# Patient Record
Sex: FEMALE | Race: AMERICAN INDIAN OR ALASKA NATIVE | NOT HISPANIC OR LATINO | URBAN - METROPOLITAN AREA
[De-identification: names, ages, dates, MRNs, and addresses within clinical notes are randomized per-mention and may not be internally consistent; named-entity substitution may affect disease eponyms.]

---

## 2017-11-02 ENCOUNTER — INPATIENT (INPATIENT)
Facility: HOSPITAL | Age: 62
LOS: 0 days | Discharge: HOME | End: 2017-11-03
Attending: INTERNAL MEDICINE

## 2017-11-02 DIAGNOSIS — C34.91 MALIGNANT NEOPLASM OF UNSPECIFIED PART OF RIGHT BRONCHUS OR LUNG: ICD-10-CM

## 2017-11-02 DIAGNOSIS — J90 PLEURAL EFFUSION, NOT ELSEWHERE CLASSIFIED: ICD-10-CM

## 2017-11-06 DIAGNOSIS — Z82.49 FAMILY HISTORY OF ISCHEMIC HEART DISEASE AND OTHER DISEASES OF THE CIRCULATORY SYSTEM: ICD-10-CM

## 2017-11-06 DIAGNOSIS — Z77.22 CONTACT WITH AND (SUSPECTED) EXPOSURE TO ENVIRONMENTAL TOBACCO SMOKE (ACUTE) (CHRONIC): ICD-10-CM

## 2017-11-06 DIAGNOSIS — Z83.3 FAMILY HISTORY OF DIABETES MELLITUS: ICD-10-CM

## 2017-11-06 DIAGNOSIS — J90 PLEURAL EFFUSION, NOT ELSEWHERE CLASSIFIED: ICD-10-CM

## 2017-11-06 DIAGNOSIS — R91.1 SOLITARY PULMONARY NODULE: ICD-10-CM

## 2017-11-06 DIAGNOSIS — Z82.3 FAMILY HISTORY OF STROKE: ICD-10-CM

## 2017-11-06 DIAGNOSIS — R05 COUGH: ICD-10-CM

## 2017-11-17 ENCOUNTER — APPOINTMENT (OUTPATIENT)
Dept: PULMONOLOGY | Facility: CLINIC | Age: 62
End: 2017-11-17

## 2017-11-17 ENCOUNTER — OUTPATIENT (OUTPATIENT)
Dept: OUTPATIENT SERVICES | Facility: HOSPITAL | Age: 62
LOS: 1 days | Discharge: HOME | End: 2017-11-17

## 2017-11-17 VITALS
HEART RATE: 98 BPM | BODY MASS INDEX: 30.16 KG/M2 | HEIGHT: 65 IN | SYSTOLIC BLOOD PRESSURE: 108 MMHG | DIASTOLIC BLOOD PRESSURE: 79 MMHG | WEIGHT: 181 LBS

## 2017-11-17 DIAGNOSIS — J90 PLEURAL EFFUSION, NOT ELSEWHERE CLASSIFIED: ICD-10-CM

## 2017-11-17 DIAGNOSIS — Z78.9 OTHER SPECIFIED HEALTH STATUS: ICD-10-CM

## 2017-11-24 ENCOUNTER — APPOINTMENT (OUTPATIENT)
Dept: HEMATOLOGY ONCOLOGY | Facility: CLINIC | Age: 62
End: 2017-11-24

## 2017-11-24 ENCOUNTER — OUTPATIENT (OUTPATIENT)
Dept: OUTPATIENT SERVICES | Facility: HOSPITAL | Age: 62
LOS: 1 days | Discharge: HOME | End: 2017-11-24

## 2017-11-24 VITALS
HEIGHT: 65 IN | HEART RATE: 105 BPM | RESPIRATION RATE: 16 BRPM | TEMPERATURE: 96.8 F | DIASTOLIC BLOOD PRESSURE: 76 MMHG | BODY MASS INDEX: 30.32 KG/M2 | WEIGHT: 182 LBS | SYSTOLIC BLOOD PRESSURE: 111 MMHG

## 2017-11-24 DIAGNOSIS — J90 PLEURAL EFFUSION, NOT ELSEWHERE CLASSIFIED: ICD-10-CM

## 2017-11-24 DIAGNOSIS — R06.00 DYSPNEA, UNSPECIFIED: ICD-10-CM

## 2017-11-24 LAB
BASOPHILS # BLD: 0.02 TH/MM3
BASOPHILS NFR BLD: 0.2 %
EOSINOPHIL # BLD: 0 TH/MM3
EOSINOPHIL NFR BLD: 0 %
ERYTHROCYTE [DISTWIDTH] IN BLOOD BY AUTOMATED COUNT: 13.4 %
GRANULOCYTES # BLD: 6.52 TH/MM3
GRANULOCYTES NFR BLD: 75.8 %
HCT VFR BLD AUTO: 48.6 %
HGB BLD-MCNC: 15.8 G/DL
IMM GRANULOCYTES # BLD: 0.02 TH/MM3
IMM GRANULOCYTES NFR BLD: 0.2 %
LYMPHOCYTES # BLD: 1.41 TH/MM3
LYMPHOCYTES NFR BLD: 16.4 %
MCH RBC QN AUTO: 29.2 PG
MCHC RBC AUTO-ENTMCNC: 32.5 G/DL
MCV RBC AUTO: 89.8 FL
MONOCYTES # BLD: 0.64 TH/MM3
MONOCYTES NFR BLD: 7.4 %
PLATELET # BLD: 376 TH/MM3
PMV BLD AUTO: 9.5 FL
RBC # BLD AUTO: 5.41 MIL/MM3
WBC # BLD: 8.61 TH/MM3

## 2017-11-27 ENCOUNTER — OUTPATIENT (OUTPATIENT)
Dept: OUTPATIENT SERVICES | Facility: HOSPITAL | Age: 62
LOS: 1 days | Discharge: HOME | End: 2017-11-27

## 2017-11-27 DIAGNOSIS — J90 PLEURAL EFFUSION, NOT ELSEWHERE CLASSIFIED: ICD-10-CM

## 2017-11-27 LAB
ALBUMIN SERPL-MCNC: 3.9 G/DL
ALBUMIN/GLOB SERPL: 1.5
ALP SERPL-CCNC: 82 IU/L
ALT SERPL-CCNC: 23 IU/L
ANION GAP SERPL CALC-SCNC: 10 MEQ/L
APTT PPP: 31.5 SEC
AST SERPL-CCNC: 23 IU/L
BILIRUB SERPL-MCNC: 0.8 MG/DL
BUN SERPL-MCNC: 10 MG/DL
BUN/CREAT SERPL: 16.1 %
CALCIUM SERPL-MCNC: 9.3 MG/DL
CANCER AG125 SERPL-ACNC: 638 U/ML
CANCER AG19-9 SERPL-ACNC: 16 U/ML
CEA SERPL-MCNC: 3.6 NG/ML
CHLORIDE SERPL-SCNC: 100 MEQ/L
CO2 SERPL-SCNC: 27 MEQ/L
CREAT SERPL-MCNC: 0.62 MG/DL
GFR SERPL CREATININE-BSD FRML MDRD: 98
GLUCOSE SERPL-MCNC: 84 MG/DL
INR PPP: 1.1
POTASSIUM SERPL-SCNC: 4.6 MMOL/L
PROT SERPL-MCNC: 6.5 G/DL
PROTHROMBIN TIME: 11.5 SEC
SODIUM SERPL-SCNC: 137 MEQ/L

## 2017-11-29 ENCOUNTER — APPOINTMENT (OUTPATIENT)
Dept: INTERNAL MEDICINE | Facility: CLINIC | Age: 62
End: 2017-11-29

## 2017-11-29 ENCOUNTER — OUTPATIENT (OUTPATIENT)
Dept: OUTPATIENT SERVICES | Facility: HOSPITAL | Age: 62
LOS: 1 days | Discharge: HOME | End: 2017-11-29

## 2017-11-29 DIAGNOSIS — J90 PLEURAL EFFUSION, NOT ELSEWHERE CLASSIFIED: ICD-10-CM

## 2017-11-29 DIAGNOSIS — C80.1 MALIGNANT (PRIMARY) NEOPLASM, UNSPECIFIED: ICD-10-CM

## 2017-12-01 ENCOUNTER — APPOINTMENT (OUTPATIENT)
Dept: HEMATOLOGY ONCOLOGY | Facility: CLINIC | Age: 62
End: 2017-12-01

## 2017-12-01 VITALS
SYSTOLIC BLOOD PRESSURE: 113 MMHG | WEIGHT: 172 LBS | TEMPERATURE: 96.6 F | DIASTOLIC BLOOD PRESSURE: 77 MMHG | RESPIRATION RATE: 16 BRPM | HEIGHT: 65 IN | HEART RATE: 88 BPM | BODY MASS INDEX: 28.66 KG/M2

## 2017-12-04 DIAGNOSIS — J90 PLEURAL EFFUSION, NOT ELSEWHERE CLASSIFIED: ICD-10-CM

## 2017-12-06 ENCOUNTER — APPOINTMENT (OUTPATIENT)
Dept: HEMATOLOGY ONCOLOGY | Facility: CLINIC | Age: 62
End: 2017-12-06

## 2017-12-07 ENCOUNTER — OUTPATIENT (OUTPATIENT)
Dept: OUTPATIENT SERVICES | Facility: HOSPITAL | Age: 62
LOS: 1 days | Discharge: HOME | End: 2017-12-07

## 2017-12-07 DIAGNOSIS — J90 PLEURAL EFFUSION, NOT ELSEWHERE CLASSIFIED: ICD-10-CM

## 2017-12-07 DIAGNOSIS — C80.1 MALIGNANT (PRIMARY) NEOPLASM, UNSPECIFIED: ICD-10-CM

## 2017-12-08 ENCOUNTER — RX RENEWAL (OUTPATIENT)
Age: 62
End: 2017-12-08

## 2017-12-12 ENCOUNTER — OUTPATIENT (OUTPATIENT)
Dept: OUTPATIENT SERVICES | Facility: HOSPITAL | Age: 62
LOS: 1 days | Discharge: HOME | End: 2017-12-12

## 2017-12-12 DIAGNOSIS — J90 PLEURAL EFFUSION, NOT ELSEWHERE CLASSIFIED: ICD-10-CM

## 2017-12-12 DIAGNOSIS — R05 COUGH: ICD-10-CM

## 2017-12-13 ENCOUNTER — OUTPATIENT (OUTPATIENT)
Dept: OUTPATIENT SERVICES | Facility: HOSPITAL | Age: 62
LOS: 1 days | Discharge: HOME | End: 2017-12-13

## 2017-12-13 DIAGNOSIS — J91.0 MALIGNANT PLEURAL EFFUSION: ICD-10-CM

## 2017-12-13 DIAGNOSIS — C77.1 SECONDARY AND UNSPECIFIED MALIGNANT NEOPLASM OF INTRATHORACIC LYMPH NODES: ICD-10-CM

## 2017-12-13 DIAGNOSIS — J90 PLEURAL EFFUSION, NOT ELSEWHERE CLASSIFIED: ICD-10-CM

## 2017-12-14 ENCOUNTER — INPATIENT (INPATIENT)
Facility: HOSPITAL | Age: 62
LOS: 6 days | Discharge: HOME HEALTH | End: 2017-12-21
Attending: INTERNAL MEDICINE | Admitting: INTERNAL MEDICINE

## 2017-12-14 DIAGNOSIS — J90 PLEURAL EFFUSION, NOT ELSEWHERE CLASSIFIED: ICD-10-CM

## 2017-12-21 ENCOUNTER — APPOINTMENT (OUTPATIENT)
Dept: HEMATOLOGY ONCOLOGY | Facility: CLINIC | Age: 62
End: 2017-12-21

## 2017-12-22 ENCOUNTER — APPOINTMENT (OUTPATIENT)
Dept: INFUSION THERAPY | Facility: CLINIC | Age: 62
End: 2017-12-22

## 2017-12-27 DIAGNOSIS — R55 SYNCOPE AND COLLAPSE: ICD-10-CM

## 2017-12-27 DIAGNOSIS — N84.1 POLYP OF CERVIX UTERI: ICD-10-CM

## 2017-12-27 DIAGNOSIS — C78.00 SECONDARY MALIGNANT NEOPLASM OF UNSPECIFIED LUNG: ICD-10-CM

## 2017-12-27 DIAGNOSIS — S06.6X0A TRAUMATIC SUBARACHNOID HEMORRHAGE WITHOUT LOSS OF CONSCIOUSNESS, INITIAL ENCOUNTER: ICD-10-CM

## 2017-12-27 DIAGNOSIS — R10.13 EPIGASTRIC PAIN: ICD-10-CM

## 2017-12-27 DIAGNOSIS — T50.8X5A ADVERSE EFFECT OF DIAGNOSTIC AGENTS, INITIAL ENCOUNTER: ICD-10-CM

## 2017-12-27 DIAGNOSIS — G50.0 TRIGEMINAL NEURALGIA: ICD-10-CM

## 2017-12-27 DIAGNOSIS — J91.0 MALIGNANT PLEURAL EFFUSION: ICD-10-CM

## 2017-12-27 DIAGNOSIS — R13.10 DYSPHAGIA, UNSPECIFIED: ICD-10-CM

## 2017-12-27 DIAGNOSIS — K59.00 CONSTIPATION, UNSPECIFIED: ICD-10-CM
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
## 2017-12-27 DIAGNOSIS — K52.1 TOXIC GASTROENTERITIS AND COLITIS: ICD-10-CM
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
## 2017-12-27 DIAGNOSIS — D25.9 LEIOMYOMA OF UTERUS, UNSPECIFIED: ICD-10-CM

## 2017-12-28 ENCOUNTER — APPOINTMENT (OUTPATIENT)
Dept: HEMATOLOGY ONCOLOGY | Facility: CLINIC | Age: 62
End: 2017-12-28

## 2017-12-28 DIAGNOSIS — C79.82 SECONDARY MALIGNANT NEOPLASM OF GENITAL ORGANS: ICD-10-CM

## 2017-12-28 DIAGNOSIS — C80.1 MALIGNANT (PRIMARY) NEOPLASM, UNSPECIFIED: ICD-10-CM

## 2017-12-29 LAB
BASOPHILS # BLD: 0.06 TH/MM3
BASOPHILS NFR BLD: 1.3 %
EOSINOPHIL # BLD: 0.11 TH/MM3
EOSINOPHIL NFR BLD: 2.3 %
ERYTHROCYTE [DISTWIDTH] IN BLOOD BY AUTOMATED COUNT: 13.4 %
GRANULOCYTES # BLD: 2.38 TH/MM3
GRANULOCYTES NFR BLD: 49.7 %
HCT VFR BLD AUTO: 44.9 %
HGB BLD-MCNC: 14.7 G/DL
IMM GRANULOCYTES # BLD: 0.08 TH/MM3
IMM GRANULOCYTES NFR BLD: 1.7 %
LYMPHOCYTES # BLD: 1.28 TH/MM3
LYMPHOCYTES NFR BLD: 26.8 %
MCH RBC QN AUTO: 29.7 PG
MCHC RBC AUTO-ENTMCNC: 32.7 G/DL
MCV RBC AUTO: 90.7 FL
MONOCYTES # BLD: 0.87 TH/MM3
MONOCYTES NFR BLD: 18.2 %
PLATELET # BLD: 275 TH/MM3
PMV BLD AUTO: 9.1 FL
RBC # BLD AUTO: 4.95 MIL/MM3
WBC # BLD: 4.78 TH/MM3

## 2018-01-02 ENCOUNTER — OUTPATIENT (OUTPATIENT)
Dept: OUTPATIENT SERVICES | Facility: HOSPITAL | Age: 63
LOS: 1 days | Discharge: HOME | End: 2018-01-02

## 2018-01-02 ENCOUNTER — APPOINTMENT (OUTPATIENT)
Dept: INTERNAL MEDICINE | Facility: CLINIC | Age: 63
End: 2018-01-02

## 2018-01-02 VITALS
DIASTOLIC BLOOD PRESSURE: 75 MMHG | HEIGHT: 65 IN | WEIGHT: 165 LBS | HEART RATE: 98 BPM | SYSTOLIC BLOOD PRESSURE: 107 MMHG | BODY MASS INDEX: 27.49 KG/M2

## 2018-01-02 DIAGNOSIS — J90 PLEURAL EFFUSION, NOT ELSEWHERE CLASSIFIED: ICD-10-CM

## 2018-01-02 DIAGNOSIS — Z83.3 FAMILY HISTORY OF DIABETES MELLITUS: ICD-10-CM

## 2018-01-02 DIAGNOSIS — C55 MALIGNANT NEOPLASM OF UTERUS, PART UNSPECIFIED: ICD-10-CM

## 2018-01-02 DIAGNOSIS — Z82.49 FAMILY HISTORY OF ISCHEMIC HEART DISEASE AND OTHER DISEASES OF THE CIRCULATORY SYSTEM: ICD-10-CM

## 2018-01-02 DIAGNOSIS — Z82.3 FAMILY HISTORY OF STROKE: ICD-10-CM

## 2018-01-02 DIAGNOSIS — Z00.01 ENCOUNTER FOR GENERAL ADULT MEDICAL EXAMINATION WITH ABNORMAL FINDINGS: ICD-10-CM

## 2018-01-02 DIAGNOSIS — Z23 ENCOUNTER FOR IMMUNIZATION: ICD-10-CM

## 2018-01-02 RX ORDER — DOCUSATE SODIUM 100 MG/1
100 CAPSULE, LIQUID FILLED ORAL
Qty: 60 | Refills: 0 | Status: COMPLETED | COMMUNITY
Start: 2017-12-21

## 2018-01-05 ENCOUNTER — APPOINTMENT (OUTPATIENT)
Dept: INFUSION THERAPY | Facility: CLINIC | Age: 63
End: 2018-01-05

## 2018-01-05 ENCOUNTER — RESULT REVIEW (OUTPATIENT)
Age: 63
End: 2018-01-05

## 2018-01-05 ENCOUNTER — APPOINTMENT (OUTPATIENT)
Dept: HEMATOLOGY ONCOLOGY | Facility: CLINIC | Age: 63
End: 2018-01-05

## 2018-01-05 VITALS
WEIGHT: 164 LBS | SYSTOLIC BLOOD PRESSURE: 107 MMHG | HEART RATE: 103 BPM | HEIGHT: 65 IN | RESPIRATION RATE: 16 BRPM | BODY MASS INDEX: 27.32 KG/M2 | TEMPERATURE: 97.1 F | DIASTOLIC BLOOD PRESSURE: 76 MMHG

## 2018-01-08 ENCOUNTER — OUTPATIENT (OUTPATIENT)
Dept: OUTPATIENT SERVICES | Facility: HOSPITAL | Age: 63
LOS: 1 days | Discharge: HOME | End: 2018-01-08

## 2018-01-08 DIAGNOSIS — J81.0 ACUTE PULMONARY EDEMA: ICD-10-CM

## 2018-01-08 DIAGNOSIS — S06.6X9A TRAUMATIC SUBARACHNOID HEMORRHAGE WITH LOSS OF CONSCIOUSNESS OF UNSPECIFIED DURATION, INITIAL ENCOUNTER: ICD-10-CM

## 2018-01-08 DIAGNOSIS — J90 PLEURAL EFFUSION, NOT ELSEWHERE CLASSIFIED: ICD-10-CM

## 2018-01-08 DIAGNOSIS — J91.0 MALIGNANT PLEURAL EFFUSION: ICD-10-CM

## 2018-01-08 DIAGNOSIS — I61.9 NONTRAUMATIC INTRACEREBRAL HEMORRHAGE, UNSPECIFIED: ICD-10-CM

## 2018-01-12 ENCOUNTER — APPOINTMENT (OUTPATIENT)
Dept: HEMATOLOGY ONCOLOGY | Facility: CLINIC | Age: 63
End: 2018-01-12

## 2018-01-19 ENCOUNTER — APPOINTMENT (OUTPATIENT)
Dept: PULMONOLOGY | Facility: CLINIC | Age: 63
End: 2018-01-19

## 2018-01-19 ENCOUNTER — OUTPATIENT (OUTPATIENT)
Dept: OUTPATIENT SERVICES | Facility: HOSPITAL | Age: 63
LOS: 1 days | Discharge: HOME | End: 2018-01-19

## 2018-01-19 ENCOUNTER — APPOINTMENT (OUTPATIENT)
Dept: HEMATOLOGY ONCOLOGY | Facility: CLINIC | Age: 63
End: 2018-01-19

## 2018-01-19 VITALS
SYSTOLIC BLOOD PRESSURE: 115 MMHG | HEIGHT: 65 IN | OXYGEN SATURATION: 97 % | HEART RATE: 109 BPM | DIASTOLIC BLOOD PRESSURE: 83 MMHG | WEIGHT: 164 LBS | BODY MASS INDEX: 27.32 KG/M2

## 2018-01-19 DIAGNOSIS — J90 PLEURAL EFFUSION, NOT ELSEWHERE CLASSIFIED: ICD-10-CM

## 2018-01-19 LAB
ALBUMIN SERPL-MCNC: 3.8 G/DL
ALBUMIN SERPL-MCNC: 3.8 G/DL
ALBUMIN/GLOB SERPL: 1.23
ALBUMIN/GLOB SERPL: 1.46
ALP SERPL-CCNC: 103 IU/L
ALP SERPL-CCNC: 82 IU/L
ALT SERPL-CCNC: 26 IU/L
ALT SERPL-CCNC: 37 IU/L
ANION GAP SERPL CALC-SCNC: 10 MEQ/L
ANION GAP SERPL CALC-SCNC: 7 MEQ/L
APPEARANCE UR: NORMAL
AST SERPL-CCNC: 20 IU/L
AST SERPL-CCNC: 23 IU/L
BACTERIA URNS QL MICRO: ABNORMAL
BASOPHILS # BLD: 0.01 TH/MM3
BASOPHILS # BLD: 0.02 TH/MM3
BASOPHILS # BLD: 0.02 TH/MM3
BASOPHILS NFR BLD: 0.1 %
BASOPHILS NFR BLD: 0.5 %
BASOPHILS NFR BLD: 0.6 %
BILIRUB SERPL-MCNC: 0.5 MG/DL
BILIRUB SERPL-MCNC: 0.5 MG/DL
BILIRUB UR QL STRIP: NEGATIVE
BUN SERPL-MCNC: 12 MG/DL
BUN SERPL-MCNC: 6 MG/DL
BUN/CREAT SERPL: 16.2 %
BUN/CREAT SERPL: 6.2 %
CALCIUM SERPL-MCNC: 9.6 MG/DL
CALCIUM SERPL-MCNC: 9.8 MG/DL
CHLORIDE SERPL-SCNC: 102 MEQ/L
CHLORIDE SERPL-SCNC: 104 MEQ/L
CO2 SERPL-SCNC: 27 MEQ/L
CO2 SERPL-SCNC: 29 MEQ/L
COLOR UR: YELLOW
CREAT SERPL-MCNC: 0.74 MG/DL
CREAT SERPL-MCNC: 0.97 MG/DL
EOSINOPHIL # BLD: 0 TH/MM3
EOSINOPHIL # BLD: 0 TH/MM3
EOSINOPHIL # BLD: 0.01 TH/MM3
EOSINOPHIL NFR BLD: 0 %
EOSINOPHIL NFR BLD: 0 %
EOSINOPHIL NFR BLD: 0.1 %
ERYTHROCYTE [DISTWIDTH] IN BLOOD BY AUTOMATED COUNT: 13.4 %
ERYTHROCYTE [DISTWIDTH] IN BLOOD BY AUTOMATED COUNT: 14 %
ERYTHROCYTE [DISTWIDTH] IN BLOOD BY AUTOMATED COUNT: 14.3 %
GFR SERPL CREATININE-BSD FRML MDRD: 58
GFR SERPL CREATININE-BSD FRML MDRD: 80
GLUCOSE SERPL-MCNC: 80 MG/DL
GLUCOSE SERPL-MCNC: 94 MG/DL
GLUCOSE UR STRIP-MCNC: NEGATIVE MG/DL
GRANULOCYTES # BLD: 1.32 TH/MM3
GRANULOCYTES # BLD: 2.81 TH/MM3
GRANULOCYTES # BLD: 5.89 TH/MM3
GRANULOCYTES NFR BLD: 36.6 %
GRANULOCYTES NFR BLD: 67.4 %
GRANULOCYTES NFR BLD: 79.6 %
HCT VFR BLD AUTO: 34.7 %
HCT VFR BLD AUTO: 39.3 %
HCT VFR BLD AUTO: 42.7 %
HGB BLD-MCNC: 12.5 G/DL
HGB BLD-MCNC: 13.2 G/DL
HGB BLD-MCNC: 13.8 G/DL
HGB UR QL STRIP: NEGATIVE
IMM GRANULOCYTES # BLD: 0.03 TH/MM3
IMM GRANULOCYTES # BLD: 0.05 TH/MM3
IMM GRANULOCYTES # BLD: 0.07 TH/MM3
IMM GRANULOCYTES NFR BLD: 0.7 %
IMM GRANULOCYTES NFR BLD: 0.7 %
IMM GRANULOCYTES NFR BLD: 1.9 %
KETONES UR STRIP-MCNC: NEGATIVE MG/DL
LYMPHOCYTES # BLD: 0.95 TH/MM3
LYMPHOCYTES # BLD: 1.1 TH/MM3
LYMPHOCYTES # BLD: 1.28 TH/MM3
LYMPHOCYTES NFR BLD: 12.8 %
LYMPHOCYTES NFR BLD: 26.4 %
LYMPHOCYTES NFR BLD: 35.6 %
MAGNESIUM SERPL-MCNC: 2.5 MG/DL
MCH RBC QN AUTO: 29.5 PG
MCH RBC QN AUTO: 29.9 PG
MCH RBC QN AUTO: 30.1 PG
MCHC RBC AUTO-ENTMCNC: 32.3 G/DL
MCHC RBC AUTO-ENTMCNC: 33.6 G/DL
MCHC RBC AUTO-ENTMCNC: 36 G/DL
MCV RBC AUTO: 83 FL
MCV RBC AUTO: 89.5 FL
MCV RBC AUTO: 91.2 FL
MONOCYTES # BLD: 0.21 TH/MM3
MONOCYTES # BLD: 0.5 TH/MM3
MONOCYTES # BLD: 0.91 TH/MM3
MONOCYTES NFR BLD: 25.3 %
MONOCYTES NFR BLD: 5 %
MONOCYTES NFR BLD: 6.7 %
NITRITE UR QL STRIP: NEGATIVE
PH UR STRIP: 7
PLATELET # BLD: 209 TH/MM3
PLATELET # BLD: 264 TH/MM3
PLATELET # BLD: 299 TH/MM3
PMV BLD AUTO: 8.9 FL
PMV BLD AUTO: 9 FL
PMV BLD AUTO: 9.6 FL
POTASSIUM SERPL-SCNC: 4.9 MMOL/L
POTASSIUM SERPL-SCNC: 5 MMOL/L
PROT SERPL-MCNC: 6.4 G/DL
PROT SERPL-MCNC: 6.9 G/DL
PROT UR STRIP-MCNC: NEGATIVE MG/DL
RBC # BLD AUTO: 4.18 MIL/MM3
RBC # BLD AUTO: 4.39 MIL/MM3
RBC # BLD AUTO: 4.68 MIL/MM3
SODIUM SERPL-SCNC: 139 MEQ/L
SODIUM SERPL-SCNC: 140 MEQ/L
SP GR UR STRIP: 1.01
URINE COMP/EPITH (NORTH): ABNORMAL
UROBILINOGEN UR STRIP-MCNC: 1 MG/DL
WBC # BLD: 3.6 TH/MM3
WBC # BLD: 4.17 TH/MM3
WBC # BLD: 7.41 TH/MM3
WBC URNS QL MICRO: ABNORMAL
WBC URNS QL MICRO: ABNORMAL P/HPF

## 2018-01-26 ENCOUNTER — RX RENEWAL (OUTPATIENT)
Age: 63
End: 2018-01-26

## 2018-01-26 ENCOUNTER — APPOINTMENT (OUTPATIENT)
Dept: HEMATOLOGY ONCOLOGY | Facility: CLINIC | Age: 63
End: 2018-01-26

## 2018-01-26 ENCOUNTER — APPOINTMENT (OUTPATIENT)
Dept: INFUSION THERAPY | Facility: CLINIC | Age: 63
End: 2018-01-26

## 2018-01-26 ENCOUNTER — RESULT REVIEW (OUTPATIENT)
Age: 63
End: 2018-01-26

## 2018-01-26 VITALS
TEMPERATURE: 97.3 F | HEART RATE: 97 BPM | BODY MASS INDEX: 28.32 KG/M2 | WEIGHT: 170 LBS | SYSTOLIC BLOOD PRESSURE: 126 MMHG | DIASTOLIC BLOOD PRESSURE: 73 MMHG | RESPIRATION RATE: 16 BRPM | HEIGHT: 65 IN

## 2018-02-01 LAB
ALBUMIN SERPL-MCNC: 3.7 G/DL
ALBUMIN/GLOB SERPL: 1.61
ALP SERPL-CCNC: 71 IU/L
ALT SERPL-CCNC: 37 IU/L
ANION GAP SERPL CALC-SCNC: 8 MEQ/L
APTT PPP: 30 SEC
AST SERPL-CCNC: 75 IU/L
BASOPHILS # BLD: 0.02 TH/MM3
BASOPHILS NFR BLD: 0.3 %
BILIRUB SERPL-MCNC: 1.9 MG/DL
BUN SERPL-MCNC: 14 MG/DL
BUN/CREAT SERPL: 17.1 %
CALCIUM SERPL-MCNC: 9.5 MG/DL
CHLORIDE SERPL-SCNC: 102 MEQ/L
CO2 SERPL-SCNC: 26 MEQ/L
CREAT SERPL-MCNC: 0.82 MG/DL
EOSINOPHIL # BLD: 0 TH/MM3
EOSINOPHIL NFR BLD: 0 %
ERYTHROCYTE [DISTWIDTH] IN BLOOD BY AUTOMATED COUNT: 15.4 %
GFR SERPL CREATININE-BSD FRML MDRD: 71
GLUCOSE SERPL-MCNC: 81 MG/DL
GRANULOCYTES # BLD: 5.52 TH/MM3
GRANULOCYTES NFR BLD: 72 %
HCT VFR BLD AUTO: 36 %
HGB BLD-MCNC: 12.9 G/DL
IMM GRANULOCYTES # BLD: 0.05 TH/MM3
IMM GRANULOCYTES NFR BLD: 0.7 %
INR PPP: 1
LYMPHOCYTES # BLD: 1.27 TH/MM3
LYMPHOCYTES NFR BLD: 16.6 %
MAGNESIUM SERPL-MCNC: 2.4 MG/DL
MCH RBC QN AUTO: 30.5 PG
MCHC RBC AUTO-ENTMCNC: 35.8 G/DL
MCV RBC AUTO: 85.1 FL
MONOCYTES # BLD: 0.8 TH/MM3
MONOCYTES NFR BLD: 10.4 %
PLATELET # BLD: 237 TH/MM3
PMV BLD AUTO: 8.5 FL
POTASSIUM SERPL-SCNC: 5.9 MMOL/L
PROT SERPL-MCNC: 6 G/DL
PROTHROMBIN TIME: 10.3 SEC
RBC # BLD AUTO: 4.23 MIL/MM3
SODIUM SERPL-SCNC: 136 MEQ/L
WBC # BLD: 7.66 TH/MM3

## 2018-02-02 ENCOUNTER — OUTPATIENT (OUTPATIENT)
Dept: OUTPATIENT SERVICES | Facility: HOSPITAL | Age: 63
LOS: 1 days | Discharge: HOME | End: 2018-02-02

## 2018-02-02 DIAGNOSIS — C54.1 MALIGNANT NEOPLASM OF ENDOMETRIUM: ICD-10-CM

## 2018-02-08 ENCOUNTER — OUTPATIENT (OUTPATIENT)
Dept: OUTPATIENT SERVICES | Facility: HOSPITAL | Age: 63
LOS: 1 days | Discharge: HOME | End: 2018-02-08

## 2018-02-08 VITALS
HEIGHT: 65 IN | HEART RATE: 91 BPM | TEMPERATURE: 99 F | SYSTOLIC BLOOD PRESSURE: 109 MMHG | RESPIRATION RATE: 20 BRPM | WEIGHT: 164.02 LBS | DIASTOLIC BLOOD PRESSURE: 76 MMHG

## 2018-02-08 VITALS — RESPIRATION RATE: 20 BRPM | HEART RATE: 90 BPM | SYSTOLIC BLOOD PRESSURE: 111 MMHG | DIASTOLIC BLOOD PRESSURE: 80 MMHG

## 2018-02-08 DIAGNOSIS — C34.90 MALIGNANT NEOPLASM OF UNSPECIFIED PART OF UNSPECIFIED BRONCHUS OR LUNG: ICD-10-CM

## 2018-02-08 DIAGNOSIS — J91.0 MALIGNANT PLEURAL EFFUSION: ICD-10-CM

## 2018-02-09 ENCOUNTER — RX RENEWAL (OUTPATIENT)
Age: 63
End: 2018-02-09

## 2018-02-16 ENCOUNTER — APPOINTMENT (OUTPATIENT)
Dept: INFUSION THERAPY | Facility: CLINIC | Age: 63
End: 2018-02-16

## 2018-02-16 ENCOUNTER — LABORATORY RESULT (OUTPATIENT)
Age: 63
End: 2018-02-16

## 2018-02-16 ENCOUNTER — APPOINTMENT (OUTPATIENT)
Dept: HEMATOLOGY ONCOLOGY | Facility: CLINIC | Age: 63
End: 2018-02-16

## 2018-02-16 VITALS
WEIGHT: 170 LBS | TEMPERATURE: 97.1 F | SYSTOLIC BLOOD PRESSURE: 113 MMHG | HEIGHT: 65 IN | RESPIRATION RATE: 16 BRPM | HEART RATE: 93 BPM | BODY MASS INDEX: 28.32 KG/M2 | DIASTOLIC BLOOD PRESSURE: 69 MMHG

## 2018-02-16 RX ORDER — FOSAPREPITANT DIMEGLUMINE 150 MG/5ML
150 INJECTION, POWDER, LYOPHILIZED, FOR SOLUTION INTRAVENOUS ONCE
Qty: 0 | Refills: 0 | Status: COMPLETED | OUTPATIENT
Start: 2018-02-16 | End: 2018-02-16

## 2018-02-16 RX ORDER — DEXAMETHASONE 0.5 MG/5ML
12 ELIXIR ORAL ONCE
Qty: 0 | Refills: 0 | Status: COMPLETED | OUTPATIENT
Start: 2018-02-16 | End: 2018-02-16

## 2018-02-16 RX ORDER — ONDANSETRON 8 MG/1
16 TABLET, FILM COATED ORAL ONCE
Qty: 0 | Refills: 0 | Status: COMPLETED | OUTPATIENT
Start: 2018-02-16 | End: 2018-02-16

## 2018-02-16 RX ORDER — FAMOTIDINE 10 MG/ML
20 INJECTION INTRAVENOUS ONCE
Qty: 0 | Refills: 0 | Status: COMPLETED | OUTPATIENT
Start: 2018-02-16 | End: 2018-02-16

## 2018-02-16 RX ORDER — DIPHENHYDRAMINE HCL 50 MG
50 CAPSULE ORAL ONCE
Qty: 0 | Refills: 0 | Status: COMPLETED | OUTPATIENT
Start: 2018-02-16 | End: 2018-02-16

## 2018-02-16 RX ORDER — CARBOPLATIN 50 MG
555 VIAL (EA) INTRAVENOUS ONCE
Qty: 0 | Refills: 0 | Status: COMPLETED | OUTPATIENT
Start: 2018-02-16 | End: 2018-02-16

## 2018-02-16 RX ORDER — PACLITAXEL 6 MG/ML
325 INJECTION, SOLUTION, CONCENTRATE INTRAVENOUS ONCE
Qty: 0 | Refills: 0 | Status: COMPLETED | OUTPATIENT
Start: 2018-02-16 | End: 2018-02-16

## 2018-02-16 RX ADMIN — ONDANSETRON 174 MILLIGRAM(S): 8 TABLET, FILM COATED ORAL at 12:03

## 2018-02-16 RX ADMIN — FOSAPREPITANT DIMEGLUMINE 465 MILLIGRAM(S): 150 INJECTION, POWDER, LYOPHILIZED, FOR SOLUTION INTRAVENOUS at 12:03

## 2018-02-16 RX ADMIN — FAMOTIDINE 156 MILLIGRAM(S): 10 INJECTION INTRAVENOUS at 12:03

## 2018-02-16 RX ADMIN — Medication 305.5 MILLIGRAM(S): at 13:04

## 2018-02-16 RX ADMIN — Medication 159 MILLIGRAM(S): at 12:04

## 2018-02-16 RX ADMIN — Medication 153 MILLIGRAM(S): at 12:03

## 2018-02-16 RX ADMIN — PACLITAXEL 277.09 MILLIGRAM(S): 6 INJECTION, SOLUTION, CONCENTRATE INTRAVENOUS at 13:04

## 2018-02-22 LAB
ALBUMIN SERPL ELPH-MCNC: 4.1 G/DL
ALP BLD-CCNC: 85 U/L
ALT SERPL-CCNC: 21 U/L
ANION GAP SERPL CALC-SCNC: 17 MMOL/L
AST SERPL-CCNC: 40 U/L
BILIRUB SERPL-MCNC: 0.3 MG/DL
BUN SERPL-MCNC: 12 MG/DL
CALCIUM SERPL-MCNC: 9.8 MG/DL
CANCER AG125 SERPL-ACNC: 23 U/ML
CHLORIDE SERPL-SCNC: 103 MMOL/L
CO2 SERPL-SCNC: 21 MMOL/L
CREAT SERPL-MCNC: 0.76 MG/DL
GLUCOSE SERPL-MCNC: 83 MG/DL
HCT VFR BLD CALC: 33.7 %
HGB BLD-MCNC: 11.5 G/DL
MCHC RBC-ENTMCNC: 31.3 PG
MCHC RBC-ENTMCNC: 34.1 G/DL
MCV RBC AUTO: 91.6 FL
PLATELET # BLD AUTO: 269 K/UL
PMV BLD: 9 FL
POTASSIUM SERPL-SCNC: 5 MMOL/L
PROT SERPL-MCNC: 7.4 G/DL
RBC # BLD: 3.68 M/UL
RBC # FLD: 16.5 %
SODIUM SERPL-SCNC: 141 MMOL/L
WBC # FLD AUTO: 4.61 K/UL

## 2018-02-23 ENCOUNTER — APPOINTMENT (OUTPATIENT)
Dept: GYNECOLOGIC ONCOLOGY | Facility: CLINIC | Age: 63
End: 2018-02-23

## 2018-02-23 ENCOUNTER — OUTPATIENT (OUTPATIENT)
Dept: OUTPATIENT SERVICES | Facility: HOSPITAL | Age: 63
LOS: 1 days | Discharge: HOME | End: 2018-02-23

## 2018-02-23 VITALS
HEART RATE: 106 BPM | SYSTOLIC BLOOD PRESSURE: 106 MMHG | TEMPERATURE: 97.6 F | DIASTOLIC BLOOD PRESSURE: 72 MMHG | BODY MASS INDEX: 28.99 KG/M2 | WEIGHT: 174 LBS | HEIGHT: 65 IN | RESPIRATION RATE: 16 BRPM

## 2018-02-23 DIAGNOSIS — C54.1 MALIGNANT NEOPLASM OF ENDOMETRIUM: ICD-10-CM

## 2018-03-09 ENCOUNTER — LABORATORY RESULT (OUTPATIENT)
Age: 63
End: 2018-03-09

## 2018-03-09 ENCOUNTER — APPOINTMENT (OUTPATIENT)
Dept: HEMATOLOGY ONCOLOGY | Facility: CLINIC | Age: 63
End: 2018-03-09

## 2018-03-09 ENCOUNTER — APPOINTMENT (OUTPATIENT)
Dept: INFUSION THERAPY | Facility: CLINIC | Age: 63
End: 2018-03-09

## 2018-03-09 VITALS
HEIGHT: 65 IN | WEIGHT: 174 LBS | HEART RATE: 82 BPM | BODY MASS INDEX: 28.99 KG/M2 | TEMPERATURE: 97.2 F | DIASTOLIC BLOOD PRESSURE: 54 MMHG | SYSTOLIC BLOOD PRESSURE: 115 MMHG | RESPIRATION RATE: 16 BRPM

## 2018-03-09 RX ORDER — CARBOPLATIN 50 MG
600 VIAL (EA) INTRAVENOUS ONCE
Qty: 0 | Refills: 0 | Status: COMPLETED | OUTPATIENT
Start: 2018-03-09 | End: 2018-03-09

## 2018-03-09 RX ORDER — DEXAMETHASONE 0.5 MG/5ML
12 ELIXIR ORAL ONCE
Qty: 0 | Refills: 0 | Status: COMPLETED | OUTPATIENT
Start: 2018-03-09 | End: 2018-03-09

## 2018-03-09 RX ORDER — PACLITAXEL 6 MG/ML
280 INJECTION, SOLUTION, CONCENTRATE INTRAVENOUS ONCE
Qty: 0 | Refills: 0 | Status: COMPLETED | OUTPATIENT
Start: 2018-03-09 | End: 2018-03-09

## 2018-03-09 RX ORDER — DIPHENHYDRAMINE HCL 50 MG
50 CAPSULE ORAL ONCE
Qty: 0 | Refills: 0 | Status: COMPLETED | OUTPATIENT
Start: 2018-03-09 | End: 2018-03-09

## 2018-03-09 RX ORDER — FOSAPREPITANT DIMEGLUMINE 150 MG/5ML
150 INJECTION, POWDER, LYOPHILIZED, FOR SOLUTION INTRAVENOUS ONCE
Qty: 0 | Refills: 0 | Status: COMPLETED | OUTPATIENT
Start: 2018-03-09 | End: 2018-03-09

## 2018-03-09 RX ORDER — FAMOTIDINE 10 MG/ML
20 INJECTION INTRAVENOUS ONCE
Qty: 0 | Refills: 0 | Status: COMPLETED | OUTPATIENT
Start: 2018-03-09 | End: 2018-03-09

## 2018-03-09 RX ADMIN — Medication 183 MILLIGRAM(S): at 11:24

## 2018-03-09 RX ADMIN — Medication 153 MILLIGRAM(S): at 11:24

## 2018-03-09 RX ADMIN — FOSAPREPITANT DIMEGLUMINE 465 MILLIGRAM(S): 150 INJECTION, POWDER, LYOPHILIZED, FOR SOLUTION INTRAVENOUS at 11:25

## 2018-03-09 RX ADMIN — Medication 310 MILLIGRAM(S): at 13:25

## 2018-03-09 RX ADMIN — PACLITAXEL 182.22 MILLIGRAM(S): 6 INJECTION, SOLUTION, CONCENTRATE INTRAVENOUS at 13:25

## 2018-03-09 RX ADMIN — FAMOTIDINE 156 MILLIGRAM(S): 10 INJECTION INTRAVENOUS at 11:24

## 2018-03-12 LAB
ALBUMIN SERPL ELPH-MCNC: 3.9 G/DL
ALP BLD-CCNC: 76 U/L
ALT SERPL-CCNC: 27 U/L
ANION GAP SERPL CALC-SCNC: 15 MMOL/L
AST SERPL-CCNC: 27 U/L
BILIRUB SERPL-MCNC: 0.3 MG/DL
BUN SERPL-MCNC: 17 MG/DL
CALCIUM SERPL-MCNC: 9.3 MG/DL
CANCER AG125 SERPL-ACNC: 19 U/ML
CHLORIDE SERPL-SCNC: 103 MMOL/L
CO2 SERPL-SCNC: 25 MMOL/L
CREAT SERPL-MCNC: 0.8 MG/DL
GLUCOSE SERPL-MCNC: 85 MG/DL
HCT VFR BLD CALC: 34.3 %
HGB BLD-MCNC: 11.5 G/DL
MCHC RBC-ENTMCNC: 32 PG
MCHC RBC-ENTMCNC: 33.5 G/DL
MCV RBC AUTO: 95.5 FL
PLATELET # BLD AUTO: 259 K/UL
PMV BLD: 9.2 FL
POTASSIUM SERPL-SCNC: 4.4 MMOL/L
PROT SERPL-MCNC: 6.8 G/DL
RBC # BLD: 3.59 M/UL
RBC # FLD: 17.4 %
SODIUM SERPL-SCNC: 143 MMOL/L
WBC # FLD AUTO: 5.34 K/UL

## 2018-03-16 ENCOUNTER — OUTPATIENT (OUTPATIENT)
Dept: OUTPATIENT SERVICES | Facility: HOSPITAL | Age: 63
LOS: 1 days | Discharge: HOME | End: 2018-03-16

## 2018-03-16 DIAGNOSIS — J91.0 MALIGNANT PLEURAL EFFUSION: ICD-10-CM

## 2018-03-20 ENCOUNTER — OUTPATIENT (OUTPATIENT)
Dept: OUTPATIENT SERVICES | Facility: HOSPITAL | Age: 63
LOS: 1 days | Discharge: HOME | End: 2018-03-20

## 2018-03-20 DIAGNOSIS — C80.1 MALIGNANT (PRIMARY) NEOPLASM, UNSPECIFIED: ICD-10-CM

## 2018-03-30 ENCOUNTER — APPOINTMENT (OUTPATIENT)
Dept: HEMATOLOGY ONCOLOGY | Facility: CLINIC | Age: 63
End: 2018-03-30

## 2018-03-30 ENCOUNTER — APPOINTMENT (OUTPATIENT)
Dept: INFUSION THERAPY | Facility: CLINIC | Age: 63
End: 2018-03-30

## 2018-03-30 ENCOUNTER — OUTPATIENT (OUTPATIENT)
Dept: OUTPATIENT SERVICES | Facility: HOSPITAL | Age: 63
LOS: 1 days | Discharge: HOME | End: 2018-03-30

## 2018-03-30 ENCOUNTER — LABORATORY RESULT (OUTPATIENT)
Age: 63
End: 2018-03-30

## 2018-03-30 VITALS
DIASTOLIC BLOOD PRESSURE: 78 MMHG | SYSTOLIC BLOOD PRESSURE: 114 MMHG | HEIGHT: 65 IN | TEMPERATURE: 97.2 F | RESPIRATION RATE: 16 BRPM | WEIGHT: 174 LBS | HEART RATE: 85 BPM | BODY MASS INDEX: 28.99 KG/M2

## 2018-03-30 DIAGNOSIS — J91.0 MALIGNANT PLEURAL EFFUSION: ICD-10-CM

## 2018-03-30 DIAGNOSIS — R59.9 ENLARGED LYMPH NODES, UNSPECIFIED: ICD-10-CM

## 2018-03-30 DIAGNOSIS — C54.1 MALIGNANT NEOPLASM OF ENDOMETRIUM: ICD-10-CM

## 2018-03-30 DIAGNOSIS — C80.1 MALIGNANT (PRIMARY) NEOPLASM, UNSPECIFIED: ICD-10-CM

## 2018-03-30 DIAGNOSIS — C55 MALIGNANT NEOPLASM OF UTERUS, PART UNSPECIFIED: ICD-10-CM

## 2018-03-30 RX ORDER — DIPHENHYDRAMINE HCL 50 MG
50 CAPSULE ORAL ONCE
Qty: 0 | Refills: 0 | Status: COMPLETED | OUTPATIENT
Start: 2018-03-30 | End: 2018-03-30

## 2018-03-30 RX ORDER — FOSAPREPITANT DIMEGLUMINE 150 MG/5ML
150 INJECTION, POWDER, LYOPHILIZED, FOR SOLUTION INTRAVENOUS ONCE
Qty: 0 | Refills: 0 | Status: COMPLETED | OUTPATIENT
Start: 2018-03-30 | End: 2018-03-30

## 2018-03-30 RX ORDER — DEXAMETHASONE 0.5 MG/5ML
12 ELIXIR ORAL ONCE
Qty: 0 | Refills: 0 | Status: COMPLETED | OUTPATIENT
Start: 2018-03-30 | End: 2018-03-30

## 2018-03-30 RX ORDER — PACLITAXEL 6 MG/ML
185 INJECTION, SOLUTION, CONCENTRATE INTRAVENOUS ONCE
Qty: 0 | Refills: 0 | Status: COMPLETED | OUTPATIENT
Start: 2018-03-30 | End: 2018-03-30

## 2018-03-30 RX ORDER — FAMOTIDINE 10 MG/ML
20 INJECTION INTRAVENOUS ONCE
Qty: 0 | Refills: 0 | Status: COMPLETED | OUTPATIENT
Start: 2018-03-30 | End: 2018-03-30

## 2018-03-30 RX ORDER — CARBOPLATIN 50 MG
580 VIAL (EA) INTRAVENOUS ONCE
Qty: 0 | Refills: 0 | Status: COMPLETED | OUTPATIENT
Start: 2018-03-30 | End: 2018-03-30

## 2018-03-30 RX ADMIN — FOSAPREPITANT DIMEGLUMINE 465 MILLIGRAM(S): 150 INJECTION, POWDER, LYOPHILIZED, FOR SOLUTION INTRAVENOUS at 10:34

## 2018-03-30 RX ADMIN — Medication 183 MILLIGRAM(S): at 10:34

## 2018-03-30 RX ADMIN — Medication 153 MILLIGRAM(S): at 10:34

## 2018-03-30 RX ADMIN — Medication 308 MILLIGRAM(S): at 11:55

## 2018-03-30 RX ADMIN — PACLITAXEL 176.94 MILLIGRAM(S): 6 INJECTION, SOLUTION, CONCENTRATE INTRAVENOUS at 11:56

## 2018-03-30 RX ADMIN — FAMOTIDINE 156 MILLIGRAM(S): 10 INJECTION INTRAVENOUS at 10:34

## 2018-04-04 ENCOUNTER — OUTPATIENT (OUTPATIENT)
Dept: OUTPATIENT SERVICES | Facility: HOSPITAL | Age: 63
LOS: 1 days | Discharge: HOME | End: 2018-04-04

## 2018-04-04 DIAGNOSIS — H53.8 OTHER VISUAL DISTURBANCES: ICD-10-CM

## 2018-04-04 LAB
CANCER AG125 SERPL-ACNC: 16 U/ML
HCT VFR BLD CALC: 33.8 %
HGB BLD-MCNC: 11.4 G/DL
MCHC RBC-ENTMCNC: 32.9 PG
MCHC RBC-ENTMCNC: 33.7 G/DL
MCV RBC AUTO: 97.7 FL
PLATELET # BLD AUTO: 183 K/UL
PMV BLD: 9.5 FL
RBC # BLD: 3.46 M/UL
RBC # FLD: 16.5 %
WBC # FLD AUTO: 4.69 K/UL

## 2018-04-16 ENCOUNTER — OUTPATIENT (OUTPATIENT)
Dept: OUTPATIENT SERVICES | Facility: HOSPITAL | Age: 63
LOS: 1 days | Discharge: HOME | End: 2018-04-16

## 2018-04-16 DIAGNOSIS — C54.1 MALIGNANT NEOPLASM OF ENDOMETRIUM: ICD-10-CM

## 2018-04-20 ENCOUNTER — OUTPATIENT (OUTPATIENT)
Dept: OUTPATIENT SERVICES | Facility: HOSPITAL | Age: 63
LOS: 1 days | Discharge: HOME | End: 2018-04-20

## 2018-04-20 ENCOUNTER — APPOINTMENT (OUTPATIENT)
Dept: GYNECOLOGIC ONCOLOGY | Facility: CLINIC | Age: 63
End: 2018-04-20

## 2018-04-20 VITALS
DIASTOLIC BLOOD PRESSURE: 72 MMHG | SYSTOLIC BLOOD PRESSURE: 104 MMHG | WEIGHT: 172 LBS | BODY MASS INDEX: 28.66 KG/M2 | HEART RATE: 96 BPM | TEMPERATURE: 96.4 F | RESPIRATION RATE: 16 BRPM | HEIGHT: 65 IN

## 2018-04-20 DIAGNOSIS — E07.89 OTHER SPECIFIED DISORDERS OF THYROID: ICD-10-CM

## 2018-04-21 LAB — T4 FREE SERPL-MCNC: 1 NG/DL

## 2018-04-23 ENCOUNTER — OUTPATIENT (OUTPATIENT)
Dept: OUTPATIENT SERVICES | Facility: HOSPITAL | Age: 63
LOS: 1 days | Discharge: HOME | End: 2018-04-23

## 2018-04-23 VITALS
WEIGHT: 170.86 LBS | OXYGEN SATURATION: 98 % | SYSTOLIC BLOOD PRESSURE: 107 MMHG | RESPIRATION RATE: 16 BRPM | TEMPERATURE: 97 F | HEIGHT: 65 IN | DIASTOLIC BLOOD PRESSURE: 62 MMHG | HEART RATE: 74 BPM

## 2018-04-23 DIAGNOSIS — Z98.890 OTHER SPECIFIED POSTPROCEDURAL STATES: Chronic | ICD-10-CM

## 2018-04-23 DIAGNOSIS — Z01.818 ENCOUNTER FOR OTHER PREPROCEDURAL EXAMINATION: ICD-10-CM

## 2018-04-23 DIAGNOSIS — C54.1 MALIGNANT NEOPLASM OF ENDOMETRIUM: ICD-10-CM

## 2018-04-23 LAB
ALBUMIN SERPL ELPH-MCNC: 4.3 G/DL — SIGNIFICANT CHANGE UP (ref 3.5–5.2)
ALP SERPL-CCNC: 74 U/L — SIGNIFICANT CHANGE UP (ref 30–115)
ALT FLD-CCNC: 21 U/L — SIGNIFICANT CHANGE UP (ref 0–41)
ANION GAP SERPL CALC-SCNC: 11 MMOL/L — SIGNIFICANT CHANGE UP (ref 7–14)
APPEARANCE UR: CLEAR — SIGNIFICANT CHANGE UP
APTT BLD: 29.1 SEC — SIGNIFICANT CHANGE UP (ref 27–39.2)
AST SERPL-CCNC: 20 U/L — SIGNIFICANT CHANGE UP (ref 0–41)
BACTERIA # UR AUTO: (no result) /HPF
BASOPHILS # BLD AUTO: 0.01 K/UL — SIGNIFICANT CHANGE UP (ref 0–0.2)
BASOPHILS NFR BLD AUTO: 0.2 % — SIGNIFICANT CHANGE UP (ref 0–1)
BILIRUB SERPL-MCNC: 0.3 MG/DL — SIGNIFICANT CHANGE UP (ref 0.2–1.2)
BILIRUB UR-MCNC: NEGATIVE — SIGNIFICANT CHANGE UP
BLD GP AB SCN SERPL QL: SIGNIFICANT CHANGE UP
BUN SERPL-MCNC: 17 MG/DL — SIGNIFICANT CHANGE UP (ref 10–20)
CALCIUM SERPL-MCNC: 9.3 MG/DL — SIGNIFICANT CHANGE UP (ref 8.5–10.1)
CHLORIDE SERPL-SCNC: 101 MMOL/L — SIGNIFICANT CHANGE UP (ref 98–110)
CO2 SERPL-SCNC: 28 MMOL/L — SIGNIFICANT CHANGE UP (ref 17–32)
COLOR SPEC: YELLOW — SIGNIFICANT CHANGE UP
CREAT SERPL-MCNC: 0.9 MG/DL — SIGNIFICANT CHANGE UP (ref 0.7–1.5)
DIFF PNL FLD: NEGATIVE — SIGNIFICANT CHANGE UP
EOSINOPHIL # BLD AUTO: 0 K/UL — SIGNIFICANT CHANGE UP (ref 0–0.7)
EOSINOPHIL NFR BLD AUTO: 0 % — SIGNIFICANT CHANGE UP (ref 0–8)
EPI CELLS # UR: (no result) /HPF
GLUCOSE SERPL-MCNC: 88 MG/DL — SIGNIFICANT CHANGE UP (ref 70–99)
GLUCOSE UR QL: NEGATIVE MG/DL — SIGNIFICANT CHANGE UP
HCT VFR BLD CALC: 35.1 % — LOW (ref 37–47)
HGB BLD-MCNC: 11.8 G/DL — LOW (ref 12–16)
IMM GRANULOCYTES NFR BLD AUTO: 0.2 % — SIGNIFICANT CHANGE UP (ref 0.1–0.3)
INR BLD: 0.95 RATIO — SIGNIFICANT CHANGE UP (ref 0.65–1.3)
KETONES UR-MCNC: NEGATIVE — SIGNIFICANT CHANGE UP
LEUKOCYTE ESTERASE UR-ACNC: (no result)
LYMPHOCYTES # BLD AUTO: 1.19 K/UL — LOW (ref 1.2–3.4)
LYMPHOCYTES # BLD AUTO: 23.1 % — SIGNIFICANT CHANGE UP (ref 20.5–51.1)
MCHC RBC-ENTMCNC: 33.2 PG — HIGH (ref 27–31)
MCHC RBC-ENTMCNC: 33.6 G/DL — SIGNIFICANT CHANGE UP (ref 32–37)
MCV RBC AUTO: 98.9 FL — SIGNIFICANT CHANGE UP (ref 81–99)
MONOCYTES # BLD AUTO: 0.54 K/UL — SIGNIFICANT CHANGE UP (ref 0.1–0.6)
MONOCYTES NFR BLD AUTO: 10.5 % — HIGH (ref 1.7–9.3)
NEUTROPHILS # BLD AUTO: 3.4 K/UL — SIGNIFICANT CHANGE UP (ref 1.4–6.5)
NEUTROPHILS NFR BLD AUTO: 66 % — SIGNIFICANT CHANGE UP (ref 42.2–75.2)
NITRITE UR-MCNC: NEGATIVE — SIGNIFICANT CHANGE UP
NRBC # BLD: 0 /100 WBCS — SIGNIFICANT CHANGE UP (ref 0–0)
PH UR: 6 — SIGNIFICANT CHANGE UP (ref 5–8)
PLATELET # BLD AUTO: 200 K/UL — SIGNIFICANT CHANGE UP (ref 130–400)
POTASSIUM SERPL-MCNC: 4.6 MMOL/L — SIGNIFICANT CHANGE UP (ref 3.5–5)
POTASSIUM SERPL-SCNC: 4.6 MMOL/L — SIGNIFICANT CHANGE UP (ref 3.5–5)
PROT SERPL-MCNC: 7 G/DL — SIGNIFICANT CHANGE UP (ref 6–8)
PROT UR-MCNC: NEGATIVE MG/DL — SIGNIFICANT CHANGE UP
PROTHROM AB SERPL-ACNC: 10.2 SEC — SIGNIFICANT CHANGE UP (ref 9.95–12.87)
RBC # BLD: 3.55 M/UL — LOW (ref 4.2–5.4)
RBC # FLD: 14.5 % — SIGNIFICANT CHANGE UP (ref 11.5–14.5)
SODIUM SERPL-SCNC: 140 MMOL/L — SIGNIFICANT CHANGE UP (ref 135–146)
SP GR SPEC: 1.02 — SIGNIFICANT CHANGE UP (ref 1.01–1.03)
TYPE + AB SCN PNL BLD: SIGNIFICANT CHANGE UP
UROBILINOGEN FLD QL: 0.2 MG/DL — SIGNIFICANT CHANGE UP (ref 0.2–0.2)
WBC # BLD: 5.15 K/UL — SIGNIFICANT CHANGE UP (ref 4.8–10.8)
WBC # FLD AUTO: 5.15 K/UL — SIGNIFICANT CHANGE UP (ref 4.8–10.8)
WBC UR QL: SIGNIFICANT CHANGE UP /HPF

## 2018-04-23 RX ORDER — GABAPENTIN 400 MG/1
100 CAPSULE ORAL
Qty: 0 | Refills: 0 | COMMUNITY

## 2018-04-23 NOTE — H&P PST ADULT - REASON FOR ADMISSION
63 Y/O FEMALE HERE FOR PRE-ADMISSION SURGICAL TESTING. PATIENT REPORTS SHE HAD SOB IN NOVEMBER. W/U REVEALED PLEURAL EFFUSION. FURTHER W/U REVEALED + UTERINE CA. S/P 6 ROUNDS OF CHEMO. LAST ONE WAS 3/30/18.   NOW FOR SCHEDULED PROCEDURE.

## 2018-04-23 NOTE — H&P PST ADULT - FAMILY HISTORY
Mother  Still living? Yes, Estimated age: Age Unknown  CAD (coronary artery disease), Age at diagnosis: Age Unknown     Father  Still living? No  CVA (cerebral vascular accident), Age at diagnosis: Age Unknown

## 2018-04-23 NOTE — H&P PST ADULT - NSANTHOSAYNRD_GEN_A_CORE
No. ROSIE screening performed.  STOP BANG Legend: 0-2 = LOW Risk; 3-4 = INTERMEDIATE Risk; 5-8 = HIGH Risk

## 2018-04-25 ENCOUNTER — APPOINTMENT (OUTPATIENT)
Dept: INTERNAL MEDICINE | Facility: CLINIC | Age: 63
End: 2018-04-25

## 2018-04-25 ENCOUNTER — OUTPATIENT (OUTPATIENT)
Dept: OUTPATIENT SERVICES | Facility: HOSPITAL | Age: 63
LOS: 1 days | Discharge: HOME | End: 2018-04-25

## 2018-04-25 VITALS — HEART RATE: 86 BPM | DIASTOLIC BLOOD PRESSURE: 71 MMHG | SYSTOLIC BLOOD PRESSURE: 100 MMHG | HEIGHT: 65 IN

## 2018-04-25 VITALS — BODY MASS INDEX: 29.95 KG/M2 | WEIGHT: 180 LBS

## 2018-04-25 DIAGNOSIS — Z01.818 ENCOUNTER FOR OTHER PREPROCEDURAL EXAMINATION: ICD-10-CM

## 2018-04-25 DIAGNOSIS — Z98.890 OTHER SPECIFIED POSTPROCEDURAL STATES: Chronic | ICD-10-CM

## 2018-04-25 RX ORDER — ONDANSETRON 8 MG/1
8 TABLET, ORALLY DISINTEGRATING ORAL EVERY 8 HOURS
Qty: 30 | Refills: 2 | Status: DISCONTINUED | COMMUNITY
Start: 2018-01-26 | End: 2018-04-25

## 2018-04-25 RX ORDER — PROCHLORPERAZINE MALEATE 10 MG/1
10 TABLET ORAL EVERY 6 HOURS
Qty: 30 | Refills: 3 | Status: DISCONTINUED | COMMUNITY
Start: 2018-01-05 | End: 2018-04-25

## 2018-04-25 RX ORDER — LEVETIRACETAM 250 MG/1
250 TABLET, FILM COATED ORAL TWICE DAILY
Refills: 0 | Status: DISCONTINUED | COMMUNITY
Start: 2017-12-21 | End: 2018-04-25

## 2018-04-25 RX ORDER — BENZOCAINE 200 MG/G
20 LIQUID DENTAL; ORAL; PERIODONTAL
Qty: 300 | Refills: 0 | Status: DISCONTINUED | COMMUNITY
Start: 2018-02-09 | End: 2018-04-25

## 2018-04-25 RX ORDER — ONDANSETRON 8 MG/1
8 TABLET ORAL EVERY 8 HOURS
Qty: 30 | Refills: 3 | Status: DISCONTINUED | COMMUNITY
Start: 2018-01-05 | End: 2018-04-25

## 2018-04-27 ENCOUNTER — LABORATORY RESULT (OUTPATIENT)
Age: 63
End: 2018-04-27

## 2018-04-27 ENCOUNTER — APPOINTMENT (OUTPATIENT)
Dept: HEMATOLOGY ONCOLOGY | Facility: CLINIC | Age: 63
End: 2018-04-27

## 2018-04-27 VITALS
HEIGHT: 65 IN | DIASTOLIC BLOOD PRESSURE: 78 MMHG | BODY MASS INDEX: 29.66 KG/M2 | RESPIRATION RATE: 16 BRPM | HEART RATE: 81 BPM | TEMPERATURE: 97.4 F | SYSTOLIC BLOOD PRESSURE: 125 MMHG | WEIGHT: 178 LBS

## 2018-05-03 ENCOUNTER — APPOINTMENT (OUTPATIENT)
Dept: OTOLARYNGOLOGY | Facility: CLINIC | Age: 63
End: 2018-05-03
Payer: MEDICAID

## 2018-05-03 VITALS
SYSTOLIC BLOOD PRESSURE: 124 MMHG | DIASTOLIC BLOOD PRESSURE: 80 MMHG | BODY MASS INDEX: 29.66 KG/M2 | HEIGHT: 65 IN | WEIGHT: 178 LBS

## 2018-05-03 LAB
ALBUMIN SERPL ELPH-MCNC: 4.2 G/DL
ALP BLD-CCNC: 69 U/L
ALT SERPL-CCNC: 18 U/L
ANION GAP SERPL CALC-SCNC: 17 MMOL/L
APTT BLD: 30.2 SEC
AST SERPL-CCNC: 23 U/L
BILIRUB SERPL-MCNC: 0.5 MG/DL
BUN SERPL-MCNC: 13 MG/DL
CALCIUM SERPL-MCNC: 9.7 MG/DL
CHLORIDE SERPL-SCNC: 103 MMOL/L
CO2 SERPL-SCNC: 22 MMOL/L
CREAT SERPL-MCNC: 0.6 MG/DL
GLUCOSE SERPL-MCNC: 87 MG/DL
HCT VFR BLD CALC: 34.3 %
HGB BLD-MCNC: 11.5 G/DL
INR PPP: 0.99 RATIO
MCHC RBC-ENTMCNC: 33.5 G/DL
MCHC RBC-ENTMCNC: 33.7 PG
MCV RBC AUTO: 100.6 FL
PLATELET # BLD AUTO: 186 K/UL
PMV BLD: 9.5 FL
POTASSIUM SERPL-SCNC: 4.3 MMOL/L
PROT SERPL-MCNC: 7 G/DL
PT BLD: 10.6 SEC
RBC # BLD: 3.41 M/UL
RBC # FLD: 14.3 %
SODIUM SERPL-SCNC: 142 MMOL/L
TSH SERPL-ACNC: 0.65 UIU/ML
WBC # FLD AUTO: 4.57 K/UL

## 2018-05-03 PROCEDURE — 69210 REMOVE IMPACTED EAR WAX UNI: CPT

## 2018-05-03 PROCEDURE — 99203 OFFICE O/P NEW LOW 30 MIN: CPT | Mod: 25

## 2018-05-07 ENCOUNTER — INPATIENT (INPATIENT)
Facility: HOSPITAL | Age: 63
LOS: 2 days | Discharge: HOME | End: 2018-05-10
Attending: OBSTETRICS & GYNECOLOGY | Admitting: OBSTETRICS & GYNECOLOGY

## 2018-05-07 ENCOUNTER — RESULT REVIEW (OUTPATIENT)
Age: 63
End: 2018-05-07

## 2018-05-07 VITALS
HEART RATE: 70 BPM | TEMPERATURE: 98 F | HEIGHT: 65 IN | WEIGHT: 169.98 LBS | DIASTOLIC BLOOD PRESSURE: 73 MMHG | SYSTOLIC BLOOD PRESSURE: 130 MMHG | RESPIRATION RATE: 20 BRPM

## 2018-05-07 DIAGNOSIS — N73.6 FEMALE PELVIC PERITONEAL ADHESIONS (POSTINFECTIVE): ICD-10-CM

## 2018-05-07 DIAGNOSIS — C54.1 MALIGNANT NEOPLASM OF ENDOMETRIUM: ICD-10-CM

## 2018-05-07 DIAGNOSIS — C78.6 SECONDARY MALIGNANT NEOPLASM OF RETROPERITONEUM AND PERITONEUM: ICD-10-CM

## 2018-05-07 DIAGNOSIS — Z98.890 OTHER SPECIFIED POSTPROCEDURAL STATES: Chronic | ICD-10-CM

## 2018-05-07 DIAGNOSIS — R11.10 VOMITING, UNSPECIFIED: ICD-10-CM

## 2018-05-07 DIAGNOSIS — G62.9 POLYNEUROPATHY, UNSPECIFIED: ICD-10-CM

## 2018-05-07 DIAGNOSIS — Z92.21 PERSONAL HISTORY OF ANTINEOPLASTIC CHEMOTHERAPY: ICD-10-CM

## 2018-05-07 LAB
HCT VFR BLD CALC: 32.9 % — LOW (ref 37–47)
HGB BLD-MCNC: 11.3 G/DL — LOW (ref 12–16)
MCHC RBC-ENTMCNC: 33.6 PG — HIGH (ref 27–31)
MCHC RBC-ENTMCNC: 34.3 G/DL — SIGNIFICANT CHANGE UP (ref 32–37)
MCV RBC AUTO: 97.9 FL — SIGNIFICANT CHANGE UP (ref 81–99)
NRBC # BLD: 0 /100 WBCS — SIGNIFICANT CHANGE UP (ref 0–0)
PLATELET # BLD AUTO: 195 K/UL — SIGNIFICANT CHANGE UP (ref 130–400)
RBC # BLD: 3.36 M/UL — LOW (ref 4.2–5.4)
RBC # FLD: 13.3 % — SIGNIFICANT CHANGE UP (ref 11.5–14.5)
WBC # BLD: 9.28 K/UL — SIGNIFICANT CHANGE UP (ref 4.8–10.8)
WBC # FLD AUTO: 9.28 K/UL — SIGNIFICANT CHANGE UP (ref 4.8–10.8)

## 2018-05-07 RX ORDER — OXYCODONE AND ACETAMINOPHEN 5; 325 MG/1; MG/1
1 TABLET ORAL EVERY 4 HOURS
Qty: 0 | Refills: 0 | Status: DISCONTINUED | OUTPATIENT
Start: 2018-05-07 | End: 2018-05-10

## 2018-05-07 RX ORDER — MAGNESIUM HYDROXIDE 400 MG/1
30 TABLET, CHEWABLE ORAL EVERY 6 HOURS
Qty: 0 | Refills: 0 | Status: DISCONTINUED | OUTPATIENT
Start: 2018-05-07 | End: 2018-05-10

## 2018-05-07 RX ORDER — ONDANSETRON 8 MG/1
4 TABLET, FILM COATED ORAL EVERY 6 HOURS
Qty: 0 | Refills: 0 | Status: DISCONTINUED | OUTPATIENT
Start: 2018-05-07 | End: 2018-05-10

## 2018-05-07 RX ORDER — ONDANSETRON 8 MG/1
4 TABLET, FILM COATED ORAL ONCE
Qty: 0 | Refills: 0 | Status: DISCONTINUED | OUTPATIENT
Start: 2018-05-07 | End: 2018-05-07

## 2018-05-07 RX ORDER — MORPHINE SULFATE 50 MG/1
30 CAPSULE, EXTENDED RELEASE ORAL
Qty: 0 | Refills: 0 | Status: DISCONTINUED | OUTPATIENT
Start: 2018-05-07 | End: 2018-05-08

## 2018-05-07 RX ORDER — HEPARIN SODIUM 5000 [USP'U]/ML
5000 INJECTION INTRAVENOUS; SUBCUTANEOUS ONCE
Qty: 0 | Refills: 0 | Status: COMPLETED | OUTPATIENT
Start: 2018-05-07 | End: 2018-05-07

## 2018-05-07 RX ORDER — MORPHINE SULFATE 50 MG/1
4 CAPSULE, EXTENDED RELEASE ORAL
Qty: 0 | Refills: 0 | Status: DISCONTINUED | OUTPATIENT
Start: 2018-05-07 | End: 2018-05-07

## 2018-05-07 RX ORDER — SODIUM CHLORIDE 9 MG/ML
1000 INJECTION, SOLUTION INTRAVENOUS
Qty: 0 | Refills: 0 | Status: DISCONTINUED | OUTPATIENT
Start: 2018-05-07 | End: 2018-05-09

## 2018-05-07 RX ORDER — MEPERIDINE HYDROCHLORIDE 50 MG/ML
12.5 INJECTION INTRAMUSCULAR; INTRAVENOUS; SUBCUTANEOUS
Qty: 0 | Refills: 0 | Status: DISCONTINUED | OUTPATIENT
Start: 2018-05-07 | End: 2018-05-07

## 2018-05-07 RX ORDER — OXYCODONE AND ACETAMINOPHEN 5; 325 MG/1; MG/1
2 TABLET ORAL EVERY 6 HOURS
Qty: 0 | Refills: 0 | Status: DISCONTINUED | OUTPATIENT
Start: 2018-05-07 | End: 2018-05-10

## 2018-05-07 RX ORDER — SODIUM CHLORIDE 9 MG/ML
1000 INJECTION, SOLUTION INTRAVENOUS
Qty: 0 | Refills: 0 | Status: DISCONTINUED | OUTPATIENT
Start: 2018-05-07 | End: 2018-05-07

## 2018-05-07 RX ORDER — HEPARIN SODIUM 5000 [USP'U]/ML
5000 INJECTION INTRAVENOUS; SUBCUTANEOUS EVERY 12 HOURS
Qty: 0 | Refills: 0 | Status: DISCONTINUED | OUTPATIENT
Start: 2018-05-07 | End: 2018-05-09

## 2018-05-07 RX ORDER — MORPHINE SULFATE 50 MG/1
2 CAPSULE, EXTENDED RELEASE ORAL
Qty: 0 | Refills: 0 | Status: DISCONTINUED | OUTPATIENT
Start: 2018-05-07 | End: 2018-05-07

## 2018-05-07 RX ORDER — PANTOPRAZOLE SODIUM 20 MG/1
40 TABLET, DELAYED RELEASE ORAL
Qty: 0 | Refills: 0 | Status: DISCONTINUED | OUTPATIENT
Start: 2018-05-07 | End: 2018-05-08

## 2018-05-07 RX ORDER — NALOXONE HYDROCHLORIDE 4 MG/.1ML
0.1 SPRAY NASAL
Qty: 0 | Refills: 0 | Status: DISCONTINUED | OUTPATIENT
Start: 2018-05-07 | End: 2018-05-10

## 2018-05-07 RX ORDER — DOCUSATE SODIUM 100 MG
100 CAPSULE ORAL THREE TIMES A DAY
Qty: 0 | Refills: 0 | Status: DISCONTINUED | OUTPATIENT
Start: 2018-05-07 | End: 2018-05-10

## 2018-05-07 RX ORDER — SENNA PLUS 8.6 MG/1
2 TABLET ORAL AT BEDTIME
Qty: 0 | Refills: 0 | Status: DISCONTINUED | OUTPATIENT
Start: 2018-05-07 | End: 2018-05-10

## 2018-05-07 RX ORDER — HYDROMORPHONE HYDROCHLORIDE 2 MG/ML
0.5 INJECTION INTRAMUSCULAR; INTRAVENOUS; SUBCUTANEOUS
Qty: 0 | Refills: 0 | Status: DISCONTINUED | OUTPATIENT
Start: 2018-05-07 | End: 2018-05-07

## 2018-05-07 RX ADMIN — SODIUM CHLORIDE 120 MILLILITER(S): 9 INJECTION, SOLUTION INTRAVENOUS at 12:23

## 2018-05-07 RX ADMIN — HEPARIN SODIUM 5000 UNIT(S): 5000 INJECTION INTRAVENOUS; SUBCUTANEOUS at 21:06

## 2018-05-07 RX ADMIN — HEPARIN SODIUM 5000 UNIT(S): 5000 INJECTION INTRAVENOUS; SUBCUTANEOUS at 07:33

## 2018-05-07 RX ADMIN — SODIUM CHLORIDE 125 MILLILITER(S): 9 INJECTION, SOLUTION INTRAVENOUS at 14:38

## 2018-05-07 RX ADMIN — MORPHINE SULFATE 30 MILLILITER(S): 50 CAPSULE, EXTENDED RELEASE ORAL at 13:28

## 2018-05-07 NOTE — CHART NOTE - NSCHARTNOTEFT_GEN_A_CORE
PACU ANESTHESIA ADMISSION NOTE      Procedure: Hysterectomy, total, laparoscopic, with bilateral salpingo-oophorectomy: omentectomy    Post op diagnosis:  Papillary serous adenocarcinoma of endometrium      ____  Intubated  TV:______       Rate: ______      FiO2: ______    _x___  Patent Airway    _x___  Full return of protective reflexes    _x___  Full recovery from anesthesia / back to baseline status    Vitals  SPO2:-97% on 3 l nc  HR:-94  RR:-14  B.P:-129/63  TEMP:-98.5    Mental Status:  _x___ Awake   ___x_ Alert   _____ Drowsy   _____ Sedated    Nausea/Vomiting:  _x___  NO       ______Yes,   See Post - Op Orders         Pain Scale (0-10):  __0___    Treatment: _x___ None    __x__ See Post - Op/PCA Orders    Post - Operative Fluids:   ___ Oral   ____x See Post - Op Orders    Plan: Discharge:   ____Home       ___x__Floor     _____Critical Care    _____  Other:_________________    Comments:  Report endorsed to RN in pacu  Vitals stable  No anesthesia issues or complications noted.  Discharge to patient to floor when criteria met.

## 2018-05-07 NOTE — PROGRESS NOTE ADULT - ASSESSMENT
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

## 2018-05-07 NOTE — BRIEF OPERATIVE NOTE - OPERATION/FINDINGS
enlarged 15 cm multiple fibroid uterus, left ovary adherent to the back of the uterus, thick omentum, pelvic adhesions in the cul-de-sac

## 2018-05-07 NOTE — PROGRESS NOTE ADULT - SUBJECTIVE AND OBJECTIVE BOX
GYN Progress Note  S: Pt resting in bed comfortable. Reports pain well controlled with pain medications just feels sore throat. Tolerated clear liquid diet, no nausea or vomiting. Not ambulating yet. Not passing flatus. Denies dizziness, SOB, palpitations, chest pain. Using incentive spirometry     Physical exam:    Vital Signs Last 24 Hrs  T(F): 98.4 (07 May 2018 20:30), Max: 98.5 (07 May 2018 12:22)  HR: 78 (07 May 2018 20:30) (62 - 97)  BP: 113/56 (07 May 2018 20:30) (109/70 - 154/78)  RR: 18 (07 May 2018 20:30) (12 - 24)  SpO2: 99% (07 May 2018 20:30) (94% - 100%)    Gen: alert, oriented  CVS: RRR  Lungs: CTAB  Abdomen: Soft, nontender, non distended. No rebound, rigidity or guarding. Dressings in place, clean, dry  Perineum: no active bleeding. Ohara in place, clear urine  Ext: No calf tenderness, sequentials in place    Diet:  meds:  heparin  Morphine PCA    LABS:  preop: 5.15>11.8/35.1<200, Coags: 10.2/0.95/29.1, 140/4.6/101/28/11/0.9<88  postop pending        05-07-18 @ 07:01  -  05-07-18 @ 21:49  --------------------------------------------------------  IN: 260 mL / OUT: 2280 mL / NET: -2020 mL    UO 1271-2124 2880cc 360cc/hr (adequate for weight 44.45cc/hr)

## 2018-05-07 NOTE — BRIEF OPERATIVE NOTE - PROCEDURE
<<-----Click on this checkbox to enter Procedure Hysterectomy, total, laparoscopic, with bilateral salpingo-oophorectomy  05/07/2018  omentectomy  Active  NVANG

## 2018-05-08 LAB
ANION GAP SERPL CALC-SCNC: 16 MMOL/L — HIGH (ref 7–14)
ANION GAP SERPL CALC-SCNC: 17 MMOL/L — HIGH (ref 7–14)
BUN SERPL-MCNC: 10 MG/DL — SIGNIFICANT CHANGE UP (ref 10–20)
BUN SERPL-MCNC: 9 MG/DL — LOW (ref 10–20)
CALCIUM SERPL-MCNC: 8.4 MG/DL — LOW (ref 8.5–10.1)
CALCIUM SERPL-MCNC: 8.9 MG/DL — SIGNIFICANT CHANGE UP (ref 8.5–10.1)
CHLORIDE SERPL-SCNC: 100 MMOL/L — SIGNIFICANT CHANGE UP (ref 98–110)
CHLORIDE SERPL-SCNC: 102 MMOL/L — SIGNIFICANT CHANGE UP (ref 98–110)
CO2 SERPL-SCNC: 24 MMOL/L — SIGNIFICANT CHANGE UP (ref 17–32)
CO2 SERPL-SCNC: 25 MMOL/L — SIGNIFICANT CHANGE UP (ref 17–32)
CREAT SERPL-MCNC: 0.6 MG/DL — LOW (ref 0.7–1.5)
CREAT SERPL-MCNC: 0.7 MG/DL — SIGNIFICANT CHANGE UP (ref 0.7–1.5)
GLUCOSE SERPL-MCNC: 119 MG/DL — HIGH (ref 70–99)
GLUCOSE SERPL-MCNC: 126 MG/DL — HIGH (ref 70–99)
HCT VFR BLD CALC: 30.9 % — LOW (ref 37–47)
HGB BLD-MCNC: 10.6 G/DL — LOW (ref 12–16)
MAGNESIUM SERPL-MCNC: 1.8 MG/DL — SIGNIFICANT CHANGE UP (ref 1.8–2.4)
MAGNESIUM SERPL-MCNC: 1.8 MG/DL — SIGNIFICANT CHANGE UP (ref 1.8–2.4)
MCHC RBC-ENTMCNC: 33.5 PG — HIGH (ref 27–31)
MCHC RBC-ENTMCNC: 34.3 G/DL — SIGNIFICANT CHANGE UP (ref 32–37)
MCV RBC AUTO: 97.8 FL — SIGNIFICANT CHANGE UP (ref 81–99)
NRBC # BLD: 0 /100 WBCS — SIGNIFICANT CHANGE UP (ref 0–0)
PHOSPHATE SERPL-MCNC: 3.9 MG/DL — SIGNIFICANT CHANGE UP (ref 2.1–4.9)
PHOSPHATE SERPL-MCNC: 4.1 MG/DL — SIGNIFICANT CHANGE UP (ref 2.1–4.9)
PLATELET # BLD AUTO: 197 K/UL — SIGNIFICANT CHANGE UP (ref 130–400)
POTASSIUM SERPL-MCNC: 3.9 MMOL/L — SIGNIFICANT CHANGE UP (ref 3.5–5)
POTASSIUM SERPL-MCNC: 4.4 MMOL/L — SIGNIFICANT CHANGE UP (ref 3.5–5)
POTASSIUM SERPL-SCNC: 3.9 MMOL/L — SIGNIFICANT CHANGE UP (ref 3.5–5)
POTASSIUM SERPL-SCNC: 4.4 MMOL/L — SIGNIFICANT CHANGE UP (ref 3.5–5)
RBC # BLD: 3.16 M/UL — LOW (ref 4.2–5.4)
RBC # FLD: 13.5 % — SIGNIFICANT CHANGE UP (ref 11.5–14.5)
SODIUM SERPL-SCNC: 141 MMOL/L — SIGNIFICANT CHANGE UP (ref 135–146)
SODIUM SERPL-SCNC: 143 MMOL/L — SIGNIFICANT CHANGE UP (ref 135–146)
WBC # BLD: 7.35 K/UL — SIGNIFICANT CHANGE UP (ref 4.8–10.8)
WBC # FLD AUTO: 7.35 K/UL — SIGNIFICANT CHANGE UP (ref 4.8–10.8)

## 2018-05-08 RX ORDER — KETOROLAC TROMETHAMINE 30 MG/ML
15 SYRINGE (ML) INJECTION EVERY 6 HOURS
Qty: 0 | Refills: 0 | Status: DISCONTINUED | OUTPATIENT
Start: 2018-05-08 | End: 2018-05-10

## 2018-05-08 RX ORDER — BENZOCAINE AND MENTHOL 5; 1 G/100ML; G/100ML
1 LIQUID ORAL
Qty: 0 | Refills: 0 | Status: DISCONTINUED | OUTPATIENT
Start: 2018-05-08 | End: 2018-05-10

## 2018-05-08 RX ORDER — PANTOPRAZOLE SODIUM 20 MG/1
40 TABLET, DELAYED RELEASE ORAL DAILY
Qty: 0 | Refills: 0 | Status: DISCONTINUED | OUTPATIENT
Start: 2018-05-08 | End: 2018-05-09

## 2018-05-08 RX ORDER — KETOROLAC TROMETHAMINE 30 MG/ML
15 SYRINGE (ML) INJECTION EVERY 6 HOURS
Qty: 0 | Refills: 0 | Status: DISCONTINUED | OUTPATIENT
Start: 2018-05-08 | End: 2018-05-08

## 2018-05-08 RX ADMIN — HEPARIN SODIUM 5000 UNIT(S): 5000 INJECTION INTRAVENOUS; SUBCUTANEOUS at 21:24

## 2018-05-08 RX ADMIN — ONDANSETRON 4 MILLIGRAM(S): 8 TABLET, FILM COATED ORAL at 20:02

## 2018-05-08 RX ADMIN — PANTOPRAZOLE SODIUM 40 MILLIGRAM(S): 20 TABLET, DELAYED RELEASE ORAL at 12:11

## 2018-05-08 RX ADMIN — HEPARIN SODIUM 5000 UNIT(S): 5000 INJECTION INTRAVENOUS; SUBCUTANEOUS at 10:16

## 2018-05-08 RX ADMIN — ONDANSETRON 4 MILLIGRAM(S): 8 TABLET, FILM COATED ORAL at 10:14

## 2018-05-08 RX ADMIN — OXYCODONE AND ACETAMINOPHEN 1 TABLET(S): 5; 325 TABLET ORAL at 15:27

## 2018-05-08 NOTE — PROGRESS NOTE ADULT - SUBJECTIVE AND OBJECTIVE BOX
GYN-ONC Progress Note    POD #1  Procedure:  TL BSO, omental biopsy for Stage IV endometrial cancer    Subjective: Patient is doing well. Pain 6/10, relieved with morphine. Patient is not ambulating yet. Ohara in place. She is tolerating clear diet. Not passing flatus or having bowel movements yet.  She reports throat discomfort and SOB. No chest pain, lightheadedness.      Physical exam:    Vital Signs Last 24 Hrs  T(C): 37.5 (08 May 2018 00:34), Max: 37.5 (08 May 2018 00:34)  T(F): 99.5 (08 May 2018 00:34), Max: 99.5 (08 May 2018 00:34)  HR: 74 (08 May 2018 00:34) (62 - 97)  BP: 104/54 (08 May 2018 00:34) (104/54 - 154/78)  RR: 18 (08 May 2018 00:34) (12 - 24)  SpO2: 98% (08 May 2018 00:34) (94% - 100%)      Gen: NAD, alert and oriented  CVS: RRR s1/s2 no murmurs  Lungs: CTABL  Abdomen: BS+, soft, not tender, mildly distended, laparoscopic dressings C/D/I  Pelvic: deferred  Ext: No calf tenderness or edema    Diet: Regular    Meds:   heparin  Injectable   5000 Unit(s) SubCutaneous (05-07-18 @ 21:06)  heparin  Injectable   5000 Unit(s) SubCutaneous (05-07-18 @ 07:33)  Morphine PCA pump    LABS:                        11.3   9.28  )-----------( 195      ( 07 May 2018 22:29 )             32.9     Magnesium, Serum: 1.8 mg/dL (05-07 @ 22:29)    05-07-18 @ 22:29      143  |  102  |  9<L>  ----------------------------<  126<H>  4.4   |  24  |  0.7        Ca    8.9      07 May 2018 22:29  Phos  4.1     05-07  Mg     1.8     05-07 GYN-ONC Progress Note    POD #1  Procedure:  H BSO, omental biopsy, ureterolysis for Stage IV endometrial cancer    Subjective: Patient is doing well. Pain 6/10, relieved with morphine. Patient is not ambulating yet. Ohara in place. She is tolerating clear diet. Not passing flatus or having bowel movements yet.  She reports throat discomfort and SOB. No chest pain, lightheadedness.      Physical exam:    Vital Signs Last 24 Hrs  T(C): 37.5 (08 May 2018 00:34), Max: 37.5 (08 May 2018 00:34)  T(F): 99.5 (08 May 2018 00:34), Max: 99.5 (08 May 2018 00:34)  HR: 74 (08 May 2018 00:34) (62 - 97)  BP: 104/54 (08 May 2018 00:34) (104/54 - 154/78)  RR: 18 (08 May 2018 00:34) (12 - 24)  SpO2: 98% (08 May 2018 00:34) (94% - 100%)  UO: 700 cc from 3-6am - 233cc/h    Gen: NAD, alert and oriented  CVS: RRR s1/s2 no murmurs  Lungs: CTABL  Abdomen: BS+, soft, not tender, mildly distended, laparoscopic dressings C/D/I  Pelvic: deferred  Ext: No calf tenderness or edema    Diet: Regular    Meds:   heparin  Injectable   5000 Unit(s) SubCutaneous (05-07-18 @ 21:06)  heparin  Injectable   5000 Unit(s) SubCutaneous (05-07-18 @ 07:33)  Morphine PCA pump    LABS:                        11.3   9.28  )-----------( 195      ( 07 May 2018 22:29 )             32.9     Magnesium, Serum: 1.8 mg/dL (05-07 @ 22:29)    05-07-18 @ 22:29      143  |  102  |  9<L>  ----------------------------<  126<H>  4.4   |  24  |  0.7        Ca    8.9      07 May 2018 22:29  Phos  4.1     05-07  Mg     1.8     05-07

## 2018-05-08 NOTE — PROGRESS NOTE ADULT - ASSESSMENT
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 Will inform Dr Massey.

## 2018-05-08 NOTE — PROGRESS NOTE ADULT - SUBJECTIVE AND OBJECTIVE BOX
Pt seen at bedside at 2030 with Dr Massey. Pt reported 1-2 episodes of vomiting in the day, last 10 mins ago, yellowish, 100cc per nurse. Pt reports having clear liquid, small amount. Ambulated to the bathroom, no dizziness or SOB, voiding without difficulty, not passing flatus yet. Pain well controlled, taking percocet  Abd: soft, mildly tender, mildly distended  Pt to continue IVF, antiemetics, toradol IV for pain control  Will continue monitoring    Dr Massey aware

## 2018-05-08 NOTE — PROGRESS NOTE ADULT - ATTENDING COMMENTS
Patient seen and examined  C/o nausea/vomiting today  Abdomen soft, non tender; labs and vitals stable  Plan:  Expectant management  Repeat labs in am

## 2018-05-09 LAB
ANION GAP SERPL CALC-SCNC: 17 MMOL/L — HIGH (ref 7–14)
BUN SERPL-MCNC: 9 MG/DL — LOW (ref 10–20)
CALCIUM SERPL-MCNC: 8.8 MG/DL — SIGNIFICANT CHANGE UP (ref 8.5–10.1)
CHLORIDE SERPL-SCNC: 92 MMOL/L — LOW (ref 98–110)
CO2 SERPL-SCNC: 25 MMOL/L — SIGNIFICANT CHANGE UP (ref 17–32)
CREAT SERPL-MCNC: 0.6 MG/DL — LOW (ref 0.7–1.5)
GLUCOSE SERPL-MCNC: 129 MG/DL — HIGH (ref 70–99)
HCT VFR BLD CALC: 33 % — LOW (ref 37–47)
HGB BLD-MCNC: 11.9 G/DL — LOW (ref 12–16)
MCHC RBC-ENTMCNC: 33.5 PG — HIGH (ref 27–31)
MCHC RBC-ENTMCNC: 36.1 G/DL — SIGNIFICANT CHANGE UP (ref 32–37)
MCV RBC AUTO: 93 FL — SIGNIFICANT CHANGE UP (ref 81–99)
NRBC # BLD: 0 /100 WBCS — SIGNIFICANT CHANGE UP (ref 0–0)
PLATELET # BLD AUTO: 220 K/UL — SIGNIFICANT CHANGE UP (ref 130–400)
POTASSIUM SERPL-MCNC: 3.6 MMOL/L — SIGNIFICANT CHANGE UP (ref 3.5–5)
POTASSIUM SERPL-SCNC: 3.6 MMOL/L — SIGNIFICANT CHANGE UP (ref 3.5–5)
RBC # BLD: 3.55 M/UL — LOW (ref 4.2–5.4)
RBC # FLD: 12.6 % — SIGNIFICANT CHANGE UP (ref 11.5–14.5)
SODIUM SERPL-SCNC: 134 MMOL/L — LOW (ref 135–146)
WBC # BLD: 7.24 K/UL — SIGNIFICANT CHANGE UP (ref 4.8–10.8)
WBC # FLD AUTO: 7.24 K/UL — SIGNIFICANT CHANGE UP (ref 4.8–10.8)

## 2018-05-09 RX ORDER — GABAPENTIN 400 MG/1
300 CAPSULE ORAL DAILY
Qty: 0 | Refills: 0 | Status: DISCONTINUED | OUTPATIENT
Start: 2018-05-09 | End: 2018-05-09

## 2018-05-09 RX ORDER — POLYETHYLENE GLYCOL 3350 17 G/17G
17 POWDER, FOR SOLUTION ORAL EVERY 24 HOURS
Qty: 0 | Refills: 0 | Status: DISCONTINUED | OUTPATIENT
Start: 2018-05-09 | End: 2018-05-10

## 2018-05-09 RX ORDER — LEVETIRACETAM 250 MG/1
250 TABLET, FILM COATED ORAL
Qty: 0 | Refills: 0 | Status: DISCONTINUED | OUTPATIENT
Start: 2018-05-09 | End: 2018-05-09

## 2018-05-09 RX ORDER — ENOXAPARIN SODIUM 100 MG/ML
40 INJECTION SUBCUTANEOUS DAILY
Qty: 0 | Refills: 0 | Status: DISCONTINUED | OUTPATIENT
Start: 2018-05-09 | End: 2018-05-10

## 2018-05-09 RX ORDER — GABAPENTIN 400 MG/1
300 CAPSULE ORAL THREE TIMES A DAY
Qty: 0 | Refills: 0 | Status: DISCONTINUED | OUTPATIENT
Start: 2018-05-09 | End: 2018-05-09

## 2018-05-09 RX ORDER — PANTOPRAZOLE SODIUM 20 MG/1
40 TABLET, DELAYED RELEASE ORAL
Qty: 0 | Refills: 0 | Status: DISCONTINUED | OUTPATIENT
Start: 2018-05-09 | End: 2018-05-10

## 2018-05-09 RX ADMIN — Medication 15 MILLIGRAM(S): at 09:50

## 2018-05-09 RX ADMIN — POLYETHYLENE GLYCOL 3350 17 GRAM(S): 17 POWDER, FOR SOLUTION ORAL at 13:43

## 2018-05-09 RX ADMIN — Medication 15 MILLIGRAM(S): at 18:09

## 2018-05-09 RX ADMIN — ONDANSETRON 4 MILLIGRAM(S): 8 TABLET, FILM COATED ORAL at 08:37

## 2018-05-09 RX ADMIN — ENOXAPARIN SODIUM 40 MILLIGRAM(S): 100 INJECTION SUBCUTANEOUS at 13:37

## 2018-05-09 RX ADMIN — ONDANSETRON 4 MILLIGRAM(S): 8 TABLET, FILM COATED ORAL at 00:41

## 2018-05-09 RX ADMIN — PANTOPRAZOLE SODIUM 40 MILLIGRAM(S): 20 TABLET, DELAYED RELEASE ORAL at 13:43

## 2018-05-09 NOTE — PROGRESS NOTE ADULT - ASSESSMENT
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

## 2018-05-09 NOTE — PROGRESS NOTE ADULT - SUBJECTIVE AND OBJECTIVE BOX
Post operative Note  PGY4  POD#2  Subjective:  Patient reports nausea has resolved, she passed gas.  Patient will try to eat for the 1st time this morning. Patient still has some abdominal pain.  She denies shortness of breath, chest pain, or palpitations, no fever/chills, no urinary symptoms.      Physical exam:  Vital Signs Last 24 Hrs  T(C): 35.8 (09 May 2018 04:56), Max: 36.8 (08 May 2018 14:30)  T(F): 96.5 (09 May 2018 04:56), Max: 98.3 (08 May 2018 14:30)  HR: 80 (09 May 2018 04:56) (70 - 80)  BP: 127/61 (09 May 2018 04:56) (111/61 - 133/69)    RR: 18 (09 May 2018 04:56) (18 - 18)    I&O's Summary    07 May 2018 07:01  -  08 May 2018 07:00  --------------------------------------------------------  IN: 260 mL / OUT: 4280 mL / NET: -4020 mL    08 May 2018 07:01  -  09 May 2018 06:50  --------------------------------------------------------  IN: 540 mL / OUT: 2085 mL / NET: -1545 mL      Gen: NAD, AAOX3  CVS:S1S2 wnl, RRR  Lungs: CTAB  Abdomen: Soft, mild tenderness around incision, no distention, bowel sounds x4, no rebound/guarding/rigidity.  Incision: Clean, dry, and intact x4, small blisters around tape-was removed  Ext: No calf tenderness, no edema    Laboratory Results:                        10.6   7.35  )-----------( 197      ( 08 May 2018 07:22 )             30.9                         11.3   9.28  )-----------( 195      ( 07 May 2018 22:29 )             32.9     Magnesium, Serum: 1.8 mg/dL (05-08 @ 07:22)    05-08-18 @ 07:22      141  |  100  |  10  ----------------------------<  119<H>  3.9   |  25  |  0.6<L>    05-07-18 @ 22:29      143  |  102  |  9<L>  ----------------------------<  126<H>  4.4   |  24  |  0.7        Ca    8.4<L>      08 May 2018 07:22  Ca    8.9      07 May 2018 22:29  Phos  3.9     05-08  Phos  4.1     05-07  Mg     1.8     05-08  Mg     1.8     05-07 05-08    141  |  100  |  10  ----------------------------<  119<H>  3.9   |  25  |  0.6<L>    Ca    8.4<L>      08 May 2018 07:22  Phos  3.9     05-08  Mg     1.8     05-08      Medications:  Home Medications:  gabapentin 300 mg oral capsule: 1 cap(s) orally 2 times a day (07 May 2018 06:06)  Vitamin B6 100 mg oral tablet: 1 tab(s) orally once a day (07 May 2018 06:06)    MEDICATIONS  (STANDING):  enoxaparin Injectable 40 milliGRAM(s) SubCutaneous daily  lactated ringers. 1000 milliLiter(s) (125 mL/Hr) IV Continuous <Continuous>  pantoprazole  Injectable 40 milliGRAM(s) IV Push daily    MEDICATIONS  (PRN):  benzocaine 15 mG/menthol 3.6 mG Lozenge 1 Lozenge Oral every 3 hours PRN Sore Throat  docusate sodium 100 milliGRAM(s) Oral three times a day PRN Stool Softening  ketorolac   Injectable 15 milliGRAM(s) IV Push every 6 hours PRN Moderate Pain (4 - 6)  magnesium hydroxide Suspension 30 milliLiter(s) Oral every 6 hours PRN Constipation  naloxone Injectable 0.1 milliGRAM(s) IV Push every 3 minutes PRN For ANY of the following changes in patient status:  NICK AYON LESS THAN 10 breaths per minute, B. Oxygen saturation LESS THAN 90%, C. Sedation score of 6  ondansetron Injectable 4 milliGRAM(s) IV Push every 6 hours PRN Nausea  ondansetron Injectable 4 milliGRAM(s) IV Push every 6 hours PRN Postoperative Nausea and/or Vomiting  oxyCODONE    5 mG/acetaminophen 325 mG 1 Tablet(s) Oral every 4 hours PRN Moderate Pain  oxyCODONE    5 mG/acetaminophen 325 mG 2 Tablet(s) Oral every 6 hours PRN Severe Pain  senna 2 Tablet(s) Oral at bedtime PRN Constipation Post operative Note  PGY4  POD#2  Subjective:  Patient reports nausea this morning, she passed gas.  Patient will try to eat for the 1st time this morning. Patient still has some abdominal pain.  She denies shortness of breath, chest pain, or palpitations, no fever/chills, no urinary symptoms.      Physical exam:  Vital Signs Last 24 Hrs  T(C): 35.8 (09 May 2018 04:56), Max: 36.8 (08 May 2018 14:30)  T(F): 96.5 (09 May 2018 04:56), Max: 98.3 (08 May 2018 14:30)  HR: 80 (09 May 2018 04:56) (70 - 80)  BP: 127/61 (09 May 2018 04:56) (111/61 - 133/69)    RR: 18 (09 May 2018 04:56) (18 - 18)    I&O's Summary    07 May 2018 07:01  -  08 May 2018 07:00  --------------------------------------------------------  IN: 260 mL / OUT: 4280 mL / NET: -4020 mL    08 May 2018 07:01  -  09 May 2018 06:50  --------------------------------------------------------  IN: 540 mL / OUT: 2085 mL / NET: -1545 mL      Gen: NAD, AAOX3  CVS:S1S2 wnl, RRR  Lungs: CTAB  Abdomen: Soft, mild tenderness around incision, no distention, bowel sounds x4, no rebound/guarding/rigidity.  Incision: Clean, dry, and intact x4, small blisters around tape-was removed  Ext: No calf tenderness, no edema    Laboratory Results:                        10.6   7.35  )-----------( 197      ( 08 May 2018 07:22 )             30.9                         11.3   9.28  )-----------( 195      ( 07 May 2018 22:29 )             32.9     Magnesium, Serum: 1.8 mg/dL (05-08 @ 07:22)    05-08-18 @ 07:22      141  |  100  |  10  ----------------------------<  119<H>  3.9   |  25  |  0.6<L>    05-07-18 @ 22:29      143  |  102  |  9<L>  ----------------------------<  126<H>  4.4   |  24  |  0.7        Ca    8.4<L>      08 May 2018 07:22  Ca    8.9      07 May 2018 22:29  Phos  3.9     05-08  Phos  4.1     05-07  Mg     1.8     05-08  Mg     1.8     05-07 05-08    141  |  100  |  10  ----------------------------<  119<H>  3.9   |  25  |  0.6<L>    Ca    8.4<L>      08 May 2018 07:22  Phos  3.9     05-08  Mg     1.8     05-08      Medications:  Home Medications:  gabapentin 300 mg oral capsule: 1 cap(s) orally 2 times a day (07 May 2018 06:06)  Vitamin B6 100 mg oral tablet: 1 tab(s) orally once a day (07 May 2018 06:06)    MEDICATIONS  (STANDING):  enoxaparin Injectable 40 milliGRAM(s) SubCutaneous daily  lactated ringers. 1000 milliLiter(s) (125 mL/Hr) IV Continuous <Continuous>  pantoprazole  Injectable 40 milliGRAM(s) IV Push daily    MEDICATIONS  (PRN):  benzocaine 15 mG/menthol 3.6 mG Lozenge 1 Lozenge Oral every 3 hours PRN Sore Throat  docusate sodium 100 milliGRAM(s) Oral three times a day PRN Stool Softening  ketorolac   Injectable 15 milliGRAM(s) IV Push every 6 hours PRN Moderate Pain (4 - 6)  magnesium hydroxide Suspension 30 milliLiter(s) Oral every 6 hours PRN Constipation  naloxone Injectable 0.1 milliGRAM(s) IV Push every 3 minutes PRN For ANY of the following changes in patient status:  NICK AYON LESS THAN 10 breaths per minute, B. Oxygen saturation LESS THAN 90%, C. Sedation score of 6  ondansetron Injectable 4 milliGRAM(s) IV Push every 6 hours PRN Nausea  ondansetron Injectable 4 milliGRAM(s) IV Push every 6 hours PRN Postoperative Nausea and/or Vomiting  oxyCODONE    5 mG/acetaminophen 325 mG 1 Tablet(s) Oral every 4 hours PRN Moderate Pain  oxyCODONE    5 mG/acetaminophen 325 mG 2 Tablet(s) Oral every 6 hours PRN Severe Pain  senna 2 Tablet(s) Oral at bedtime PRN Constipation

## 2018-05-10 ENCOUNTER — TRANSCRIPTION ENCOUNTER (OUTPATIENT)
Age: 63
End: 2018-05-10

## 2018-05-10 VITALS — OXYGEN SATURATION: 95 %

## 2018-05-10 RX ORDER — PYRIDOXINE HCL (VITAMIN B6) 100 MG
1 TABLET ORAL
Qty: 0 | Refills: 0 | COMMUNITY

## 2018-05-10 RX ORDER — DOCUSATE SODIUM 100 MG
1 CAPSULE ORAL
Qty: 0 | Refills: 0 | COMMUNITY
Start: 2018-05-10

## 2018-05-10 RX ORDER — GABAPENTIN 400 MG/1
1 CAPSULE ORAL
Qty: 0 | Refills: 0 | COMMUNITY

## 2018-05-10 NOTE — DISCHARGE NOTE ADULT - HOSPITAL COURSE
DATE OF DISCHARGE: 05-10-18 @ 06:25    HISTORY OF PRESENT ILLNESS/HOSPITAL COURSE: HPI:    PAST MEDICAL & SURGICAL HISTORY:  Peripheral neuropathy  Thyroid cyst: NEW DX (NO MEDS)  Pleural effusion: 11/17  Uterine cancer  S/P thoracentesis: 12/17  H/O lymph node excision: LEFT NECK 12/17      PROCEDURES PERFORMED:   laparoscopic total hysterectomy with bilateral salpinooophorectomy omentectomy  COMPLICATIONS:  none    post op course: slow return of bowel function      FINAL DIAGNOSIS:      LABS:                       11.9   7.24  )-----------( 220      ( 09 May 2018 07:53 )             33.0       DISCHARGE INSTRUCTIONS:      If you have severe abdominal pain, heavy vaginal bleeding, shortness of breath or chest pain please call your doctor or come to the emergency room.   DIET:  Advance as tolerated.  ACTIVITY:  Advance as tolerated.  Pelvic rest for 6 weeks.  Nothing to be inserted into the vagina for 6 weeks, i.e. no tampons, douching, sexual relations, or tub baths.   FOLLOW UP: Make an appointment to see your doctor as instructed   PRESCRIPTIONS: Prescriptions: percocet and colace sent to the pharmacy

## 2018-05-10 NOTE — PROGRESS NOTE ADULT - ASSESSMENT
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

## 2018-05-10 NOTE — PROGRESS NOTE ADULT - SUBJECTIVE AND OBJECTIVE BOX
Post operative Note  PGY4  POD#3  Subjective:  Patient reports gas pains resolved, improved overall and ready to go home.   Ambulating without difficulty.  Patient is tolerating regular diet. Pain is well controlled. Patient passing flatus but no bowel movement.  She denies nausea and vomiting, shortness of breath, chest pain, or palpitations, no fever/chills, no urinary symptoms.      Physical exam:  Vital Signs Last 24 Hrs  T(C): 35.8 (10 May 2018 05:00), Max: 36.6 (09 May 2018 13:18)  T(F): 96.5 (10 May 2018 05:00), Max: 97.9 (09 May 2018 13:18)  HR: 66 (10 May 2018 05:00) (66 - 89)  BP: 91/53 (10 May 2018 05:00) (91/53 - 119/66)  RR: 18 (10 May 2018 05:00) (18 - 18)  SpO2: 95% (10 May 2018 00:28) (94% - 99%)  I&O's Summary    08 May 2018 07:01  -  09 May 2018 07:00  --------------------------------------------------------  IN: 540 mL / OUT: 2385 mL / NET: -1845 mL    09 May 2018 07:01  -  10 May 2018 06:17  --------------------------------------------------------  IN: 390 mL / OUT: 1600 mL / NET: -1210 mL      Gen: NAD, AAOX3  CVS:S1S2 wnl, RRR  Lungs: CTAB  Abdomen: Soft, mild tenderness, no distention, bowel sounds x4, no rebound/guarding/rigidity.  Incision: Clean, dry, and intact  Ext: No calf tenderness, no edema    Laboratory Results:                        11.9   7.24  )-----------( 220      ( 09 May 2018 07:53 )             33.0                         10.6   7.35  )-----------( 197      ( 08 May 2018 07:22 )             30.9       05-09-18 @ 07:53      134<L>  |  92<L>  |  9<L>  ----------------------------<  129<H>  3.6   |  25  |  0.6<L>    05-08-18 @ 07:22      141  |  100  |  10  ----------------------------<  119<H>  3.9   |  25  |  0.6<L>    05-07-18 @ 22:29      143  |  102  |  9<L>  ----------------------------<  126<H>  4.4   |  24  |  0.7        Ca    8.8      09 May 2018 07:53  Ca    8.4<L>      08 May 2018 07:22  Ca    8.9      07 May 2018 22:29  Phos  3.9     05-08  Phos  4.1     05-07  Mg     1.8     05-08  Mg     1.8     05-07    05-09    134<L>  |  92<L>  |  9<L>  ----------------------------<  129<H>  3.6   |  25  |  0.6<L>    Ca    8.8      09 May 2018 07:53  Phos  3.9     05-08  Mg     1.8     05-08      Medications:  Home Medications:  gabapentin 300 mg oral capsule: 1 cap(s) orally 2 times a day (07 May 2018 06:06)  Vitamin B6 100 mg oral tablet: 1 tab(s) orally once a day (07 May 2018 06:06)    MEDICATIONS  (STANDING):  enoxaparin Injectable 40 milliGRAM(s) SubCutaneous daily  pantoprazole    Tablet 40 milliGRAM(s) Oral before breakfast  polyethylene glycol 3350 17 Gram(s) Oral every 24 hours    MEDICATIONS  (PRN):  benzocaine 15 mG/menthol 3.6 mG Lozenge 1 Lozenge Oral every 3 hours PRN Sore Throat  docusate sodium 100 milliGRAM(s) Oral three times a day PRN Stool Softening  ketorolac   Injectable 15 milliGRAM(s) IV Push every 6 hours PRN Moderate Pain (4 - 6)  magnesium hydroxide Suspension 30 milliLiter(s) Oral every 6 hours PRN Constipation  naloxone Injectable 0.1 milliGRAM(s) IV Push every 3 minutes PRN For ANY of the following changes in patient status:  A. RR LESS THAN 10 breaths per minute, B. Oxygen saturation LESS THAN 90%, C. Sedation score of 6  ondansetron Injectable 4 milliGRAM(s) IV Push every 6 hours PRN Nausea  ondansetron Injectable 4 milliGRAM(s) IV Push every 6 hours PRN Postoperative Nausea and/or Vomiting  oxyCODONE    5 mG/acetaminophen 325 mG 1 Tablet(s) Oral every 4 hours PRN Moderate Pain  oxyCODONE    5 mG/acetaminophen 325 mG 2 Tablet(s) Oral every 6 hours PRN Severe Pain  senna 2 Tablet(s) Oral at bedtime PRN Constipation

## 2018-05-10 NOTE — DISCHARGE NOTE ADULT - PATIENT PORTAL LINK FT
You can access the [facility], offered by [facility], by registering with the following website: [email or web address]

## 2018-05-10 NOTE — DISCHARGE NOTE ADULT - MEDICATION SUMMARY - MEDICATIONS TO TAKE
I will START or STAY ON the medications listed below when I get home from the hospital:    docusate sodium 100 mg oral capsule  -- 1 cap(s) by mouth 3 times a day, As needed, Stool Softening  -- Indication: For Constipation I will START or STAY ON the medications listed below when I get home from the hospital:    Percocet 5/325 oral tablet  -- 1 tab(s) by mouth every 6 hours, As Needed  -- Indication: For pain    docusate sodium 100 mg oral capsule  -- 1 cap(s) by mouth 3 times a day, As needed, Stool Softening  -- Indication: For Constipation

## 2018-05-10 NOTE — DISCHARGE NOTE ADULT - CARE PLAN
Principal Discharge DX:	History of total hysterectomy with bilateral salpingo-oophorectomy (BSO)  Goal:	healthy patient  Assessment and plan of treatment:	nothing per vagina for 6 weeks, no sex, no tampons, no hot tubs or swimming, if severe abdominal pain or severe vaginal bleeding please call MD

## 2018-05-10 NOTE — DISCHARGE NOTE ADULT - PLAN OF CARE
healthy patient nothing per vagina for 6 weeks, no sex, no tampons, no hot tubs or swimming, if severe abdominal pain or severe vaginal bleeding please call MD

## 2018-05-15 LAB — SURGICAL PATHOLOGY STUDY: SIGNIFICANT CHANGE UP

## 2018-05-23 ENCOUNTER — OUTPATIENT (OUTPATIENT)
Dept: OUTPATIENT SERVICES | Facility: HOSPITAL | Age: 63
LOS: 1 days | Discharge: HOME | End: 2018-05-23

## 2018-05-23 ENCOUNTER — RESULT REVIEW (OUTPATIENT)
Age: 63
End: 2018-05-23

## 2018-05-23 DIAGNOSIS — Z98.890 OTHER SPECIFIED POSTPROCEDURAL STATES: Chronic | ICD-10-CM

## 2018-05-24 DIAGNOSIS — R59.9 ENLARGED LYMPH NODES, UNSPECIFIED: ICD-10-CM

## 2018-05-25 ENCOUNTER — APPOINTMENT (OUTPATIENT)
Dept: GYNECOLOGIC ONCOLOGY | Facility: CLINIC | Age: 63
End: 2018-05-25

## 2018-05-25 ENCOUNTER — OUTPATIENT (OUTPATIENT)
Dept: OUTPATIENT SERVICES | Facility: HOSPITAL | Age: 63
LOS: 1 days | Discharge: HOME | End: 2018-05-25

## 2018-05-25 VITALS
BODY MASS INDEX: 28.28 KG/M2 | DIASTOLIC BLOOD PRESSURE: 78 MMHG | HEART RATE: 80 BPM | SYSTOLIC BLOOD PRESSURE: 119 MMHG | RESPIRATION RATE: 16 BRPM | TEMPERATURE: 97.6 F | HEIGHT: 66 IN | WEIGHT: 176 LBS

## 2018-05-25 DIAGNOSIS — Z98.890 OTHER SPECIFIED POSTPROCEDURAL STATES: Chronic | ICD-10-CM

## 2018-05-29 DIAGNOSIS — Z48.816 ENCOUNTER FOR SURGICAL AFTERCARE FOLLOWING SURGERY ON THE GENITOURINARY SYSTEM: ICD-10-CM

## 2018-05-30 ENCOUNTER — APPOINTMENT (OUTPATIENT)
Dept: OTOLARYNGOLOGY | Facility: CLINIC | Age: 63
End: 2018-05-30
Payer: MEDICAID

## 2018-05-30 PROCEDURE — 99214 OFFICE O/P EST MOD 30 MIN: CPT | Mod: 25

## 2018-05-30 PROCEDURE — 31575 DIAGNOSTIC LARYNGOSCOPY: CPT

## 2018-06-01 ENCOUNTER — LABORATORY RESULT (OUTPATIENT)
Age: 63
End: 2018-06-01

## 2018-06-01 ENCOUNTER — APPOINTMENT (OUTPATIENT)
Dept: HEMATOLOGY ONCOLOGY | Facility: CLINIC | Age: 63
End: 2018-06-01

## 2018-06-01 VITALS
HEART RATE: 80 BPM | SYSTOLIC BLOOD PRESSURE: 110 MMHG | BODY MASS INDEX: 28.93 KG/M2 | RESPIRATION RATE: 16 BRPM | DIASTOLIC BLOOD PRESSURE: 62 MMHG | HEIGHT: 66 IN | TEMPERATURE: 97 F | WEIGHT: 180 LBS

## 2018-06-08 ENCOUNTER — APPOINTMENT (OUTPATIENT)
Dept: HEMATOLOGY ONCOLOGY | Facility: CLINIC | Age: 63
End: 2018-06-08

## 2018-06-08 ENCOUNTER — LABORATORY RESULT (OUTPATIENT)
Age: 63
End: 2018-06-08

## 2018-06-08 ENCOUNTER — APPOINTMENT (OUTPATIENT)
Dept: INFUSION THERAPY | Facility: CLINIC | Age: 63
End: 2018-06-08

## 2018-06-08 LAB
ALBUMIN SERPL ELPH-MCNC: 4.1 G/DL
ALP BLD-CCNC: 83 U/L
ALT SERPL-CCNC: 30 U/L
ANION GAP SERPL CALC-SCNC: 19 MMOL/L
AST SERPL-CCNC: 24 U/L
BILIRUB SERPL-MCNC: 0.3 MG/DL
BUN SERPL-MCNC: 18 MG/DL
CALCIUM SERPL-MCNC: 9.4 MG/DL
CANCER AG125 SERPL-ACNC: 7 U/ML
CHLORIDE SERPL-SCNC: 103 MMOL/L
CO2 SERPL-SCNC: 22 MMOL/L
CREAT SERPL-MCNC: 0.7 MG/DL
GLUCOSE SERPL-MCNC: 109 MG/DL
HCT VFR BLD CALC: 36.8 %
HCT VFR BLD CALC: 37.4 %
HGB BLD-MCNC: 12.2 G/DL
HGB BLD-MCNC: 12.6 G/DL
MCHC RBC-ENTMCNC: 32.9 PG
MCHC RBC-ENTMCNC: 33.2 G/DL
MCHC RBC-ENTMCNC: 33.2 PG
MCHC RBC-ENTMCNC: 33.7 G/DL
MCV RBC AUTO: 98.7 FL
MCV RBC AUTO: 99.2 FL
PLATELET # BLD AUTO: 188 K/UL
PLATELET # BLD AUTO: 207 K/UL
PMV BLD: 10 FL
PMV BLD: 9.8 FL
POTASSIUM SERPL-SCNC: 4 MMOL/L
PROT SERPL-MCNC: 6.7 G/DL
RBC # BLD: 3.71 M/UL
RBC # BLD: 3.79 M/UL
RBC # FLD: 12.4 %
RBC # FLD: 12.5 %
SODIUM SERPL-SCNC: 144 MMOL/L
WBC # FLD AUTO: 4.76 K/UL
WBC # FLD AUTO: 5.51 K/UL

## 2018-06-08 RX ORDER — FAMOTIDINE 10 MG/ML
20 INJECTION INTRAVENOUS ONCE
Qty: 0 | Refills: 0 | Status: COMPLETED | OUTPATIENT
Start: 2018-06-08 | End: 2018-06-08

## 2018-06-08 RX ORDER — DEXAMETHASONE 0.5 MG/5ML
12 ELIXIR ORAL ONCE
Qty: 0 | Refills: 0 | Status: COMPLETED | OUTPATIENT
Start: 2018-06-08 | End: 2018-06-08

## 2018-06-08 RX ORDER — PACLITAXEL 6 MG/ML
240 INJECTION, SOLUTION, CONCENTRATE INTRAVENOUS ONCE
Qty: 0 | Refills: 0 | Status: COMPLETED | OUTPATIENT
Start: 2018-06-08 | End: 2018-06-08

## 2018-06-08 RX ORDER — CARBOPLATIN 50 MG
656 VIAL (EA) INTRAVENOUS ONCE
Qty: 0 | Refills: 0 | Status: COMPLETED | OUTPATIENT
Start: 2018-06-08 | End: 2018-06-08

## 2018-06-08 RX ORDER — FOSAPREPITANT DIMEGLUMINE 150 MG/5ML
150 INJECTION, POWDER, LYOPHILIZED, FOR SOLUTION INTRAVENOUS ONCE
Qty: 0 | Refills: 0 | Status: COMPLETED | OUTPATIENT
Start: 2018-06-08 | End: 2018-06-08

## 2018-06-08 RX ORDER — DIPHENHYDRAMINE HCL 50 MG
50 CAPSULE ORAL ONCE
Qty: 0 | Refills: 0 | Status: COMPLETED | OUTPATIENT
Start: 2018-06-08 | End: 2018-06-08

## 2018-06-08 RX ADMIN — Medication 183 MILLIGRAM(S): at 10:05

## 2018-06-08 RX ADMIN — Medication 153 MILLIGRAM(S): at 10:04

## 2018-06-08 RX ADMIN — PACLITAXEL 180 MILLIGRAM(S): 6 INJECTION, SOLUTION, CONCENTRATE INTRAVENOUS at 11:07

## 2018-06-08 RX ADMIN — Medication 188.53 MILLIGRAM(S): at 11:06

## 2018-06-08 RX ADMIN — FOSAPREPITANT DIMEGLUMINE 450 MILLIGRAM(S): 150 INJECTION, POWDER, LYOPHILIZED, FOR SOLUTION INTRAVENOUS at 10:05

## 2018-06-08 RX ADMIN — FAMOTIDINE 156 MILLIGRAM(S): 10 INJECTION INTRAVENOUS at 10:04

## 2018-06-29 ENCOUNTER — APPOINTMENT (OUTPATIENT)
Dept: HEMATOLOGY ONCOLOGY | Facility: CLINIC | Age: 63
End: 2018-06-29

## 2018-06-29 ENCOUNTER — APPOINTMENT (OUTPATIENT)
Dept: INFUSION THERAPY | Facility: CLINIC | Age: 63
End: 2018-06-29

## 2018-06-29 ENCOUNTER — LABORATORY RESULT (OUTPATIENT)
Age: 63
End: 2018-06-29

## 2018-06-29 VITALS
DIASTOLIC BLOOD PRESSURE: 75 MMHG | BODY MASS INDEX: 29.57 KG/M2 | HEART RATE: 76 BPM | TEMPERATURE: 96.3 F | HEIGHT: 66 IN | RESPIRATION RATE: 16 BRPM | WEIGHT: 184 LBS | SYSTOLIC BLOOD PRESSURE: 115 MMHG

## 2018-06-29 RX ORDER — FOSAPREPITANT DIMEGLUMINE 150 MG/5ML
150 INJECTION, POWDER, LYOPHILIZED, FOR SOLUTION INTRAVENOUS ONCE
Qty: 0 | Refills: 0 | Status: COMPLETED | OUTPATIENT
Start: 2018-06-29 | End: 2018-06-29

## 2018-06-29 RX ORDER — FAMOTIDINE 10 MG/ML
20 INJECTION INTRAVENOUS ONCE
Qty: 0 | Refills: 0 | Status: COMPLETED | OUTPATIENT
Start: 2018-06-29 | End: 2018-06-29

## 2018-06-29 RX ORDER — CARBOPLATIN 50 MG
656 VIAL (EA) INTRAVENOUS ONCE
Qty: 0 | Refills: 0 | Status: COMPLETED | OUTPATIENT
Start: 2018-06-29 | End: 2018-06-29

## 2018-06-29 RX ORDER — PACLITAXEL 6 MG/ML
240 INJECTION, SOLUTION, CONCENTRATE INTRAVENOUS ONCE
Qty: 0 | Refills: 0 | Status: COMPLETED | OUTPATIENT
Start: 2018-06-29 | End: 2018-06-29

## 2018-06-29 RX ORDER — DEXAMETHASONE 0.5 MG/5ML
12 ELIXIR ORAL ONCE
Qty: 0 | Refills: 0 | Status: COMPLETED | OUTPATIENT
Start: 2018-06-29 | End: 2018-06-29

## 2018-06-29 RX ORDER — DIPHENHYDRAMINE HCL 50 MG
50 CAPSULE ORAL ONCE
Qty: 0 | Refills: 0 | Status: COMPLETED | OUTPATIENT
Start: 2018-06-29 | End: 2018-06-29

## 2018-06-29 RX ADMIN — Medication 188.53 MILLIGRAM(S): at 11:19

## 2018-06-29 RX ADMIN — Medication 183 MILLIGRAM(S): at 10:42

## 2018-06-29 RX ADMIN — FAMOTIDINE 156 MILLIGRAM(S): 10 INJECTION INTRAVENOUS at 10:42

## 2018-06-29 RX ADMIN — Medication 153 MILLIGRAM(S): at 10:42

## 2018-06-29 RX ADMIN — PACLITAXEL 180 MILLIGRAM(S): 6 INJECTION, SOLUTION, CONCENTRATE INTRAVENOUS at 11:20

## 2018-06-29 RX ADMIN — FOSAPREPITANT DIMEGLUMINE 450 MILLIGRAM(S): 150 INJECTION, POWDER, LYOPHILIZED, FOR SOLUTION INTRAVENOUS at 10:42

## 2018-07-03 NOTE — H&P PST ADULT - SKIN
I have personally seen and examined this patient.  I have fully participated in the care of this patient. I have reviewed all pertinent clinical information, including history, physical exam, plan and the Resident’s note and agree except as noted.
No lesions; no rash

## 2018-07-09 LAB
ALBUMIN SERPL ELPH-MCNC: 4.6 G/DL
ALP BLD-CCNC: 91 U/L
ALT SERPL-CCNC: 64 U/L
ANION GAP SERPL CALC-SCNC: 18 MMOL/L
AST SERPL-CCNC: 37 U/L
BILIRUB SERPL-MCNC: 0.4 MG/DL
BUN SERPL-MCNC: 11 MG/DL
CALCIUM SERPL-MCNC: 9.8 MG/DL
CANCER AG125 SERPL-ACNC: 6 U/ML
CHLORIDE SERPL-SCNC: 99 MMOL/L
CO2 SERPL-SCNC: 23 MMOL/L
CREAT SERPL-MCNC: 0.6 MG/DL
GLUCOSE SERPL-MCNC: 89 MG/DL
HCT VFR BLD CALC: 36.3 %
HGB BLD-MCNC: 12.2 G/DL
MCHC RBC-ENTMCNC: 32.8 PG
MCHC RBC-ENTMCNC: 33.6 G/DL
MCV RBC AUTO: 97.6 FL
PLATELET # BLD AUTO: 188 K/UL
PMV BLD: 10.1 FL
POTASSIUM SERPL-SCNC: 4.3 MMOL/L
PROT SERPL-MCNC: 7.3 G/DL
RBC # BLD: 3.72 M/UL
RBC # FLD: 12.9 %
SODIUM SERPL-SCNC: 140 MMOL/L
WBC # FLD AUTO: 4.95 K/UL

## 2018-07-17 ENCOUNTER — APPOINTMENT (OUTPATIENT)
Dept: INTERNAL MEDICINE | Facility: CLINIC | Age: 63
End: 2018-07-17

## 2018-07-17 ENCOUNTER — OUTPATIENT (OUTPATIENT)
Dept: OUTPATIENT SERVICES | Facility: HOSPITAL | Age: 63
LOS: 1 days | Discharge: HOME | End: 2018-07-17

## 2018-07-17 VITALS
SYSTOLIC BLOOD PRESSURE: 131 MMHG | WEIGHT: 191.4 LBS | HEART RATE: 84 BPM | HEIGHT: 66 IN | BODY MASS INDEX: 30.76 KG/M2 | TEMPERATURE: 98.1 F | DIASTOLIC BLOOD PRESSURE: 91 MMHG

## 2018-07-17 DIAGNOSIS — C73 MALIGNANT NEOPLASM OF THYROID GLAND: ICD-10-CM

## 2018-07-17 DIAGNOSIS — G62.9 POLYNEUROPATHY, UNSPECIFIED: ICD-10-CM

## 2018-07-17 DIAGNOSIS — Z98.890 OTHER SPECIFIED POSTPROCEDURAL STATES: Chronic | ICD-10-CM

## 2018-07-17 PROBLEM — C55 MALIGNANT NEOPLASM OF UTERUS, PART UNSPECIFIED: Chronic | Status: ACTIVE | Noted: 2018-02-08

## 2018-07-17 PROBLEM — J90 PLEURAL EFFUSION, NOT ELSEWHERE CLASSIFIED: Chronic | Status: ACTIVE | Noted: 2018-04-23

## 2018-07-17 NOTE — REVIEW OF SYSTEMS
[Recent Change In Weight] : ~T recent weight change [Negative] : Heme/Lymph [FreeTextEntry2] : 15 lb weight gain in last 3 weeks [de-identified] : Worsening "pins and needle" sensation in her hands and feet

## 2018-07-17 NOTE — PHYSICAL EXAM

## 2018-07-17 NOTE — HISTORY OF PRESENT ILLNESS
[FreeTextEntry1] : 64 yo female with pmhx of Stage IV uterine Ca presents for follow up. [de-identified] : 62 yo female with stage IV uterine cancer on active chemotherapy, being followed by Dr. Cid and Dr. Massey presents for follow up. Pt has received 8 sessions of chemotherapy, her last session will be on Friday. Pt had a total hysterectomy and bilateral salpingo-oophorectomy on 5/7/18 with Dr. Massey, no complications. Pt was recently diagnosed with papillary thyroid carcinoma and is scheduled for a thyroidectomy on 8/20/18 with Dr. Penaloza. Pt is currently complaining of worsening neuropathy in her hands and feet. She recently gained 15 lbs in the past 3 weeks. Pt denies any other complaints. \par \par Pt also has a pmhx of SAH (asymptomatic, repeat CT head on 1/8/2018 showed no evidence), malignant R pleural effusion s/p thoracentesis, which resolved, and neuropathy on Gabapentin.\par

## 2018-07-17 NOTE — END OF VISIT
[FreeTextEntry3] : Case d/w MS4 (Radha Mg) [Time Spent: ___ minutes] : I have spent [unfilled] minutes of face to face time with the patient

## 2018-07-17 NOTE — ASSESSMENT
[FreeTextEntry1] : 62 yo female with a pmhx of Stage IV uterine cancer on chemotherapy, recently diagnosed papillary thyroid carcinoma, and neuropathy presents for follow up.\par \par 1. Stage IV uterine cancer\par -on chemotherapy (8 sessions of chemo, last session will be this Friday)\par -f/u w Dr. Cid\par \par 2. Papillary thyroid cancer\par -scheduled for thyroidectomy on 8/20/18\par -f/u w endo after surgery- referral given\par \par 3. Neuropathy\par -c/w Gabapentin\par \par 4. HCM\par -Refer to GI for colonoscopy\par -Refer for mammogram \par -follows with GYN- next appt 8/3/18\par -Routine labs: CBC, CMP, lipid profile, TSH, Hba1c, vit D\par \par RTC in 3 mos

## 2018-07-20 ENCOUNTER — APPOINTMENT (OUTPATIENT)
Dept: HEMATOLOGY ONCOLOGY | Facility: CLINIC | Age: 63
End: 2018-07-20

## 2018-07-20 ENCOUNTER — LABORATORY RESULT (OUTPATIENT)
Age: 63
End: 2018-07-20

## 2018-07-20 ENCOUNTER — APPOINTMENT (OUTPATIENT)
Dept: INFUSION THERAPY | Facility: CLINIC | Age: 63
End: 2018-07-20

## 2018-07-20 ENCOUNTER — OUTPATIENT (OUTPATIENT)
Dept: OUTPATIENT SERVICES | Facility: HOSPITAL | Age: 63
LOS: 1 days | Discharge: HOME | End: 2018-07-20

## 2018-07-20 VITALS
TEMPERATURE: 96.8 F | WEIGHT: 191 LBS | RESPIRATION RATE: 16 BRPM | BODY MASS INDEX: 30.7 KG/M2 | DIASTOLIC BLOOD PRESSURE: 62 MMHG | HEART RATE: 70 BPM | HEIGHT: 66 IN | SYSTOLIC BLOOD PRESSURE: 101 MMHG

## 2018-07-20 DIAGNOSIS — C54.1 MALIGNANT NEOPLASM OF ENDOMETRIUM: ICD-10-CM

## 2018-07-20 DIAGNOSIS — Z98.890 OTHER SPECIFIED POSTPROCEDURAL STATES: Chronic | ICD-10-CM

## 2018-07-20 DIAGNOSIS — E04.1 NONTOXIC SINGLE THYROID NODULE: ICD-10-CM

## 2018-07-23 PROBLEM — Z78.9 ALCOHOL USE: Status: INACTIVE | Noted: 2017-11-17

## 2018-07-26 LAB
HCT VFR BLD CALC: 32.3 %
HGB BLD-MCNC: 10.8 G/DL
MCHC RBC-ENTMCNC: 32.5 PG
MCHC RBC-ENTMCNC: 33.4 G/DL
MCV RBC AUTO: 97.3 FL
PLATELET # BLD AUTO: 166 K/UL
PMV BLD: 10.1 FL
RBC # BLD: 3.32 M/UL
RBC # FLD: 13.8 %
WBC # FLD AUTO: 4.56 K/UL

## 2018-07-27 DIAGNOSIS — C73 MALIGNANT NEOPLASM OF THYROID GLAND: ICD-10-CM

## 2018-08-03 ENCOUNTER — OUTPATIENT (OUTPATIENT)
Dept: OUTPATIENT SERVICES | Facility: HOSPITAL | Age: 63
LOS: 1 days | Discharge: HOME | End: 2018-08-03

## 2018-08-03 ENCOUNTER — APPOINTMENT (OUTPATIENT)
Dept: GYNECOLOGIC ONCOLOGY | Facility: CLINIC | Age: 63
End: 2018-08-03

## 2018-08-03 ENCOUNTER — LABORATORY RESULT (OUTPATIENT)
Age: 63
End: 2018-08-03

## 2018-08-03 VITALS
HEART RATE: 78 BPM | RESPIRATION RATE: 16 BRPM | SYSTOLIC BLOOD PRESSURE: 111 MMHG | WEIGHT: 191 LBS | TEMPERATURE: 96.7 F | BODY MASS INDEX: 30.7 KG/M2 | DIASTOLIC BLOOD PRESSURE: 72 MMHG | HEIGHT: 66 IN

## 2018-08-03 DIAGNOSIS — Z98.890 OTHER SPECIFIED POSTPROCEDURAL STATES: Chronic | ICD-10-CM

## 2018-08-04 LAB
25(OH)D3 SERPL-MCNC: 9 NG/ML
ALBUMIN SERPL ELPH-MCNC: 4.2 G/DL
ALP BLD-CCNC: 79 U/L
ALT SERPL-CCNC: 33 U/L
ANION GAP SERPL CALC-SCNC: 16 MMOL/L
AST SERPL-CCNC: 29 U/L
BASOPHILS # BLD AUTO: 0.01 K/UL
BASOPHILS NFR BLD AUTO: 0.2 %
BILIRUB SERPL-MCNC: 0.5 MG/DL
BUN SERPL-MCNC: 14 MG/DL
CALCIUM SERPL-MCNC: 9 MG/DL
CHLORIDE SERPL-SCNC: 102 MMOL/L
CHOLEST SERPL-MCNC: 162 MG/DL
CHOLEST/HDLC SERPL: 4.5 RATIO
CO2 SERPL-SCNC: 23 MMOL/L
CREAT SERPL-MCNC: 0.7 MG/DL
EOSINOPHIL # BLD AUTO: 0 K/UL
EOSINOPHIL NFR BLD AUTO: 0 %
GLUCOSE SERPL-MCNC: 93 MG/DL
HCT VFR BLD CALC: 34.1 %
HDLC SERPL-MCNC: 36 MG/DL
HGB BLD-MCNC: 11 G/DL
IMM GRANULOCYTES NFR BLD AUTO: 0.5 %
LDLC SERPL CALC-MCNC: 101 MG/DL
LYMPHOCYTES # BLD AUTO: 1.38 K/UL
LYMPHOCYTES NFR BLD AUTO: 31.4 %
MAN DIFF?: NORMAL
MCHC RBC-ENTMCNC: 32 PG
MCHC RBC-ENTMCNC: 32.3 G/DL
MCV RBC AUTO: 99.1 FL
MONOCYTES # BLD AUTO: 0.51 K/UL
MONOCYTES NFR BLD AUTO: 11.6 %
NEUTROPHILS # BLD AUTO: 2.48 K/UL
NEUTROPHILS NFR BLD AUTO: 56.3 %
PLATELET # BLD AUTO: 242 K/UL
POTASSIUM SERPL-SCNC: 4.1 MMOL/L
PROT SERPL-MCNC: 6.6 G/DL
RBC # BLD: 3.44 M/UL
RBC # FLD: 14.6 %
SODIUM SERPL-SCNC: 141 MMOL/L
TRIGL SERPL-MCNC: 178 MG/DL
TSH SERPL-ACNC: 1.04 UIU/ML
WBC # FLD AUTO: 4.4 K/UL

## 2018-08-06 ENCOUNTER — OUTPATIENT (OUTPATIENT)
Dept: OUTPATIENT SERVICES | Facility: HOSPITAL | Age: 63
LOS: 1 days | Discharge: HOME | End: 2018-08-06

## 2018-08-06 VITALS
HEIGHT: 65 IN | OXYGEN SATURATION: 99 % | RESPIRATION RATE: 18 BRPM | HEART RATE: 79 BPM | TEMPERATURE: 97 F | DIASTOLIC BLOOD PRESSURE: 70 MMHG | WEIGHT: 191.8 LBS | SYSTOLIC BLOOD PRESSURE: 132 MMHG

## 2018-08-06 DIAGNOSIS — C73 MALIGNANT NEOPLASM OF THYROID GLAND: ICD-10-CM

## 2018-08-06 DIAGNOSIS — Z01.818 ENCOUNTER FOR OTHER PREPROCEDURAL EXAMINATION: ICD-10-CM

## 2018-08-06 DIAGNOSIS — Z90.710 ACQUIRED ABSENCE OF BOTH CERVIX AND UTERUS: Chronic | ICD-10-CM

## 2018-08-06 DIAGNOSIS — Z98.890 OTHER SPECIFIED POSTPROCEDURAL STATES: Chronic | ICD-10-CM

## 2018-08-06 LAB
APTT BLD: 28.7 SEC — SIGNIFICANT CHANGE UP (ref 27–39.2)
BLD GP AB SCN SERPL QL: SIGNIFICANT CHANGE UP
INR BLD: 0.99 RATIO — SIGNIFICANT CHANGE UP (ref 0.65–1.3)
PROTHROM AB SERPL-ACNC: 10.7 SEC — SIGNIFICANT CHANGE UP (ref 9.95–12.87)
TYPE + AB SCN PNL BLD: SIGNIFICANT CHANGE UP

## 2018-08-06 NOTE — H&P PST ADULT - REASON FOR ADMISSION
64 Y/O FEMALE HERE FOR PRE-ADMISSION SURGICAL TESTING. PATIENT REPORTS SHE WAS DIAGNOSED WITH UTERINE CANCER 12/2017 AFTER DEVELOPING A PLEURAL EFFUSION. S/P CHEMO. S/P ELEONORA 4/18. HAD MORE CHEMO. LAST DOSE 6/23/18. PT STATES SHE WAS TOLD SHE NOW HAS THYROID CANCER.  NOW FOR SCHEDULED TOTAL THYROIDECTOMY.

## 2018-08-06 NOTE — H&P PST ADULT - PSH
H/O lymph node excision  LEFT NECK 12/17  History of total abdominal hysterectomy  4/2018  S/P thoracentesis  12/17

## 2018-08-06 NOTE — H&P PST ADULT - VENOUS THROMBOEMBOLISM CURRENT LABS/TEST RESULTS
HPI:    Patient ID: Aarti Chin is a 24year old male. Anxiety  This is a recurrent problem. Episode onset: >6 m. The problem occurs 2 to 4 times per day. The problem has been gradually worsening. Associated symptoms include fatigue.  Pertinent negativ tablet (10 mg total) by mouth daily.            Imaging & Referrals:  None       [facility][id number] none

## 2018-08-07 ENCOUNTER — OTHER (OUTPATIENT)
Age: 63
End: 2018-08-07

## 2018-08-07 DIAGNOSIS — C54.1 MALIGNANT NEOPLASM OF ENDOMETRIUM: ICD-10-CM

## 2018-08-07 PROBLEM — E04.1 NONTOXIC SINGLE THYROID NODULE: Chronic | Status: INACTIVE | Noted: 2018-04-23 | Resolved: 2018-08-06

## 2018-08-09 ENCOUNTER — OTHER (OUTPATIENT)
Age: 63
End: 2018-08-09

## 2018-08-12 ENCOUNTER — FORM ENCOUNTER (OUTPATIENT)
Age: 63
End: 2018-08-12

## 2018-08-13 ENCOUNTER — OUTPATIENT (OUTPATIENT)
Dept: OUTPATIENT SERVICES | Facility: HOSPITAL | Age: 63
LOS: 1 days | Discharge: HOME | End: 2018-08-13

## 2018-08-13 DIAGNOSIS — Z12.31 ENCOUNTER FOR SCREENING MAMMOGRAM FOR MALIGNANT NEOPLASM OF BREAST: ICD-10-CM

## 2018-08-13 DIAGNOSIS — Z90.710 ACQUIRED ABSENCE OF BOTH CERVIX AND UTERUS: Chronic | ICD-10-CM

## 2018-08-13 DIAGNOSIS — Z98.890 OTHER SPECIFIED POSTPROCEDURAL STATES: Chronic | ICD-10-CM

## 2018-08-13 PROBLEM — C73 MALIGNANT NEOPLASM OF THYROID GLAND: Chronic | Status: ACTIVE | Noted: 2018-08-06

## 2018-08-20 ENCOUNTER — OUTPATIENT (OUTPATIENT)
Dept: OUTPATIENT SERVICES | Facility: HOSPITAL | Age: 63
LOS: 1 days | Discharge: HOME | End: 2018-08-20

## 2018-08-20 ENCOUNTER — RESULT REVIEW (OUTPATIENT)
Age: 63
End: 2018-08-20

## 2018-08-20 ENCOUNTER — APPOINTMENT (OUTPATIENT)
Dept: OTOLARYNGOLOGY | Facility: HOSPITAL | Age: 63
End: 2018-08-20
Payer: MEDICAID

## 2018-08-20 VITALS — SYSTOLIC BLOOD PRESSURE: 135 MMHG | HEART RATE: 84 BPM | RESPIRATION RATE: 20 BRPM | DIASTOLIC BLOOD PRESSURE: 84 MMHG

## 2018-08-20 VITALS
SYSTOLIC BLOOD PRESSURE: 110 MMHG | TEMPERATURE: 98 F | HEART RATE: 76 BPM | DIASTOLIC BLOOD PRESSURE: 78 MMHG | HEIGHT: 65 IN | RESPIRATION RATE: 18 BRPM | WEIGHT: 188.94 LBS

## 2018-08-20 DIAGNOSIS — Z98.890 OTHER SPECIFIED POSTPROCEDURAL STATES: Chronic | ICD-10-CM

## 2018-08-20 DIAGNOSIS — Z90.710 ACQUIRED ABSENCE OF BOTH CERVIX AND UTERUS: Chronic | ICD-10-CM

## 2018-08-20 LAB
CALCIUM SERPL-MCNC: 9.6 MG/DL — SIGNIFICANT CHANGE UP (ref 8.5–10.1)
PTH-INTACT IO % DIF SERPL: 11.1 PG/ML — LOW (ref 19–73)

## 2018-08-20 PROCEDURE — 60240 REMOVAL OF THYROID: CPT

## 2018-08-20 RX ORDER — MORPHINE SULFATE 50 MG/1
2 CAPSULE, EXTENDED RELEASE ORAL
Qty: 0 | Refills: 0 | Status: DISCONTINUED | OUTPATIENT
Start: 2018-08-20 | End: 2018-08-21

## 2018-08-20 RX ORDER — SODIUM CHLORIDE 9 MG/ML
1000 INJECTION, SOLUTION INTRAVENOUS
Qty: 0 | Refills: 0 | Status: DISCONTINUED | OUTPATIENT
Start: 2018-08-20 | End: 2018-08-21

## 2018-08-20 RX ORDER — SODIUM CHLORIDE 9 MG/ML
3 INJECTION INTRAMUSCULAR; INTRAVENOUS; SUBCUTANEOUS EVERY 8 HOURS
Qty: 0 | Refills: 0 | Status: DISCONTINUED | OUTPATIENT
Start: 2018-08-20 | End: 2018-09-04

## 2018-08-20 RX ORDER — ONDANSETRON 8 MG/1
4 TABLET, FILM COATED ORAL ONCE
Qty: 0 | Refills: 0 | Status: DISCONTINUED | OUTPATIENT
Start: 2018-08-20 | End: 2018-08-21

## 2018-08-20 RX ORDER — HYDROMORPHONE HYDROCHLORIDE 2 MG/ML
0.5 INJECTION INTRAMUSCULAR; INTRAVENOUS; SUBCUTANEOUS
Qty: 0 | Refills: 0 | Status: DISCONTINUED | OUTPATIENT
Start: 2018-08-20 | End: 2018-08-21

## 2018-08-20 RX ADMIN — MORPHINE SULFATE 2 MILLIGRAM(S): 50 CAPSULE, EXTENDED RELEASE ORAL at 10:56

## 2018-08-20 RX ADMIN — SODIUM CHLORIDE 100 MILLILITER(S): 9 INJECTION, SOLUTION INTRAVENOUS at 10:41

## 2018-08-20 RX ADMIN — MORPHINE SULFATE 2 MILLIGRAM(S): 50 CAPSULE, EXTENDED RELEASE ORAL at 11:21

## 2018-08-20 RX ADMIN — HYDROMORPHONE HYDROCHLORIDE 0.5 MILLIGRAM(S): 2 INJECTION INTRAMUSCULAR; INTRAVENOUS; SUBCUTANEOUS at 11:21

## 2018-08-20 NOTE — BRIEF OPERATIVE NOTE - POST-OP DX
Thyroid cancer, medullary carcinoma  08/20/2018    Active  Stoney Drake Thyroid cancer  08/20/2018    Active  Myron Herrera

## 2018-08-20 NOTE — ASU DISCHARGE PLAN (ADULT/PEDIATRIC). - INSTRUCTIONS
stay hydrated Call office to make an appointment to be seen in 1 week. Call office to make an appointment to be seen in 1 week by the PA to have your stitch removed.

## 2018-08-20 NOTE — BRIEF OPERATIVE NOTE - PROCEDURE
<<-----Click on this checkbox to enter Procedure Thyroidectomy, total  08/20/2018    Active  FELABBASY

## 2018-08-20 NOTE — CHART NOTE - NSCHARTNOTEFT_GEN_A_CORE
PACU ANESTHESIA ADMISSION NOTE      Procedure: Thyroidectomy, total    Post op diagnosis:  Thyroid cancer      ____  Intubated  TV:______       Rate: ______      FiO2: ______    __x__  Patent Airway    __x__  Full return of protective reflexes    __x__  Full recovery from anesthesia / back to baseline status      Vitals:   BP     140/78       HR     84      RR       12      O2 sat     98%      Temp 98.7 F      Mental Status:  __x__ Awake   _____ Alert   _____ Drowsy   _____ Sedated    Nausea/Vomiting:  __x__ No    ____ Yes, See Post - Op Orders        Pain Scale (0-10):  __0___    Treatment: ____ None    ____ See Post - Op/PCA Orders    Post - Operative Fluids:   __x__ Oral   ____ See Post - Op Orders    Plan: Discharge:   _x___Home       _____Floor     _____Critical Care    _____  Other:_________________    Comments: No anesthesia complications noted.  Discharge once criteria met. PACU ANESTHESIA ADMISSION NOTE      Procedure: Thyroidectomy, total    Post op diagnosis:  Thyroid cancer      ____  Intubated  TV:______       Rate: ______      FiO2: ______    __x__  Patent Airway    __x__  Full return of protective reflexes    __x__  Full recovery from anesthesia / back to baseline status      Vitals:   BP     140/78       HR     84      RR       12      O2 sat     98%      Temp 98.7 F      Mental Status:  __x__ Awake   _____ Alert   _____ Drowsy   _____ Sedated    Nausea/Vomiting:  __x__ No    ____ Yes, See Post - Op Orders        Pain Scale (0-10):  __0___    Treatment: ____ None    ____ See Post - Op/PCA Orders    Post - Operative Fluids:   __x__ Oral   ____ See Post - Op Orders    Plan: Discharge:   _x___Home       _____Floor     _____Critical Care    _____  Other:_________________    Comments: No anesthesia complications noted.  Discharge once criteria met.    Agree with CRNA PACU evaluation.

## 2018-08-20 NOTE — ASU DISCHARGE PLAN (ADULT/PEDIATRIC). - NOTIFY
Swelling that continues/Pain not relieved by Medications/Numbness, color, or temperature change to extremity/Persistent Nausea and Vomiting/Fever greater than 101/Bleeding that does not stop

## 2018-08-20 NOTE — ASU DISCHARGE PLAN (ADULT/PEDIATRIC). - SPECIAL INSTRUCTIONS
Return to ED or call Dr Herrera if develop fever >101.4, difficulty breathing or increased swelling in the neck. If develop numbness or tingling in your hands/feet/mouth, take an extra calcium pill and call Dr Herrera.

## 2018-08-22 LAB — SURGICAL PATHOLOGY STUDY: SIGNIFICANT CHANGE UP

## 2018-08-23 DIAGNOSIS — C73 MALIGNANT NEOPLASM OF THYROID GLAND: ICD-10-CM

## 2018-08-23 DIAGNOSIS — Z85.3 PERSONAL HISTORY OF MALIGNANT NEOPLASM OF BREAST: ICD-10-CM

## 2018-08-27 ENCOUNTER — APPOINTMENT (OUTPATIENT)
Dept: OTOLARYNGOLOGY | Facility: CLINIC | Age: 63
End: 2018-08-27
Payer: MEDICAID

## 2018-08-27 ENCOUNTER — OUTPATIENT (OUTPATIENT)
Dept: OUTPATIENT SERVICES | Facility: HOSPITAL | Age: 63
LOS: 1 days | Discharge: HOME | End: 2018-08-27

## 2018-08-27 VITALS
SYSTOLIC BLOOD PRESSURE: 128 MMHG | BODY MASS INDEX: 30.7 KG/M2 | DIASTOLIC BLOOD PRESSURE: 74 MMHG | HEIGHT: 66 IN | WEIGHT: 191 LBS

## 2018-08-27 DIAGNOSIS — Z98.890 OTHER SPECIFIED POSTPROCEDURAL STATES: Chronic | ICD-10-CM

## 2018-08-27 DIAGNOSIS — E83.51 HYPOCALCEMIA: ICD-10-CM

## 2018-08-27 DIAGNOSIS — Z90.710 ACQUIRED ABSENCE OF BOTH CERVIX AND UTERUS: Chronic | ICD-10-CM

## 2018-08-27 PROCEDURE — 99024 POSTOP FOLLOW-UP VISIT: CPT

## 2018-09-04 ENCOUNTER — FORM ENCOUNTER (OUTPATIENT)
Age: 63
End: 2018-09-04

## 2018-09-05 ENCOUNTER — OUTPATIENT (OUTPATIENT)
Dept: OUTPATIENT SERVICES | Facility: HOSPITAL | Age: 63
LOS: 1 days | Discharge: HOME | End: 2018-09-05

## 2018-09-05 DIAGNOSIS — R92.8 OTHER ABNORMAL AND INCONCLUSIVE FINDINGS ON DIAGNOSTIC IMAGING OF BREAST: ICD-10-CM

## 2018-09-05 DIAGNOSIS — Z98.890 OTHER SPECIFIED POSTPROCEDURAL STATES: Chronic | ICD-10-CM

## 2018-09-05 DIAGNOSIS — Z90.710 ACQUIRED ABSENCE OF BOTH CERVIX AND UTERUS: Chronic | ICD-10-CM

## 2018-09-12 ENCOUNTER — APPOINTMENT (OUTPATIENT)
Dept: HEMATOLOGY ONCOLOGY | Facility: CLINIC | Age: 63
End: 2018-09-12

## 2018-09-12 ENCOUNTER — LABORATORY RESULT (OUTPATIENT)
Age: 63
End: 2018-09-12

## 2018-09-12 VITALS
HEART RATE: 80 BPM | WEIGHT: 1 LBS | SYSTOLIC BLOOD PRESSURE: 108 MMHG | DIASTOLIC BLOOD PRESSURE: 68 MMHG | HEIGHT: 66 IN | RESPIRATION RATE: 16 BRPM | OXYGEN SATURATION: 100 % | TEMPERATURE: 97.1 F | BODY MASS INDEX: 0.16 KG/M2

## 2018-09-13 ENCOUNTER — APPOINTMENT (OUTPATIENT)
Dept: OTOLARYNGOLOGY | Facility: CLINIC | Age: 63
End: 2018-09-13
Payer: MEDICAID

## 2018-09-13 VITALS — HEIGHT: 66 IN | BODY MASS INDEX: 30.7 KG/M2 | WEIGHT: 191 LBS

## 2018-09-13 LAB
ALBUMIN SERPL ELPH-MCNC: 4.3 G/DL
ALP BLD-CCNC: 87 U/L
ALT SERPL-CCNC: 33 U/L
ANION GAP SERPL CALC-SCNC: 15 MMOL/L
AST SERPL-CCNC: 24 U/L
BILIRUB SERPL-MCNC: 0.5 MG/DL
BUN SERPL-MCNC: 11 MG/DL
CALCIUM SERPL-MCNC: 9.7 MG/DL
CANCER AG125 SERPL-ACNC: 6 U/ML
CHLORIDE SERPL-SCNC: 100 MMOL/L
CO2 SERPL-SCNC: 25 MMOL/L
CREAT SERPL-MCNC: 0.6 MG/DL
GLUCOSE SERPL-MCNC: 104 MG/DL
HCT VFR BLD CALC: 38.4 %
HGB BLD-MCNC: 12.6 G/DL
MCHC RBC-ENTMCNC: 31.5 PG
MCHC RBC-ENTMCNC: 32.8 G/DL
MCV RBC AUTO: 96 FL
PLATELET # BLD AUTO: 231 K/UL
PMV BLD: 9.6 FL
POTASSIUM SERPL-SCNC: 4.2 MMOL/L
PROT SERPL-MCNC: 7.2 G/DL
RBC # BLD: 4 M/UL
RBC # FLD: 13.1 %
SODIUM SERPL-SCNC: 140 MMOL/L
WBC # FLD AUTO: 5.07 K/UL

## 2018-09-13 PROCEDURE — 31575 DIAGNOSTIC LARYNGOSCOPY: CPT | Mod: 58

## 2018-09-14 ENCOUNTER — APPOINTMENT (OUTPATIENT)
Dept: HEMATOLOGY ONCOLOGY | Facility: CLINIC | Age: 63
End: 2018-09-14

## 2018-09-18 ENCOUNTER — OUTPATIENT (OUTPATIENT)
Dept: OUTPATIENT SERVICES | Facility: HOSPITAL | Age: 63
LOS: 1 days | Discharge: HOME | End: 2018-09-18

## 2018-09-18 ENCOUNTER — OTHER (OUTPATIENT)
Age: 63
End: 2018-09-18

## 2018-09-18 DIAGNOSIS — Z90.710 ACQUIRED ABSENCE OF BOTH CERVIX AND UTERUS: Chronic | ICD-10-CM

## 2018-09-18 DIAGNOSIS — Z98.890 OTHER SPECIFIED POSTPROCEDURAL STATES: Chronic | ICD-10-CM

## 2018-09-21 ENCOUNTER — OUTPATIENT (OUTPATIENT)
Dept: OUTPATIENT SERVICES | Facility: HOSPITAL | Age: 63
LOS: 1 days | Discharge: HOME | End: 2018-09-21

## 2018-09-21 DIAGNOSIS — Z98.890 OTHER SPECIFIED POSTPROCEDURAL STATES: Chronic | ICD-10-CM

## 2018-09-21 DIAGNOSIS — Z90.710 ACQUIRED ABSENCE OF BOTH CERVIX AND UTERUS: Chronic | ICD-10-CM

## 2018-09-21 DIAGNOSIS — E89.0 POSTPROCEDURAL HYPOTHYROIDISM: ICD-10-CM

## 2018-09-21 DIAGNOSIS — E55.9 VITAMIN D DEFICIENCY, UNSPECIFIED: ICD-10-CM

## 2018-09-24 ENCOUNTER — OUTPATIENT (OUTPATIENT)
Dept: OUTPATIENT SERVICES | Facility: HOSPITAL | Age: 63
LOS: 1 days | Discharge: HOME | End: 2018-09-24

## 2018-09-24 DIAGNOSIS — Z98.890 OTHER SPECIFIED POSTPROCEDURAL STATES: Chronic | ICD-10-CM

## 2018-09-24 DIAGNOSIS — C54.1 MALIGNANT NEOPLASM OF ENDOMETRIUM: ICD-10-CM

## 2018-09-24 DIAGNOSIS — Z90.710 ACQUIRED ABSENCE OF BOTH CERVIX AND UTERUS: Chronic | ICD-10-CM

## 2018-09-24 LAB — GLUCOSE BLDC GLUCOMTR-MCNC: 117 MG/DL — HIGH (ref 70–99)

## 2018-09-26 ENCOUNTER — RX RENEWAL (OUTPATIENT)
Age: 63
End: 2018-09-26

## 2018-09-26 ENCOUNTER — OUTPATIENT (OUTPATIENT)
Dept: OUTPATIENT SERVICES | Facility: HOSPITAL | Age: 63
LOS: 1 days | Discharge: HOME | End: 2018-09-26

## 2018-09-26 DIAGNOSIS — Z98.890 OTHER SPECIFIED POSTPROCEDURAL STATES: Chronic | ICD-10-CM

## 2018-09-26 DIAGNOSIS — Z00.00 ENCOUNTER FOR GENERAL ADULT MEDICAL EXAMINATION WITHOUT ABNORMAL FINDINGS: ICD-10-CM

## 2018-09-26 DIAGNOSIS — C55 MALIGNANT NEOPLASM OF UTERUS, PART UNSPECIFIED: ICD-10-CM

## 2018-09-26 DIAGNOSIS — C73 MALIGNANT NEOPLASM OF THYROID GLAND: ICD-10-CM

## 2018-09-26 DIAGNOSIS — Z90.710 ACQUIRED ABSENCE OF BOTH CERVIX AND UTERUS: Chronic | ICD-10-CM

## 2018-09-28 ENCOUNTER — APPOINTMENT (OUTPATIENT)
Dept: PULMONOLOGY | Facility: CLINIC | Age: 63
End: 2018-09-28

## 2018-09-28 ENCOUNTER — OUTPATIENT (OUTPATIENT)
Dept: OUTPATIENT SERVICES | Facility: HOSPITAL | Age: 63
LOS: 1 days | Discharge: HOME | End: 2018-09-28

## 2018-09-28 VITALS
OXYGEN SATURATION: 97 % | HEIGHT: 66 IN | SYSTOLIC BLOOD PRESSURE: 110 MMHG | BODY MASS INDEX: 30.53 KG/M2 | WEIGHT: 190 LBS | DIASTOLIC BLOOD PRESSURE: 76 MMHG | HEART RATE: 66 BPM

## 2018-09-28 DIAGNOSIS — J91.0 MALIGNANT PLEURAL EFFUSION: ICD-10-CM

## 2018-09-28 DIAGNOSIS — Z98.890 OTHER SPECIFIED POSTPROCEDURAL STATES: Chronic | ICD-10-CM

## 2018-09-28 DIAGNOSIS — Z90.710 ACQUIRED ABSENCE OF BOTH CERVIX AND UTERUS: Chronic | ICD-10-CM

## 2018-10-10 ENCOUNTER — LABORATORY RESULT (OUTPATIENT)
Age: 63
End: 2018-10-10

## 2018-10-10 ENCOUNTER — APPOINTMENT (OUTPATIENT)
Dept: HEMATOLOGY ONCOLOGY | Facility: CLINIC | Age: 63
End: 2018-10-10

## 2018-10-10 VITALS
HEART RATE: 77 BPM | HEIGHT: 66 IN | RESPIRATION RATE: 16 BRPM | DIASTOLIC BLOOD PRESSURE: 70 MMHG | BODY MASS INDEX: 30.53 KG/M2 | SYSTOLIC BLOOD PRESSURE: 104 MMHG | WEIGHT: 190 LBS | TEMPERATURE: 96.7 F

## 2018-10-10 RX ORDER — POLYETHYLENE GLYCOL 3350 17 G/17G
17 POWDER, FOR SOLUTION ORAL
Qty: 255 | Refills: 0 | Status: COMPLETED | COMMUNITY
Start: 2018-05-03 | End: 2018-10-10

## 2018-10-10 RX ORDER — DEXAMETHASONE 4 MG/1
4 TABLET ORAL
Qty: 24 | Refills: 1 | Status: COMPLETED | COMMUNITY
Start: 2018-01-05 | End: 2018-10-10

## 2018-10-10 RX ORDER — OXYCODONE AND ACETAMINOPHEN 5; 325 MG/1; MG/1
5-325 TABLET ORAL
Qty: 20 | Refills: 0 | Status: COMPLETED | COMMUNITY
Start: 2018-05-08 | End: 2018-10-10

## 2018-10-10 RX ORDER — ASPIRIN 81 MG
6.5 TABLET, DELAYED RELEASE (ENTERIC COATED) ORAL
Qty: 1 | Refills: 0 | Status: COMPLETED | COMMUNITY
Start: 2018-05-03 | End: 2018-10-10

## 2018-10-10 RX ORDER — SENNOSIDES 8.6 MG
5 TABLET ORAL
Qty: 4 | Refills: 0 | Status: COMPLETED | COMMUNITY
Start: 2018-05-03 | End: 2018-10-10

## 2018-10-11 LAB
25(OH)D3 SERPL-MCNC: 25 NG/ML
ALBUMIN SERPL ELPH-MCNC: 4.4 G/DL
ALP BLD-CCNC: 84 U/L
ALT SERPL-CCNC: 43 U/L
ANION GAP SERPL CALC-SCNC: 21 MMOL/L
AST SERPL-CCNC: 36 U/L
BILIRUB SERPL-MCNC: 0.6 MG/DL
BUN SERPL-MCNC: 11 MG/DL
CALCIUM SERPL-MCNC: 9.5 MG/DL
CANCER AG125 SERPL-ACNC: 5 U/ML
CHLORIDE SERPL-SCNC: 99 MMOL/L
CO2 SERPL-SCNC: 21 MMOL/L
CREAT SERPL-MCNC: 0.7 MG/DL
GLUCOSE SERPL-MCNC: 86 MG/DL
HCT VFR BLD CALC: 36.6 %
HGB BLD-MCNC: 12.2 G/DL
IRON SATN MFR SERPL: 31 %
IRON SERPL-MCNC: 101 UG/DL
MCHC RBC-ENTMCNC: 31.6 PG
MCHC RBC-ENTMCNC: 33.3 G/DL
MCV RBC AUTO: 94.8 FL
PLATELET # BLD AUTO: 209 K/UL
PMV BLD: 9.7 FL
POTASSIUM SERPL-SCNC: 4.3 MMOL/L
PROT SERPL-MCNC: 7.3 G/DL
RBC # BLD: 3.86 M/UL
RBC # FLD: 12.4 %
SODIUM SERPL-SCNC: 141 MMOL/L
TIBC SERPL-MCNC: 331 UG/DL
UIBC SERPL-MCNC: 230 UG/DL
WBC # FLD AUTO: 4.9 K/UL

## 2018-10-15 LAB
FOLATE SERPL-MCNC: 10.9 NG/ML
T3 SERPL-MCNC: 157 NG/DL
T4 SERPL-MCNC: 11.3 UG/DL
TSH SERPL-ACNC: 0.02 UIU/ML
VIT B12 SERPL-MCNC: 288 PG/ML

## 2018-10-16 ENCOUNTER — OUTPATIENT (OUTPATIENT)
Dept: OUTPATIENT SERVICES | Facility: HOSPITAL | Age: 63
LOS: 1 days | Discharge: HOME | End: 2018-10-16

## 2018-10-16 DIAGNOSIS — Z90.710 ACQUIRED ABSENCE OF BOTH CERVIX AND UTERUS: Chronic | ICD-10-CM

## 2018-10-16 DIAGNOSIS — C54.1 MALIGNANT NEOPLASM OF ENDOMETRIUM: ICD-10-CM

## 2018-10-16 DIAGNOSIS — Z98.890 OTHER SPECIFIED POSTPROCEDURAL STATES: Chronic | ICD-10-CM

## 2018-10-25 ENCOUNTER — APPOINTMENT (OUTPATIENT)
Dept: OTOLARYNGOLOGY | Facility: CLINIC | Age: 63
End: 2018-10-25
Payer: MEDICAID

## 2018-10-25 PROCEDURE — 31575 DIAGNOSTIC LARYNGOSCOPY: CPT | Mod: 58

## 2018-10-29 ENCOUNTER — APPOINTMENT (OUTPATIENT)
Dept: INTERNAL MEDICINE | Facility: CLINIC | Age: 63
End: 2018-10-29

## 2018-10-29 ENCOUNTER — OUTPATIENT (OUTPATIENT)
Dept: OUTPATIENT SERVICES | Facility: HOSPITAL | Age: 63
LOS: 1 days | Discharge: HOME | End: 2018-10-29

## 2018-10-29 VITALS
SYSTOLIC BLOOD PRESSURE: 117 MMHG | HEIGHT: 66 IN | BODY MASS INDEX: 30.86 KG/M2 | HEART RATE: 80 BPM | WEIGHT: 192 LBS | TEMPERATURE: 97.2 F | DIASTOLIC BLOOD PRESSURE: 72 MMHG

## 2018-10-29 DIAGNOSIS — Z98.890 OTHER SPECIFIED POSTPROCEDURAL STATES: Chronic | ICD-10-CM

## 2018-10-29 DIAGNOSIS — Z90.710 ACQUIRED ABSENCE OF BOTH CERVIX AND UTERUS: Chronic | ICD-10-CM

## 2018-10-29 NOTE — HISTORY OF PRESENT ILLNESS
[FreeTextEntry1] : Routine Follow up  [de-identified] : 63F with pmhx of Stage IV Endometrial Cacner s/p chemotherapy, Papillary Thyroid cancer s/p Thyroidectomy, hx of Malignant pleural effusion s/p pleurex cath and subsequent removal, DM, and Vit D insufficiency presents to clinic for routine follow up. Patient states she feels well generally, and is mainiting her energy, also she tends to have mild fatigue ~3 o'clock in the afternoon which will improve shortly after. Patient states her appetite I has been maintained and she is attempting to watch what she eats following her appointment with the dietician. Patient is not experiencing shortness of breath at this time, and has no significant untreated pain. \par Last seen by ENT 10/25/2018: Given Protonix for GERD and Hoarseness \par Last seen by Onc 10/10/2018\par Will follow up with Endo on 1/2019\par

## 2018-10-29 NOTE — PHYSICAL EXAM
[No Acute Distress] : no acute distress [Well Nourished] : well nourished [Well Developed] : well developed [Well-Appearing] : well-appearing [Normal Sclera/Conjunctiva] : normal sclera/conjunctiva [Normal Outer Ear/Nose] : the outer ears and nose were normal in appearance [Normal Oropharynx] : the oropharynx was normal [No JVD] : no jugular venous distention [Supple] : supple [No Lymphadenopathy] : no lymphadenopathy [Thyroid Normal, No Nodules] : the thyroid was normal and there were no nodules present [No Respiratory Distress] : no respiratory distress  [Clear to Auscultation] : lungs were clear to auscultation bilaterally [No Accessory Muscle Use] : no accessory muscle use [Normal Rate] : normal rate  [Regular Rhythm] : with a regular rhythm [Normal S1, S2] : normal S1 and S2 [No Murmur] : no murmur heard [No Carotid Bruits] : no carotid bruits [No Abdominal Bruit] : a ~M bruit was not heard ~T in the abdomen [No Edema] : there was no peripheral edema [No Extremity Clubbing/Cyanosis] : no extremity clubbing/cyanosis [Soft] : abdomen soft [Non Tender] : non-tender [Non-distended] : non-distended [No HSM] : no HSM [Normal Bowel Sounds] : normal bowel sounds [Normal Posterior Cervical Nodes] : no posterior cervical lymphadenopathy [Normal Anterior Cervical Nodes] : no anterior cervical lymphadenopathy [No CVA Tenderness] : no CVA  tenderness [No Spinal Tenderness] : no spinal tenderness [No Joint Swelling] : no joint swelling [Grossly Normal Strength/Tone] : grossly normal strength/tone [No Rash] : no rash [Normal Gait] : normal gait [Coordination Grossly Intact] : coordination grossly intact [No Focal Deficits] : no focal deficits [Normal Affect] : the affect was normal [Normal Insight/Judgement] : insight and judgment were intact [de-identified] : thyroidectomy scar

## 2018-10-29 NOTE — ASSESSMENT
[FreeTextEntry1] : 63F with pmhx of Stage IV Endometrial Cacner s/p chemotherapy, Papillary Thyroid cancer s/p Thyroidectomy, hx of Malignant pleural effusion s/p pleurex cath and subsequent removal, DM, and Vit D insufficiency presents to clinic for routine follow up.\par \par Stage IV endometrial Cancer s/p Chemo:\par Following with Dr. Cid\par Last CBC normal, no weight loss\par \par Papilltary Thyroid cancer s/p Thyoridectomy\par Currently on Levothyroxine 137mcg \par Follows with Endo Dr. Acevedo and ENT \par \par \par Malignant Pleural Effusion: \par s/p pleurex cath removal in 2/2018, no shortness of breath\par Can now follow with Pulandry ALVARENGAN, seen by Dr Thompson 9/28\par \par \par GERD/Hoarness: ENT following\par Given Rx for Protonix, if insurance is an issue can take omperazole OTC \par \par Peripheral Neuropathy: Controlled with gabapentin \par \par DM:  \par Will repeat Hba1c following lifestyle modification \par Last HbA1c: 6.7 in 8/2018 \par Last Optho appointment: State she was seen by ophtho 3/2018 \par Does not want to see a podiatrist at this time\par \par Jean Care Maintenance: \par Routine labs reviewed: CBC, BMP, HbA1c, TSH, Lipid Profile, Vit, D\par Flu Vaccine: Recieved for other clinic \par Colonoscopy: Up to date, done on May 4, 2018. %mm polyp removed, next colo in 5 years, (5/2023)\par Mammo/PAP: up to date \par RTC in 6 months

## 2018-10-30 ENCOUNTER — OTHER (OUTPATIENT)
Age: 63
End: 2018-10-30

## 2018-10-30 DIAGNOSIS — G62.9 POLYNEUROPATHY, UNSPECIFIED: ICD-10-CM

## 2018-10-30 DIAGNOSIS — E11.9 TYPE 2 DIABETES MELLITUS WITHOUT COMPLICATIONS: ICD-10-CM

## 2018-10-30 DIAGNOSIS — E03.9 HYPOTHYROIDISM, UNSPECIFIED: ICD-10-CM

## 2018-10-30 DIAGNOSIS — C54.1 MALIGNANT NEOPLASM OF ENDOMETRIUM: ICD-10-CM

## 2018-11-02 ENCOUNTER — APPOINTMENT (OUTPATIENT)
Dept: GYNECOLOGIC ONCOLOGY | Facility: CLINIC | Age: 63
End: 2018-11-02

## 2018-11-02 ENCOUNTER — OUTPATIENT (OUTPATIENT)
Dept: OUTPATIENT SERVICES | Facility: HOSPITAL | Age: 63
LOS: 1 days | Discharge: HOME | End: 2018-11-02

## 2018-11-02 ENCOUNTER — LABORATORY RESULT (OUTPATIENT)
Age: 63
End: 2018-11-02

## 2018-11-02 VITALS
BODY MASS INDEX: 30.86 KG/M2 | RESPIRATION RATE: 16 BRPM | DIASTOLIC BLOOD PRESSURE: 66 MMHG | WEIGHT: 192 LBS | SYSTOLIC BLOOD PRESSURE: 94 MMHG | HEIGHT: 66 IN | TEMPERATURE: 97.8 F | HEART RATE: 61 BPM

## 2018-11-02 DIAGNOSIS — Z90.710 ACQUIRED ABSENCE OF BOTH CERVIX AND UTERUS: Chronic | ICD-10-CM

## 2018-11-02 DIAGNOSIS — Z98.890 OTHER SPECIFIED POSTPROCEDURAL STATES: Chronic | ICD-10-CM

## 2018-11-05 DIAGNOSIS — C54.1 MALIGNANT NEOPLASM OF ENDOMETRIUM: ICD-10-CM

## 2018-11-14 NOTE — H&P PST ADULT - TEACHING/LEARNING RELIGIOUS CONSIDERATIONS
Cozaar [Losartan Potassium] Other (See Comments)     Cough    Lisinopril Other (See Comments)     Cough    Pcn [Penicillins] Other (See Comments)     Unknown as a child think a rash       Problem List:    Patient Active Problem List   Diagnosis Code    Hyperlipidemia E78.5    Obesity, morbid, BMI 40.0-49.9 [facility] E66.01    H/O echocardiogram J72.88    Metabolic syndrome R43.06    Venous stasis dermatitis of both lower extremities I87.2    Dependent edema R60.9    Atrial fibrillation with RVR ([facility]) I48.91    Essential hypertension I10    Paroxysmal atrial fibrillation ([facility]) I48.0    MARLINE treated with BiPAP G47.33    Neck pain on left side M54.2    Cervical radiculopathy M54.12    Left lateral epicondylitis M77.12    Anticoagulated Z79.01       Past Medical History:        Diagnosis Date    Atrial fibrillation with RVR ([facility]) 07/01/2017    Bell palsy     right side of face affected    H/O 24 hour EKG monitoring 3/30/10    holter: other then an episode of sinus tach at 150 bpm no other clinically significant arrhythmia seen. Symptoms reported in pts diary do not correlate with this episode    H/O cardiovascular stress test 1/14/10    Joe protocol: normal stress study. gated study shows uniform wall motion with calculated LV EF of over 67 %    H/O echocardiogram 5/20/10    ECHO:  There is no comparison study available. There is mild concentric LV hypertrophy. LVSF is normal, EF = > 55 %    H/O echocardiogram 07/18/2014    EF55%, borderline left atrial enlargement, normal LV systolic function, mild mitral regurg, no pericardial effusion.     H/O echocardiogram 07/03/2017    EF55%  AV mild  sclerotic    H/O echocardiogram 11/07/2018    EF55-60% sclerotic non stenosed AV    Heart palpitations     History of cardiac monitoring 7/21/14    Event - no clinically significant arrythmias    History of cardiac monitoring 08/10/2017    No clinically signifacant arrythmias    History of complete ECG none

## 2018-11-26 NOTE — H&P PST ADULT - PMH
History and Physical - [facility]  Vandana Meigs Custodio 64 y o  female MRN: [id number]        HPI:  49-year-old female with no significant past medical history was referred for colonoscopy  Patient reports having colonoscopy many years ago and advised for a repeat colonoscopy in 5 years but she does not remember about the prep or any polyps  Patient has regular bowel movements and denies any blood or mucus in the stool  Appetite is good and denies any recent weight loss  Denies any abdominal pain, nausea, or vomiting  Has no heartburn or acid reflux  Denies any difficulty swallowing  Historical Information   Past Medical History:   Diagnosis Date    Asthma     Encounter for surveillance of contraceptives, unspecified 07/30/2013    H/O mammogram     Plantar fasciitis 08/09/2017    Resolved:  November 24, 2017     Past Surgical History:   Procedure Laterality Date    CARPAL TUNNEL RELEASE Bilateral      Social History   History   Alcohol Use    Yes     Comment: social     History   Drug Use No     History   Smoking Status    Never Smoker   Smokeless Tobacco    Never Used     Family History   Problem Relation Age of Onset    Lung cancer Mother     Ulcerative colitis Son        Meds/Allergies     Prescriptions Prior to Admission   Medication    fexofenadine (ALLEGRA) 180 MG tablet    fexofenadine-pseudoephedrine (ALLEGRA-D)  MG per tablet    fluticasone (FLONASE) 50 mcg/act nasal spray    fluticasone-salmeterol (ADVAIR DISKUS) 250-50 mcg/dose inhaler    montelukast (SINGULAIR) 10 mg tablet    albuterol (VENTOLIN HFA) 90 mcg/act inhaler       No Known Allergies    Objective     Blood pressure 90/51, pulse 69, temperature 97 8 °F (36 6 °C), temperature source Temporal, resp  rate 18, height 5' 4" (1 626 m), weight 70 3 kg (155 lb), SpO2 97 %, not currently breastfeeding      PHYSICAL EXAM:    Gen: NAD  CV: S1 & S2 normal, RRR  CHEST: Clear to auscultate  ABD: soft, NT/ND, good bowel sounds  EXT: no edema    ASSESSMENT:     Screening for colon cancer    PLAN:    Colonoscopy Peripheral neuropathy    Pleural effusion  11/17  Thyroid cyst  NEW DX (NO MEDS)  Uterine cancer

## 2018-11-27 ENCOUNTER — RX RENEWAL (OUTPATIENT)
Age: 63
End: 2018-11-27

## 2018-11-28 ENCOUNTER — RX RENEWAL (OUTPATIENT)
Age: 63
End: 2018-11-28

## 2018-12-10 ENCOUNTER — APPOINTMENT (OUTPATIENT)
Dept: OTOLARYNGOLOGY | Facility: CLINIC | Age: 63
End: 2018-12-10
Payer: MEDICAID

## 2018-12-10 VITALS
HEIGHT: 66 IN | SYSTOLIC BLOOD PRESSURE: 122 MMHG | WEIGHT: 192 LBS | DIASTOLIC BLOOD PRESSURE: 84 MMHG | BODY MASS INDEX: 30.86 KG/M2

## 2018-12-10 PROCEDURE — 99213 OFFICE O/P EST LOW 20 MIN: CPT | Mod: 25

## 2018-12-10 NOTE — ASU PATIENT PROFILE, ADULT - URINARY CATHETER
Physical Therapy Evaluation    Visit Count: 1   Plan of Care: 12/10/2018 Through: 3/4/2019  Insurance Information: no limitations  Referred by: Forrest Cormier MD; Next provider visit (if known/scheduled): 1 year  Medical Diagnosis (from order):  History of arthroplasty of right knee [Z96.651]  Pain in both knees, unspecified chronicity [M25.561, M25.562]  Impaired gait and mobility [R26.89]  Treatment Diagnosis: knee symptoms with increased pain/symptoms, impaired strength, impaired range of motion, increased swelling, impaired joint mobility/play, impaired gait, impaired mobility, impaired activity tolerance, impaired balance, increased risk for falls    Date of Surgery: 8/29/18; Surgery performed: right TKA  Physician Guidelines/Precautions: none   Chart reviewed at time of initial evaluation (relevant co-morbidities, allergies, tests and medications listed): HTN, asthma     SUBJECTIVE   Description of Problem and/or Mechanism of Injury: Patient reports a chronic history of right knee pain. She underwent right TKA on 8/29/18 and was subsequently admitted to SNF and was discharged home on 10/4/18. She underwent extensive home therapy due to difficulty with 17 step entry into 2nd floor apartment and was last seen in home therapy 12/7.      Pain:  Patient reports she continues to have difficulty with walking. She uses her cane and ww in home environment but finds herself still using walker more than the cane due to concerns with stability as well as LEFT > right knee pain. She is trying to hold off on LEFT TKA until after she moves from second floor apartment.     Patient does report intermittent anterior right knee pain. No buckling reported.     Current HEP - standing hip ABD and EXT, mini-squats, repeated sit<> standing, marching with walker, seated LAQs, side-stepping.     Intensity (0-10 scale): Current: 3; Pain Range (best-worst): 0-7  Location: right knee   Quality/Description:  Ache  Relieving/Alleviating factors: rest, muscle rub    Function:  Limitations and exacerbating factors: pain, difficulty with all activities/tasks with involved extremity, all weight bearing activities/tasks with involved extremity, ambulation/walking, meal prep/standing tasks, stair ambulation, standing tolerance 10 minutes, walking tolerance 3 minutes, low transfers, tub transfer with bench    Prior level: no pain or difficulty with all lower extremity tasks including but not limited to ambulation, stairs and transfers , patient was ambulating with 4ww although mostly scooting around home on seat.    Patient Goals: walk down stairs without cane and drive car, increase walking tolerance     Prior Treatment: inpatient physical and occupational therapy while hospitalized following surgery.  She was discharged from the hospital to nursing facility; did receive and has been discharged from home therapy   Home Environment/Social Support: Patient lives alone, in an apartment, 17 step entry (3 exterior steps) - right rail ascending; intermittent assist from family/friends available.     Safety:  Do you feel safe at home, work and/or school? yes, per patient  Patient denies 2 or more falls or an unexplained fall with injury in the last year, further testing not required. She did fall this past Sunday transferring out of chair.     OBJECTIVE   Posture/Observation:  Patient stands with flexed knees    Gait Analysis:  Using standard cane; decreased ubaldo, ambulating with left > right knee flexion in stance      Range of Motion (degrees)   Left Right   Date Initial Initial   Knee Flexion (135) 120 110   Knee Extension (0-5) -12 -4   standard testing positions unless otherwise noted; Key: ranges are reported in active range of motion unless noted as AA=active assistive or P=passive range of motion, (norms), * denotes pain   Only those motions that were assessed are noted.    Strength (out of 5)   Left Right   Date Initial  Initial   Hip Abductors 2+ 2+   Knee Flexors 4+ 4+   Knee Extensors 5 4+   Ankle Dorsiflexors     Ankle Plantar flexors     standard testing positions unless otherwise noted, key: * denotes pain  Only muscle strength that was assessed are noted.    Palpation:  NT     Balance/Gait Tests:   6 Minute Walk Test:  Distance walked in 6 minutes: 600 feet  RPE scale: 6/10 - limited by left knee pain as well as breathing  HR 90 bpm, SpO2 97%  Norms: All age/gender: 2320-4218 feet, 60-69 years: male 7590-1734 feet, female 2666-4329 feet    Timed Up and Go (TUG) Test:   Total time to complete: 20 seconds  Unstable on turns: No        Assistive Device Used: standard cane  Interpretation: < 10 seconds = normal ; > or = 13.5 seconds has been shown to indicate high risk of falls  Norms: 60-69 years male and female 8 seconds      Outcome Measures:   Lower Extremity Functional Scale: LEFS Calculated Total: 33 (0=extreme difficulty; 80=no difficulty)     Initial Treatment   Initial evaluation completed.    Initial Home Program:  * above=instructed home program   Continue post op home program,    Writer verbally educated the patient and received verbal consent from the patient on hand placement, positioning of patient, and techniques to be performed today and how they are pertinent to the patient's plan of care.     Suggestions for next session as indicated: progress per plan of care; standing encourage full knee extension, hip and knee strengthening, general endurance, gait and balance.     ASSESSMENT   60 year old female patient has signs and symptoms consistent with status post total knee replacement that has reported functional limitations listed above.  Gait and balance are limited by left knee pain and impaired ROM / strength as well.     Patient will benefit from skilled therapy and rehab potential is good based on assessment above   Predicted patient presentation: Low (stable) - Patient comorbidities and complexities, as  defined above, will have little effect on progress for prescribed plan of care.    PLAN   Goals: To be obtained by end of this plan of care:  1. Patient will be independent with progressed and modified home exercise program  2. Decrease pain/symptoms to 2/10  3. Improve involved hip ABD strength to 3+/5  4. Improve involved range of motion to 0-120 degrees  5. Improve 6 minute walk test to 800 feet  6. Improve TUG to 15 secs  through improvements listed above patient will:  7. Demonstrate ability to negotiate level and unlevel surfaces at variable velocities, including change of direction without increased pain or instability to return to age appropriate and community activities at prior level of function  8. Be able to ambulate for 6 minutes with minimal pain/difficulty without rest break  9. Be able to ambulate safely and timely across the street  10. Be able to stand for 10 minutes with minimal pain/difficulty to improve meal prep  11. Lower Extremity Functional Scale: Patient will complete form to reflect an improved score from initial score of 33 to greater than or equal to 45 (0=extreme difficulty; 80=no difficulty) to indicate pt reported improvement in function/disability/impairment (minimal detectable change: 9 points).     The following skilled interventions to be implemented to achieve above:  Gait Training (36831)  Manual Therapy (64978)  Neuromuscular Re-Education (49483)  Therapeutic Activity (03703)  Therapeutic Exercise (10102)    Frequency/Duration: 2 times per week for 8 weeks with tapering as the patient progresses    patient involved in and agreed to plan of care and goals.   Attendance policy provided at time of evaluation.      Patient Education:  Who will be receiving education: patient  Are they ready to learn: yes  Preferred learning style: written, verbal, demonstration  Barriers to learning: no barriers apparent at this time   Result of initial outlined education: Verbalizes understanding  and Needs reinforcement    THERAPY DAILY BILLING   Insurance: 1. [employer] MEDIC[employer] SOLUTION 2. [facility] [employer]    Evaluation Procedures:  Physical Therapy Evaluation: Low Complexity    Timed Procedures:  No timed codes were used on this date of service    Untimed Procedures:  No untimed codes were used on this date of service    Total Treatment Time: 43 minutes        G-Code:  G-Code Score ABN form  Eval/Re-eval: report current and goal status with C modifiers   : Current Mobility Limitation,  CK - 40% to 59% impaired, limited or restricted  : Goal Mobility Limitation,  CJ - 20% to 39% impaired, limited or restricted  Modifier based on outcome measure(s)/functional testing/clinical judgement as listed above    The referring provider's electronic or written signature on the evaluation authorizes the therapy plan of care and certifies the need for these services, furnished under this plan of care while under their care.  Mailed/faxed for referring provider signature: ______________________________ Date: _________ Time: _______     no

## 2018-12-11 ENCOUNTER — LABORATORY RESULT (OUTPATIENT)
Age: 63
End: 2018-12-11

## 2018-12-11 ENCOUNTER — APPOINTMENT (OUTPATIENT)
Dept: HEMATOLOGY ONCOLOGY | Facility: CLINIC | Age: 63
End: 2018-12-11

## 2018-12-11 ENCOUNTER — OUTPATIENT (OUTPATIENT)
Dept: OUTPATIENT SERVICES | Facility: HOSPITAL | Age: 63
LOS: 1 days | Discharge: HOME | End: 2018-12-11

## 2018-12-11 VITALS
WEIGHT: 191 LBS | SYSTOLIC BLOOD PRESSURE: 114 MMHG | TEMPERATURE: 97.2 F | HEIGHT: 66 IN | BODY MASS INDEX: 30.7 KG/M2 | OXYGEN SATURATION: 99 % | DIASTOLIC BLOOD PRESSURE: 56 MMHG | RESPIRATION RATE: 16 BRPM | HEART RATE: 74 BPM

## 2018-12-11 DIAGNOSIS — Z98.890 OTHER SPECIFIED POSTPROCEDURAL STATES: Chronic | ICD-10-CM

## 2018-12-11 DIAGNOSIS — Z90.710 ACQUIRED ABSENCE OF BOTH CERVIX AND UTERUS: Chronic | ICD-10-CM

## 2018-12-11 DIAGNOSIS — C73 MALIGNANT NEOPLASM OF THYROID GLAND: ICD-10-CM

## 2018-12-11 DIAGNOSIS — C54.1 MALIGNANT NEOPLASM OF ENDOMETRIUM: ICD-10-CM

## 2018-12-11 DIAGNOSIS — G62.9 POLYNEUROPATHY, UNSPECIFIED: ICD-10-CM

## 2018-12-11 LAB
HCT VFR BLD CALC: 44.2 %
HGB BLD-MCNC: 14.4 G/DL
MCHC RBC-ENTMCNC: 30.2 PG
MCHC RBC-ENTMCNC: 32.6 G/DL
MCV RBC AUTO: 92.7 FL
PLATELET # BLD AUTO: 180 K/UL
PMV BLD: 10.8 FL
RBC # BLD: 4.77 M/UL
RBC # FLD: 12.7 %
WBC # FLD AUTO: 7.44 K/UL

## 2018-12-11 NOTE — RESULTS/DATA
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 Highest SUV outside the thyroid 18-left supraclavicular adenopathy)    In addition, intense (SUV 34.8) FDG uptake coregistering with 1.5 cm partially calcified left thyroid nodule . Thyroid ultrasound and serum thyroglobulin level are suggested    Intense FDG uptake coregistering with irregular density most likely representing an infiltrate on CT (SUV 8.3) which may represent inflammatory  uptake. \par \par Transvaginal sono 12/14/2017: \par Abnormally thickened endometrium, with vascularity and suggestion of a 2.3 cm endometrial polypoid mass.    Right ovary not well visualized. Nonvisualization of the left ovary.    Fibroid uterus.\par \par MRI brain 11/29/2017:\par negative

## 2018-12-11 NOTE — HISTORY OF PRESENT ILLNESS
[Disease: _____________________] : Disease: [unfilled] [AJCC Stage: ____] : AJCC Stage: [unfilled] [de-identified] : Serean is a rodrigue 63 yo female, who has started developing SOB approximately 2 months ago, she went to ER on 11/2/2017 and on CXR was noted to have a large right sided pleural effusion. She underwent a thoracentesis, 1.6L of fluid was drained. 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 IHC was pos for CK7, PAX8, CK5/6 and Ca125, negative for TTF-1/Napsin, calretinin, CK20, mammaglobin, p63, villin, HAM56, GCDFP15, TAY-EP4. \par \par Her visit on 12/1/2017 Serena had an excisional biopsy of cervical node on 12/13/2017 revealing met adenocarcinoma, primary source still was not clear. On 12/14/2017 Serena was admitted with SOB, Pleurx catheter was placed on 12/14/2017. 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 Serena received 1st dose of carbo (auc of 5)/taxol(175mg/m2) on 12/15/2017 without complications, but the next day sustained a fall 2/2 vasovagal episode and developed asymptomatic SAH, that resolved on imaging by 12/21/2017.  [de-identified] : KRas WT, EGFR WT, Alk WT, ROS1 WT, PDL1-1%\par Normal MMR protein expression [de-identified] : 11/24/2017: She reports some SOB, especially ARZATE today. No fevers, no weight loss (reports some weight gain), no GI,  or GYN symptoms, except for increasing abdominal girth, she reports no blood in the stool or urine and no vaginal bleeding. She reports no CP and no palpitations, she reports worsening nonproductive cough, no hemoptysis. She also reports difficulty lying on left side and lying down flat. \par She reports left on/off facial numbness several months in duration. She had a CT of her head performed in [facility] ([facility] within 1 year.  Additionally there is a freely mobile left cervical node present on exam, as per patient this was in place for approximately 1 year. \par \par 12/1/2017: Serena is here for follow up today. She had a therapeutic thoracentesis in  on 11/27/2017, 2L of fluid were removed, with much improved respiratory status. PET CT and MRI of the brain were done on 11/29/2017, findings were discussed today. There remains no clear primary source of malignancy. We have discussed that the source of tumor may be Mullerian vs lung. regardless of source chemotherapy needs to be initiated ASAP. We have discussed Carbo/taxol regiment that will cover both Mullerian and lung origin. Side effects including myelosuppression, peripheral neuropathy, allergic reaction and others.\par \par 1/2/2017 Since last visit Serena had an excisional biopsy of cervical node on 12/13/2017 revealing met adenocarcinoma, primary source still was not clear. On 12/14/2017 Serena was admitted with SOB, Pleurx catheter was placed on 12/14/2017. 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 Serena received 1st dose of carbo (auc of 5)/taxol(175mg/m2) on 12/15/2017 without complications, but the next day sustained a fall 2/2 vasovagal episode and developed asymptomatic SAH, that resolved on imaging by 12/21/2017. Today Serena's SOB has significantly improved. She reports very mild neuropathy in fingertips only. She reports no headache and offers no other complaints. \par \par 1/26/2018: Complains of some neuropathy, otherwise tolerating chemo with no difficulty. \par \par 2/16/2018: restaging CT C/A/P reviewed, significant improvement noted. Will refer to GYN/ONC. Reports slightly worsening neuropathy, not -painful, taking Gabapentin, no other symptoms. For cycle 4 of carbo/taxol today.\par \par 3/9/2018: Serena met with Dr. Massey, she is planned for surgery on 5/7/2018, after completion of 6 cycles of carbo/taxol and restaging PET CT ordered for mid April and follow up with Dr. Massey on April 20. She reports worsening neuropathy, and occasional pain and changes in vision in her left eye, eye symptoms come and go and are not very bothersome. She reports no other symptoms. \par \par 3/30/2018; Serena is here for cycle 6 of carbo/taxol, she continues to have neuropathy in finger and toes, but offers no other complaints, chemo has been dose reduced. \par \par 4/27/2018: Results of restaging PET CT s/p 6 cycles of carbo/taxol revealed 1. No new areas of abnormal increased uptake is seen to indicate \par biologically active disease.\par \par 2. Persistent PET positive left thyroid mass with a max SUV 33.1, \par previously max SUV 34.8. Further evaluation with thyroid ultrasound and \par if needed fine-needle aspiration biopsy will be helpful.\par \par 3.   PET positive left cervical lymph nodes mainly level 2 on the left \par with a max SUV 5.47previously 18. Minimal increased uptake in the \par bilateral axillary lymph nodes most likely reactive(less than 2.5)\par \par 4. Weakly PET positive, max SUV 2.89 right pleura, previous max SUV 5.3 \par with non-FDG avid right pleural effusion. \par \par 5. Previously FDG avid right and left supraclavicular lymphadenopathy, \par left internal mammary, bilateral paratracheal, para-aortic, para \par vertebral, carinal, mesenteric, right and left iliacs, right inguinal \par lymphadenopathy, small in size and no longer FDG avid.\par \par 6. No abnormal increased uptake is seen in the  lobulated uterus.\par \par 7. Overall there is marked improvement in the FDG avid disease.\par This was reviewed with Serena. She met with Dr. Massey on 4/20/2018, surgery is planned for 5/7/2018. She will also joselyn to meet with ENT re FDG avid thyroid nodule. Will assist in making he an appointment for her. \par Persistent neuropathy on gabapentin. \par \par 6/29/18 ; Patient came for follow up visit , evaluation for chemotherapy cycle 8 of carbo / Taxol , patient tolerating medication well , except neuropathy  recommend to have gabapentin  300 mg po daily.\par \par 7/20/2018: Serena present for follow up today, she has completed total of 8 cycles of Carbo/Taxol. She reports significant neuropathy in her hands and feet. We will discontinue chemotherapy today and plan to follow with close observation. She has thyroidectomy planned with Dr. Herrera on 8/20/2018, obtaining clearance for PMD. She is also planning to fly to [place] at the end of September. She reports no SOB, no GI or  symptoms. \par \par 9/12/2018: Serena offers no significant complaints today. She states neuropathy in her hands has almost completely resolved and neuropathy in her feet is significantly better. She had undergone complete thyroidectomy by Dr. Herrera on 8/20/2018, pathology revealed papillary thyroid cancer, measuring 2 cm, pT1b, other additional foci of papillary carcinoma were also noted, no LVI, surgical margins were clear, LN 0/2 had no carcinoma, stage [id number]. She is following up with Dr. Herrera tomorrow. She has still not gone for her vacation in [place]. \par \par 10/10/2018: Restaging PET CT on 9/26/2018 reveals Since 4/16/2018,  1. Post thyroidectomy with diffuse increased FDG uptake at the thyroid  bed likely representing post-surgical changes.  2. Subtle increased uptake is seen in the lamina , right side at the  level of T8 with a max SUV 4.6. This is not sufficient for metastatic  disease. Bone scan or MRI examination with contrast will be helpful for  further evaluation.  3. No other areas of abnormal increased uptake is seen. Images were personally reviewed by myself and discussed with Serena. \par She also had an Echo on 9/24/2018 revealing EF of 63%. \par Serena reports ongoing fatigue, she is unable to lose weight. She is taking Synthroid and following with Dr. Acevedo. \par She reports stiff fingers at night only, no painful neuropathy. \par She denies any other symptoms today. \par \par 12/11/2018: Serena feels well, neuropathy in hands and feet is much improved. S he is now complaining of r-sided facial pain associated with some swelling, pain comes and goes. She has already seen Dr. Herrera, who ordered CT of sinuses and prescribed pain relief meds. She is also due for restaging PET CT. Serena feels well otherwise.

## 2018-12-11 NOTE — PHYSICAL EXAM
[Restricted in physically strenuous activity but ambulatory and able to carry out work of a light or sedentary nature] : Status 1- Restricted in physically strenuous activity but ambulatory and able to carry out work of a light or sedentary nature, e.g., light house work, office work [Normal] : affect appropriate [de-identified] : + left sided 1cm cervical lymph node, resolved, R sided mild facial swelling, no erythema  [de-identified] : clear lungs [de-identified] : L upper back lipoma, present for years, otherwise normal exam

## 2018-12-11 NOTE — ASSESSMENT
[FreeTextEntry1] : Malignant pleural effusion, resolved\par --L side PleurX catheter has been removed on 2/8/2018\par --Following wit Dr. Thompson\par \par Papillary-serous endometrial cancer, stage IV\par  --PET CT on 11/29/2017 reveals no clear GYN origin, extensive KEVIN, uptake in the right lung and pleura, thyroid nodule \par --Completed cycle 8  of carbo/taxol on 6/29/2018, will proceed with close observation, 2 cycles were administered  postop\par --MRI of the brain on 11/29/2017 reveals no brain metastasis, but possible R ICA aneurysm, follow up CTA brain revealed no aneurysm  \par --Follow up MRI brain on 3/202/2018 was negative (2/2 eye complaints)\par --Restaging PET CT scans 2/2/2016 and 4/16/2018 reveal great response to chemotherapy\par --Neuropathy improving, not painful, continue on Gabapentin \par --Restaging PET CT on 9/26/2018 (3 months post completion of chemo 6/29/2018), was essentially negative for recurrent disease\par --Follow up MRI of T-spine ordered on 10/10/2018 \par --She will saw GYN ONC in 11/2018 \par --Restaging PET CT ordered on 12/11/2018\par --CT sinuses ordered by Dr. Herrera \par \par Papillary thyroid cancer [id number], 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 Herrera \par --Follow up with Dr. Acevedo of endocrinology, he is addressing vitamin D, thyroid and diabetes \par \par  Follow up in 3 weeks after PET CT

## 2018-12-13 LAB
ALBUMIN SERPL ELPH-MCNC: 4.4 G/DL
ALP BLD-CCNC: 98 U/L
ALT SERPL-CCNC: 30 U/L
ANION GAP SERPL CALC-SCNC: 15 MMOL/L
AST SERPL-CCNC: 27 U/L
BILIRUB SERPL-MCNC: 0.5 MG/DL
BUN SERPL-MCNC: 11 MG/DL
CALCIUM SERPL-MCNC: 10 MG/DL
CANCER AG125 SERPL-ACNC: 6 U/ML
CHLORIDE SERPL-SCNC: 99 MMOL/L
CO2 SERPL-SCNC: 24 MMOL/L
CREAT SERPL-MCNC: 0.7 MG/DL
GLUCOSE SERPL-MCNC: 152 MG/DL
POTASSIUM SERPL-SCNC: 5 MMOL/L
PROT SERPL-MCNC: 7.7 G/DL
SODIUM SERPL-SCNC: 138 MMOL/L

## 2018-12-18 ENCOUNTER — FORM ENCOUNTER (OUTPATIENT)
Age: 63
End: 2018-12-18

## 2018-12-19 ENCOUNTER — OUTPATIENT (OUTPATIENT)
Dept: OUTPATIENT SERVICES | Facility: HOSPITAL | Age: 63
LOS: 1 days | Discharge: HOME | End: 2018-12-19

## 2018-12-19 DIAGNOSIS — R60.0 LOCALIZED EDEMA: ICD-10-CM

## 2018-12-19 DIAGNOSIS — Z98.890 OTHER SPECIFIED POSTPROCEDURAL STATES: Chronic | ICD-10-CM

## 2018-12-19 DIAGNOSIS — C54.1 MALIGNANT NEOPLASM OF ENDOMETRIUM: ICD-10-CM

## 2018-12-19 DIAGNOSIS — Z90.710 ACQUIRED ABSENCE OF BOTH CERVIX AND UTERUS: Chronic | ICD-10-CM

## 2018-12-19 LAB — GLUCOSE BLDC GLUCOMTR-MCNC: 126 MG/DL — HIGH (ref 70–99)

## 2018-12-24 ENCOUNTER — LABORATORY RESULT (OUTPATIENT)
Age: 63
End: 2018-12-24

## 2018-12-24 ENCOUNTER — APPOINTMENT (OUTPATIENT)
Dept: HEMATOLOGY ONCOLOGY | Facility: CLINIC | Age: 63
End: 2018-12-24

## 2018-12-26 LAB
ALBUMIN SERPL ELPH-MCNC: 3.6 G/DL
ALP BLD-CCNC: 75 U/L
ALT SERPL-CCNC: 59 U/L
ANION GAP SERPL CALC-SCNC: 10 MMOL/L
AST SERPL-CCNC: 44 U/L
BILIRUB SERPL-MCNC: 0.4 MG/DL
BUN SERPL-MCNC: 11 MG/DL
CALCIUM SERPL-MCNC: 8.7 MG/DL
CANCER AG125 SERPL-ACNC: 4 U/ML
CHLORIDE SERPL-SCNC: 102 MMOL/L
CO2 SERPL-SCNC: 28 MMOL/L
CREAT SERPL-MCNC: 0.8 MG/DL
GLUCOSE SERPL-MCNC: 97 MG/DL
HCT VFR BLD CALC: 37.2 %
HGB BLD-MCNC: 12.2 G/DL
MCHC RBC-ENTMCNC: 29.8 PG
MCHC RBC-ENTMCNC: 32.8 G/DL
MCV RBC AUTO: 91 FL
PLATELET # BLD AUTO: 204 K/UL
PMV BLD: 9.5 FL
POTASSIUM SERPL-SCNC: 4.1 MMOL/L
PROT SERPL-MCNC: 6 G/DL
RBC # BLD: 4.09 M/UL
RBC # FLD: 13.2 %
SODIUM SERPL-SCNC: 140 MMOL/L
WBC # FLD AUTO: 5.56 K/UL

## 2019-01-09 ENCOUNTER — APPOINTMENT (OUTPATIENT)
Dept: HEMATOLOGY ONCOLOGY | Facility: CLINIC | Age: 64
End: 2019-01-09

## 2019-01-09 ENCOUNTER — LABORATORY RESULT (OUTPATIENT)
Age: 64
End: 2019-01-09

## 2019-01-09 VITALS
HEART RATE: 68 BPM | WEIGHT: 190 LBS | HEIGHT: 66 IN | DIASTOLIC BLOOD PRESSURE: 84 MMHG | TEMPERATURE: 98.6 F | RESPIRATION RATE: 16 BRPM | BODY MASS INDEX: 30.53 KG/M2 | SYSTOLIC BLOOD PRESSURE: 110 MMHG

## 2019-01-09 LAB
ALBUMIN SERPL ELPH-MCNC: 4.3 G/DL
ALP BLD-CCNC: 86 U/L
ALT SERPL-CCNC: 27 U/L
ANION GAP SERPL CALC-SCNC: 15 MMOL/L
AST SERPL-CCNC: 20 U/L
BILIRUB SERPL-MCNC: 0.7 MG/DL
BUN SERPL-MCNC: 15 MG/DL
CALCIUM SERPL-MCNC: 9.4 MG/DL
CANCER AG125 SERPL-ACNC: 5 U/ML
CHLORIDE SERPL-SCNC: 100 MMOL/L
CO2 SERPL-SCNC: 25 MMOL/L
CREAT SERPL-MCNC: 0.7 MG/DL
GLUCOSE SERPL-MCNC: 130 MG/DL
HCT VFR BLD CALC: 39.6 %
HGB BLD-MCNC: 13 G/DL
MCHC RBC-ENTMCNC: 30.1 PG
MCHC RBC-ENTMCNC: 32.8 G/DL
MCV RBC AUTO: 91.7 FL
PLATELET # BLD AUTO: 218 K/UL
PMV BLD: 9.2 FL
POTASSIUM SERPL-SCNC: 4.2 MMOL/L
PROT SERPL-MCNC: 7 G/DL
RBC # BLD: 4.32 M/UL
RBC # FLD: 13.5 %
SODIUM SERPL-SCNC: 140 MMOL/L
WBC # FLD AUTO: 4.45 K/UL

## 2019-01-11 NOTE — REVIEW OF SYSTEMS
[Fatigue] : fatigue [Negative] : Allergic/Immunologic [Chest Pain] : no chest pain [Palpitations] : no palpitations [Lower Ext Edema] : no lower extremity edema [Shortness Of Breath] : no shortness of breath [Wheezing] : no wheezing [Cough] : no cough [SOB on Exertion] : no shortness of breath during exertion [FreeTextEntry6] : SOB completely resolved [de-identified] : worsening peripheral neuropathy, not painful

## 2019-01-11 NOTE — PHYSICAL EXAM
[Restricted in physically strenuous activity but ambulatory and able to carry out work of a light or sedentary nature] : Status 1- Restricted in physically strenuous activity but ambulatory and able to carry out work of a light or sedentary nature, e.g., light house work, office work [Normal] : affect appropriate [de-identified] : + left sided 1cm cervical lymph node, resolved, R sided mild facial swelling, no erythema  [de-identified] : clear lungs [de-identified] : L upper back lipoma, present for years, otherwise normal exam

## 2019-01-11 NOTE — HISTORY OF PRESENT ILLNESS
[Disease: _____________________] : Disease: [unfilled] [AJCC Stage: ____] : AJCC Stage: [unfilled] [de-identified] : Serena is a rodrigue 63 yo female, who has started developing SOB approximately 2 months ago, she went to ER on 11/2/2017 and on CXR was noted to have a large right sided pleural effusion. She underwent a thoracentesis, 1.6L of fluid was drained. 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 IHC was pos for CK7, PAX8, CK5/6 and Ca125, negative for TTF-1/Napsin, calretinin, CK20, mammaglobin, p63, villin, HAM56, GCDFP15, TAY-EP4. \par \par Her visit on 12/1/2017 Serena had an excisional biopsy of cervical node on 12/13/2017 revealing met adenocarcinoma, primary source still was not clear. On 12/14/2017 Serena was admitted with SOB, Pleurx catheter was placed on 12/14/2017. 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 Serena received 1st dose of carbo (auc of 5)/taxol(175mg/m2) on 12/15/2017 without complications, but the next day sustained a fall 2/2 vasovagal episode and developed asymptomatic SAH, that resolved on imaging by 12/21/2017.  [de-identified] : KRas WT, EGFR WT, Alk WT, ROS1 WT, PDL1-1%\par Normal MMR protein expression [de-identified] : 11/24/2017: She reports some SOB, especially ARZATE today. No fevers, no weight loss (reports some weight gain), no GI,  or GYN symptoms, except for increasing abdominal girth, she reports no blood in the stool or urine and no vaginal bleeding. She reports no CP and no palpitations, she reports worsening nonproductive cough, no hemoptysis. She also reports difficulty lying on left side and lying down flat. \par She reports left on/off facial numbness several months in duration. She had a CT of her head performed in [facility] within 1 year.  Additionally there is a freely mobile left cervical node present on exam, as per patient this was in place for approximately 1 year. \par \par 12/1/2017: Serena is here for follow up today. She had a therapeutic thoracentesis in  on 11/27/2017, 2L of fluid were removed, with much improved respiratory status. PET CT and MRI of the brain were done on 11/29/2017, findings were discussed today. There remains no clear primary source of malignancy. We have discussed that the source of tumor may be Mullerian vs lung. regardless of source chemotherapy needs to be initiated ASAP. We have discussed Carbo/taxol regiment that will cover both Mullerian and lung origin. Side effects including myelosuppression, peripheral neuropathy, allergic reaction and others.\par \par 1/2/2017 Since last visit Serena had an excisional biopsy of cervical node on 12/13/2017 revealing met adenocarcinoma, primary source still was not clear. On 12/14/2017 Serena was admitted with SOB, Pleurx catheter was placed on 12/14/2017. 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 Serena received 1st dose of carbo (auc of 5)/taxol(175mg/m2) on 12/15/2017 without complications, but the next day sustained a fall 2/2 vasovagal episode and developed asymptomatic SAH, that resolved on imaging by 12/21/2017. Today Serena's SOB has significantly improved. She reports very mild neuropathy in fingertips only. She reports no headache and offers no other complaints. \par \par 1/26/2018: Complains of some neuropathy, otherwise tolerating chemo with no difficulty. \par \par 2/16/2018: restaging CT C/A/P reviewed, significant improvement noted. Will refer to GYN/ONC. Reports slightly worsening neuropathy, not -painful, taking Gabapentin, no other symptoms. For cycle 4 of carbo/taxol today.\par \par 3/9/2018: Serena met with Dr. Massey, she is planned for surgery on 5/7/2018, after completion of 6 cycles of carbo/taxol and restaging PET CT ordered for mid April and follow up with Dr. Massey on April 20. She reports worsening neuropathy, and occasional pain and changes in vision in her left eye, eye symptoms come and go and are not very bothersome. She reports no other symptoms. \par \par 3/30/2018; Serena is here for cycle 6 of carbo/taxol, she continues to have neuropathy in finger and toes, but offers no other complaints, chemo has been dose reduced. \par \par 4/27/2018: Results of restaging PET CT s/p 6 cycles of carbo/taxol revealed 1. No new areas of abnormal increased uptake is seen to indicate \par biologically active disease.\par \par 2. Persistent PET positive left thyroid mass with a max SUV 33.1, \par previously max SUV 34.8. Further evaluation with thyroid ultrasound and \par if needed fine-needle aspiration biopsy will be helpful.\par \par 3.   PET positive left cervical lymph nodes mainly level 2 on the left \par with a max SUV 5.47previously 18. Minimal increased uptake in the \par bilateral axillary lymph nodes most likely reactive(less than 2.5)\par \par 4. Weakly PET positive, max SUV 2.89 right pleura, previous max SUV 5.3 \par with non-FDG avid right pleural effusion. \par \par 5. Previously FDG avid right and left supraclavicular lymphadenopathy, \par left internal mammary, bilateral paratracheal, para-aortic, para \par vertebral, carinal, mesenteric, right and left iliacs, right inguinal \par lymphadenopathy, small in size and no longer FDG avid.\par \par 6. No abnormal increased uptake is seen in the  lobulated uterus.\par \par 7. Overall there is marked improvement in the FDG avid disease.\par This was reviewed with Serena. She met with Dr. Massey on 4/20/2018, surgery is planned for 5/7/2018. She will also joselyn to meet with ENT re FDG avid thyroid nodule. Will assist in making he an appointment for her. \par Persistent neuropathy on gabapentin. \par \par 6/29/18 ; Patient came for follow up visit , evaluation for chemotherapy cycle 8 of carbo / Taxol , patient tolerating medication well , except neuropathy  recommend to have gabapentin  300 mg po daily.\par \par 7/20/2018: Serena present for follow up today, she has completed total of 8 cycles of Carbo/Taxol. She reports significant neuropathy in her hands and feet. We will discontinue chemotherapy today and plan to follow with close observation. She has thyroidectomy planned with Dr. Herrera on 8/20/2018, obtaining clearance for PMD. She is also planning to fly to [place] at the end of September. She reports no SOB, no GI or  symptoms. \par \par 9/12/2018: Serena offers no significant complaints today. She states neuropathy in her hands has almost completely resolved and neuropathy in her feet is significantly better. She had undergone complete thyroidectomy by Dr. Herrera on 8/20/2018, pathology revealed papillary thyroid cancer, measuring 2 cm, pT1b, other additional foci of papillary carcinoma were also noted, no LVI, surgical margins were clear, LN 0/2 had no carcinoma, stage [id number]. She is following up with Dr. Herrera tomorrow. She has still not gone for her vacation in [place]. \par \par 10/10/2018: Restaging PET CT on 9/26/2018 reveals Since 4/16/2018,  1. Post thyroidectomy with diffuse increased FDG uptake at the thyroid  bed likely representing post-surgical changes.  2. Subtle increased uptake is seen in the lamina , right side at the  level of T8 with a max SUV 4.6. This is not sufficient for metastatic  disease. Bone scan or MRI examination with contrast will be helpful for  further evaluation.  3. No other areas of abnormal increased uptake is seen. Images were personally reviewed by myself and discussed with Serena. \par She also had an Echo on 9/24/2018 revealing EF of 63%. \par Serena reports ongoing fatigue, she is unable to lose weight. She is taking Synthroid and following with Dr. Acevedo. \par She reports stiff fingers at night only, no painful neuropathy. \par She denies any other symptoms today. \par \par 12/11/2018: Serena feels well, neuropathy in hands and feet is much improved. S he is now complaining of r-sided facial pain associated with some swelling, pain comes and goes. She has already seen Dr. Herrera, who ordered CT of sinuses and prescribed pain relief meds. She is also due for restaging PET CT. Serena feels well otherwise. \par \par 1/9/2019: Restaging PET CT was performed on 12/18/2018 it revealed COMPARISON : 9/24/2018.  New left upper quadrant peritoneal nodule measuring 8 mm with an SUV max  of 7.3. This is suggestive of biologic tumor activity.  No other FDG avid lesions seen throughout the scan. Images were personally reviewed by myself and discussed with Serena, originally over the phone and again today. I have originally suggested to try AI in the interim, Serena however reported diarrhea at the time of the scan and declined intervention for now. We will plan for restaging PET CT to be performed in 6-8 weeks, this will be ordered today. She will follow up with Dr. Massey at [facility] shortly and bring the disk for his review. I would also like her to meet with genetics, this will be arranged at [facility] with Dr. Massey's help. Serena reports no new symptoms today, her neuropathy is manageable and  improving. She still reports unilateral headache that was work up in the past. Neurology eval was again suggested to her. She is following closely with Endocrinology re thyroid management. She will have follow up with Dr. Herrera shortly as well.

## 2019-01-11 NOTE — CONSULT LETTER
[Dear  ___] : Dear  [unfilled], [Consult Letter:] : I had the pleasure of evaluating your patient, [unfilled]. [( Thank you for referring [unfilled] for consultation for _____ )] : Thank you for referring [unfilled] for consultation for [unfilled] [Please see my note below.] : Please see my note below. [Consult Closing:] : Thank you very much for allowing me to participate in the care of this patient.  If you have any questions, please do not hesitate to contact me. [Sincerely,] : Sincerely, [FreeTextEntry3] : Stacie Cid MD

## 2019-01-11 NOTE — ASSESSMENT
[FreeTextEntry1] : Malignant pleural effusion, resolved\par --L side PleurX catheter has been removed on 2/8/2018\par --Following wit Dr. Thompson\par \par Papillary-serous endometrial cancer, stage IV\par  --PET CT on 11/29/2017 reveals no clear GYN origin, extensive KEVIN, uptake in the right lung and pleura, thyroid nodule \par --Completed cycle 8  of carbo/taxol on 6/29/2018, will proceed with close observation, 2 cycles were administered  postop\par --MRI of the brain on 11/29/2017 reveals no brain metastasis, but possible R ICA aneurysm, follow up CTA brain revealed no aneurysm  \par --Follow up MRI brain on 3/202/2018 was negative (2/2 eye complaints)\par --Restaging PET CT scans 2/2/2016 and 4/16/2018 reveal great response to chemotherapy\par --Neuropathy improving, not painful, continue on Gabapentin \par --Restaging PET CT on 9/26/2018 (3 months post completion of chemo 6/29/2018), was essentially negative for recurrent disease\par --Restaging PET CT on 12/18/2018 reveals   New left upper quadrant peritoneal nodule measuring 8 mm with an SUV max  of 7.3. This is suggestive of biologic tumor activity.  No other FDG avid lesions seen throughout the scan.\par --Close follow up PET CT at 6-8 weeks was ordered on 1/9/2019\par --Case was discussed with Dr. Massey at [facility]\par --Follow up MRI of T-spine done on 10/16/2018 was negative for bone mets\par --She will wee Dr. Massey shortly and bring PET CT on disk for his review\par --Restaging PET CT ordered on 1/9/2019\par --CT sinuses ordered by Dr. Herrera \par \par Papillary thyroid cancer [id number], 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 Herrera \par --Follow up with Dr. Acevedo of endocrinology, he is addressing vitamin D, thyroid and diabetes \par \par  Follow up in 3 weeks after PET CT

## 2019-01-24 ENCOUNTER — OUTPATIENT (OUTPATIENT)
Dept: OUTPATIENT SERVICES | Facility: HOSPITAL | Age: 64
LOS: 1 days | Discharge: HOME | End: 2019-01-24

## 2019-01-24 ENCOUNTER — APPOINTMENT (OUTPATIENT)
Dept: OTOLARYNGOLOGY | Facility: CLINIC | Age: 64
End: 2019-01-24
Payer: MEDICAID

## 2019-01-24 DIAGNOSIS — H61.22 IMPACTED CERUMEN, LEFT EAR: ICD-10-CM

## 2019-01-24 DIAGNOSIS — Z90.710 ACQUIRED ABSENCE OF BOTH CERVIX AND UTERUS: Chronic | ICD-10-CM

## 2019-01-24 DIAGNOSIS — Z98.890 OTHER SPECIFIED POSTPROCEDURAL STATES: Chronic | ICD-10-CM

## 2019-01-24 DIAGNOSIS — E55.9 VITAMIN D DEFICIENCY, UNSPECIFIED: ICD-10-CM

## 2019-01-24 DIAGNOSIS — E89.0 POSTPROCEDURAL HYPOTHYROIDISM: ICD-10-CM

## 2019-01-24 DIAGNOSIS — C73 MALIGNANT NEOPLASM OF THYROID GLAND: ICD-10-CM

## 2019-01-24 DIAGNOSIS — R22.0 LOCALIZED SWELLING, MASS AND LUMP, HEAD: ICD-10-CM

## 2019-01-24 PROCEDURE — 99213 OFFICE O/P EST LOW 20 MIN: CPT | Mod: 25

## 2019-01-24 PROCEDURE — 69210 REMOVE IMPACTED EAR WAX UNI: CPT

## 2019-01-24 NOTE — DATA REVIEWED
[de-identified] : CT Maxillofacial (12/19/18) : No CT evidence of underlying mass indicated at the marker overlying the left temporal region. Minimal maxillary sinus mucosal thickening. Small (2mm) left sided septal spur.

## 2019-01-24 NOTE — PHYSICAL EXAM
[Normal] : mucosa is normal [Midline] : trachea located in midline position [de-identified] : cerumen impaction on the left

## 2019-01-24 NOTE — HISTORY OF PRESENT ILLNESS
[FreeTextEntry1] : Patient following up on facial swelling. Patient admits still having left facial pain. Not getting worse or better. Patient had CT Maxillofacial on 12/19/18 which reveals no CT evidence of underlying mass indicated at the marker overlying the left temporal region. Minimal maxillary sinus mucosal thickening. Small (2mm) left sided septal spur.

## 2019-02-01 ENCOUNTER — RX RENEWAL (OUTPATIENT)
Age: 64
End: 2019-02-01

## 2019-02-08 ENCOUNTER — APPOINTMENT (OUTPATIENT)
Dept: GYNECOLOGIC ONCOLOGY | Facility: CLINIC | Age: 64
End: 2019-02-08

## 2019-02-08 NOTE — HISTORY OF PRESENT ILLNESS
[FreeTextEntry1] : 62 y/o \par Dx Stage IV Uterine serous carcinoma (positive pleural effusion, positive cervical node 12/13/2017, EMBX 12/19/2017)\par MMR: no deficiency\par \par Started chemotherapy on 12/15/2017\par Completed cycle # 6 on 03/30/2018\par \par Surgery date: 05/07/2018: TLH/BSO/Omental biopsy; path: residual serous carcinoma in the omentum\par Received 2 additional cycles of chemotherapy postop, completed 06/29/2018.\par She has comes today for a surveillance visit.\par Most recent : 10/10/18 5\par Most recent imaging: \par PET/CT: 04/16/2018: Resolution of FDG uptake in the supraclavilcular, thoracic, abdominal and pelvic nodes; persistent uptake in the thyroid; Pet + left cervical node, but decreased compared to prior\par PET/CT 9/24/18 essentially negative, subtle uptake in T8\par MRI T spine 10/16/18 - no mets (T8 looks normal), small rounded lesions in T5 and L1 (no FGC uptake seen in these areas on PET/CT) \par \par s/p thyroidectomy 8/10/18 for thyroid cancer, follows with Dr Copeland, doing well post-op. Started on synthroid. \par \par Last : 1/2019: 5\par

## 2019-02-12 ENCOUNTER — OUTPATIENT (OUTPATIENT)
Dept: OUTPATIENT SERVICES | Facility: HOSPITAL | Age: 64
LOS: 1 days | Discharge: HOME | End: 2019-02-12

## 2019-02-12 DIAGNOSIS — Z98.890 OTHER SPECIFIED POSTPROCEDURAL STATES: Chronic | ICD-10-CM

## 2019-02-12 DIAGNOSIS — Z90.710 ACQUIRED ABSENCE OF BOTH CERVIX AND UTERUS: Chronic | ICD-10-CM

## 2019-02-12 DIAGNOSIS — C54.1 MALIGNANT NEOPLASM OF ENDOMETRIUM: ICD-10-CM

## 2019-02-12 LAB — GLUCOSE BLDC GLUCOMTR-MCNC: 125 MG/DL — HIGH (ref 70–99)

## 2019-02-20 ENCOUNTER — LABORATORY RESULT (OUTPATIENT)
Age: 64
End: 2019-02-20

## 2019-02-20 ENCOUNTER — APPOINTMENT (OUTPATIENT)
Dept: HEMATOLOGY ONCOLOGY | Facility: CLINIC | Age: 64
End: 2019-02-20

## 2019-02-20 VITALS
BODY MASS INDEX: 29.89 KG/M2 | HEIGHT: 66 IN | SYSTOLIC BLOOD PRESSURE: 129 MMHG | DIASTOLIC BLOOD PRESSURE: 83 MMHG | RESPIRATION RATE: 16 BRPM | HEART RATE: 83 BPM | TEMPERATURE: 96.5 F | WEIGHT: 186 LBS

## 2019-02-20 LAB
ALBUMIN SERPL ELPH-MCNC: 4.9 G/DL
ALP BLD-CCNC: 85 U/L
ALT SERPL-CCNC: 24 U/L
ANION GAP SERPL CALC-SCNC: 19 MMOL/L
AST SERPL-CCNC: 19 U/L
BILIRUB SERPL-MCNC: 0.8 MG/DL
BUN SERPL-MCNC: 13 MG/DL
CALCIUM SERPL-MCNC: 10.2 MG/DL
CHLORIDE SERPL-SCNC: 96 MMOL/L
CO2 SERPL-SCNC: 24 MMOL/L
CREAT SERPL-MCNC: 0.7 MG/DL
GLUCOSE SERPL-MCNC: 113 MG/DL
HCT VFR BLD CALC: 43.6 %
HGB BLD-MCNC: 14.4 G/DL
MCHC RBC-ENTMCNC: 30 PG
MCHC RBC-ENTMCNC: 33 G/DL
MCV RBC AUTO: 90.8 FL
PLATELET # BLD AUTO: 262 K/UL
PMV BLD: 9.3 FL
POTASSIUM SERPL-SCNC: 4.3 MMOL/L
PROT SERPL-MCNC: 7.9 G/DL
RBC # BLD: 4.8 M/UL
RBC # FLD: 13.2 %
SODIUM SERPL-SCNC: 139 MMOL/L
WBC # FLD AUTO: 6.55 K/UL

## 2019-02-27 ENCOUNTER — RX RENEWAL (OUTPATIENT)
Age: 64
End: 2019-02-27

## 2019-02-28 LAB — CANCER AG125 SERPL-ACNC: 11 U/ML

## 2019-03-01 ENCOUNTER — OUTPATIENT (OUTPATIENT)
Dept: OUTPATIENT SERVICES | Facility: HOSPITAL | Age: 64
LOS: 1 days | End: 2019-03-01
Payer: MEDICAID

## 2019-03-01 DIAGNOSIS — Z90.710 ACQUIRED ABSENCE OF BOTH CERVIX AND UTERUS: Chronic | ICD-10-CM

## 2019-03-01 DIAGNOSIS — Z98.890 OTHER SPECIFIED POSTPROCEDURAL STATES: Chronic | ICD-10-CM

## 2019-03-01 PROCEDURE — G9001: CPT

## 2019-03-20 ENCOUNTER — APPOINTMENT (OUTPATIENT)
Dept: HEMATOLOGY ONCOLOGY | Facility: CLINIC | Age: 64
End: 2019-03-20

## 2019-03-20 ENCOUNTER — INPATIENT (INPATIENT)
Facility: HOSPITAL | Age: 64
LOS: 5 days | Discharge: NURSING, REHAB OR LONG-TERM CARE | End: 2019-03-26
Attending: INTERNAL MEDICINE | Admitting: INTERNAL MEDICINE
Payer: MEDICAID

## 2019-03-20 ENCOUNTER — OUTPATIENT (OUTPATIENT)
Dept: OUTPATIENT SERVICES | Facility: HOSPITAL | Age: 64
LOS: 1 days | Discharge: HOME | End: 2019-03-20
Payer: MEDICAID

## 2019-03-20 VITALS
DIASTOLIC BLOOD PRESSURE: 63 MMHG | RESPIRATION RATE: 20 BRPM | HEART RATE: 71 BPM | TEMPERATURE: 97 F | SYSTOLIC BLOOD PRESSURE: 113 MMHG | OXYGEN SATURATION: 97 %

## 2019-03-20 VITALS
RESPIRATION RATE: 18 BRPM | HEIGHT: 66 IN | DIASTOLIC BLOOD PRESSURE: 74 MMHG | WEIGHT: 184 LBS | SYSTOLIC BLOOD PRESSURE: 104 MMHG | BODY MASS INDEX: 29.57 KG/M2 | TEMPERATURE: 96.4 F | HEART RATE: 68 BPM

## 2019-03-20 DIAGNOSIS — Z98.890 OTHER SPECIFIED POSTPROCEDURAL STATES: Chronic | ICD-10-CM

## 2019-03-20 DIAGNOSIS — C73 MALIGNANT NEOPLASM OF THYROID GLAND: ICD-10-CM

## 2019-03-20 DIAGNOSIS — Z90.710 ACQUIRED ABSENCE OF BOTH CERVIX AND UTERUS: Chronic | ICD-10-CM

## 2019-03-20 DIAGNOSIS — C54.1 MALIGNANT NEOPLASM OF ENDOMETRIUM: ICD-10-CM

## 2019-03-20 LAB
ALBUMIN SERPL ELPH-MCNC: 4.6 G/DL — SIGNIFICANT CHANGE UP (ref 3.5–5.2)
ALP SERPL-CCNC: 76 U/L — SIGNIFICANT CHANGE UP (ref 30–115)
ALT FLD-CCNC: 26 U/L — SIGNIFICANT CHANGE UP (ref 0–41)
ANION GAP SERPL CALC-SCNC: 15 MMOL/L — HIGH (ref 7–14)
APPEARANCE UR: ABNORMAL
APTT BLD: 26.6 SEC — LOW (ref 27–39.2)
AST SERPL-CCNC: 39 U/L — SIGNIFICANT CHANGE UP (ref 0–41)
BACTERIA # UR AUTO: ABNORMAL /HPF
BILIRUB SERPL-MCNC: 0.7 MG/DL — SIGNIFICANT CHANGE UP (ref 0.2–1.2)
BILIRUB UR-MCNC: NEGATIVE — SIGNIFICANT CHANGE UP
BLD GP AB SCN SERPL QL: SIGNIFICANT CHANGE UP
BUN SERPL-MCNC: 13 MG/DL — SIGNIFICANT CHANGE UP (ref 10–20)
CALCIUM SERPL-MCNC: 9.9 MG/DL — SIGNIFICANT CHANGE UP (ref 8.5–10.1)
CHLORIDE SERPL-SCNC: 100 MMOL/L — SIGNIFICANT CHANGE UP (ref 98–110)
CO2 SERPL-SCNC: 23 MMOL/L — SIGNIFICANT CHANGE UP (ref 17–32)
COLOR SPEC: YELLOW — SIGNIFICANT CHANGE UP
CREAT SERPL-MCNC: 0.7 MG/DL — SIGNIFICANT CHANGE UP (ref 0.7–1.5)
DIFF PNL FLD: NEGATIVE — SIGNIFICANT CHANGE UP
EPI CELLS # UR: ABNORMAL /HPF
GLUCOSE SERPL-MCNC: 103 MG/DL — HIGH (ref 70–99)
GLUCOSE UR QL: NEGATIVE MG/DL — SIGNIFICANT CHANGE UP
INR BLD: 0.92 RATIO — SIGNIFICANT CHANGE UP (ref 0.65–1.3)
KETONES UR-MCNC: NEGATIVE — SIGNIFICANT CHANGE UP
LEUKOCYTE ESTERASE UR-ACNC: ABNORMAL
NITRITE UR-MCNC: NEGATIVE — SIGNIFICANT CHANGE UP
PH UR: 6 — SIGNIFICANT CHANGE UP (ref 5–8)
POTASSIUM SERPL-MCNC: 5.6 MMOL/L — HIGH (ref 3.5–5)
POTASSIUM SERPL-SCNC: 5.6 MMOL/L — HIGH (ref 3.5–5)
PROT SERPL-MCNC: 7.8 G/DL — SIGNIFICANT CHANGE UP (ref 6–8)
PROT UR-MCNC: NEGATIVE MG/DL — SIGNIFICANT CHANGE UP
PROTHROM AB SERPL-ACNC: 10.6 SEC — SIGNIFICANT CHANGE UP (ref 9.95–12.87)
SODIUM SERPL-SCNC: 138 MMOL/L — SIGNIFICANT CHANGE UP (ref 135–146)
SP GR SPEC: 1.01 — SIGNIFICANT CHANGE UP (ref 1.01–1.03)
TYPE + AB SCN PNL BLD: SIGNIFICANT CHANGE UP
UROBILINOGEN FLD QL: 0.2 MG/DL — SIGNIFICANT CHANGE UP (ref 0.2–0.2)
WBC UR QL: SIGNIFICANT CHANGE UP /HPF

## 2019-03-20 PROCEDURE — 99222 1ST HOSP IP/OBS MODERATE 55: CPT

## 2019-03-20 RX ORDER — PANTOPRAZOLE SODIUM 20 MG/1
40 TABLET, DELAYED RELEASE ORAL
Qty: 0 | Refills: 0 | Status: DISCONTINUED | OUTPATIENT
Start: 2019-03-20 | End: 2019-03-26

## 2019-03-20 RX ORDER — LEVOTHYROXINE SODIUM 125 MCG
1 TABLET ORAL
Qty: 0 | Refills: 0 | COMMUNITY

## 2019-03-20 RX ORDER — CALCITRIOL 0.5 UG/1
1 CAPSULE ORAL
Qty: 0 | Refills: 0 | COMMUNITY

## 2019-03-20 RX ORDER — DEXAMETHASONE 0.5 MG/5ML
4 ELIXIR ORAL
Qty: 0 | Refills: 0 | Status: DISCONTINUED | OUTPATIENT
Start: 2019-03-20 | End: 2019-03-21

## 2019-03-20 RX ORDER — DEXAMETHASONE 0.5 MG/5ML
4 ELIXIR ORAL ONCE
Qty: 0 | Refills: 0 | Status: COMPLETED | OUTPATIENT
Start: 2019-03-20 | End: 2019-03-20

## 2019-03-20 RX ORDER — SODIUM CHLORIDE 9 MG/ML
1000 INJECTION, SOLUTION INTRAVENOUS ONCE
Qty: 0 | Refills: 0 | Status: COMPLETED | OUTPATIENT
Start: 2019-03-20 | End: 2019-03-20

## 2019-03-20 RX ORDER — LEVETIRACETAM 250 MG/1
1000 TABLET, FILM COATED ORAL
Qty: 0 | Refills: 0 | Status: DISCONTINUED | OUTPATIENT
Start: 2019-03-20 | End: 2019-03-26

## 2019-03-20 RX ORDER — GABAPENTIN 400 MG/1
1 CAPSULE ORAL
Qty: 0 | Refills: 0 | COMMUNITY

## 2019-03-20 RX ADMIN — SODIUM CHLORIDE 1000 MILLILITER(S): 9 INJECTION, SOLUTION INTRAVENOUS at 18:01

## 2019-03-20 RX ADMIN — Medication 4 MILLIGRAM(S): at 18:01

## 2019-03-20 NOTE — H&P ADULT - HISTORY OF PRESENT ILLNESS
63 y F with pmh of thyroid cancer s/p thyroidectomy,, stage4 uterine cancer s/p chemo and surgery was sent to ED from her Oncologist's office for the progressive weakness in her right UE and lower extremities x 5 days. She denies any chest pain, nausea vomiting, neck rigidity or back pain, vertigo. She has no difficulty in swallowing.     In ED patient had CT scan that showed new supra tentorial lesions who were suspicious for the mets.

## 2019-03-20 NOTE — ASSESSMENT
[FreeTextEntry1] : Malignant pleural effusion, resolved\par --L side PleurX catheter has been removed on 2/8/2018\par --Following wit Dr. Thompson\par \par Papillary-serous endometrial cancer, stage IV\par  --PET CT on 11/29/2017 revealed no clear GYN origin, extensive KEVIN, uptake in the right lung and pleura, thyroid nodule\par --Completed cycle 8  of carbo/taxol on 6/29/2018, will proceed with close observation, 2 cycles were administered  postop\par --MRI of the brain on 11/29/2017 reveals no brain metastasis, but possible R ICA aneurysm, follow up CTA brain revealed no aneurysm  \par --Follow up MRI brain on 3/202/2018 was negative (2/2 eye complaints)\par --Restaging PET CT scans 2/2/2017 and 4/16/2018 reveal great response to chemotherapy\par --Neuropathy improving, not painful, continue on Gabapentin \par --Restaging PET CT on 9/26/2018 (3 months post completion of chemo 6/29/2018), was essentially negative for recurrent disease\par --Restaging PET CT on 12/18/2018 reveals   New left upper quadrant peritoneal nodule measuring 8 mm with an SUV max  of 7.3. This is suggestive of biologic tumor activity.  No other FDG avid lesions seen throughout the scan.\par --restaging PET CT on 2/12/2019 revealed slight increase in size of left upper quadrant peritoneal nodule (1.1 cm, \par previously 0.8 cm) with stable FDG uptake, 7.7 SUV suspicious for biologic tumor activity.\par --Case was discussed with Dr. Massey at [place], biopsy of peritoneal nodule is positive for recurrent endometrial cancer, obtain records from [place] \par --Follow up MRI of T-spine done on 10/16/2018 was negative for bone mets\par --Admit for work up of RLE weakness\par \par Papillary thyroid cancer [id number], 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 Herrera \par --Follow up with Dr. Acevedo of endocrinology, he is addressing vitamin D, thyroid and diabetes \par \par  Follow up post discharge

## 2019-03-20 NOTE — H&P ADULT - NSHPPHYSICALEXAM_GEN_ALL_CORE
T(C): 35.9 (03-20-19 @ 10:18), Max: 35.9 (03-20-19 @ 10:18)  HR: 68 (03-20-19 @ 20:19) (68 - 71)  BP: 120/58 (03-20-19 @ 20:19) (113/63 - 120/58)  RR: 18 (03-20-19 @ 20:19) (18 - 20)  SpO2: 95% (03-20-19 @ 20:19) (95% - 97%)    PHYSICAL EXAM:  GENERAL: NAD, well-developed  HEAD:  Atraumatic, Normocephalic  EYES: EOMI, PERRLA, conjunctiva and sclera clear  ENT: Normal tympanic membrane. No nasal obstruction or discharge. No tonsillar exudate, swelling or erythema.  NECK: Supple, No JVD  CHEST/LUNG: Clear to auscultation bilaterally; No wheeze  HEART: Regular rate and rhythm; No murmurs, rubs, or gallops  ABDOMEN: Soft, Nontender, Nondistended; Bowel sounds present  EXTREMITIES:  2+ Peripheral Pulses, No clubbing, cyanosis, or edema  PSYCH: AAOx3  NEUROLOGY: non-focal  SKIN: No rashes or lesions T(C): 35.9 (03-20-19 @ 10:18), Max: 35.9 (03-20-19 @ 10:18)  HR: 68 (03-20-19 @ 20:19) (68 - 71)  BP: 120/58 (03-20-19 @ 20:19) (113/63 - 120/58)  RR: 18 (03-20-19 @ 20:19) (18 - 20)  SpO2: 95% (03-20-19 @ 20:19) (95% - 97%)    PHYSICAL EXAM:  GENERAL: NAD, well-developed  HEAD:  Atraumatic, Normocephalic  NECK: Supple, No JVD  CHEST/LUNG: Clear to auscultation bilaterally; No wheeze  HEART: Regular rate and rhythm; No murmurs, rubs, or gallops  ABDOMEN: Soft, Nontender, Nondistended; Bowel sounds present  EXTREMITIES:  2+ Peripheral Pulses, No clubbing, cyanosis, or edema  PSYCH: AAOx3  NEUROLOGY: Weakness on the RU and LE   power 3/5 on left  Right : Normal   SKIN: No rashes or lesions

## 2019-03-20 NOTE — H&P ADULT - NSICDXPASTSURGICALHX_GEN_ALL_CORE_FT
PAST SURGICAL HISTORY:  H/O lymph node excision LEFT NECK 12/17    History of total abdominal hysterectomy 4/2018    S/P thoracentesis 12/17

## 2019-03-20 NOTE — CONSULT NOTE ADULT - SUBJECTIVE AND OBJECTIVE BOX
HPI:  62 yo f hx uterine ca, neuropathy, hx thyroid ca in remission w/ resulting hypothyroid here for R sided weakness. sx x5 days and progressive. no trauma, ha, numbness. no f/c/neck stiffness.      Pt sent here after progressive right sided weakness worsened over 5 days. She admits she follows with Dr. Cid as outpatient.    PAST MEDICAL & SURGICAL HISTORY:  Thyroid cancer  Peripheral neuropathy  Pleural effusion:   Uterine cancer  History of total abdominal hysterectomy: 2018  S/P thoracentesis:   H/O lymph node excision: LEFT NECK       Home Medications:  calcitriol 0.25 mcg oral capsule: 1 cap(s) orally once a day (20 Aug 2018 06:14)  Calcium 600+D 600 mg-200 intl units oral tablet: 1 tab(s) orally 3 times a day (20 Aug 2018 06:14)  gabapentin 300 mg oral capsule: 1 cap(s) orally 3 times a day (20 Aug 2018 06:14)  Synthroid 100 mcg (0.1 mg) oral tablet: 1 tab(s) orally once a day (20 Aug 2018 14:55)      Allergies    No Known Allergies    Intolerances        MEDICATIONS  (STANDING):    MEDICATIONS  (PRN):      ICU Vital Signs Last 24 Hrs  T(C): 35.9 (20 Mar 2019 10:18), Max: 35.9 (20 Mar 2019 10:18)  T(F): 96.7 (20 Mar 2019 10:18), Max: 96.7 (20 Mar 2019 10:18)  HR: 71 (20 Mar 2019 10:18) (71 - 71)  BP: 113/63 (20 Mar 2019 10:18) (113/63 - 113/63)  BP(mean): --  ABP: --  ABP(mean): --  RR: 20 (20 Mar 2019 10:18) (20 - 20)  SpO2: 97% (20 Mar 2019 10:18) (97% - 97%)      I&O's Detail            138  |  100  |  13  ----------------------------<  103<H>  5.6<H>   |  23  |  0.7    Ca    9.9      20 Mar 2019 12:50    TPro  7.8  /  Alb  4.6  /  TBili  0.7  /  DBili  x   /  AST  39  /  ALT  26  /  AlkPhos  76          Urinalysis Basic - ( 20 Mar 2019 14:30 )    Color: Yellow / Appearance: Cloudy / S.015 / pH: x  Gluc: x / Ketone: Negative  / Bili: Negative / Urobili: 0.2 mg/dL   Blood: x / Protein: Negative mg/dL / Nitrite: Negative   Leuk Esterase: Small / RBC: x / WBC 3-5 /HPF   Sq Epi: x / Non Sq Epi: Many /HPF / Bacteria: Few /HPF      Neuro:  AAOX3. Verbal function intact  tongue midline, facial motions symmetric  PERRL  Motor: MAEx4, 5/5 left upper and lower ext  4+/5 right upper ext, 4/5 right lower ext  Sensation: intact to touch in all extremities          Imaging:  < from: CT Head No Cont (19 @ 15:41) >  Impression:     1. New bilateral supratentorial lesions with surrounding edema suspicious   for metastatic disease. This can be further evaluated MRI of the brain   with and without contrast.    2. Localized mass effect without midline shift.          MIRIAN PALENCIA M.D., RESIDENT RADIOLOGIST  This document has been electronically signed.  AMMY CARRASCO M.D., ATTENDING RADIOLOGIST  This document has been electronically signed. Mar 20 2019  4:19PM    < end of copied text >      Assessment/Plan  MRI brain +/-  decadron 4mg q6h  keppra  onc follow up  d/w attending

## 2019-03-20 NOTE — REASON FOR VISIT
[Follow-Up Visit] : a follow-up [Family Member] : family member [FreeTextEntry2] : endometrial cancer

## 2019-03-20 NOTE — ED ADULT NURSE NOTE - NSIMPLEMENTINTERV_GEN_ALL_ED
Implemented All Fall with Harm Risk Interventions:  [place] to call system. Call bell, personal items and telephone within reach. Instruct patient to call for assistance. Room bathroom lighting operational. Non-slip footwear when patient is off stretcher. Physically safe environment: no spills, clutter or unnecessary equipment. Stretcher in lowest position, wheels locked, appropriate side rails in place. Provide visual cue, wrist band, yellow gown, etc. Monitor gait and stability. Monitor for mental status changes and reorient to person, place, and time. Review medications for side effects contributing to fall risk. Reinforce activity limits and safety measures with patient and family. Provide visual clues: red socks.

## 2019-03-20 NOTE — REVIEW OF SYSTEMS
[Fatigue] : fatigue [Negative] : Heme/Lymph [Chest Pain] : no chest pain [Palpitations] : no palpitations [Lower Ext Edema] : no lower extremity edema [Shortness Of Breath] : no shortness of breath [Wheezing] : no wheezing [Cough] : no cough [SOB on Exertion] : no shortness of breath during exertion [FreeTextEntry6] : SOB completely resolved [FreeTextEntry9] : gait abnormal, R leg is weak  [de-identified] : worsening peripheral neuropathy, not painful

## 2019-03-20 NOTE — PHYSICAL EXAM
[Restricted in physically strenuous activity but ambulatory and able to carry out work of a light or sedentary nature] : Status 1- Restricted in physically strenuous activity but ambulatory and able to carry out work of a light or sedentary nature, e.g., light house work, office work [Normal] : affect appropriate [de-identified] : + left sided 1cm cervical lymph node, resolved, R sided mild facial swelling, no erythema  [de-identified] : clear lungs [de-identified] : L upper back lipoma, present for years, otherwise normal exam

## 2019-03-20 NOTE — H&P ADULT - NSHPLABSRESULTS_GEN_ALL_CORE
< from: CT Head No Cont (03.20.19 @ 15:41) >    1. New bilateral supratentorial lesions with surrounding edema suspicious   for metastatic disease. This can be further evaluated MRI of the brain   with and without contrast.    2. Localized mass effect without midline shift.

## 2019-03-20 NOTE — ED PROVIDER NOTE - OBJECTIVE STATEMENT
62 yo f hx uterine ca, neuropathy, hx thyroid ca in remission w/ resulting hypothyroid here for R sided weakness. sx x5 days and progressive. no trauma, ha, numbness. no f/c/neck stiffness.

## 2019-03-20 NOTE — H&P ADULT - NSICDXPASTMEDICALHX_GEN_ALL_CORE_FT
PAST MEDICAL HISTORY:  Peripheral neuropathy     Pleural effusion 11/17    Thyroid cancer     Uterine cancer

## 2019-03-20 NOTE — ED ADULT TRIAGE NOTE - CHIEF COMPLAINT QUOTE
right sided weakness x a "few weeks", progressively worsened x 5 days ago, patient being sent in by Dr. Cid. speech clear, no facial droop noted.

## 2019-03-20 NOTE — ED PROVIDER NOTE - PHYSICAL EXAMINATION
PHYSICAL EXAM:    Constitutional: awake, alert, NAD  Eyes: EOMI, no conj injection  HENT: NC AT  Back: no c/t/l spine ttp  Respiratory: no respiratory distress, breath sounds equal b/l, no wheezing, rhonchi or stridor.   Cardiovascular: RRR nml S1S2  Gastrointestinal: soft, no masses, nontender, nondistended. No guarding or rebound.   Extremities: no peripheral edema  Neurological: AAOx3, CN II-XII grossly intact, no focal numbness. LUE/LLE 5/5 str in all muscle groups. RUE 4+/5 str, RLE 4/5 str, sensation intact in all extremities  Skin: no rash  Musculoskeletal: no gross deformity

## 2019-03-20 NOTE — H&P ADULT - ASSESSMENT
62 yo M with PMH of thyroid cancer and uterine cancer comes to ED for the right sided weakness.     # Right sided weakness 2/2 brain mets  - Neuro SX follow up   - Keppra  - Decadron 4 mg q6h   - Onc follow up   - Follow up with the brain    # DVT ppx: Not indicated    # Dispo: home

## 2019-03-20 NOTE — ED PROVIDER NOTE - PLAN OF CARE
5 days progressive R sided weakness, RLE >RUE, r/o cvs, r/o mass, no evidence of infection/meningitis

## 2019-03-20 NOTE — PHYSICAL EXAM
[Restricted in physically strenuous activity but ambulatory and able to carry out work of a light or sedentary nature] : Status 1- Restricted in physically strenuous activity but ambulatory and able to carry out work of a light or sedentary nature, e.g., light house work, office work [Normal] : affect appropriate [de-identified] : clear lungs [de-identified] : L upper back lipoma, present for years, otherwise normal exam [de-identified] : RLE weakness, 3/5, no loss of sensation, no objective RUE weakness, gait is affected

## 2019-03-20 NOTE — HISTORY OF PRESENT ILLNESS
[Disease: _____________________] : Disease: [unfilled] [AJCC Stage: ____] : AJCC Stage: [unfilled] [de-identified] : Serena is a rodrigue 63 yo female, who has started developing SOB approximately 2 months ago, she went to ER on 11/2/2017 and on CXR was noted to have a large right sided pleural effusion. She underwent a thoracentesis, 1.6L of fluid was drained. 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 IHC was pos for CK7, PAX8, CK5/6 and Ca125, negative for TTF-1/Napsin, calretinin, CK20, mammaglobin, p63, villin, HAM56, GCDFP15, TAY-EP4. \par \par Her visit on 12/1/2017 Serena had an excisional biopsy of cervical node on 12/13/2017 revealing met adenocarcinoma, primary source still was not clear. On 12/14/2017 Serena was admitted with SOB, Pleurx catheter was placed on 12/14/2017. 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 Serena received 1st dose of carbo (auc of 5)/taxol(175mg/m2) on 12/15/2017 without complications, but the next day sustained a fall 2/2 vasovagal episode and developed asymptomatic SAH, that resolved on imaging by 12/21/2017.  [de-identified] : KRas WT, EGFR WT, Alk WT, ROS1 WT, PDL1-1%\par Normal MMR protein expression [de-identified] : 11/24/2017: She reports some SOB, especially ARZATE today. No fevers, no weight loss (reports some weight gain), no GI,  or GYN symptoms, except for increasing abdominal girth, she reports no blood in the stool or urine and no vaginal bleeding. She reports no CP and no palpitations, she reports worsening nonproductive cough, no hemoptysis. She also reports difficulty lying on left side and lying down flat. \par She reports left on/off facial numbness several months in duration. She had a CT of her head performed in [facility] within 1 year.  Additionally there is a freely mobile left cervical node present on exam, as per patient this was in place for approximately 1 year. \par \par 12/1/2017: Serena is here for follow up today. She had a therapeutic thoracentesis in  on 11/27/2017, 2L of fluid were removed, with much improved respiratory status. PET CT and MRI of the brain were done on 11/29/2017, findings were discussed today. There remains no clear primary source of malignancy. We have discussed that the source of tumor may be Mullerian vs lung. regardless of source chemotherapy needs to be initiated ASAP. We have discussed Carbo/taxol regiment that will cover both Mullerian and lung origin. Side effects including myelosuppression, peripheral neuropathy, allergic reaction and others.\par \par 1/2/2017 Since last visit Serena had an excisional biopsy of cervical node on 12/13/2017 revealing met adenocarcinoma, primary source still was not clear. On 12/14/2017 Serena was admitted with SOB, Pleurx catheter was placed on 12/14/2017. 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 Serena received 1st dose of carbo (auc of 5)/taxol(175mg/m2) on 12/15/2017 without complications, but the next day sustained a fall 2/2 vasovagal episode and developed asymptomatic SAH, that resolved on imaging by 12/21/2017. Today Serena's SOB has significantly improved. She reports very mild neuropathy in fingertips only. She reports no headache and offers no other complaints. \par \par 1/26/2018: Complains of some neuropathy, otherwise tolerating chemo with no difficulty. \par \par 2/16/2018: restaging CT C/A/P reviewed, significant improvement noted. Will refer to GYN/ONC. Reports slightly worsening neuropathy, not -painful, taking Gabapentin, no other symptoms. For cycle 4 of carbo/taxol today.\par \par 3/9/2018: Serena met with Dr. Massey, she is planned for surgery on 5/7/2018, after completion of 6 cycles of carbo/taxol and restaging PET CT ordered for mid April and follow up with Dr. Massey on April 20. She reports worsening neuropathy, and occasional pain and changes in vision in her left eye, eye symptoms come and go and are not very bothersome. She reports no other symptoms. \par \par 3/30/2018; Serena is here for cycle 6 of carbo/taxol, she continues to have neuropathy in finger and toes, but offers no other complaints, chemo has been dose reduced. \par \par 4/27/2018: Results of restaging PET CT s/p 6 cycles of carbo/taxol revealed 1. No new areas of abnormal increased uptake is seen to indicate \par biologically active disease.\par \par 2. Persistent PET positive left thyroid mass with a max SUV 33.1, \par previously max SUV 34.8. Further evaluation with thyroid ultrasound and \par if needed fine-needle aspiration biopsy will be helpful.\par \par 3.   PET positive left cervical lymph nodes mainly level 2 on the left \par with a max SUV 5.47previously 18. Minimal increased uptake in the \par bilateral axillary lymph nodes most likely reactive(less than 2.5)\par \par 4. Weakly PET positive, max SUV 2.89 right pleura, previous max SUV 5.3 \par with non-FDG avid right pleural effusion. \par \par 5. Previously FDG avid right and left supraclavicular lymphadenopathy, \par left internal mammary, bilateral paratracheal, para-aortic, para \par vertebral, carinal, mesenteric, right and left iliacs, right inguinal \par lymphadenopathy, small in size and no longer FDG avid.\par \par 6. No abnormal increased uptake is seen in the  lobulated uterus.\par \par 7. Overall there is marked improvement in the FDG avid disease.\par This was reviewed with Serena. She met with Dr. Massey on 4/20/2018, surgery is planned for 5/7/2018. She will also joselyn to meet with ENT re FDG avid thyroid nodule. Will assist in making he an appointment for her. \par Persistent neuropathy on gabapentin. \par \par 6/29/18 ; Patient came for follow up visit , evaluation for chemotherapy cycle 8 of carbo / Taxol , patient tolerating medication well , except neuropathy  recommend to have gabapentin  300 mg po daily.\par \par 7/20/2018: Serena present for follow up today, she has completed total of 8 cycles of Carbo/Taxol. She reports significant neuropathy in her hands and feet. We will discontinue chemotherapy today and plan to follow with close observation. She has thyroidectomy planned with Dr. Herrera on 8/20/2018, obtaining clearance for PMD. She is also planning to fly to [place] at the end of September. She reports no SOB, no GI or  symptoms. \par \par 9/12/2018: Serena offers no significant complaints today. She states neuropathy in her hands has almost completely resolved and neuropathy in her feet is significantly better. She had undergone complete thyroidectomy by Dr. Herrera on 8/20/2018, pathology revealed papillary thyroid cancer, measuring 2 cm, pT1b, other additional foci of papillary carcinoma were also noted, no LVI, surgical margins were clear, LN 0/2 had no carcinoma, stage [id number]. She is following up with Dr. Herrera tomorrow. She has still not gone for her vacation in [place]. \par \par 10/10/2018: Restaging PET CT on 9/26/2018 reveals Since 4/16/2018,  1. Post thyroidectomy with diffuse increased FDG uptake at the thyroid  bed likely representing post-surgical changes.  2. Subtle increased uptake is seen in the lamina , right side at the  level of T8 with a max SUV 4.6. This is not sufficient for metastatic  disease. Bone scan or MRI examination with contrast will be helpful for  further evaluation.  3. No other areas of abnormal increased uptake is seen. Images were personally reviewed by myself and discussed with Serena. \par She also had an Echo on 9/24/2018 revealing EF of 63%. \par Serena reports ongoing fatigue, she is unable to lose weight. She is taking Synthroid and following with Dr. Acevedo. \par She reports stiff fingers at night only, no painful neuropathy. \par She denies any other symptoms today. \par \par 12/11/2018: Serena feels well, neuropathy in hands and feet is much improved. S he is now complaining of r-sided facial pain associated with some swelling, pain comes and goes. She has already seen Dr. Herrera, who ordered CT of sinuses and prescribed pain relief meds. She is also due for restaging PET CT. Serena feels well otherwise. \par \par 1/9/2019: Restaging PET CT was performed on 12/18/2018 it revealed COMPARISON : 9/24/2018.  New left upper quadrant peritoneal nodule measuring 8 mm with an SUV max  of 7.3. This is suggestive of biologic tumor activity.  No other FDG avid lesions seen throughout the scan. Images were personally reviewed by myself and discussed with Serena, originally over the phone and again today. I have originally suggested to try AI in the interim, Serena however reported diarrhea at the time of the scan and declined intervention for now. We will plan for restaging PET CT to be performed in 6-8 weeks, this will be ordered today. She will follow up with Dr. Massey at [place] shortly and bring the disk for his review. I would also like her to meet with genetics, this will be arranged at [place] with Dr. Massey's help. Serena reports no new symptoms today, her neuropathy is manageable and  improving. She still reports unilateral headache that was work up in the past. Neurology eval was again suggested to her. She is following closely with Endocrinology re thyroid management. She will have follow up with Dr. Herrera shortly as well. \par \par 2/20/2019: Serena offers no new complaints. PET CT from 2/12/2019 revealed \par Since PET/CT of December 19, 2018, no new sites of pathologic FDG uptake\par Slight increase in size of left upper quadrant peritoneal nodule (1.1 cm, \par previously 0.8 cm) with stable FDG uptake, 7.7 SUV suspicious for \par biologic tumor activity.\par No other sites of pathologic FDG uptake \par Images were personally reviewed by myself and discussed with Serena, case was also discussed with Dr. Massey at [place]. Serena will have follow up with his shortly. She is following with endocrinology. reports improving neuropathy. No GI or  symptoms at this time. No weight loss.

## 2019-03-20 NOTE — H&P ADULT - NSICDXFAMILYHX_GEN_ALL_CORE_FT
FAMILY HISTORY:  Father  Still living? No  CVA (cerebral vascular accident), Age at diagnosis: Age Unknown    Mother  Still living? Yes, Estimated age: Age Unknown  CAD (coronary artery disease), Age at diagnosis: Age Unknown

## 2019-03-20 NOTE — ED PROVIDER NOTE - PROGRESS NOTE DETAILS
discussed CTH findings w/ pt and family. neuro and nsurg paged d/w hemonc fellow- rec dex 4mg iv, admit to ketan. ketan requesting Dr. Soto from nsurg

## 2019-03-20 NOTE — ASSESSMENT
[FreeTextEntry1] : Malignant pleural effusion, resolved\par --L side PleurX catheter has been removed on 2/8/2018\par --Following wit Dr. Thompson\par \par Papillary-serous endometrial cancer, stage IV\par  --PET CT on 11/29/2017 revealed no clear GYN origin, extensive KEVIN, uptake in the right lung and pleura, thyroid nodule\par --Completed cycle 8  of carbo/taxol on 6/29/2018, will proceed with close observation, 2 cycles were administered  postop\par --MRI of the brain on 11/29/2017 reveals no brain metastasis, but possible R ICA aneurysm, follow up CTA brain revealed no aneurysm  \par --Follow up MRI brain on 3/202/2018 was negative (2/2 eye complaints)\par --Restaging PET CT scans 2/2/2017 and 4/16/2018 reveal great response to chemotherapy\par --Neuropathy improving, not painful, continue on Gabapentin \par --Restaging PET CT on 9/26/2018 (3 months post completion of chemo 6/29/2018), was essentially negative for recurrent disease\par --Restaging PET CT on 12/18/2018 reveals   New left upper quadrant peritoneal nodule measuring 8 mm with an SUV max  of 7.3. This is suggestive of biologic tumor activity.  No other FDG avid lesions seen throughout the scan.\par --restaging PET CT on 2/12/2019 revealed slight increase in size of left upper quadrant peritoneal nodule (1.1 cm, \par previously 0.8 cm) with stable FDG uptake, 7.7 SUV suspicious for biologic tumor activity.\par --Case was discussed with Dr. Massey at [facility]\par --Follow up MRI of T-spine done on 10/16/2018 was negative for bone mets\par --She will wee Dr. Massey shortly and bring PET CT on disk for his review\par --Restaging PET CT ordered on 1/9/2019\par \par Papillary thyroid cancer [id number], 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 Herrera \par --Follow up with Dr. Acevedo of endocrinology, he is addressing vitamin D, thyroid and diabetes \par \par  Follow up in 4 weeks

## 2019-03-20 NOTE — CONSULT LETTER
[Dear  ___] : Dear  [unfilled], [Consult Letter:] : I had the pleasure of evaluating your patient, [unfilled]. [Please see my note below.] : Please see my note below. [( Thank you for referring [unfilled] for consultation for _____ )] : Thank you for referring [unfilled] for consultation for [unfilled] [Consult Closing:] : Thank you very much for allowing me to participate in the care of this patient.  If you have any questions, please do not hesitate to contact me. [Sincerely,] : Sincerely, [FreeTextEntry3] : Stacie Cid MD

## 2019-03-20 NOTE — HISTORY OF PRESENT ILLNESS
[Disease: _____________________] : Disease: [unfilled] [AJCC Stage: ____] : AJCC Stage: [unfilled] [de-identified] : Serena is a rodrigue 63 yo female, who has started developing SOB approximately 2 months ago, she went to ER on 11/2/2017 and on CXR was noted to have a large right sided pleural effusion. She underwent a thoracentesis, 1.6L of fluid was drained. 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 IHC was pos for CK7, PAX8, CK5/6 and Ca125, negative for TTF-1/Napsin, calretinin, CK20, mammaglobin, p63, villin, HAM56, GCDFP15, TAY-EP4. \par \par Her visit on 12/1/2017 Serena had an excisional biopsy of cervical node on 12/13/2017 revealing met adenocarcinoma, primary source still was not clear. On 12/14/2017 Serena was admitted with SOB, Pleurx catheter was placed on 12/14/2017. 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 Serena received 1st dose of carbo (auc of 5)/taxol(175mg/m2) on 12/15/2017 without complications, but the next day sustained a fall 2/2 vasovagal episode and developed asymptomatic SAH, that resolved on imaging by 12/21/2017.  [de-identified] : KRas WT, EGFR WT, Alk WT, ROS1 WT, PDL1-1%\par Normal MMR protein expression [de-identified] : 11/24/2017: She reports some SOB, especially ARZATE today. No fevers, no weight loss (reports some weight gain), no GI,  or GYN symptoms, except for increasing abdominal girth, she reports no blood in the stool or urine and no vaginal bleeding. She reports no CP and no palpitations, she reports worsening nonproductive cough, no hemoptysis. She also reports difficulty lying on left side and lying down flat. \par She reports left on/off facial numbness several months in duration. She had a CT of her head performed in [facility] within 1 year.  Additionally there is a freely mobile left cervical node present on exam, as per patient this was in place for approximately 1 year. \par \par 12/1/2017: Serena is here for follow up today. She had a therapeutic thoracentesis in  on 11/27/2017, 2L of fluid were removed, with much improved respiratory status. PET CT and MRI of the brain were done on 11/29/2017, findings were discussed today. There remains no clear primary source of malignancy. We have discussed that the source of tumor may be Mullerian vs lung. regardless of source chemotherapy needs to be initiated ASAP. We have discussed Carbo/taxol regiment that will cover both Mullerian and lung origin. Side effects including myelosuppression, peripheral neuropathy, allergic reaction and others.\par \par 1/2/2017 Since last visit Serena had an excisional biopsy of cervical node on 12/13/2017 revealing met adenocarcinoma, primary source still was not clear. On 12/14/2017 Serena was admitted with SOB, Pleurx catheter was placed on 12/14/2017. 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 Serena received 1st dose of carbo (auc of 5)/taxol(175mg/m2) on 12/15/2017 without complications, but the next day sustained a fall 2/2 vasovagal episode and developed asymptomatic SAH, that resolved on imaging by 12/21/2017. Today Serena's SOB has significantly improved. She reports very mild neuropathy in fingertips only. She reports no headache and offers no other complaints. \par \par 1/26/2018: Complains of some neuropathy, otherwise tolerating chemo with no difficulty. \par \par 2/16/2018: restaging CT C/A/P reviewed, significant improvement noted. Will refer to GYN/ONC. Reports slightly worsening neuropathy, not -painful, taking Gabapentin, no other symptoms. For cycle 4 of carbo/taxol today.\par \par 3/9/2018: Serena met with Dr. Massey, she is planned for surgery on 5/7/2018, after completion of 6 cycles of carbo/taxol and restaging PET CT ordered for mid April and follow up with Dr. Massey on April 20. She reports worsening neuropathy, and occasional pain and changes in vision in her left eye, eye symptoms come and go and are not very bothersome. She reports no other symptoms. \par \par 3/30/2018; Serena is here for cycle 6 of carbo/taxol, she continues to have neuropathy in finger and toes, but offers no other complaints, chemo has been dose reduced. \par \par 4/27/2018: Results of restaging PET CT s/p 6 cycles of carbo/taxol revealed 1. No new areas of abnormal increased uptake is seen to indicate \par biologically active disease.\par \par 2. Persistent PET positive left thyroid mass with a max SUV 33.1, \par previously max SUV 34.8. Further evaluation with thyroid ultrasound and \par if needed fine-needle aspiration biopsy will be helpful.\par \par 3.   PET positive left cervical lymph nodes mainly level 2 on the left \par with a max SUV 5.47previously 18. Minimal increased uptake in the \par bilateral axillary lymph nodes most likely reactive(less than 2.5)\par \par 4. Weakly PET positive, max SUV 2.89 right pleura, previous max SUV 5.3 \par with non-FDG avid right pleural effusion. \par \par 5. Previously FDG avid right and left supraclavicular lymphadenopathy, \par left internal mammary, bilateral paratracheal, para-aortic, para \par vertebral, carinal, mesenteric, right and left iliacs, right inguinal \par lymphadenopathy, small in size and no longer FDG avid.\par \par 6. No abnormal increased uptake is seen in the  lobulated uterus.\par \par 7. Overall there is marked improvement in the FDG avid disease.\par This was reviewed with Serena. She met with Dr. Massey on 4/20/2018, surgery is planned for 5/7/2018. She will also joselyn to meet with ENT re FDG avid thyroid nodule. Will assist in making he an appointment for her. \par Persistent neuropathy on gabapentin. \par \par 6/29/18 ; Patient came for follow up visit , evaluation for chemotherapy cycle 8 of carbo / Taxol , patient tolerating medication well , except neuropathy  recommend to have gabapentin  300 mg po daily.\par \par 7/20/2018: Serena present for follow up today, she has completed total of 8 cycles of Carbo/Taxol. She reports significant neuropathy in her hands and feet. We will discontinue chemotherapy today and plan to follow with close observation. She has thyroidectomy planned with Dr. Herrera on 8/20/2018, obtaining clearance for PMD. She is also planning to fly to [place] at the end of September. She reports no SOB, no GI or  symptoms. \par \par 9/12/2018: Serena offers no significant complaints today. She states neuropathy in her hands has almost completely resolved and neuropathy in her feet is significantly better. She had undergone complete thyroidectomy by Dr. Herrera on 8/20/2018, pathology revealed papillary thyroid cancer, measuring 2 cm, pT1b, other additional foci of papillary carcinoma were also noted, no LVI, surgical margins were clear, LN 0/2 had no carcinoma, stage [id number]. She is following up with Dr. Herrera tomorrow. She has still not gone for her vacation in [place]. \par \par 10/10/2018: Restaging PET CT on 9/26/2018 reveals Since 4/16/2018,  1. Post thyroidectomy with diffuse increased FDG uptake at the thyroid  bed likely representing post-surgical changes.  2. Subtle increased uptake is seen in the lamina , right side at the  level of T8 with a max SUV 4.6. This is not sufficient for metastatic  disease. Bone scan or MRI examination with contrast will be helpful for  further evaluation.  3. No other areas of abnormal increased uptake is seen. Images were personally reviewed by myself and discussed with Serena. \par She also had an Echo on 9/24/2018 revealing EF of 63%. \par Serena reports ongoing fatigue, she is unable to lose weight. She is taking Synthroid and following with Dr. Acevedo. \par She reports stiff fingers at night only, no painful neuropathy. \par She denies any other symptoms today. \par \par 12/11/2018: Serena feels well, neuropathy in hands and feet is much improved. S he is now complaining of r-sided facial pain associated with some swelling, pain comes and goes. She has already seen Dr. Herrera, who ordered CT of sinuses and prescribed pain relief meds. She is also due for restaging PET CT. Serena feels well otherwise. \par \par 1/9/2019: Restaging PET CT was performed on 12/18/2018 it revealed COMPARISON : 9/24/2018.  New left upper quadrant peritoneal nodule measuring 8 mm with an SUV max  of 7.3. This is suggestive of biologic tumor activity.  No other FDG avid lesions seen throughout the scan. Images were personally reviewed by myself and discussed with Serena, originally over the phone and again today. I have originally suggested to try AI in the interim, Serena however reported diarrhea at the time of the scan and declined intervention for now. We will plan for restaging PET CT to be performed in 6-8 weeks, this will be ordered today. She will follow up with Dr. Massey at [facility] shortly and bring the disk for his review. I would also like her to meet with genetics, this will be arranged at [facility] with Dr. Massey's help. Serena reports no new symptoms today, her neuropathy is manageable and  improving. She still reports unilateral headache that was work up in the past. Neurology eval was again suggested to her. She is following closely with Endocrinology re thyroid management. She will have follow up with Dr. Herrera shortly as well. \par \par 2/20/2019: Serena offers no new complaints. PET CT from 2/12/2019 revealed \par Since PET/CT of December 19, 2018, no new sites of pathologic FDG uptake\par Slight increase in size of left upper quadrant peritoneal nodule (1.1 cm, \par previously 0.8 cm) with stable FDG uptake, 7.7 SUV suspicious for \par biologic tumor activity.\par No other sites of pathologic FDG uptake \par Images were personally reviewed by myself and discussed with Serena, case was also discussed with Dr. Massey at [facility]. Serena will have follow up with his shortly. She is following with endocrinology. reports improving neuropathy. No GI or  symptoms at this time. No weight loss. \par \par 3/20/2019: Serena had a peritoneal nodule biopsy done at [facility], I have received a call from Dr. 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 We have discussed that surgery though could be attempted will not be the best therapeutic approach, recommencement of systemic therapy was discussed. I have not received the results from [facility] yet. I have briefly discussed this with Serena today. Serena reports that she has acutely developed right lower extremity weakness 5-6 days ago, she did not inform any of her doctors about this. She also reports that her R upper extremity though not weak "does not feel normal either. She reports no back pain, there is no point tenderness, RLE is objectively weak on exam. I recommend ER evaluation to r/o CVA vs brain met, vs L-spine related issue. Recommend admission for work up. Serena is agreeable to get admitted.

## 2019-03-20 NOTE — REVIEW OF SYSTEMS
[Fatigue] : fatigue [Negative] : Allergic/Immunologic [Chest Pain] : no chest pain [Palpitations] : no palpitations [Lower Ext Edema] : no lower extremity edema [Shortness Of Breath] : no shortness of breath [Wheezing] : no wheezing [Cough] : no cough [SOB on Exertion] : no shortness of breath during exertion [FreeTextEntry6] : SOB completely resolved [de-identified] : worsening peripheral neuropathy, not painful

## 2019-03-20 NOTE — ED PROVIDER NOTE - CARE PLAN
Principal Discharge DX:	Malignant neoplasm of brain, unspecified location  Assessment and plan of treatment:	5 days progressive R sided weakness, RLE >RUE, r/o cvs, r/o mass, no evidence of infection/meningitis

## 2019-03-21 LAB
ANION GAP SERPL CALC-SCNC: 17 MMOL/L — HIGH (ref 7–14)
BUN SERPL-MCNC: 14 MG/DL — SIGNIFICANT CHANGE UP (ref 10–20)
CALCIUM SERPL-MCNC: 10 MG/DL — SIGNIFICANT CHANGE UP (ref 8.5–10.1)
CHLORIDE SERPL-SCNC: 98 MMOL/L — SIGNIFICANT CHANGE UP (ref 98–110)
CO2 SERPL-SCNC: 22 MMOL/L — SIGNIFICANT CHANGE UP (ref 17–32)
CREAT SERPL-MCNC: 0.7 MG/DL — SIGNIFICANT CHANGE UP (ref 0.7–1.5)
GLUCOSE SERPL-MCNC: 145 MG/DL — HIGH (ref 70–99)
HCT VFR BLD CALC: 46 % — SIGNIFICANT CHANGE UP (ref 37–47)
HGB BLD-MCNC: 15.2 G/DL — SIGNIFICANT CHANGE UP (ref 12–16)
MCHC RBC-ENTMCNC: 29.8 PG — SIGNIFICANT CHANGE UP (ref 27–31)
MCHC RBC-ENTMCNC: 33 G/DL — SIGNIFICANT CHANGE UP (ref 32–37)
MCV RBC AUTO: 90.2 FL — SIGNIFICANT CHANGE UP (ref 81–99)
NRBC # BLD: 0 /100 WBCS — SIGNIFICANT CHANGE UP (ref 0–0)
PLATELET # BLD AUTO: 277 K/UL — SIGNIFICANT CHANGE UP (ref 130–400)
POTASSIUM SERPL-MCNC: 4.6 MMOL/L — SIGNIFICANT CHANGE UP (ref 3.5–5)
POTASSIUM SERPL-SCNC: 4.6 MMOL/L — SIGNIFICANT CHANGE UP (ref 3.5–5)
RBC # BLD: 5.1 M/UL — SIGNIFICANT CHANGE UP (ref 4.2–5.4)
RBC # FLD: 12.3 % — SIGNIFICANT CHANGE UP (ref 11.5–14.5)
SODIUM SERPL-SCNC: 137 MMOL/L — SIGNIFICANT CHANGE UP (ref 135–146)
WBC # BLD: 8.13 K/UL — SIGNIFICANT CHANGE UP (ref 4.8–10.8)
WBC # FLD AUTO: 8.13 K/UL — SIGNIFICANT CHANGE UP (ref 4.8–10.8)

## 2019-03-21 RX ORDER — CHOLECALCIFEROL (VITAMIN D3) 125 MCG
400 CAPSULE ORAL DAILY
Qty: 0 | Refills: 0 | Status: DISCONTINUED | OUTPATIENT
Start: 2019-03-21 | End: 2019-03-21

## 2019-03-21 RX ORDER — PREGABALIN 225 MG/1
1000 CAPSULE ORAL DAILY
Qty: 0 | Refills: 0 | Status: DISCONTINUED | OUTPATIENT
Start: 2019-03-21 | End: 2019-03-26

## 2019-03-21 RX ORDER — LEVOTHYROXINE SODIUM 125 MCG
125 TABLET ORAL DAILY
Qty: 0 | Refills: 0 | Status: DISCONTINUED | OUTPATIENT
Start: 2019-03-21 | End: 2019-03-26

## 2019-03-21 RX ORDER — SODIUM CHLORIDE 9 MG/ML
500 INJECTION INTRAMUSCULAR; INTRAVENOUS; SUBCUTANEOUS ONCE
Qty: 0 | Refills: 0 | Status: COMPLETED | OUTPATIENT
Start: 2019-03-21 | End: 2019-03-21

## 2019-03-21 RX ORDER — CHOLECALCIFEROL (VITAMIN D3) 125 MCG
1000 CAPSULE ORAL DAILY
Qty: 0 | Refills: 0 | Status: DISCONTINUED | OUTPATIENT
Start: 2019-03-21 | End: 2019-03-26

## 2019-03-21 RX ORDER — DEXAMETHASONE 0.5 MG/5ML
4 ELIXIR ORAL EVERY 6 HOURS
Qty: 0 | Refills: 0 | Status: DISCONTINUED | OUTPATIENT
Start: 2019-03-21 | End: 2019-03-22

## 2019-03-21 RX ADMIN — Medication 4 MILLIGRAM(S): at 01:15

## 2019-03-21 RX ADMIN — SODIUM CHLORIDE 1000 MILLILITER(S): 9 INJECTION INTRAMUSCULAR; INTRAVENOUS; SUBCUTANEOUS at 19:46

## 2019-03-21 RX ADMIN — LEVETIRACETAM 1000 MILLIGRAM(S): 250 TABLET, FILM COATED ORAL at 06:06

## 2019-03-21 RX ADMIN — Medication 4 MILLIGRAM(S): at 17:27

## 2019-03-21 RX ADMIN — Medication 4 MILLIGRAM(S): at 06:07

## 2019-03-21 RX ADMIN — PANTOPRAZOLE SODIUM 40 MILLIGRAM(S): 20 TABLET, DELAYED RELEASE ORAL at 06:07

## 2019-03-21 RX ADMIN — Medication 4 MILLIGRAM(S): at 11:43

## 2019-03-21 RX ADMIN — LEVETIRACETAM 1000 MILLIGRAM(S): 250 TABLET, FILM COATED ORAL at 17:27

## 2019-03-21 NOTE — SWALLOW BEDSIDE ASSESSMENT ADULT - SLP PERTINENT HISTORY OF CURRENT PROBLEM
pt admitted with R weakness. pmh of thyroid cancer s/p thyroidectomy,, stage4 uterine cancer s/p chemo and surgery was sent to ED from her Oncologist's office for the progressive weakness in her right UE and lower extremities x 5 days. CT scan that showed new supra tentorial lesions who were suspicious for the mets

## 2019-03-21 NOTE — PROGRESS NOTE ADULT - SUBJECTIVE AND OBJECTIVE BOX
Patient is a 63y old  Female who presents with a chief complaint of Right sided weakness (20 Mar 2019 20:25)      HPI:  63 y F with pmh of thyroid cancer s/p thyroidectomy,, stage4 uterine cancer s/p chemo and surgery was sent to ED from her Oncologist's office for the progressive weakness in her right UE and lower extremities x 5 days. She denies any chest pain, nausea vomiting, neck rigidity or back pain, vertigo. She has no difficulty in swallowing.     In ED patient had CT scan that showed new supra tentorial lesions who were suspicious for the mets. (20 Mar 2019 20:25)         PAST MEDICAL & SURGICAL HISTORY:  Thyroid cancer  Peripheral neuropathy  Pleural effusion:   Uterine cancer  History of total abdominal hysterectomy: 2018  S/P thoracentesis:   H/O lymph node excision: LEFT NECK       FAMILY HISTORY:  CVA (cerebral vascular accident) (Father)  CAD (coronary artery disease) (Mother)    Allergies    No Known Allergies    Intolerances      Height (cm): 165.1 (19 @ 22:00)  Weight (kg): 83.8 (19 @ 22:00)  BMI (kg/m2): 30.7 (19 @ 22:00)  BSA (m2): 1.91 (19 @ 22:00)      HOME MEDICATIONS:  Calcium 600+D 600 mg-200 intl units oral tablet: 1 tab(s) orally 3 times a day (20 Mar 2019 20:47)  gabapentin 300 mg oral capsule: 1 cap(s) orally 2 times a day (20 Mar 2019 20:47)  Synthroid 125 mcg (0.125 mg) oral tablet: 1 tab(s) orally once a day (20 Mar 2019 20:47)  Vitamin B12 Methylcobalamin 2000 mcg oral capsule: 1 tab(s) orally once a day (20 Mar 2019 20:47)      Vital Signs Last 24 Hrs  T(C): 36.5 (21 Mar 2019 05:42), Max: 36.5 (21 Mar 2019 05:42)  T(F): 97.7 (21 Mar 2019 05:42), Max: 97.7 (21 Mar 2019 05:42)  HR: 67 (21 Mar 2019 05:42) (67 - 71)  BP: 122/65 (21 Mar 2019 05:42) (113/63 - 133/64)  BP(mean): --  RR: 17 (21 Mar 2019 05:42) (17 - 20)  SpO2: 95% (20 Mar 2019 22:00) (95% - 97%)    PHYSICAL EXAM  General: adult in NAD  HEENT: clear oropharynx, anicteric sclera, pink conjunctiva  Neck: supple  CV: normal S1/S2 with no murmur rubs or gallops  Lungs: positive air movement b/l ant lungs,clear to auscultation, no wheezes, no rales  Abdomen: soft non-tender non-distended, no hepatosplenomegaly  Ext: no clubbing cyanosis or edema  Skin: no rashes and no petechiae  Neuro: alert and oriented X 4, no focal deficits    MEDICATIONS  (STANDING):  dexamethasone  Injectable 4 milliGRAM(s) IV Push every 6 hours  levETIRAcetam 1000 milliGRAM(s) Oral two times a day  pantoprazole    Tablet 40 milliGRAM(s) Oral before breakfast    MEDICATIONS  (PRN):      LABS:                    138  |  100  |  13  ----------------------------<  103<H>  5.6<H>   |  23  |  0.7    Ca    9.9      20 Mar 2019 12:50    TPro  7.8  /  Alb  4.6  /  TBili  0.7  /  DBili  x   /  AST  39  /  ALT  26  /  AlkPhos  76  03-20      PT/INR - ( 20 Mar 2019 12:50 )   PT: 10.60 sec;   INR: 0.92 ratio         PTT - ( 20 Mar 2019 12:50 )  PTT:26.6 sec                Urinalysis Basic - ( 20 Mar 2019 14:30 )    Color: Yellow / Appearance: Cloudy / S.015 / pH: x  Gluc: x / Ketone: Negative  / Bili: Negative / Urobili: 0.2 mg/dL   Blood: x / Protein: Negative mg/dL / Nitrite: Negative   Leuk Esterase: Small / RBC: x / WBC 3-5 /HPF   Sq Epi: x / Non Sq Epi: Many /HPF / Bacteria: Few /HPF    RADIOLOGY & ADDITIONAL STUDIES:  < from: CT Head No Cont (19 @ 15:41) >  Impression:     1. New bilateral supratentorial lesions with surrounding edema suspicious   for metastatic disease. This can be further evaluated MRI of the brain   with and without contrast.    2. Localized mass effect without midline shift.          < end of copied text > Patient is a 63y old  Female who presents with a chief complaint of Right sided weakness (20 Mar 2019 20:25)      HPI:  63 y F with pmh of thyroid cancer s/p thyroidectomy,, stage4 uterine cancer s/p chemo and surgery was sent to ED from her Oncologist's office for the progressive weakness in her right UE and lower extremities x 5 days. She denies any chest pain, nausea vomiting, neck rigidity or back pain, vertigo. She has no difficulty in swallowing.     In ED patient had CT scan that showed new supra tentorial lesions who were suspicious for the mets. (20 Mar 2019 20:25)     Heme onc:  Papillary-serous endometrial cancer, stage IV  --PET CT on 2017 revealed no clear GYN origin, extensive KEVIN, uptake in the right lung and pleura, thyroid nodule  --Completed cycle 8 of carbo/taxol on 2018, will proceed with close observation, 2 cycles were administered postop  --MRI of the brain on 2017 reveals no brain metastasis, but possible R ICA aneurysm, follow up CTA brain revealed no aneurysm  --Follow up MRI brain on 3/202/2018 was negative (2/2 eye complaints)  --Restaging PET CT scans 2017 and 2018 reveal great response to chemotherapy  --Neuropathy improving, not painful, continue on Gabapentin   --Restaging PET CT on 2018 (3 months post completion of chemo 2018), was essentially negative for recurrent disease  --Restaging PET CT on 2018 reveals New left upper quadrant peritoneal nodule measuring 8 mm with an SUV max of 7.3. This is suggestive of biologic tumor activity.No other FDG avid lesions seen throughout the scan.  --restaging PET CT on 2019 revealed slight increase in size of left upper quadrant peritoneal nodule (1.1 cm,   previously 0.8 cm) with stable FDG uptake, 7.7 SUV suspicious for biologic tumor activity.  --Case was discussed with Dr. Massey at [place], biopsy of peritoneal nodule is positive for recurrent endometrial cancer, obtain records from [place]       PAST MEDICAL & SURGICAL HISTORY:  Thyroid cancer  Peripheral neuropathy  Pleural effusion:   Uterine cancer  History of total abdominal hysterectomy: 2018  S/P thoracentesis:   H/O lymph node excision: LEFT NECK       FAMILY HISTORY:  CVA (cerebral vascular accident) (Father)  CAD (coronary artery disease) (Mother)    Allergies    No Known Allergies    Intolerances      Height (cm): 165.1 (19 @ 22:00)  Weight (kg): 83.8 (19 @ 22:00)  BMI (kg/m2): 30.7 (19 @ 22:00)  BSA (m2): 1.91 (19 @ 22:00)      HOME MEDICATIONS:  Calcium 600+D 600 mg-200 intl units oral tablet: 1 tab(s) orally 3 times a day (20 Mar 2019 20:47)  gabapentin 300 mg oral capsule: 1 cap(s) orally 2 times a day (20 Mar 2019 20:47)  Synthroid 125 mcg (0.125 mg) oral tablet: 1 tab(s) orally once a day (20 Mar 2019 20:47)  Vitamin B12 Methylcobalamin 2000 mcg oral capsule: 1 tab(s) orally once a day (20 Mar 2019 20:47)      Vital Signs Last 24 Hrs  T(C): 36.5 (21 Mar 2019 05:42), Max: 36.5 (21 Mar 2019 05:42)  T(F): 97.7 (21 Mar 2019 05:42), Max: 97.7 (21 Mar 2019 05:42)  HR: 67 (21 Mar 2019 05:42) (67 - 71)  BP: 122/65 (21 Mar 2019 05:42) (113/63 - 133/64)  BP(mean): --  RR: 17 (21 Mar 2019 05:42) (17 - 20)  SpO2: 95% (20 Mar 2019 22:00) (95% - 97%)    PHYSICAL EXAM  General: adult in NAD  HEENT: clear oropharynx, anicteric sclera, pink conjunctiva  Neck: supple  CV: normal S1/S2 with no murmur rubs or gallops  Lungs: positive air movement b/l ant lungs,clear to auscultation, no wheezes, no rales  Abdomen: soft non-tender non-distended, no hepatosplenomegaly  Ext: no clubbing cyanosis or edema  Skin: no rashes and no petechiae  Neuro: alert and oriented X 4, rigt sided weakness    MEDICATIONS  (STANDING):  dexamethasone  Injectable 4 milliGRAM(s) IV Push every 6 hours  levETIRAcetam 1000 milliGRAM(s) Oral two times a day  pantoprazole    Tablet 40 milliGRAM(s) Oral before breakfast    MEDICATIONS  (PRN):      LABS:                    138  |  100  |  13  ----------------------------<  103<H>  5.6<H>   |  23  |  0.7    Ca    9.9      20 Mar 2019 12:50    TPro  7.8  /  Alb  4.6  /  TBili  0.7  /  DBili  x   /  AST  39  /  ALT  26  /  AlkPhos  76        PT/INR - ( 20 Mar 2019 12:50 )   PT: 10.60 sec;   INR: 0.92 ratio         PTT - ( 20 Mar 2019 12:50 )  PTT:26.6 sec                Urinalysis Basic - ( 20 Mar 2019 14:30 )    Color: Yellow / Appearance: Cloudy / S.015 / pH: x  Gluc: x / Ketone: Negative  / Bili: Negative / Urobili: 0.2 mg/dL   Blood: x / Protein: Negative mg/dL / Nitrite: Negative   Leuk Esterase: Small / RBC: x / WBC 3-5 /HPF   Sq Epi: x / Non Sq Epi: Many /HPF / Bacteria: Few /HPF    RADIOLOGY & ADDITIONAL STUDIES:  < from: CT Head No Cont (19 @ 15:41) >  Impression:     1. New bilateral supratentorial lesions with surrounding edema suspicious   for metastatic disease. This can be further evaluated MRI of the brain   with and without contrast.    2. Localized mass effect without midline shift.          < end of copied text >

## 2019-03-21 NOTE — PROGRESS NOTE ADULT - ASSESSMENT
SUBJECTIVE:    Patient is a 63y old Female who presents with a chief complaint of Right sided weakness (21 Mar 2019 07:57)    Currently admitted to medicine with the primary diagnosis of Malignant neoplasm of brain, unspecified location     Today is hospital day 1d. This morning she is resting comfortably in bed and reports no new issues or overnight events.     PAST MEDICAL & SURGICAL HISTORY  Thyroid cancer  Peripheral neuropathy  Pleural effusion:   Uterine cancer  History of total abdominal hysterectomy: 2018  S/P thoracentesis:   H/O lymph node excision: LEFT NECK     SOCIAL HISTORY:  Negative for smoking/alcohol/drug use.     ALLERGIES:  No Known Allergies    MEDICATIONS:  STANDING MEDICATIONS  dexamethasone  Injectable 4 milliGRAM(s) IV Push every 6 hours  levETIRAcetam 1000 milliGRAM(s) Oral two times a day  pantoprazole    Tablet 40 milliGRAM(s) Oral before breakfast    PRN MEDICATIONS    VITALS:   T(F): 97.7  HR: 67  BP: 122/65  RR: 17  SpO2: 95%    LABS:        138  |  100  |  13  ----------------------------<  103<H>  5.6<H>   |  23  |  0.7    Ca    9.9      20 Mar 2019 12:50    TPro  7.8  /  Alb  4.6  /  TBili  0.7  /  DBili  x   /  AST  39  /  ALT  26  /  AlkPhos  76  03-20    PT/INR - ( 20 Mar 2019 12:50 )   PT: 10.60 sec;   INR: 0.92 ratio         PTT - ( 20 Mar 2019 12:50 )  PTT:26.6 sec  Urinalysis Basic - ( 20 Mar 2019 14:30 )    Color: Yellow / Appearance: Cloudy / S.015 / pH: x  Gluc: x / Ketone: Negative  / Bili: Negative / Urobili: 0.2 mg/dL   Blood: x / Protein: Negative mg/dL / Nitrite: Negative   Leuk Esterase: Small / RBC: x / WBC 3-5 /HPF   Sq Epi: x / Non Sq Epi: Many /HPF / Bacteria: Few /HPF                RADIOLOGY:    PHYSICAL EXAM:  GEN: No acute distress  LUNGS: Clear to auscultation bilaterally   HEART: Regular rate and rhythm  ABD: Soft, non-tender, non-distended.  EXT: No edema legs.   NEURO: AAOX3, R leg 2/5 weakness. R arm drift. L arm and leg strength 5/5.     63 yo F with pmh of papillary thyroid cancer s/p resection SUBJECTIVE:    Patient is a 63y old Female who presents with a chief complaint of Right sided weakness (21 Mar 2019 07:57)  Currently admitted to medicine with the primary diagnosis of Malignant neoplasm of brain, unspecified location   Today is hospital day 1d. This morning she is resting comfortably in bed and reports no new issues or overnight events.   Patient notes progressive weakness.    PAST MEDICAL & SURGICAL HISTORY  Thyroid cancer  Peripheral neuropathy  Pleural effusion:   Uterine cancer  History of total abdominal hysterectomy: 2018  S/P thoracentesis:   H/O lymph node excision: LEFT NECK     SOCIAL HISTORY:  Negative for smoking/alcohol/drug use.     ALLERGIES:  No Known Allergies    MEDICATIONS:  STANDING MEDICATIONS  dexamethasone  Injectable 4 milliGRAM(s) IV Push every 6 hours  levETIRAcetam 1000 milliGRAM(s) Oral two times a day  pantoprazole    Tablet 40 milliGRAM(s) Oral before breakfast    PRN MEDICATIONS    VITALS:   T(F): 97.7  HR: 67  BP: 122/65  RR: 17  SpO2: 95%    LABS:                          15.2   8.13  )-----------( 277      ( 21 Mar 2019 11:35 )             46.0   -    137  |  98  |  14  ----------------------------<  145<H>  4.6   |  22  |  0.7    Ca    10.0      21 Mar 2019 11:35    TPro  7.8  /  Alb  4.6  /  TBili  0.7  /  DBili  x   /  AST  39  /  ALT  26  /  AlkPhos  76  -      03    138  |  100  |  13  ----------------------------<  103<H>  5.6<H>   |  23  |  0.7    Ca    9.9      20 Mar 2019 12:50    TPro  7.8  /  Alb  4.6  /  TBili  0.7  /  DBili  x   /  AST  39  /  ALT  26  /  AlkPhos  76      PT/INR - ( 20 Mar 2019 12:50 )   PT: 10.60 sec;   INR: 0.92 ratio         PTT - ( 20 Mar 2019 12:50 )  PTT:26.6 sec  Urinalysis Basic - ( 20 Mar 2019 14:30 )    Color: Yellow / Appearance: Cloudy / S.015 / pH: x  Gluc: x / Ketone: Negative  / Bili: Negative / Urobili: 0.2 mg/dL   Blood: x / Protein: Negative mg/dL / Nitrite: Negative   Leuk Esterase: Small / RBC: x / WBC 3-5 /HPF   Sq Epi: x / Non Sq Epi: Many /HPF / Bacteria: Few /HPF                RADIOLOGY:    PHYSICAL EXAM:  GEN: No acute distress  LUNGS: Clear to auscultation bilaterally   HEART: Regular rate and rhythm  ABD: Soft, non-tender, non-distended.  EXT: No edema legs.   NEURO: AAOX3, R leg 2/5 weakness. R arm drift. L arm and leg strength 5/5.     61 yo F with pmh of papillary thyroid cancer s/p resection stage IV endometrial cancer s/p 8 cycles of carboplatin and taxol, sent in by her oncologist due to progressive R sided weakness, found to have new lesions in the brain    #) R sided weakness 2/2 new metastatic brain lesions  -CT H with new b/l supratentorial lexsions with surrounding edema, suspicious for metastatic disease. Localized mass effect without midline shift.   -f/u MR brain w/wo contrast  -c/w keppra 1g PO BID and decadron 4 mg IV q6h  -f/u NSx  -PT/Physiatry f/u    #) papilary serous endometrial cancer  -s/p hysterectomy b/l salpingoopherectomy  -s/p 8 cycles carbo/taxol 2018  -peritoneal nodule on PET (2109) showing recurrent endometrial cancer on biopsy.     #) papillary thyroid cancer s/p resection  -c/w levothyroxine 125 mcg  -f/u with Dr. Herrera and Dr. Acevedo    DVT ppx SCDs until MRI done  Diet: DASH  FULL CODE  ambulate w assisatnce

## 2019-03-21 NOTE — CONSULT NOTE ADULT - ASSESSMENT
IMPRESSION: Rehab of [facility]/ Brain mets    PRECAUTIONS: [    ] Cardiac  [    ] Respiratory  [   x ] Seizures [    ] Contact Isolation  [    ] Droplet Isolation  [    ] Other    Weight Bearing Status:     RECOMMENDATION:    Out of Bed to Chair     DVT/Decubiti Prophylaxis    REHAB PLAN:     [    x ] Bedside P/T 3-5 times a week   [x     ] Bedside O/T  2-3 times a week   [     ] No Rehab Therapy Indicated   [     ]  Speech Therapy   Conditioning/ROM                                 ADL  Bed Mobility                                            Conditioning/ROM  Transfers                                                  Bed Mobility  Sitting /Standing Balance                      Transfers                                        Gait Training                                            Sitting/Standing Balance  Stair Training [   ]Applicable                 Home equipment Eval                                                                     Splinting  [   ] Only      GOALS:   ADL   [   x ]   Independent         Transfers  [  x  ] Independent            Ambulation  [   x  ] Independent     [ x    ] With device                            [    ]  CG                                               [    ]  CG                                                    [     ] CG                            [    ] Min A                                          [    ] Min A                                                [     ] Min  A          DISCHARGE PLAN:   [     ]  Good candidate for Intensive Rehabilitation/Hospital based                                             Will tolerate 3hrs Intensive Rehab Daily                                       [      ]  Short Term Rehab in Skilled Nursing Facility                                       [      ]  Home with Outpatient or  services                                         [   x   ]  Possible Candidate for Intensive Hospital based Rehab-will tolerate 3hrs intensive rehab

## 2019-03-21 NOTE — PROGRESS NOTE ADULT - ASSESSMENT
64 yo M with PMH of thyroid cancer and uterine cancer comes to ED for the right sided weakness.     # Right sided weakness 2/2 brain mets  - Neuro SX follow up   - Keppra  - Decadron 4 mg q6h   - Onc follow up   - Follow up with the brain    # DVT ppx: Not indicated    # Dispo: home 63 yr old female patient with stage IV papillary serous endometrial cancer, papillary thyroid cancer is sent to the ED for right sided weakness , found to have:    # Metastatic brain lesions with mass effect and no midline shift:  - Neurosurgery on board  - MRI brain w and w/ ordered  - On keppra 1g PO BID and decadron 4mg IV Q6hrs    # Stage IV papillary serous endometrial cancer  -Completed cycle 8 of carbo/taxol on 6/29/2018 followed by observation  - Restaging PET CT on 2/12/2019 revealed slight increase in size of left upper quadrant peritoneal nodule (1.1 cm,   previously 0.8 cm) with stable FDG uptake, 7.7 SUV suspicious for biologic tumor activity.  -Biopsy of peritoneal nodule is positive for recurrent endometrial cancer ( as per verbal discussion with dr trejo)     # Papillary thyroid cancer [id number], s/p total thyroidectomy on 8/20/2018  --Follow up with Dr. Herrera from ENT and  Dr. Acveedo of endocrinology    Will follow with attending

## 2019-03-21 NOTE — CONSULT NOTE ADULT - SUBJECTIVE AND OBJECTIVE BOX
Patient is a 63y old  Female who presents with a chief complaint of Right sided weakness (21 Mar 2019 09:19)    HPI:  63 y F with pmh of thyroid cancer s/p thyroidectomy,, stage4 uterine cancer s/p chemo and surgery was sent to ED from her Oncologist's office for the progressive weakness in her right UE and lower extremities x 5 days. She denies any chest pain, nausea vomiting, neck rigidity or back pain, vertigo. She has no difficulty in swallowing.     In ED patient had CT scan that showed new supra tentorial lesions who were suspicious for the mets. (20 Mar 2019 20:25)      PAST MEDICAL & SURGICAL HISTORY:  Thyroid cancer  Peripheral neuropathy  Pleural effusion:   Uterine cancer  History of total abdominal hysterectomy: 2018  S/P thoracentesis:   H/O lymph node excision: LEFT NECK       Hospital Course: On IV Steroids - NS/ONC SAMUEL in progress    TODAY'S SUBJECTIVE & REVIEW OF SYMPTOMS:     Constitutional Weakness   Cardio WNL   Resp WNL   GI WNL  Heme WNL  Endo WNL  Skin WNL  MSK WNL  Neuro WNL  Cognitive WNL  Psych WNL      MEDICATIONS  (STANDING):  dexamethasone  Injectable 4 milliGRAM(s) IV Push every 6 hours  levETIRAcetam 1000 milliGRAM(s) Oral two times a day  pantoprazole    Tablet 40 milliGRAM(s) Oral before breakfast    MEDICATIONS  (PRN):      FAMILY HISTORY:  CVA (cerebral vascular accident) (Father)  CAD (coronary artery disease) (Mother)      Allergies    No Known Allergies    Intolerances        SOCIAL HISTORY:    [    ] Etoh  [    ] Smoking  [    ] Substance abuse     Home Environment:  [    ] Home Alone  [  x  ] Lives with Family  [    ] Home Health Aid    Dwelling:  [    ] Apartment  [x    ] Private House  [    ] Adult Home  [    ] Skilled Nursing Facility      [    ] Short Term  [    ] Long Term  [x    ] Stairs                           [    ] Elevator     FUNCTIONAL STATUS PTA: (Check all that apply)  Ambulation: [x     ]Independent    [    ] Dependent     [    ] Non-Ambulatory  Assistive Device: [    ] SA Cane  [    ]  Q Cane  [    ] Walker  [    ]  Wheelchair  ADL : [  x  ] Independent  [    ]  Dependent       Vital Signs Last 24 Hrs  T(C): 35.7 (21 Mar 2019 13:00), Max: 36.5 (21 Mar 2019 05:42)  T(F): 96.3 (21 Mar 2019 13:00), Max: 97.7 (21 Mar 2019 05:42)  HR: 75 (21 Mar 2019 13:00) (67 - 75)  BP: 111/62 (21 Mar 2019 13:00) (111/62 - 133/64)  BP(mean): --  RR: 19 (21 Mar 2019 13:00) (17 - 19)  SpO2: 96% (21 Mar 2019 11:19) (95% - 96%)      PHYSICAL EXAM: Alert & Oriented X3  GENERAL: NAD, well-groomed, well-developed  HEAD:  Atraumatic, Normocephalic  EYES: EOMI, PERRLA, conjunctiva and sclera clear  NECK: Supple  CHEST/LUNG: Clear bilaterally  HEART: Regular rate and rhythm  ABDOMEN: Soft, Nontender, Nondistended; Bowel sounds present  EXTREMITIES:  no calf tenderness,no edema BLES    NERVOUS SYSTEM:  Cranial Nerves 2-12 intact [  x  ] Abnormal  [    ]  ROM: WFL all extremities [  x  ]  Abnormal [     ]  Motor Strength: WFL all extremities  [    ]  Abnormal [ x   ]RUE 4-/5 RLE 3-/5 Prox, 1-2/5 dorsiflexion  Sensation: intact to light touch [ x   ] Abnormal [    ]      FUNCTIONAL STATUS:  Bed Mobility: [   ]  Independent [    ]  Supervision [ x   ]  Needs Assistance [  ]  N/A  Transfers: [    ]  Independent [    ]  Supervision [  x  ]  Needs Assistance [    ]  N/A    Ambulation:  [    ]  Independent [    ]  Supervision [    ]  Needs Assistance [x    ]  N/A   ADL:  [    ]   Independent [  x  ] Requires Assistance [    ] N/A   FAIR LONG SITTING BALANCE    LABS:                        15.2   8.13  )-----------( 277      ( 21 Mar 2019 11:35 )             46.0     03-21    137  |  98  |  14  ----------------------------<  145<H>  4.6   |  22  |  0.7    Ca    10.0      21 Mar 2019 11:35    TPro  7.8  /  Alb  4.6  /  TBili  0.7  /  DBili  x   /  AST  39  /  ALT  26  /  AlkPhos  76  03-20    PT/INR - ( 20 Mar 2019 12:50 )   PT: 10.60 sec;   INR: 0.92 ratio         PTT - ( 20 Mar 2019 12:50 )  PTT:26.6 sec  Urinalysis Basic - ( 20 Mar 2019 14:30 )    Color: Yellow / Appearance: Cloudy / S.015 / pH: x  Gluc: x / Ketone: Negative  / Bili: Negative / Urobili: 0.2 mg/dL   Blood: x / Protein: Negative mg/dL / Nitrite: Negative   Leuk Esterase: Small / RBC: x / WBC 3-5 /HPF   Sq Epi: x / Non Sq Epi: Many /HPF / Bacteria: Few /HPF        RADIOLOGY & ADDITIONAL STUDIES:

## 2019-03-22 DIAGNOSIS — Z71.89 OTHER SPECIFIED COUNSELING: ICD-10-CM

## 2019-03-22 LAB
ANION GAP SERPL CALC-SCNC: 15 MMOL/L — HIGH (ref 7–14)
BASOPHILS # BLD AUTO: 0 K/UL — SIGNIFICANT CHANGE UP (ref 0–0.2)
BASOPHILS NFR BLD AUTO: 0 % — SIGNIFICANT CHANGE UP (ref 0–1)
BUN SERPL-MCNC: 16 MG/DL — SIGNIFICANT CHANGE UP (ref 10–20)
CALCIUM SERPL-MCNC: 10 MG/DL — SIGNIFICANT CHANGE UP (ref 8.5–10.1)
CHLORIDE SERPL-SCNC: 101 MMOL/L — SIGNIFICANT CHANGE UP (ref 98–110)
CO2 SERPL-SCNC: 23 MMOL/L — SIGNIFICANT CHANGE UP (ref 17–32)
CREAT SERPL-MCNC: 0.7 MG/DL — SIGNIFICANT CHANGE UP (ref 0.7–1.5)
EOSINOPHIL # BLD AUTO: 0 K/UL — SIGNIFICANT CHANGE UP (ref 0–0.7)
EOSINOPHIL NFR BLD AUTO: 0 % — SIGNIFICANT CHANGE UP (ref 0–8)
GLUCOSE SERPL-MCNC: 183 MG/DL — HIGH (ref 70–99)
HCT VFR BLD CALC: 44.9 % — SIGNIFICANT CHANGE UP (ref 37–47)
HGB BLD-MCNC: 15 G/DL — SIGNIFICANT CHANGE UP (ref 12–16)
IMM GRANULOCYTES NFR BLD AUTO: 0.7 % — HIGH (ref 0.1–0.3)
LYMPHOCYTES # BLD AUTO: 0.83 K/UL — LOW (ref 1.2–3.4)
LYMPHOCYTES # BLD AUTO: 7.8 % — LOW (ref 20.5–51.1)
MAGNESIUM SERPL-MCNC: 2.2 MG/DL — SIGNIFICANT CHANGE UP (ref 1.8–2.4)
MCHC RBC-ENTMCNC: 30.3 PG — SIGNIFICANT CHANGE UP (ref 27–31)
MCHC RBC-ENTMCNC: 33.4 G/DL — SIGNIFICANT CHANGE UP (ref 32–37)
MCV RBC AUTO: 90.7 FL — SIGNIFICANT CHANGE UP (ref 81–99)
MONOCYTES # BLD AUTO: 0.19 K/UL — SIGNIFICANT CHANGE UP (ref 0.1–0.6)
MONOCYTES NFR BLD AUTO: 1.8 % — SIGNIFICANT CHANGE UP (ref 1.7–9.3)
NEUTROPHILS # BLD AUTO: 9.58 K/UL — HIGH (ref 1.4–6.5)
NEUTROPHILS NFR BLD AUTO: 89.7 % — HIGH (ref 42.2–75.2)
NRBC # BLD: 0 /100 WBCS — SIGNIFICANT CHANGE UP (ref 0–0)
PLATELET # BLD AUTO: 282 K/UL — SIGNIFICANT CHANGE UP (ref 130–400)
POTASSIUM SERPL-MCNC: 4.7 MMOL/L — SIGNIFICANT CHANGE UP (ref 3.5–5)
POTASSIUM SERPL-SCNC: 4.7 MMOL/L — SIGNIFICANT CHANGE UP (ref 3.5–5)
RBC # BLD: 4.95 M/UL — SIGNIFICANT CHANGE UP (ref 4.2–5.4)
RBC # FLD: 12.5 % — SIGNIFICANT CHANGE UP (ref 11.5–14.5)
SODIUM SERPL-SCNC: 139 MMOL/L — SIGNIFICANT CHANGE UP (ref 135–146)
WBC # BLD: 10.68 K/UL — SIGNIFICANT CHANGE UP (ref 4.8–10.8)
WBC # FLD AUTO: 10.68 K/UL — SIGNIFICANT CHANGE UP (ref 4.8–10.8)

## 2019-03-22 PROCEDURE — ZZZZZ: CPT

## 2019-03-22 RX ORDER — DEXAMETHASONE 0.5 MG/5ML
6 ELIXIR ORAL EVERY 12 HOURS
Qty: 0 | Refills: 0 | Status: DISCONTINUED | OUTPATIENT
Start: 2019-03-22 | End: 2019-03-26

## 2019-03-22 RX ADMIN — PREGABALIN 1000 MICROGRAM(S): 225 CAPSULE ORAL at 11:30

## 2019-03-22 RX ADMIN — Medication 1000 UNIT(S): at 11:30

## 2019-03-22 RX ADMIN — Medication 6 MILLIGRAM(S): at 17:08

## 2019-03-22 RX ADMIN — Medication 4 MILLIGRAM(S): at 11:30

## 2019-03-22 RX ADMIN — Medication 4 MILLIGRAM(S): at 00:51

## 2019-03-22 RX ADMIN — LEVETIRACETAM 1000 MILLIGRAM(S): 250 TABLET, FILM COATED ORAL at 17:08

## 2019-03-22 RX ADMIN — LEVETIRACETAM 1000 MILLIGRAM(S): 250 TABLET, FILM COATED ORAL at 05:10

## 2019-03-22 RX ADMIN — Medication 125 MICROGRAM(S): at 05:10

## 2019-03-22 RX ADMIN — Medication 4 MILLIGRAM(S): at 05:10

## 2019-03-22 RX ADMIN — PANTOPRAZOLE SODIUM 40 MILLIGRAM(S): 20 TABLET, DELAYED RELEASE ORAL at 07:57

## 2019-03-22 NOTE — PHYSICAL THERAPY INITIAL EVALUATION ADULT - PERTINENT HX OF CURRENT PROBLEM, REHAB EVAL
63 y F with pmh of thyroid cancer s/p thyroidectomy,, stage4 uterine cancer s/p chemo and surgery was sent to ED from her Oncologist's office for the progressive weakness in her right UE and lower extremities x 5 days.

## 2019-03-22 NOTE — PHYSICAL THERAPY INITIAL EVALUATION ADULT - IMPAIRMENTS FOUND, PT EVAL
gait, locomotion, and balance/ergonomics and body mechanics/joint integrity and mobility/gross motor/neuromotor development and sensory integration/fine motor/muscle strength

## 2019-03-22 NOTE — PHYSICAL THERAPY INITIAL EVALUATION ADULT - GENERAL OBSERVATIONS, REHAB EVAL
[id number] [phone] [id number] Pt rec in b/s chair with daughter at b/s, in NAD. Pt returned from radiation oncology, PIOTR coulter.

## 2019-03-22 NOTE — PHYSICAL THERAPY INITIAL EVALUATION ADULT - PLANNED THERAPY INTERVENTIONS, PT EVAL
neuromuscular re-education/postural re-education/balance training/gait training/strengthening/bed mobility training/transfer training

## 2019-03-22 NOTE — PROGRESS NOTE ADULT - ASSESSMENT
SUBJECTIVE:    Patient is a 63y old Female who presents with a chief complaint of Right sided weakness (22 Mar 2019 15:57)  Currently admitted to medicine with the primary diagnosis of Malignant neoplasm of brain, unspecified location  Today is hospital day 2d. This morning she is resting comfortably in bed and reports no new issues or overnight events.   Patient notes weakness is improving.     PAST MEDICAL & SURGICAL HISTORY  Thyroid cancer  Peripheral neuropathy  Pleural effusion: 11/17  Uterine cancer  History of total abdominal hysterectomy: 4/2018  S/P thoracentesis: 12/17  H/O lymph node excision: LEFT NECK 12/17    SOCIAL HISTORY:  Negative for smoking/alcohol/drug use.     ALLERGIES:  No Known Allergies    MEDICATIONS:  STANDING MEDICATIONS  cholecalciferol 1000 Unit(s) Oral daily  cyanocobalamin 1000 MICROGram(s) Oral daily  dexamethasone     Tablet 6 milliGRAM(s) Oral every 12 hours  levETIRAcetam 1000 milliGRAM(s) Oral two times a day  levothyroxine 125 MICROGram(s) Oral daily  pantoprazole    Tablet 40 milliGRAM(s) Oral before breakfast    PRN MEDICATIONS    VITALS:   T(F): 97.4  HR: 65  BP: 104/59  RR: 18  SpO2: 97%    LABS:                        15.0   10.68 )-----------( 282      ( 22 Mar 2019 09:48 )             44.9     03-22    139  |  101  |  16  ----------------------------<  183<H>  4.7   |  23  |  0.7    Ca    10.0      22 Mar 2019 09:48  Mg     2.2     03-22          RADIOLOGY:  < from: MR Head w/wo IV Cont (03.21.19 @ 22:24) >  IMPRESSION:    Multiple supratentorial heterogeneously enhancing lesions consistent with   metastatic disease. The appearance on the T2-weighted images is   suggestive of adenocarcinoma. There is mass effect upon the left lateral   ventricle and corpus callosum.        < end of copied text >    < from: CT Head No Cont (03.20.19 @ 15:41) >  Impression:     1. New bilateral supratentorial lesions with surrounding edema suspicious   for metastatic disease. This can be further evaluated MRI of the brain   with and without contrast.    2. Localized mass effect without midline shift.    < end of copied text >    PHYSICAL EXAM:  GEN: No acute distress  LUNGS: Clear to auscultation bilaterally   HEART: Regular  ABD: Soft, non-tender, non-distended.  EXT: Strength 4/5 RLE, 5/5 RUE, AOx3, no numbness, tingling.   NEURO: AAOX3    61 yo F with pmh of papillary thyroid cancer s/p resection stage IV endometrial cancer s/p 8 cycles of carboplatin and taxol, sent in by her oncologist due to progressive R sided weakness, found to have new lesions in the brain    #) R sided weakness 2/2 new metastatic brain lesions  -CT H with new b/l supratentorial lexsions with surrounding edema, suspicious for metastatic disease. Localized mass effect without midline shift.   - MR brain w/wo contrast multiple supratentorial heterogeneously enhancing lesions consistent with   metastatic disease, image suggestive of adenocarcinoma. mass effect on the left lateral ventricle and corpus callosum.  -c/w keppra 1g PO BID   - switched decadron 6 mgPO q12h  -plan for 4a  -plan for radiation therapy on Monday.     #) papilary serous endometrial cancer  -s/p hysterectomy b/l salpingoopherectomy  -s/p 8 cycles carbo/taxol 6/2018  -peritoneal nodule on PET (2/2109) showing recurrent endometrial cancer on biopsy.     #) papillary thyroid cancer s/p resection  -c/w levothyroxine 125 mcg  -f/u with Dr. Herrera and Dr. Aceevdo    DVT ppx SCDs until MRI done  Diet: DASH  FULL CODE  ambulate w assisatnce

## 2019-03-22 NOTE — PROGRESS NOTE ADULT - SUBJECTIVE AND OBJECTIVE BOX
MRI reviewed.  Multiple enhancing lesions with surrounding edema.  No indication for surgical resection.  Recommend dexamethasone and XRT.

## 2019-03-22 NOTE — PROGRESS NOTE ADULT - SUBJECTIVE AND OBJECTIVE BOX
Patient is a 63y old  Female who presents with a chief complaint of Right sided weakness (21 Mar 2019 22:37)    Subjective: Patient is feeling much better , she is able to lift up her right arm , she is regaining her strength . She was also able to stand up on her own using a walker      Vital Signs Last 24 Hrs  T(C): 36 (22 Mar 2019 05:44), Max: 36.7 (21 Mar 2019 19:59)  T(F): 96.8 (22 Mar 2019 05:44), Max: 98 (21 Mar 2019 19:59)  HR: 80 (22 Mar 2019 05:44) (69 - 83)  BP: 117/60 (22 Mar 2019 05:44) (89/51 - 117/60)  BP(mean): --  RR: 18 (22 Mar 2019 05:44) (16 - 19)  SpO2: 95% (22 Mar 2019 08:00) (95% - 97%)    PHYSICAL EXAM  General: adult in NAD  HEENT: clear oropharynx, anicteric sclera, pink conjunctiva  Neck: supple  CV: normal S1/S2 with no murmur rubs or gallops  Lungs: positive air movement b/l ant lungs,clear to auscultation, no wheezes, no rales  Abdomen: soft non-tender non-distended, no hepatosplenomegaly  Ext: no clubbing cyanosis or edema  Skin: no rashes and no petechiae  Neuro: alert and oriented X 4, right arm/right leg is weak compared to left arm    MEDICATIONS  (STANDING):  cholecalciferol 1000 Unit(s) Oral daily  cyanocobalamin 1000 MICROGram(s) Oral daily  dexamethasone  Injectable 4 milliGRAM(s) IV Push every 6 hours  levETIRAcetam 1000 milliGRAM(s) Oral two times a day  levothyroxine 125 MICROGram(s) Oral daily  pantoprazole    Tablet 40 milliGRAM(s) Oral before breakfast    MEDICATIONS  (PRN):      LABS:                          15.2   8.13  )-----------( 277      ( 21 Mar 2019 11:35 )             46.0         Mean Cell Volume : 90.2 fL  Mean Cell Hemoglobin : 29.8 pg  Mean Cell Hemoglobin Concentration : 33.0 g/dL  Auto Neutrophil # : x  Auto Lymphocyte # : x  Auto Monocyte # : x  Auto Eosinophil # : x  Auto Basophil # : x  Auto Neutrophil % : x  Auto Lymphocyte % : x  Auto Monocyte % : x  Auto Eosinophil % : x  Auto Basophil % : x      Serial CBC's   @ 11:35  Hct-46.0 / Hgb-15.2 / Plat-277 / RBC-5.10 / WBC-8.13          137  |  98  |  14  ----------------------------<  145<H>  4.6   |  22  |  0.7    Ca    10.0      21 Mar 2019 11:35    TPro  7.8  /  Alb  4.6  /  TBili  0.7  /  DBili  x   /  AST  39  /  ALT  26  /  AlkPhos  76        PT/INR - ( 20 Mar 2019 12:50 )   PT: 10.60 sec;   INR: 0.92 ratio         PTT - ( 20 Mar 2019 12:50 )  PTT:26.6 sec                Urinalysis Basic - ( 20 Mar 2019 14:30 )    Color: Yellow / Appearance: Cloudy / S.015 / pH: x  Gluc: x / Ketone: Negative  / Bili: Negative / Urobili: 0.2 mg/dL   Blood: x / Protein: Negative mg/dL / Nitrite: Negative   Leuk Esterase: Small / RBC: x / WBC 3-5 /HPF   Sq Epi: x / Non Sq Epi: Many /HPF / Bacteria: Few /HPF    RADIOLOGY & ADDITIONAL STUDIES:  < from: CT Head No Cont (19 @ 15:41) >  Impression:     1. New bilateral supratentorial lesions with surrounding edema suspicious   for metastatic disease. This can be further evaluated MRI of the brain   with and without contrast.    2. Localized mass effect without midline shift.              < end of copied text > Patient is a 63y old  Female who presents with a chief complaint of Right sided weakness (21 Mar 2019 22:37)    Subjective: Patient is feeling much better , she is able to lift up her right arm , she is regaining her strength . She was also able to stand up on her own using a walker      Vital Signs Last 24 Hrs  T(C): 36 (22 Mar 2019 05:44), Max: 36.7 (21 Mar 2019 19:59)  T(F): 96.8 (22 Mar 2019 05:44), Max: 98 (21 Mar 2019 19:59)  HR: 80 (22 Mar 2019 05:44) (69 - 83)  BP: 117/60 (22 Mar 2019 05:44) (89/51 - 117/60)  BP(mean): --  RR: 18 (22 Mar 2019 05:44) (16 - 19)  SpO2: 95% (22 Mar 2019 08:00) (95% - 97%)    PHYSICAL EXAM  General: adult in NAD  HEENT: clear oropharynx, anicteric sclera, pink conjunctiva  Neck: supple  CV: normal S1/S2 with no murmur rubs or gallops  Lungs: positive air movement b/l ant lungs,clear to auscultation, no wheezes, no rales  Abdomen: soft non-tender non-distended, no hepatosplenomegaly  Ext: no clubbing cyanosis or edema  Skin: no rashes and no petechiae  Neuro: alert and oriented X 4, right arm/right leg is weak compared to left arm    MEDICATIONS  (STANDING):  cholecalciferol 1000 Unit(s) Oral daily  cyanocobalamin 1000 MICROGram(s) Oral daily  dexamethasone  Injectable 4 milliGRAM(s) IV Push every 6 hours  levETIRAcetam 1000 milliGRAM(s) Oral two times a day  levothyroxine 125 MICROGram(s) Oral daily  pantoprazole    Tablet 40 milliGRAM(s) Oral before breakfast    MEDICATIONS  (PRN):      LABS:                          15.2   8.13  )-----------( 277      ( 21 Mar 2019 11:35 )             46.0         Mean Cell Volume : 90.2 fL  Mean Cell Hemoglobin : 29.8 pg  Mean Cell Hemoglobin Concentration : 33.0 g/dL  Auto Neutrophil # : x  Auto Lymphocyte # : x  Auto Monocyte # : x  Auto Eosinophil # : x  Auto Basophil # : x  Auto Neutrophil % : x  Auto Lymphocyte % : x  Auto Monocyte % : x  Auto Eosinophil % : x  Auto Basophil % : x      Serial CBC's   @ 11:35  Hct-46.0 / Hgb-15.2 / Plat-277 / RBC-5.10 / WBC-8.13          137  |  98  |  14  ----------------------------<  145<H>  4.6   |  22  |  0.7    Ca    10.0      21 Mar 2019 11:35    TPro  7.8  /  Alb  4.6  /  TBili  0.7  /  DBili  x   /  AST  39  /  ALT  26  /  AlkPhos  76        PT/INR - ( 20 Mar 2019 12:50 )   PT: 10.60 sec;   INR: 0.92 ratio         PTT - ( 20 Mar 2019 12:50 )  PTT:26.6 sec                Urinalysis Basic - ( 20 Mar 2019 14:30 )    Color: Yellow / Appearance: Cloudy / S.015 / pH: x  Gluc: x / Ketone: Negative  / Bili: Negative / Urobili: 0.2 mg/dL   Blood: x / Protein: Negative mg/dL / Nitrite: Negative   Leuk Esterase: Small / RBC: x / WBC 3-5 /HPF   Sq Epi: x / Non Sq Epi: Many /HPF / Bacteria: Few /HPF    RADIOLOGY & ADDITIONAL STUDIES:  < from: CT Head No Cont (19 @ 15:41) >  Impression:     1. New bilateral supratentorial lesions with surrounding edema suspicious   for metastatic disease. This can be further evaluated MRI of the brain   with and without contrast.    2. Localized mass effect without midline shift.              < end of copied text >    < from: MR Head w/wo IV Cont (19 @ 22:24) >    IMPRESSION:    Multiple supratentorial heterogeneously enhancing lesions consistent with   metastatic disease. The appearance on the T2-weighted images is   suggestive of adenocarcinoma. There is mass effect upon the left lateral   ventricle and corpus callosum.        < end of copied text >

## 2019-03-22 NOTE — PHYSICAL THERAPY INITIAL EVALUATION ADULT - SPECIFY REASON(S)
Attempted to see pt for b/s pt however pt not in room. Pt's roommate informed PT and RN that pt seen leaving room in wheelchair with visitor. PT to f/u when pt is available.

## 2019-03-22 NOTE — OCCUPATIONAL THERAPY INITIAL EVALUATION ADULT - PLANNED THERAPY INTERVENTIONS, OT EVAL
IADL retraining/fine motor coordination training/transfer training/ADL retraining/bed mobility training/balance training/strengthening/motor coordination training

## 2019-03-22 NOTE — PROGRESS NOTE ADULT - ASSESSMENT
63 yr old female patient with stage IV papillary serous endometrial cancer, papillary thyroid cancer is sent to the ED for right sided weakness , found to have :    # Metastatic brain lesions with mass effect and no midline shift:  - Neurosurgery on board  - MRI brain w and w/ done , results pending  - On keppra 1g PO BID and decadron 4mg IV Q6hrs    # Stage IV papillary serous endometrial cancer  -Completed cycle 8 of carbo/taxol on 6/29/2018 followed by observation  - Restaging PET CT on 2/12/2019 revealed slight increase in size of left upper quadrant peritoneal nodule (1.1 cm,   previously 0.8 cm) with stable FDG uptake, 7.7 SUV suspicious for biologic tumor activity.  -Biopsy of peritoneal nodule is positive for recurrent endometrial cancer ( as per verbal discussion with dr trejo)     # Papillary thyroid cancer [id number], s/p total thyroidectomy on 8/20/2018  --Follow up with Dr. Herrera from ENT and  Dr. Acevedo of endocrinology    Will follow with attending 63 yr old female patient with stage IV papillary serous endometrial cancer, papillary thyroid cancer is sent to the ED for right sided weakness , found to have :    # Metastatic brain lesions with mass effect and no midline shift:  - Neurosurgery on board  - MRI brain w and w/ done , results reviewed  - On keppra 1g PO BID and decadron 4mg IV Q6hrs    # Stage IV papillary serous endometrial cancer  -Completed cycle 8 of carbo/taxol on 6/29/2018 followed by observation  - Restaging PET CT on 2/12/2019 revealed slight increase in size of left upper quadrant peritoneal nodule (1.1 cm,   previously 0.8 cm) with stable FDG uptake, 7.7 SUV suspicious for biologic tumor activity.  -Biopsy of peritoneal nodule is positive for recurrent endometrial cancer ( as per verbal discussion with dr trejo)     # Papillary thyroid cancer [id number], s/p total thyroidectomy on 8/20/2018  --Follow up with Dr. Herrera from ENT and  Dr. Acevedo of endocrinology    Will follow with attending 63 yr old female patient with stage IV papillary serous endometrial cancer, papillary thyroid cancer is sent to the ED for right sided weakness , found to have :    # Metastatic brain lesions with mass effect and no midline shift:  - Neurosurgery on board  - MRI brain w and w/ done , results reviewed  - On keppra 1g PO BID and decadron 4mg IV Q6hrs . switch to 6 mg PO BID  - Radiation oncology on board , she will have mapping today , and start radiation as OP on monday    # Stage IV papillary serous endometrial cancer  - Completed cycle 8 of carbo/taxol on 6/29/2018 followed by observation  - Restaging PET CT on 2/12/2019 revealed slight increase in size of left upper quadrant peritoneal nodule (1.1 cm,   previously 0.8 cm) with stable FDG uptake, 7.7 SUV suspicious for biologic tumor activity.  -Biopsy of peritoneal nodule is positive for recurrent endometrial cancer ( as per verbal discussion with dr trejo)     # Papillary thyroid cancer [id number], s/p total thyroidectomy on 8/20/2018  --Follow up with Dr. Herrera from ENT and  Dr. Acevedo of endocrinology    Case discussed with attending    Plan : Switch to decadron 6mg PO BID           Plan for radiation starting monday as OP ( 4A vs home)

## 2019-03-22 NOTE — CONSULT NOTE ADULT - SUBJECTIVE AND OBJECTIVE BOX
Serena Sorto was seen while an in-patient at [facility].  As know she is a 63 year old with stage IV uterine cancer who became progressively weak on the right side.  She denied any nausea, vomiting, headache, numbness, bowel or bladder problems.  On imaging of the brain, 5+ enhancing masses were noted in the brain with edema and mass effect.  She is doing better on steroids.  She is being seen for possible whole brain irradiation.      PMH:  thyroid cancer, excision of neck node, peripheral neuropathy.  PSH: ELEONORA-BSO 2018 for uterine cancer, thoracenteses MEDS:  Synthroid, gabapentin, vit B12  ALLERGIES:  None  SH:  denies smoking  FH:  father  of a CVA    EXAMINATION:    ECOG 2   She is alert and oriented.  There are no cranial nerve findings.  Motor strength is 5/5 on the left and 3.5/5 on the right.  She was not ambulated. There are no palpable nodes in the neck or supraclavicular area (there is a neck node on PET).      IMPRESSION:  CNS metastases from uterine cancer    RECOMMENDATIONS:  She has reasonable systemic control and fair performance status.  She is going to rehab and we will offer 30 Gy of whole brain irradiation in 10 treatments.  Side effects can include transient hair loss, somnolence.  She asked about memory issues.  While detriment cannot be denied, patients are typically functional and can continue to drive and work.  Of course in the background is her ultimate poor prognosis.

## 2019-03-22 NOTE — PHYSICAL THERAPY INITIAL EVALUATION ADULT - GAIT DEVIATIONS NOTED, PT EVAL
decreased step length/decreased weight-shifting ability/decreased stride length/R toe drag, substantial RLE - inconsistent with initiating swing requiring PT assist 25% of time, RLE tends to lag behind LLE, decreased balance, minimal to absent R step height/decreased rosa/increased time in double stance/footdrop

## 2019-03-23 ENCOUNTER — TRANSCRIPTION ENCOUNTER (OUTPATIENT)
Age: 64
End: 2019-03-23

## 2019-03-23 LAB
ANION GAP SERPL CALC-SCNC: 13 MMOL/L — SIGNIFICANT CHANGE UP (ref 7–14)
BASOPHILS # BLD AUTO: 0.01 K/UL — SIGNIFICANT CHANGE UP (ref 0–0.2)
BASOPHILS NFR BLD AUTO: 0.1 % — SIGNIFICANT CHANGE UP (ref 0–1)
BUN SERPL-MCNC: 21 MG/DL — HIGH (ref 10–20)
CALCIUM SERPL-MCNC: 9.6 MG/DL — SIGNIFICANT CHANGE UP (ref 8.5–10.1)
CHLORIDE SERPL-SCNC: 100 MMOL/L — SIGNIFICANT CHANGE UP (ref 98–110)
CO2 SERPL-SCNC: 25 MMOL/L — SIGNIFICANT CHANGE UP (ref 17–32)
CREAT SERPL-MCNC: 0.8 MG/DL — SIGNIFICANT CHANGE UP (ref 0.7–1.5)
EOSINOPHIL # BLD AUTO: 0 K/UL — SIGNIFICANT CHANGE UP (ref 0–0.7)
EOSINOPHIL NFR BLD AUTO: 0 % — SIGNIFICANT CHANGE UP (ref 0–8)
GLUCOSE SERPL-MCNC: 167 MG/DL — HIGH (ref 70–99)
HCT VFR BLD CALC: 44.3 % — SIGNIFICANT CHANGE UP (ref 37–47)
HGB BLD-MCNC: 14.7 G/DL — SIGNIFICANT CHANGE UP (ref 12–16)
IMM GRANULOCYTES NFR BLD AUTO: 0.7 % — HIGH (ref 0.1–0.3)
LYMPHOCYTES # BLD AUTO: 0.85 K/UL — LOW (ref 1.2–3.4)
LYMPHOCYTES # BLD AUTO: 7.3 % — LOW (ref 20.5–51.1)
MAGNESIUM SERPL-MCNC: 2.3 MG/DL — SIGNIFICANT CHANGE UP (ref 1.8–2.4)
MCHC RBC-ENTMCNC: 30.2 PG — SIGNIFICANT CHANGE UP (ref 27–31)
MCHC RBC-ENTMCNC: 33.2 G/DL — SIGNIFICANT CHANGE UP (ref 32–37)
MCV RBC AUTO: 91 FL — SIGNIFICANT CHANGE UP (ref 81–99)
MONOCYTES # BLD AUTO: 0.57 K/UL — SIGNIFICANT CHANGE UP (ref 0.1–0.6)
MONOCYTES NFR BLD AUTO: 4.9 % — SIGNIFICANT CHANGE UP (ref 1.7–9.3)
NEUTROPHILS # BLD AUTO: 10.07 K/UL — HIGH (ref 1.4–6.5)
NEUTROPHILS NFR BLD AUTO: 87 % — HIGH (ref 42.2–75.2)
NRBC # BLD: 0 /100 WBCS — SIGNIFICANT CHANGE UP (ref 0–0)
PLATELET # BLD AUTO: 279 K/UL — SIGNIFICANT CHANGE UP (ref 130–400)
POTASSIUM SERPL-MCNC: 4.6 MMOL/L — SIGNIFICANT CHANGE UP (ref 3.5–5)
POTASSIUM SERPL-SCNC: 4.6 MMOL/L — SIGNIFICANT CHANGE UP (ref 3.5–5)
RBC # BLD: 4.87 M/UL — SIGNIFICANT CHANGE UP (ref 4.2–5.4)
RBC # FLD: 12.4 % — SIGNIFICANT CHANGE UP (ref 11.5–14.5)
SODIUM SERPL-SCNC: 138 MMOL/L — SIGNIFICANT CHANGE UP (ref 135–146)
WBC # BLD: 11.58 K/UL — HIGH (ref 4.8–10.8)
WBC # FLD AUTO: 11.58 K/UL — HIGH (ref 4.8–10.8)

## 2019-03-23 RX ORDER — LANOLIN ALCOHOL/MO/W.PET/CERES
1 CREAM (GRAM) TOPICAL
Qty: 0 | Refills: 0 | COMMUNITY
Start: 2019-03-23

## 2019-03-23 RX ORDER — GABAPENTIN 400 MG/1
1 CAPSULE ORAL
Qty: 0 | Refills: 0 | COMMUNITY

## 2019-03-23 RX ORDER — LEVETIRACETAM 250 MG/1
1 TABLET, FILM COATED ORAL
Qty: 0 | Refills: 0 | COMMUNITY
Start: 2019-03-23

## 2019-03-23 RX ORDER — LANOLIN ALCOHOL/MO/W.PET/CERES
5 CREAM (GRAM) TOPICAL AT BEDTIME
Qty: 0 | Refills: 0 | Status: DISCONTINUED | OUTPATIENT
Start: 2019-03-23 | End: 2019-03-26

## 2019-03-23 RX ORDER — HYDROCORTISONE 1 %
1 OINTMENT (GRAM) TOPICAL EVERY 6 HOURS
Qty: 0 | Refills: 0 | Status: DISCONTINUED | OUTPATIENT
Start: 2019-03-23 | End: 2019-03-26

## 2019-03-23 RX ORDER — DEXAMETHASONE 0.5 MG/5ML
1 ELIXIR ORAL
Qty: 0 | Refills: 0 | COMMUNITY
Start: 2019-03-23

## 2019-03-23 RX ORDER — PREGABALIN 225 MG/1
1 CAPSULE ORAL
Qty: 0 | Refills: 0 | DISCHARGE
Start: 2019-03-23

## 2019-03-23 RX ORDER — PANTOPRAZOLE SODIUM 20 MG/1
1 TABLET, DELAYED RELEASE ORAL
Qty: 0 | Refills: 0 | COMMUNITY
Start: 2019-03-23

## 2019-03-23 RX ORDER — DIPHENHYDRAMINE HCL 50 MG
50 CAPSULE ORAL ONCE
Qty: 0 | Refills: 0 | Status: COMPLETED | OUTPATIENT
Start: 2019-03-23 | End: 2019-03-24

## 2019-03-23 RX ORDER — HYDROCORTISONE 1 %
1 OINTMENT (GRAM) TOPICAL
Qty: 0 | Refills: 0 | COMMUNITY
Start: 2019-03-23

## 2019-03-23 RX ADMIN — Medication 125 MICROGRAM(S): at 06:23

## 2019-03-23 RX ADMIN — Medication 1 APPLICATION(S): at 17:17

## 2019-03-23 RX ADMIN — PANTOPRAZOLE SODIUM 40 MILLIGRAM(S): 20 TABLET, DELAYED RELEASE ORAL at 06:23

## 2019-03-23 RX ADMIN — Medication 1 APPLICATION(S): at 12:38

## 2019-03-23 RX ADMIN — Medication 6 MILLIGRAM(S): at 17:18

## 2019-03-23 RX ADMIN — LEVETIRACETAM 1000 MILLIGRAM(S): 250 TABLET, FILM COATED ORAL at 17:18

## 2019-03-23 RX ADMIN — Medication 6 MILLIGRAM(S): at 06:23

## 2019-03-23 RX ADMIN — Medication 1000 UNIT(S): at 12:38

## 2019-03-23 RX ADMIN — PREGABALIN 1000 MICROGRAM(S): 225 CAPSULE ORAL at 12:38

## 2019-03-23 RX ADMIN — Medication 5 MILLIGRAM(S): at 01:07

## 2019-03-23 RX ADMIN — LEVETIRACETAM 1000 MILLIGRAM(S): 250 TABLET, FILM COATED ORAL at 06:23

## 2019-03-23 NOTE — DISCHARGE NOTE PROVIDER - NSDCCPCAREPLAN_GEN_ALL_CORE_FT
PRINCIPAL DISCHARGE DIAGNOSIS  Diagnosis: Malignant neoplasm of brain, unspecified location  Assessment and Plan of Treatment: Please follow up with Dr. Cid, and Dr. Sow within 1 week. Radiation therapy will be starting on Monday. Please continue physical therapy, and continue medications as above.

## 2019-03-23 NOTE — PROGRESS NOTE ADULT - SUBJECTIVE AND OBJECTIVE BOX
Patient is a 63y old  Female who presents with a chief complaint of Right sided weakness (22 Mar 2019 20:30)      Subjective:      Vital Signs Last 24 Hrs  T(C): 36.3 (22 Mar 2019 19:34), Max: 36.3 (22 Mar 2019 19:34)  T(F): 97.4 (22 Mar 2019 19:34), Max: 97.4 (22 Mar 2019 19:34)  HR: 65 (22 Mar 2019 19:34) (65 - 65)  BP: 104/59 (22 Mar 2019 19:34) (104/59 - 104/59)  BP(mean): --  RR: 18 (22 Mar 2019 19:34) (18 - 18)  SpO2: 97% (22 Mar 2019 20:21) (95% - 97%)    PHYSICAL EXAM  General: adult in NAD  HEENT: clear oropharynx, anicteric sclera, pink conjunctiva  Neck: supple  CV: normal S1/S2 with no murmur rubs or gallops  Lungs: positive air movement b/l ant lungs,clear to auscultation, no wheezes, no rales  Abdomen: soft non-tender non-distended, no hepatosplenomegaly  Ext: no clubbing cyanosis or edema  Skin: no rashes and no petechiae  Neuro: alert and oriented X 4, no focal deficits    MEDICATIONS  (STANDING):  cholecalciferol 1000 Unit(s) Oral daily  cyanocobalamin 1000 MICROGram(s) Oral daily  dexamethasone     Tablet 6 milliGRAM(s) Oral every 12 hours  levETIRAcetam 1000 milliGRAM(s) Oral two times a day  levothyroxine 125 MICROGram(s) Oral daily  pantoprazole    Tablet 40 milliGRAM(s) Oral before breakfast    MEDICATIONS  (PRN):  melatonin 5 milliGRAM(s) Oral at bedtime PRN Insomnia      LABS:                          15.0   10.68 )-----------( 282      ( 22 Mar 2019 09:48 )             44.9         Mean Cell Volume : 90.7 fL  Mean Cell Hemoglobin : 30.3 pg  Mean Cell Hemoglobin Concentration : 33.4 g/dL  Auto Neutrophil # : 9.58 K/uL  Auto Lymphocyte # : 0.83 K/uL  Auto Monocyte # : 0.19 K/uL  Auto Eosinophil # : 0.00 K/uL  Auto Basophil # : 0.00 K/uL  Auto Neutrophil % : 89.7 %  Auto Lymphocyte % : 7.8 %  Auto Monocyte % : 1.8 %  Auto Eosinophil % : 0.0 %  Auto Basophil % : 0.0 %      Serial CBC's  03-22 @ 09:48  Hct-44.9 / Hgb-15.0 / Plat-282 / RBC-4.95 / WBC-10.68  Serial CBC's  03-21 @ 11:35  Hct-46.0 / Hgb-15.2 / Plat-277 / RBC-5.10 / WBC-8.13      03-22    139  |  101  |  16  ----------------------------<  183<H>  4.7   |  23  |  0.7    Ca    10.0      22 Mar 2019 09:48  Mg     2.2     03-22 Patient is a 63y old  Female who presents with a chief complaint of Right sided weakness (22 Mar 2019 20:30)      Subjective: Patient RUE weakness has almost completely resolved after receiving steroids.  She complains of pruritic rash on right upper extremity, which just started yesterday.      Vital Signs Last 24 Hrs  T(C): 36.3 (22 Mar 2019 19:34), Max: 36.3 (22 Mar 2019 19:34)  T(F): 97.4 (22 Mar 2019 19:34), Max: 97.4 (22 Mar 2019 19:34)  HR: 65 (22 Mar 2019 19:34) (65 - 65)  BP: 104/59 (22 Mar 2019 19:34) (104/59 - 104/59)  BP(mean): --  RR: 18 (22 Mar 2019 19:34) (18 - 18)  SpO2: 97% (22 Mar 2019 20:21) (95% - 97%)    PHYSICAL EXAM  General: adult in NAD, resting comfortably in bed  HEENT: NC/AT  Neck: supple  Lungs: positive air movement b/l ant lungs  Skin: erythematous patch noted superior to R antecubital fossa  Extremities: almost complete resolution of RUE weakness  Neuro: alert and oriented X 4    MEDICATIONS  (STANDING):  cholecalciferol 1000 Unit(s) Oral daily  cyanocobalamin 1000 MICROGram(s) Oral daily  dexamethasone     Tablet 6 milliGRAM(s) Oral every 12 hours  levETIRAcetam 1000 milliGRAM(s) Oral two times a day  levothyroxine 125 MICROGram(s) Oral daily  pantoprazole    Tablet 40 milliGRAM(s) Oral before breakfast    MEDICATIONS  (PRN):  melatonin 5 milliGRAM(s) Oral at bedtime PRN Insomnia      LABS:                          15.0   10.68 )-----------( 282      ( 22 Mar 2019 09:48 )             44.9         Mean Cell Volume : 90.7 fL  Mean Cell Hemoglobin : 30.3 pg  Mean Cell Hemoglobin Concentration : 33.4 g/dL  Auto Neutrophil # : 9.58 K/uL  Auto Lymphocyte # : 0.83 K/uL  Auto Monocyte # : 0.19 K/uL  Auto Eosinophil # : 0.00 K/uL  Auto Basophil # : 0.00 K/uL  Auto Neutrophil % : 89.7 %  Auto Lymphocyte % : 7.8 %  Auto Monocyte % : 1.8 %  Auto Eosinophil % : 0.0 %  Auto Basophil % : 0.0 %      Serial CBC's  03-22 @ 09:48  Hct-44.9 / Hgb-15.0 / Plat-282 / RBC-4.95 / WBC-10.68  Serial CBC's  03-21 @ 11:35  Hct-46.0 / Hgb-15.2 / Plat-277 / RBC-5.10 / WBC-8.13      03-22    139  |  101  |  16  ----------------------------<  183<H>  4.7   |  23  |  0.7    Ca    10.0      22 Mar 2019 09:48  Mg     2.2     03-22

## 2019-03-23 NOTE — PROGRESS NOTE ADULT - ASSESSMENT
SUBJECTIVE:    Patient is a 63y old Female who presents with a chief complaint of Right sided weakness (23 Mar 2019 05:49)  Currently admitted to medicine with the primary diagnosis of Malignant neoplasm of brain, unspecified location  Today is hospital day 3d. This morning she is resting comfortably in bed and reports no new issues or overnight events.   Patient notes improvement in weakness symptoms. Notes new diffuse itching on trunk, arm, and back. Patient has been scratching, and has excoriation marks.     PAST MEDICAL & SURGICAL HISTORY  Thyroid cancer  Peripheral neuropathy  Pleural effusion: 11/17  Uterine cancer  History of total abdominal hysterectomy: 4/2018  S/P thoracentesis: 12/17  H/O lymph node excision: LEFT NECK 12/17    SOCIAL HISTORY:  Negative for smoking/alcohol/drug use.     ALLERGIES:  No Known Allergies    MEDICATIONS:  STANDING MEDICATIONS  cholecalciferol 1000 Unit(s) Oral daily  cyanocobalamin 1000 MICROGram(s) Oral daily  dexamethasone     Tablet 6 milliGRAM(s) Oral every 12 hours  hydrocortisone 1% Cream 1 Application(s) Topical every 6 hours  levETIRAcetam 1000 milliGRAM(s) Oral two times a day  levothyroxine 125 MICROGram(s) Oral daily  pantoprazole    Tablet 40 milliGRAM(s) Oral before breakfast    PRN MEDICATIONS  diphenhydrAMINE 50 milliGRAM(s) Oral once PRN  melatonin 5 milliGRAM(s) Oral at bedtime PRN    VITALS:   T(F): 96.4  HR: 59  BP: 101/54  RR: 17  SpO2: 97%    LABS:                        14.7   11.58 )-----------( 279      ( 23 Mar 2019 08:52 )             44.3     03-23    138  |  100  |  21<H>  ----------------------------<  167<H>  4.6   |  25  |  0.8    Ca    9.6      23 Mar 2019 08:52  Mg     2.3     03-23      RADIOLOGY:    < from: MR Head w/wo IV Cont (03.21.19 @ 22:24) >  IMPRESSION:    Multiple supratentorial heterogeneously enhancing lesions consistent with   metastatic disease. The appearance on the T2-weighted images is   suggestive of adenocarcinoma. There is mass effect upon the left lateral   ventricle and corpus callosum.        < end of copied text >    < from: CT Head No Cont (03.20.19 @ 15:41) >  Impression:     1. New bilateral supratentorial lesions with surrounding edema suspicious   for metastatic disease. This can be further evaluated MRI of the brain   with and without contrast.    2. Localized mass effect without midline shift.    < end of copied text >  PHYSICAL EXAM:  GEN: No acute distress  LUNGS: Clear to auscultation bilaterally   HEART: Regular  ABD: Soft, non-tender, non-distended.  Neuro: Strength 4/5 RLE, 5/5 RUE, AOx3, no numbness, tingling.         61 yo F with pmh of papillary thyroid cancer s/p resection stage IV endometrial cancer s/p 8 cycles of carboplatin and taxol, sent in by her oncologist due to progressive R sided weakness, found to have new lesions in the brain    #) R sided weakness 2/2 new metastatic brain lesions  -CT H with new b/l supratentorial lexsions with surrounding edema, suspicious for metastatic disease. Localized mass effect without midline shift.   - MR brain w/wo contrast multiple supratentorial heterogeneously enhancing lesions consistent with   metastatic disease, image suggestive of adenocarcinoma. mass effect on the left lateral ventricle and corpus callosum.  -c/w keppra 1g PO BID   - switched decadron 6 mgPO q12h  -plan for 4a  -plan for radiation therapy on Monday.     #) itching, likely 2/2 steroids  -given benadryl 1x. monitor for worsening of rash.     #) papilary serous endometrial cancer  -s/p hysterectomy b/l salpingoopherectomy  -s/p 8 cycles carbo/taxol 6/2018  -peritoneal nodule on PET (2/2109) showing recurrent endometrial cancer on biopsy.     #) papillary thyroid cancer s/p resection  -c/w levothyroxine 125 mcg  -f/u with Dr. Herrera and Dr. Acevedo    DVT ppx SCDs until MRI done  Diet: DASH  FULL CODE  ambulate w assisatnce SUBJECTIVE:    Patient is a 63y old Female who presents with a chief complaint of Right sided weakness (23 Mar 2019 05:49)  Currently admitted to medicine with the primary diagnosis of Malignant neoplasm of brain, unspecified location  Today is hospital day 3d. This morning she is resting comfortably in bed and reports no new issues or overnight events.   Patient notes improvement in weakness symptoms. Notes new diffuse itching on trunk, arm, and back. Patient has been scratching, and has excoriation marks.     PAST MEDICAL & SURGICAL HISTORY  Thyroid cancer  Peripheral neuropathy  Pleural effusion: 11/17  Uterine cancer  History of total abdominal hysterectomy: 4/2018  S/P thoracentesis: 12/17  H/O lymph node excision: LEFT NECK 12/17    SOCIAL HISTORY:  Negative for smoking/alcohol/drug use.     ALLERGIES:  No Known Allergies    MEDICATIONS:  STANDING MEDICATIONS  cholecalciferol 1000 Unit(s) Oral daily  cyanocobalamin 1000 MICROGram(s) Oral daily  dexamethasone     Tablet 6 milliGRAM(s) Oral every 12 hours  hydrocortisone 1% Cream 1 Application(s) Topical every 6 hours  levETIRAcetam 1000 milliGRAM(s) Oral two times a day  levothyroxine 125 MICROGram(s) Oral daily  pantoprazole    Tablet 40 milliGRAM(s) Oral before breakfast    PRN MEDICATIONS  diphenhydrAMINE 50 milliGRAM(s) Oral once PRN  melatonin 5 milliGRAM(s) Oral at bedtime PRN    VITALS:   T(F): 96.4  HR: 59  BP: 101/54  RR: 17  SpO2: 97%    LABS:                        14.7   11.58 )-----------( 279      ( 23 Mar 2019 08:52 )             44.3     03-23    138  |  100  |  21<H>  ----------------------------<  167<H>  4.6   |  25  |  0.8    Ca    9.6      23 Mar 2019 08:52  Mg     2.3     03-23      RADIOLOGY:    < from: MR Head w/wo IV Cont (03.21.19 @ 22:24) >  IMPRESSION:    Multiple supratentorial heterogeneously enhancing lesions consistent with   metastatic disease. The appearance on the T2-weighted images is   suggestive of adenocarcinoma. There is mass effect upon the left lateral   ventricle and corpus callosum.        < end of copied text >    < from: CT Head No Cont (03.20.19 @ 15:41) >  Impression:     1. New bilateral supratentorial lesions with surrounding edema suspicious   for metastatic disease. This can be further evaluated MRI of the brain   with and without contrast.    2. Localized mass effect without midline shift.    < end of copied text >  PHYSICAL EXAM:  GEN: No acute distress  LUNGS: Clear to auscultation bilaterally   HEART: Regular  ABD: Soft, non-tender, non-distended.  Neuro: Strength 4/5 RLE, 5/5 RUE, AOx3, no numbness, tingling.         63 yo F with pmh of papillary thyroid cancer s/p resection stage IV endometrial cancer s/p 8 cycles of carboplatin and taxol, sent in by her oncologist due to progressive R sided weakness, found to have new lesions in the brain    #) R sided weakness 2/2 new metastatic brain lesions  -CT H with new b/l supratentorial lexsions with surrounding edema, suspicious for metastatic disease. Localized mass effect without midline shift.   - MR brain w/wo contrast multiple supratentorial heterogeneously enhancing lesions consistent with   metastatic disease, image suggestive of adenocarcinoma. mass effect on the left lateral ventricle and corpus callosum.  -c/w keppra 1g PO BID   - switched decadron 6 mgPO q12h  -plan for 4a  -plan for radiation therapy on Monday.     #) itching, likely 2/2 steroids  -given benadryl 1x. monitor for worsening of rash.     #) papilary serous endometrial cancer  -s/p hysterectomy b/l salpingoopherectomy  -s/p 8 cycles carbo/taxol 6/2018  -peritoneal nodule on PET (2/2109) showing recurrent endometrial cancer on biopsy.     #) papillary thyroid cancer s/p resection  -c/w levothyroxine 125 mcg  -f/u with Dr. Herrera and Dr. Acevedo    #) possible vitamin D deficiency  -c/w cholecalciferol   DVT ppx SCDs until MRI done  Diet: DASH  FULL CODE  ambulate w assisatnce

## 2019-03-23 NOTE — DISCHARGE NOTE PROVIDER - CARE PROVIDERS DIRECT ADDRESSES
[email or web address]

## 2019-03-23 NOTE — PROGRESS NOTE ADULT - ASSESSMENT
63 yr old female patient with stage IV papillary serous endometrial cancer, papillary thyroid cancer is sent to the ED for right sided weakness , found to have:    # Metastatic brain lesions with mass effect and no midline shift:  - Neurosurgery on board  - MRI brain w and w/ contrast done; results reviewed  - On keppra 1g PO BID and decadron 6 mg PO BID  - Radiation oncology on board.  Patient had mapping on 3/22.  She will start radiation as OP on 3/25.    # Stage IV papillary serous endometrial cancer  - Completed cycle 8 of carbo/taxol on 6/29/2018, which was followed by observation.  - Restaging PET CT on 2/12/2019 revealed slight increase in size of left upper quadrant peritoneal nodule (1.1 cm,   previously 0.8 cm) with stable FDG uptake, 7.7 SUV suspicious for biologic tumor activity.  - Biopsy of peritoneal nodule is positive for recurrent endometrial cancer (as per Dr. Cid's verbal discussion with Dr. Massey.)     # Papillary thyroid cancer [id number], s/p total thyroidectomy on 8/20/2018  - Follows with Dr. Herrera from ENT and Dr. Acevedo of endocrinology.    # Dispo  - Home with PT vs. rehab on 4A. 63 yr old female patient with stage IV papillary serous endometrial cancer, papillary thyroid cancer is sent to the ED for right sided weakness , found to have:    # Metastatic brain lesions with mass effect and no midline shift:  - Neurosurgery on board  - MRI brain w and w/ contrast done; results reviewed  - On keppra 1g PO BID and decadron 6 mg PO BID  - Radiation oncology on board.  Patient had mapping on 3/22.  She will start radiation as OP on 3/25.    # Stage IV papillary serous endometrial cancer  - Completed cycle 8 of carbo/taxol on 6/29/2018, which was followed by observation.  - Restaging PET CT on 2/12/2019 revealed slight increase in size of left upper quadrant peritoneal nodule (1.1 cm,   previously 0.8 cm) with stable FDG uptake, 7.7 SUV suspicious for biologic tumor activity.  - Biopsy of peritoneal nodule is positive for recurrent endometrial cancer (as per Dr. Cid's verbal discussion with Dr. Massey.)     # Papillary thyroid cancer [id number], s/p total thyroidectomy on 8/20/2018  - Follows with Dr. Herrera from ENT and Dr. Acevedo of endocrinology.    # Rash on RUE  - ? contact dermatitis.  Will prescribe hydrocortisone cream for symptomatic relief.    # Dispo  - Home with PT vs. rehab on 4A. 63 yr old female patient with stage IV papillary serous endometrial cancer, papillary thyroid cancer is sent to the ED for right sided weakness , found to have:    # Metastatic brain lesions with mass effect and no midline shift:  - Neurosurgery on board  - MRI brain w and w/ contrast done; results reviewed  - On keppra 1g PO BID and decadron 6 mg PO BID  - Radiation oncology on board.  Patient had mapping on 3/22.  She will start radiation as OP on 3/25.    # Stage IV papillary serous endometrial cancer  - Completed cycle 8 of carbo/taxol on 6/29/2018, which was followed by observation.  - Restaging PET CT on 2/12/2019 revealed slight increase in size of left upper quadrant peritoneal nodule (1.1 cm,   previously 0.8 cm) with stable FDG uptake, 7.7 SUV suspicious for biologic tumor activity.  - Biopsy of peritoneal nodule is positive for recurrent endometrial cancer (as per Dr. Cid's verbal discussion with Dr. Massey.)     # Papillary thyroid cancer [id number], s/p total thyroidectomy on 8/20/2018  - Follows with Dr. Herrera from ENT and Dr. Acevedo of endocrinology.    # Rash on RUE  - ? contact dermatitis.  Will prescribe hydrocortisone cream for symptomatic relief.    # Dispo  - Home with PT vs. rehab on 4A.  As per case management, Monday is earliest discharge to 4A.  If patient will not be accepted to 4A, discharge home Monday AM to begin outpatient RT.

## 2019-03-23 NOTE — DISCHARGE NOTE PROVIDER - CARE PROVIDER_API CALL
Stacie Cid)  HematologyOncology; Internal Medicine; Medical Oncology  [address] [address]  Phone: [phone]  Fax: [phone]  Follow Up Time:     Edmund Dahl (DO)  Radiation Oncology  [address] [place] [address]  Phone: [phone]  Fax: [phone]  Follow Up Time:

## 2019-03-24 LAB
ANION GAP SERPL CALC-SCNC: 13 MMOL/L — SIGNIFICANT CHANGE UP (ref 7–14)
BASOPHILS # BLD AUTO: 0.01 K/UL — SIGNIFICANT CHANGE UP (ref 0–0.2)
BASOPHILS NFR BLD AUTO: 0.1 % — SIGNIFICANT CHANGE UP (ref 0–1)
BUN SERPL-MCNC: 21 MG/DL — HIGH (ref 10–20)
CALCIUM SERPL-MCNC: 9.5 MG/DL — SIGNIFICANT CHANGE UP (ref 8.5–10.1)
CHLORIDE SERPL-SCNC: 100 MMOL/L — SIGNIFICANT CHANGE UP (ref 98–110)
CO2 SERPL-SCNC: 23 MMOL/L — SIGNIFICANT CHANGE UP (ref 17–32)
CREAT SERPL-MCNC: 0.8 MG/DL — SIGNIFICANT CHANGE UP (ref 0.7–1.5)
EOSINOPHIL # BLD AUTO: 0 K/UL — SIGNIFICANT CHANGE UP (ref 0–0.7)
EOSINOPHIL NFR BLD AUTO: 0 % — SIGNIFICANT CHANGE UP (ref 0–8)
GLUCOSE SERPL-MCNC: 144 MG/DL — HIGH (ref 70–99)
HCT VFR BLD CALC: 43.9 % — SIGNIFICANT CHANGE UP (ref 37–47)
HGB BLD-MCNC: 14.8 G/DL — SIGNIFICANT CHANGE UP (ref 12–16)
IMM GRANULOCYTES NFR BLD AUTO: 0.8 % — HIGH (ref 0.1–0.3)
LYMPHOCYTES # BLD AUTO: 0.87 K/UL — LOW (ref 1.2–3.4)
LYMPHOCYTES # BLD AUTO: 8.8 % — LOW (ref 20.5–51.1)
MAGNESIUM SERPL-MCNC: 2.4 MG/DL — SIGNIFICANT CHANGE UP (ref 1.8–2.4)
MCHC RBC-ENTMCNC: 30.1 PG — SIGNIFICANT CHANGE UP (ref 27–31)
MCHC RBC-ENTMCNC: 33.7 G/DL — SIGNIFICANT CHANGE UP (ref 32–37)
MCV RBC AUTO: 89.4 FL — SIGNIFICANT CHANGE UP (ref 81–99)
MONOCYTES # BLD AUTO: 0.45 K/UL — SIGNIFICANT CHANGE UP (ref 0.1–0.6)
MONOCYTES NFR BLD AUTO: 4.6 % — SIGNIFICANT CHANGE UP (ref 1.7–9.3)
NEUTROPHILS # BLD AUTO: 8.47 K/UL — HIGH (ref 1.4–6.5)
NEUTROPHILS NFR BLD AUTO: 85.7 % — HIGH (ref 42.2–75.2)
NRBC # BLD: 0 /100 WBCS — SIGNIFICANT CHANGE UP (ref 0–0)
PLATELET # BLD AUTO: 262 K/UL — SIGNIFICANT CHANGE UP (ref 130–400)
POTASSIUM SERPL-MCNC: 4.3 MMOL/L — SIGNIFICANT CHANGE UP (ref 3.5–5)
POTASSIUM SERPL-SCNC: 4.3 MMOL/L — SIGNIFICANT CHANGE UP (ref 3.5–5)
RBC # BLD: 4.91 M/UL — SIGNIFICANT CHANGE UP (ref 4.2–5.4)
RBC # FLD: 12.3 % — SIGNIFICANT CHANGE UP (ref 11.5–14.5)
SODIUM SERPL-SCNC: 136 MMOL/L — SIGNIFICANT CHANGE UP (ref 135–146)
WBC # BLD: 9.88 K/UL — SIGNIFICANT CHANGE UP (ref 4.8–10.8)
WBC # FLD AUTO: 9.88 K/UL — SIGNIFICANT CHANGE UP (ref 4.8–10.8)

## 2019-03-24 RX ADMIN — Medication 1000 UNIT(S): at 12:23

## 2019-03-24 RX ADMIN — Medication 50 MILLIGRAM(S): at 19:30

## 2019-03-24 RX ADMIN — Medication 125 MICROGRAM(S): at 05:29

## 2019-03-24 RX ADMIN — Medication 1 APPLICATION(S): at 12:23

## 2019-03-24 RX ADMIN — Medication 6 MILLIGRAM(S): at 17:21

## 2019-03-24 RX ADMIN — LEVETIRACETAM 1000 MILLIGRAM(S): 250 TABLET, FILM COATED ORAL at 17:21

## 2019-03-24 RX ADMIN — Medication 6 MILLIGRAM(S): at 05:28

## 2019-03-24 RX ADMIN — Medication 1 APPLICATION(S): at 17:21

## 2019-03-24 RX ADMIN — PANTOPRAZOLE SODIUM 40 MILLIGRAM(S): 20 TABLET, DELAYED RELEASE ORAL at 06:01

## 2019-03-24 RX ADMIN — Medication 1 APPLICATION(S): at 05:28

## 2019-03-24 RX ADMIN — Medication 1 APPLICATION(S): at 02:27

## 2019-03-24 RX ADMIN — PREGABALIN 1000 MICROGRAM(S): 225 CAPSULE ORAL at 12:23

## 2019-03-24 RX ADMIN — Medication 1 APPLICATION(S): at 23:24

## 2019-03-24 RX ADMIN — LEVETIRACETAM 1000 MILLIGRAM(S): 250 TABLET, FILM COATED ORAL at 05:28

## 2019-03-24 NOTE — PROGRESS NOTE ADULT - SUBJECTIVE AND OBJECTIVE BOX
Patient is a 63y old  Female who presents with a chief complaint of Right sided weakness (23 Mar 2019 12:33)      Subjective:      Vital Signs Last 24 Hrs  T(C): 36.1 (24 Mar 2019 05:06), Max: 36.3 (23 Mar 2019 14:10)  T(F): 96.9 (24 Mar 2019 05:06), Max: 97.4 (23 Mar 2019 14:10)  HR: 55 (24 Mar 2019 05:06) (55 - 66)  BP: 118/58 (24 Mar 2019 05:06) (94/61 - 118/58)  BP(mean): --  RR: 18 (24 Mar 2019 05:06) (17 - 18)  SpO2: 97% (23 Mar 2019 21:53) (97% - 97%)    PHYSICAL EXAM  General: adult in NAD  HEENT: clear oropharynx, anicteric sclera, pink conjunctiva  Neck: supple  CV: normal S1/S2 with no murmur rubs or gallops  Lungs: positive air movement b/l ant lungs,clear to auscultation, no wheezes, no rales  Abdomen: soft non-tender non-distended, no hepatosplenomegaly  Ext: no clubbing cyanosis or edema  Skin: no rashes and no petechiae  Neuro: alert and oriented X 4, no focal deficits    MEDICATIONS  (STANDING):  cholecalciferol 1000 Unit(s) Oral daily  cyanocobalamin 1000 MICROGram(s) Oral daily  dexamethasone     Tablet 6 milliGRAM(s) Oral every 12 hours  hydrocortisone 1% Cream 1 Application(s) Topical every 6 hours  levETIRAcetam 1000 milliGRAM(s) Oral two times a day  levothyroxine 125 MICROGram(s) Oral daily  pantoprazole    Tablet 40 milliGRAM(s) Oral before breakfast    MEDICATIONS  (PRN):  diphenhydrAMINE 50 milliGRAM(s) Oral once PRN Rash and/or Itching  melatonin 5 milliGRAM(s) Oral at bedtime PRN Insomnia      LABS:                          14.7   11.58 )-----------( 279      ( 23 Mar 2019 08:52 )             44.3         Mean Cell Volume : 91.0 fL  Mean Cell Hemoglobin : 30.2 pg  Mean Cell Hemoglobin Concentration : 33.2 g/dL  Auto Neutrophil # : 10.07 K/uL  Auto Lymphocyte # : 0.85 K/uL  Auto Monocyte # : 0.57 K/uL  Auto Eosinophil # : 0.00 K/uL  Auto Basophil # : 0.01 K/uL  Auto Neutrophil % : 87.0 %  Auto Lymphocyte % : 7.3 %  Auto Monocyte % : 4.9 %  Auto Eosinophil % : 0.0 %  Auto Basophil % : 0.1 %      Serial CBC's  03-23 @ 08:52  Hct-44.3 / Hgb-14.7 / Plat-279 / RBC-4.87 / WBC-11.58  Serial CBC's  03-22 @ 09:48  Hct-44.9 / Hgb-15.0 / Plat-282 / RBC-4.95 / WBC-10.68  Serial CBC's  03-21 @ 11:35  Hct-46.0 / Hgb-15.2 / Plat-277 / RBC-5.10 / WBC-8.13      03-23    138  |  100  |  21<H>  ----------------------------<  167<H>  4.6   |  25  |  0.8    Ca    9.6      23 Mar 2019 08:52  Mg     2.3     03-23 Patient is a 63y old  Female who presents with a chief complaint of Right sided weakness (23 Mar 2019 12:33)      Subjective: Patient reports that her rash has drastically improved after being given hydrocortisone.  Other than that, she has no complaints and is hoping to be discharged to  on Monday.      Vital Signs Last 24 Hrs  T(C): 36.1 (24 Mar 2019 05:06), Max: 36.3 (23 Mar 2019 14:10)  T(F): 96.9 (24 Mar 2019 05:06), Max: 97.4 (23 Mar 2019 14:10)  HR: 55 (24 Mar 2019 05:06) (55 - 66)  BP: 118/58 (24 Mar 2019 05:06) (94/61 - 118/58)  BP(mean): --  RR: 18 (24 Mar 2019 05:06) (17 - 18)  SpO2: 97% (23 Mar 2019 21:53) (97% - 97%)    PHYSICAL EXAM  General: adult in NAD, resting comfortably in bed, well-appearing  HEENT: NC/AT  Neck: supple  Lungs: positive air movement b/l ant lungs  Ext: no edema  Skin: rash on RUE largely resolved s/p hydrocortisone  Neuro: alert and oriented X 4, normal R-sided movement    MEDICATIONS  (STANDING):  cholecalciferol 1000 Unit(s) Oral daily  cyanocobalamin 1000 MICROGram(s) Oral daily  dexamethasone     Tablet 6 milliGRAM(s) Oral every 12 hours  hydrocortisone 1% Cream 1 Application(s) Topical every 6 hours  levETIRAcetam 1000 milliGRAM(s) Oral two times a day  levothyroxine 125 MICROGram(s) Oral daily  pantoprazole    Tablet 40 milliGRAM(s) Oral before breakfast    MEDICATIONS  (PRN):  diphenhydrAMINE 50 milliGRAM(s) Oral once PRN Rash and/or Itching  melatonin 5 milliGRAM(s) Oral at bedtime PRN Insomnia      LABS:                          14.7   11.58 )-----------( 279      ( 23 Mar 2019 08:52 )             44.3         Mean Cell Volume : 91.0 fL  Mean Cell Hemoglobin : 30.2 pg  Mean Cell Hemoglobin Concentration : 33.2 g/dL  Auto Neutrophil # : 10.07 K/uL  Auto Lymphocyte # : 0.85 K/uL  Auto Monocyte # : 0.57 K/uL  Auto Eosinophil # : 0.00 K/uL  Auto Basophil # : 0.01 K/uL  Auto Neutrophil % : 87.0 %  Auto Lymphocyte % : 7.3 %  Auto Monocyte % : 4.9 %  Auto Eosinophil % : 0.0 %  Auto Basophil % : 0.1 %      Serial CBC's  03-23 @ 08:52  Hct-44.3 / Hgb-14.7 / Plat-279 / RBC-4.87 / WBC-11.58  Serial CBC's  03-22 @ 09:48  Hct-44.9 / Hgb-15.0 / Plat-282 / RBC-4.95 / WBC-10.68  Serial CBC's  03-21 @ 11:35  Hct-46.0 / Hgb-15.2 / Plat-277 / RBC-5.10 / WBC-8.13      03-23    138  |  100  |  21<H>  ----------------------------<  167<H>  4.6   |  25  |  0.8    Ca    9.6      23 Mar 2019 08:52  Mg     2.3     03-23

## 2019-03-24 NOTE — PROGRESS NOTE ADULT - ASSESSMENT
63 yr old female patient with stage IV papillary serous endometrial cancer, papillary thyroid cancer is sent to the ED for right sided weakness , found to have:    # Metastatic brain lesions with mass effect and no midline shift:  - Neurosurgery on board.  - MRI brain w and w/ contrast done; results reviewed.  - On keppra 1g PO BID and decadron 6 mg PO BID.  - Radiation oncology on board.  Patient had mapping on 3/22.  She will start radiation as OP on 3/25.    # Stage IV papillary serous endometrial cancer  - Completed cycle 8 of carbo/taxol on 6/29/2018, which was followed by observation.  - Restaging PET CT on 2/12/2019 revealed slight increase in size of left upper quadrant peritoneal nodule (1.1 cm,   previously 0.8 cm) with stable FDG uptake, 7.7 SUV suspicious for biologic tumor activity.  - Biopsy of peritoneal nodule is positive for recurrent endometrial cancer (as per Dr. Cid's verbal discussion with Dr. Massey.)     # Papillary thyroid cancer [id number], s/p total thyroidectomy on 8/20/2018  - Follows with Dr. Herrera from ENT and Dr. Acevedo of endocrinology.    # Rash on RUE  - ? contact dermatitis.  Prescribed hydrocortisone cream for symptomatic relief.    # Dispo  - Home with PT vs. rehab on 4A.  As per case management, Monday is earliest discharge to 4A.  If patient will not be accepted to 4A, discharge home Monday AM to begin outpatient RT. 63 yr old female patient with stage IV papillary serous endometrial cancer, papillary thyroid cancer is sent to the ED for right sided weakness , found to have:    # Metastatic brain lesions with mass effect and no midline shift:  - Neurosurgery on board.  - MRI brain w and w/ contrast done; results reviewed.  - On keppra 1g PO BID and decadron 6 mg PO BID.  - Radiation oncology on board.  Patient had mapping on 3/22.  She will start radiation as OP on 3/25.    # Stage IV papillary serous endometrial cancer  - Completed cycle 8 of carbo/taxol on 6/29/2018, which was followed by observation.  - Restaging PET CT on 2/12/2019 revealed slight increase in size of left upper quadrant peritoneal nodule (1.1 cm,   previously 0.8 cm) with stable FDG uptake, 7.7 SUV suspicious for biologic tumor activity.  - Biopsy of peritoneal nodule is positive for recurrent endometrial cancer (as per Dr. Cid's verbal discussion with Dr. Massey.)     # Papillary thyroid cancer [id number], s/p total thyroidectomy on 8/20/2018  - Follows with Dr. Herrera from ENT and Dr. Acevedo of endocrinology.    # Rash on RUE  - ? contact dermatitis.  Prescribed hydrocortisone cream for symptomatic relief.    # Dispo  - Home with PT vs. rehab on 4A.  As per case management, Monday is earliest discharge to 4A.  If patient will not be accepted to 4A, discharge home Monday AM to begin outpatient RT.

## 2019-03-25 LAB
ANION GAP SERPL CALC-SCNC: 16 MMOL/L — HIGH (ref 7–14)
BASOPHILS # BLD AUTO: 0.01 K/UL — SIGNIFICANT CHANGE UP (ref 0–0.2)
BASOPHILS NFR BLD AUTO: 0.1 % — SIGNIFICANT CHANGE UP (ref 0–1)
BUN SERPL-MCNC: 19 MG/DL — SIGNIFICANT CHANGE UP (ref 10–20)
CALCIUM SERPL-MCNC: 9.7 MG/DL — SIGNIFICANT CHANGE UP (ref 8.5–10.1)
CHLORIDE SERPL-SCNC: 97 MMOL/L — LOW (ref 98–110)
CO2 SERPL-SCNC: 24 MMOL/L — SIGNIFICANT CHANGE UP (ref 17–32)
CREAT SERPL-MCNC: 0.8 MG/DL — SIGNIFICANT CHANGE UP (ref 0.7–1.5)
EOSINOPHIL # BLD AUTO: 0 K/UL — SIGNIFICANT CHANGE UP (ref 0–0.7)
EOSINOPHIL NFR BLD AUTO: 0 % — SIGNIFICANT CHANGE UP (ref 0–8)
GLUCOSE SERPL-MCNC: 219 MG/DL — HIGH (ref 70–99)
HCT VFR BLD CALC: 47.4 % — HIGH (ref 37–47)
HGB BLD-MCNC: 15.9 G/DL — SIGNIFICANT CHANGE UP (ref 12–16)
IMM GRANULOCYTES NFR BLD AUTO: 1.5 % — HIGH (ref 0.1–0.3)
LYMPHOCYTES # BLD AUTO: 0.88 K/UL — LOW (ref 1.2–3.4)
LYMPHOCYTES # BLD AUTO: 8.5 % — LOW (ref 20.5–51.1)
MAGNESIUM SERPL-MCNC: 2.3 MG/DL — SIGNIFICANT CHANGE UP (ref 1.8–2.4)
MCHC RBC-ENTMCNC: 30.2 PG — SIGNIFICANT CHANGE UP (ref 27–31)
MCHC RBC-ENTMCNC: 33.5 G/DL — SIGNIFICANT CHANGE UP (ref 32–37)
MCV RBC AUTO: 89.9 FL — SIGNIFICANT CHANGE UP (ref 81–99)
MONOCYTES # BLD AUTO: 0.45 K/UL — SIGNIFICANT CHANGE UP (ref 0.1–0.6)
MONOCYTES NFR BLD AUTO: 4.4 % — SIGNIFICANT CHANGE UP (ref 1.7–9.3)
NEUTROPHILS # BLD AUTO: 8.82 K/UL — HIGH (ref 1.4–6.5)
NEUTROPHILS NFR BLD AUTO: 85.5 % — HIGH (ref 42.2–75.2)
NRBC # BLD: 0 /100 WBCS — SIGNIFICANT CHANGE UP (ref 0–0)
PLATELET # BLD AUTO: 280 K/UL — SIGNIFICANT CHANGE UP (ref 130–400)
POTASSIUM SERPL-MCNC: 4.6 MMOL/L — SIGNIFICANT CHANGE UP (ref 3.5–5)
POTASSIUM SERPL-SCNC: 4.6 MMOL/L — SIGNIFICANT CHANGE UP (ref 3.5–5)
RBC # BLD: 5.27 M/UL — SIGNIFICANT CHANGE UP (ref 4.2–5.4)
RBC # FLD: 12.3 % — SIGNIFICANT CHANGE UP (ref 11.5–14.5)
SODIUM SERPL-SCNC: 137 MMOL/L — SIGNIFICANT CHANGE UP (ref 135–146)
WBC # BLD: 10.31 K/UL — SIGNIFICANT CHANGE UP (ref 4.8–10.8)
WBC # FLD AUTO: 10.31 K/UL — SIGNIFICANT CHANGE UP (ref 4.8–10.8)

## 2019-03-25 RX ORDER — POLYETHYLENE GLYCOL 3350 17 G/17G
17 POWDER, FOR SOLUTION ORAL
Qty: 0 | Refills: 0 | COMMUNITY
Start: 2019-03-25

## 2019-03-25 RX ORDER — POLYETHYLENE GLYCOL 3350 17 G/17G
17 POWDER, FOR SOLUTION ORAL DAILY
Qty: 0 | Refills: 0 | Status: DISCONTINUED | OUTPATIENT
Start: 2019-03-25 | End: 2019-03-26

## 2019-03-25 RX ADMIN — LEVETIRACETAM 1000 MILLIGRAM(S): 250 TABLET, FILM COATED ORAL at 17:43

## 2019-03-25 RX ADMIN — POLYETHYLENE GLYCOL 3350 17 GRAM(S): 17 POWDER, FOR SOLUTION ORAL at 11:41

## 2019-03-25 RX ADMIN — Medication 6 MILLIGRAM(S): at 17:43

## 2019-03-25 RX ADMIN — PANTOPRAZOLE SODIUM 40 MILLIGRAM(S): 20 TABLET, DELAYED RELEASE ORAL at 06:02

## 2019-03-25 RX ADMIN — Medication 125 MICROGRAM(S): at 05:26

## 2019-03-25 RX ADMIN — PREGABALIN 1000 MICROGRAM(S): 225 CAPSULE ORAL at 11:43

## 2019-03-25 RX ADMIN — Medication 1 APPLICATION(S): at 17:43

## 2019-03-25 RX ADMIN — Medication 1 APPLICATION(S): at 11:43

## 2019-03-25 RX ADMIN — Medication 1 APPLICATION(S): at 05:25

## 2019-03-25 RX ADMIN — Medication 6 MILLIGRAM(S): at 05:26

## 2019-03-25 RX ADMIN — LEVETIRACETAM 1000 MILLIGRAM(S): 250 TABLET, FILM COATED ORAL at 05:26

## 2019-03-25 RX ADMIN — Medication 1 APPLICATION(S): at 21:31

## 2019-03-25 RX ADMIN — Medication 1000 UNIT(S): at 11:43

## 2019-03-25 NOTE — PROGRESS NOTE ADULT - ASSESSMENT
SUBJECTIVE:    Patient is a 63y old Female who presents with a chief complaint of Right sided weakness (25 Mar 2019 07:39)  Currently admitted to medicine with the primary diagnosis of Malignant neoplasm of brain, unspecified location   Today is hospital day 5d. This morning she is resting comfortably in bed and reports no new issues or overnight events.   Patient notes continued weakness of RUE, though resolution of RLE weakness.   Pt is eager to start radiation therapy.     PAST MEDICAL & SURGICAL HISTORY  Thyroid cancer  Peripheral neuropathy  Pleural effusion: 11/17  Uterine cancer  History of total abdominal hysterectomy: 4/2018  S/P thoracentesis: 12/17  H/O lymph node excision: LEFT NECK 12/17    SOCIAL HISTORY:  Negative for smoking/alcohol/drug use.     ALLERGIES:  No Known Allergies    MEDICATIONS:  STANDING MEDICATIONS  cholecalciferol 1000 Unit(s) Oral daily  cyanocobalamin 1000 MICROGram(s) Oral daily  dexamethasone     Tablet 6 milliGRAM(s) Oral every 12 hours  hydrocortisone 1% Cream 1 Application(s) Topical every 6 hours  levETIRAcetam 1000 milliGRAM(s) Oral two times a day  levothyroxine 125 MICROGram(s) Oral daily  pantoprazole    Tablet 40 milliGRAM(s) Oral before breakfast  polyethylene glycol 3350 17 Gram(s) Oral daily    PRN MEDICATIONS  melatonin 5 milliGRAM(s) Oral at bedtime PRN    VITALS:   T(F): 96.5  HR: 68  BP: 110/70  RR: 18  SpO2: 98%    LABS:                        15.9   10.31 )-----------( 280      ( 25 Mar 2019 08:38 )             47.4     03-25    137  |  97<L>  |  19  ----------------------------<  219<H>  4.6   |  24  |  0.8    Ca    9.7      25 Mar 2019 08:38  Mg     2.3     03-25                    RADIOLOGY:    PHYSICAL EXAM:  GEN: No acute distress  LUNGS: Clear to auscultation bilaterally   HEART: Regular  ABD: Soft, non-tender, non-distended.  EXT: No edema legs. Rash on RUE resolved.  NEURO: AAOX3, CN intact. 4/5 strength RUE, 5/5 strength RLE.     63 yo F with pmh of papillary thyroid cancer s/p resection stage IV endometrial cancer s/p 8 cycles of carboplatin and taxol, sent in by her oncologist due to progressive R sided weakness, found to have new lesions in the brain    #) R sided weakness 2/2 new metastatic brain lesions  -CT H with new b/l supratentorial lexsions with surrounding edema, suspicious for metastatic disease. Localized mass effect without midline shift.   - MR brain w/wo contrast multiple supratentorial heterogeneously enhancing lesions consistent with   metastatic disease, image suggestive of adenocarcinoma. mass effect on the left lateral ventricle and corpus callosum.  -c/w keppra 1g PO BID   - switched decadron 6 mgPO q12h  -once authorization, DC to 4a      #) itching, likely 2/2 steroids-resolved    #) papilary serous endometrial cancer  -s/p hysterectomy b/l salpingoopherectomy  -s/p 8 cycles carbo/taxol 6/2018  -peritoneal nodule on PET (2/2109) showing recurrent endometrial cancer on biopsy.     #) papillary thyroid cancer s/p resection  -c/w levothyroxine 125 mcg  -f/u with Dr. Herrera and Dr. Acevedo    #) possible vitamin D deficiency  -c/w cholecalciferol   DVT ppx SCDs  Diet: DASH  FULL CODE  ambulate w assistance

## 2019-03-25 NOTE — PROGRESS NOTE ADULT - SUBJECTIVE AND OBJECTIVE BOX
Patient is a 63y old  Female who presents with a chief complaint of Right sided weakness (24 Mar 2019 05:58)      Subjective: No events over the weekend      Vital Signs Last 24 Hrs  T(C): 35.7 (25 Mar 2019 05:37), Max: 36.3 (24 Mar 2019 20:41)  T(F): 96.2 (25 Mar 2019 05:37), Max: 97.4 (24 Mar 2019 20:41)  HR: 65 (25 Mar 2019 05:37) (61 - 68)  BP: 95/63 (25 Mar 2019 05:37) (95/63 - 122/69)  BP(mean): --  RR: 18 (25 Mar 2019 05:37) (18 - 20)  SpO2: 96% (24 Mar 2019 20:35) (96% - 97%)    PHYSICAL EXAM  General: adult in NAD  HEENT: clear oropharynx, anicteric sclera, pink conjunctiva  Neck: supple  CV: normal S1/S2 with no murmur rubs or gallops  Lungs: positive air movement b/l ant lungs,clear to auscultation, no wheezes, no rales  Abdomen: soft non-tender non-distended, no hepatosplenomegaly  Ext: no clubbing cyanosis or edema  Skin: no rashes and no petechiae  Neuro: alert and oriented X 4, right sided weak    MEDICATIONS  (STANDING):  cholecalciferol 1000 Unit(s) Oral daily  cyanocobalamin 1000 MICROGram(s) Oral daily  dexamethasone     Tablet 6 milliGRAM(s) Oral every 12 hours  hydrocortisone 1% Cream 1 Application(s) Topical every 6 hours  levETIRAcetam 1000 milliGRAM(s) Oral two times a day  levothyroxine 125 MICROGram(s) Oral daily  pantoprazole    Tablet 40 milliGRAM(s) Oral before breakfast    MEDICATIONS  (PRN):  melatonin 5 milliGRAM(s) Oral at bedtime PRN Insomnia      LABS:                          14.8   9.88  )-----------( 262      ( 24 Mar 2019 08:33 )             43.9         Mean Cell Volume : 89.4 fL  Mean Cell Hemoglobin : 30.1 pg  Mean Cell Hemoglobin Concentration : 33.7 g/dL  Auto Neutrophil # : 8.47 K/uL  Auto Lymphocyte # : 0.87 K/uL  Auto Monocyte # : 0.45 K/uL  Auto Eosinophil # : 0.00 K/uL  Auto Basophil # : 0.01 K/uL  Auto Neutrophil % : 85.7 %  Auto Lymphocyte % : 8.8 %  Auto Monocyte % : 4.6 %  Auto Eosinophil % : 0.0 %  Auto Basophil % : 0.1 %      Serial CBC's  03-24 @ 08:33  Hct-43.9 / Hgb-14.8 / Plat-262 / RBC-4.91 / WBC-9.88  Serial CBC's  03-23 @ 08:52  Hct-44.3 / Hgb-14.7 / Plat-279 / RBC-4.87 / WBC-11.58  Serial CBC's  03-22 @ 09:48  Hct-44.9 / Hgb-15.0 / Plat-282 / RBC-4.95 / WBC-10.68  Serial CBC's  03-21 @ 11:35  Hct-46.0 / Hgb-15.2 / Plat-277 / RBC-5.10 / WBC-8.13      03-24    136  |  100  |  21<H>  ----------------------------<  144<H>  4.3   |  23  |  0.8    Ca    9.5      24 Mar 2019 08:33  Mg     2.4     03-24        RADIOLOGY & ADDITIONAL STUDIES:  No new imaging Patient is a 63y old  Female who presents with a chief complaint of Right sided weakness (24 Mar 2019 05:58)      Subjective: No events over the weekend . Patient is reporting better strength in her leg, no change in her arm      Vital Signs Last 24 Hrs  T(C): 35.7 (25 Mar 2019 05:37), Max: 36.3 (24 Mar 2019 20:41)  T(F): 96.2 (25 Mar 2019 05:37), Max: 97.4 (24 Mar 2019 20:41)  HR: 65 (25 Mar 2019 05:37) (61 - 68)  BP: 95/63 (25 Mar 2019 05:37) (95/63 - 122/69)  BP(mean): --  RR: 18 (25 Mar 2019 05:37) (18 - 20)  SpO2: 96% (24 Mar 2019 20:35) (96% - 97%)    PHYSICAL EXAM  General: adult in NAD  HEENT: clear oropharynx, anicteric sclera, pink conjunctiva  Neck: supple  CV: normal S1/S2 with no murmur rubs or gallops  Lungs: positive air movement b/l ant lungs,clear to auscultation, no wheezes, no rales  Abdomen: soft non-tender non-distended, no hepatosplenomegaly  Ext: no clubbing cyanosis or edema  Skin: no rashes and no petechiae  Neuro: alert and oriented X 4, right sided weak    MEDICATIONS  (STANDING):  cholecalciferol 1000 Unit(s) Oral daily  cyanocobalamin 1000 MICROGram(s) Oral daily  dexamethasone     Tablet 6 milliGRAM(s) Oral every 12 hours  hydrocortisone 1% Cream 1 Application(s) Topical every 6 hours  levETIRAcetam 1000 milliGRAM(s) Oral two times a day  levothyroxine 125 MICROGram(s) Oral daily  pantoprazole    Tablet 40 milliGRAM(s) Oral before breakfast    MEDICATIONS  (PRN):  melatonin 5 milliGRAM(s) Oral at bedtime PRN Insomnia      LABS:                          14.8   9.88  )-----------( 262      ( 24 Mar 2019 08:33 )             43.9         Mean Cell Volume : 89.4 fL  Mean Cell Hemoglobin : 30.1 pg  Mean Cell Hemoglobin Concentration : 33.7 g/dL  Auto Neutrophil # : 8.47 K/uL  Auto Lymphocyte # : 0.87 K/uL  Auto Monocyte # : 0.45 K/uL  Auto Eosinophil # : 0.00 K/uL  Auto Basophil # : 0.01 K/uL  Auto Neutrophil % : 85.7 %  Auto Lymphocyte % : 8.8 %  Auto Monocyte % : 4.6 %  Auto Eosinophil % : 0.0 %  Auto Basophil % : 0.1 %      Serial CBC's  03-24 @ 08:33  Hct-43.9 / Hgb-14.8 / Plat-262 / RBC-4.91 / WBC-9.88  Serial CBC's  03-23 @ 08:52  Hct-44.3 / Hgb-14.7 / Plat-279 / RBC-4.87 / WBC-11.58  Serial CBC's  03-22 @ 09:48  Hct-44.9 / Hgb-15.0 / Plat-282 / RBC-4.95 / WBC-10.68  Serial CBC's  03-21 @ 11:35  Hct-46.0 / Hgb-15.2 / Plat-277 / RBC-5.10 / WBC-8.13      03-24    136  |  100  |  21<H>  ----------------------------<  144<H>  4.3   |  23  |  0.8    Ca    9.5      24 Mar 2019 08:33  Mg     2.4     03-24        RADIOLOGY & ADDITIONAL STUDIES:  No new imaging

## 2019-03-25 NOTE — PROGRESS NOTE ADULT - ASSESSMENT
63 yr old female patient with stage IV papillary serous endometrial cancer, papillary thyroid cancer is sent to the ED for right sided weakness , found to have:    # Metastatic brain lesions with mass effect and no midline shift:  - Neurosurgery on board.  - MRI brain w and w/ contrast done; results reviewed.  - On keppra 1g PO BID and decadron 6 mg PO BID.  - Radiation oncology on board.  Patient had mapping on 3/22.  She will start radiation as OP on 3/25.    # Stage IV papillary serous endometrial cancer  - Completed cycle 8 of carbo/taxol on 6/29/2018, which was followed by observation.  - Restaging PET CT on 2/12/2019 revealed slight increase in size of left upper quadrant peritoneal nodule (1.1 cm,   previously 0.8 cm) with stable FDG uptake, 7.7 SUV suspicious for biologic tumor activity.  - Biopsy of peritoneal nodule is positive for recurrent endometrial cancer (as per Dr. Cid's verbal discussion with Dr. Massey.)     # Papillary thyroid cancer [id number], s/p total thyroidectomy on 8/20/2018  - Follows with Dr. Herrera from ENT and Dr. Acevedo of endocrinology.    # Rash on RUE  - ? contact dermatitis.  Prescribed hydrocortisone cream for symptomatic relief.    # Dispo  - Home with PT vs. rehab on 4A.  As per case management, Monday is earliest discharge to 4A.  If patient will not be accepted to 4A, discharge home Monday AM to begin outpatient RT. 63 yr old female patient with stage IV papillary serous endometrial cancer, papillary thyroid cancer is sent to the ED for right sided weakness , found to have:    # Metastatic brain lesions with mass effect and no midline shift:  - MRI brain w and w/ contrast done; results reviewed.  - On keppra 1g PO BID and decadron 6 mg PO BID.  - Radiation oncology on board.  Patient had mapping on 3/22.  She will start radiation as OP on 3/25.    # Stage IV papillary serous endometrial cancer  - Completed cycle 8 of carbo/taxol on 6/29/2018, which was followed by observation.  - Restaging PET CT on 2/12/2019 revealed slight increase in size of left upper quadrant peritoneal nodule (1.1 cm,   previously 0.8 cm) with stable FDG uptake, 7.7 SUV suspicious for biologic tumor activity.  - Biopsy of peritoneal nodule is positive for recurrent endometrial cancer (as per Dr. Cid's verbal discussion with Dr. Massey.)     # Papillary thyroid cancer [id number], s/p total thyroidectomy on 8/20/2018  - Follows with Dr. Herrera from ENT and Dr. Acevedo of endocrinology.    # Rash on RUE  - ? contact dermatitis.  Prescribed hydrocortisone cream for symptomatic relief.    # Dispo  - Home with PT vs. rehab on 4A.  As per case management, Monday is earliest discharge to .  If patient will not be accepted to 4A, discharge home Monday AM to begin outpatient RT.

## 2019-03-26 ENCOUNTER — TRANSCRIPTION ENCOUNTER (OUTPATIENT)
Age: 64
End: 2019-03-26

## 2019-03-26 ENCOUNTER — INPATIENT (INPATIENT)
Facility: HOSPITAL | Age: 64
LOS: 14 days | Discharge: HOME | End: 2019-04-10
Attending: PHYSICAL MEDICINE & REHABILITATION | Admitting: PHYSICAL MEDICINE & REHABILITATION

## 2019-03-26 VITALS
RESPIRATION RATE: 18 BRPM | DIASTOLIC BLOOD PRESSURE: 75 MMHG | TEMPERATURE: 96 F | SYSTOLIC BLOOD PRESSURE: 117 MMHG | HEART RATE: 74 BPM

## 2019-03-26 DIAGNOSIS — E89.0 POSTPROCEDURAL HYPOTHYROIDISM: ICD-10-CM

## 2019-03-26 DIAGNOSIS — G81.91 HEMIPLEGIA, UNSPECIFIED AFFECTING RIGHT DOMINANT SIDE: ICD-10-CM

## 2019-03-26 DIAGNOSIS — G62.0 DRUG-INDUCED POLYNEUROPATHY: ICD-10-CM

## 2019-03-26 DIAGNOSIS — Z98.890 OTHER SPECIFIED POSTPROCEDURAL STATES: Chronic | ICD-10-CM

## 2019-03-26 DIAGNOSIS — C79.31 SECONDARY MALIGNANT NEOPLASM OF BRAIN: ICD-10-CM

## 2019-03-26 DIAGNOSIS — T45.1X5A ADVERSE EFFECT OF ANTINEOPLASTIC AND IMMUNOSUPPRESSIVE DRUGS, INITIAL ENCOUNTER: ICD-10-CM

## 2019-03-26 DIAGNOSIS — E11.9 TYPE 2 DIABETES MELLITUS WITHOUT COMPLICATIONS: ICD-10-CM

## 2019-03-26 DIAGNOSIS — Z90.710 ACQUIRED ABSENCE OF BOTH CERVIX AND UTERUS: Chronic | ICD-10-CM

## 2019-03-26 DIAGNOSIS — Z85.850 PERSONAL HISTORY OF MALIGNANT NEOPLASM OF THYROID: ICD-10-CM

## 2019-03-26 DIAGNOSIS — C55 MALIGNANT NEOPLASM OF UTERUS, PART UNSPECIFIED: ICD-10-CM

## 2019-03-26 LAB — GLUCOSE BLDC GLUCOMTR-MCNC: 171 MG/DL — HIGH (ref 70–99)

## 2019-03-26 RX ORDER — LEVETIRACETAM 250 MG/1
1000 TABLET, FILM COATED ORAL
Qty: 0 | Refills: 0 | Status: DISCONTINUED | OUTPATIENT
Start: 2019-03-26 | End: 2019-04-10

## 2019-03-26 RX ORDER — MAGNESIUM HYDROXIDE 400 MG/1
30 TABLET, CHEWABLE ORAL DAILY
Qty: 0 | Refills: 0 | Status: DISCONTINUED | OUTPATIENT
Start: 2019-03-26 | End: 2019-04-10

## 2019-03-26 RX ORDER — PANTOPRAZOLE SODIUM 20 MG/1
40 TABLET, DELAYED RELEASE ORAL
Qty: 0 | Refills: 0 | Status: DISCONTINUED | OUTPATIENT
Start: 2019-03-26 | End: 2019-04-10

## 2019-03-26 RX ORDER — ACETAMINOPHEN 500 MG
650 TABLET ORAL EVERY 4 HOURS
Qty: 0 | Refills: 0 | Status: DISCONTINUED | OUTPATIENT
Start: 2019-03-26 | End: 2019-04-10

## 2019-03-26 RX ORDER — CHOLECALCIFEROL (VITAMIN D3) 125 MCG
1000 CAPSULE ORAL EVERY 24 HOURS
Qty: 0 | Refills: 0 | Status: DISCONTINUED | OUTPATIENT
Start: 2019-03-26 | End: 2019-04-10

## 2019-03-26 RX ORDER — LEVOTHYROXINE SODIUM 125 MCG
125 TABLET ORAL DAILY
Qty: 0 | Refills: 0 | Status: DISCONTINUED | OUTPATIENT
Start: 2019-03-26 | End: 2019-03-29

## 2019-03-26 RX ORDER — LANOLIN ALCOHOL/MO/W.PET/CERES
5 CREAM (GRAM) TOPICAL AT BEDTIME
Qty: 0 | Refills: 0 | Status: DISCONTINUED | OUTPATIENT
Start: 2019-03-26 | End: 2019-04-10

## 2019-03-26 RX ORDER — PREGABALIN 225 MG/1
1 CAPSULE ORAL
Qty: 30 | Refills: 0 | OUTPATIENT
Start: 2019-03-26

## 2019-03-26 RX ORDER — HYDROCORTISONE 1 %
1 OINTMENT (GRAM) TOPICAL EVERY 12 HOURS
Qty: 0 | Refills: 0 | Status: DISCONTINUED | OUTPATIENT
Start: 2019-03-26 | End: 2019-04-10

## 2019-03-26 RX ORDER — DEXAMETHASONE 0.5 MG/5ML
6 ELIXIR ORAL EVERY 12 HOURS
Qty: 0 | Refills: 0 | Status: DISCONTINUED | OUTPATIENT
Start: 2019-03-26 | End: 2019-04-05

## 2019-03-26 RX ORDER — ENOXAPARIN SODIUM 100 MG/ML
40 INJECTION SUBCUTANEOUS DAILY
Qty: 0 | Refills: 0 | Status: DISCONTINUED | OUTPATIENT
Start: 2019-03-26 | End: 2019-04-10

## 2019-03-26 RX ORDER — SENNA PLUS 8.6 MG/1
2 TABLET ORAL AT BEDTIME
Qty: 0 | Refills: 0 | Status: DISCONTINUED | OUTPATIENT
Start: 2019-03-26 | End: 2019-04-10

## 2019-03-26 RX ORDER — POLYETHYLENE GLYCOL 3350 17 G/17G
17 POWDER, FOR SOLUTION ORAL DAILY
Qty: 0 | Refills: 0 | Status: DISCONTINUED | OUTPATIENT
Start: 2019-03-26 | End: 2019-04-10

## 2019-03-26 RX ADMIN — Medication 1 APPLICATION(S): at 05:21

## 2019-03-26 RX ADMIN — PREGABALIN 1000 MICROGRAM(S): 225 CAPSULE ORAL at 11:45

## 2019-03-26 RX ADMIN — POLYETHYLENE GLYCOL 3350 17 GRAM(S): 17 POWDER, FOR SOLUTION ORAL at 11:46

## 2019-03-26 RX ADMIN — LEVETIRACETAM 1000 MILLIGRAM(S): 250 TABLET, FILM COATED ORAL at 05:22

## 2019-03-26 RX ADMIN — LEVETIRACETAM 1000 MILLIGRAM(S): 250 TABLET, FILM COATED ORAL at 17:02

## 2019-03-26 RX ADMIN — Medication 6 MILLIGRAM(S): at 17:02

## 2019-03-26 RX ADMIN — Medication 6 MILLIGRAM(S): at 05:22

## 2019-03-26 RX ADMIN — Medication 125 MICROGRAM(S): at 05:22

## 2019-03-26 RX ADMIN — Medication 1 APPLICATION(S): at 12:15

## 2019-03-26 RX ADMIN — PANTOPRAZOLE SODIUM 40 MILLIGRAM(S): 20 TABLET, DELAYED RELEASE ORAL at 05:22

## 2019-03-26 RX ADMIN — Medication 1000 UNIT(S): at 11:45

## 2019-03-26 RX ADMIN — Medication 1 APPLICATION(S): at 17:03

## 2019-03-26 NOTE — PROGRESS NOTE ADULT - SUBJECTIVE AND OBJECTIVE BOX
Patient is a 63y old  Female who presents with a chief complaint of Right sided weakness (25 Mar 2019 14:43)    Subjective: Patient is feeling better , awaiting auth for 4A      Vital Signs Last 24 Hrs  T(C): 35.9 (26 Mar 2019 05:32), Max: 35.9 (26 Mar 2019 05:32)  T(F): 96.7 (26 Mar 2019 05:32), Max: 96.7 (26 Mar 2019 05:32)  HR: 68 (26 Mar 2019 05:32) (55 - 68)  BP: 109/61 (26 Mar 2019 05:32) (109/61 - 114/63)  BP(mean): --  RR: 17 (26 Mar 2019 05:32) (17 - 18)  SpO2: 96% (26 Mar 2019 08:01) (96% - 98%)    PHYSICAL EXAM  General: adult in NAD  HEENT: clear oropharynx, anicteric sclera, pink conjunctiva  Neck: supple  CV: normal S1/S2 with no murmur rubs or gallops  Lungs: positive air movement b/l ant lungs,clear to auscultation, no wheezes, no rales  Abdomen: soft non-tender non-distended, no hepatosplenomegaly  Ext: no clubbing cyanosis or edema  Skin: no rashes and no petechiae  Neuro: alert and oriented X 4, right sided weakness    MEDICATIONS  (STANDING):  cholecalciferol 1000 Unit(s) Oral daily  cyanocobalamin 1000 MICROGram(s) Oral daily  dexamethasone     Tablet 6 milliGRAM(s) Oral every 12 hours  hydrocortisone 1% Cream 1 Application(s) Topical every 6 hours  levETIRAcetam 1000 milliGRAM(s) Oral two times a day  levothyroxine 125 MICROGram(s) Oral daily  pantoprazole    Tablet 40 milliGRAM(s) Oral before breakfast  polyethylene glycol 3350 17 Gram(s) Oral daily    MEDICATIONS  (PRN):  melatonin 5 milliGRAM(s) Oral at bedtime PRN Insomnia      LABS:                          15.9   10.31 )-----------( 280      ( 25 Mar 2019 08:38 )             47.4         Mean Cell Volume : 89.9 fL  Mean Cell Hemoglobin : 30.2 pg  Mean Cell Hemoglobin Concentration : 33.5 g/dL  Auto Neutrophil # : 8.82 K/uL  Auto Lymphocyte # : 0.88 K/uL  Auto Monocyte # : 0.45 K/uL  Auto Eosinophil # : 0.00 K/uL  Auto Basophil # : 0.01 K/uL  Auto Neutrophil % : 85.5 %  Auto Lymphocyte % : 8.5 %  Auto Monocyte % : 4.4 %  Auto Eosinophil % : 0.0 %  Auto Basophil % : 0.1 %      Serial CBC's  03-25 @ 08:38  Hct-47.4 / Hgb-15.9 / Plat-280 / RBC-5.27 / WBC-10.31  Serial CBC's  03-24 @ 08:33  Hct-43.9 / Hgb-14.8 / Plat-262 / RBC-4.91 / WBC-9.88  Serial CBC's  03-23 @ 08:52  Hct-44.3 / Hgb-14.7 / Plat-279 / RBC-4.87 / WBC-11.58  Serial CBC's  03-22 @ 09:48  Hct-44.9 / Hgb-15.0 / Plat-282 / RBC-4.95 / WBC-10.68      03-25    137  |  97<L>  |  19  ----------------------------<  219<H>  4.6   |  24  |  0.8    Ca    9.7      25 Mar 2019 08:38  Mg     2.3     03-25

## 2019-03-26 NOTE — DISCHARGE NOTE NURSING/CASE MANAGEMENT/SOCIAL WORK - NSDCDPATPORTLINK_GEN_ALL_CORE
You can access the [facility] Patient Portal, offered by [facility], by registering with the following website: [email or web address]

## 2019-03-26 NOTE — PROGRESS NOTE ADULT - PROVIDER SPECIALTY LIST ADULT
Heme/Onc
Internal Medicine
Neurosurgery
Heme/Onc
Internal Medicine

## 2019-03-26 NOTE — PROGRESS NOTE ADULT - ASSESSMENT
63 yr old female patient with stage IV papillary serous endometrial cancer, papillary thyroid cancer is sent to the ED for right sided weakness , found to have:    # Metastatic brain lesions with mass effect and no midline shift:  - MRI brain w and w/ contrast done; results reviewed.  - On keppra 1g PO BID and decadron 6 mg PO BID.  - Radiation oncology on board.  Patient had mapping on 3/22.  She will start radiation as OP once on 4A    # Stage IV papillary serous endometrial cancer  - Completed cycle 8 of carbo/taxol on 6/29/2018, which was followed by observation.  - Restaging PET CT on 2/12/2019 revealed slight increase in size of left upper quadrant peritoneal nodule (1.1 cm,   previously 0.8 cm) with stable FDG uptake, 7.7 SUV suspicious for biologic tumor activity.  - Biopsy of peritoneal nodule is positive for recurrent endometrial cancer (as per Dr. Cid's verbal discussion with Dr. Massey.)     # Papillary thyroid cancer [id number], s/p total thyroidectomy on 8/20/2018  - Follows with Dr. Herrera from ENT and Dr. Acevedo of endocrinology.    # Dispo : awaiting Auth to 4A    Will follow with attending

## 2019-03-26 NOTE — PROGRESS NOTE ADULT - REASON FOR ADMISSION
Right sided weakness

## 2019-03-26 NOTE — PROGRESS NOTE ADULT - ATTENDING COMMENTS
Serena awaiting discharge for 4A later today. Neurologically she is stable. For WBXRT to start tomorrow.   Will follow in rehab.
Awaiting discharge home vs 4A rehab, will start whole brain XRT upon discharge.   Symptomatically much improved, continue with Decadron and Keppra.
Plan for d/c either home or to 4A rehab, patient was sim-ed for whole brain XRT.   Discharge with Keppra and Dexamethasone. Case was discussed with Neurosurgery and Radiation Oncology.

## 2019-03-26 NOTE — PROGRESS NOTE ADULT - ASSESSMENT
SUBJECTIVE:    Patient is a 63y old Female who presents with a chief complaint of Right sided weakness (26 Mar 2019 08:05)  Currently admitted to medicine with the primary diagnosis of Malignant neoplasm of brain, unspecified location  Today is hospital day 6d. This morning she is resting comfortably in bed and reports no new issues or overnight events.   Pt notes she wants to start radiation therapy. Improvement in R arm strength.   PAST MEDICAL & SURGICAL HISTORY  Thyroid cancer  Peripheral neuropathy  Pleural effusion: 11/17  Uterine cancer  History of total abdominal hysterectomy: 4/2018  S/P thoracentesis: 12/17  H/O lymph node excision: LEFT NECK 12/17    SOCIAL HISTORY:  Negative for smoking/alcohol/drug use.     ALLERGIES:  No Known Allergies    MEDICATIONS:  STANDING MEDICATIONS  cholecalciferol 1000 Unit(s) Oral daily  cyanocobalamin 1000 MICROGram(s) Oral daily  dexamethasone     Tablet 6 milliGRAM(s) Oral every 12 hours  hydrocortisone 1% Cream 1 Application(s) Topical every 6 hours  levETIRAcetam 1000 milliGRAM(s) Oral two times a day  levothyroxine 125 MICROGram(s) Oral daily  pantoprazole    Tablet 40 milliGRAM(s) Oral before breakfast  polyethylene glycol 3350 17 Gram(s) Oral daily    PRN MEDICATIONS  melatonin 5 milliGRAM(s) Oral at bedtime PRN    VITALS:   T(F): 96.7  HR: 68  BP: 109/61  RR: 17  SpO2: 96%    LABS:                        15.9   10.31 )-----------( 280      ( 25 Mar 2019 08:38 )             47.4     03-25    137  |  97<L>  |  19  ----------------------------<  219<H>  4.6   |  24  |  0.8    Ca    9.7      25 Mar 2019 08:38  Mg     2.3     03-25                    RADIOLOGY:    PHYSICAL EXAM:  GEN: No acute distress  LUNGS: Clear to auscultation bilaterally   HEART: Regular  ABD: Soft, non-tender, non-distended.  EXT: No edema legs.   NEURO: [id number] 5/5 RLE, 5/5 RUE, 4/5 R hand     61 yo F with pmh of papillary thyroid cancer s/p resection stage IV endometrial cancer s/p 8 cycles of carboplatin and taxol, sent in by her oncologist due to progressive R sided weakness, found to have new lesions in the brain    #) R sided weakness 2/2 new metastatic brain lesions  -CT H with new b/l supratentorial lesions with surrounding edema, suspicious for metastatic disease. Localized mass effect without midline shift.   - MR brain w/wo contrast multiple supratentorial heterogeneously enhancing lesions consistent with   metastatic disease, image suggestive of adenocarcinoma. mass effect on the left lateral ventricle and corpus callosum.  -c/w keppra 1g PO BID   - switched decadron 6 mgPO q12h  -once authorization, DC to 4a    #) itching, likely 2/2 steroids-resolved    #) papilary serous endometrial cancer  -s/p hysterectomy b/l salpingoopherectomy  -s/p 8 cycles carbo/taxol 6/2018  -peritoneal nodule on PET (2/2109) showing recurrent endometrial cancer on biopsy.     #) papillary thyroid cancer s/p resection  -c/w levothyroxine 125 mcg  -f/u with Dr. Herrera and Dr. Acevedo    #) possible vitamin D deficiency  -c/w cholecalciferol   DVT ppx SCDs  Diet: DASH  FULL CODE

## 2019-03-27 ENCOUNTER — APPOINTMENT (OUTPATIENT)
Dept: OTOLARYNGOLOGY | Facility: CLINIC | Age: 64
End: 2019-03-27

## 2019-03-27 ENCOUNTER — INBOUND DOCUMENT (OUTPATIENT)
Age: 64
End: 2019-03-27

## 2019-03-27 LAB
ALBUMIN SERPL ELPH-MCNC: 4.3 G/DL — SIGNIFICANT CHANGE UP (ref 3.5–5.2)
ALP SERPL-CCNC: 87 U/L — SIGNIFICANT CHANGE UP (ref 30–115)
ALT FLD-CCNC: 40 U/L — SIGNIFICANT CHANGE UP (ref 0–41)
ANION GAP SERPL CALC-SCNC: 15 MMOL/L — HIGH (ref 7–14)
APPEARANCE UR: CLEAR — SIGNIFICANT CHANGE UP
AST SERPL-CCNC: 22 U/L — SIGNIFICANT CHANGE UP (ref 0–41)
BACTERIA # UR AUTO: ABNORMAL /HPF
BASOPHILS # BLD AUTO: 0.06 K/UL — SIGNIFICANT CHANGE UP (ref 0–0.2)
BASOPHILS NFR BLD AUTO: 0.4 % — SIGNIFICANT CHANGE UP (ref 0–1)
BILIRUB SERPL-MCNC: 0.8 MG/DL — SIGNIFICANT CHANGE UP (ref 0.2–1.2)
BILIRUB UR-MCNC: NEGATIVE — SIGNIFICANT CHANGE UP
BUN SERPL-MCNC: 22 MG/DL — HIGH (ref 10–20)
CALCIUM SERPL-MCNC: 9.4 MG/DL — SIGNIFICANT CHANGE UP (ref 8.5–10.1)
CHLORIDE SERPL-SCNC: 97 MMOL/L — LOW (ref 98–110)
CO2 SERPL-SCNC: 23 MMOL/L — SIGNIFICANT CHANGE UP (ref 17–32)
COLOR SPEC: YELLOW — SIGNIFICANT CHANGE UP
CREAT SERPL-MCNC: 0.7 MG/DL — SIGNIFICANT CHANGE UP (ref 0.7–1.5)
DIFF PNL FLD: NEGATIVE — SIGNIFICANT CHANGE UP
EOSINOPHIL # BLD AUTO: 0.02 K/UL — SIGNIFICANT CHANGE UP (ref 0–0.7)
EOSINOPHIL NFR BLD AUTO: 0.1 % — SIGNIFICANT CHANGE UP (ref 0–8)
EPI CELLS # UR: ABNORMAL /HPF
GLUCOSE SERPL-MCNC: 144 MG/DL — HIGH (ref 70–99)
GLUCOSE UR QL: NEGATIVE MG/DL — SIGNIFICANT CHANGE UP
HCT VFR BLD CALC: 48.7 % — HIGH (ref 37–47)
HGB BLD-MCNC: 16.6 G/DL — HIGH (ref 12–16)
IMM GRANULOCYTES NFR BLD AUTO: 1.8 % — HIGH (ref 0.1–0.3)
KETONES UR-MCNC: NEGATIVE — SIGNIFICANT CHANGE UP
LEUKOCYTE ESTERASE UR-ACNC: ABNORMAL
LYMPHOCYTES # BLD AUTO: 1.21 K/UL — SIGNIFICANT CHANGE UP (ref 1.2–3.4)
LYMPHOCYTES # BLD AUTO: 7.2 % — LOW (ref 20.5–51.1)
MAGNESIUM SERPL-MCNC: 2.4 MG/DL — SIGNIFICANT CHANGE UP (ref 1.8–2.4)
MCHC RBC-ENTMCNC: 30.1 PG — SIGNIFICANT CHANGE UP (ref 27–31)
MCHC RBC-ENTMCNC: 34.1 G/DL — SIGNIFICANT CHANGE UP (ref 32–37)
MCV RBC AUTO: 88.4 FL — SIGNIFICANT CHANGE UP (ref 81–99)
MONOCYTES # BLD AUTO: 0.8 K/UL — HIGH (ref 0.1–0.6)
MONOCYTES NFR BLD AUTO: 4.7 % — SIGNIFICANT CHANGE UP (ref 1.7–9.3)
NEUTROPHILS # BLD AUTO: 14.46 K/UL — HIGH (ref 1.4–6.5)
NEUTROPHILS NFR BLD AUTO: 85.8 % — HIGH (ref 42.2–75.2)
NITRITE UR-MCNC: NEGATIVE — SIGNIFICANT CHANGE UP
NRBC # BLD: 0 /100 WBCS — SIGNIFICANT CHANGE UP (ref 0–0)
PH UR: 6.5 — SIGNIFICANT CHANGE UP (ref 5–8)
PLATELET # BLD AUTO: 176 K/UL — SIGNIFICANT CHANGE UP (ref 130–400)
POTASSIUM SERPL-MCNC: 4.8 MMOL/L — SIGNIFICANT CHANGE UP (ref 3.5–5)
POTASSIUM SERPL-SCNC: 4.8 MMOL/L — SIGNIFICANT CHANGE UP (ref 3.5–5)
PROT SERPL-MCNC: 7.1 G/DL — SIGNIFICANT CHANGE UP (ref 6–8)
PROT UR-MCNC: NEGATIVE MG/DL — SIGNIFICANT CHANGE UP
RBC # BLD: 5.51 M/UL — HIGH (ref 4.2–5.4)
RBC # FLD: 12.2 % — SIGNIFICANT CHANGE UP (ref 11.5–14.5)
SODIUM SERPL-SCNC: 135 MMOL/L — SIGNIFICANT CHANGE UP (ref 135–146)
SP GR SPEC: 1.01 — SIGNIFICANT CHANGE UP (ref 1.01–1.03)
UROBILINOGEN FLD QL: 1 MG/DL (ref 0.2–0.2)
WBC # BLD: 16.86 K/UL — HIGH (ref 4.8–10.8)
WBC # FLD AUTO: 16.86 K/UL — HIGH (ref 4.8–10.8)
WBC UR QL: SIGNIFICANT CHANGE UP /HPF

## 2019-03-27 RX ORDER — PREGABALIN 225 MG/1
1000 CAPSULE ORAL DAILY
Qty: 0 | Refills: 0 | Status: DISCONTINUED | OUTPATIENT
Start: 2019-03-27 | End: 2019-04-10

## 2019-03-27 RX ADMIN — Medication 5 MILLIGRAM(S): at 22:15

## 2019-03-27 RX ADMIN — PANTOPRAZOLE SODIUM 40 MILLIGRAM(S): 20 TABLET, DELAYED RELEASE ORAL at 06:52

## 2019-03-27 RX ADMIN — LEVETIRACETAM 1000 MILLIGRAM(S): 250 TABLET, FILM COATED ORAL at 18:12

## 2019-03-27 RX ADMIN — Medication 6 MILLIGRAM(S): at 05:48

## 2019-03-27 RX ADMIN — LEVETIRACETAM 1000 MILLIGRAM(S): 250 TABLET, FILM COATED ORAL at 05:54

## 2019-03-27 RX ADMIN — Medication 6 MILLIGRAM(S): at 18:15

## 2019-03-27 RX ADMIN — Medication 1000 UNIT(S): at 13:23

## 2019-03-27 RX ADMIN — ENOXAPARIN SODIUM 40 MILLIGRAM(S): 100 INJECTION SUBCUTANEOUS at 11:51

## 2019-03-27 RX ADMIN — Medication 125 MICROGRAM(S): at 05:50

## 2019-03-28 LAB
T4 FREE SERPL-MCNC: 2.2 NG/DL — HIGH (ref 0.9–1.8)
TSH SERPL-MCNC: 0.02 UIU/ML — LOW (ref 0.27–4.2)

## 2019-03-28 RX ADMIN — Medication 1000 UNIT(S): at 13:21

## 2019-03-28 RX ADMIN — SENNA PLUS 2 TABLET(S): 8.6 TABLET ORAL at 21:13

## 2019-03-28 RX ADMIN — LEVETIRACETAM 1000 MILLIGRAM(S): 250 TABLET, FILM COATED ORAL at 06:03

## 2019-03-28 RX ADMIN — PANTOPRAZOLE SODIUM 40 MILLIGRAM(S): 20 TABLET, DELAYED RELEASE ORAL at 06:01

## 2019-03-28 RX ADMIN — LEVETIRACETAM 1000 MILLIGRAM(S): 250 TABLET, FILM COATED ORAL at 17:31

## 2019-03-28 RX ADMIN — Medication 6 MILLIGRAM(S): at 17:30

## 2019-03-28 RX ADMIN — PREGABALIN 1000 MICROGRAM(S): 225 CAPSULE ORAL at 13:21

## 2019-03-28 RX ADMIN — Medication 125 MICROGRAM(S): at 06:01

## 2019-03-28 RX ADMIN — ENOXAPARIN SODIUM 40 MILLIGRAM(S): 100 INJECTION SUBCUTANEOUS at 13:21

## 2019-03-28 RX ADMIN — Medication 6 MILLIGRAM(S): at 06:02

## 2019-03-29 DIAGNOSIS — Z85.850 PERSONAL HISTORY OF MALIGNANT NEOPLASM OF THYROID: ICD-10-CM

## 2019-03-29 DIAGNOSIS — G93.6 CEREBRAL EDEMA: ICD-10-CM

## 2019-03-29 DIAGNOSIS — E55.9 VITAMIN D DEFICIENCY, UNSPECIFIED: ICD-10-CM

## 2019-03-29 DIAGNOSIS — R29.898 OTHER SYMPTOMS AND SIGNS INVOLVING THE MUSCULOSKELETAL SYSTEM: ICD-10-CM

## 2019-03-29 DIAGNOSIS — E89.0 POSTPROCEDURAL HYPOTHYROIDISM: ICD-10-CM

## 2019-03-29 DIAGNOSIS — T38.0X5A ADVERSE EFFECT OF GLUCOCORTICOIDS AND SYNTHETIC ANALOGUES, INITIAL ENCOUNTER: ICD-10-CM

## 2019-03-29 DIAGNOSIS — C79.31 SECONDARY MALIGNANT NEOPLASM OF BRAIN: ICD-10-CM

## 2019-03-29 DIAGNOSIS — C54.1 MALIGNANT NEOPLASM OF ENDOMETRIUM: ICD-10-CM

## 2019-03-29 DIAGNOSIS — G62.9 POLYNEUROPATHY, UNSPECIFIED: ICD-10-CM

## 2019-03-29 DIAGNOSIS — L25.9 UNSPECIFIED CONTACT DERMATITIS, UNSPECIFIED CAUSE: ICD-10-CM

## 2019-03-29 RX ORDER — LEVOTHYROXINE SODIUM 125 MCG
112 TABLET ORAL
Qty: 0 | Refills: 0 | Status: DISCONTINUED | OUTPATIENT
Start: 2019-03-30 | End: 2019-04-10

## 2019-03-29 RX ADMIN — PANTOPRAZOLE SODIUM 40 MILLIGRAM(S): 20 TABLET, DELAYED RELEASE ORAL at 05:32

## 2019-03-29 RX ADMIN — ENOXAPARIN SODIUM 40 MILLIGRAM(S): 100 INJECTION SUBCUTANEOUS at 11:04

## 2019-03-29 RX ADMIN — Medication 125 MICROGRAM(S): at 05:32

## 2019-03-29 RX ADMIN — LEVETIRACETAM 1000 MILLIGRAM(S): 250 TABLET, FILM COATED ORAL at 05:32

## 2019-03-29 RX ADMIN — LEVETIRACETAM 1000 MILLIGRAM(S): 250 TABLET, FILM COATED ORAL at 17:25

## 2019-03-29 RX ADMIN — Medication 6 MILLIGRAM(S): at 17:22

## 2019-03-29 RX ADMIN — Medication 1000 UNIT(S): at 11:07

## 2019-03-29 RX ADMIN — Medication 6 MILLIGRAM(S): at 05:31

## 2019-03-29 RX ADMIN — PREGABALIN 1000 MICROGRAM(S): 225 CAPSULE ORAL at 11:04

## 2019-03-30 RX ADMIN — Medication 112 MICROGRAM(S): at 05:32

## 2019-03-30 RX ADMIN — LEVETIRACETAM 1000 MILLIGRAM(S): 250 TABLET, FILM COATED ORAL at 17:19

## 2019-03-30 RX ADMIN — PREGABALIN 1000 MICROGRAM(S): 225 CAPSULE ORAL at 12:42

## 2019-03-30 RX ADMIN — Medication 1000 UNIT(S): at 12:41

## 2019-03-30 RX ADMIN — Medication 6 MILLIGRAM(S): at 17:20

## 2019-03-30 RX ADMIN — Medication 6 MILLIGRAM(S): at 05:33

## 2019-03-30 RX ADMIN — LEVETIRACETAM 1000 MILLIGRAM(S): 250 TABLET, FILM COATED ORAL at 05:31

## 2019-03-30 RX ADMIN — ENOXAPARIN SODIUM 40 MILLIGRAM(S): 100 INJECTION SUBCUTANEOUS at 12:42

## 2019-03-30 RX ADMIN — PANTOPRAZOLE SODIUM 40 MILLIGRAM(S): 20 TABLET, DELAYED RELEASE ORAL at 05:31

## 2019-03-31 RX ADMIN — ENOXAPARIN SODIUM 40 MILLIGRAM(S): 100 INJECTION SUBCUTANEOUS at 11:37

## 2019-03-31 RX ADMIN — Medication 6 MILLIGRAM(S): at 17:13

## 2019-03-31 RX ADMIN — PANTOPRAZOLE SODIUM 40 MILLIGRAM(S): 20 TABLET, DELAYED RELEASE ORAL at 05:57

## 2019-03-31 RX ADMIN — Medication 6 MILLIGRAM(S): at 05:56

## 2019-03-31 RX ADMIN — LEVETIRACETAM 1000 MILLIGRAM(S): 250 TABLET, FILM COATED ORAL at 05:57

## 2019-03-31 RX ADMIN — Medication 112 MICROGRAM(S): at 05:56

## 2019-03-31 RX ADMIN — PREGABALIN 1000 MICROGRAM(S): 225 CAPSULE ORAL at 11:37

## 2019-03-31 RX ADMIN — LEVETIRACETAM 1000 MILLIGRAM(S): 250 TABLET, FILM COATED ORAL at 17:13

## 2019-03-31 RX ADMIN — Medication 1000 UNIT(S): at 12:56

## 2019-04-01 LAB
ALBUMIN SERPL ELPH-MCNC: 4 G/DL — SIGNIFICANT CHANGE UP (ref 3.5–5.2)
ALP SERPL-CCNC: 79 U/L — SIGNIFICANT CHANGE UP (ref 30–115)
ALT FLD-CCNC: 44 U/L — HIGH (ref 0–41)
ANION GAP SERPL CALC-SCNC: 15 MMOL/L — HIGH (ref 7–14)
AST SERPL-CCNC: 17 U/L — SIGNIFICANT CHANGE UP (ref 0–41)
BASOPHILS # BLD AUTO: 0.03 K/UL — SIGNIFICANT CHANGE UP (ref 0–0.2)
BASOPHILS NFR BLD AUTO: 0.2 % — SIGNIFICANT CHANGE UP (ref 0–1)
BILIRUB SERPL-MCNC: 0.6 MG/DL — SIGNIFICANT CHANGE UP (ref 0.2–1.2)
BUN SERPL-MCNC: 19 MG/DL — SIGNIFICANT CHANGE UP (ref 10–20)
CALCIUM SERPL-MCNC: 9.2 MG/DL — SIGNIFICANT CHANGE UP (ref 8.5–10.1)
CHLORIDE SERPL-SCNC: 96 MMOL/L — LOW (ref 98–110)
CO2 SERPL-SCNC: 23 MMOL/L — SIGNIFICANT CHANGE UP (ref 17–32)
CREAT SERPL-MCNC: 0.7 MG/DL — SIGNIFICANT CHANGE UP (ref 0.7–1.5)
EOSINOPHIL # BLD AUTO: 0 K/UL — SIGNIFICANT CHANGE UP (ref 0–0.7)
EOSINOPHIL NFR BLD AUTO: 0 % — SIGNIFICANT CHANGE UP (ref 0–8)
GLUCOSE SERPL-MCNC: 143 MG/DL — HIGH (ref 70–99)
HCT VFR BLD CALC: 47.1 % — HIGH (ref 37–47)
HGB BLD-MCNC: 15.9 G/DL — SIGNIFICANT CHANGE UP (ref 12–16)
IMM GRANULOCYTES NFR BLD AUTO: 4.4 % — HIGH (ref 0.1–0.3)
LYMPHOCYTES # BLD AUTO: 1.02 K/UL — LOW (ref 1.2–3.4)
LYMPHOCYTES # BLD AUTO: 8.2 % — LOW (ref 20.5–51.1)
MAGNESIUM SERPL-MCNC: 2.5 MG/DL — HIGH (ref 1.8–2.4)
MCHC RBC-ENTMCNC: 30 PG — SIGNIFICANT CHANGE UP (ref 27–31)
MCHC RBC-ENTMCNC: 33.8 G/DL — SIGNIFICANT CHANGE UP (ref 32–37)
MCV RBC AUTO: 88.9 FL — SIGNIFICANT CHANGE UP (ref 81–99)
MONOCYTES # BLD AUTO: 0.46 K/UL — SIGNIFICANT CHANGE UP (ref 0.1–0.6)
MONOCYTES NFR BLD AUTO: 3.7 % — SIGNIFICANT CHANGE UP (ref 1.7–9.3)
NEUTROPHILS # BLD AUTO: 10.33 K/UL — HIGH (ref 1.4–6.5)
NEUTROPHILS NFR BLD AUTO: 83.5 % — HIGH (ref 42.2–75.2)
NRBC # BLD: 0 /100 WBCS — SIGNIFICANT CHANGE UP (ref 0–0)
PLATELET # BLD AUTO: 172 K/UL — SIGNIFICANT CHANGE UP (ref 130–400)
POTASSIUM SERPL-MCNC: 4.7 MMOL/L — SIGNIFICANT CHANGE UP (ref 3.5–5)
POTASSIUM SERPL-SCNC: 4.7 MMOL/L — SIGNIFICANT CHANGE UP (ref 3.5–5)
PROT SERPL-MCNC: 6.6 G/DL — SIGNIFICANT CHANGE UP (ref 6–8)
RBC # BLD: 5.3 M/UL — SIGNIFICANT CHANGE UP (ref 4.2–5.4)
RBC # FLD: 12.4 % — SIGNIFICANT CHANGE UP (ref 11.5–14.5)
SODIUM SERPL-SCNC: 134 MMOL/L — LOW (ref 135–146)
WBC # BLD: 12.39 K/UL — HIGH (ref 4.8–10.8)
WBC # FLD AUTO: 12.39 K/UL — HIGH (ref 4.8–10.8)

## 2019-04-01 RX ADMIN — PREGABALIN 1000 MICROGRAM(S): 225 CAPSULE ORAL at 12:40

## 2019-04-01 RX ADMIN — POLYETHYLENE GLYCOL 3350 17 GRAM(S): 17 POWDER, FOR SOLUTION ORAL at 12:39

## 2019-04-01 RX ADMIN — PANTOPRAZOLE SODIUM 40 MILLIGRAM(S): 20 TABLET, DELAYED RELEASE ORAL at 06:04

## 2019-04-01 RX ADMIN — LEVETIRACETAM 1000 MILLIGRAM(S): 250 TABLET, FILM COATED ORAL at 17:46

## 2019-04-01 RX ADMIN — ENOXAPARIN SODIUM 40 MILLIGRAM(S): 100 INJECTION SUBCUTANEOUS at 12:41

## 2019-04-01 RX ADMIN — Medication 112 MICROGRAM(S): at 06:04

## 2019-04-01 RX ADMIN — Medication 6 MILLIGRAM(S): at 06:04

## 2019-04-01 RX ADMIN — Medication 6 MILLIGRAM(S): at 17:47

## 2019-04-01 RX ADMIN — Medication 1000 UNIT(S): at 15:03

## 2019-04-01 RX ADMIN — LEVETIRACETAM 1000 MILLIGRAM(S): 250 TABLET, FILM COATED ORAL at 06:04

## 2019-04-02 RX ADMIN — ENOXAPARIN SODIUM 40 MILLIGRAM(S): 100 INJECTION SUBCUTANEOUS at 12:17

## 2019-04-02 RX ADMIN — Medication 1000 UNIT(S): at 12:18

## 2019-04-02 RX ADMIN — Medication 112 MICROGRAM(S): at 05:55

## 2019-04-02 RX ADMIN — PANTOPRAZOLE SODIUM 40 MILLIGRAM(S): 20 TABLET, DELAYED RELEASE ORAL at 05:56

## 2019-04-02 RX ADMIN — PREGABALIN 1000 MICROGRAM(S): 225 CAPSULE ORAL at 12:17

## 2019-04-02 RX ADMIN — Medication 6 MILLIGRAM(S): at 17:27

## 2019-04-02 RX ADMIN — LEVETIRACETAM 1000 MILLIGRAM(S): 250 TABLET, FILM COATED ORAL at 05:58

## 2019-04-02 RX ADMIN — Medication 6 MILLIGRAM(S): at 05:56

## 2019-04-02 RX ADMIN — LEVETIRACETAM 1000 MILLIGRAM(S): 250 TABLET, FILM COATED ORAL at 17:27

## 2019-04-03 RX ADMIN — SENNA PLUS 2 TABLET(S): 8.6 TABLET ORAL at 21:43

## 2019-04-03 RX ADMIN — PREGABALIN 1000 MICROGRAM(S): 225 CAPSULE ORAL at 13:16

## 2019-04-03 RX ADMIN — LEVETIRACETAM 1000 MILLIGRAM(S): 250 TABLET, FILM COATED ORAL at 18:24

## 2019-04-03 RX ADMIN — ENOXAPARIN SODIUM 40 MILLIGRAM(S): 100 INJECTION SUBCUTANEOUS at 13:17

## 2019-04-03 RX ADMIN — Medication 6 MILLIGRAM(S): at 06:06

## 2019-04-03 RX ADMIN — Medication 1000 UNIT(S): at 13:16

## 2019-04-03 RX ADMIN — PANTOPRAZOLE SODIUM 40 MILLIGRAM(S): 20 TABLET, DELAYED RELEASE ORAL at 06:06

## 2019-04-03 RX ADMIN — Medication 112 MICROGRAM(S): at 06:06

## 2019-04-03 RX ADMIN — Medication 6 MILLIGRAM(S): at 18:24

## 2019-04-03 RX ADMIN — LEVETIRACETAM 1000 MILLIGRAM(S): 250 TABLET, FILM COATED ORAL at 06:07

## 2019-04-04 RX ADMIN — ENOXAPARIN SODIUM 40 MILLIGRAM(S): 100 INJECTION SUBCUTANEOUS at 12:59

## 2019-04-04 RX ADMIN — Medication 1000 UNIT(S): at 12:58

## 2019-04-04 RX ADMIN — PANTOPRAZOLE SODIUM 40 MILLIGRAM(S): 20 TABLET, DELAYED RELEASE ORAL at 05:27

## 2019-04-04 RX ADMIN — PREGABALIN 1000 MICROGRAM(S): 225 CAPSULE ORAL at 12:58

## 2019-04-04 RX ADMIN — LEVETIRACETAM 1000 MILLIGRAM(S): 250 TABLET, FILM COATED ORAL at 21:17

## 2019-04-04 RX ADMIN — LEVETIRACETAM 1000 MILLIGRAM(S): 250 TABLET, FILM COATED ORAL at 05:23

## 2019-04-04 RX ADMIN — Medication 6 MILLIGRAM(S): at 17:14

## 2019-04-04 RX ADMIN — Medication 6 MILLIGRAM(S): at 05:24

## 2019-04-04 RX ADMIN — Medication 112 MICROGRAM(S): at 05:24

## 2019-04-04 RX ADMIN — SENNA PLUS 2 TABLET(S): 8.6 TABLET ORAL at 21:11

## 2019-04-05 LAB
ANION GAP SERPL CALC-SCNC: 13 MMOL/L — SIGNIFICANT CHANGE UP (ref 7–14)
BASOPHILS # BLD AUTO: 0 K/UL — SIGNIFICANT CHANGE UP (ref 0–0.2)
BASOPHILS NFR BLD AUTO: 0 % — SIGNIFICANT CHANGE UP (ref 0–1)
BUN SERPL-MCNC: 22 MG/DL — HIGH (ref 10–20)
CALCIUM SERPL-MCNC: 9 MG/DL — SIGNIFICANT CHANGE UP (ref 8.5–10.1)
CHLORIDE SERPL-SCNC: 94 MMOL/L — LOW (ref 98–110)
CO2 SERPL-SCNC: 28 MMOL/L — SIGNIFICANT CHANGE UP (ref 17–32)
CREAT SERPL-MCNC: 0.8 MG/DL — SIGNIFICANT CHANGE UP (ref 0.7–1.5)
EOSINOPHIL # BLD AUTO: 0 K/UL — SIGNIFICANT CHANGE UP (ref 0–0.7)
EOSINOPHIL NFR BLD AUTO: 0 % — SIGNIFICANT CHANGE UP (ref 0–8)
GLUCOSE SERPL-MCNC: 130 MG/DL — HIGH (ref 70–99)
HCT VFR BLD CALC: 48.3 % — HIGH (ref 37–47)
HGB BLD-MCNC: 16.1 G/DL — HIGH (ref 12–16)
LYMPHOCYTES # BLD AUTO: 0.11 K/UL — LOW (ref 1.2–3.4)
LYMPHOCYTES # BLD AUTO: 1 % — LOW (ref 20.5–51.1)
MANUAL SMEAR VERIFICATION: SIGNIFICANT CHANGE UP
MCHC RBC-ENTMCNC: 30 PG — SIGNIFICANT CHANGE UP (ref 27–31)
MCHC RBC-ENTMCNC: 33.3 G/DL — SIGNIFICANT CHANGE UP (ref 32–37)
MCV RBC AUTO: 89.9 FL — SIGNIFICANT CHANGE UP (ref 81–99)
MONOCYTES # BLD AUTO: 0.32 K/UL — SIGNIFICANT CHANGE UP (ref 0.1–0.6)
MONOCYTES NFR BLD AUTO: 2.9 % — SIGNIFICANT CHANGE UP (ref 1.7–9.3)
NEUTROPHILS # BLD AUTO: 10.1 K/UL — HIGH (ref 1.4–6.5)
NEUTROPHILS NFR BLD AUTO: 91.2 % — HIGH (ref 42.2–75.2)
NEUTS HYPERSEG # BLD: PRESENT — SIGNIFICANT CHANGE UP
PLAT MORPH BLD: NORMAL — SIGNIFICANT CHANGE UP
PLATELET # BLD AUTO: 194 K/UL — SIGNIFICANT CHANGE UP (ref 130–400)
POTASSIUM SERPL-MCNC: 4.2 MMOL/L — SIGNIFICANT CHANGE UP (ref 3.5–5)
POTASSIUM SERPL-SCNC: 4.2 MMOL/L — SIGNIFICANT CHANGE UP (ref 3.5–5)
RBC # BLD: 5.37 M/UL — SIGNIFICANT CHANGE UP (ref 4.2–5.4)
RBC # FLD: 12.5 % — SIGNIFICANT CHANGE UP (ref 11.5–14.5)
RBC BLD AUTO: NORMAL — SIGNIFICANT CHANGE UP
SODIUM SERPL-SCNC: 135 MMOL/L — SIGNIFICANT CHANGE UP (ref 135–146)
VARIANT LYMPHS # BLD: 4.9 % — SIGNIFICANT CHANGE UP (ref 0–5)
WBC # BLD: 11.07 K/UL — HIGH (ref 4.8–10.8)
WBC # FLD AUTO: 11.07 K/UL — HIGH (ref 4.8–10.8)

## 2019-04-05 RX ORDER — DEXAMETHASONE 0.5 MG/5ML
4 ELIXIR ORAL
Qty: 0 | Refills: 0 | Status: DISCONTINUED | OUTPATIENT
Start: 2019-04-05 | End: 2019-04-10

## 2019-04-05 RX ADMIN — PANTOPRAZOLE SODIUM 40 MILLIGRAM(S): 20 TABLET, DELAYED RELEASE ORAL at 06:03

## 2019-04-05 RX ADMIN — LEVETIRACETAM 1000 MILLIGRAM(S): 250 TABLET, FILM COATED ORAL at 06:05

## 2019-04-05 RX ADMIN — Medication 112 MICROGRAM(S): at 06:03

## 2019-04-05 RX ADMIN — Medication 4 MILLIGRAM(S): at 17:00

## 2019-04-05 RX ADMIN — PREGABALIN 1000 MICROGRAM(S): 225 CAPSULE ORAL at 12:06

## 2019-04-05 RX ADMIN — Medication 1000 UNIT(S): at 12:06

## 2019-04-05 RX ADMIN — LEVETIRACETAM 1000 MILLIGRAM(S): 250 TABLET, FILM COATED ORAL at 17:39

## 2019-04-05 RX ADMIN — POLYETHYLENE GLYCOL 3350 17 GRAM(S): 17 POWDER, FOR SOLUTION ORAL at 12:06

## 2019-04-05 RX ADMIN — Medication 6 MILLIGRAM(S): at 06:03

## 2019-04-05 RX ADMIN — ENOXAPARIN SODIUM 40 MILLIGRAM(S): 100 INJECTION SUBCUTANEOUS at 12:07

## 2019-04-06 RX ADMIN — Medication 4 MILLIGRAM(S): at 06:19

## 2019-04-06 RX ADMIN — ENOXAPARIN SODIUM 40 MILLIGRAM(S): 100 INJECTION SUBCUTANEOUS at 11:55

## 2019-04-06 RX ADMIN — PANTOPRAZOLE SODIUM 40 MILLIGRAM(S): 20 TABLET, DELAYED RELEASE ORAL at 06:19

## 2019-04-06 RX ADMIN — Medication 1000 UNIT(S): at 11:55

## 2019-04-06 RX ADMIN — LEVETIRACETAM 1000 MILLIGRAM(S): 250 TABLET, FILM COATED ORAL at 17:40

## 2019-04-06 RX ADMIN — Medication 112 MICROGRAM(S): at 06:19

## 2019-04-06 RX ADMIN — Medication 4 MILLIGRAM(S): at 17:40

## 2019-04-06 RX ADMIN — SENNA PLUS 2 TABLET(S): 8.6 TABLET ORAL at 21:34

## 2019-04-06 RX ADMIN — LEVETIRACETAM 1000 MILLIGRAM(S): 250 TABLET, FILM COATED ORAL at 06:19

## 2019-04-06 RX ADMIN — PREGABALIN 1000 MICROGRAM(S): 225 CAPSULE ORAL at 11:55

## 2019-04-07 RX ADMIN — LEVETIRACETAM 1000 MILLIGRAM(S): 250 TABLET, FILM COATED ORAL at 17:47

## 2019-04-07 RX ADMIN — Medication 4 MILLIGRAM(S): at 05:48

## 2019-04-07 RX ADMIN — Medication 1000 UNIT(S): at 13:21

## 2019-04-07 RX ADMIN — ENOXAPARIN SODIUM 40 MILLIGRAM(S): 100 INJECTION SUBCUTANEOUS at 11:40

## 2019-04-07 RX ADMIN — POLYETHYLENE GLYCOL 3350 17 GRAM(S): 17 POWDER, FOR SOLUTION ORAL at 11:40

## 2019-04-07 RX ADMIN — PANTOPRAZOLE SODIUM 40 MILLIGRAM(S): 20 TABLET, DELAYED RELEASE ORAL at 06:09

## 2019-04-07 RX ADMIN — PREGABALIN 1000 MICROGRAM(S): 225 CAPSULE ORAL at 11:40

## 2019-04-07 RX ADMIN — Medication 112 MICROGRAM(S): at 05:48

## 2019-04-07 RX ADMIN — Medication 4 MILLIGRAM(S): at 17:47

## 2019-04-07 RX ADMIN — LEVETIRACETAM 1000 MILLIGRAM(S): 250 TABLET, FILM COATED ORAL at 05:48

## 2019-04-08 LAB
ALBUMIN SERPL ELPH-MCNC: 3.6 G/DL — SIGNIFICANT CHANGE UP (ref 3.5–5.2)
ALP SERPL-CCNC: 55 U/L — SIGNIFICANT CHANGE UP (ref 30–115)
ALT FLD-CCNC: 41 U/L — SIGNIFICANT CHANGE UP (ref 0–41)
ANION GAP SERPL CALC-SCNC: 14 MMOL/L — SIGNIFICANT CHANGE UP (ref 7–14)
AST SERPL-CCNC: 25 U/L — SIGNIFICANT CHANGE UP (ref 0–41)
BASOPHILS # BLD AUTO: 0.02 K/UL — SIGNIFICANT CHANGE UP (ref 0–0.2)
BASOPHILS NFR BLD AUTO: 0.2 % — SIGNIFICANT CHANGE UP (ref 0–1)
BILIRUB SERPL-MCNC: 0.9 MG/DL — SIGNIFICANT CHANGE UP (ref 0.2–1.2)
BUN SERPL-MCNC: 20 MG/DL — SIGNIFICANT CHANGE UP (ref 10–20)
CALCIUM SERPL-MCNC: 8.6 MG/DL — SIGNIFICANT CHANGE UP (ref 8.5–10.1)
CHLORIDE SERPL-SCNC: 95 MMOL/L — LOW (ref 98–110)
CO2 SERPL-SCNC: 22 MMOL/L — SIGNIFICANT CHANGE UP (ref 17–32)
CREAT SERPL-MCNC: 0.8 MG/DL — SIGNIFICANT CHANGE UP (ref 0.7–1.5)
EOSINOPHIL # BLD AUTO: 0 K/UL — SIGNIFICANT CHANGE UP (ref 0–0.7)
EOSINOPHIL NFR BLD AUTO: 0 % — SIGNIFICANT CHANGE UP (ref 0–8)
ESTIMATED AVERAGE GLUCOSE: 160 MG/DL — HIGH (ref 68–114)
GLUCOSE SERPL-MCNC: 201 MG/DL — HIGH (ref 70–99)
HBA1C BLD-MCNC: 7.2 % — HIGH (ref 4–5.6)
HCT VFR BLD CALC: 45.9 % — SIGNIFICANT CHANGE UP (ref 37–47)
HGB BLD-MCNC: 16 G/DL — SIGNIFICANT CHANGE UP (ref 12–16)
IMM GRANULOCYTES NFR BLD AUTO: 2.4 % — HIGH (ref 0.1–0.3)
LYMPHOCYTES # BLD AUTO: 0.59 K/UL — LOW (ref 1.2–3.4)
LYMPHOCYTES # BLD AUTO: 5.2 % — LOW (ref 20.5–51.1)
MAGNESIUM SERPL-MCNC: 2.3 MG/DL — SIGNIFICANT CHANGE UP (ref 1.8–2.4)
MCHC RBC-ENTMCNC: 30.7 PG — SIGNIFICANT CHANGE UP (ref 27–31)
MCHC RBC-ENTMCNC: 34.9 G/DL — SIGNIFICANT CHANGE UP (ref 32–37)
MCV RBC AUTO: 87.9 FL — SIGNIFICANT CHANGE UP (ref 81–99)
MONOCYTES # BLD AUTO: 0.4 K/UL — SIGNIFICANT CHANGE UP (ref 0.1–0.6)
MONOCYTES NFR BLD AUTO: 3.5 % — SIGNIFICANT CHANGE UP (ref 1.7–9.3)
NEUTROPHILS # BLD AUTO: 10.13 K/UL — HIGH (ref 1.4–6.5)
NEUTROPHILS NFR BLD AUTO: 88.7 % — HIGH (ref 42.2–75.2)
NRBC # BLD: 0 /100 WBCS — SIGNIFICANT CHANGE UP (ref 0–0)
PLATELET # BLD AUTO: 182 K/UL — SIGNIFICANT CHANGE UP (ref 130–400)
POTASSIUM SERPL-MCNC: 5.4 MMOL/L — HIGH (ref 3.5–5)
POTASSIUM SERPL-SCNC: 5.4 MMOL/L — HIGH (ref 3.5–5)
PROT SERPL-MCNC: 6 G/DL — SIGNIFICANT CHANGE UP (ref 6–8)
RBC # BLD: 5.22 M/UL — SIGNIFICANT CHANGE UP (ref 4.2–5.4)
RBC # FLD: 12.6 % — SIGNIFICANT CHANGE UP (ref 11.5–14.5)
SODIUM SERPL-SCNC: 131 MMOL/L — LOW (ref 135–146)
WBC # BLD: 11.41 K/UL — HIGH (ref 4.8–10.8)
WBC # FLD AUTO: 11.41 K/UL — HIGH (ref 4.8–10.8)

## 2019-04-08 RX ADMIN — LEVETIRACETAM 1000 MILLIGRAM(S): 250 TABLET, FILM COATED ORAL at 17:14

## 2019-04-08 RX ADMIN — LEVETIRACETAM 1000 MILLIGRAM(S): 250 TABLET, FILM COATED ORAL at 06:31

## 2019-04-08 RX ADMIN — Medication 1000 UNIT(S): at 17:14

## 2019-04-08 RX ADMIN — Medication 112 MICROGRAM(S): at 06:31

## 2019-04-08 RX ADMIN — PREGABALIN 1000 MICROGRAM(S): 225 CAPSULE ORAL at 12:23

## 2019-04-08 RX ADMIN — Medication 4 MILLIGRAM(S): at 17:15

## 2019-04-08 RX ADMIN — PANTOPRAZOLE SODIUM 40 MILLIGRAM(S): 20 TABLET, DELAYED RELEASE ORAL at 06:32

## 2019-04-08 RX ADMIN — ENOXAPARIN SODIUM 40 MILLIGRAM(S): 100 INJECTION SUBCUTANEOUS at 12:23

## 2019-04-08 RX ADMIN — Medication 4 MILLIGRAM(S): at 06:31

## 2019-04-09 ENCOUNTER — TRANSCRIPTION ENCOUNTER (OUTPATIENT)
Age: 64
End: 2019-04-09

## 2019-04-09 LAB
ANION GAP SERPL CALC-SCNC: 11 MMOL/L — SIGNIFICANT CHANGE UP (ref 7–14)
BUN SERPL-MCNC: 21 MG/DL — HIGH (ref 10–20)
CALCIUM SERPL-MCNC: 8.9 MG/DL — SIGNIFICANT CHANGE UP (ref 8.5–10.1)
CHLORIDE SERPL-SCNC: 97 MMOL/L — LOW (ref 98–110)
CO2 SERPL-SCNC: 26 MMOL/L — SIGNIFICANT CHANGE UP (ref 17–32)
CREAT SERPL-MCNC: 0.7 MG/DL — SIGNIFICANT CHANGE UP (ref 0.7–1.5)
GLUCOSE SERPL-MCNC: 129 MG/DL — HIGH (ref 70–99)
POTASSIUM SERPL-MCNC: 4.3 MMOL/L — SIGNIFICANT CHANGE UP (ref 3.5–5)
POTASSIUM SERPL-SCNC: 4.3 MMOL/L — SIGNIFICANT CHANGE UP (ref 3.5–5)
SODIUM SERPL-SCNC: 134 MMOL/L — LOW (ref 135–146)

## 2019-04-09 RX ORDER — SENNA PLUS 8.6 MG/1
2 TABLET ORAL
Qty: 0 | Refills: 0 | DISCHARGE
Start: 2019-04-09

## 2019-04-09 RX ORDER — LEVETIRACETAM 250 MG/1
1 TABLET, FILM COATED ORAL
Qty: 60 | Refills: 0
Start: 2019-04-09 | End: 2019-05-08

## 2019-04-09 RX ORDER — PANTOPRAZOLE SODIUM 20 MG/1
1 TABLET, DELAYED RELEASE ORAL
Qty: 30 | Refills: 0
Start: 2019-04-09 | End: 2019-05-08

## 2019-04-09 RX ORDER — LEVETIRACETAM 250 MG/1
1 TABLET, FILM COATED ORAL
Qty: 0 | Refills: 0 | COMMUNITY
Start: 2019-04-09

## 2019-04-09 RX ORDER — HYDROCORTISONE 1 %
1 OINTMENT (GRAM) TOPICAL
Qty: 0 | Refills: 0 | DISCHARGE
Start: 2019-04-09

## 2019-04-09 RX ORDER — PANTOPRAZOLE SODIUM 20 MG/1
1 TABLET, DELAYED RELEASE ORAL
Qty: 0 | Refills: 0 | COMMUNITY
Start: 2019-04-09

## 2019-04-09 RX ORDER — LEVOTHYROXINE SODIUM 125 MCG
1 TABLET ORAL
Qty: 0 | Refills: 0 | COMMUNITY
Start: 2019-04-09

## 2019-04-09 RX ORDER — METFORMIN HYDROCHLORIDE 850 MG/1
500 TABLET ORAL
Qty: 0 | Refills: 0 | Status: DISCONTINUED | OUTPATIENT
Start: 2019-04-09 | End: 2019-04-10

## 2019-04-09 RX ORDER — METFORMIN HYDROCHLORIDE 850 MG/1
1 TABLET ORAL
Qty: 0 | Refills: 0 | COMMUNITY
Start: 2019-04-09

## 2019-04-09 RX ORDER — CHOLECALCIFEROL (VITAMIN D3) 125 MCG
1000 CAPSULE ORAL
Qty: 0 | Refills: 0 | DISCHARGE
Start: 2019-04-09

## 2019-04-09 RX ORDER — LANOLIN ALCOHOL/MO/W.PET/CERES
1 CREAM (GRAM) TOPICAL
Qty: 0 | Refills: 0 | DISCHARGE
Start: 2019-04-09

## 2019-04-09 RX ORDER — DEXAMETHASONE 0.5 MG/5ML
1 ELIXIR ORAL
Qty: 50 | Refills: 0
Start: 2019-04-09

## 2019-04-09 RX ORDER — LEVOTHYROXINE SODIUM 125 MCG
1 TABLET ORAL
Qty: 0 | Refills: 0 | COMMUNITY

## 2019-04-09 RX ORDER — ACETAMINOPHEN 500 MG
2 TABLET ORAL
Qty: 0 | Refills: 0 | DISCHARGE
Start: 2019-04-09

## 2019-04-09 RX ORDER — SENNA PLUS 8.6 MG/1
1 TABLET ORAL
Qty: 0 | Refills: 0 | COMMUNITY

## 2019-04-09 RX ORDER — LEVOTHYROXINE SODIUM 125 MCG
1 TABLET ORAL
Qty: 30 | Refills: 0
Start: 2019-04-09 | End: 2019-05-08

## 2019-04-09 RX ORDER — METFORMIN HYDROCHLORIDE 850 MG/1
1 TABLET ORAL
Qty: 30 | Refills: 0
Start: 2019-04-09 | End: 2019-05-08

## 2019-04-09 RX ORDER — POLYETHYLENE GLYCOL 3350 17 G/17G
17 POWDER, FOR SOLUTION ORAL
Qty: 0 | Refills: 0 | DISCHARGE
Start: 2019-04-09

## 2019-04-09 RX ORDER — DEXAMETHASONE 0.5 MG/5ML
1 ELIXIR ORAL
Qty: 0 | Refills: 0 | COMMUNITY
Start: 2019-04-09

## 2019-04-09 RX ADMIN — Medication 4 MILLIGRAM(S): at 17:31

## 2019-04-09 RX ADMIN — METFORMIN HYDROCHLORIDE 500 MILLIGRAM(S): 850 TABLET ORAL at 17:39

## 2019-04-09 RX ADMIN — LEVETIRACETAM 1000 MILLIGRAM(S): 250 TABLET, FILM COATED ORAL at 06:21

## 2019-04-09 RX ADMIN — ENOXAPARIN SODIUM 40 MILLIGRAM(S): 100 INJECTION SUBCUTANEOUS at 11:40

## 2019-04-09 RX ADMIN — PANTOPRAZOLE SODIUM 40 MILLIGRAM(S): 20 TABLET, DELAYED RELEASE ORAL at 06:21

## 2019-04-09 RX ADMIN — LEVETIRACETAM 1000 MILLIGRAM(S): 250 TABLET, FILM COATED ORAL at 17:31

## 2019-04-09 RX ADMIN — Medication 1000 UNIT(S): at 13:13

## 2019-04-09 RX ADMIN — Medication 4 MILLIGRAM(S): at 06:22

## 2019-04-09 RX ADMIN — Medication 112 MICROGRAM(S): at 06:21

## 2019-04-09 RX ADMIN — PREGABALIN 1000 MICROGRAM(S): 225 CAPSULE ORAL at 11:41

## 2019-04-09 NOTE — DISCHARGE NOTE PROVIDER - PROVIDER TOKENS
PROVIDER:[TOKEN:[[id number]],FOLLOWUP:[1 month]],PROVIDER:[TOKEN:[[id number]],FOLLOWUP:[1 month]],PROVIDER:[TOKEN:[[id number]],FOLLOWUP:[1 week]],PROVIDER:[TOKEN:[[id number]],FOLLOWUP:[2 weeks]]

## 2019-04-09 NOTE — DISCHARGE NOTE PROVIDER - NSDCFUADDAPPT_GEN_ALL_CORE_FT
Dr. Dahl RT Oncology  [address] [place] [address], 1st floor in the [facility]  Friday, May 10, 2019 at 10:40AM

## 2019-04-09 NOTE — DISCHARGE NOTE PROVIDER - INSTRUCTIONS
Diabetic diet (carbohydrate controlled)--avoid concentrated sweets such as cookies, cake, candy, fruit juices; limit starches such as pasta, potatoes, rice, bread, also limit fried and fatty foods; it is better to eat broiled, baked, boiled, roasted poultry and fish, occasional lean red meat, fresh or frozen vegetables, occasional fresh fruit; you can go to the ADA (American Diabetes Association) website to learn about healthy meals that also taste good

## 2019-04-09 NOTE — DISCHARGE NOTE PROVIDER - NSDCCPCAREPLAN_GEN_ALL_CORE_FT
PRINCIPAL DISCHARGE DIAGNOSIS  Diagnosis: Uterine cancer  Assessment and Plan of Treatment: GOAL: to achieve and remain in remission  PLAN: You received 10 radiation treatments to your brain, from 3/27/19 to 4/9/19, to shrink/destroy the metastatic lesions. Continue taking Decadron as instructed, and follow up with Dr. Dahl on May 10 at 10:40AM, and Dr. Cid will call you to make an appointment for you  with her. Continue taking Keppra to prevent seizures.      SECONDARY DISCHARGE DIAGNOSES  Diagnosis: Type 2 diabetes mellitus  Assessment and Plan of Treatment: The blood test called hemoglobin A1C shows that you have mild diabetes, which is a little worse now because you are taking steroids (Decadron). You were started on a new medication called metformin, which should be taken daily with dinner, and check your blood sugar before each meal. Also follow a diabetic diet. Follow up on April 25 with Dr. Acevedo, your endocrine doctor.    Diagnosis: History of thyroid cancer  Assessment and Plan of Treatment: You are taking thyroid hormone replacement therapy because your thyroid gland was removed due to cancer last year. The dose of levothyroxine was a little too high, causing you to experience an overactive thyroid, so the dose was lowered. Follow up with Dr. Acevedo on April 25, he will repeat your thyroid tests and adjust the synthroid dose if needed.

## 2019-04-09 NOTE — DISCHARGE NOTE PROVIDER - NSDCACTIVITY_GEN_ALL_CORE
Showering allowed/No heavy lifting/straining/Walking - Outdoors allowed/Stairs allowed/Walking - Indoors allowed/Do not drive or operate machinery

## 2019-04-09 NOTE — DISCHARGE NOTE PROVIDER - NSDCFUADDINST_GEN_ALL_CORE_FT
***PLEASE SHOW THESE DISCHARGE PAPERS TO ALL YOUR DOCTORS AND CAREGIVERS***      ***Ambulate with the rolling walker, or if you ambulate without an assistive device you should have someone with you for supervision, for your safety***      ***If you have any questions or concerns, please call [phone] and ask for Dr. Brumfield or TONY Angela***

## 2019-04-09 NOTE — DISCHARGE NOTE PROVIDER - CARE PROVIDERS DIRECT ADDRESSES
[email or web address]

## 2019-04-09 NOTE — DISCHARGE NOTE PROVIDER - CARE PROVIDER_API CALL
Stacie Cid)  HematologyOncology; Internal Medicine; Medical Oncology  [address] [place] [address]  Phone: [phone]  Fax: [phone]  Follow Up Time: 1 month    Edmund Dahl (DO)  Radiation Oncology  [address] [place] [address]  Phone: [phone]  Fax: [phone]  Follow Up Time: 1 month    Patty Goldstein)  Geriatric Medicine; Internal Medicine  [address] [place], [place] [id number]  Phone: [phone]  Fax: [phone]  Follow Up Time: 1 week    Jacky Acevedo)  EndocrinologyMetabDiabetes; Internal Medicine  [address] [place] [address]  Phone: [phone]  Fax: [phone]  Follow Up Time: 2 weeks

## 2019-04-09 NOTE — DISCHARGE NOTE PROVIDER - HOSPITAL COURSE
62 yo woman was diagnosed with metastatic lung lesion in 2017, with pleural effusion, had thoracentesis in December 2017 and also had chemo, to which she responded well.     In 2018 she was diagnosed with papillary thyroid cancer and stage IV uterine cancer, s/p ELEONORA in April 2018 and thyroidectomy in August 2018. She underwent 8 cycles of chemo (carbo     and taxol), completed 6/29/18, and was doing well until 3/20/19, when she developed right-sided weakness 5 days prior to admission. Head CT revealed multiple supratentorial    lesions suspicious for metastatic disease. A suspicious LUQ peritoneal nodule was found on PET scan on 12/18/18, repeat PET scan on 2/12/19 showed slight increase in the size.     Biopsy of that nodule was positive for recurrent endometrial cancer. She had been seeing Dr. Massey at [place], but now she is being followed by Dr. Cid at [facility]. Dr. Cid    was in contact with Dr. Massey.        In addition to Hem-Onc, she was also seen by Neurosurgery and Neurology and RT Oncology. It was decided that no surgical intervention was needed at this time, and she    was started on 10 treatments of whole brain RT, from 3/27/19 and last treatment was on 4/9/19. The right hemiparesis almost completely resolved with Decadron 6mg po q12hr, the dose     was decreased to 4mg po q12h on 4/5/19, and she will continue this dose through 4/22/19; on 4/23/19 the dose will be decreased to 4mg po daily. She has a follow up appointment    with Dr. Dahl on May 10, 2019 at 10:40AM, and she will be called by Dr. Cid for follow-up appointment before that date. She was also started on Keppra 1000mg po q12h for    seizure prophylaxis.        Thyroid function tests were done during her admission, TSH was very low = 0.02 and free T4 was elevated = 2.2. She was seen by Endocrine Dr. Adrian and levothyroxine dose was     decreased to 112mcg po daily (from 125mcg daily). She has a follow up appointment with Dr. Acevedo on 4/25/19, when TFT's can be repeated and dose further adjusted if needed.        FS glucoses also ran high, A1C = 7.2, so diet was changed to CHO consistent and she was started on metformin 500mg po daily with dinner. Nursing staff taught her how to check     her FS glucose, and Rx was given to her for a glucometer with supplies. She will follow up with Dr. Acevedo for DM as well.        She is being discharged home on 4/10/19. She ambulates with a rolling walker, and also without assistive device but with supervision. She will follow up with her PCP Dr. Goldstein at [facility],    in addition to following up with Dr. Cid, Dr. Dahl and Dr. Acevedo, as mentioned above.

## 2019-04-10 ENCOUNTER — INBOUND DOCUMENT (OUTPATIENT)
Age: 64
End: 2019-04-10

## 2019-04-10 ENCOUNTER — TRANSCRIPTION ENCOUNTER (OUTPATIENT)
Age: 64
End: 2019-04-10

## 2019-04-10 RX ADMIN — Medication 4 MILLIGRAM(S): at 05:40

## 2019-04-10 RX ADMIN — Medication 112 MICROGRAM(S): at 05:40

## 2019-04-10 RX ADMIN — LEVETIRACETAM 1000 MILLIGRAM(S): 250 TABLET, FILM COATED ORAL at 05:40

## 2019-04-10 RX ADMIN — PREGABALIN 1000 MICROGRAM(S): 225 CAPSULE ORAL at 11:21

## 2019-04-10 RX ADMIN — PANTOPRAZOLE SODIUM 40 MILLIGRAM(S): 20 TABLET, DELAYED RELEASE ORAL at 06:04

## 2019-04-10 NOTE — DISCHARGE NOTE NURSING/CASE MANAGEMENT/SOCIAL WORK - NSDCDPATPORTLINK_GEN_ALL_CORE
You can access the [facility] Patient Portal, offered by [facility], by registering with the following website: [email or web address]

## 2019-04-10 NOTE — DISCHARGE NOTE NURSING/CASE MANAGEMENT/SOCIAL WORK - NSDCFUADDAPPT_GEN_ALL_CORE_FT
Dr. Dahl RT Oncology  [address] [place] [address], 1st floor in the [facility]  Friday, May 10, 2019 at 10:40AM

## 2019-04-23 ENCOUNTER — OUTPATIENT (OUTPATIENT)
Dept: OUTPATIENT SERVICES | Facility: HOSPITAL | Age: 64
LOS: 1 days | Discharge: HOME | End: 2019-04-23

## 2019-04-23 ENCOUNTER — APPOINTMENT (OUTPATIENT)
Dept: HEMATOLOGY ONCOLOGY | Facility: CLINIC | Age: 64
End: 2019-04-23

## 2019-04-23 ENCOUNTER — OUTPATIENT (OUTPATIENT)
Dept: OUTPATIENT SERVICES | Facility: HOSPITAL | Age: 64
LOS: 1 days | Discharge: HOME | End: 2019-04-23
Payer: MEDICAID

## 2019-04-23 ENCOUNTER — LABORATORY RESULT (OUTPATIENT)
Age: 64
End: 2019-04-23

## 2019-04-23 VITALS
HEIGHT: 66 IN | DIASTOLIC BLOOD PRESSURE: 70 MMHG | HEART RATE: 68 BPM | OXYGEN SATURATION: 96 % | WEIGHT: 174 LBS | TEMPERATURE: 98 F | BODY MASS INDEX: 27.97 KG/M2 | SYSTOLIC BLOOD PRESSURE: 104 MMHG | RESPIRATION RATE: 16 BRPM

## 2019-04-23 DIAGNOSIS — Z98.890 OTHER SPECIFIED POSTPROCEDURAL STATES: Chronic | ICD-10-CM

## 2019-04-23 DIAGNOSIS — E89.0 POSTPROCEDURAL HYPOTHYROIDISM: ICD-10-CM

## 2019-04-23 DIAGNOSIS — Z90.710 ACQUIRED ABSENCE OF BOTH CERVIX AND UTERUS: Chronic | ICD-10-CM

## 2019-04-23 DIAGNOSIS — E55.9 VITAMIN D DEFICIENCY, UNSPECIFIED: ICD-10-CM

## 2019-04-23 DIAGNOSIS — C54.1 MALIGNANT NEOPLASM OF ENDOMETRIUM: ICD-10-CM

## 2019-04-23 DIAGNOSIS — R06.9 UNSPECIFIED ABNORMALITIES OF BREATHING: ICD-10-CM

## 2019-04-23 DIAGNOSIS — E03.9 HYPOTHYROIDISM, UNSPECIFIED: ICD-10-CM

## 2019-04-23 LAB
HCT VFR BLD CALC: 41.5 %
HGB BLD-MCNC: 14.2 G/DL
MCHC RBC-ENTMCNC: 30.9 PG
MCHC RBC-ENTMCNC: 34.2 G/DL
MCV RBC AUTO: 90.4 FL
PLATELET # BLD AUTO: 125 K/UL
PMV BLD: 8.8 FL
RBC # BLD: 4.59 M/UL
RBC # FLD: 13.3 %
WBC # FLD AUTO: 7.17 K/UL

## 2019-04-23 PROCEDURE — 71046 X-RAY EXAM CHEST 2 VIEWS: CPT | Mod: 26

## 2019-04-23 NOTE — PHYSICAL EXAM
[Restricted in physically strenuous activity but ambulatory and able to carry out work of a light or sedentary nature] : Status 1- Restricted in physically strenuous activity but ambulatory and able to carry out work of a light or sedentary nature, e.g., light house work, office work [Normal] : normal appearance, no rash, nodules, vesicles, ulcers, erythema [de-identified] : L upper back lipoma, present for years, otherwise normal exam [de-identified] : clear lungs [de-identified] : RLE weakness, 3/5, no loss of sensation, no objective RUE weakness, gait is affected

## 2019-04-23 NOTE — ASSESSMENT
[FreeTextEntry1] : Malignant pleural effusion, resolved\par --L side PleurX catheter has been removed on 2/8/2018\par --Following wit Dr. Thompson\par \par Papillary-serous endometrial cancer, stage IV\par  --PET CT on 11/29/2017 revealed no clear GYN origin, extensive KEVIN, uptake in the right lung and pleura, thyroid nodule\par --Completed cycle 8  of carbo/taxol on 6/29/2018, will proceed with close observation, 2 cycles were administered  postop\par --MRI of the brain on 11/29/2017 reveals no brain metastasis, but possible R ICA aneurysm, follow up CTA brain revealed no aneurysm  \par --Follow up MRI brain on 3/202/2018 was negative (2/2 eye complaints)\par --Restaging PET CT scans 2/2/2017 and 4/16/2018 reveal great response to chemotherapy\par --Neuropathy improving, not painful, continue on Gabapentin \par --Restaging PET CT on 9/26/2018 (3 months post completion of chemo 6/29/2018), was essentially negative for recurrent disease\par --Restaging PET CT on 12/18/2018 reveals   New left upper quadrant peritoneal nodule measuring 8 mm with an SUV max  of 7.3. This is suggestive of biologic tumor activity.  No other FDG avid lesions seen throughout the scan.\par --restaging PET CT on 2/12/2019 revealed slight increase in size of left upper quadrant peritoneal nodule (1.1 cm, \par previously 0.8 cm) with stable FDG uptake, 7.7 SUV suspicious for biologic tumor activity.\par --Case was discussed with Dr. Massey at [facility], biopsy of peritoneal nodule is positive for recurrent endometrial cancer, obtain records from [facility] \par --Follow up MRI of T-spine done on 10/16/2018 was negative for bone mets\par --PET CT ordered on 4/23/2019 \par \par Metastatic brain disease \par --MRI on 3/22/2019 multiple brain leasions\par --S/p whole brain irradiation 3-4/2019 \par --Required acute rehab\par --Decadron 4mg daily since 4/22/2919, taper per Dr. Dahl\par --Follow up with Dr. Dahl on 5/10/2019 \par --On Keppra \par \par Papillary thyroid cancer [id number], 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 Herrera \par --Follow up with Dr. Acevedo of endocrinology, he is addressing vitamin D, thyroid and diabetes \par \par  Follow up in 2 weeks

## 2019-04-23 NOTE — REVIEW OF SYSTEMS
[Fatigue] : fatigue [Negative] : Allergic/Immunologic [Cough] : cough [Chest Pain] : no chest pain [Lower Ext Edema] : no lower extremity edema [Palpitations] : no palpitations [SOB on Exertion] : no shortness of breath during exertion [Shortness Of Breath] : no shortness of breath [Wheezing] : no wheezing [FreeTextEntry9] : gait abnormal, R leg is weak  [FreeTextEntry6] : SOB completely resolved, mild dry cough  [de-identified] : worsening peripheral neuropathy, not painful, L leg weakness 4/5, improved as compared to prior, L eye blurry, also improving

## 2019-04-23 NOTE — HISTORY OF PRESENT ILLNESS
[Disease: _____________________] : Disease: [unfilled] [AJCC Stage: ____] : AJCC Stage: [unfilled] [de-identified] : KRas WT, EGFR WT, Alk WT, ROS1 WT, PDL1-1%\par Normal MMR protein expression [de-identified] : Serena is a rodrigue 61 yo female, who has started developing SOB approximately 2 months ago, she went to ER on 11/2/2017 and on CXR was noted to have a large right sided pleural effusion. She underwent a thoracentesis, 1.6L of fluid was drained. 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 IHC was pos for CK7, PAX8, CK5/6 and Ca125, negative for TTF-1/Napsin, calretinin, CK20, mammaglobin, p63, villin, HAM56, GCDFP15, TAY-EP4. \par \par Her visit on 12/1/2017 Serena had an excisional biopsy of cervical node on 12/13/2017 revealing met adenocarcinoma, primary source still was not clear. On 12/14/2017 Serena was admitted with SOB, Pleurx catheter was placed on 12/14/2017. 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 Serena received 1st dose of carbo (auc of 5)/taxol(175mg/m2) on 12/15/2017 without complications, but the next day sustained a fall 2/2 vasovagal episode and developed asymptomatic SAH, that resolved on imaging by 12/21/2017.  [de-identified] : 11/24/2017: She reports some SOB, especially ARZATE today. No fevers, no weight loss (reports some weight gain), no GI,  or GYN symptoms, except for increasing abdominal girth, she reports no blood in the stool or urine and no vaginal bleeding. She reports no CP and no palpitations, she reports worsening nonproductive cough, no hemoptysis. She also reports difficulty lying on left side and lying down flat. \par She reports left on/off facial numbness several months in duration. She had a CT of her head performed in [facility] within 1 year.  Additionally there is a freely mobile left cervical node present on exam, as per patient this was in place for approximately 1 year. \par \par 12/1/2017: Serena is here for follow up today. She had a therapeutic thoracentesis in  on 11/27/2017, 2L of fluid were removed, with much improved respiratory status. PET CT and MRI of the brain were done on 11/29/2017, findings were discussed today. There remains no clear primary source of malignancy. We have discussed that the source of tumor may be Mullerian vs lung. regardless of source chemotherapy needs to be initiated ASAP. We have discussed Carbo/taxol regiment that will cover both Mullerian and lung origin. Side effects including myelosuppression, peripheral neuropathy, allergic reaction and others.\par \par 1/2/2017 Since last visit Serena had an excisional biopsy of cervical node on 12/13/2017 revealing met adenocarcinoma, primary source still was not clear. On 12/14/2017 Serena was admitted with SOB, Pleurx catheter was placed on 12/14/2017. 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 Serena received 1st dose of carbo (auc of 5)/taxol(175mg/m2) on 12/15/2017 without complications, but the next day sustained a fall 2/2 vasovagal episode and developed asymptomatic SAH, that resolved on imaging by 12/21/2017. Today Serena's SOB has significantly improved. She reports very mild neuropathy in fingertips only. She reports no headache and offers no other complaints. \par \par 1/26/2018: Complains of some neuropathy, otherwise tolerating chemo with no difficulty. \par \par 2/16/2018: restaging CT C/A/P reviewed, significant improvement noted. Will refer to GYN/ONC. Reports slightly worsening neuropathy, not -painful, taking Gabapentin, no other symptoms. For cycle 4 of carbo/taxol today.\par \par 3/9/2018: Serena met with Dr. Massey, she is planned for surgery on 5/7/2018, after completion of 6 cycles of carbo/taxol and restaging PET CT ordered for mid April and follow up with Dr. Massey on April 20. She reports worsening neuropathy, and occasional pain and changes in vision in her left eye, eye symptoms come and go and are not very bothersome. She reports no other symptoms. \par \par 3/30/2018; Serena is here for cycle 6 of carbo/taxol, she continues to have neuropathy in finger and toes, but offers no other complaints, chemo has been dose reduced. \par \par 4/27/2018: Results of restaging PET CT s/p 6 cycles of carbo/taxol revealed 1. No new areas of abnormal increased uptake is seen to indicate \par biologically active disease.\par \par 2. Persistent PET positive left thyroid mass with a max SUV 33.1, \par previously max SUV 34.8. Further evaluation with thyroid ultrasound and \par if needed fine-needle aspiration biopsy will be helpful.\par \par 3.   PET positive left cervical lymph nodes mainly level 2 on the left \par with a max SUV 5.47previously 18. Minimal increased uptake in the \par bilateral axillary lymph nodes most likely reactive(less than 2.5)\par \par 4. Weakly PET positive, max SUV 2.89 right pleura, previous max SUV 5.3 \par with non-FDG avid right pleural effusion. \par \par 5. Previously FDG avid right and left supraclavicular lymphadenopathy, \par left internal mammary, bilateral paratracheal, para-aortic, para \par vertebral, carinal, mesenteric, right and left iliacs, right inguinal \par lymphadenopathy, small in size and no longer FDG avid.\par \par 6. No abnormal increased uptake is seen in the  lobulated uterus.\par \par 7. Overall there is marked improvement in the FDG avid disease.\par This was reviewed with Serena. She met with Dr. Massey on 4/20/2018, surgery is planned for 5/7/2018. She will also joselyn to meet with ENT re FDG avid thyroid nodule. Will assist in making he an appointment for her. \par Persistent neuropathy on gabapentin. \par \par 6/29/18 ; Patient came for follow up visit , evaluation for chemotherapy cycle 8 of carbo / Taxol , patient tolerating medication well , except neuropathy  recommend to have gabapentin  300 mg po daily.\par \par 7/20/2018: Serena present for follow up today, she has completed total of 8 cycles of Carbo/Taxol. She reports significant neuropathy in her hands and feet. We will discontinue chemotherapy today and plan to follow with close observation. She has thyroidectomy planned with Dr. Herrera on 8/20/2018, obtaining clearance for PMD. She is also planning to fly to [place] at the end of September. She reports no SOB, no GI or  symptoms. \par \par 9/12/2018: Serena offers no significant complaints today. She states neuropathy in her hands has almost completely resolved and neuropathy in her feet is significantly better. She had undergone complete thyroidectomy by Dr. Herrera on 8/20/2018, pathology revealed papillary thyroid cancer, measuring 2 cm, pT1b, other additional foci of papillary carcinoma were also noted, no LVI, surgical margins were clear, LN 0/2 had no carcinoma, stage [id number]. She is following up with Dr. Herrera tomorrow. She has still not gone for her vacation in [place]. \par \par 10/10/2018: Restaging PET CT on 9/26/2018 reveals Since 4/16/2018,  1. Post thyroidectomy with diffuse increased FDG uptake at the thyroid  bed likely representing post-surgical changes.  2. Subtle increased uptake is seen in the lamina , right side at the  level of T8 with a max SUV 4.6. This is not sufficient for metastatic  disease. Bone scan or MRI examination with contrast will be helpful for  further evaluation.  3. No other areas of abnormal increased uptake is seen. Images were personally reviewed by myself and discussed with Serena. \par She also had an Echo on 9/24/2018 revealing EF of 63%. \par Serena reports ongoing fatigue, she is unable to lose weight. She is taking Synthroid and following with Dr. Acevedo. \par She reports stiff fingers at night only, no painful neuropathy. \par She denies any other symptoms today. \par \par 12/11/2018: Serena feels well, neuropathy in hands and feet is much improved. S he is now complaining of r-sided facial pain associated with some swelling, pain comes and goes. She has already seen Dr. Herrera, who ordered CT of sinuses and prescribed pain relief meds. She is also due for restaging PET CT. Serena feels well otherwise. \par \par 1/9/2019: Restaging PET CT was performed on 12/18/2018 it revealed COMPARISON : 9/24/2018.  New left upper quadrant peritoneal nodule measuring 8 mm with an SUV max  of 7.3. This is suggestive of biologic tumor activity.  No other FDG avid lesions seen throughout the scan. Images were personally reviewed by myself and discussed with Serena, originally over the phone and again today. I have originally suggested to try AI in the interim, Serena however reported diarrhea at the time of the scan and declined intervention for now. We will plan for restaging PET CT to be performed in 6-8 weeks, this will be ordered today. She will follow up with Dr. Massey at [facility] shortly and bring the disk for his review. I would also like her to meet with genetics, this will be arranged at [facility] with Dr. Massey's help. Serena reports no new symptoms today, her neuropathy is manageable and  improving. She still reports unilateral headache that was work up in the past. Neurology eval was again suggested to her. She is following closely with Endocrinology re thyroid management. She will have follow up with Dr. Herrera shortly as well. \par \par 2/20/2019: Serena offers no new complaints. PET CT from 2/12/2019 revealed \par Since PET/CT of December 19, 2018, no new sites of pathologic FDG uptake\par Slight increase in size of left upper quadrant peritoneal nodule (1.1 cm, \par previously 0.8 cm) with stable FDG uptake, 7.7 SUV suspicious for \par biologic tumor activity.\par No other sites of pathologic FDG uptake \par Images were personally reviewed by myself and discussed with Serena, case was also discussed with Dr. Massey at [facility]. Serena will have follow up with his shortly. She is following with endocrinology. reports improving neuropathy. No GI or  symptoms at this time. No weight loss. \par \par 3/20/2019: Serena had a peritoneal nodule biopsy done at [facility], I have received a call from Dr. 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 We have discussed that surgery though could be attempted will not be the best therapeutic approach, recommencement of systemic therapy was discussed. I have not received the results from [facility] yet. I have briefly discussed this with Serena today. Serena reports that she has acutely developed right lower extremity weakness 5-6 days ago, she did not inform any of her doctors about this. She also reports that her R upper extremity though not weak "does not feel normal either. She reports no back pain, there is no point tenderness, RLE is objectively weak on exam. I recommend ER evaluation to r/o CVA vs brain met, vs L-spine related issue. Recommend admission for work up. Serena is agreeable to get admitted. \par \par 4/23/2019: Serena was admitted to the hospital and diagnosed with extensive metastatic disease to the brain. MRI of the brain on 3/22/2019 revealed Multiple supratentorial heterogeneously enhancing lesions consistent with \par metastatic disease. The appearance on the T2-weighted images is \par suggestive of adenocarcinoma. There is mass effect upon the left lateral \par ventricle and corpus callosum.\par Images were personally reviewed by myself and discussed with Serena. Serena has completed whole brain radiation and required few weeks of acute rehab. Currently she is feeling much better, she is able to walk without difficulty, her left leg is only slightly weaker on exam. She reports some blurred vision in her left eye, that is improved as well, but not completely corrected. She tapered down to 4 mg of Decadron of 4/22/2019 as per Dr. Dahl's instructions. She has mild cough today, that is not bothering her, no fever. She is agreeable to get a CXR. We will plan to rescan her with PET CT.

## 2019-04-24 LAB
ALBUMIN SERPL ELPH-MCNC: 3.8 G/DL
ALP BLD-CCNC: 65 U/L
ALT SERPL-CCNC: 32 U/L
ANION GAP SERPL CALC-SCNC: 17 MMOL/L
AST SERPL-CCNC: 13 U/L
BILIRUB SERPL-MCNC: 0.7 MG/DL
BUN SERPL-MCNC: 19 MG/DL
CALCIUM SERPL-MCNC: 8.6 MG/DL
CANCER AG125 SERPL-ACNC: 9 U/ML
CEA SERPL-MCNC: 3.3 NG/ML
CHLORIDE SERPL-SCNC: 97 MMOL/L
CO2 SERPL-SCNC: 22 MMOL/L
CREAT SERPL-MCNC: 0.6 MG/DL
GLUCOSE SERPL-MCNC: 123 MG/DL
POTASSIUM SERPL-SCNC: 4.1 MMOL/L
PROT SERPL-MCNC: 5.8 G/DL
SODIUM SERPL-SCNC: 136 MMOL/L

## 2019-04-25 ENCOUNTER — APPOINTMENT (OUTPATIENT)
Dept: INTERNAL MEDICINE | Facility: CLINIC | Age: 64
End: 2019-04-25

## 2019-04-25 ENCOUNTER — OUTPATIENT (OUTPATIENT)
Dept: OUTPATIENT SERVICES | Facility: HOSPITAL | Age: 64
LOS: 1 days | Discharge: HOME | End: 2019-04-25

## 2019-04-25 VITALS — HEART RATE: 80 BPM | SYSTOLIC BLOOD PRESSURE: 93 MMHG | DIASTOLIC BLOOD PRESSURE: 63 MMHG

## 2019-04-25 DIAGNOSIS — Z98.890 OTHER SPECIFIED POSTPROCEDURAL STATES: Chronic | ICD-10-CM

## 2019-04-25 DIAGNOSIS — E11.9 TYPE 2 DIABETES MELLITUS WITHOUT COMPLICATIONS: ICD-10-CM

## 2019-04-25 DIAGNOSIS — Z90.710 ACQUIRED ABSENCE OF BOTH CERVIX AND UTERUS: Chronic | ICD-10-CM

## 2019-04-25 DIAGNOSIS — C79.31 SECONDARY MALIGNANT NEOPLASM OF BRAIN: ICD-10-CM

## 2019-04-25 DIAGNOSIS — E03.9 HYPOTHYROIDISM, UNSPECIFIED: ICD-10-CM

## 2019-04-25 RX ORDER — RANITIDINE 150 MG/1
150 TABLET ORAL
Qty: 30 | Refills: 3 | Status: DISCONTINUED | COMMUNITY
Start: 2018-10-30 | End: 2019-04-25

## 2019-04-25 RX ORDER — ESOMEPRAZOLE MAGNESIUM 20 MG/1
20 CAPSULE, DELAYED RELEASE ORAL
Qty: 28 | Refills: 0 | Status: DISCONTINUED | COMMUNITY
Start: 2018-11-27 | End: 2019-04-25

## 2019-04-25 RX ORDER — METHYLPREDNISOLONE 4 MG/1
4 TABLET ORAL
Qty: 1 | Refills: 0 | Status: DISCONTINUED | COMMUNITY
Start: 2018-12-10 | End: 2019-04-25

## 2019-04-25 RX ORDER — FEXOFENADINE HCL 60 MG/1
60 TABLET, FILM COATED ORAL
Qty: 40 | Refills: 3 | Status: DISCONTINUED | COMMUNITY
Start: 2018-12-10 | End: 2019-04-25

## 2019-04-25 RX ORDER — GABAPENTIN 300 MG/1
300 CAPSULE ORAL
Qty: 90 | Refills: 3 | Status: DISCONTINUED | COMMUNITY
Start: 2017-12-21 | End: 2019-04-25

## 2019-05-01 ENCOUNTER — OUTPATIENT (OUTPATIENT)
Dept: OUTPATIENT SERVICES | Facility: HOSPITAL | Age: 64
LOS: 1 days | Discharge: HOME | End: 2019-05-01
Payer: MEDICAID

## 2019-05-01 DIAGNOSIS — Z90.710 ACQUIRED ABSENCE OF BOTH CERVIX AND UTERUS: Chronic | ICD-10-CM

## 2019-05-01 DIAGNOSIS — Z98.890 OTHER SPECIFIED POSTPROCEDURAL STATES: Chronic | ICD-10-CM

## 2019-05-01 DIAGNOSIS — C54.1 MALIGNANT NEOPLASM OF ENDOMETRIUM: ICD-10-CM

## 2019-05-01 LAB — GLUCOSE BLDC GLUCOMTR-MCNC: 91 MG/DL — SIGNIFICANT CHANGE UP (ref 70–99)

## 2019-05-01 PROCEDURE — 78815 PET IMAGE W/CT SKULL-THIGH: CPT | Mod: 26,PS

## 2019-05-08 ENCOUNTER — LABORATORY RESULT (OUTPATIENT)
Age: 64
End: 2019-05-08

## 2019-05-08 ENCOUNTER — APPOINTMENT (OUTPATIENT)
Dept: HEMATOLOGY ONCOLOGY | Facility: CLINIC | Age: 64
End: 2019-05-08

## 2019-05-08 VITALS
TEMPERATURE: 96.9 F | HEIGHT: 66 IN | BODY MASS INDEX: 28.28 KG/M2 | DIASTOLIC BLOOD PRESSURE: 83 MMHG | SYSTOLIC BLOOD PRESSURE: 113 MMHG | HEART RATE: 74 BPM | RESPIRATION RATE: 16 BRPM | WEIGHT: 176 LBS

## 2019-05-08 NOTE — REVIEW OF SYSTEMS
[Fatigue] : fatigue [Negative] : Heme/Lymph [Chest Pain] : no chest pain [Palpitations] : no palpitations [Shortness Of Breath] : no shortness of breath [Lower Ext Edema] : no lower extremity edema [Cough] : no cough [Wheezing] : no wheezing [FreeTextEntry9] : gait abnormal, R leg is weak  [FreeTextEntry6] : SOB completely resolved [SOB on Exertion] : no shortness of breath during exertion [de-identified] : worsening peripheral neuropathy, not painful, L visual field deficit, gait impairment has resolved

## 2019-05-08 NOTE — ASSESSMENT
[FreeTextEntry1] : Malignant pleural effusion, resolved\par --L side PleurX catheter has been removed on 2/8/2018\par --Following wit Dr. Thompson\par \par Papillary-serous endometrial cancer, stage IV\par  --PET CT on 11/29/2017 revealed no clear GYN origin, extensive KEVIN, uptake in the right lung and pleura, thyroid nodule\par --Completed cycle 8  of carbo/taxol on 6/29/2018, will proceed with close observation, 2 cycles were administered  postop, last dose administered on 6/29/2018\par --MRI of the brain on 11/29/2017 reveals no brain metastasis, but possible R ICA aneurysm, follow up CTA brain revealed no aneurysm  \par --Follow up MRI brain on 3/202/2018 was negative (2/2 eye complaints)\par --Restaging PET CT scans 2/2/2017 and 4/16/2018 reveal great response to chemotherapy\par --Neuropathy improving, not painful, continue on Gabapentin \par --Restaging PET CT on 9/26/2018 (3 months post completion of chemo 6/29/2018), was essentially negative for recurrent disease\par --Restaging PET CT on 12/18/2018 reveals   New left upper quadrant peritoneal nodule measuring 8 mm with an SUV max  of 7.3. This is suggestive of biologic tumor activity.  No other FDG avid lesions seen throughout the scan.\par --restaging PET CT on 2/12/2019 revealed slight increase in size of left upper quadrant peritoneal nodule (1.1 cm, \par previously 0.8 cm) with stable FDG uptake, 7.7 SUV suspicious for biologic tumor activity.\par --Case was discussed with Dr. Massey at [place], biopsy of peritoneal nodule is positive for recurrent endometrial cancer, obtain records from [place] \par --Follow up MRI of T-spine done on 10/16/2018 was negative for bone mets\par --MRI brain on 4/23/2019 revealed multiple brain mets\par --S/p whole brain radiation completed 4/2019, required acute rehab\par --PET CT on 5/1/2019 now reveals further disease progression \par --Plan to restart weekly carbo/taxol 3 on 1 off and restage with PET CT after 2 cycles\par --Taper Decadron per Dr. Dahl, currently on 4mg daily on 5/8/2019 \par \par Papillary thyroid cancer [id number], 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 Herrera \par --Follow up with Dr. Acevedo of endocrinology, he is addressing vitamin D, thyroid and diabetes \par \par  Follow up in 4 weeks

## 2019-05-08 NOTE — HISTORY OF PRESENT ILLNESS
[Disease: _____________________] : Disease: [unfilled] [AJCC Stage: ____] : AJCC Stage: [unfilled] [de-identified] : Serena is a rodrigue 63 yo female, who has started developing SOB approximately 2 months ago, she went to ER on 11/2/2017 and on CXR was noted to have a large right sided pleural effusion. She underwent a thoracentesis, 1.6L of fluid was drained. 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 IHC was pos for CK7, PAX8, CK5/6 and Ca125, negative for TTF-1/Napsin, calretinin, CK20, mammaglobin, p63, villin, HAM56, GCDFP15, TAY-EP4. \par \par Her visit on 12/1/2017 Serena had an excisional biopsy of cervical node on 12/13/2017 revealing met adenocarcinoma, primary source still was not clear. On 12/14/2017 Serena was admitted with SOB, Pleurx catheter was placed on 12/14/2017. 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 Serena received 1st dose of carbo (auc of 5)/taxol(175mg/m2) on 12/15/2017 without complications, but the next day sustained a fall 2/2 vasovagal episode and developed asymptomatic SAH, that resolved on imaging by 12/21/2017.  [de-identified] : KRas WT, EGFR WT, Alk WT, ROS1 WT, PDL1-1%\par Normal MMR protein expression [de-identified] : 11/24/2017: She reports some SOB, especially ARZATE today. No fevers, no weight loss (reports some weight gain), no GI,  or GYN symptoms, except for increasing abdominal girth, she reports no blood in the stool or urine and no vaginal bleeding. She reports no CP and no palpitations, she reports worsening nonproductive cough, no hemoptysis. She also reports difficulty lying on left side and lying down flat. \par She reports left on/off facial numbness several months in duration. She had a CT of her head performed in [facility] within 1 year.  Additionally there is a freely mobile left cervical node present on exam, as per patient this was in place for approximately 1 year. \par \par 12/1/2017: Serena is here for follow up today. She had a therapeutic thoracentesis in  on 11/27/2017, 2L of fluid were removed, with much improved respiratory status. PET CT and MRI of the brain were done on 11/29/2017, findings were discussed today. There remains no clear primary source of malignancy. We have discussed that the source of tumor may be Mullerian vs lung. regardless of source chemotherapy needs to be initiated ASAP. We have discussed Carbo/taxol regiment that will cover both Mullerian and lung origin. Side effects including myelosuppression, peripheral neuropathy, allergic reaction and others.\par \par 1/2/2017 Since last visit Serena had an excisional biopsy of cervical node on 12/13/2017 revealing met adenocarcinoma, primary source still was not clear. On 12/14/2017 Serena was admitted with SOB, Pleurx catheter was placed on 12/14/2017. 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 Serena received 1st dose of carbo (auc of 5)/taxol(175mg/m2) on 12/15/2017 without complications, but the next day sustained a fall 2/2 vasovagal episode and developed asymptomatic SAH, that resolved on imaging by 12/21/2017. Today Serena's SOB has significantly improved. She reports very mild neuropathy in fingertips only. She reports no headache and offers no other complaints. \par \par 1/26/2018: Complains of some neuropathy, otherwise tolerating chemo with no difficulty. \par \par 2/16/2018: restaging CT C/A/P reviewed, significant improvement noted. Will refer to GYN/ONC. Reports slightly worsening neuropathy, not -painful, taking Gabapentin, no other symptoms. For cycle 4 of carbo/taxol today.\par \par 3/9/2018: Serena met with Dr. Massey, she is planned for surgery on 5/7/2018, after completion of 6 cycles of carbo/taxol and restaging PET CT ordered for mid April and follow up with Dr. Massey on April 20. She reports worsening neuropathy, and occasional pain and changes in vision in her left eye, eye symptoms come and go and are not very bothersome. She reports no other symptoms. \par \par 3/30/2018; Serena is here for cycle 6 of carbo/taxol, she continues to have neuropathy in finger and toes, but offers no other complaints, chemo has been dose reduced. \par \par 4/27/2018: Results of restaging PET CT s/p 6 cycles of carbo/taxol revealed 1. No new areas of abnormal increased uptake is seen to indicate \par biologically active disease.\par \par 2. Persistent PET positive left thyroid mass with a max SUV 33.1, \par previously max SUV 34.8. Further evaluation with thyroid ultrasound and \par if needed fine-needle aspiration biopsy will be helpful.\par \par 3.   PET positive left cervical lymph nodes mainly level 2 on the left \par with a max SUV 5.47previously 18. Minimal increased uptake in the \par bilateral axillary lymph nodes most likely reactive(less than 2.5)\par \par 4. Weakly PET positive, max SUV 2.89 right pleura, previous max SUV 5.3 \par with non-FDG avid right pleural effusion. \par \par 5. Previously FDG avid right and left supraclavicular lymphadenopathy, \par left internal mammary, bilateral paratracheal, para-aortic, para \par vertebral, carinal, mesenteric, right and left iliacs, right inguinal \par lymphadenopathy, small in size and no longer FDG avid.\par \par 6. No abnormal increased uptake is seen in the  lobulated uterus.\par \par 7. Overall there is marked improvement in the FDG avid disease.\par This was reviewed with Serena. She met with Dr. Massey on 4/20/2018, surgery is planned for 5/7/2018. She will also joselyn to meet with ENT re FDG avid thyroid nodule. Will assist in making he an appointment for her. \par Persistent neuropathy on gabapentin. \par \par 6/29/18 ; Patient came for follow up visit , evaluation for chemotherapy cycle 8 of carbo / Taxol , patient tolerating medication well , except neuropathy  recommend to have gabapentin  300 mg po daily.\par \par 7/20/2018: Serena present for follow up today, she has completed total of 8 cycles of Carbo/Taxol. She reports significant neuropathy in her hands and feet. We will discontinue chemotherapy today and plan to follow with close observation. She has thyroidectomy planned with Dr. Herrera on 8/20/2018, obtaining clearance for PMD. She is also planning to fly to [place] at the end of September. She reports no SOB, no GI or  symptoms. \par \par 9/12/2018: Serena offers no significant complaints today. She states neuropathy in her hands has almost completely resolved and neuropathy in her feet is significantly better. She had undergone complete thyroidectomy by Dr. Herrera on 8/20/2018, pathology revealed papillary thyroid cancer, measuring 2 cm, pT1b, other additional foci of papillary carcinoma were also noted, no LVI, surgical margins were clear, LN 0/2 had no carcinoma, stage [id number]. She is following up with Dr. Herrera tomorrow. She has still not gone for her vacation in [place]. \par \par 10/10/2018: Restaging PET CT on 9/26/2018 reveals Since 4/16/2018,  1. Post thyroidectomy with diffuse increased FDG uptake at the thyroid  bed likely representing post-surgical changes.  2. Subtle increased uptake is seen in the lamina , right side at the  level of T8 with a max SUV 4.6. This is not sufficient for metastatic  disease. Bone scan or MRI examination with contrast will be helpful for  further evaluation.  3. No other areas of abnormal increased uptake is seen. Images were personally reviewed by myself and discussed with Serena. \par She also had an Echo on 9/24/2018 revealing EF of 63%. \par Serena reports ongoing fatigue, she is unable to lose weight. She is taking Synthroid and following with Dr. Acevedo. \par She reports stiff fingers at night only, no painful neuropathy. \par She denies any other symptoms today. \par \par 12/11/2018: Serena feels well, neuropathy in hands and feet is much improved. S he is now complaining of r-sided facial pain associated with some swelling, pain comes and goes. She has already seen Dr. Herrera, who ordered CT of sinuses and prescribed pain relief meds. She is also due for restaging PET CT. Serena feels well otherwise. \par \par 1/9/2019: Restaging PET CT was performed on 12/18/2018 it revealed COMPARISON : 9/24/2018.  New left upper quadrant peritoneal nodule measuring 8 mm with an SUV max  of 7.3. This is suggestive of biologic tumor activity.  No other FDG avid lesions seen throughout the scan. Images were personally reviewed by myself and discussed with Serena, originally over the phone and again today. I have originally suggested to try AI in the interim, Serena however reported diarrhea at the time of the scan and declined intervention for now. We will plan for restaging PET CT to be performed in 6-8 weeks, this will be ordered today. She will follow up with Dr. Massey at [facility] shortly and bring the disk for his review. I would also like her to meet with genetics, this will be arranged at [facility] with Dr. Massey's help. Serena reports no new symptoms today, her neuropathy is manageable and  improving. She still reports unilateral headache that was work up in the past. Neurology eval was again suggested to her. She is following closely with Endocrinology re thyroid management. She will have follow up with Dr. Herrera shortly as well. \par \par 2/20/2019: Serena offers no new complaints. PET CT from 2/12/2019 revealed \par Since PET/CT of December 19, 2018, no new sites of pathologic FDG uptake\par Slight increase in size of left upper quadrant peritoneal nodule (1.1 cm, \par previously 0.8 cm) with stable FDG uptake, 7.7 SUV suspicious for \par biologic tumor activity.\par No other sites of pathologic FDG uptake \par Images were personally reviewed by myself and discussed with Serena, case was also discussed with Dr. Massey at [facility]. Serena will have follow up with his shortly. She is following with endocrinology. reports improving neuropathy. No GI or  symptoms at this time. No weight loss. \par \par 3/20/2019: Serena had a peritoneal nodule biopsy done at [facility], I have received a call from Dr. 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 We have discussed that surgery though could be attempted will not be the best therapeutic approach, recommencement of systemic therapy was discussed. I have not received the results from [facility] yet. I have briefly discussed this with Serena today. Serena reports that she has acutely developed right lower extremity weakness 5-6 days ago, she did not inform any of her doctors about this. She also reports that her R upper extremity though not weak "does not feel normal either. She reports no back pain, there is no point tenderness, RLE is objectively weak on exam. I recommend ER evaluation to r/o CVA vs brain met, vs L-spine related issue. Recommend admission for work up. Serena is agreeable to get admitted. \par \par 5/8/2019: Serena present for follow up, she was diagnosed with multiple metastasis to the brain, MRI was done on 3/21/2019 and revealed \par Multiple supratentorial heterogeneously enhancing lesions consistent with \par metastatic disease. The appearance on the T2-weighted images is suggestive \par of adenocarcinoma. There is mass effect upon the left lateral ventricle and \par corpus callosum. \par She received whole brain irradiation by Dr. Dahl, completed it in the beginning of 4/2019, she is currently on steroid taper managed by Dr. Dahl, she is taking 4mg of Decadron daily. She will be stopping the Keppra as there have been no documented h/o seizures. She still reports impairment of the L visual field, she has an appointment coming up with Opthalmology. She reports no deficits walking after she has completed inpatient acute rehabilitation. \par Restaging PET CT on 5/1/2019 reveals COMPARISON : 2/12/2019 \par Multiple new FDG avid omental nodules largest measuring up to 10 mm, \par positive on nonattenuation corrected images. Findings are suggestive of \par biologic tumor activity. \par Again seen is a left upper quadrant peritoneal nodule, SUV max 6.8, \par previously 7.7 (12% decrease). \par Images were personally reviewed by myself and discussed with patient. \par She now reports mild abdominal bloating, no other symptoms. She reports her neuropathy has significantly improved. \par She has first evidence of recurrent disease documented 5.5 months after last carbo/taxol dose, the volume of disease was very small (1 8mm nodule), she was then observed for another 6 months and now she wishes to restart the chemotherapy. \par I have discussed with her that rechallenging with carbo/taxol may still prove to be effective as long as she gets restaged after 2 cycles. Given recent whole brain radiation and neuropathy I will offer her carbo/taxol weekly with does reduction on the taxol for neuropathy, We will plan to run carbo slowly to avoid allergic reaction. \par She is agreeable to start ASAP.

## 2019-05-08 NOTE — REASON FOR VISIT
[Family Member] : family member [Follow-Up Visit] : a follow-up [FreeTextEntry2] : endometrial cancer

## 2019-05-08 NOTE — CONSULT LETTER
[Dear  ___] : Dear  [unfilled], [Consult Letter:] : I had the pleasure of evaluating your patient, [unfilled]. [Please see my note below.] : Please see my note below. [Consult Closing:] : Thank you very much for allowing me to participate in the care of this patient.  If you have any questions, please do not hesitate to contact me. [( Thank you for referring [unfilled] for consultation for _____ )] : Thank you for referring [unfilled] for consultation for [unfilled] [Sincerely,] : Sincerely, [FreeTextEntry3] : Stacie Cid MD

## 2019-05-08 NOTE — PHYSICAL EXAM
[Restricted in physically strenuous activity but ambulatory and able to carry out work of a light or sedentary nature] : Status 1- Restricted in physically strenuous activity but ambulatory and able to carry out work of a light or sedentary nature, e.g., light house work, office work [Normal] : affect appropriate [de-identified] : clear lungs [de-identified] : gait deficient has resolved, some RLE weakness persistent  [de-identified] : L upper back lipoma, present for years, otherwise normal exam

## 2019-05-09 ENCOUNTER — APPOINTMENT (OUTPATIENT)
Dept: INFUSION THERAPY | Facility: CLINIC | Age: 64
End: 2019-05-09

## 2019-05-09 LAB
ALBUMIN SERPL ELPH-MCNC: 4.4 G/DL
ALP BLD-CCNC: 79 U/L
ALT SERPL-CCNC: 41 U/L
ANION GAP SERPL CALC-SCNC: 18 MMOL/L
AST SERPL-CCNC: 14 U/L
BILIRUB SERPL-MCNC: 0.7 MG/DL
BUN SERPL-MCNC: 18 MG/DL
CALCIUM SERPL-MCNC: 9.4 MG/DL
CANCER AG125 SERPL-ACNC: 8 U/ML
CEA SERPL-MCNC: 3.9 NG/ML
CHLORIDE SERPL-SCNC: 96 MMOL/L
CO2 SERPL-SCNC: 23 MMOL/L
CREAT SERPL-MCNC: 0.7 MG/DL
GLUCOSE SERPL-MCNC: 171 MG/DL
HCT VFR BLD CALC: 41.8 %
HGB BLD-MCNC: 14.1 G/DL
MCHC RBC-ENTMCNC: 30.7 PG
MCHC RBC-ENTMCNC: 33.7 G/DL
MCV RBC AUTO: 90.9 FL
PLATELET # BLD AUTO: 180 K/UL
PMV BLD: 8.5 FL
POTASSIUM SERPL-SCNC: 4.5 MMOL/L
PROT SERPL-MCNC: 6.6 G/DL
RBC # BLD: 4.6 M/UL
RBC # FLD: 14.6 %
SODIUM SERPL-SCNC: 137 MMOL/L
TSH SERPL-ACNC: 0.04 UIU/ML
WBC # FLD AUTO: 7.58 K/UL

## 2019-05-09 RX ORDER — DEXAMETHASONE 0.5 MG/5ML
12 ELIXIR ORAL ONCE
Refills: 0 | Status: COMPLETED | OUTPATIENT
Start: 2019-05-09 | End: 2019-05-09

## 2019-05-09 RX ORDER — DIPHENHYDRAMINE HCL 50 MG
25 CAPSULE ORAL ONCE
Refills: 0 | Status: COMPLETED | OUTPATIENT
Start: 2019-05-09 | End: 2019-05-09

## 2019-05-09 RX ORDER — FOSAPREPITANT DIMEGLUMINE 150 MG/5ML
150 INJECTION, POWDER, LYOPHILIZED, FOR SOLUTION INTRAVENOUS ONCE
Refills: 0 | Status: COMPLETED | OUTPATIENT
Start: 2019-05-09 | End: 2019-05-09

## 2019-05-09 RX ORDER — PACLITAXEL 6 MG/ML
115 INJECTION, SOLUTION, CONCENTRATE INTRAVENOUS ONCE
Refills: 0 | Status: COMPLETED | OUTPATIENT
Start: 2019-05-09 | End: 2019-05-09

## 2019-05-09 RX ORDER — FAMOTIDINE 10 MG/ML
20 INJECTION INTRAVENOUS ONCE
Refills: 0 | Status: COMPLETED | OUTPATIENT
Start: 2019-05-09 | End: 2019-05-09

## 2019-05-09 RX ORDER — CARBOPLATIN 50 MG
258 VIAL (EA) INTRAVENOUS ONCE
Refills: 0 | Status: COMPLETED | OUTPATIENT
Start: 2019-05-09 | End: 2019-05-09

## 2019-05-09 RX ADMIN — Medication 101 MILLIGRAM(S): at 10:58

## 2019-05-09 RX ADMIN — Medication 175.27 MILLIGRAM(S): at 13:00

## 2019-05-09 RX ADMIN — Medication 25 MILLIGRAM(S): at 13:00

## 2019-05-09 RX ADMIN — Medication 12 MILLIGRAM(S): at 10:20

## 2019-05-09 RX ADMIN — Medication 25 MILLIGRAM(S): at 10:15

## 2019-05-09 RX ADMIN — Medication 101 MILLIGRAM(S): at 12:40

## 2019-05-09 RX ADMIN — Medication 258 MILLIGRAM(S): at 16:00

## 2019-05-09 RX ADMIN — FAMOTIDINE 156 MILLIGRAM(S): 10 INJECTION INTRAVENOUS at 10:20

## 2019-05-09 RX ADMIN — FOSAPREPITANT DIMEGLUMINE 150 MILLIGRAM(S): 150 INJECTION, POWDER, LYOPHILIZED, FOR SOLUTION INTRAVENOUS at 10:55

## 2019-05-09 RX ADMIN — PACLITAXEL 269.17 MILLIGRAM(S): 6 INJECTION, SOLUTION, CONCENTRATE INTRAVENOUS at 10:19

## 2019-05-09 RX ADMIN — Medication 183 MILLIGRAM(S): at 09:59

## 2019-05-09 RX ADMIN — PACLITAXEL 115 MILLIGRAM(S): 6 INJECTION, SOLUTION, CONCENTRATE INTRAVENOUS at 12:40

## 2019-05-09 RX ADMIN — FAMOTIDINE 20 MILLIGRAM(S): 10 INJECTION INTRAVENOUS at 10:35

## 2019-05-09 RX ADMIN — FOSAPREPITANT DIMEGLUMINE 300 MILLIGRAM(S): 150 INJECTION, POWDER, LYOPHILIZED, FOR SOLUTION INTRAVENOUS at 10:35

## 2019-05-16 ENCOUNTER — LABORATORY RESULT (OUTPATIENT)
Age: 64
End: 2019-05-16

## 2019-05-16 ENCOUNTER — APPOINTMENT (OUTPATIENT)
Dept: INFUSION THERAPY | Facility: CLINIC | Age: 64
End: 2019-05-16

## 2019-05-16 RX ORDER — DEXAMETHASONE 0.5 MG/5ML
12 ELIXIR ORAL ONCE
Refills: 0 | Status: COMPLETED | OUTPATIENT
Start: 2019-05-16 | End: 2019-05-16

## 2019-05-16 RX ORDER — CARBOPLATIN 50 MG
258 VIAL (EA) INTRAVENOUS ONCE
Refills: 0 | Status: COMPLETED | OUTPATIENT
Start: 2019-05-16 | End: 2019-05-16

## 2019-05-16 RX ORDER — PACLITAXEL 6 MG/ML
115 INJECTION, SOLUTION, CONCENTRATE INTRAVENOUS ONCE
Refills: 0 | Status: COMPLETED | OUTPATIENT
Start: 2019-05-16 | End: 2019-05-16

## 2019-05-16 RX ORDER — DIPHENHYDRAMINE HCL 50 MG
25 CAPSULE ORAL ONCE
Refills: 0 | Status: COMPLETED | OUTPATIENT
Start: 2019-05-16 | End: 2019-05-16

## 2019-05-16 RX ORDER — FOSAPREPITANT DIMEGLUMINE 150 MG/5ML
150 INJECTION, POWDER, LYOPHILIZED, FOR SOLUTION INTRAVENOUS ONCE
Refills: 0 | Status: COMPLETED | OUTPATIENT
Start: 2019-05-16 | End: 2019-05-16

## 2019-05-16 RX ORDER — FAMOTIDINE 10 MG/ML
20 INJECTION INTRAVENOUS ONCE
Refills: 0 | Status: COMPLETED | OUTPATIENT
Start: 2019-05-16 | End: 2019-05-16

## 2019-05-16 RX ADMIN — FOSAPREPITANT DIMEGLUMINE 450 MILLIGRAM(S): 150 INJECTION, POWDER, LYOPHILIZED, FOR SOLUTION INTRAVENOUS at 13:10

## 2019-05-16 RX ADMIN — Medication 183 MILLIGRAM(S): at 12:30

## 2019-05-16 RX ADMIN — PACLITAXEL 269.17 MILLIGRAM(S): 6 INJECTION, SOLUTION, CONCENTRATE INTRAVENOUS at 11:55

## 2019-05-16 RX ADMIN — FAMOTIDINE 20 MILLIGRAM(S): 10 INJECTION INTRAVENOUS at 13:10

## 2019-05-16 RX ADMIN — Medication 12 MILLIGRAM(S): at 12:50

## 2019-05-16 RX ADMIN — Medication 25 MILLIGRAM(S): at 12:30

## 2019-05-16 RX ADMIN — FAMOTIDINE 156 MILLIGRAM(S): 10 INJECTION INTRAVENOUS at 12:50

## 2019-05-16 RX ADMIN — Medication 101 MILLIGRAM(S): at 11:56

## 2019-05-16 RX ADMIN — Medication 175.27 MILLIGRAM(S): at 11:53

## 2019-05-17 LAB
HCT VFR BLD CALC: 39.2 %
HGB BLD-MCNC: 13.3 G/DL
MCHC RBC-ENTMCNC: 30.9 PG
MCHC RBC-ENTMCNC: 33.9 G/DL
MCV RBC AUTO: 91 FL
PLATELET # BLD AUTO: 202 K/UL
PMV BLD: 9.5 FL
RBC # BLD: 4.31 M/UL
RBC # FLD: 15 %
WBC # FLD AUTO: 5.74 K/UL

## 2019-05-21 ENCOUNTER — APPOINTMENT (OUTPATIENT)
Dept: NEUROLOGY | Facility: CLINIC | Age: 64
End: 2019-05-21

## 2019-05-23 ENCOUNTER — APPOINTMENT (OUTPATIENT)
Dept: INFUSION THERAPY | Facility: CLINIC | Age: 64
End: 2019-05-23

## 2019-05-23 ENCOUNTER — LABORATORY RESULT (OUTPATIENT)
Age: 64
End: 2019-05-23

## 2019-05-23 LAB
HCT VFR BLD CALC: 36.3 %
HGB BLD-MCNC: 12.4 G/DL
MCHC RBC-ENTMCNC: 30.5 PG
MCHC RBC-ENTMCNC: 34.2 G/DL
MCV RBC AUTO: 89.4 FL
PLATELET # BLD AUTO: 218 K/UL
PMV BLD: 9.3 FL
RBC # BLD: 4.06 M/UL
RBC # FLD: 14.8 %
WBC # FLD AUTO: 3.73 K/UL

## 2019-05-23 RX ORDER — DEXAMETHASONE 0.5 MG/5ML
12 ELIXIR ORAL ONCE
Refills: 0 | Status: COMPLETED | OUTPATIENT
Start: 2019-05-23 | End: 2019-05-23

## 2019-05-23 RX ORDER — DIPHENHYDRAMINE HCL 50 MG
25 CAPSULE ORAL ONCE
Refills: 0 | Status: COMPLETED | OUTPATIENT
Start: 2019-05-23 | End: 2019-05-23

## 2019-05-23 RX ORDER — FAMOTIDINE 10 MG/ML
20 INJECTION INTRAVENOUS ONCE
Refills: 0 | Status: COMPLETED | OUTPATIENT
Start: 2019-05-23 | End: 2019-05-23

## 2019-05-23 RX ORDER — CARBOPLATIN 50 MG
258 VIAL (EA) INTRAVENOUS ONCE
Refills: 0 | Status: COMPLETED | OUTPATIENT
Start: 2019-05-23 | End: 2019-05-23

## 2019-05-23 RX ORDER — PACLITAXEL 6 MG/ML
115 INJECTION, SOLUTION, CONCENTRATE INTRAVENOUS ONCE
Refills: 0 | Status: COMPLETED | OUTPATIENT
Start: 2019-05-23 | End: 2019-05-23

## 2019-05-23 RX ORDER — FOSAPREPITANT DIMEGLUMINE 150 MG/5ML
150 INJECTION, POWDER, LYOPHILIZED, FOR SOLUTION INTRAVENOUS ONCE
Refills: 0 | Status: COMPLETED | OUTPATIENT
Start: 2019-05-23 | End: 2019-05-23

## 2019-05-23 RX ADMIN — Medication 183 MILLIGRAM(S): at 10:58

## 2019-05-23 RX ADMIN — Medication 175.27 MILLIGRAM(S): at 11:30

## 2019-05-23 RX ADMIN — PACLITAXEL 269.17 MILLIGRAM(S): 6 INJECTION, SOLUTION, CONCENTRATE INTRAVENOUS at 11:30

## 2019-05-23 RX ADMIN — FOSAPREPITANT DIMEGLUMINE 450 MILLIGRAM(S): 150 INJECTION, POWDER, LYOPHILIZED, FOR SOLUTION INTRAVENOUS at 10:59

## 2019-05-23 RX ADMIN — Medication 151.5 MILLIGRAM(S): at 10:58

## 2019-05-23 RX ADMIN — FAMOTIDINE 156 MILLIGRAM(S): 10 INJECTION INTRAVENOUS at 10:59

## 2019-06-05 ENCOUNTER — LABORATORY RESULT (OUTPATIENT)
Age: 64
End: 2019-06-05

## 2019-06-05 ENCOUNTER — APPOINTMENT (OUTPATIENT)
Dept: HEMATOLOGY ONCOLOGY | Facility: CLINIC | Age: 64
End: 2019-06-05
Payer: MEDICAID

## 2019-06-05 VITALS
BODY MASS INDEX: 28.28 KG/M2 | HEART RATE: 111 BPM | RESPIRATION RATE: 14 BRPM | TEMPERATURE: 97.2 F | DIASTOLIC BLOOD PRESSURE: 72 MMHG | HEIGHT: 66 IN | WEIGHT: 176 LBS | SYSTOLIC BLOOD PRESSURE: 107 MMHG

## 2019-06-05 LAB
ALBUMIN SERPL ELPH-MCNC: 3.4 G/DL
ALP BLD-CCNC: 89 U/L
ALT SERPL-CCNC: 30 U/L
ANION GAP SERPL CALC-SCNC: 14 MMOL/L
AST SERPL-CCNC: 21 U/L
BILIRUB SERPL-MCNC: 0.7 MG/DL
BUN SERPL-MCNC: 5 MG/DL
CALCIUM SERPL-MCNC: 8.6 MG/DL
CHLORIDE SERPL-SCNC: 96 MMOL/L
CO2 SERPL-SCNC: 24 MMOL/L
CREAT SERPL-MCNC: 0.6 MG/DL
GLUCOSE SERPL-MCNC: 102 MG/DL
HCT VFR BLD CALC: 32.2 %
HGB BLD-MCNC: 11 G/DL
MCHC RBC-ENTMCNC: 31 PG
MCHC RBC-ENTMCNC: 34.2 G/DL
MCV RBC AUTO: 90.7 FL
PLATELET # BLD AUTO: 264 K/UL
PMV BLD: 9.1 FL
POTASSIUM SERPL-SCNC: 4.1 MMOL/L
PROT SERPL-MCNC: 6.1 G/DL
RBC # BLD: 3.55 M/UL
RBC # FLD: 17 %
SODIUM SERPL-SCNC: 134 MMOL/L
WBC # FLD AUTO: 6.11 K/UL

## 2019-06-05 PROCEDURE — 99213 OFFICE O/P EST LOW 20 MIN: CPT

## 2019-06-05 NOTE — ASSESSMENT
[FreeTextEntry1] : Malignant pleural effusion, resolved\par --L side PleurX catheter has been removed on 2/8/2018\par --Following wit Dr. Thompson\par \par Papillary-serous endometrial cancer, stage IV\par  --PET CT on 11/29/2017 revealed no clear GYN origin, extensive KEVIN, uptake in the right lung and pleura, thyroid nodule\par --Completed cycle 8  of carbo/taxol on 6/29/2018, will proceed with close observation, 2 cycles were administered  postop, last dose administered on 6/29/2018\par --MRI of the brain on 11/29/2017 reveals no brain metastasis, but possible R ICA aneurysm, follow up CTA brain revealed no aneurysm  \par --Follow up MRI brain on 3/202/2018 was negative (2/2 eye complaints)\par --Restaging PET CT scans 2/2/2017 and 4/16/2018 reveal great response to chemotherapy\par --Neuropathy improving, not painful, continue on Gabapentin \par --Restaging PET CT on 9/26/2018 (3 months post completion of chemo 6/29/2018), was essentially negative for recurrent disease\par --Restaging PET CT on 12/18/2018 reveals   New left upper quadrant peritoneal nodule measuring 8 mm with an SUV max  of 7.3. This is suggestive of biologic tumor activity.  No other FDG avid lesions seen throughout the scan.\par --restaging PET CT on 2/12/2019 revealed slight increase in size of left upper quadrant peritoneal nodule (1.1 cm, \par previously 0.8 cm) with stable FDG uptake, 7.7 SUV suspicious for biologic tumor activity.\par --Case was discussed with Dr. Massey at [facility], biopsy of peritoneal nodule is positive for recurrent endometrial cancer, obtain records from [facility] \par --Follow up MRI of T-spine done on 10/16/2018 was negative for bone mets\par --MRI brain on 4/23/2019 revealed multiple brain mets\par --S/p whole brain radiation completed 4/2019, required acute rehab\par --PET CT on 5/1/2019 now reveals further disease progression \par --Restarted weekly carbo/taxol 3 on 1 off on 5/9/2019 \par --Plan to restage with PET CT after 2 cycles (ordered on 6/5/2019) \par --Off Decadron and Keppra on 6/5/2019 \par --Restaging brain MRI ordered on 6//5/2019 \par --She was advised to make appointment with opthalmology \par \par Papillary thyroid cancer [id number], 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 Herrera \par --Follow up with Dr. Acevedo of endocrinology, he is addressing vitamin D, thyroid and diabetes \par \par  Follow up in 4 weeks

## 2019-06-05 NOTE — REVIEW OF SYSTEMS
[Fatigue] : fatigue [Negative] : Endocrine [Chest Pain] : no chest pain [Palpitations] : no palpitations [Lower Ext Edema] : no lower extremity edema [Shortness Of Breath] : no shortness of breath [Cough] : no cough [Wheezing] : no wheezing [SOB on Exertion] : no shortness of breath during exertion [FreeTextEntry6] : SOB completely resolved [FreeTextEntry9] : normal gait and strength in upper and lower extremities  [de-identified] : worsening peripheral neuropathy, not painful, L visual field deficit, gait impairment has resolved

## 2019-06-05 NOTE — PHYSICAL EXAM
[Restricted in physically strenuous activity but ambulatory and able to carry out work of a light or sedentary nature] : Status 1- Restricted in physically strenuous activity but ambulatory and able to carry out work of a light or sedentary nature, e.g., light house work, office work [Normal] : affect appropriate [de-identified] : L eye appears more swollen than right, visual field defect L eye  [de-identified] : L upper back lipoma, present for years, otherwise normal exam [de-identified] : clear lungs [de-identified] : gait deficient has resolved completely

## 2019-06-05 NOTE — HISTORY OF PRESENT ILLNESS
[AJCC Stage: ____] : AJCC Stage: [unfilled] [Disease: _____________________] : Disease: [unfilled] [de-identified] : KRas WT, EGFR WT, Alk WT, ROS1 WT, PDL1-1%\par Normal MMR protein expression [de-identified] : Serena is a rodrigue 63 yo female, who has started developing SOB approximately 2 months ago, she went to ER on 11/2/2017 and on CXR was noted to have a large right sided pleural effusion. She underwent a thoracentesis, 1.6L of fluid was drained. 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 IHC was pos for CK7, PAX8, CK5/6 and Ca125, negative for TTF-1/Napsin, calretinin, CK20, mammaglobin, p63, villin, HAM56, GCDFP15, TYA-EP4. \par \par Her visit on 12/1/2017 Serena had an excisional biopsy of cervical node on 12/13/2017 revealing met adenocarcinoma, primary source still was not clear. On 12/14/2017 Serena was admitted with SOB, Pleurx catheter was placed on 12/14/2017. 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 Serena received 1st dose of carbo (auc of 5)/taxol(175mg/m2) on 12/15/2017 without complications, but the next day sustained a fall 2/2 vasovagal episode and developed asymptomatic SAH, that resolved on imaging by 12/21/2017.  [de-identified] : 11/24/2017: She reports some SOB, especially ARZATE today. No fevers, no weight loss (reports some weight gain), no GI,  or GYN symptoms, except for increasing abdominal girth, she reports no blood in the stool or urine and no vaginal bleeding. She reports no CP and no palpitations, she reports worsening nonproductive cough, no hemoptysis. She also reports difficulty lying on left side and lying down flat. \par She reports left on/off facial numbness several months in duration. She had a CT of her head performed in [facility] within 1 year.  Additionally there is a freely mobile left cervical node present on exam, as per patient this was in place for approximately 1 year. \par \par 12/1/2017: Serena is here for follow up today. She had a therapeutic thoracentesis in  on 11/27/2017, 2L of fluid were removed, with much improved respiratory status. PET CT and MRI of the brain were done on 11/29/2017, findings were discussed today. There remains no clear primary source of malignancy. We have discussed that the source of tumor may be Mullerian vs lung. regardless of source chemotherapy needs to be initiated ASAP. We have discussed Carbo/taxol regiment that will cover both Mullerian and lung origin. Side effects including myelosuppression, peripheral neuropathy, allergic reaction and others.\par \par 1/2/2017 Since last visit Serena had an excisional biopsy of cervical node on 12/13/2017 revealing met adenocarcinoma, primary source still was not clear. On 12/14/2017 Serena was admitted with SOB, Pleurx catheter was placed on 12/14/2017. 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 Serena received 1st dose of carbo (auc of 5)/taxol(175mg/m2) on 12/15/2017 without complications, but the next day sustained a fall 2/2 vasovagal episode and developed asymptomatic SAH, that resolved on imaging by 12/21/2017. Today Serena's SOB has significantly improved. She reports very mild neuropathy in fingertips only. She reports no headache and offers no other complaints. \par \par 1/26/2018: Complains of some neuropathy, otherwise tolerating chemo with no difficulty. \par \par 2/16/2018: restaging CT C/A/P reviewed, significant improvement noted. Will refer to GYN/ONC. Reports slightly worsening neuropathy, not -painful, taking Gabapentin, no other symptoms. For cycle 4 of carbo/taxol today.\par \par 3/9/2018: Serena met with Dr. Massey, she is planned for surgery on 5/7/2018, after completion of 6 cycles of carbo/taxol and restaging PET CT ordered for mid April and follow up with Dr. Massey on April 20. She reports worsening neuropathy, and occasional pain and changes in vision in her left eye, eye symptoms come and go and are not very bothersome. She reports no other symptoms. \par \par 3/30/2018; Serena is here for cycle 6 of carbo/taxol, she continues to have neuropathy in finger and toes, but offers no other complaints, chemo has been dose reduced. \par \par 4/27/2018: Results of restaging PET CT s/p 6 cycles of carbo/taxol revealed 1. No new areas of abnormal increased uptake is seen to indicate \par biologically active disease.\par \par 2. Persistent PET positive left thyroid mass with a max SUV 33.1, \par previously max SUV 34.8. Further evaluation with thyroid ultrasound and \par if needed fine-needle aspiration biopsy will be helpful.\par \par 3.   PET positive left cervical lymph nodes mainly level 2 on the left \par with a max SUV 5.47previously 18. Minimal increased uptake in the \par bilateral axillary lymph nodes most likely reactive(less than 2.5)\par \par 4. Weakly PET positive, max SUV 2.89 right pleura, previous max SUV 5.3 \par with non-FDG avid right pleural effusion. \par \par 5. Previously FDG avid right and left supraclavicular lymphadenopathy, \par left internal mammary, bilateral paratracheal, para-aortic, para \par vertebral, carinal, mesenteric, right and left iliacs, right inguinal \par lymphadenopathy, small in size and no longer FDG avid.\par \par 6. No abnormal increased uptake is seen in the  lobulated uterus.\par \par 7. Overall there is marked improvement in the FDG avid disease.\par This was reviewed with Serena. She met with Dr. Massey on 4/20/2018, surgery is planned for 5/7/2018. She will also joselyn to meet with ENT re FDG avid thyroid nodule. Will assist in making he an appointment for her. \par Persistent neuropathy on gabapentin. \par \par 6/29/18 ; Patient came for follow up visit , evaluation for chemotherapy cycle 8 of carbo / Taxol , patient tolerating medication well , except neuropathy  recommend to have gabapentin  300 mg po daily.\par \par 7/20/2018: Serena present for follow up today, she has completed total of 8 cycles of Carbo/Taxol. She reports significant neuropathy in her hands and feet. We will discontinue chemotherapy today and plan to follow with close observation. She has thyroidectomy planned with Dr. Herrera on 8/20/2018, obtaining clearance for PMD. She is also planning to fly to [place] at the end of September. She reports no SOB, no GI or  symptoms. \par \par 9/12/2018: Serena offers no significant complaints today. She states neuropathy in her hands has almost completely resolved and neuropathy in her feet is significantly better. She had undergone complete thyroidectomy by Dr. Herrera on 8/20/2018, pathology revealed papillary thyroid cancer, measuring 2 cm, pT1b, other additional foci of papillary carcinoma were also noted, no LVI, surgical margins were clear, LN 0/2 had no carcinoma, stage [id number]. She is following up with Dr. Herrera tomorrow. She has still not gone for her vacation in [place]. \par \par 10/10/2018: Restaging PET CT on 9/26/2018 reveals Since 4/16/2018,  1. Post thyroidectomy with diffuse increased FDG uptake at the thyroid  bed likely representing post-surgical changes.  2. Subtle increased uptake is seen in the lamina , right side at the  level of T8 with a max SUV 4.6. This is not sufficient for metastatic  disease. Bone scan or MRI examination with contrast will be helpful for  further evaluation.  3. No other areas of abnormal increased uptake is seen. Images were personally reviewed by myself and discussed with Serena. \par She also had an Echo on 9/24/2018 revealing EF of 63%. \par Serena reports ongoing fatigue, she is unable to lose weight. She is taking Synthroid and following with Dr. Acevedo. \par She reports stiff fingers at night only, no painful neuropathy. \par She denies any other symptoms today. \par \par 12/11/2018: Serena feels well, neuropathy in hands and feet is much improved. S he is now complaining of r-sided facial pain associated with some swelling, pain comes and goes. She has already seen Dr. Herrera, who ordered CT of sinuses and prescribed pain relief meds. She is also due for restaging PET CT. Serena feels well otherwise. \par \par 1/9/2019: Restaging PET CT was performed on 12/18/2018 it revealed COMPARISON : 9/24/2018.  New left upper quadrant peritoneal nodule measuring 8 mm with an SUV max  of 7.3. This is suggestive of biologic tumor activity.  No other FDG avid lesions seen throughout the scan. Images were personally reviewed by myself and discussed with Serena, originally over the phone and again today. I have originally suggested to try AI in the interim, Serena however reported diarrhea at the time of the scan and declined intervention for now. We will plan for restaging PET CT to be performed in 6-8 weeks, this will be ordered today. She will follow up with Dr. Massey at [facility] shortly and bring the disk for his review. I would also like her to meet with genetics, this will be arranged at [facility] with Dr. Massey's help. Serena reports no new symptoms today, her neuropathy is manageable and  improving. She still reports unilateral headache that was work up in the past. Neurology eval was again suggested to her. She is following closely with Endocrinology re thyroid management. She will have follow up with Dr. Herrera shortly as well. \par \par 2/20/2019: Serena offers no new complaints. PET CT from 2/12/2019 revealed \par Since PET/CT of December 19, 2018, no new sites of pathologic FDG uptake\par Slight increase in size of left upper quadrant peritoneal nodule (1.1 cm, \par previously 0.8 cm) with stable FDG uptake, 7.7 SUV suspicious for \par biologic tumor activity.\par No other sites of pathologic FDG uptake \par Images were personally reviewed by myself and discussed with Serena, case was also discussed with Dr. Massey at [facility]. Serena will have follow up with his shortly. She is following with endocrinology. reports improving neuropathy. No GI or  symptoms at this time. No weight loss. \par \par 3/20/2019: Serena had a peritoneal nodule biopsy done at [facility], I have received a call from Dr. 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 We have discussed that surgery though could be attempted will not be the best therapeutic approach, recommencement of systemic therapy was discussed. I have not received the results from [facility] yet. I have briefly discussed this with Serena today. Serena reports that she has acutely developed right lower extremity weakness 5-6 days ago, she did not inform any of her doctors about this. She also reports that her R upper extremity though not weak "does not feel normal either. She reports no back pain, there is no point tenderness, RLE is objectively weak on exam. I recommend ER evaluation to r/o CVA vs brain met, vs L-spine related issue. Recommend admission for work up. Serena is agreeable to get admitted. \par \par 5/8/2019: Serena present for follow up, she was diagnosed with multiple metastasis to the brain, MRI was done on 3/21/2019 and revealed \par Multiple supratentorial heterogeneously enhancing lesions consistent with \par metastatic disease. The appearance on the T2-weighted images is suggestive \par of adenocarcinoma. There is mass effect upon the left lateral ventricle and \par corpus callosum. \par She received whole brain irradiation by Dr. Dahl, completed it in the beginning of 4/2019, she is currently on steroid taper managed by Dr. Dahl, she is taking 4mg of Decadron daily. She will be stopping the Keppra as there have been no documented h/o seizures. She still reports impairment of the L visual field, she has an appointment coming up with Opthalmology. She reports no deficits walking after she has completed inpatient acute rehabilitation. \par Restaging PET CT on 5/1/2019 reveals COMPARISON : 2/12/2019 \par Multiple new FDG avid omental nodules largest measuring up to 10 mm, \par positive on nonattenuation corrected images. Findings are suggestive of \par biologic tumor activity. \par Again seen is a left upper quadrant peritoneal nodule, SUV max 6.8, \par previously 7.7 (12% decrease). \par Images were personally reviewed by myself and discussed with patient. \par She now reports mild abdominal bloating, no other symptoms. She reports her neuropathy has significantly improved. \par She has first evidence of recurrent disease documented 5.5 months after last carbo/taxol dose, the volume of disease was very small (1 8mm nodule), she was then observed for another 6 months and now she wishes to restart the chemotherapy. \par I have discussed with her that rechallenging with carbo/taxol may still prove to be effective as long as she gets restaged after 2 cycles. Given recent whole brain radiation and neuropathy I will offer her carbo/taxol weekly with does reduction on the taxol for neuropathy, We will plan to run carbo slowly to avoid allergic reaction. \par She is agreeable to start ASAP. \par \par 6/5/2019: Serena has completed 1 cycle of weekly Carbo/taxol, ran at slow rate to avoid Carbo reaction and has tolerated chemotherapy without difficulty, she does not complain of increase in neuropathy. She has ongoing vision changes in her L eye, she had no residual LE weakness. She is off Decadron and Keppra. She was advised to see ophthalmology.

## 2019-06-06 ENCOUNTER — APPOINTMENT (OUTPATIENT)
Dept: INFUSION THERAPY | Facility: CLINIC | Age: 64
End: 2019-06-06

## 2019-06-06 RX ORDER — PACLITAXEL 6 MG/ML
115 INJECTION, SOLUTION, CONCENTRATE INTRAVENOUS ONCE
Refills: 0 | Status: COMPLETED | OUTPATIENT
Start: 2019-06-06 | End: 2019-06-06

## 2019-06-06 RX ORDER — DIPHENHYDRAMINE HCL 50 MG
25 CAPSULE ORAL ONCE
Refills: 0 | Status: COMPLETED | OUTPATIENT
Start: 2019-06-06 | End: 2019-06-06

## 2019-06-06 RX ORDER — FOSAPREPITANT DIMEGLUMINE 150 MG/5ML
150 INJECTION, POWDER, LYOPHILIZED, FOR SOLUTION INTRAVENOUS ONCE
Refills: 0 | Status: COMPLETED | OUTPATIENT
Start: 2019-06-06 | End: 2019-06-06

## 2019-06-06 RX ORDER — CARBOPLATIN 50 MG
260 VIAL (EA) INTRAVENOUS ONCE
Refills: 0 | Status: COMPLETED | OUTPATIENT
Start: 2019-06-06 | End: 2019-06-06

## 2019-06-06 RX ORDER — FAMOTIDINE 10 MG/ML
20 INJECTION INTRAVENOUS ONCE
Refills: 0 | Status: COMPLETED | OUTPATIENT
Start: 2019-06-06 | End: 2019-06-06

## 2019-06-06 RX ORDER — DEXAMETHASONE 0.5 MG/5ML
12 ELIXIR ORAL ONCE
Refills: 0 | Status: COMPLETED | OUTPATIENT
Start: 2019-06-06 | End: 2019-06-06

## 2019-06-06 RX ADMIN — FOSAPREPITANT DIMEGLUMINE 450 MILLIGRAM(S): 150 INJECTION, POWDER, LYOPHILIZED, FOR SOLUTION INTRAVENOUS at 09:45

## 2019-06-06 RX ADMIN — FAMOTIDINE 20 MILLIGRAM(S): 10 INJECTION INTRAVENOUS at 10:40

## 2019-06-06 RX ADMIN — Medication 183 MILLIGRAM(S): at 10:10

## 2019-06-06 RX ADMIN — Medication 151.5 MILLIGRAM(S): at 10:40

## 2019-06-06 RX ADMIN — FOSAPREPITANT DIMEGLUMINE 150 MILLIGRAM(S): 150 INJECTION, POWDER, LYOPHILIZED, FOR SOLUTION INTRAVENOUS at 10:10

## 2019-06-06 RX ADMIN — FAMOTIDINE 156 MILLIGRAM(S): 10 INJECTION INTRAVENOUS at 10:25

## 2019-06-06 RX ADMIN — Medication 25 MILLIGRAM(S): at 12:10

## 2019-06-06 RX ADMIN — Medication 260 MILLIGRAM(S): at 15:35

## 2019-06-06 RX ADMIN — Medication 175.33 MILLIGRAM(S): at 11:35

## 2019-06-06 RX ADMIN — Medication 12 MILLIGRAM(S): at 10:25

## 2019-06-06 RX ADMIN — PACLITAXEL 269.17 MILLIGRAM(S): 6 INJECTION, SOLUTION, CONCENTRATE INTRAVENOUS at 10:55

## 2019-06-06 RX ADMIN — PACLITAXEL 115 MILLIGRAM(S): 6 INJECTION, SOLUTION, CONCENTRATE INTRAVENOUS at 11:55

## 2019-06-06 RX ADMIN — Medication 25 MILLIGRAM(S): at 10:55

## 2019-06-06 RX ADMIN — Medication 101 MILLIGRAM(S): at 11:55

## 2019-06-08 ENCOUNTER — OUTPATIENT (OUTPATIENT)
Dept: OUTPATIENT SERVICES | Facility: HOSPITAL | Age: 64
LOS: 1 days | Discharge: HOME | End: 2019-06-08
Payer: MEDICAID

## 2019-06-08 DIAGNOSIS — Z98.890 OTHER SPECIFIED POSTPROCEDURAL STATES: Chronic | ICD-10-CM

## 2019-06-08 DIAGNOSIS — C79.31 SECONDARY MALIGNANT NEOPLASM OF BRAIN: ICD-10-CM

## 2019-06-08 DIAGNOSIS — Z90.710 ACQUIRED ABSENCE OF BOTH CERVIX AND UTERUS: Chronic | ICD-10-CM

## 2019-06-08 PROCEDURE — 70553 MRI BRAIN STEM W/O & W/DYE: CPT | Mod: 26

## 2019-06-12 LAB
ALBUMIN SERPL ELPH-MCNC: 3.3 G/DL
ALP BLD-CCNC: 85 U/L
ALT SERPL-CCNC: 30 U/L
ANION GAP SERPL CALC-SCNC: 17 MMOL/L
AST SERPL-CCNC: 23 U/L
BILIRUB SERPL-MCNC: 0.5 MG/DL
BUN SERPL-MCNC: 5 MG/DL
CALCIUM SERPL-MCNC: 8.6 MG/DL
CANCER AG125 SERPL-ACNC: 14 U/ML
CEA SERPL-MCNC: 6.6 NG/ML
CHLORIDE SERPL-SCNC: 97 MMOL/L
CO2 SERPL-SCNC: 22 MMOL/L
CREAT SERPL-MCNC: 0.7 MG/DL
FERRITIN SERPL-MCNC: 943 NG/ML
FOLATE SERPL-MCNC: 12.1 NG/ML
GLUCOSE SERPL-MCNC: 104 MG/DL
IRON SATN MFR SERPL: 23 %
IRON SERPL-MCNC: 46 UG/DL
POTASSIUM SERPL-SCNC: 4.3 MMOL/L
PROT SERPL-MCNC: 5.7 G/DL
SODIUM SERPL-SCNC: 136 MMOL/L
TIBC SERPL-MCNC: 204 UG/DL
UIBC SERPL-MCNC: 158 UG/DL
VIT B12 SERPL-MCNC: >2000 PG/ML

## 2019-06-13 ENCOUNTER — LABORATORY RESULT (OUTPATIENT)
Age: 64
End: 2019-06-13

## 2019-06-13 ENCOUNTER — APPOINTMENT (OUTPATIENT)
Dept: INFUSION THERAPY | Facility: CLINIC | Age: 64
End: 2019-06-13

## 2019-06-13 LAB
HCT VFR BLD CALC: 32.3 %
HGB BLD-MCNC: 10.9 G/DL
MCHC RBC-ENTMCNC: 31.2 PG
MCHC RBC-ENTMCNC: 33.7 G/DL
MCV RBC AUTO: 92.6 FL
PLATELET # BLD AUTO: 382 K/UL
PMV BLD: 9.2 FL
RBC # BLD: 3.49 M/UL
RBC # FLD: 17.1 %
WBC # FLD AUTO: 7.77 K/UL

## 2019-06-13 RX ORDER — DIPHENHYDRAMINE HCL 50 MG
25 CAPSULE ORAL ONCE
Refills: 0 | Status: COMPLETED | OUTPATIENT
Start: 2019-06-13 | End: 2019-06-13

## 2019-06-13 RX ORDER — DEXAMETHASONE 0.5 MG/5ML
12 ELIXIR ORAL ONCE
Refills: 0 | Status: COMPLETED | OUTPATIENT
Start: 2019-06-13 | End: 2019-06-13

## 2019-06-13 RX ORDER — PACLITAXEL 6 MG/ML
115 INJECTION, SOLUTION, CONCENTRATE INTRAVENOUS ONCE
Refills: 0 | Status: COMPLETED | OUTPATIENT
Start: 2019-06-13 | End: 2019-06-13

## 2019-06-13 RX ORDER — CARBOPLATIN 50 MG
260 VIAL (EA) INTRAVENOUS ONCE
Refills: 0 | Status: COMPLETED | OUTPATIENT
Start: 2019-06-13 | End: 2019-06-13

## 2019-06-13 RX ORDER — FOSAPREPITANT DIMEGLUMINE 150 MG/5ML
150 INJECTION, POWDER, LYOPHILIZED, FOR SOLUTION INTRAVENOUS ONCE
Refills: 0 | Status: COMPLETED | OUTPATIENT
Start: 2019-06-13 | End: 2019-06-13

## 2019-06-13 RX ORDER — FAMOTIDINE 10 MG/ML
20 INJECTION INTRAVENOUS ONCE
Refills: 0 | Status: COMPLETED | OUTPATIENT
Start: 2019-06-13 | End: 2019-06-13

## 2019-06-13 RX ADMIN — Medication 25 MILLIGRAM(S): at 12:00

## 2019-06-13 RX ADMIN — Medication 151.5 MILLIGRAM(S): at 13:25

## 2019-06-13 RX ADMIN — Medication 12 MILLIGRAM(S): at 11:25

## 2019-06-13 RX ADMIN — PACLITAXEL 115 MILLIGRAM(S): 6 INJECTION, SOLUTION, CONCENTRATE INTRAVENOUS at 13:20

## 2019-06-13 RX ADMIN — PACLITAXEL 269.17 MILLIGRAM(S): 6 INJECTION, SOLUTION, CONCENTRATE INTRAVENOUS at 12:20

## 2019-06-13 RX ADMIN — Medication 25 MILLIGRAM(S): at 13:45

## 2019-06-13 RX ADMIN — FAMOTIDINE 20 MILLIGRAM(S): 10 INJECTION INTRAVENOUS at 11:40

## 2019-06-13 RX ADMIN — FAMOTIDINE 156 MILLIGRAM(S): 10 INJECTION INTRAVENOUS at 11:25

## 2019-06-13 RX ADMIN — Medication 101 MILLIGRAM(S): at 11:40

## 2019-06-13 RX ADMIN — Medication 175.33 MILLIGRAM(S): at 11:14

## 2019-06-13 RX ADMIN — FOSAPREPITANT DIMEGLUMINE 450 MILLIGRAM(S): 150 INJECTION, POWDER, LYOPHILIZED, FOR SOLUTION INTRAVENOUS at 10:28

## 2019-06-13 RX ADMIN — FOSAPREPITANT DIMEGLUMINE 150 MILLIGRAM(S): 150 INJECTION, POWDER, LYOPHILIZED, FOR SOLUTION INTRAVENOUS at 11:00

## 2019-06-13 RX ADMIN — Medication 183 MILLIGRAM(S): at 11:00

## 2019-06-20 ENCOUNTER — OTHER (OUTPATIENT)
Age: 64
End: 2019-06-20

## 2019-06-20 ENCOUNTER — LABORATORY RESULT (OUTPATIENT)
Age: 64
End: 2019-06-20

## 2019-06-20 ENCOUNTER — APPOINTMENT (OUTPATIENT)
Dept: INFUSION THERAPY | Facility: CLINIC | Age: 64
End: 2019-06-20

## 2019-06-20 LAB
HCT VFR BLD CALC: 28.1 %
HGB BLD-MCNC: 9.3 G/DL
MCHC RBC-ENTMCNC: 31.6 PG
MCHC RBC-ENTMCNC: 33.1 G/DL
MCV RBC AUTO: 95.6 FL
PLATELET # BLD AUTO: 251 K/UL
PMV BLD: 9.1 FL
RBC # BLD: 2.94 M/UL
RBC # FLD: 18.2 %
WBC # FLD AUTO: 5.1 K/UL

## 2019-06-20 RX ORDER — DIPHENHYDRAMINE HCL 50 MG
25 CAPSULE ORAL ONCE
Refills: 0 | Status: COMPLETED | OUTPATIENT
Start: 2019-06-20 | End: 2019-06-20

## 2019-06-20 RX ORDER — PACLITAXEL 6 MG/ML
115 INJECTION, SOLUTION, CONCENTRATE INTRAVENOUS ONCE
Refills: 0 | Status: COMPLETED | OUTPATIENT
Start: 2019-06-20 | End: 2019-06-20

## 2019-06-20 RX ORDER — FAMOTIDINE 10 MG/ML
20 INJECTION INTRAVENOUS ONCE
Refills: 0 | Status: COMPLETED | OUTPATIENT
Start: 2019-06-20 | End: 2019-06-20

## 2019-06-20 RX ORDER — FOSAPREPITANT DIMEGLUMINE 150 MG/5ML
150 INJECTION, POWDER, LYOPHILIZED, FOR SOLUTION INTRAVENOUS ONCE
Refills: 0 | Status: COMPLETED | OUTPATIENT
Start: 2019-06-20 | End: 2019-06-20

## 2019-06-20 RX ORDER — CARBOPLATIN 50 MG
260 VIAL (EA) INTRAVENOUS ONCE
Refills: 0 | Status: COMPLETED | OUTPATIENT
Start: 2019-06-20 | End: 2019-06-20

## 2019-06-20 RX ORDER — DEXAMETHASONE 0.5 MG/5ML
12 ELIXIR ORAL ONCE
Refills: 0 | Status: COMPLETED | OUTPATIENT
Start: 2019-06-20 | End: 2019-06-20

## 2019-06-20 RX ADMIN — Medication 25 MILLIGRAM(S): at 11:20

## 2019-06-20 RX ADMIN — PACLITAXEL 269.17 MILLIGRAM(S): 6 INJECTION, SOLUTION, CONCENTRATE INTRAVENOUS at 11:20

## 2019-06-20 RX ADMIN — Medication 260 MILLIGRAM(S): at 16:02

## 2019-06-20 RX ADMIN — PACLITAXEL 115 MILLIGRAM(S): 6 INJECTION, SOLUTION, CONCENTRATE INTRAVENOUS at 12:20

## 2019-06-20 RX ADMIN — FAMOTIDINE 156 MILLIGRAM(S): 10 INJECTION INTRAVENOUS at 10:40

## 2019-06-20 RX ADMIN — Medication 12 MILLIGRAM(S): at 10:23

## 2019-06-20 RX ADMIN — Medication 151.5 MILLIGRAM(S): at 12:20

## 2019-06-20 RX ADMIN — Medication 175.33 MILLIGRAM(S): at 12:40

## 2019-06-20 RX ADMIN — FOSAPREPITANT DIMEGLUMINE 150 MILLIGRAM(S): 150 INJECTION, POWDER, LYOPHILIZED, FOR SOLUTION INTRAVENOUS at 10:24

## 2019-06-20 RX ADMIN — Medication 183 MILLIGRAM(S): at 09:52

## 2019-06-20 RX ADMIN — FOSAPREPITANT DIMEGLUMINE 450 MILLIGRAM(S): 150 INJECTION, POWDER, LYOPHILIZED, FOR SOLUTION INTRAVENOUS at 09:52

## 2019-06-20 RX ADMIN — FAMOTIDINE 20 MILLIGRAM(S): 10 INJECTION INTRAVENOUS at 11:00

## 2019-06-20 RX ADMIN — Medication 25 MILLIGRAM(S): at 12:35

## 2019-06-20 RX ADMIN — Medication 151.5 MILLIGRAM(S): at 11:00

## 2019-06-28 ENCOUNTER — RX RENEWAL (OUTPATIENT)
Age: 64
End: 2019-06-28

## 2019-07-03 ENCOUNTER — LABORATORY RESULT (OUTPATIENT)
Age: 64
End: 2019-07-03

## 2019-07-03 ENCOUNTER — APPOINTMENT (OUTPATIENT)
Dept: HEMATOLOGY ONCOLOGY | Facility: CLINIC | Age: 64
End: 2019-07-03
Payer: MEDICAID

## 2019-07-03 VITALS
RESPIRATION RATE: 16 BRPM | WEIGHT: 178 LBS | SYSTOLIC BLOOD PRESSURE: 119 MMHG | HEART RATE: 82 BPM | BODY MASS INDEX: 28.61 KG/M2 | HEIGHT: 66 IN | TEMPERATURE: 96.1 F | DIASTOLIC BLOOD PRESSURE: 69 MMHG

## 2019-07-03 PROCEDURE — 99213 OFFICE O/P EST LOW 20 MIN: CPT

## 2019-07-03 RX ORDER — LEVETIRACETAM 1000 MG/1
1000 TABLET, FILM COATED ORAL TWICE DAILY
Qty: 60 | Refills: 3 | Status: DISCONTINUED | COMMUNITY
Start: 2019-04-25 | End: 2019-07-03

## 2019-07-03 RX ORDER — DEXAMETHASONE 4 MG/1
4 TABLET ORAL DAILY
Qty: 30 | Refills: 0 | Status: DISCONTINUED | COMMUNITY
Start: 2019-04-25 | End: 2019-07-03

## 2019-07-05 ENCOUNTER — APPOINTMENT (OUTPATIENT)
Dept: INFUSION THERAPY | Facility: CLINIC | Age: 64
End: 2019-07-05

## 2019-07-05 RX ORDER — FOSAPREPITANT DIMEGLUMINE 150 MG/5ML
150 INJECTION, POWDER, LYOPHILIZED, FOR SOLUTION INTRAVENOUS ONCE
Refills: 0 | Status: COMPLETED | OUTPATIENT
Start: 2019-07-05 | End: 2019-07-05

## 2019-07-05 RX ORDER — PACLITAXEL 6 MG/ML
115 INJECTION, SOLUTION, CONCENTRATE INTRAVENOUS ONCE
Refills: 0 | Status: COMPLETED | OUTPATIENT
Start: 2019-07-05 | End: 2019-07-05

## 2019-07-05 RX ORDER — CARBOPLATIN 50 MG
260 VIAL (EA) INTRAVENOUS ONCE
Refills: 0 | Status: COMPLETED | OUTPATIENT
Start: 2019-07-05 | End: 2019-07-05

## 2019-07-05 RX ORDER — DEXAMETHASONE 0.5 MG/5ML
12 ELIXIR ORAL ONCE
Refills: 0 | Status: COMPLETED | OUTPATIENT
Start: 2019-07-05 | End: 2019-07-05

## 2019-07-05 RX ORDER — FAMOTIDINE 10 MG/ML
20 INJECTION INTRAVENOUS ONCE
Refills: 0 | Status: COMPLETED | OUTPATIENT
Start: 2019-07-05 | End: 2019-07-05

## 2019-07-05 RX ORDER — DIPHENHYDRAMINE HCL 50 MG
25 CAPSULE ORAL ONCE
Refills: 0 | Status: COMPLETED | OUTPATIENT
Start: 2019-07-05 | End: 2019-07-05

## 2019-07-05 RX ADMIN — Medication 183 MILLIGRAM(S): at 10:40

## 2019-07-05 RX ADMIN — Medication 25 MILLIGRAM(S): at 12:50

## 2019-07-05 RX ADMIN — Medication 175.33 MILLIGRAM(S): at 13:03

## 2019-07-05 RX ADMIN — PACLITAXEL 269.17 MILLIGRAM(S): 6 INJECTION, SOLUTION, CONCENTRATE INTRAVENOUS at 11:40

## 2019-07-05 RX ADMIN — Medication 25 MILLIGRAM(S): at 10:15

## 2019-07-05 RX ADMIN — Medication 151.5 MILLIGRAM(S): at 10:00

## 2019-07-05 RX ADMIN — FAMOTIDINE 20 MILLIGRAM(S): 10 INJECTION INTRAVENOUS at 11:10

## 2019-07-05 RX ADMIN — Medication 12 MILLIGRAM(S): at 10:55

## 2019-07-05 RX ADMIN — PACLITAXEL 115 MILLIGRAM(S): 6 INJECTION, SOLUTION, CONCENTRATE INTRAVENOUS at 12:40

## 2019-07-05 RX ADMIN — FOSAPREPITANT DIMEGLUMINE 450 MILLIGRAM(S): 150 INJECTION, POWDER, LYOPHILIZED, FOR SOLUTION INTRAVENOUS at 10:20

## 2019-07-05 RX ADMIN — FAMOTIDINE 156 MILLIGRAM(S): 10 INJECTION INTRAVENOUS at 10:55

## 2019-07-05 RX ADMIN — Medication 260 MILLIGRAM(S): at 16:03

## 2019-07-05 RX ADMIN — Medication 151.5 MILLIGRAM(S): at 12:35

## 2019-07-05 RX ADMIN — FOSAPREPITANT DIMEGLUMINE 150 MILLIGRAM(S): 150 INJECTION, POWDER, LYOPHILIZED, FOR SOLUTION INTRAVENOUS at 10:40

## 2019-07-08 ENCOUNTER — RX RENEWAL (OUTPATIENT)
Age: 64
End: 2019-07-08

## 2019-07-08 RX ORDER — BLOOD-GLUCOSE METER
KIT MISCELLANEOUS DAILY
Qty: 50 | Refills: 3 | Status: DISCONTINUED | COMMUNITY
Start: 2019-04-23 | End: 2019-07-08

## 2019-07-09 ENCOUNTER — OUTPATIENT (OUTPATIENT)
Dept: OUTPATIENT SERVICES | Facility: HOSPITAL | Age: 64
LOS: 1 days | Discharge: HOME | End: 2019-07-09
Payer: MEDICAID

## 2019-07-09 ENCOUNTER — APPOINTMENT (OUTPATIENT)
Dept: HEMATOLOGY ONCOLOGY | Facility: CLINIC | Age: 64
End: 2019-07-09
Payer: MEDICAID

## 2019-07-09 VITALS
BODY MASS INDEX: 29.68 KG/M2 | DIASTOLIC BLOOD PRESSURE: 59 MMHG | HEART RATE: 109 BPM | TEMPERATURE: 97.2 F | WEIGHT: 176 LBS | SYSTOLIC BLOOD PRESSURE: 106 MMHG | HEIGHT: 64.5 IN

## 2019-07-09 DIAGNOSIS — Z98.890 OTHER SPECIFIED POSTPROCEDURAL STATES: Chronic | ICD-10-CM

## 2019-07-09 DIAGNOSIS — C54.1 MALIGNANT NEOPLASM OF ENDOMETRIUM: ICD-10-CM

## 2019-07-09 DIAGNOSIS — Z90.710 ACQUIRED ABSENCE OF BOTH CERVIX AND UTERUS: Chronic | ICD-10-CM

## 2019-07-09 PROCEDURE — 74177 CT ABD & PELVIS W/CONTRAST: CPT | Mod: 26

## 2019-07-09 PROCEDURE — 71260 CT THORAX DX C+: CPT | Mod: 26

## 2019-07-09 PROCEDURE — 99213 OFFICE O/P EST LOW 20 MIN: CPT

## 2019-07-10 ENCOUNTER — OUTPATIENT (OUTPATIENT)
Dept: OUTPATIENT SERVICES | Facility: HOSPITAL | Age: 64
LOS: 1 days | Discharge: HOME | End: 2019-07-10

## 2019-07-10 ENCOUNTER — RX RENEWAL (OUTPATIENT)
Age: 64
End: 2019-07-10

## 2019-07-10 DIAGNOSIS — Z90.710 ACQUIRED ABSENCE OF BOTH CERVIX AND UTERUS: Chronic | ICD-10-CM

## 2019-07-10 DIAGNOSIS — C80.1 MALIGNANT (PRIMARY) NEOPLASM, UNSPECIFIED: ICD-10-CM

## 2019-07-10 DIAGNOSIS — Z98.890 OTHER SPECIFIED POSTPROCEDURAL STATES: Chronic | ICD-10-CM

## 2019-07-10 LAB
ALBUMIN SERPL ELPH-MCNC: 3.8 G/DL
ALP BLD-CCNC: 81 U/L
ALT SERPL-CCNC: 32 U/L
ANION GAP SERPL CALC-SCNC: 11 MMOL/L
AST SERPL-CCNC: 24 U/L
BILIRUB SERPL-MCNC: 0.7 MG/DL
BUN SERPL-MCNC: 3 MG/DL
CALCIUM SERPL-MCNC: 9 MG/DL
CHLORIDE SERPL-SCNC: 106 MMOL/L
CO2 SERPL-SCNC: 27 MMOL/L
CREAT SERPL-MCNC: 0.5 MG/DL
GLUCOSE SERPL-MCNC: 97 MG/DL
HCT VFR BLD CALC: 29 %
HGB BLD-MCNC: 9.1 G/DL
MCHC RBC-ENTMCNC: 31.4 G/DL
MCHC RBC-ENTMCNC: 32.4 PG
MCV RBC AUTO: 103.2 FL
PLATELET # BLD AUTO: 221 K/UL
PMV BLD: 8.6 FL
POTASSIUM SERPL-SCNC: 3.9 MMOL/L
PROT SERPL-MCNC: 6.4 G/DL
RBC # BLD: 2.81 M/UL
RBC # FLD: 20.7 %
SODIUM SERPL-SCNC: 144 MMOL/L
WBC # FLD AUTO: 4.14 K/UL

## 2019-07-11 ENCOUNTER — APPOINTMENT (OUTPATIENT)
Dept: INFUSION THERAPY | Facility: CLINIC | Age: 64
End: 2019-07-11

## 2019-07-11 ENCOUNTER — LABORATORY RESULT (OUTPATIENT)
Age: 64
End: 2019-07-11

## 2019-07-11 LAB
HCT VFR BLD CALC: 28.7 %
HGB BLD-MCNC: 9.6 G/DL
MCHC RBC-ENTMCNC: 33.4 G/DL
MCHC RBC-ENTMCNC: 33.6 PG
MCV RBC AUTO: 100.3 FL
PLATELET # BLD AUTO: 226 K/UL
PMV BLD: 9.9 FL
RBC # BLD: 2.86 M/UL
RBC # FLD: 17.5 %
WBC # FLD AUTO: 4.92 K/UL

## 2019-07-11 RX ORDER — DIPHENHYDRAMINE HCL 50 MG
25 CAPSULE ORAL ONCE
Refills: 0 | Status: COMPLETED | OUTPATIENT
Start: 2019-07-11 | End: 2019-07-11

## 2019-07-11 RX ORDER — DEXAMETHASONE 0.5 MG/5ML
12 ELIXIR ORAL ONCE
Refills: 0 | Status: COMPLETED | OUTPATIENT
Start: 2019-07-11 | End: 2019-07-11

## 2019-07-11 RX ORDER — FAMOTIDINE 10 MG/ML
20 INJECTION INTRAVENOUS ONCE
Refills: 0 | Status: COMPLETED | OUTPATIENT
Start: 2019-07-11 | End: 2019-07-11

## 2019-07-11 RX ORDER — CARBOPLATIN 50 MG
260 VIAL (EA) INTRAVENOUS ONCE
Refills: 0 | Status: COMPLETED | OUTPATIENT
Start: 2019-07-11 | End: 2019-07-11

## 2019-07-11 RX ORDER — PACLITAXEL 6 MG/ML
115 INJECTION, SOLUTION, CONCENTRATE INTRAVENOUS ONCE
Refills: 0 | Status: COMPLETED | OUTPATIENT
Start: 2019-07-11 | End: 2019-07-11

## 2019-07-11 RX ORDER — FOSAPREPITANT DIMEGLUMINE 150 MG/5ML
150 INJECTION, POWDER, LYOPHILIZED, FOR SOLUTION INTRAVENOUS ONCE
Refills: 0 | Status: COMPLETED | OUTPATIENT
Start: 2019-07-11 | End: 2019-07-11

## 2019-07-11 RX ADMIN — Medication 260 MILLIGRAM(S): at 16:09

## 2019-07-11 RX ADMIN — FOSAPREPITANT DIMEGLUMINE 150 MILLIGRAM(S): 150 INJECTION, POWDER, LYOPHILIZED, FOR SOLUTION INTRAVENOUS at 11:40

## 2019-07-11 RX ADMIN — Medication 25 MILLIGRAM(S): at 13:00

## 2019-07-11 RX ADMIN — PACLITAXEL 269.17 MILLIGRAM(S): 6 INJECTION, SOLUTION, CONCENTRATE INTRAVENOUS at 10:44

## 2019-07-11 RX ADMIN — Medication 25 MILLIGRAM(S): at 11:20

## 2019-07-11 RX ADMIN — FAMOTIDINE 20 MILLIGRAM(S): 10 INJECTION INTRAVENOUS at 11:00

## 2019-07-11 RX ADMIN — Medication 151.5 MILLIGRAM(S): at 10:21

## 2019-07-11 RX ADMIN — Medication 183 MILLIGRAM(S): at 10:22

## 2019-07-11 RX ADMIN — Medication 175.33 MILLIGRAM(S): at 10:44

## 2019-07-11 RX ADMIN — FOSAPREPITANT DIMEGLUMINE 450 MILLIGRAM(S): 150 INJECTION, POWDER, LYOPHILIZED, FOR SOLUTION INTRAVENOUS at 11:20

## 2019-07-11 RX ADMIN — FAMOTIDINE 156 MILLIGRAM(S): 10 INJECTION INTRAVENOUS at 10:22

## 2019-07-11 RX ADMIN — Medication 12 MILLIGRAM(S): at 10:45

## 2019-07-11 RX ADMIN — PACLITAXEL 115 MILLIGRAM(S): 6 INJECTION, SOLUTION, CONCENTRATE INTRAVENOUS at 12:40

## 2019-07-11 RX ADMIN — Medication 151.5 MILLIGRAM(S): at 12:40

## 2019-07-16 ENCOUNTER — OUTPATIENT (OUTPATIENT)
Dept: OUTPATIENT SERVICES | Facility: HOSPITAL | Age: 64
LOS: 1 days | Discharge: HOME | End: 2019-07-16
Payer: MEDICAID

## 2019-07-16 DIAGNOSIS — Z90.710 ACQUIRED ABSENCE OF BOTH CERVIX AND UTERUS: Chronic | ICD-10-CM

## 2019-07-16 DIAGNOSIS — Z98.890 OTHER SPECIFIED POSTPROCEDURAL STATES: Chronic | ICD-10-CM

## 2019-07-16 PROCEDURE — 92134 CPTRZ OPH DX IMG PST SGM RTA: CPT | Mod: 26,RT

## 2019-07-16 PROCEDURE — 76512 OPH US DX B-SCAN: CPT | Mod: 26

## 2019-07-16 PROCEDURE — 92014 COMPRE OPH EXAM EST PT 1/>: CPT | Mod: 25

## 2019-07-17 ENCOUNTER — APPOINTMENT (OUTPATIENT)
Dept: HEMATOLOGY ONCOLOGY | Facility: CLINIC | Age: 64
End: 2019-07-17
Payer: MEDICAID

## 2019-07-17 VITALS
RESPIRATION RATE: 16 BRPM | HEIGHT: 64.5 IN | HEART RATE: 100 BPM | TEMPERATURE: 98.5 F | WEIGHT: 174 LBS | SYSTOLIC BLOOD PRESSURE: 106 MMHG | DIASTOLIC BLOOD PRESSURE: 64 MMHG | OXYGEN SATURATION: 97 % | BODY MASS INDEX: 29.35 KG/M2

## 2019-07-17 PROCEDURE — 99215 OFFICE O/P EST HI 40 MIN: CPT

## 2019-07-18 ENCOUNTER — APPOINTMENT (OUTPATIENT)
Dept: INFUSION THERAPY | Facility: CLINIC | Age: 64
End: 2019-07-18

## 2019-07-18 ENCOUNTER — OUTPATIENT (OUTPATIENT)
Dept: OUTPATIENT SERVICES | Facility: HOSPITAL | Age: 64
LOS: 1 days | Discharge: HOME | End: 2019-07-18

## 2019-07-18 ENCOUNTER — LABORATORY RESULT (OUTPATIENT)
Age: 64
End: 2019-07-18

## 2019-07-18 DIAGNOSIS — Z90.710 ACQUIRED ABSENCE OF BOTH CERVIX AND UTERUS: Chronic | ICD-10-CM

## 2019-07-18 DIAGNOSIS — C54.1 MALIGNANT NEOPLASM OF ENDOMETRIUM: ICD-10-CM

## 2019-07-18 DIAGNOSIS — Z98.890 OTHER SPECIFIED POSTPROCEDURAL STATES: Chronic | ICD-10-CM

## 2019-07-18 DIAGNOSIS — G62.9 POLYNEUROPATHY, UNSPECIFIED: ICD-10-CM

## 2019-07-18 DIAGNOSIS — C73 MALIGNANT NEOPLASM OF THYROID GLAND: ICD-10-CM

## 2019-07-18 DIAGNOSIS — C79.31 SECONDARY MALIGNANT NEOPLASM OF BRAIN: ICD-10-CM

## 2019-07-18 DIAGNOSIS — Z51.11 ENCOUNTER FOR ANTINEOPLASTIC CHEMOTHERAPY: ICD-10-CM

## 2019-07-18 LAB
HCT VFR BLD CALC: 26.2 %
HGB BLD-MCNC: 8.8 G/DL
MCHC RBC-ENTMCNC: 33.6 G/DL
MCHC RBC-ENTMCNC: 34.1 PG
MCV RBC AUTO: 101.6 FL
PLATELET # BLD AUTO: 225 K/UL
PMV BLD: 9.8 FL
RBC # BLD: 2.58 M/UL
RBC # FLD: 17.2 %
WBC # FLD AUTO: 3.7 K/UL

## 2019-07-18 RX ORDER — DEXAMETHASONE 0.5 MG/5ML
12 ELIXIR ORAL ONCE
Refills: 0 | Status: COMPLETED | OUTPATIENT
Start: 2019-07-18 | End: 2019-07-18

## 2019-07-18 RX ORDER — FOSAPREPITANT DIMEGLUMINE 150 MG/5ML
150 INJECTION, POWDER, LYOPHILIZED, FOR SOLUTION INTRAVENOUS ONCE
Refills: 0 | Status: COMPLETED | OUTPATIENT
Start: 2019-07-18 | End: 2019-07-18

## 2019-07-18 RX ORDER — DIPHENHYDRAMINE HCL 50 MG
25 CAPSULE ORAL ONCE
Refills: 0 | Status: COMPLETED | OUTPATIENT
Start: 2019-07-18 | End: 2019-07-18

## 2019-07-18 RX ORDER — CARBOPLATIN 50 MG
260 VIAL (EA) INTRAVENOUS ONCE
Refills: 0 | Status: COMPLETED | OUTPATIENT
Start: 2019-07-18 | End: 2019-07-18

## 2019-07-18 RX ORDER — PACLITAXEL 6 MG/ML
115 INJECTION, SOLUTION, CONCENTRATE INTRAVENOUS ONCE
Refills: 0 | Status: COMPLETED | OUTPATIENT
Start: 2019-07-18 | End: 2019-07-18

## 2019-07-18 RX ORDER — FAMOTIDINE 10 MG/ML
20 INJECTION INTRAVENOUS ONCE
Refills: 0 | Status: COMPLETED | OUTPATIENT
Start: 2019-07-18 | End: 2019-07-18

## 2019-07-18 RX ADMIN — PACLITAXEL 269.17 MILLIGRAM(S): 6 INJECTION, SOLUTION, CONCENTRATE INTRAVENOUS at 11:30

## 2019-07-18 RX ADMIN — PACLITAXEL 115 MILLIGRAM(S): 6 INJECTION, SOLUTION, CONCENTRATE INTRAVENOUS at 12:25

## 2019-07-18 RX ADMIN — FAMOTIDINE 156 MILLIGRAM(S): 10 INJECTION INTRAVENOUS at 11:15

## 2019-07-18 RX ADMIN — FOSAPREPITANT DIMEGLUMINE 150 MILLIGRAM(S): 150 INJECTION, POWDER, LYOPHILIZED, FOR SOLUTION INTRAVENOUS at 10:45

## 2019-07-18 RX ADMIN — Medication 151.5 MILLIGRAM(S): at 12:30

## 2019-07-18 RX ADMIN — Medication 151.5 MILLIGRAM(S): at 11:00

## 2019-07-18 RX ADMIN — FAMOTIDINE 20 MILLIGRAM(S): 10 INJECTION INTRAVENOUS at 11:30

## 2019-07-18 RX ADMIN — Medication 12 MILLIGRAM(S): at 11:00

## 2019-07-18 RX ADMIN — Medication 175.33 MILLIGRAM(S): at 12:50

## 2019-07-18 RX ADMIN — Medication 260 MILLIGRAM(S): at 16:35

## 2019-07-18 RX ADMIN — FOSAPREPITANT DIMEGLUMINE 450 MILLIGRAM(S): 150 INJECTION, POWDER, LYOPHILIZED, FOR SOLUTION INTRAVENOUS at 10:14

## 2019-07-18 RX ADMIN — Medication 25 MILLIGRAM(S): at 12:45

## 2019-07-18 RX ADMIN — Medication 25 MILLIGRAM(S): at 11:15

## 2019-07-18 RX ADMIN — Medication 183 MILLIGRAM(S): at 10:48

## 2019-07-19 NOTE — RESULTS/DATA
[FreeTextEntry1] : EXAM:  MR BRAIN WAW IC      \par \par \par PROCEDURE DATE:  06/08/2019  \par \par \par \par \par INTERPRETATION:  Clinical History / Reason for exam: Dizziness and \par vertigo, history of uterine and thyroid cancer, for evaluation of \par metastatic disease, lesion seen on prior MRI of the brain.\par \par MRI OF THE BRAIN WITHOUT AND WITH CONTRAST\par \par TECHNIQUE:\par \par Multiplanar multisequence imaging of the brain was performed on the \[facility] open magnet before and after the intravenous administration of \par 7.5 cc of Gadavist including brain lab protocol.\par \par COMPARISON:\par \par MRI of the brain without and with contrast dated March 12, 2019.\par \par FINDINGS:\par \par The previously described supratentorial lesions have almost completely \par resolved.\par \par The lesion in the left posterior frontal region now measures 1.4 cm in \par maximum diameter, the lesion in the anterior right frontal lobe measures \par 0.7 cm in maximum diameter, the second lesion in the right frontal lobe \par measures 0.1 cm in maximum diameter, the lesion in the right posterior \par parietal region measures 0.3 cm in maximum diameter, and the left \par occipital lobe lesion measures 1.2 cm in maximum diameter. (Previously \par measuring 2.6, 2.4, 0.5, 0.7, and 2.5 cm respectively).\par \par The third, fourth, and lateral ventricles are normal in size and \par position. There is no shift of the midline structures.\par \par The cerebellar tonsils are minimally low-lying (0.3 cm of cerebellar \par ectopia).\par \par Incidental note is made of a tiny medial cyst.\par \par There are scattered T2/FLAIR hyperintense signal intensities in the\par subcortical white matter which are nonspecific differential diagnostic\par possibilities include chronic ischemic change, foci of gliosis or\par demyelination.\par \par IMPRESSION:\par \par In comparison with the prior MRI of the brain dated March 21, 2019:\par \par There has been almost complete resolution of most of of the previously \par described supratentorial lesions.\par \par There are no new enhancing lesions.\par \par \par \par \par \par \par CRISTINA MÉNDEZ M.D., ATTENDING RADIOLOGIST\par This document has been electronically signed. Oren 10 2019  1:17PM\par   \par \par   \par \par \par [id number]^RI^DC\par \par EXAM:  PETCT SKUL-THI ONC FDG SUBS      \par \par \par PROCEDURE DATE:  05/01/2019  \par \par \par \par \par INTERPRETATION:  \par FDG PET CT STUDY   Subsequent  treatment strategy\par REASON: TUMOR IMAGING - PET with concurrently acquired CT for attenuation \par correction and anatomic localization; skull base to mid - thigh .\par \par CPT code 95161.\par \par Fasting blood glucose level:     91 mg/dl\par \par HISTORY: Endometrial cancer. Stage IV with brain metastatic disease.\par \par TECHNIQUE: Approximately 45 minutes after the intravenous administration \par of 10.7 mCi 18-Fluorine FDG, whole body PET images were acquired from \par base of skull to mid - thigh.\par \par CT protocol used for this PET/CT study is designed for attenuation \par correction and anatomic localization of PET findings.  This  CT \par is not designed to replace state-of-the-art diagnostic CT scans for \par specific imaging protocols of different body parts.\par \par COMPARISON : 2/12/2019.\par Correlation: MRI of the brain on 3/21/2019.\par \par FINDINGS:\par \par Head/Neck: No biologically active head/neck lesions.     \par Normal uptake within the brain, pharyngeal lymphoid tissue, salivary \par glands and laryngeal musculature is noted.    \par \par Thorax:   No suspicious hypermetabolic mediastinal, hilar, lung \par parenchymal lesions.\par \par Abdomen/Pelvis: Physiologic GI/  activity. \par \par Again seen is a left upper quadrant peritoneal nodule, SUV max 6.8, \par previously 7.7 (12% decrease). \par \par Multiple new FDG avid omental nodules largest of which is adjacent to the \par distal transverse colon, measuring 10 mm, positive on nonattenuation \par corrected images, axial image 134. \par \par No other abnormal visceral or guillaume tracer uptake is present.    \par \par Musculoskeletal :  No suspicious hypermetabolic osseous lesions.\par \par Additional CT findings:\par Status post hysterectomy.\par Colonic diverticulosis.\par \par IMPRESSION: \par \par COMPARISON : 2/12/2019\par \par Multiple new FDG avid omental nodules largest measuring up to 10 mm, \par positive on nonattenuation corrected images. Findings are suggestive of \par biologic tumor activity.\par \par Again seen is a left upper quadrant peritoneal nodule, SUV max 6.8, \par previously 7.7 (12% decrease). \par \par \par \par \par

## 2019-07-19 NOTE — PHYSICAL EXAM
[Restricted in physically strenuous activity but ambulatory and able to carry out work of a light or sedentary nature] : Status 1- Restricted in physically strenuous activity but ambulatory and able to carry out work of a light or sedentary nature, e.g., light house work, office work [Normal] : affect appropriate [de-identified] : B/L cataracts appreciated [de-identified] : CTA B/L [de-identified] : Prior deficits resolved.

## 2019-07-19 NOTE — REVIEW OF SYSTEMS
[Vision Problems] : vision problems [Fatigue] : fatigue [Negative] : Allergic/Immunologic [Chest Pain] : no chest pain [Palpitations] : no palpitations [Lower Ext Edema] : no lower extremity edema [Shortness Of Breath] : no shortness of breath [Wheezing] : no wheezing [Cough] : no cough [SOB on Exertion] : no shortness of breath during exertion [FreeTextEntry3] : Difficulty seeing out of L eye, will see ophthalmologist [FreeTextEntry7] : loose stools, no diarrhea [FreeTextEntry9] : normal strength in upper and lower extremities  [de-identified] : L visual field deficit, gait impairment has resolved

## 2019-07-19 NOTE — REVIEW OF SYSTEMS
[Vision Problems] : vision problems [Fatigue] : fatigue [Negative] : Allergic/Immunologic [Chest Pain] : no chest pain [Palpitations] : no palpitations [Lower Ext Edema] : no lower extremity edema [Shortness Of Breath] : no shortness of breath [Wheezing] : no wheezing [Cough] : no cough [SOB on Exertion] : no shortness of breath during exertion [FreeTextEntry3] : Difficulty seeing out of L eye, will see ophthalmologist [FreeTextEntry7] : loose stools, no diarrhea [FreeTextEntry9] : normal strength in upper and lower extremities  [de-identified] : L visual field deficit, gait impairment has resolved

## 2019-07-19 NOTE — ASSESSMENT
[FreeTextEntry1] : \par \par Papillary-serous endometrial cancer, stage IV\par  \par --PET CT on 11/29/2017 revealed no clear GYN origin, extensive KEVIN, uptake in the right lung and pleura, thyroid nodule\par --Malignant pleural effusion, resolved\par --L side PleurX catheter was removed on 2/8/2018\par --Completed cycle 8  of carbo/taxol on 6/29/2018.  2 cycles were administered  postoperatively.\par --Restaging PET CT on 9/26/2018 (3 months post completion of chemo 6/29/2018)was essentially negative for recurrent disease\par --Restaging PET CT on 12/18/2018 reveals   New left upper quadrant peritoneal nodule measuring 8 mm with an SUV max  of 7.3. This is suggestive of biologic tumor activity.  No other FDG avid lesions seen throughout the scan.\par --restaging PET CT on 2/12/2019 revealed slight increase in size of left upper quadrant peritoneal nodule (1.1 cm, \par previously 0.8 cm) with stable FDG uptake, 7.7 SUV suspicious for biologic tumor activity.\par --Case was discussed with Dr. Massey at [place], biopsy of peritoneal nodule is positive for recurrent endometrial cancer.\par --MRI brain on 4/23/2019 revealed multiple brain mets\par --S/p whole brain radiation completed 4/2019, required acute rehab\par --PET CT on 5/1/2019 subsequently revealed further disease progression \par --Restarted weekly carbo/taxol 3 on 1 off on 5/9/2019, due for next dose on 7/5.\par --Off Decadron and Keppra since 6/5/2019 \par --Restaging brain MRI on 6/8/2019 showed almost complete resolution of most supratentorial lesions and no new lesions.\par --Pending restaging CT chest/abdomen/pelvis-Patient will make appointment.\par --Patient has an upcoming appointment with ophthalmology regarding vision changes, possibly secondary to cataracts.\par \par Papillary thyroid cancer [id number], 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 Herrera \par --Follow up with Dr. Acevedo of endocrinology; he is addressing vitamin D, thyroid and diabetes \par \par  Follow up with Dr. Cid at previously scheduled appointment on 7/9/2019.

## 2019-07-19 NOTE — ASSESSMENT
[FreeTextEntry1] : \par \par Papillary-serous endometrial cancer, stage IV\par  \par --PET CT on 11/29/2017 revealed no clear GYN origin, extensive KEVIN, uptake in the right lung and pleura, thyroid nodule\par --Malignant pleural effusion, resolved\par --L side PleurX catheter was removed on 2/8/2018\par --Completed cycle 8  of carbo/taxol on 6/29/2018.  2 cycles were administered  postoperatively.\par --Restaging PET CT on 9/26/2018 (3 months post completion of chemo 6/29/2018)was essentially negative for recurrent disease\par --Restaging PET CT on 12/18/2018 reveals   New left upper quadrant peritoneal nodule measuring 8 mm with an SUV max  of 7.3. This is suggestive of biologic tumor activity.  No other FDG avid lesions seen throughout the scan.\par --restaging PET CT on 2/12/2019 revealed slight increase in size of left upper quadrant peritoneal nodule (1.1 cm, \par previously 0.8 cm) with stable FDG uptake, 7.7 SUV suspicious for biologic tumor activity.\par --Case was discussed with Dr. Massey at [place], biopsy of peritoneal nodule is positive for recurrent endometrial cancer.\par --MRI brain on 4/23/2019 revealed multiple brain mets\par --S/p whole brain radiation completed 4/2019, required acute rehab\par --PET CT on 5/1/2019 subsequently revealed further disease progression \par --Restarted weekly carbo/taxol 3 on 1 off on 5/9/2019, due for next dose on 7/5.\par --Off Decadron and Keppra since 6/5/2019 \par --Restaging brain MRI on 6/8/2019 showed almost complete resolution of most supratentorial lesions and no new lesions.\par --Pending restaging CT chest/abdomen/pelvis-Patient will make appointment.\par --Patient has an upcoming appointment with ophthalmology regarding vision changes, possibly secondary to cataracts.\par \par Papillary thyroid cancer [id number], 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 Herrera \par --Follow up with Dr. Acevedo of endocrinology; he is addressing vitamin D, thyroid and diabetes \par \par  Follow up with Dr. Cid at previously scheduled appointment on 7/9/2019.

## 2019-07-19 NOTE — PHYSICAL EXAM
[Restricted in physically strenuous activity but ambulatory and able to carry out work of a light or sedentary nature] : Status 1- Restricted in physically strenuous activity but ambulatory and able to carry out work of a light or sedentary nature, e.g., light house work, office work [Normal] : affect appropriate [de-identified] : B/L cataracts appreciated [de-identified] : CTA B/L [de-identified] : Prior deficits resolved.

## 2019-07-19 NOTE — RESULTS/DATA
[FreeTextEntry1] : EXAM:  MR BRAIN WAW IC      \par \par \par PROCEDURE DATE:  06/08/2019  \par \par \par \par \par INTERPRETATION:  Clinical History / Reason for exam: Dizziness and \par vertigo, history of uterine and thyroid cancer, for evaluation of \par metastatic disease, lesion seen on prior MRI of the brain.\par \par MRI OF THE BRAIN WITHOUT AND WITH CONTRAST\par \par TECHNIQUE:\par \par Multiplanar multisequence imaging of the brain was performed on the \[facility] open magnet before and after the intravenous administration of \par 7.5 cc of Gadavist including brain lab protocol.\par \par COMPARISON:\par \par MRI of the brain without and with contrast dated March 12, 2019.\par \par FINDINGS:\par \par The previously described supratentorial lesions have almost completely \par resolved.\par \par The lesion in the left posterior frontal region now measures 1.4 cm in \par maximum diameter, the lesion in the anterior right frontal lobe measures \par 0.7 cm in maximum diameter, the second lesion in the right frontal lobe \par measures 0.1 cm in maximum diameter, the lesion in the right posterior \par parietal region measures 0.3 cm in maximum diameter, and the left \par occipital lobe lesion measures 1.2 cm in maximum diameter. (Previously \par measuring 2.6, 2.4, 0.5, 0.7, and 2.5 cm respectively).\par \par The third, fourth, and lateral ventricles are normal in size and \par position. There is no shift of the midline structures.\par \par The cerebellar tonsils are minimally low-lying (0.3 cm of cerebellar \par ectopia).\par \par Incidental note is made of a tiny medial cyst.\par \par There are scattered T2/FLAIR hyperintense signal intensities in the\par subcortical white matter which are nonspecific differential diagnostic\par possibilities include chronic ischemic change, foci of gliosis or\par demyelination.\par \par IMPRESSION:\par \par In comparison with the prior MRI of the brain dated March 21, 2019:\par \par There has been almost complete resolution of most of of the previously \par described supratentorial lesions.\par \par There are no new enhancing lesions.\par \par \par \par \par \par \par CRISTINA MÉNDEZ M.D., ATTENDING RADIOLOGIST\par This document has been electronically signed. Oren 10 2019  1:17PM\par   \par \par   \par \par \par [id number]^RI^DC\par \par EXAM:  PETCT SKUL-THI ONC FDG SUBS      \par \par \par PROCEDURE DATE:  05/01/2019  \par \par \par \par \par INTERPRETATION:  \par FDG PET CT STUDY   Subsequent  treatment strategy\par REASON: TUMOR IMAGING - PET with concurrently acquired CT for attenuation \par correction and anatomic localization; skull base to mid - thigh .\par \par CPT code 43478.\par \par Fasting blood glucose level:     91 mg/dl\par \par HISTORY: Endometrial cancer. Stage IV with brain metastatic disease.\par \par TECHNIQUE: Approximately 45 minutes after the intravenous administration \par of 10.7 mCi 18-Fluorine FDG, whole body PET images were acquired from \par base of skull to mid - thigh.\par \par CT protocol used for this PET/CT study is designed for attenuation \par correction and anatomic localization of PET findings.  This  CT \par is not designed to replace state-of-the-art diagnostic CT scans for \par specific imaging protocols of different body parts.\par \par COMPARISON : 2/12/2019.\par Correlation: MRI of the brain on 3/21/2019.\par \par FINDINGS:\par \par Head/Neck: No biologically active head/neck lesions.     \par Normal uptake within the brain, pharyngeal lymphoid tissue, salivary \par glands and laryngeal musculature is noted.    \par \par Thorax:   No suspicious hypermetabolic mediastinal, hilar, lung \par parenchymal lesions.\par \par Abdomen/Pelvis: Physiologic GI/  activity. \par \par Again seen is a left upper quadrant peritoneal nodule, SUV max 6.8, \par previously 7.7 (12% decrease). \par \par Multiple new FDG avid omental nodules largest of which is adjacent to the \par distal transverse colon, measuring 10 mm, positive on nonattenuation \par corrected images, axial image 134. \par \par No other abnormal visceral or guillaume tracer uptake is present.    \par \par Musculoskeletal :  No suspicious hypermetabolic osseous lesions.\par \par Additional CT findings:\par Status post hysterectomy.\par Colonic diverticulosis.\par \par IMPRESSION: \par \par COMPARISON : 2/12/2019\par \par Multiple new FDG avid omental nodules largest measuring up to 10 mm, \par positive on nonattenuation corrected images. Findings are suggestive of \par biologic tumor activity.\par \par Again seen is a left upper quadrant peritoneal nodule, SUV max 6.8, \par previously 7.7 (12% decrease). \par \par \par \par \par

## 2019-07-19 NOTE — HISTORY OF PRESENT ILLNESS
[Disease: _____________________] : Disease: [unfilled] [AJCC Stage: ____] : AJCC Stage: [unfilled] [de-identified] : Serena is a rodrigue 63 yo female, who has started developing SOB approximately 2 months ago, she went to ER on 11/2/2017 and on CXR was noted to have a large right sided pleural effusion. She underwent a thoracentesis, 1.6L of fluid was drained. 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 IHC was pos for CK7, PAX8, CK5/6 and Ca125, negative for TTF-1/Napsin, calretinin, CK20, mammaglobin, p63, villin, HAM56, GCDFP15, TAY-EP4. \par \par Her visit on 12/1/2017 Serena had an excisional biopsy of cervical node on 12/13/2017 revealing met adenocarcinoma, primary source still was not clear. On 12/14/2017 Serena was admitted with SOB, Pleurx catheter was placed on 12/14/2017. 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 Serena received 1st dose of carbo (auc of 5)/taxol(175mg/m2) on 12/15/2017 without complications, but the next day sustained a fall 2/2 vasovagal episode and developed asymptomatic SAH, that resolved on imaging by 12/21/2017.  [de-identified] : KRas WT, EGFR WT, Alk WT, ROS1 WT, PDL1-1%\par Normal MMR protein expression [de-identified] : 11/24/2017: She reports some SOB, especially ARZATE today. No fevers, no weight loss (reports some weight gain), no GI,  or GYN symptoms, except for increasing abdominal girth, she reports no blood in the stool or urine and no vaginal bleeding. She reports no CP and no palpitations, she reports worsening nonproductive cough, no hemoptysis. She also reports difficulty lying on left side and lying down flat. \par She reports left on/off facial numbness several months in duration. She had a CT of her head performed in [facility] within 1 year.  Additionally there is a freely mobile left cervical node present on exam, as per patient this was in place for approximately 1 year. \par \par 12/1/2017: Serena is here for follow up today. She had a therapeutic thoracentesis in  on 11/27/2017, 2L of fluid were removed, with much improved respiratory status. PET CT and MRI of the brain were done on 11/29/2017, findings were discussed today. There remains no clear primary source of malignancy. We have discussed that the source of tumor may be Mullerian vs lung. regardless of source chemotherapy needs to be initiated ASAP. We have discussed Carbo/taxol regiment that will cover both Mullerian and lung origin. Side effects including myelosuppression, peripheral neuropathy, allergic reaction and others.\par \par 1/2/2017 Since last visit Serena had an excisional biopsy of cervical node on 12/13/2017 revealing met adenocarcinoma, primary source still was not clear. On 12/14/2017 Serena was admitted with SOB, Pleurx catheter was placed on 12/14/2017. 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 Serena received 1st dose of carbo (auc of 5)/taxol(175mg/m2) on 12/15/2017 without complications, but the next day sustained a fall 2/2 vasovagal episode and developed asymptomatic SAH, that resolved on imaging by 12/21/2017. Today Serena's SOB has significantly improved. She reports very mild neuropathy in fingertips only. She reports no headache and offers no other complaints. \par \par 1/26/2018: Complains of some neuropathy, otherwise tolerating chemo with no difficulty. \par \par 2/16/2018: restaging CT C/A/P reviewed, significant improvement noted. Will refer to GYN/ONC. Reports slightly worsening neuropathy, not -painful, taking Gabapentin, no other symptoms. For cycle 4 of carbo/taxol today.\par \par 3/9/2018: Serena met with Dr. Massey, she is planned for surgery on 5/7/2018, after completion of 6 cycles of carbo/taxol and restaging PET CT ordered for mid April and follow up with Dr. Massey on April 20. She reports worsening neuropathy, and occasional pain and changes in vision in her left eye, eye symptoms come and go and are not very bothersome. She reports no other symptoms. \par \par 3/30/2018; Serena is here for cycle 6 of carbo/taxol, she continues to have neuropathy in finger and toes, but offers no other complaints, chemo has been dose reduced. \par \par 4/27/2018: Results of restaging PET CT s/p 6 cycles of carbo/taxol revealed 1. No new areas of abnormal increased uptake is seen to indicate \par biologically active disease.\par \par 2. Persistent PET positive left thyroid mass with a max SUV 33.1, \par previously max SUV 34.8. Further evaluation with thyroid ultrasound and \par if needed fine-needle aspiration biopsy will be helpful.\par \par 3.   PET positive left cervical lymph nodes mainly level 2 on the left \par with a max SUV 5.47previously 18. Minimal increased uptake in the \par bilateral axillary lymph nodes most likely reactive(less than 2.5)\par \par 4. Weakly PET positive, max SUV 2.89 right pleura, previous max SUV 5.3 \par with non-FDG avid right pleural effusion. \par \par 5. Previously FDG avid right and left supraclavicular lymphadenopathy, \par left internal mammary, bilateral paratracheal, para-aortic, para \par vertebral, carinal, mesenteric, right and left iliacs, right inguinal \par lymphadenopathy, small in size and no longer FDG avid.\par \par 6. No abnormal increased uptake is seen in the  lobulated uterus.\par \par 7. Overall there is marked improvement in the FDG avid disease.\par This was reviewed with Serena. She met with Dr. Massey on 4/20/2018, surgery is planned for 5/7/2018. She will also joselyn to meet with ENT re FDG avid thyroid nodule. Will assist in making he an appointment for her. \par Persistent neuropathy on gabapentin. \par \par 6/29/18 ; Patient came for follow up visit , evaluation for chemotherapy cycle 8 of carbo / Taxol , patient tolerating medication well , except neuropathy  recommend to have gabapentin  300 mg po daily.\par \par 7/20/2018: Serena present for follow up today, she has completed total of 8 cycles of Carbo/Taxol. She reports significant neuropathy in her hands and feet. We will discontinue chemotherapy today and plan to follow with close observation. She has thyroidectomy planned with Dr. eHrrera on 8/20/2018, obtaining clearance for PMD. She is also planning to fly to [place] at the end of September. She reports no SOB, no GI or  symptoms. \par \par 9/12/2018: Serena offers no significant complaints today. She states neuropathy in her hands has almost completely resolved and neuropathy in her feet is significantly better. She had undergone complete thyroidectomy by Dr. Herrera on 8/20/2018, pathology revealed papillary thyroid cancer, measuring 2 cm, pT1b, other additional foci of papillary carcinoma were also noted, no LVI, surgical margins were clear, LN 0/2 had no carcinoma, stage [id number]. She is following up with Dr. Herrera tomorrow. She has still not gone for her vacation in [place]. \par \par 10/10/2018: Restaging PET CT on 9/26/2018 reveals Since 4/16/2018,  1. Post thyroidectomy with diffuse increased FDG uptake at the thyroid  bed likely representing post-surgical changes.  2. Subtle increased uptake is seen in the lamina , right side at the  level of T8 with a max SUV 4.6. This is not sufficient for metastatic  disease. Bone scan or MRI examination with contrast will be helpful for  further evaluation.  3. No other areas of abnormal increased uptake is seen. Images were personally reviewed by myself and discussed with Serena. \par She also had an Echo on 9/24/2018 revealing EF of 63%. \par Serena reports ongoing fatigue, she is unable to lose weight. She is taking Synthroid and following with Dr. Acevedo. \par She reports stiff fingers at night only, no painful neuropathy. \par She denies any other symptoms today. \par \par 12/11/2018: Serena feels well, neuropathy in hands and feet is much improved. S he is now complaining of r-sided facial pain associated with some swelling, pain comes and goes. She has already seen Dr. Herrera, who ordered CT of sinuses and prescribed pain relief meds. She is also due for restaging PET CT. Serena feels well otherwise. \par \par 1/9/2019: Restaging PET CT was performed on 12/18/2018 it revealed COMPARISON : 9/24/2018.  New left upper quadrant peritoneal nodule measuring 8 mm with an SUV max  of 7.3. This is suggestive of biologic tumor activity.  No other FDG avid lesions seen throughout the scan. Images were personally reviewed by myself and discussed with Serena, originally over the phone and again today. I have originally suggested to try AI in the interim, Serena however reported diarrhea at the time of the scan and declined intervention for now. We will plan for restaging PET CT to be performed in 6-8 weeks, this will be ordered today. She will follow up with Dr. Massey at [place] shortly and bring the disk for his review. I would also like her to meet with genetics, this will be arranged at [place] with Dr. Massey's help. Serena reports no new symptoms today, her neuropathy is manageable and  improving. She still reports unilateral headache that was work up in the past. Neurology eval was again suggested to her. She is following closely with Endocrinology re thyroid management. She will have follow up with Dr. Herrera shortly as well. \par \par 2/20/2019: Serena offers no new complaints. PET CT from 2/12/2019 revealed \par Since PET/CT of December 19, 2018, no new sites of pathologic FDG uptake\par Slight increase in size of left upper quadrant peritoneal nodule (1.1 cm, \par previously 0.8 cm) with stable FDG uptake, 7.7 SUV suspicious for \par biologic tumor activity.\par No other sites of pathologic FDG uptake \par Images were personally reviewed by myself and discussed with Serena, case was also discussed with Dr. Massey at [place]. Serena will have follow up with his shortly. She is following with endocrinology. reports improving neuropathy. No GI or  symptoms at this time. No weight loss. \par \par 3/20/2019: Serena had a peritoneal nodule biopsy done at [place], I have received a call from Dr. 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 We have discussed that surgery though could be attempted will not be the best therapeutic approach, recommencement of systemic therapy was discussed. I have not received the results from [place] yet. I have briefly discussed this with Serena today. Serena reports that she has acutely developed right lower extremity weakness 5-6 days ago, she did not inform any of her doctors about this. She also reports that her R upper extremity though not weak "does not feel normal either. She reports no back pain, there is no point tenderness, RLE is objectively weak on exam. I recommend ER evaluation to r/o CVA vs brain met, vs L-spine related issue. Recommend admission for work up. Serena is agreeable to get admitted. \par \par 5/8/2019: Serena present for follow up, she was diagnosed with multiple metastasis to the brain, MRI was done on 3/21/2019 and revealed \par Multiple supratentorial heterogeneously enhancing lesions consistent with \par metastatic disease. The appearance on the T2-weighted images is suggestive \par of adenocarcinoma. There is mass effect upon the left lateral ventricle and \par corpus callosum. \par She received whole brain irradiation by Dr. Dahl, completed it in the beginning of 4/2019, she is currently on steroid taper managed by Dr. Dahl, she is taking 4mg of Decadron daily. She will be stopping the Keppra as there have been no documented h/o seizures. She still reports impairment of the L visual field, she has an appointment coming up with Opthalmology. She reports no deficits walking after she has completed inpatient acute rehabilitation. \par Restaging PET CT on 5/1/2019 reveals COMPARISON : 2/12/2019 \par Multiple new FDG avid omental nodules largest measuring up to 10 mm, \par positive on nonattenuation corrected images. Findings are suggestive of \par biologic tumor activity. \par Again seen is a left upper quadrant peritoneal nodule, SUV max 6.8, \par previously 7.7 (12% decrease). \par Images were personally reviewed by myself and discussed with patient. \par She now reports mild abdominal bloating, no other symptoms. She reports her neuropathy has significantly improved. \par She has first evidence of recurrent disease documented 5.5 months after last carbo/taxol dose, the volume of disease was very small (1 8mm nodule), she was then observed for another 6 months and now she wishes to restart the chemotherapy. \par I have discussed with her that rechallenging with carbo/taxol may still prove to be effective as long as she gets restaged after 2 cycles. Given recent whole brain radiation and neuropathy I will offer her carbo/taxol weekly with does reduction on the taxol for neuropathy, We will plan to run carbo slowly to avoid allergic reaction. \par She is agreeable to start ASAP. \par \par 6/5/2019: Serena has completed 1 cycle of weekly Carbo/taxol, ran at slow rate to avoid Carbo reaction and has tolerated chemotherapy without difficulty, she does not complain of increase in neuropathy. She has ongoing vision changes in her L eye, she had no residual LE weakness. She is off Decadron and Keppra. She was advised to see ophthalmology. \par \par 7/3/2019: Serena is doing well.  Reports no problems with carbo/taxol.  Still complains of vision changes in L eye but is seeing ophthalmologist on 7/16.  Remains active around the house and cooks regularly.  No fevers, weight loss, anorexia.  ROS is otherwise only significant for occasional loose stools, no diarrhea.

## 2019-07-20 NOTE — ASSESSMENT
[FreeTextEntry1] : #Papillary-serous endometrial cancer, stage IV\par  \par --PET CT on 11/29/2017 revealed no clear GYN origin, extensive KEVIN, uptake in the right lung and pleura, thyroid nodule\par --Malignant pleural effusion, resolved\par --L side PleurX catheter was removed on 2/8/2018\par --Completed cycle 8  of carbo/taxol on 6/29/2018.  2 cycles were administered  postoperatively.\par --Restaging PET CT on 9/26/2018 (3 months post completion of chemo 6/29/2018)was essentially negative for recurrent disease\par --Restaging PET CT on 12/18/2018 reveals   New left upper quadrant peritoneal nodule measuring 8 mm with an SUV max  of 7.3. This is suggestive of biologic tumor activity.  No other FDG avid lesions seen throughout the scan.\par --restaging PET CT on 2/12/2019 revealed slight increase in size of left upper quadrant peritoneal nodule (1.1 cm, \par previously 0.8 cm) with stable FDG uptake, 7.7 SUV suspicious for biologic tumor activity.\par --Case was discussed with Dr. Massey at [place], biopsy of peritoneal nodule is positive for recurrent endometrial cancer.\par --MRI brain on 4/23/2019 revealed multiple brain mets\par --S/p whole brain radiation completed 4/2019, required acute rehab\par --PET CT on 5/1/2019 subsequently revealed further disease progression \par --Restarted weekly carbo/taxol 3 on 1 off on 5/9/2019, continue with weekly Carbo/Taxol for now\par --Off Decadron and Keppra since 6/5/2019 \par --Restaging brain MRI on 6/8/2019 showed almost complete resolution of most supratentorial lesions and no new lesions.\par --Restaging CT C/A/P on 7/9/2019 did not reveal definite progression, there was a questionable filling defect suspicious for possible chronic PE/artifact, this was discussed with Radiology at length anticoagulation was not deemed to be necessary\par --PET CT was recommended by radiology for better visualization of A/P findings\par  \par \par Papillary thyroid cancer [id number], 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 Herrera \par --Follow up with Dr. Acevedo of endocrinology; he is addressing vitamin D, thyroid and diabetes\par \par Cataract of eye\par -- Saw ophthalmology. She is planned for surgery and is going to follow up July 30. \par \par Plan: Rpt PET scan\par Next cycle chemo- carbo/taxol tomorrow 7/18/19 \par \par Follow up in 3 weeks\par

## 2019-07-20 NOTE — HISTORY OF PRESENT ILLNESS
[Disease: _____________________] : Disease: [unfilled] [AJCC Stage: ____] : AJCC Stage: [unfilled] [de-identified] : Serena is a rodrigue 61 yo female, who has started developing SOB approximately 2 months ago, she went to ER on 11/2/2017 and on CXR was noted to have a large right sided pleural effusion. She underwent a thoracentesis, 1.6L of fluid was drained. 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 IHC was pos for CK7, PAX8, CK5/6 and Ca125, negative for TTF-1/Napsin, calretinin, CK20, mammaglobin, p63, villin, HAM56, GCDFP15, TAY-EP4. \par \par Her visit on 12/1/2017 Serena had an excisional biopsy of cervical node on 12/13/2017 revealing met adenocarcinoma, primary source still was not clear. On 12/14/2017 Serena was admitted with SOB, Pleurx catheter was placed on 12/14/2017. 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 Serena received 1st dose of carbo (auc of 5)/taxol(175mg/m2) on 12/15/2017 without complications, but the next day sustained a fall 2/2 vasovagal episode and developed asymptomatic SAH, that resolved on imaging by 12/21/2017.  [de-identified] : KRas WT, EGFR WT, Alk WT, ROS1 WT, PDL1-1%\par Normal MMR protein expression [de-identified] : 11/24/2017: She reports some SOB, especially ARZATE today. No fevers, no weight loss (reports some weight gain), no GI,  or GYN symptoms, except for increasing abdominal girth, she reports no blood in the stool or urine and no vaginal bleeding. She reports no CP and no palpitations, she reports worsening nonproductive cough, no hemoptysis. She also reports difficulty lying on left side and lying down flat. \par She reports left on/off facial numbness several months in duration. She had a CT of her head performed in [facility] within 1 year.  Additionally there is a freely mobile left cervical node present on exam, as per patient this was in place for approximately 1 year. \par \par 12/1/2017: Serena is here for follow up today. She had a therapeutic thoracentesis in  on 11/27/2017, 2L of fluid were removed, with much improved respiratory status. PET CT and MRI of the brain were done on 11/29/2017, findings were discussed today. There remains no clear primary source of malignancy. We have discussed that the source of tumor may be Mullerian vs lung. regardless of source chemotherapy needs to be initiated ASAP. We have discussed Carbo/taxol regiment that will cover both Mullerian and lung origin. Side effects including myelosuppression, peripheral neuropathy, allergic reaction and others.\par \par 1/2/2017 Since last visit Serena had an excisional biopsy of cervical node on 12/13/2017 revealing met adenocarcinoma, primary source still was not clear. On 12/14/2017 Serena was admitted with SOB, Pleurx catheter was placed on 12/14/2017. 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 Serena received 1st dose of carbo (auc of 5)/taxol(175mg/m2) on 12/15/2017 without complications, but the next day sustained a fall 2/2 vasovagal episode and developed asymptomatic SAH, that resolved on imaging by 12/21/2017. Today Serena's SOB has significantly improved. She reports very mild neuropathy in fingertips only. She reports no headache and offers no other complaints. \par \par 1/26/2018: Complains of some neuropathy, otherwise tolerating chemo with no difficulty. \par \par 2/16/2018: restaging CT C/A/P reviewed, significant improvement noted. Will refer to GYN/ONC. Reports slightly worsening neuropathy, not -painful, taking Gabapentin, no other symptoms. For cycle 4 of carbo/taxol today.\par \par 3/9/2018: Serena met with Dr. Massey, she is planned for surgery on 5/7/2018, after completion of 6 cycles of carbo/taxol and restaging PET CT ordered for mid April and follow up with Dr. Massey on April 20. She reports worsening neuropathy, and occasional pain and changes in vision in her left eye, eye symptoms come and go and are not very bothersome. She reports no other symptoms. \par \par 3/30/2018; Serena is here for cycle 6 of carbo/taxol, she continues to have neuropathy in finger and toes, but offers no other complaints, chemo has been dose reduced. \par \par 4/27/2018: Results of restaging PET CT s/p 6 cycles of carbo/taxol revealed 1. No new areas of abnormal increased uptake is seen to indicate \par biologically active disease.\par \par 2. Persistent PET positive left thyroid mass with a max SUV 33.1, \par previously max SUV 34.8. Further evaluation with thyroid ultrasound and \par if needed fine-needle aspiration biopsy will be helpful.\par \par 3.   PET positive left cervical lymph nodes mainly level 2 on the left \par with a max SUV 5.47previously 18. Minimal increased uptake in the \par bilateral axillary lymph nodes most likely reactive(less than 2.5)\par \par 4. Weakly PET positive, max SUV 2.89 right pleura, previous max SUV 5.3 \par with non-FDG avid right pleural effusion. \par \par 5. Previously FDG avid right and left supraclavicular lymphadenopathy, \par left internal mammary, bilateral paratracheal, para-aortic, para \par vertebral, carinal, mesenteric, right and left iliacs, right inguinal \par lymphadenopathy, small in size and no longer FDG avid.\par \par 6. No abnormal increased uptake is seen in the  lobulated uterus.\par \par 7. Overall there is marked improvement in the FDG avid disease.\par This was reviewed with Serena. She met with Dr. Massey on 4/20/2018, surgery is planned for 5/7/2018. She will also joselyn to meet with ENT re FDG avid thyroid nodule. Will assist in making he an appointment for her. \par Persistent neuropathy on gabapentin. \par \par 6/29/18 ; Patient came for follow up visit , evaluation for chemotherapy cycle 8 of carbo / Taxol , patient tolerating medication well , except neuropathy  recommend to have gabapentin  300 mg po daily.\par \par 7/20/2018: Serena present for follow up today, she has completed total of 8 cycles of Carbo/Taxol. She reports significant neuropathy in her hands and feet. We will discontinue chemotherapy today and plan to follow with close observation. She has thyroidectomy planned with Dr. Herrera on 8/20/2018, obtaining clearance for PMD. She is also planning to fly to [place] at the end of September. She reports no SOB, no GI or  symptoms. \par \par 9/12/2018: Serena offers no significant complaints today. She states neuropathy in her hands has almost completely resolved and neuropathy in her feet is significantly better. She had undergone complete thyroidectomy by Dr. Herrera on 8/20/2018, pathology revealed papillary thyroid cancer, measuring 2 cm, pT1b, other additional foci of papillary carcinoma were also noted, no LVI, surgical margins were clear, LN 0/2 had no carcinoma, stage [id number]. She is following up with Dr. Herrera tomorrow. She has still not gone for her vacation in [place]. \par \par 10/10/2018: Restaging PET CT on 9/26/2018 reveals Since 4/16/2018,  1. Post thyroidectomy with diffuse increased FDG uptake at the thyroid  bed likely representing post-surgical changes.  2. Subtle increased uptake is seen in the lamina , right side at the  level of T8 with a max SUV 4.6. This is not sufficient for metastatic  disease. Bone scan or MRI examination with contrast will be helpful for  further evaluation.  3. No other areas of abnormal increased uptake is seen. Images were personally reviewed by myself and discussed with Serena. \par She also had an Echo on 9/24/2018 revealing EF of 63%. \par Serena reports ongoing fatigue, she is unable to lose weight. She is taking Synthroid and following with Dr. Acevedo. \par She reports stiff fingers at night only, no painful neuropathy. \par She denies any other symptoms today. \par \par 12/11/2018: Serena feels well, neuropathy in hands and feet is much improved. S he is now complaining of r-sided facial pain associated with some swelling, pain comes and goes. She has already seen Dr. Herrera, who ordered CT of sinuses and prescribed pain relief meds. She is also due for restaging PET CT. Serena feels well otherwise. \par \par 1/9/2019: Restaging PET CT was performed on 12/18/2018 it revealed COMPARISON : 9/24/2018.  New left upper quadrant peritoneal nodule measuring 8 mm with an SUV max  of 7.3. This is suggestive of biologic tumor activity.  No other FDG avid lesions seen throughout the scan. Images were personally reviewed by myself and discussed with Serena, originally over the phone and again today. I have originally suggested to try AI in the interim, Serena however reported diarrhea at the time of the scan and declined intervention for now. We will plan for restaging PET CT to be performed in 6-8 weeks, this will be ordered today. She will follow up with Dr. Massey at [facility] shortly and bring the disk for his review. I would also like her to meet with genetics, this will be arranged at [facility] with Dr. Massey's help. Serena reports no new symptoms today, her neuropathy is manageable and  improving. She still reports unilateral headache that was work up in the past. Neurology eval was again suggested to her. She is following closely with Endocrinology re thyroid management. She will have follow up with Dr. Herrera shortly as well. \par \par 2/20/2019: Serena offers no new complaints. PET CT from 2/12/2019 revealed \par Since PET/CT of December 19, 2018, no new sites of pathologic FDG uptake\par Slight increase in size of left upper quadrant peritoneal nodule (1.1 cm, \par previously 0.8 cm) with stable FDG uptake, 7.7 SUV suspicious for \par biologic tumor activity.\par No other sites of pathologic FDG uptake \par Images were personally reviewed by myself and discussed with Serena, case was also discussed with Dr. Massey at [facility]. Serena will have follow up with his shortly. She is following with endocrinology. reports improving neuropathy. No GI or  symptoms at this time. No weight loss. \par \par 3/20/2019: Serena had a peritoneal nodule biopsy done at [facility], I have received a call from Dr. 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 We have discussed that surgery though could be attempted will not be the best therapeutic approach, recommencement of systemic therapy was discussed. I have not received the results from [facility] yet. I have briefly discussed this with Serena today. Serena reports that she has acutely developed right lower extremity weakness 5-6 days ago, she did not inform any of her doctors about this. She also reports that her R upper extremity though not weak "does not feel normal either. She reports no back pain, there is no point tenderness, RLE is objectively weak on exam. I recommend ER evaluation to r/o CVA vs brain met, vs L-spine related issue. Recommend admission for work up. Serena is agreeable to get admitted. \par \par 5/8/2019: Serena present for follow up, she was diagnosed with multiple metastasis to the brain, MRI was done on 3/21/2019 and revealed \par Multiple supratentorial heterogeneously enhancing lesions consistent with \par metastatic disease. The appearance on the T2-weighted images is suggestive \par of adenocarcinoma. There is mass effect upon the left lateral ventricle and \par corpus callosum. \par She received whole brain irradiation by Dr. Dahl, completed it in the beginning of 4/2019, she is currently on steroid taper managed by Dr. Dahl, she is taking 4mg of Decadron daily. She will be stopping the Keppra as there have been no documented h/o seizures. She still reports impairment of the L visual field, she has an appointment coming up with Opthalmology. She reports no deficits walking after she has completed inpatient acute rehabilitation. \par Restaging PET CT on 5/1/2019 reveals COMPARISON : 2/12/2019 \par Multiple new FDG avid omental nodules largest measuring up to 10 mm, \par positive on nonattenuation corrected images. Findings are suggestive of \par biologic tumor activity. \par Again seen is a left upper quadrant peritoneal nodule, SUV max 6.8, \par previously 7.7 (12% decrease). \par Images were personally reviewed by myself and discussed with patient. \par She now reports mild abdominal bloating, no other symptoms. She reports her neuropathy has significantly improved. \par She has first evidence of recurrent disease documented 5.5 months after last carbo/taxol dose, the volume of disease was very small (1 8mm nodule), she was then observed for another 6 months and now she wishes to restart the chemotherapy. \par I have discussed with her that rechallenging with carbo/taxol may still prove to be effective as long as she gets restaged after 2 cycles. Given recent whole brain radiation and neuropathy I will offer her carbo/taxol weekly with does reduction on the taxol for neuropathy, We will plan to run carbo slowly to avoid allergic reaction. \par She is agreeable to start ASAP. \par \par 6/5/2019: Serena has completed 1 cycle of weekly Carbo/taxol, ran at slow rate to avoid Carbo reaction and has tolerated chemotherapy without difficulty, she does not complain of increase in neuropathy. She has ongoing vision changes in her L eye, she had no residual LE weakness. She is off Decadron and Keppra. She was advised to see ophthalmology. \par \par 7/3/2019: Serena is doing well.  Reports no problems with carbo/taxol.  Still complains of vision changes in L eye but is seeing ophthalmologist on 7/16.  Remains active around the house and cooks regularly.  No fevers, weight loss, anorexia.  ROS is otherwise only significant for occasional loose stools, no diarrhea.\par \par 7/9/2019: Serena's PET CT was denied by insurance, she will be getting restaging CT scans today. She denies worsening neuropathy, denies any other issues with Carbo/Taxol.

## 2019-07-20 NOTE — REVIEW OF SYSTEMS
[Fatigue] : fatigue [Vision Problems] : vision problems [Negative] : Allergic/Immunologic [Chest Pain] : no chest pain [Palpitations] : no palpitations [Lower Ext Edema] : no lower extremity edema [Shortness Of Breath] : no shortness of breath [Wheezing] : no wheezing [Cough] : no cough [SOB on Exertion] : no shortness of breath during exertion [FreeTextEntry3] : Difficulty seeing out of L eye, will see ophthalmologist [FreeTextEntry9] : normal strength in upper and lower extremities  [de-identified] : L visual field deficit, gait impairment has resolved

## 2019-07-20 NOTE — RESULTS/DATA
[FreeTextEntry1] : EXAM:  MR BRAIN WAW IC      \par \par \par PROCEDURE DATE:  06/08/2019  \par \par \par \par \par INTERPRETATION:  Clinical History / Reason for exam: Dizziness and \par vertigo, history of uterine and thyroid cancer, for evaluation of \par metastatic disease, lesion seen on prior MRI of the brain.\par \par MRI OF THE BRAIN WITHOUT AND WITH CONTRAST\par \par TECHNIQUE:\par \par Multiplanar multisequence imaging of the brain was performed on the \[facility] open magnet before and after the intravenous administration of \par 7.5 cc of Gadavist including brain lab protocol.\par \par COMPARISON:\par \par MRI of the brain without and with contrast dated March 12, 2019.\par \par FINDINGS:\par \par The previously described supratentorial lesions have almost completely \par resolved.\par \par The lesion in the left posterior frontal region now measures 1.4 cm in \par maximum diameter, the lesion in the anterior right frontal lobe measures \par 0.7 cm in maximum diameter, the second lesion in the right frontal lobe \par measures 0.1 cm in maximum diameter, the lesion in the right posterior \par parietal region measures 0.3 cm in maximum diameter, and the left \par occipital lobe lesion measures 1.2 cm in maximum diameter. (Previously \par measuring 2.6, 2.4, 0.5, 0.7, and 2.5 cm respectively).\par \par The third, fourth, and lateral ventricles are normal in size and \par position. There is no shift of the midline structures.\par \par The cerebellar tonsils are minimally low-lying (0.3 cm of cerebellar \par ectopia).\par \par Incidental note is made of a tiny medial cyst.\par \par There are scattered T2/FLAIR hyperintense signal intensities in the\par subcortical white matter which are nonspecific differential diagnostic\par possibilities include chronic ischemic change, foci of gliosis or\par demyelination.\par \par IMPRESSION:\par \par In comparison with the prior MRI of the brain dated March 21, 2019:\par \par There has been almost complete resolution of most of of the previously \par described supratentorial lesions.\par \par There are no new enhancing lesions.\par \par \par \par \par \par \par CRISTINA MÉNDEZ M.D., ATTENDING RADIOLOGIST\par This document has been electronically signed. Oren 10 2019  1:17PM\par   \par \par   \par \par \par [id number]^RI^DC\par \par EXAM:  PETCT SKUL-THI ONC FDG SUBS      \par \par \par PROCEDURE DATE:  05/01/2019  \par \par \par \par \par INTERPRETATION:  \par FDG PET CT STUDY   Subsequent  treatment strategy\par REASON: TUMOR IMAGING - PET with concurrently acquired CT for attenuation \par correction and anatomic localization; skull base to mid - thigh .\par \par CPT code 88820.\par \par Fasting blood glucose level:     91 mg/dl\par \par HISTORY: Endometrial cancer. Stage IV with brain metastatic disease.\par \par TECHNIQUE: Approximately 45 minutes after the intravenous administration \par of 10.7 mCi 18-Fluorine FDG, whole body PET images were acquired from \par base of skull to mid - thigh.\par \par CT protocol used for this PET/CT study is designed for attenuation \par correction and anatomic localization of PET findings.  This  CT \par is not designed to replace state-of-the-art diagnostic CT scans for \par specific imaging protocols of different body parts.\par \par COMPARISON : 2/12/2019.\par Correlation: MRI of the brain on 3/21/2019.\par \par FINDINGS:\par \par Head/Neck: No biologically active head/neck lesions.     \par Normal uptake within the brain, pharyngeal lymphoid tissue, salivary \par glands and laryngeal musculature is noted.    \par \par Thorax:   No suspicious hypermetabolic mediastinal, hilar, lung \par parenchymal lesions.\par \par Abdomen/Pelvis: Physiologic GI/  activity. \par \par Again seen is a left upper quadrant peritoneal nodule, SUV max 6.8, \par previously 7.7 (12% decrease). \par \par Multiple new FDG avid omental nodules largest of which is adjacent to the \par distal transverse colon, measuring 10 mm, positive on nonattenuation \par corrected images, axial image 134. \par \par No other abnormal visceral or guillaume tracer uptake is present.    \par \par Musculoskeletal :  No suspicious hypermetabolic osseous lesions.\par \par Additional CT findings:\par Status post hysterectomy.\par Colonic diverticulosis.\par \par IMPRESSION: \par \par COMPARISON : 2/12/2019\par \par Multiple new FDG avid omental nodules largest measuring up to 10 mm, \par positive on nonattenuation corrected images. Findings are suggestive of \par biologic tumor activity.\par \par Again seen is a left upper quadrant peritoneal nodule, SUV max 6.8, \par previously 7.7 (12% decrease). \par \par \par \par \par

## 2019-07-20 NOTE — PHYSICAL EXAM
[Restricted in physically strenuous activity but ambulatory and able to carry out work of a light or sedentary nature] : Status 1- Restricted in physically strenuous activity but ambulatory and able to carry out work of a light or sedentary nature, e.g., light house work, office work [Normal] : affect appropriate [de-identified] : B/L cataracts appreciated [de-identified] : CTA B/L

## 2019-07-20 NOTE — PHYSICAL EXAM
[Restricted in physically strenuous activity but ambulatory and able to carry out work of a light or sedentary nature] : Status 1- Restricted in physically strenuous activity but ambulatory and able to carry out work of a light or sedentary nature, e.g., light house work, office work [Normal] : affect appropriate [de-identified] : B/L cataracts appreciated [de-identified] : CTA B/L [de-identified] : Prior deficits resolved.

## 2019-07-20 NOTE — ASSESSMENT
[FreeTextEntry1] : Papillary-serous endometrial cancer, stage IV\par --PET CT on 11/29/2017 revealed no clear GYN origin, extensive KEVIN, uptake in the right lung and pleura, thyroid nodule\par --Malignant pleural effusion, resolved\par --L side PleurX catheter was removed on 2/8/2018\par --Completed cycle 8  of carbo/taxol on 6/29/2018.  2 cycles were administered  postoperatively.\par --Restaging PET CT on 9/26/2018 (3 months post completion of chemo 6/29/2018)was essentially negative for recurrent disease\par --Restaging PET CT on 12/18/2018 reveals   New left upper quadrant peritoneal nodule measuring 8 mm with an SUV max  of 7.3. This is suggestive of biologic tumor activity.  No other FDG avid lesions seen throughout the scan.\par --restaging PET CT on 2/12/2019 revealed slight increase in size of left upper quadrant peritoneal nodule (1.1 cm, \par previously 0.8 cm) with stable FDG uptake, 7.7 SUV suspicious for biologic tumor activity.\par --Case was discussed with Dr. Massey at [place], biopsy of peritoneal nodule is positive for recurrent endometrial cancer.\par --MRI brain on 4/23/2019 revealed multiple brain mets\par --S/p whole brain radiation completed 4/2019, required acute rehab\par --PET CT on 5/1/2019 subsequently revealed further disease progression \par --Restarted weekly carbo/taxol 3 on 1 off on 5/9/2019, due for next dose on 7/5.\par --Off Decadron and Keppra since 6/5/2019 \par --Restaging brain MRI on 6/8/2019 showed almost complete resolution of most supratentorial lesions and no new lesions.\par --Pending restaging CT chest/abdomen/pelvis - appointment for later today\par --Patient has an upcoming appointment with ophthalmology regarding vision changes, possibly secondary to cataracts.\par \par Papillary thyroid cancer [id number], 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 Herrera \par --Follow up with Dr. Acevedo of endocrinology; he is addressing vitamin D, thyroid and diabetes \par \par  Follow up after CT scans

## 2019-07-20 NOTE — REVIEW OF SYSTEMS
[Fatigue] : fatigue [Vision Problems] : vision problems [Negative] : Allergic/Immunologic [Chest Pain] : no chest pain [Palpitations] : no palpitations [Lower Ext Edema] : no lower extremity edema [Shortness Of Breath] : no shortness of breath [Wheezing] : no wheezing [Cough] : no cough [SOB on Exertion] : no shortness of breath during exertion [FreeTextEntry3] : Difficulty seeing out of L eye, will see ophthalmologist [FreeTextEntry7] : loose stools, no diarrhea [FreeTextEntry9] : normal strength in upper and lower extremities  [de-identified] : L visual field deficit, gait impairment has resolved

## 2019-07-20 NOTE — HISTORY OF PRESENT ILLNESS
[Disease: _____________________] : Disease: [unfilled] [AJCC Stage: ____] : AJCC Stage: [unfilled] [de-identified] : Serena is a rodrigue 63 yo female, who has started developing SOB approximately 2 months ago, she went to ER on 11/2/2017 and on CXR was noted to have a large right sided pleural effusion. She underwent a thoracentesis, 1.6L of fluid was drained. 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 IHC was pos for CK7, PAX8, CK5/6 and Ca125, negative for TTF-1/Napsin, calretinin, CK20, mammaglobin, p63, villin, HAM56, GCDFP15, TAY-EP4. \par \par Her visit on 12/1/2017 Serena had an excisional biopsy of cervical node on 12/13/2017 revealing met adenocarcinoma, primary source still was not clear. On 12/14/2017 Serena was admitted with SOB, Pleurx catheter was placed on 12/14/2017. 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 Serena received 1st dose of carbo (auc of 5)/taxol(175mg/m2) on 12/15/2017 without complications, but the next day sustained a fall 2/2 vasovagal episode and developed asymptomatic SAH, that resolved on imaging by 12/21/2017.  [de-identified] : KRas WT, EGFR WT, Alk WT, ROS1 WT, PDL1-1%\par Normal MMR protein expression [de-identified] : 11/24/2017: She reports some SOB, especially ARZATE today. No fevers, no weight loss (reports some weight gain), no GI,  or GYN symptoms, except for increasing abdominal girth, she reports no blood in the stool or urine and no vaginal bleeding. She reports no CP and no palpitations, she reports worsening nonproductive cough, no hemoptysis. She also reports difficulty lying on left side and lying down flat. \par She reports left on/off facial numbness several months in duration. She had a CT of her head performed in [facility] within 1 year.  Additionally there is a freely mobile left cervical node present on exam, as per patient this was in place for approximately 1 year. \par \par 12/1/2017: Serena is here for follow up today. She had a therapeutic thoracentesis in  on 11/27/2017, 2L of fluid were removed, with much improved respiratory status. PET CT and MRI of the brain were done on 11/29/2017, findings were discussed today. There remains no clear primary source of malignancy. We have discussed that the source of tumor may be Mullerian vs lung. regardless of source chemotherapy needs to be initiated ASAP. We have discussed Carbo/taxol regiment that will cover both Mullerian and lung origin. Side effects including myelosuppression, peripheral neuropathy, allergic reaction and others.\par \par 1/2/2017 Since last visit Serena had an excisional biopsy of cervical node on 12/13/2017 revealing met adenocarcinoma, primary source still was not clear. On 12/14/2017 Serena was admitted with SOB, Pleurx catheter was placed on 12/14/2017. 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 Serena received 1st dose of carbo (auc of 5)/taxol(175mg/m2) on 12/15/2017 without complications, but the next day sustained a fall 2/2 vasovagal episode and developed asymptomatic SAH, that resolved on imaging by 12/21/2017. Today Serena's SOB has significantly improved. She reports very mild neuropathy in fingertips only. She reports no headache and offers no other complaints. \par \par 1/26/2018: Complains of some neuropathy, otherwise tolerating chemo with no difficulty. \par \par 2/16/2018: restaging CT C/A/P reviewed, significant improvement noted. Will refer to GYN/ONC. Reports slightly worsening neuropathy, not -painful, taking Gabapentin, no other symptoms. For cycle 4 of carbo/taxol today.\par \par 3/9/2018: Serena met with Dr. Massey, she is planned for surgery on 5/7/2018, after completion of 6 cycles of carbo/taxol and restaging PET CT ordered for mid April and follow up with Dr. Massey on April 20. She reports worsening neuropathy, and occasional pain and changes in vision in her left eye, eye symptoms come and go and are not very bothersome. She reports no other symptoms. \par \par 3/30/2018; Serena is here for cycle 6 of carbo/taxol, she continues to have neuropathy in finger and toes, but offers no other complaints, chemo has been dose reduced. \par \par 4/27/2018: Results of restaging PET CT s/p 6 cycles of carbo/taxol revealed 1. No new areas of abnormal increased uptake is seen to indicate \par biologically active disease.\par \par 2. Persistent PET positive left thyroid mass with a max SUV 33.1, \par previously max SUV 34.8. Further evaluation with thyroid ultrasound and \par if needed fine-needle aspiration biopsy will be helpful.\par \par 3.   PET positive left cervical lymph nodes mainly level 2 on the left \par with a max SUV 5.47previously 18. Minimal increased uptake in the \par bilateral axillary lymph nodes most likely reactive(less than 2.5)\par \par 4. Weakly PET positive, max SUV 2.89 right pleura, previous max SUV 5.3 \par with non-FDG avid right pleural effusion. \par \par 5. Previously FDG avid right and left supraclavicular lymphadenopathy, \par left internal mammary, bilateral paratracheal, para-aortic, para \par vertebral, carinal, mesenteric, right and left iliacs, right inguinal \par lymphadenopathy, small in size and no longer FDG avid.\par \par 6. No abnormal increased uptake is seen in the  lobulated uterus.\par \par 7. Overall there is marked improvement in the FDG avid disease.\par This was reviewed with Serena. She met with Dr. Massey on 4/20/2018, surgery is planned for 5/7/2018. She will also joselyn to meet with ENT re FDG avid thyroid nodule. Will assist in making he an appointment for her. \par Persistent neuropathy on gabapentin. \par \par 6/29/18 ; Patient came for follow up visit , evaluation for chemotherapy cycle 8 of carbo / Taxol , patient tolerating medication well , except neuropathy  recommend to have gabapentin  300 mg po daily.\par \par 7/20/2018: eSrena present for follow up today, she has completed total of 8 cycles of Carbo/Taxol. She reports significant neuropathy in her hands and feet. We will discontinue chemotherapy today and plan to follow with close observation. She has thyroidectomy planned with Dr. Herrera on 8/20/2018, obtaining clearance for PMD. She is also planning to fly to [place] at the end of September. She reports no SOB, no GI or  symptoms. \par \par 9/12/2018: Serena offers no significant complaints today. She states neuropathy in her hands has almost completely resolved and neuropathy in her feet is significantly better. She had undergone complete thyroidectomy by Dr. Herrera on 8/20/2018, pathology revealed papillary thyroid cancer, measuring 2 cm, pT1b, other additional foci of papillary carcinoma were also noted, no LVI, surgical margins were clear, LN 0/2 had no carcinoma, stage [id number]. She is following up with Dr. Herrera tomorrow. She has still not gone for her vacation in [place]. \par \par 10/10/2018: Restaging PET CT on 9/26/2018 reveals Since 4/16/2018,  1. Post thyroidectomy with diffuse increased FDG uptake at the thyroid  bed likely representing post-surgical changes.  2. Subtle increased uptake is seen in the lamina , right side at the  level of T8 with a max SUV 4.6. This is not sufficient for metastatic  disease. Bone scan or MRI examination with contrast will be helpful for  further evaluation.  3. No other areas of abnormal increased uptake is seen. Images were personally reviewed by myself and discussed with Serena. \par She also had an Echo on 9/24/2018 revealing EF of 63%. \par Serena reports ongoing fatigue, she is unable to lose weight. She is taking Synthroid and following with Dr. Acevedo. \par She reports stiff fingers at night only, no painful neuropathy. \par She denies any other symptoms today. \par \par 12/11/2018: Serena feels well, neuropathy in hands and feet is much improved. S he is now complaining of r-sided facial pain associated with some swelling, pain comes and goes. She has already seen Dr. Herrera, who ordered CT of sinuses and prescribed pain relief meds. She is also due for restaging PET CT. Serena feels well otherwise. \par \par 1/9/2019: Restaging PET CT was performed on 12/18/2018 it revealed COMPARISON : 9/24/2018.  New left upper quadrant peritoneal nodule measuring 8 mm with an SUV max  of 7.3. This is suggestive of biologic tumor activity.  No other FDG avid lesions seen throughout the scan. Images were personally reviewed by myself and discussed with Serena, originally over the phone and again today. I have originally suggested to try AI in the interim, Serena however reported diarrhea at the time of the scan and declined intervention for now. We will plan for restaging PET CT to be performed in 6-8 weeks, this will be ordered today. She will follow up with Dr. Massey at [facility] shortly and bring the disk for his review. I would also like her to meet with genetics, this will be arranged at [facility] with Dr. Massey's help. Serena reports no new symptoms today, her neuropathy is manageable and  improving. She still reports unilateral headache that was work up in the past. Neurology eval was again suggested to her. She is following closely with Endocrinology re thyroid management. She will have follow up with Dr. Herrera shortly as well. \par \par 2/20/2019: Serena offers no new complaints. PET CT from 2/12/2019 revealed \par Since PET/CT of December 19, 2018, no new sites of pathologic FDG uptake\par Slight increase in size of left upper quadrant peritoneal nodule (1.1 cm, \par previously 0.8 cm) with stable FDG uptake, 7.7 SUV suspicious for \par biologic tumor activity.\par No other sites of pathologic FDG uptake \par Images were personally reviewed by myself and discussed with Serena, case was also discussed with Dr. Massey at [facility]. Serena will have follow up with his shortly. She is following with endocrinology. reports improving neuropathy. No GI or  symptoms at this time. No weight loss. \par \par 3/20/2019: Serena had a peritoneal nodule biopsy done at [facility], I have received a call from Dr. 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 We have discussed that surgery though could be attempted will not be the best therapeutic approach, recommencement of systemic therapy was discussed. I have not received the results from [facility] yet. I have briefly discussed this with Serena today. Serena reports that she has acutely developed right lower extremity weakness 5-6 days ago, she did not inform any of her doctors about this. She also reports that her R upper extremity though not weak "does not feel normal either. She reports no back pain, there is no point tenderness, RLE is objectively weak on exam. I recommend ER evaluation to r/o CVA vs brain met, vs L-spine related issue. Recommend admission for work up. Serena is agreeable to get admitted. \par \par 5/8/2019: Serena present for follow up, she was diagnosed with multiple metastasis to the brain, MRI was done on 3/21/2019 and revealed \par Multiple supratentorial heterogeneously enhancing lesions consistent with \par metastatic disease. The appearance on the T2-weighted images is suggestive \par of adenocarcinoma. There is mass effect upon the left lateral ventricle and \par corpus callosum. \par She received whole brain irradiation by Dr. Dahl, completed it in the beginning of 4/2019, she is currently on steroid taper managed by Dr. Dahl, she is taking 4mg of Decadron daily. She will be stopping the Keppra as there have been no documented h/o seizures. She still reports impairment of the L visual field, she has an appointment coming up with Opthalmology. She reports no deficits walking after she has completed inpatient acute rehabilitation. \par Restaging PET CT on 5/1/2019 reveals COMPARISON : 2/12/2019 \par Multiple new FDG avid omental nodules largest measuring up to 10 mm, \par positive on nonattenuation corrected images. Findings are suggestive of \par biologic tumor activity. \par Again seen is a left upper quadrant peritoneal nodule, SUV max 6.8, \par previously 7.7 (12% decrease). \par Images were personally reviewed by myself and discussed with patient. \par She now reports mild abdominal bloating, no other symptoms. She reports her neuropathy has significantly improved. \par She has first evidence of recurrent disease documented 5.5 months after last carbo/taxol dose, the volume of disease was very small (1 8mm nodule), she was then observed for another 6 months and now she wishes to restart the chemotherapy. \par I have discussed with her that rechallenging with carbo/taxol may still prove to be effective as long as she gets restaged after 2 cycles. Given recent whole brain radiation and neuropathy I will offer her carbo/taxol weekly with does reduction on the taxol for neuropathy, We will plan to run carbo slowly to avoid allergic reaction. \par She is agreeable to start ASAP. \par \par 6/5/2019: Serena has completed 1 cycle of weekly Carbo/taxol, ran at slow rate to avoid Carbo reaction and has tolerated chemotherapy without difficulty, she does not complain of increase in neuropathy. She has ongoing vision changes in her L eye, she had no residual LE weakness. She is off Decadron and Keppra. She was advised to see ophthalmology. \par \par 7/3/2019: Serena is doing well.  Reports no problems with carbo/taxol.  Still complains of vision changes in L eye but is seeing ophthalmologist on 7/16.  Remains active around the house and cooks regularly.  No fevers, weight loss, anorexia.  ROS is otherwise only significant for occasional loose stools, no diarrhea.\par \par 7/17/2019: Serena is doing well, denies worsening neuropathy. She c/o feeling fatigued and tired. Her last chemo was 1 week ago(7/11/19) with carbo/taxol and is due again tomorrow. She saw ophthalmology and will need cataract surgery of the L eye. No c/o chest pain/SOB. No weight loss.\par CT C/A/P was done on 7/9/2019, images were personally reviewed by myself and discussed with several radiology attendings, they revealed \par CHEST:\par Filling defect within a segmental branch of the lower lobe pulmonary artery \par suspicious for pulmonary embolus. \par Stable left upper lobe and right middle lobe 3 mm nodules. \par CT abdomen and pelvis performed on the same day, see separate report. \par ADDENDUM: \par Please note that the morphology of the small thrombus within the left lower \par pulmonary artery is not indicative of an acute thrombus but rather a chronic \par appearance. \par This was discussed with Radiology, it was not deemed that thrombus was acute and may not have even been present at all, finding was deemed to be equivocal, based on radiology assessment anticoagulation was not indicated for the filling defect noted. Initially CTA was recommended, the recommendation was withdrawn upon further review. \par ABDOMEN/PELVIS:\par New 1.3 cm segment IV hepatic hypodensity seen on series 4 image 205. \par Lesion not seen on prior PET/CT from 5/1/2019 or abdomen and pelvis CT from \par 2/2/2018 and is consistent with a new metastasis. \par Two other hepatic lesions described above, decreased in size since 2/2/2018, \par consistent with treated hepatic metastases. \par Anterior perisplenic omental nodule measures 0.7 cm on series 2 image 53, \par previously measuring 1.2 cm on PET/CT dated 5/1/2019. \par All the other previously seen omental nodules have resolved since 5/1/2019. \par ADDENDUM:\par Case was discussed with Dr. Cid in detail. It is also possible that the \par new 1.3 cm lesion in the liver since February 2, 2018 represents a treated \par metastasis similar to the other liver lesions. \par PET/CT would be necessary to assess disease activity. \par I have discussed case with Radiology, and it was determined that there was no clear cut evidence of progression. PET CT was ordered today.\par This was discussed with Serena, will proceed with Carbo/Taxol for now. \par

## 2019-07-20 NOTE — RESULTS/DATA
[FreeTextEntry1] : EXAM:  MR BRAIN WAW IC      \par \par \par PROCEDURE DATE:  06/08/2019  \par \par \par \par \par INTERPRETATION:  Clinical History / Reason for exam: Dizziness and \par vertigo, history of uterine and thyroid cancer, for evaluation of \par metastatic disease, lesion seen on prior MRI of the brain.\par \par MRI OF THE BRAIN WITHOUT AND WITH CONTRAST\par \par TECHNIQUE:\par \par Multiplanar multisequence imaging of the brain was performed on the \[facility] open magnet before and after the intravenous administration of \par 7.5 cc of Gadavist including brain lab protocol.\par \par COMPARISON:\par \par MRI of the brain without and with contrast dated March 12, 2019.\par \par FINDINGS:\par \par The previously described supratentorial lesions have almost completely \par resolved.\par \par The lesion in the left posterior frontal region now measures 1.4 cm in \par maximum diameter, the lesion in the anterior right frontal lobe measures \par 0.7 cm in maximum diameter, the second lesion in the right frontal lobe \par measures 0.1 cm in maximum diameter, the lesion in the right posterior \par parietal region measures 0.3 cm in maximum diameter, and the left \par occipital lobe lesion measures 1.2 cm in maximum diameter. (Previously \par measuring 2.6, 2.4, 0.5, 0.7, and 2.5 cm respectively).\par \par The third, fourth, and lateral ventricles are normal in size and \par position. There is no shift of the midline structures.\par \par The cerebellar tonsils are minimally low-lying (0.3 cm of cerebellar \par ectopia).\par \par Incidental note is made of a tiny medial cyst.\par \par There are scattered T2/FLAIR hyperintense signal intensities in the\par subcortical white matter which are nonspecific differential diagnostic\par possibilities include chronic ischemic change, foci of gliosis or\par demyelination.\par \par IMPRESSION:\par \par In comparison with the prior MRI of the brain dated March 21, 2019:\par \par There has been almost complete resolution of most of of the previously \par described supratentorial lesions.\par \par There are no new enhancing lesions.\par \par \par \par \par \par \par CRISTINA MÉNDEZ M.D., ATTENDING RADIOLOGIST\par This document has been electronically signed. Oren 10 2019  1:17PM\par   \par \par   \par \par \par [id number]^RI^DC\par \par EXAM:  PETCT SKUL-THI ONC FDG SUBS      \par \par \par PROCEDURE DATE:  05/01/2019  \par \par \par \par \par INTERPRETATION:  \par FDG PET CT STUDY   Subsequent  treatment strategy\par REASON: TUMOR IMAGING - PET with concurrently acquired CT for attenuation \par correction and anatomic localization; skull base to mid - thigh .\par \par CPT code 67256.\par \par Fasting blood glucose level:     91 mg/dl\par \par HISTORY: Endometrial cancer. Stage IV with brain metastatic disease.\par \par TECHNIQUE: Approximately 45 minutes after the intravenous administration \par of 10.7 mCi 18-Fluorine FDG, whole body PET images were acquired from \par base of skull to mid - thigh.\par \par CT protocol used for this PET/CT study is designed for attenuation \par correction and anatomic localization of PET findings.  This  CT \par is not designed to replace state-of-the-art diagnostic CT scans for \par specific imaging protocols of different body parts.\par \par COMPARISON : 2/12/2019.\par Correlation: MRI of the brain on 3/21/2019.\par \par FINDINGS:\par \par Head/Neck: No biologically active head/neck lesions.     \par Normal uptake within the brain, pharyngeal lymphoid tissue, salivary \par glands and laryngeal musculature is noted.    \par \par Thorax:   No suspicious hypermetabolic mediastinal, hilar, lung \par parenchymal lesions.\par \par Abdomen/Pelvis: Physiologic GI/  activity. \par \par Again seen is a left upper quadrant peritoneal nodule, SUV max 6.8, \par previously 7.7 (12% decrease). \par \par Multiple new FDG avid omental nodules largest of which is adjacent to the \par distal transverse colon, measuring 10 mm, positive on nonattenuation \par corrected images, axial image 134. \par \par No other abnormal visceral or guillaume tracer uptake is present.    \par \par Musculoskeletal :  No suspicious hypermetabolic osseous lesions.\par \par Additional CT findings:\par Status post hysterectomy.\par Colonic diverticulosis.\par \par IMPRESSION: \par \par COMPARISON : 2/12/2019\par \par Multiple new FDG avid omental nodules largest measuring up to 10 mm, \par positive on nonattenuation corrected images. Findings are suggestive of \par biologic tumor activity.\par \par Again seen is a left upper quadrant peritoneal nodule, SUV max 6.8, \par previously 7.7 (12% decrease). \par \par \par \par \par

## 2019-07-24 ENCOUNTER — OUTPATIENT (OUTPATIENT)
Dept: OUTPATIENT SERVICES | Facility: HOSPITAL | Age: 64
LOS: 1 days | Discharge: HOME | End: 2019-07-24
Payer: MEDICAID

## 2019-07-24 DIAGNOSIS — Z90.710 ACQUIRED ABSENCE OF BOTH CERVIX AND UTERUS: Chronic | ICD-10-CM

## 2019-07-24 DIAGNOSIS — C79.31 SECONDARY MALIGNANT NEOPLASM OF BRAIN: ICD-10-CM

## 2019-07-24 DIAGNOSIS — Z98.890 OTHER SPECIFIED POSTPROCEDURAL STATES: Chronic | ICD-10-CM

## 2019-07-24 LAB — GLUCOSE BLDC GLUCOMTR-MCNC: 126 MG/DL — HIGH (ref 70–99)

## 2019-07-24 PROCEDURE — 78815 PET IMAGE W/CT SKULL-THIGH: CPT | Mod: 26,PS

## 2019-07-25 ENCOUNTER — APPOINTMENT (OUTPATIENT)
Dept: INTERNAL MEDICINE | Facility: CLINIC | Age: 64
End: 2019-07-25

## 2019-07-25 ENCOUNTER — OUTPATIENT (OUTPATIENT)
Dept: OUTPATIENT SERVICES | Facility: HOSPITAL | Age: 64
LOS: 1 days | Discharge: HOME | End: 2019-07-25

## 2019-07-25 VITALS
HEART RATE: 111 BPM | HEIGHT: 65 IN | BODY MASS INDEX: 28.99 KG/M2 | WEIGHT: 174 LBS | DIASTOLIC BLOOD PRESSURE: 69 MMHG | SYSTOLIC BLOOD PRESSURE: 102 MMHG

## 2019-07-25 DIAGNOSIS — Z90.710 ACQUIRED ABSENCE OF BOTH CERVIX AND UTERUS: Chronic | ICD-10-CM

## 2019-07-25 DIAGNOSIS — E89.0 POSTPROCEDURAL HYPOTHYROIDISM: ICD-10-CM

## 2019-07-25 DIAGNOSIS — E55.9 VITAMIN D DEFICIENCY, UNSPECIFIED: ICD-10-CM

## 2019-07-25 DIAGNOSIS — E03.9 HYPOTHYROIDISM, UNSPECIFIED: ICD-10-CM

## 2019-07-25 DIAGNOSIS — Z98.890 OTHER SPECIFIED POSTPROCEDURAL STATES: Chronic | ICD-10-CM

## 2019-07-25 RX ORDER — ONDANSETRON 8 MG/1
8 TABLET ORAL
Qty: 30 | Refills: 3 | Status: DISCONTINUED | COMMUNITY
Start: 2019-05-09 | End: 2019-07-25

## 2019-07-25 RX ORDER — PROCHLORPERAZINE MALEATE 10 MG/1
10 TABLET ORAL EVERY 6 HOURS
Qty: 30 | Refills: 3 | Status: DISCONTINUED | COMMUNITY
Start: 2019-05-09 | End: 2019-07-25

## 2019-07-25 NOTE — PHYSICAL EXAM
[No Acute Distress] : no acute distress [Well Nourished] : well nourished [Well Developed] : well developed [Well-Appearing] : well-appearing [EOMI] : extraocular movements intact [Normal Outer Ear/Nose] : the outer ears and nose were normal in appearance [Normal Oropharynx] : the oropharynx was normal [No JVD] : no jugular venous distention [No Lymphadenopathy] : no lymphadenopathy [Supple] : supple [No Respiratory Distress] : no respiratory distress  [No Accessory Muscle Use] : no accessory muscle use [Pedal Pulses Present] : the pedal pulses are present [No Edema] : there was no peripheral edema [Normal] : soft, non-tender, non-distended, no masses palpated, no HSM and normal bowel sounds [No CVA Tenderness] : no CVA  tenderness [No Spinal Tenderness] : no spinal tenderness [Grossly Normal Strength/Tone] : grossly normal strength/tone [No Rash] : no rash [No Focal Deficits] : no focal deficits [Normal Gait] : normal gait [Normal Affect] : the affect was normal [Normal Insight/Judgement] : insight and judgment were intact [de-identified] : L lens opacified  [de-identified] : No thyroid.  [de-identified] : somewhat decreased BS in R base w/o crackles or wheezing

## 2019-07-25 NOTE — REVIEW OF SYSTEMS
[Fatigue] : fatigue [Vision Problems] : vision problems [Dyspnea on Exertion] : dyspnea on exertion [Hair Changes] : hair changes [Fever] : no fever [Chills] : no chills [Night Sweats] : no night sweats [Pain] : no pain [Sore Throat] : no sore throat [Chest Pain] : no chest pain [Palpitations] : no palpitations [Lower Ext Edema] : no lower extremity edema [Orthopnea] : no orthopnea [Shortness Of Breath] : no shortness of breath [Wheezing] : no wheezing [Cough] : no cough [Abdominal Pain] : no abdominal pain [Nausea] : no nausea [Constipation] : no constipation [Diarrhea] : diarrhea [Vomiting] : no vomiting [Dysuria] : no dysuria [Joint Pain] : no joint pain [Joint Stiffness] : no joint stiffness [Joint Swelling] : no joint swelling [Muscle Pain] : no muscle pain [Back Pain] : no back pain [Headache] : no headache [Dizziness] : no dizziness [Memory Loss] : no memory loss [Unsteady Walking] : no ataxia [Depression] : no depression [FreeTextEntry3] : L eye cataract, blurring [de-identified] : hair loss

## 2019-07-25 NOTE — HEALTH RISK ASSESSMENT
[No] : No [0] : 2) Feeling down, depressed, or hopeless: Not at all (0) [] : No [de-identified] : heme/onc [[id number]] : 0

## 2019-07-25 NOTE — HISTORY OF PRESENT ILLNESS
[FreeTextEntry1] : follow-up [de-identified] : 65y/o F with PMH of Stage IV Endometrial Cancer s/p chemotherapy, Papillary Thyroid cancer s/p Thyroidectomy, hx of Malignant pleural effusion s/p pleurex cath and subsequent removal, DM, and Vit D insufficiency presents to clinic for routine follow up. Patient last seen April 25 post-hospitilization for unilat weakness found at that time to have metastatic brain cancer. Completed RT inpt and dischaged to SNF, returning to baseline afterwards. During that clinic visit was started on metformin 500 qhs. \par \par Was seen by Heme/onc afterwards and was diagnosed with widely recurrent disease. Was restarted on carbo/taxol. Was seen on 7/17 and had CT C/A/P with some suspicious lung findings concerning initially for PE but now thought to be her cancer. Patient also pending now for L cataract surgery. \par \par Currently just feels tired. Has not lost weight this time. FS at home ~90-130s. Pt denies feeling SOB but states her daughter sometimes thinks she gets SOB on exertion or after prolonged conversation. Denies melena/blood in stools. Having neuropathy from chemo more in feet - numbness, sometimes associated with muscle cramping. Currently asymptomatic. \par \par Dr Yoon for oncology, Dr Acevedo for endocrinology, Dr Thompson for pulmonary, Dr Herrera for ENT. \par

## 2019-07-27 DIAGNOSIS — E89.0 POSTPROCEDURAL HYPOTHYROIDISM: ICD-10-CM

## 2019-07-27 DIAGNOSIS — C54.1 MALIGNANT NEOPLASM OF ENDOMETRIUM: ICD-10-CM

## 2019-07-27 DIAGNOSIS — D64.9 ANEMIA, UNSPECIFIED: ICD-10-CM

## 2019-07-27 DIAGNOSIS — E11.9 TYPE 2 DIABETES MELLITUS WITHOUT COMPLICATIONS: ICD-10-CM

## 2019-07-27 DIAGNOSIS — H26.9 UNSPECIFIED CATARACT: ICD-10-CM

## 2019-07-30 ENCOUNTER — OUTPATIENT (OUTPATIENT)
Dept: OUTPATIENT SERVICES | Facility: HOSPITAL | Age: 64
LOS: 1 days | Discharge: HOME | End: 2019-07-30
Payer: MEDICAID

## 2019-07-30 DIAGNOSIS — Z98.890 OTHER SPECIFIED POSTPROCEDURAL STATES: Chronic | ICD-10-CM

## 2019-07-30 DIAGNOSIS — Z90.710 ACQUIRED ABSENCE OF BOTH CERVIX AND UTERUS: Chronic | ICD-10-CM

## 2019-07-30 PROCEDURE — 92014 COMPRE OPH EXAM EST PT 1/>: CPT

## 2019-07-31 DIAGNOSIS — H02.722: ICD-10-CM

## 2019-07-31 DIAGNOSIS — H25.11 AGE-RELATED NUCLEAR CATARACT, RIGHT EYE: ICD-10-CM

## 2019-07-31 DIAGNOSIS — H02.725: ICD-10-CM

## 2019-07-31 DIAGNOSIS — C79.31 SECONDARY MALIGNANT NEOPLASM OF BRAIN: ICD-10-CM

## 2019-07-31 DIAGNOSIS — H02.724 MADAROSIS OF LEFT UPPER EYELID AND PERIOCULAR AREA: ICD-10-CM

## 2019-07-31 DIAGNOSIS — H10.523 ANGULAR BLEPHAROCONJUNCTIVITIS, BILATERAL: ICD-10-CM

## 2019-07-31 DIAGNOSIS — H25.812 COMBINED FORMS OF AGE-RELATED CATARACT, LEFT EYE: ICD-10-CM

## 2019-07-31 DIAGNOSIS — H53.19 OTHER SUBJECTIVE VISUAL DISTURBANCES: ICD-10-CM

## 2019-07-31 DIAGNOSIS — E11.9 TYPE 2 DIABETES MELLITUS WITHOUT COMPLICATIONS: ICD-10-CM

## 2019-07-31 DIAGNOSIS — H02.721 MADAROSIS OF RIGHT UPPER EYELID AND PERIOCULAR AREA: ICD-10-CM

## 2019-08-01 ENCOUNTER — APPOINTMENT (OUTPATIENT)
Dept: HEMATOLOGY ONCOLOGY | Facility: CLINIC | Age: 64
End: 2019-08-01
Payer: MEDICAID

## 2019-08-01 ENCOUNTER — LABORATORY RESULT (OUTPATIENT)
Age: 64
End: 2019-08-01

## 2019-08-01 VITALS
WEIGHT: 174 LBS | TEMPERATURE: 97.6 F | HEART RATE: 92 BPM | BODY MASS INDEX: 28.99 KG/M2 | RESPIRATION RATE: 18 BRPM | HEIGHT: 65 IN | DIASTOLIC BLOOD PRESSURE: 73 MMHG | SYSTOLIC BLOOD PRESSURE: 127 MMHG

## 2019-08-01 PROCEDURE — 99214 OFFICE O/P EST MOD 30 MIN: CPT

## 2019-08-02 ENCOUNTER — APPOINTMENT (OUTPATIENT)
Dept: INFUSION THERAPY | Facility: CLINIC | Age: 64
End: 2019-08-02

## 2019-08-02 LAB
ALBUMIN SERPL ELPH-MCNC: 3.8 G/DL
ALP BLD-CCNC: 88 U/L
ALT SERPL-CCNC: 28 U/L
ANION GAP SERPL CALC-SCNC: 16 MMOL/L
APTT BLD: 31.9 SEC
AST SERPL-CCNC: 30 U/L
BILIRUB SERPL-MCNC: 0.5 MG/DL
BUN SERPL-MCNC: 4 MG/DL
CALCIUM SERPL-MCNC: 8.5 MG/DL
CHLORIDE SERPL-SCNC: 104 MMOL/L
CO2 SERPL-SCNC: 22 MMOL/L
CREAT SERPL-MCNC: 0.6 MG/DL
GLUCOSE SERPL-MCNC: 115 MG/DL
HCT VFR BLD CALC: 29 %
HGB BLD-MCNC: 9.1 G/DL
INR PPP: 0.97 RATIO
MCHC RBC-ENTMCNC: 31.4 G/DL
MCHC RBC-ENTMCNC: 33.1 PG
MCV RBC AUTO: 105.5 FL
PLATELET # BLD AUTO: 207 K/UL
PMV BLD: 9.3 FL
POTASSIUM SERPL-SCNC: 3.9 MMOL/L
PROT SERPL-MCNC: 6.4 G/DL
PT BLD: 11.2 SEC
RBC # BLD: 2.75 M/UL
RBC # FLD: 16.9 %
SODIUM SERPL-SCNC: 142 MMOL/L
WBC # FLD AUTO: 3.73 K/UL

## 2019-08-02 RX ORDER — DIPHENHYDRAMINE HCL 50 MG
25 CAPSULE ORAL ONCE
Refills: 0 | Status: COMPLETED | OUTPATIENT
Start: 2019-08-02 | End: 2019-08-02

## 2019-08-02 RX ORDER — FAMOTIDINE 10 MG/ML
20 INJECTION INTRAVENOUS ONCE
Refills: 0 | Status: COMPLETED | OUTPATIENT
Start: 2019-08-02 | End: 2019-08-02

## 2019-08-02 RX ORDER — FOSAPREPITANT DIMEGLUMINE 150 MG/5ML
150 INJECTION, POWDER, LYOPHILIZED, FOR SOLUTION INTRAVENOUS ONCE
Refills: 0 | Status: COMPLETED | OUTPATIENT
Start: 2019-08-02 | End: 2019-08-02

## 2019-08-02 RX ORDER — CARBOPLATIN 50 MG
260 VIAL (EA) INTRAVENOUS ONCE
Refills: 0 | Status: COMPLETED | OUTPATIENT
Start: 2019-08-02 | End: 2019-08-02

## 2019-08-02 RX ORDER — DEXAMETHASONE 0.5 MG/5ML
12 ELIXIR ORAL ONCE
Refills: 0 | Status: COMPLETED | OUTPATIENT
Start: 2019-08-02 | End: 2019-08-02

## 2019-08-02 RX ORDER — PACLITAXEL 6 MG/ML
115 INJECTION, SOLUTION, CONCENTRATE INTRAVENOUS ONCE
Refills: 0 | Status: COMPLETED | OUTPATIENT
Start: 2019-08-02 | End: 2019-08-02

## 2019-08-02 RX ADMIN — Medication 183 MILLIGRAM(S): at 12:12

## 2019-08-02 RX ADMIN — Medication 260 MILLIGRAM(S): at 17:50

## 2019-08-02 RX ADMIN — FOSAPREPITANT DIMEGLUMINE 450 MILLIGRAM(S): 150 INJECTION, POWDER, LYOPHILIZED, FOR SOLUTION INTRAVENOUS at 12:50

## 2019-08-02 RX ADMIN — Medication 12 MILLIGRAM(S): at 12:30

## 2019-08-02 RX ADMIN — FAMOTIDINE 20 MILLIGRAM(S): 10 INJECTION INTRAVENOUS at 13:30

## 2019-08-02 RX ADMIN — Medication 151.5 MILLIGRAM(S): at 14:30

## 2019-08-02 RX ADMIN — Medication 175.33 MILLIGRAM(S): at 14:50

## 2019-08-02 RX ADMIN — PACLITAXEL 269.17 MILLIGRAM(S): 6 INJECTION, SOLUTION, CONCENTRATE INTRAVENOUS at 13:30

## 2019-08-02 RX ADMIN — Medication 25 MILLIGRAM(S): at 12:50

## 2019-08-02 RX ADMIN — PACLITAXEL 115 MILLIGRAM(S): 6 INJECTION, SOLUTION, CONCENTRATE INTRAVENOUS at 14:30

## 2019-08-02 RX ADMIN — Medication 25 MILLIGRAM(S): at 14:50

## 2019-08-02 RX ADMIN — FAMOTIDINE 156 MILLIGRAM(S): 10 INJECTION INTRAVENOUS at 13:10

## 2019-08-02 RX ADMIN — FOSAPREPITANT DIMEGLUMINE 150 MILLIGRAM(S): 150 INJECTION, POWDER, LYOPHILIZED, FOR SOLUTION INTRAVENOUS at 13:10

## 2019-08-02 RX ADMIN — Medication 151.5 MILLIGRAM(S): at 12:30

## 2019-08-04 NOTE — HISTORY OF PRESENT ILLNESS
[Disease: _____________________] : Disease: [unfilled] [AJCC Stage: ____] : AJCC Stage: [unfilled] [de-identified] : Serena is a rodrigue 61 yo female, who has started developing SOB approximately 2 months ago, she went to ER on 11/2/2017 and on CXR was noted to have a large right sided pleural effusion. She underwent a thoracentesis, 1.6L of fluid was drained. 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 IHC was pos for CK7, PAX8, CK5/6 and Ca125, negative for TTF-1/Napsin, calretinin, CK20, mammaglobin, p63, villin, HAM56, GCDFP15, TAY-EP4. \par \par Her visit on 12/1/2017 Serena had an excisional biopsy of cervical node on 12/13/2017 revealing met adenocarcinoma, primary source still was not clear. On 12/14/2017 Serena was admitted with SOB, Pleurx catheter was placed on 12/14/2017. 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 Serena received 1st dose of carbo (auc of 5)/taxol(175mg/m2) on 12/15/2017 without complications, but the next day sustained a fall 2/2 vasovagal episode and developed asymptomatic SAH, that resolved on imaging by 12/21/2017.  [de-identified] : KRas WT, EGFR WT, Alk WT, ROS1 WT, PDL1-1%\par Normal MMR protein expression [de-identified] : 11/24/2017: She reports some SOB, especially ARZATE today. No fevers, no weight loss (reports some weight gain), no GI,  or GYN symptoms, except for increasing abdominal girth, she reports no blood in the stool or urine and no vaginal bleeding. She reports no CP and no palpitations, she reports worsening nonproductive cough, no hemoptysis. She also reports difficulty lying on left side and lying down flat. \par She reports left on/off facial numbness several months in duration. She had a CT of her head performed in [facility] within 1 year.  Additionally there is a freely mobile left cervical node present on exam, as per patient this was in place for approximately 1 year. \par \par 12/1/2017: Serena is here for follow up today. She had a therapeutic thoracentesis in  on 11/27/2017, 2L of fluid were removed, with much improved respiratory status. PET CT and MRI of the brain were done on 11/29/2017, findings were discussed today. There remains no clear primary source of malignancy. We have discussed that the source of tumor may be Mullerian vs lung. regardless of source chemotherapy needs to be initiated ASAP. We have discussed Carbo/taxol regiment that will cover both Mullerian and lung origin. Side effects including myelosuppression, peripheral neuropathy, allergic reaction and others.\par \par 1/2/2017 Since last visit Serena had an excisional biopsy of cervical node on 12/13/2017 revealing met adenocarcinoma, primary source still was not clear. On 12/14/2017 Serena was admitted with SOB, Pleurx catheter was placed on 12/14/2017. 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 Serena received 1st dose of carbo (auc of 5)/taxol(175mg/m2) on 12/15/2017 without complications, but the next day sustained a fall 2/2 vasovagal episode and developed asymptomatic SAH, that resolved on imaging by 12/21/2017. Today Serena's SOB has significantly improved. She reports very mild neuropathy in fingertips only. She reports no headache and offers no other complaints. \par \par 1/26/2018: Complains of some neuropathy, otherwise tolerating chemo with no difficulty. \par \par 2/16/2018: restaging CT C/A/P reviewed, significant improvement noted. Will refer to GYN/ONC. Reports slightly worsening neuropathy, not -painful, taking Gabapentin, no other symptoms. For cycle 4 of carbo/taxol today.\par \par 3/9/2018: Serena met with Dr. Massey, she is planned for surgery on 5/7/2018, after completion of 6 cycles of carbo/taxol and restaging PET CT ordered for mid April and follow up with Dr. Massey on April 20. She reports worsening neuropathy, and occasional pain and changes in vision in her left eye, eye symptoms come and go and are not very bothersome. She reports no other symptoms. \par \par 3/30/2018; Serena is here for cycle 6 of carbo/taxol, she continues to have neuropathy in finger and toes, but offers no other complaints, chemo has been dose reduced. \par \par 4/27/2018: Results of restaging PET CT s/p 6 cycles of carbo/taxol revealed 1. No new areas of abnormal increased uptake is seen to indicate \par biologically active disease.\par \par 2. Persistent PET positive left thyroid mass with a max SUV 33.1, \par previously max SUV 34.8. Further evaluation with thyroid ultrasound and \par if needed fine-needle aspiration biopsy will be helpful.\par \par 3.   PET positive left cervical lymph nodes mainly level 2 on the left \par with a max SUV 5.47previously 18. Minimal increased uptake in the \par bilateral axillary lymph nodes most likely reactive(less than 2.5)\par \par 4. Weakly PET positive, max SUV 2.89 right pleura, previous max SUV 5.3 \par with non-FDG avid right pleural effusion. \par \par 5. Previously FDG avid right and left supraclavicular lymphadenopathy, \par left internal mammary, bilateral paratracheal, para-aortic, para \par vertebral, carinal, mesenteric, right and left iliacs, right inguinal \par lymphadenopathy, small in size and no longer FDG avid.\par \par 6. No abnormal increased uptake is seen in the  lobulated uterus.\par \par 7. Overall there is marked improvement in the FDG avid disease.\par This was reviewed with Serena. She met with Dr. Massey on 4/20/2018, surgery is planned for 5/7/2018. She will also joselyn to meet with ENT re FDG avid thyroid nodule. Will assist in making he an appointment for her. \par Persistent neuropathy on gabapentin. \par \par 6/29/18 ; Patient came for follow up visit , evaluation for chemotherapy cycle 8 of carbo / Taxol , patient tolerating medication well , except neuropathy  recommend to have gabapentin  300 mg po daily.\par \par 7/20/2018: Serena present for follow up today, she has completed total of 8 cycles of Carbo/Taxol. She reports significant neuropathy in her hands and feet. We will discontinue chemotherapy today and plan to follow with close observation. She has thyroidectomy planned with Dr. Herrera on 8/20/2018, obtaining clearance for PMD. She is also planning to fly to [place] at the end of September. She reports no SOB, no GI or  symptoms. \par \par 9/12/2018: Serena offers no significant complaints today. She states neuropathy in her hands has almost completely resolved and neuropathy in her feet is significantly better. She had undergone complete thyroidectomy by Dr. Herrera on 8/20/2018, pathology revealed papillary thyroid cancer, measuring 2 cm, pT1b, other additional foci of papillary carcinoma were also noted, no LVI, surgical margins were clear, LN 0/2 had no carcinoma, stage [id number]. She is following up with Dr. Herrera tomorrow. She has still not gone for her vacation in [place]. \par \par 10/10/2018: Restaging PET CT on 9/26/2018 reveals Since 4/16/2018,  1. Post thyroidectomy with diffuse increased FDG uptake at the thyroid  bed likely representing post-surgical changes.  2. Subtle increased uptake is seen in the lamina , right side at the  level of T8 with a max SUV 4.6. This is not sufficient for metastatic  disease. Bone scan or MRI examination with contrast will be helpful for  further evaluation.  3. No other areas of abnormal increased uptake is seen. Images were personally reviewed by myself and discussed with Serena. \par She also had an Echo on 9/24/2018 revealing EF of 63%. \par Serena reports ongoing fatigue, she is unable to lose weight. She is taking Synthroid and following with Dr. Acevedo. \par She reports stiff fingers at night only, no painful neuropathy. \par She denies any other symptoms today. \par \par 12/11/2018: Serena feels well, neuropathy in hands and feet is much improved. S he is now complaining of r-sided facial pain associated with some swelling, pain comes and goes. She has already seen Dr. Herrera, who ordered CT of sinuses and prescribed pain relief meds. She is also due for restaging PET CT. Serena feels well otherwise. \par \par 1/9/2019: Restaging PET CT was performed on 12/18/2018 it revealed COMPARISON : 9/24/2018.  New left upper quadrant peritoneal nodule measuring 8 mm with an SUV max  of 7.3. This is suggestive of biologic tumor activity.  No other FDG avid lesions seen throughout the scan. Images were personally reviewed by myself and discussed with Serena, originally over the phone and again today. I have originally suggested to try AI in the interim, Serena however reported diarrhea at the time of the scan and declined intervention for now. We will plan for restaging PET CT to be performed in 6-8 weeks, this will be ordered today. She will follow up with Dr. Massey at [facility] shortly and bring the disk for his review. I would also like her to meet with genetics, this will be arranged at [facility] with Dr. Massey's help. Serena reports no new symptoms today, her neuropathy is manageable and  improving. She still reports unilateral headache that was work up in the past. Neurology eval was again suggested to her. She is following closely with Endocrinology re thyroid management. She will have follow up with Dr. Herrera shortly as well. \par \par 2/20/2019: Serena offers no new complaints. PET CT from 2/12/2019 revealed \par Since PET/CT of December 19, 2018, no new sites of pathologic FDG uptake\par Slight increase in size of left upper quadrant peritoneal nodule (1.1 cm, \par previously 0.8 cm) with stable FDG uptake, 7.7 SUV suspicious for \par biologic tumor activity.\par No other sites of pathologic FDG uptake \par Images were personally reviewed by myself and discussed with Serena, case was also discussed with Dr. Massey at [facility]. Serena will have follow up with his shortly. She is following with endocrinology. reports improving neuropathy. No GI or  symptoms at this time. No weight loss. \par \par 3/20/2019: Serena had a peritoneal nodule biopsy done at [facility], I have received a call from Dr. 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 We have discussed that surgery though could be attempted will not be the best therapeutic approach, recommencement of systemic therapy was discussed. I have not received the results from [facility] yet. I have briefly discussed this with Serena today. Serena reports that she has acutely developed right lower extremity weakness 5-6 days ago, she did not inform any of her doctors about this. She also reports that her R upper extremity though not weak "does not feel normal either. She reports no back pain, there is no point tenderness, RLE is objectively weak on exam. I recommend ER evaluation to r/o CVA vs brain met, vs L-spine related issue. Recommend admission for work up. Serena is agreeable to get admitted. \par \par 5/8/2019: Serena present for follow up, she was diagnosed with multiple metastasis to the brain, MRI was done on 3/21/2019 and revealed \par Multiple supratentorial heterogeneously enhancing lesions consistent with \par metastatic disease. The appearance on the T2-weighted images is suggestive \par of adenocarcinoma. There is mass effect upon the left lateral ventricle and \par corpus callosum. \par She received whole brain irradiation by Dr. Dahl, completed it in the beginning of 4/2019, she is currently on steroid taper managed by Dr. Dahl, she is taking 4mg of Decadron daily. She will be stopping the Keppra as there have been no documented h/o seizures. She still reports impairment of the L visual field, she has an appointment coming up with Opthalmology. She reports no deficits walking after she has completed inpatient acute rehabilitation. \par Restaging PET CT on 5/1/2019 reveals COMPARISON : 2/12/2019 \par Multiple new FDG avid omental nodules largest measuring up to 10 mm, \par positive on nonattenuation corrected images. Findings are suggestive of \par biologic tumor activity. \par Again seen is a left upper quadrant peritoneal nodule, SUV max 6.8, \par previously 7.7 (12% decrease). \par Images were personally reviewed by myself and discussed with patient. \par She now reports mild abdominal bloating, no other symptoms. She reports her neuropathy has significantly improved. \par She has first evidence of recurrent disease documented 5.5 months after last carbo/taxol dose, the volume of disease was very small (1 8mm nodule), she was then observed for another 6 months and now she wishes to restart the chemotherapy. \par I have discussed with her that rechallenging with carbo/taxol may still prove to be effective as long as she gets restaged after 2 cycles. Given recent whole brain radiation and neuropathy I will offer her carbo/taxol weekly with does reduction on the taxol for neuropathy, We will plan to run carbo slowly to avoid allergic reaction. \par She is agreeable to start ASAP. \par \par 6/5/2019: Serena has completed 1 cycle of weekly Carbo/taxol, ran at slow rate to avoid Carbo reaction and has tolerated chemotherapy without difficulty, she does not complain of increase in neuropathy. She has ongoing vision changes in her L eye, she had no residual LE weakness. She is off Decadron and Keppra. She was advised to see ophthalmology. \par \par 7/3/2019: Serena is doing well.  Reports no problems with carbo/taxol.  Still complains of vision changes in L eye but is seeing ophthalmologist on 7/16.  Remains active around the house and cooks regularly.  No fevers, weight loss, anorexia.  ROS is otherwise only significant for occasional loose stools, no diarrhea.\par \par 7/17/2019: Serena is doing well, denies worsening neuropathy. She c/o feeling fatigued and tired. Her last chemo was 1 week ago(7/11/19) with carbo/taxol and is due again tomorrow. She saw ophthalmology and will need cataract surgery of the L eye. No c/o chest pain/SOB. No weight loss.\par CT C/A/P was done on 7/9/2019, images were personally reviewed by myself and discussed with several radiology attendings, they revealed \par CHEST:\par Filling defect within a segmental branch of the lower lobe pulmonary artery \par suspicious for pulmonary embolus. \par Stable left upper lobe and right middle lobe 3 mm nodules. \par CT abdomen and pelvis performed on the same day, see separate report. \par ADDENDUM: \par Please note that the morphology of the small thrombus within the left lower \par pulmonary artery is not indicative of an acute thrombus but rather a chronic \par appearance. \par This was discussed with Radiology, it was not deemed that thrombus was acute and may not have even been present at all, finding was deemed to be equivocal, based on radiology assessment anticoagulation was not indicated for the filling defect noted. Initially CTA was recommended, the recommendation was withdrawn upon further review. \par ABDOMEN/PELVIS:\par New 1.3 cm segment IV hepatic hypodensity seen on series 4 image 205. \par Lesion not seen on prior PET/CT from 5/1/2019 or abdomen and pelvis CT from \par 2/2/2018 and is consistent with a new metastasis. \par Two other hepatic lesions described above, decreased in size since 2/2/2018, \par consistent with treated hepatic metastases. \par Anterior perisplenic omental nodule measures 0.7 cm on series 2 image 53, \par previously measuring 1.2 cm on PET/CT dated 5/1/2019. \par All the other previously seen omental nodules have resolved since 5/1/2019. \par ADDENDUM:\par Case was discussed with Dr. Cid in detail. It is also possible that the \par new 1.3 cm lesion in the liver since February 2, 2018 represents a treated \par metastasis similar to the other liver lesions. \par PET/CT would be necessary to assess disease activity. \par I have discussed case with Radiology, and it was determined that there was no clear cut evidence of progression. PET CT was ordered today.\par This was discussed with Serena, will proceed with Carbo/Taxol for now. \par \par 8/1/2019: Serena reports no major side effects from weekly Carbo/Taxol, she reports no neuropathy. She has completed 10 cycles altogether. \par PET CT on 7/24/2019 revealed \par 1. Since May 1, 2019, no definite new site of pathologic FDG uptake to \par suggest new biologic tumor activity; specifically, no pathologic FDG uptake \par within previously noted 1.3 cm lesion within hepatic segment 4. \par 2. Increased FDG uptake within several subcentimeter bilateral cervical \par lymph nodes, which is nonspecific and may be postinflammatory; max SUV 9.2 \par is noted within a 0.7 cm right level 2 cervical node. Attention on follow-up \par is suggested. \par 3. Few peritoneal nodules have overall decreased in size. \par 4. No additional site of pathologic FDG uptake. \par Images were personally reviewed by myself and discussed with Serena. \par She wishes to continue with chemotherapy. Will plan for 3 additional cycles. Will consider adding Avastin, but with h/o inconclusive/possible chronic PE ppx anticoagulation maybe necessary.

## 2019-08-04 NOTE — ASSESSMENT
[FreeTextEntry1] : Papillary-serous endometrial cancer, stage IV\par --PET CT on 11/29/2017 revealed no clear GYN origin, extensive KEVIN, uptake in the right lung and pleura, thyroid nodule\par --Malignant pleural effusion, resolved\par --L side PleurX catheter was removed on 2/8/2018\par --Completed cycle 8  of carbo/taxol on 6/29/2018.  2 cycles were administered  postoperatively.\par --Restaging PET CT on 9/26/2018 (3 months post completion of chemo 6/29/2018)was essentially negative for recurrent disease\par --Restaging PET CT on 12/18/2018 reveals   New left upper quadrant peritoneal nodule measuring 8 mm with an SUV max  of 7.3. This is suggestive of biologic tumor activity.  No other FDG avid lesions seen throughout the scan.\par --restaging PET CT on 2/12/2019 revealed slight increase in size of left upper quadrant peritoneal nodule (1.1 cm, \par previously 0.8 cm) with stable FDG uptake, 7.7 SUV suspicious for biologic tumor activity.\par --Case was discussed with Dr. Massey at [place], biopsy of peritoneal nodule is positive for recurrent endometrial cancer.\par --MRI brain on 4/23/2019 revealed multiple brain mets\par --S/p whole brain radiation completed 4/2019, required acute rehab\par --PET CT on 5/1/2019 subsequently revealed further disease progression \par --Restarted weekly carbo/taxol 3 on 1 off on 5/9/2019, continue with weekly Carbo/Taxol for now\par --Off Decadron and Keppra since 6/5/2019 \par --Restaging brain MRI on 6/8/2019 showed almost complete resolution of most supratentorial lesions and no new lesions.\par --Restaging CT C/A/P on 7/9/2019 did not reveal definite progression, there was a questionable filling defect suspicious for possible chronic PE/artifact, this was discussed with Radiology at length anticoagulation was not deemed to be necessary\par --PET CT on 7/24/2019 revealed positive response to therapy \par --Attention to follow up will be needed re cervical nodes, will hold off on biopsy for now\par --Continue with weekly Carbo/Taxol\par --May need to consider adding Avastin \par \par Papillary thyroid cancer [id number], 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 Herrera \par --Follow up with Dr. Acevedo of endocrinology; he is addressing vitamin D, thyroid and diabetes\par \par Cataract of eye\par -- Saw ophthalmology. She is planned for surgery in near future, will require clearance \par \par Follow up in 3 weeks\par

## 2019-08-04 NOTE — RESULTS/DATA
[FreeTextEntry1] : EXAM:  MR BRAIN WAW IC      \par \par \par PROCEDURE DATE:  06/08/2019  \par \par \par \par \par INTERPRETATION:  Clinical History / Reason for exam: Dizziness and \par vertigo, history of uterine and thyroid cancer, for evaluation of \par metastatic disease, lesion seen on prior MRI of the brain.\par \par MRI OF THE BRAIN WITHOUT AND WITH CONTRAST\par \par TECHNIQUE:\par \par Multiplanar multisequence imaging of the brain was performed on the \[facility] open magnet before and after the intravenous administration of \par 7.5 cc of Gadavist including brain lab protocol.\par \par COMPARISON:\par \par MRI of the brain without and with contrast dated March 12, 2019.\par \par FINDINGS:\par \par The previously described supratentorial lesions have almost completely \par resolved.\par \par The lesion in the left posterior frontal region now measures 1.4 cm in \par maximum diameter, the lesion in the anterior right frontal lobe measures \par 0.7 cm in maximum diameter, the second lesion in the right frontal lobe \par measures 0.1 cm in maximum diameter, the lesion in the right posterior \par parietal region measures 0.3 cm in maximum diameter, and the left \par occipital lobe lesion measures 1.2 cm in maximum diameter. (Previously \par measuring 2.6, 2.4, 0.5, 0.7, and 2.5 cm respectively).\par \par The third, fourth, and lateral ventricles are normal in size and \par position. There is no shift of the midline structures.\par \par The cerebellar tonsils are minimally low-lying (0.3 cm of cerebellar \par ectopia).\par \par Incidental note is made of a tiny medial cyst.\par \par There are scattered T2/FLAIR hyperintense signal intensities in the\par subcortical white matter which are nonspecific differential diagnostic\par possibilities include chronic ischemic change, foci of gliosis or\par demyelination.\par \par IMPRESSION:\par \par In comparison with the prior MRI of the brain dated March 21, 2019:\par \par There has been almost complete resolution of most of of the previously \par described supratentorial lesions.\par \par There are no new enhancing lesions.\par \par \par \par \par \par \par CRISTINA MÉNDEZ M.D., ATTENDING RADIOLOGIST\par This document has been electronically signed. Oren 10 2019  1:17PM\par   \par \par   \par \par \par [id number]^RI^DC\par \par EXAM:  PETCT SKUL-THI ONC FDG SUBS      \par \par \par PROCEDURE DATE:  05/01/2019  \par \par \par \par \par INTERPRETATION:  \par FDG PET CT STUDY   Subsequent  treatment strategy\par REASON: TUMOR IMAGING - PET with concurrently acquired CT for attenuation \par correction and anatomic localization; skull base to mid - thigh .\par \par CPT code 84857.\par \par Fasting blood glucose level:     91 mg/dl\par \par HISTORY: Endometrial cancer. Stage IV with brain metastatic disease.\par \par TECHNIQUE: Approximately 45 minutes after the intravenous administration \par of 10.7 mCi 18-Fluorine FDG, whole body PET images were acquired from \par base of skull to mid - thigh.\par \par CT protocol used for this PET/CT study is designed for attenuation \par correction and anatomic localization of PET findings.  This  CT \par is not designed to replace state-of-the-art diagnostic CT scans for \par specific imaging protocols of different body parts.\par \par COMPARISON : 2/12/2019.\par Correlation: MRI of the brain on 3/21/2019.\par \par FINDINGS:\par \par Head/Neck: No biologically active head/neck lesions.     \par Normal uptake within the brain, pharyngeal lymphoid tissue, salivary \par glands and laryngeal musculature is noted.    \par \par Thorax:   No suspicious hypermetabolic mediastinal, hilar, lung \par parenchymal lesions.\par \par Abdomen/Pelvis: Physiologic GI/  activity. \par \par Again seen is a left upper quadrant peritoneal nodule, SUV max 6.8, \par previously 7.7 (12% decrease). \par \par Multiple new FDG avid omental nodules largest of which is adjacent to the \par distal transverse colon, measuring 10 mm, positive on nonattenuation \par corrected images, axial image 134. \par \par No other abnormal visceral or guillaume tracer uptake is present.    \par \par Musculoskeletal :  No suspicious hypermetabolic osseous lesions.\par \par Additional CT findings:\par Status post hysterectomy.\par Colonic diverticulosis.\par \par IMPRESSION: \par \par COMPARISON : 2/12/2019\par \par Multiple new FDG avid omental nodules largest measuring up to 10 mm, \par positive on nonattenuation corrected images. Findings are suggestive of \par biologic tumor activity.\par \par Again seen is a left upper quadrant peritoneal nodule, SUV max 6.8, \par previously 7.7 (12% decrease). \par \par \par \par \par

## 2019-08-04 NOTE — REVIEW OF SYSTEMS
[Fatigue] : fatigue [Vision Problems] : vision problems [Negative] : Allergic/Immunologic [Chest Pain] : no chest pain [Palpitations] : no palpitations [Lower Ext Edema] : no lower extremity edema [Shortness Of Breath] : no shortness of breath [Wheezing] : no wheezing [Cough] : no cough [SOB on Exertion] : no shortness of breath during exertion [FreeTextEntry3] : Difficulty seeing out of L eye, will see ophthalmologist [FreeTextEntry9] : normal strength in upper and lower extremities  [de-identified] : L visual field deficit, gait impairment has resolved

## 2019-08-04 NOTE — PHYSICAL EXAM
[Restricted in physically strenuous activity but ambulatory and able to carry out work of a light or sedentary nature] : Status 1- Restricted in physically strenuous activity but ambulatory and able to carry out work of a light or sedentary nature, e.g., light house work, office work [Normal] : affect appropriate [de-identified] : CTA B/L [de-identified] : B/L cataracts appreciated

## 2019-08-09 ENCOUNTER — LABORATORY RESULT (OUTPATIENT)
Age: 64
End: 2019-08-09

## 2019-08-09 ENCOUNTER — APPOINTMENT (OUTPATIENT)
Dept: INFUSION THERAPY | Facility: CLINIC | Age: 64
End: 2019-08-09

## 2019-08-09 VITALS
SYSTOLIC BLOOD PRESSURE: 119 MMHG | TEMPERATURE: 96.8 F | DIASTOLIC BLOOD PRESSURE: 63 MMHG | HEART RATE: 113 BPM | RESPIRATION RATE: 18 BRPM

## 2019-08-09 LAB
HCT VFR BLD CALC: 28.7 %
HGB BLD-MCNC: 9.6 G/DL
MCHC RBC-ENTMCNC: 33.4 G/DL
MCHC RBC-ENTMCNC: 34.5 PG
MCV RBC AUTO: 103.2 FL
PLATELET # BLD AUTO: 213 K/UL
PMV BLD: 10.4 FL
RBC # BLD: 2.78 M/UL
RBC # FLD: 15.3 %
WBC # FLD AUTO: 6.28 K/UL

## 2019-08-09 RX ORDER — DIPHENHYDRAMINE HCL 50 MG
25 CAPSULE ORAL ONCE
Refills: 0 | Status: COMPLETED | OUTPATIENT
Start: 2019-08-09 | End: 2019-08-09

## 2019-08-09 RX ORDER — CARBOPLATIN 50 MG
260 VIAL (EA) INTRAVENOUS ONCE
Refills: 0 | Status: COMPLETED | OUTPATIENT
Start: 2019-08-09 | End: 2019-08-09

## 2019-08-09 RX ORDER — FAMOTIDINE 10 MG/ML
20 INJECTION INTRAVENOUS ONCE
Refills: 0 | Status: COMPLETED | OUTPATIENT
Start: 2019-08-09 | End: 2019-08-09

## 2019-08-09 RX ORDER — FOSAPREPITANT DIMEGLUMINE 150 MG/5ML
150 INJECTION, POWDER, LYOPHILIZED, FOR SOLUTION INTRAVENOUS ONCE
Refills: 0 | Status: COMPLETED | OUTPATIENT
Start: 2019-08-09 | End: 2019-08-09

## 2019-08-09 RX ORDER — DEXAMETHASONE 0.5 MG/5ML
12 ELIXIR ORAL ONCE
Refills: 0 | Status: COMPLETED | OUTPATIENT
Start: 2019-08-09 | End: 2019-08-09

## 2019-08-09 RX ORDER — PACLITAXEL 6 MG/ML
115 INJECTION, SOLUTION, CONCENTRATE INTRAVENOUS ONCE
Refills: 0 | Status: COMPLETED | OUTPATIENT
Start: 2019-08-09 | End: 2019-08-09

## 2019-08-09 RX ADMIN — PACLITAXEL 115 MILLIGRAM(S): 6 INJECTION, SOLUTION, CONCENTRATE INTRAVENOUS at 15:00

## 2019-08-09 RX ADMIN — Medication 25 MILLIGRAM(S): at 15:20

## 2019-08-09 RX ADMIN — Medication 25 MILLIGRAM(S): at 13:55

## 2019-08-09 RX ADMIN — Medication 183 MILLIGRAM(S): at 11:48

## 2019-08-09 RX ADMIN — FOSAPREPITANT DIMEGLUMINE 150 MILLIGRAM(S): 150 INJECTION, POWDER, LYOPHILIZED, FOR SOLUTION INTRAVENOUS at 12:25

## 2019-08-09 RX ADMIN — FAMOTIDINE 20 MILLIGRAM(S): 10 INJECTION INTRAVENOUS at 13:30

## 2019-08-09 RX ADMIN — Medication 151.5 MILLIGRAM(S): at 11:48

## 2019-08-09 RX ADMIN — Medication 175.33 MILLIGRAM(S): at 12:28

## 2019-08-09 RX ADMIN — PACLITAXEL 269.17 MILLIGRAM(S): 6 INJECTION, SOLUTION, CONCENTRATE INTRAVENOUS at 12:28

## 2019-08-09 RX ADMIN — FOSAPREPITANT DIMEGLUMINE 450 MILLIGRAM(S): 150 INJECTION, POWDER, LYOPHILIZED, FOR SOLUTION INTRAVENOUS at 12:25

## 2019-08-09 RX ADMIN — Medication 12 MILLIGRAM(S): at 13:05

## 2019-08-09 RX ADMIN — FAMOTIDINE 156 MILLIGRAM(S): 10 INJECTION INTRAVENOUS at 11:48

## 2019-08-16 ENCOUNTER — LABORATORY RESULT (OUTPATIENT)
Age: 64
End: 2019-08-16

## 2019-08-16 ENCOUNTER — APPOINTMENT (OUTPATIENT)
Dept: INFUSION THERAPY | Facility: CLINIC | Age: 64
End: 2019-08-16

## 2019-08-16 VITALS
RESPIRATION RATE: 18 BRPM | DIASTOLIC BLOOD PRESSURE: 50 MMHG | SYSTOLIC BLOOD PRESSURE: 95 MMHG | HEART RATE: 102 BPM | TEMPERATURE: 97.1 F

## 2019-08-16 VITALS — HEART RATE: 90 BPM | DIASTOLIC BLOOD PRESSURE: 64 MMHG | SYSTOLIC BLOOD PRESSURE: 106 MMHG | RESPIRATION RATE: 18 BRPM

## 2019-08-16 LAB
HCT VFR BLD CALC: 28.9 %
HGB BLD-MCNC: 9.8 G/DL
MCHC RBC-ENTMCNC: 33.9 G/DL
MCHC RBC-ENTMCNC: 34.4 PG
MCV RBC AUTO: 101.4 FL
PLATELET # BLD AUTO: 184 K/UL
PMV BLD: 10.7 FL
RBC # BLD: 2.85 M/UL
RBC # FLD: 15 %
WBC # FLD AUTO: 4.29 K/UL

## 2019-08-16 RX ORDER — PACLITAXEL 6 MG/ML
115 INJECTION, SOLUTION, CONCENTRATE INTRAVENOUS ONCE
Refills: 0 | Status: COMPLETED | OUTPATIENT
Start: 2019-08-16 | End: 2019-08-16

## 2019-08-16 RX ORDER — FOSAPREPITANT DIMEGLUMINE 150 MG/5ML
150 INJECTION, POWDER, LYOPHILIZED, FOR SOLUTION INTRAVENOUS ONCE
Refills: 0 | Status: COMPLETED | OUTPATIENT
Start: 2019-08-16 | End: 2019-08-16

## 2019-08-16 RX ORDER — DEXAMETHASONE 0.5 MG/5ML
12 ELIXIR ORAL ONCE
Refills: 0 | Status: COMPLETED | OUTPATIENT
Start: 2019-08-16 | End: 2019-08-16

## 2019-08-16 RX ORDER — FAMOTIDINE 10 MG/ML
20 INJECTION INTRAVENOUS ONCE
Refills: 0 | Status: COMPLETED | OUTPATIENT
Start: 2019-08-16 | End: 2019-08-16

## 2019-08-16 RX ORDER — DIPHENHYDRAMINE HCL 50 MG
25 CAPSULE ORAL ONCE
Refills: 0 | Status: COMPLETED | OUTPATIENT
Start: 2019-08-16 | End: 2019-08-16

## 2019-08-16 RX ORDER — CARBOPLATIN 50 MG
260 VIAL (EA) INTRAVENOUS ONCE
Refills: 0 | Status: COMPLETED | OUTPATIENT
Start: 2019-08-16 | End: 2019-08-16

## 2019-08-16 RX ADMIN — FOSAPREPITANT DIMEGLUMINE 450 MILLIGRAM(S): 150 INJECTION, POWDER, LYOPHILIZED, FOR SOLUTION INTRAVENOUS at 12:04

## 2019-08-16 RX ADMIN — Medication 151.5 MILLIGRAM(S): at 12:04

## 2019-08-16 RX ADMIN — Medication 183 MILLIGRAM(S): at 12:04

## 2019-08-16 RX ADMIN — FAMOTIDINE 156 MILLIGRAM(S): 10 INJECTION INTRAVENOUS at 12:04

## 2019-08-16 RX ADMIN — Medication 175.33 MILLIGRAM(S): at 12:40

## 2019-08-16 RX ADMIN — PACLITAXEL 269.17 MILLIGRAM(S): 6 INJECTION, SOLUTION, CONCENTRATE INTRAVENOUS at 12:39

## 2019-08-28 ENCOUNTER — APPOINTMENT (OUTPATIENT)
Dept: HEMATOLOGY ONCOLOGY | Facility: CLINIC | Age: 64
End: 2019-08-28
Payer: MEDICAID

## 2019-08-28 ENCOUNTER — LABORATORY RESULT (OUTPATIENT)
Age: 64
End: 2019-08-28

## 2019-08-28 VITALS
HEIGHT: 65 IN | BODY MASS INDEX: 29.66 KG/M2 | DIASTOLIC BLOOD PRESSURE: 73 MMHG | SYSTOLIC BLOOD PRESSURE: 119 MMHG | RESPIRATION RATE: 14 BRPM | HEART RATE: 84 BPM | WEIGHT: 178 LBS | TEMPERATURE: 97.1 F

## 2019-08-28 PROCEDURE — 99213 OFFICE O/P EST LOW 20 MIN: CPT

## 2019-08-30 ENCOUNTER — APPOINTMENT (OUTPATIENT)
Dept: INFUSION THERAPY | Facility: CLINIC | Age: 64
End: 2019-08-30

## 2019-08-30 LAB
ALBUMIN SERPL ELPH-MCNC: 4.1 G/DL
ALP BLD-CCNC: 81 U/L
ALT SERPL-CCNC: 24 U/L
ANION GAP SERPL CALC-SCNC: 15 MMOL/L
AST SERPL-CCNC: 24 U/L
BILIRUB SERPL-MCNC: 0.5 MG/DL
BUN SERPL-MCNC: 4 MG/DL
CALCIUM SERPL-MCNC: 8.8 MG/DL
CHLORIDE SERPL-SCNC: 103 MMOL/L
CO2 SERPL-SCNC: 24 MMOL/L
CREAT SERPL-MCNC: 0.7 MG/DL
GLUCOSE SERPL-MCNC: 112 MG/DL
HCT VFR BLD CALC: 27 %
HGB BLD-MCNC: 9 G/DL
MCHC RBC-ENTMCNC: 33.3 G/DL
MCHC RBC-ENTMCNC: 34.2 PG
MCV RBC AUTO: 102.7 FL
PLATELET # BLD AUTO: 175 K/UL
PMV BLD: 10.1 FL
POTASSIUM SERPL-SCNC: 3.5 MMOL/L
PROT SERPL-MCNC: 6.5 G/DL
RBC # BLD: 2.63 M/UL
RBC # FLD: 15.8 %
SODIUM SERPL-SCNC: 142 MMOL/L
WBC # FLD AUTO: 3.65 K/UL

## 2019-08-30 RX ORDER — DEXAMETHASONE 0.5 MG/5ML
12 ELIXIR ORAL ONCE
Refills: 0 | Status: COMPLETED | OUTPATIENT
Start: 2019-08-30 | End: 2019-08-30

## 2019-08-30 RX ORDER — DIPHENHYDRAMINE HCL 50 MG
25 CAPSULE ORAL ONCE
Refills: 0 | Status: COMPLETED | OUTPATIENT
Start: 2019-08-30 | End: 2019-08-30

## 2019-08-30 RX ORDER — FOSAPREPITANT DIMEGLUMINE 150 MG/5ML
150 INJECTION, POWDER, LYOPHILIZED, FOR SOLUTION INTRAVENOUS ONCE
Refills: 0 | Status: COMPLETED | OUTPATIENT
Start: 2019-08-30 | End: 2019-08-30

## 2019-08-30 RX ORDER — FAMOTIDINE 10 MG/ML
20 INJECTION INTRAVENOUS ONCE
Refills: 0 | Status: COMPLETED | OUTPATIENT
Start: 2019-08-30 | End: 2019-08-30

## 2019-08-30 RX ORDER — CARBOPLATIN 50 MG
260 VIAL (EA) INTRAVENOUS ONCE
Refills: 0 | Status: COMPLETED | OUTPATIENT
Start: 2019-08-30 | End: 2019-08-30

## 2019-08-30 RX ORDER — PACLITAXEL 6 MG/ML
115 INJECTION, SOLUTION, CONCENTRATE INTRAVENOUS ONCE
Refills: 0 | Status: COMPLETED | OUTPATIENT
Start: 2019-08-30 | End: 2019-08-30

## 2019-08-30 RX ADMIN — FAMOTIDINE 20 MILLIGRAM(S): 10 INJECTION INTRAVENOUS at 11:45

## 2019-08-30 RX ADMIN — Medication 183 MILLIGRAM(S): at 11:15

## 2019-08-30 RX ADMIN — Medication 151.5 MILLIGRAM(S): at 13:15

## 2019-08-30 RX ADMIN — FOSAPREPITANT DIMEGLUMINE 450 MILLIGRAM(S): 150 INJECTION, POWDER, LYOPHILIZED, FOR SOLUTION INTRAVENOUS at 10:37

## 2019-08-30 RX ADMIN — Medication 25 MILLIGRAM(S): at 13:30

## 2019-08-30 RX ADMIN — Medication 260 MILLIGRAM(S): at 16:30

## 2019-08-30 RX ADMIN — Medication 12 MILLIGRAM(S): at 11:30

## 2019-08-30 RX ADMIN — FOSAPREPITANT DIMEGLUMINE 150 MILLIGRAM(S): 150 INJECTION, POWDER, LYOPHILIZED, FOR SOLUTION INTRAVENOUS at 11:10

## 2019-08-30 RX ADMIN — FAMOTIDINE 156 MILLIGRAM(S): 10 INJECTION INTRAVENOUS at 11:30

## 2019-08-30 RX ADMIN — PACLITAXEL 269.17 MILLIGRAM(S): 6 INJECTION, SOLUTION, CONCENTRATE INTRAVENOUS at 12:15

## 2019-08-30 RX ADMIN — Medication 101 MILLIGRAM(S): at 11:50

## 2019-08-30 RX ADMIN — Medication 25 MILLIGRAM(S): at 12:10

## 2019-08-30 RX ADMIN — Medication 175.33 MILLIGRAM(S): at 13:30

## 2019-09-03 ENCOUNTER — OUTPATIENT (OUTPATIENT)
Dept: OUTPATIENT SERVICES | Facility: HOSPITAL | Age: 64
LOS: 1 days | Discharge: HOME | End: 2019-09-03
Payer: MEDICAID

## 2019-09-03 DIAGNOSIS — Z90.710 ACQUIRED ABSENCE OF BOTH CERVIX AND UTERUS: Chronic | ICD-10-CM

## 2019-09-03 DIAGNOSIS — Z98.890 OTHER SPECIFIED POSTPROCEDURAL STATES: Chronic | ICD-10-CM

## 2019-09-03 PROCEDURE — 99212 OFFICE O/P EST SF 10 MIN: CPT | Mod: 25

## 2019-09-03 PROCEDURE — 76516 ECHO EXAM OF EYE: CPT | Mod: 26

## 2019-09-06 ENCOUNTER — LABORATORY RESULT (OUTPATIENT)
Age: 64
End: 2019-09-06

## 2019-09-06 ENCOUNTER — APPOINTMENT (OUTPATIENT)
Dept: INFUSION THERAPY | Facility: CLINIC | Age: 64
End: 2019-09-06

## 2019-09-06 VITALS
HEART RATE: 101 BPM | RESPIRATION RATE: 18 BRPM | SYSTOLIC BLOOD PRESSURE: 124 MMHG | DIASTOLIC BLOOD PRESSURE: 77 MMHG | TEMPERATURE: 97.7 F

## 2019-09-06 LAB
HCT VFR BLD CALC: 28.6 %
HGB BLD-MCNC: 9.7 G/DL
MCHC RBC-ENTMCNC: 33.8 PG
MCHC RBC-ENTMCNC: 33.9 G/DL
MCV RBC AUTO: 99.7 FL
PLATELET # BLD AUTO: 178 K/UL
PMV BLD: 10.9 FL
RBC # BLD: 2.87 M/UL
RBC # FLD: 14.8 %
WBC # FLD AUTO: 4.61 K/UL

## 2019-09-06 RX ORDER — DIPHENHYDRAMINE HCL 50 MG
25 CAPSULE ORAL ONCE
Refills: 0 | Status: COMPLETED | OUTPATIENT
Start: 2019-09-06 | End: 2019-09-06

## 2019-09-06 RX ORDER — CARBOPLATIN 50 MG
260 VIAL (EA) INTRAVENOUS ONCE
Refills: 0 | Status: COMPLETED | OUTPATIENT
Start: 2019-09-06 | End: 2019-09-06

## 2019-09-06 RX ORDER — DEXAMETHASONE 0.5 MG/5ML
12 ELIXIR ORAL ONCE
Refills: 0 | Status: COMPLETED | OUTPATIENT
Start: 2019-09-06 | End: 2019-09-06

## 2019-09-06 RX ORDER — FOSAPREPITANT DIMEGLUMINE 150 MG/5ML
150 INJECTION, POWDER, LYOPHILIZED, FOR SOLUTION INTRAVENOUS ONCE
Refills: 0 | Status: COMPLETED | OUTPATIENT
Start: 2019-09-06 | End: 2019-09-06

## 2019-09-06 RX ORDER — PACLITAXEL 6 MG/ML
115 INJECTION, SOLUTION, CONCENTRATE INTRAVENOUS ONCE
Refills: 0 | Status: COMPLETED | OUTPATIENT
Start: 2019-09-06 | End: 2019-09-06

## 2019-09-06 RX ORDER — FAMOTIDINE 10 MG/ML
20 INJECTION INTRAVENOUS ONCE
Refills: 0 | Status: COMPLETED | OUTPATIENT
Start: 2019-09-06 | End: 2019-09-06

## 2019-09-06 RX ADMIN — FAMOTIDINE 156 MILLIGRAM(S): 10 INJECTION INTRAVENOUS at 13:53

## 2019-09-06 RX ADMIN — FOSAPREPITANT DIMEGLUMINE 150 MILLIGRAM(S): 150 INJECTION, POWDER, LYOPHILIZED, FOR SOLUTION INTRAVENOUS at 13:40

## 2019-09-06 RX ADMIN — Medication 183 MILLIGRAM(S): at 13:53

## 2019-09-06 RX ADMIN — FAMOTIDINE 20 MILLIGRAM(S): 10 INJECTION INTRAVENOUS at 14:30

## 2019-09-06 RX ADMIN — Medication 151.5 MILLIGRAM(S): at 13:53

## 2019-09-06 RX ADMIN — Medication 25 MILLIGRAM(S): at 16:30

## 2019-09-06 RX ADMIN — Medication 25 MILLIGRAM(S): at 14:55

## 2019-09-06 RX ADMIN — PACLITAXEL 269.17 MILLIGRAM(S): 6 INJECTION, SOLUTION, CONCENTRATE INTRAVENOUS at 15:13

## 2019-09-06 RX ADMIN — Medication 12 MILLIGRAM(S): at 14:05

## 2019-09-06 RX ADMIN — Medication 175.33 MILLIGRAM(S): at 15:14

## 2019-09-06 RX ADMIN — Medication 260 MILLIGRAM(S): at 18:35

## 2019-09-06 RX ADMIN — PACLITAXEL 115 MILLIGRAM(S): 6 INJECTION, SOLUTION, CONCENTRATE INTRAVENOUS at 16:00

## 2019-09-06 RX ADMIN — FOSAPREPITANT DIMEGLUMINE 450 MILLIGRAM(S): 150 INJECTION, POWDER, LYOPHILIZED, FOR SOLUTION INTRAVENOUS at 13:53

## 2019-09-09 NOTE — PHYSICAL EXAM
[Restricted in physically strenuous activity but ambulatory and able to carry out work of a light or sedentary nature] : Status 1- Restricted in physically strenuous activity but ambulatory and able to carry out work of a light or sedentary nature, e.g., light house work, office work [Normal] : normal appearance, no rash, nodules, vesicles, ulcers, erythema [de-identified] : B/L cataracts appreciated [de-identified] : CTA B/L

## 2019-09-09 NOTE — RESULTS/DATA
[FreeTextEntry1] : EXAM:  MR BRAIN WAW IC      \par \par \par PROCEDURE DATE:  06/08/2019  \par \par \par \par \par INTERPRETATION:  Clinical History / Reason for exam: Dizziness and \par vertigo, history of uterine and thyroid cancer, for evaluation of \par metastatic disease, lesion seen on prior MRI of the brain.\par \par MRI OF THE BRAIN WITHOUT AND WITH CONTRAST\par \par TECHNIQUE:\par \par Multiplanar multisequence imaging of the brain was performed on the \[facility] open magnet before and after the intravenous administration of \par 7.5 cc of Gadavist including brain lab protocol.\par \par COMPARISON:\par \par MRI of the brain without and with contrast dated March 12, 2019.\par \par FINDINGS:\par \par The previously described supratentorial lesions have almost completely \par resolved.\par \par The lesion in the left posterior frontal region now measures 1.4 cm in \par maximum diameter, the lesion in the anterior right frontal lobe measures \par 0.7 cm in maximum diameter, the second lesion in the right frontal lobe \par measures 0.1 cm in maximum diameter, the lesion in the right posterior \par parietal region measures 0.3 cm in maximum diameter, and the left \par occipital lobe lesion measures 1.2 cm in maximum diameter. (Previously \par measuring 2.6, 2.4, 0.5, 0.7, and 2.5 cm respectively).\par \par The third, fourth, and lateral ventricles are normal in size and \par position. There is no shift of the midline structures.\par \par The cerebellar tonsils are minimally low-lying (0.3 cm of cerebellar \par ectopia).\par \par Incidental note is made of a tiny medial cyst.\par \par There are scattered T2/FLAIR hyperintense signal intensities in the\par subcortical white matter which are nonspecific differential diagnostic\par possibilities include chronic ischemic change, foci of gliosis or\par demyelination.\par \par IMPRESSION:\par \par In comparison with the prior MRI of the brain dated March 21, 2019:\par \par There has been almost complete resolution of most of of the previously \par described supratentorial lesions.\par \par There are no new enhancing lesions.\par \par \par \par \par \par \par CRISTINA MÉNDEZ M.D., ATTENDING RADIOLOGIST\par This document has been electronically signed. Oren 10 2019  1:17PM\par   \par \par   \par \par \par [id number]^RI^DC\par \par EXAM:  PETCT SKUL-THI ONC FDG SUBS      \par \par \par PROCEDURE DATE:  05/01/2019  \par \par \par \par \par INTERPRETATION:  \par FDG PET CT STUDY   Subsequent  treatment strategy\par REASON: TUMOR IMAGING - PET with concurrently acquired CT for attenuation \par correction and anatomic localization; skull base to mid - thigh .\par \par CPT code 69578.\par \par Fasting blood glucose level:     91 mg/dl\par \par HISTORY: Endometrial cancer. Stage IV with brain metastatic disease.\par \par TECHNIQUE: Approximately 45 minutes after the intravenous administration \par of 10.7 mCi 18-Fluorine FDG, whole body PET images were acquired from \par base of skull to mid - thigh.\par \par CT protocol used for this PET/CT study is designed for attenuation \par correction and anatomic localization of PET findings.  This  CT \par is not designed to replace state-of-the-art diagnostic CT scans for \par specific imaging protocols of different body parts.\par \par COMPARISON : 2/12/2019.\par Correlation: MRI of the brain on 3/21/2019.\par \par FINDINGS:\par \par Head/Neck: No biologically active head/neck lesions.     \par Normal uptake within the brain, pharyngeal lymphoid tissue, salivary \par glands and laryngeal musculature is noted.    \par \par Thorax:   No suspicious hypermetabolic mediastinal, hilar, lung \par parenchymal lesions.\par \par Abdomen/Pelvis: Physiologic GI/  activity. \par \par Again seen is a left upper quadrant peritoneal nodule, SUV max 6.8, \par previously 7.7 (12% decrease). \par \par Multiple new FDG avid omental nodules largest of which is adjacent to the \par distal transverse colon, measuring 10 mm, positive on nonattenuation \par corrected images, axial image 134. \par \par No other abnormal visceral or guillaume tracer uptake is present.    \par \par Musculoskeletal :  No suspicious hypermetabolic osseous lesions.\par \par Additional CT findings:\par Status post hysterectomy.\par Colonic diverticulosis.\par \par IMPRESSION: \par \par COMPARISON : 2/12/2019\par \par Multiple new FDG avid omental nodules largest measuring up to 10 mm, \par positive on nonattenuation corrected images. Findings are suggestive of \par biologic tumor activity.\par \par Again seen is a left upper quadrant peritoneal nodule, SUV max 6.8, \par previously 7.7 (12% decrease). \par \par \par \par \par

## 2019-09-09 NOTE — HISTORY OF PRESENT ILLNESS
[Disease: _____________________] : Disease: [unfilled] [AJCC Stage: ____] : AJCC Stage: [unfilled] [de-identified] : KRas WT, EGFR WT, Alk WT, ROS1 WT, PDL1-1%\par Normal MMR protein expression [de-identified] : Serena is a rodrigue 61 yo female, who has started developing SOB approximately 2 months ago, she went to ER on 11/2/2017 and on CXR was noted to have a large right sided pleural effusion. She underwent a thoracentesis, 1.6L of fluid was drained. 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 IHC was pos for CK7, PAX8, CK5/6 and Ca125, negative for TTF-1/Napsin, calretinin, CK20, mammaglobin, p63, villin, HAM56, GCDFP15, TAY-EP4. \par \par Her visit on 12/1/2017 Serena had an excisional biopsy of cervical node on 12/13/2017 revealing met adenocarcinoma, primary source still was not clear. On 12/14/2017 Serena was admitted with SOB, Pleurx catheter was placed on 12/14/2017. 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 Serena received 1st dose of carbo (auc of 5)/taxol(175mg/m2) on 12/15/2017 without complications, but the next day sustained a fall 2/2 vasovagal episode and developed asymptomatic SAH, that resolved on imaging by 12/21/2017.  [de-identified] : 11/24/2017: She reports some SOB, especially ARZATE today. No fevers, no weight loss (reports some weight gain), no GI,  or GYN symptoms, except for increasing abdominal girth, she reports no blood in the stool or urine and no vaginal bleeding. She reports no CP and no palpitations, she reports worsening nonproductive cough, no hemoptysis. She also reports difficulty lying on left side and lying down flat. \par She reports left on/off facial numbness several months in duration. She had a CT of her head performed in [facility] within 1 year.  Additionally there is a freely mobile left cervical node present on exam, as per patient this was in place for approximately 1 year. \par \par 12/1/2017: Serena is here for follow up today. She had a therapeutic thoracentesis in  on 11/27/2017, 2L of fluid were removed, with much improved respiratory status. PET CT and MRI of the brain were done on 11/29/2017, findings were discussed today. There remains no clear primary source of malignancy. We have discussed that the source of tumor may be Mullerian vs lung. regardless of source chemotherapy needs to be initiated ASAP. We have discussed Carbo/taxol regiment that will cover both Mullerian and lung origin. Side effects including myelosuppression, peripheral neuropathy, allergic reaction and others.\par \par 1/2/2017 Since last visit Serena had an excisional biopsy of cervical node on 12/13/2017 revealing met adenocarcinoma, primary source still was not clear. On 12/14/2017 Serena was admitted with SOB, Pleurx catheter was placed on 12/14/2017. 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 Serena received 1st dose of carbo (auc of 5)/taxol(175mg/m2) on 12/15/2017 without complications, but the next day sustained a fall 2/2 vasovagal episode and developed asymptomatic SAH, that resolved on imaging by 12/21/2017. Today Serena's SOB has significantly improved. She reports very mild neuropathy in fingertips only. She reports no headache and offers no other complaints. \par \par 1/26/2018: Complains of some neuropathy, otherwise tolerating chemo with no difficulty. \par \par 2/16/2018: restaging CT C/A/P reviewed, significant improvement noted. Will refer to GYN/ONC. Reports slightly worsening neuropathy, not -painful, taking Gabapentin, no other symptoms. For cycle 4 of carbo/taxol today.\par \par 3/9/2018: Serena met with Dr. Massey, she is planned for surgery on 5/7/2018, after completion of 6 cycles of carbo/taxol and restaging PET CT ordered for mid April and follow up with Dr. Massey on April 20. She reports worsening neuropathy, and occasional pain and changes in vision in her left eye, eye symptoms come and go and are not very bothersome. She reports no other symptoms. \par \par 3/30/2018; Serena is here for cycle 6 of carbo/taxol, she continues to have neuropathy in finger and toes, but offers no other complaints, chemo has been dose reduced. \par \par 4/27/2018: Results of restaging PET CT s/p 6 cycles of carbo/taxol revealed 1. No new areas of abnormal increased uptake is seen to indicate \par biologically active disease.\par \par 2. Persistent PET positive left thyroid mass with a max SUV 33.1, \par previously max SUV 34.8. Further evaluation with thyroid ultrasound and \par if needed fine-needle aspiration biopsy will be helpful.\par \par 3.   PET positive left cervical lymph nodes mainly level 2 on the left \par with a max SUV 5.47previously 18. Minimal increased uptake in the \par bilateral axillary lymph nodes most likely reactive(less than 2.5)\par \par 4. Weakly PET positive, max SUV 2.89 right pleura, previous max SUV 5.3 \par with non-FDG avid right pleural effusion. \par \par 5. Previously FDG avid right and left supraclavicular lymphadenopathy, \par left internal mammary, bilateral paratracheal, para-aortic, para \par vertebral, carinal, mesenteric, right and left iliacs, right inguinal \par lymphadenopathy, small in size and no longer FDG avid.\par \par 6. No abnormal increased uptake is seen in the  lobulated uterus.\par \par 7. Overall there is marked improvement in the FDG avid disease.\par This was reviewed with Serena. She met with Dr. Massey on 4/20/2018, surgery is planned for 5/7/2018. She will also joselyn to meet with ENT re FDG avid thyroid nodule. Will assist in making he an appointment for her. \par Persistent neuropathy on gabapentin. \par \par 6/29/18 ; Patient came for follow up visit , evaluation for chemotherapy cycle 8 of carbo / Taxol , patient tolerating medication well , except neuropathy  recommend to have gabapentin  300 mg po daily.\par \par 7/20/2018: Serena present for follow up today, she has completed total of 8 cycles of Carbo/Taxol. She reports significant neuropathy in her hands and feet. We will discontinue chemotherapy today and plan to follow with close observation. She has thyroidectomy planned with Dr. Herrera on 8/20/2018, obtaining clearance for PMD. She is also planning to fly to [place] at the end of September. She reports no SOB, no GI or  symptoms. \par \par 9/12/2018: Serena offers no significant complaints today. She states neuropathy in her hands has almost completely resolved and neuropathy in her feet is significantly better. She had undergone complete thyroidectomy by Dr. Herrera on 8/20/2018, pathology revealed papillary thyroid cancer, measuring 2 cm, pT1b, other additional foci of papillary carcinoma were also noted, no LVI, surgical margins were clear, LN 0/2 had no carcinoma, stage [id number]. She is following up with Dr. Herrera tomorrow. She has still not gone for her vacation in [place]. \par \par 10/10/2018: Restaging PET CT on 9/26/2018 reveals Since 4/16/2018,  1. Post thyroidectomy with diffuse increased FDG uptake at the thyroid  bed likely representing post-surgical changes.  2. Subtle increased uptake is seen in the lamina , right side at the  level of T8 with a max SUV 4.6. This is not sufficient for metastatic  disease. Bone scan or MRI examination with contrast will be helpful for  further evaluation.  3. No other areas of abnormal increased uptake is seen. Images were personally reviewed by myself and discussed with Serena. \par She also had an Echo on 9/24/2018 revealing EF of 63%. \par Serena reports ongoing fatigue, she is unable to lose weight. She is taking Synthroid and following with Dr. Acevedo. \par She reports stiff fingers at night only, no painful neuropathy. \par She denies any other symptoms today. \par \par 12/11/2018: Serena feels well, neuropathy in hands and feet is much improved. S he is now complaining of r-sided facial pain associated with some swelling, pain comes and goes. She has already seen Dr. Herrera, who ordered CT of sinuses and prescribed pain relief meds. She is also due for restaging PET CT. Serena feels well otherwise. \par \par 1/9/2019: Restaging PET CT was performed on 12/18/2018 it revealed COMPARISON : 9/24/2018.  New left upper quadrant peritoneal nodule measuring 8 mm with an SUV max  of 7.3. This is suggestive of biologic tumor activity.  No other FDG avid lesions seen throughout the scan. Images were personally reviewed by myself and discussed with Serena, originally over the phone and again today. I have originally suggested to try AI in the interim, Serena however reported diarrhea at the time of the scan and declined intervention for now. We will plan for restaging PET CT to be performed in 6-8 weeks, this will be ordered today. She will follow up with Dr. Massey at [facility] shortly and bring the disk for his review. I would also like her to meet with genetics, this will be arranged at [facility] with Dr. Massey's help. Serena reports no new symptoms today, her neuropathy is manageable and  improving. She still reports unilateral headache that was work up in the past. Neurology eval was again suggested to her. She is following closely with Endocrinology re thyroid management. She will have follow up with Dr. Herrera shortly as well. \par \par 2/20/2019: Serena offers no new complaints. PET CT from 2/12/2019 revealed \par Since PET/CT of December 19, 2018, no new sites of pathologic FDG uptake\par Slight increase in size of left upper quadrant peritoneal nodule (1.1 cm, \par previously 0.8 cm) with stable FDG uptake, 7.7 SUV suspicious for \par biologic tumor activity.\par No other sites of pathologic FDG uptake \par Images were personally reviewed by myself and discussed with Serena, case was also discussed with Dr. Massey at [facility]. Serena will have follow up with his shortly. She is following with endocrinology. reports improving neuropathy. No GI or  symptoms at this time. No weight loss. \par \par 3/20/2019: Serena had a peritoneal nodule biopsy done at [facility], I have received a call from Dr. 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 We have discussed that surgery though could be attempted will not be the best therapeutic approach, recommencement of systemic therapy was discussed. I have not received the results from [facility] yet. I have briefly discussed this with Serena today. Serena reports that she has acutely developed right lower extremity weakness 5-6 days ago, she did not inform any of her doctors about this. She also reports that her R upper extremity though not weak "does not feel normal either. She reports no back pain, there is no point tenderness, RLE is objectively weak on exam. I recommend ER evaluation to r/o CVA vs brain met, vs L-spine related issue. Recommend admission for work up. Serena is agreeable to get admitted. \par \par 5/8/2019: Serena present for follow up, she was diagnosed with multiple metastasis to the brain, MRI was done on 3/21/2019 and revealed \par Multiple supratentorial heterogeneously enhancing lesions consistent with \par metastatic disease. The appearance on the T2-weighted images is suggestive \par of adenocarcinoma. There is mass effect upon the left lateral ventricle and \par corpus callosum. \par She received whole brain irradiation by Dr. Dahl, completed it in the beginning of 4/2019, she is currently on steroid taper managed by Dr. Dahl, she is taking 4mg of Decadron daily. She will be stopping the Keppra as there have been no documented h/o seizures. She still reports impairment of the L visual field, she has an appointment coming up with Opthalmology. She reports no deficits walking after she has completed inpatient acute rehabilitation. \par Restaging PET CT on 5/1/2019 reveals COMPARISON : 2/12/2019 \par Multiple new FDG avid omental nodules largest measuring up to 10 mm, \par positive on nonattenuation corrected images. Findings are suggestive of \par biologic tumor activity. \par Again seen is a left upper quadrant peritoneal nodule, SUV max 6.8, \par previously 7.7 (12% decrease). \par Images were personally reviewed by myself and discussed with patient. \par She now reports mild abdominal bloating, no other symptoms. She reports her neuropathy has significantly improved. \par She has first evidence of recurrent disease documented 5.5 months after last carbo/taxol dose, the volume of disease was very small (1 8mm nodule), she was then observed for another 6 months and now she wishes to restart the chemotherapy. \par I have discussed with her that rechallenging with carbo/taxol may still prove to be effective as long as she gets restaged after 2 cycles. Given recent whole brain radiation and neuropathy I will offer her carbo/taxol weekly with does reduction on the taxol for neuropathy, We will plan to run carbo slowly to avoid allergic reaction. \par She is agreeable to start ASAP. \par \par 6/5/2019: Serena has completed 1 cycle of weekly Carbo/taxol, ran at slow rate to avoid Carbo reaction and has tolerated chemotherapy without difficulty, she does not complain of increase in neuropathy. She has ongoing vision changes in her L eye, she had no residual LE weakness. She is off Decadron and Keppra. She was advised to see ophthalmology. \par \par 7/3/2019: Serena is doing well.  Reports no problems with carbo/taxol.  Still complains of vision changes in L eye but is seeing ophthalmologist on 7/16.  Remains active around the house and cooks regularly.  No fevers, weight loss, anorexia.  ROS is otherwise only significant for occasional loose stools, no diarrhea.\par \par 7/17/2019: Serena is doing well, denies worsening neuropathy. She c/o feeling fatigued and tired. Her last chemo was 1 week ago(7/11/19) with carbo/taxol and is due again tomorrow. She saw ophthalmology and will need cataract surgery of the L eye. No c/o chest pain/SOB. No weight loss.\par CT C/A/P was done on 7/9/2019, images were personally reviewed by myself and discussed with several radiology attendings, they revealed \par CHEST:\par Filling defect within a segmental branch of the lower lobe pulmonary artery \par suspicious for pulmonary embolus. \par Stable left upper lobe and right middle lobe 3 mm nodules. \par CT abdomen and pelvis performed on the same day, see separate report. \par ADDENDUM: \par Please note that the morphology of the small thrombus within the left lower \par pulmonary artery is not indicative of an acute thrombus but rather a chronic \par appearance. \par This was discussed with Radiology, it was not deemed that thrombus was acute and may not have even been present at all, finding was deemed to be equivocal, based on radiology assessment anticoagulation was not indicated for the filling defect noted. Initially CTA was recommended, the recommendation was withdrawn upon further review. \par ABDOMEN/PELVIS:\par New 1.3 cm segment IV hepatic hypodensity seen on series 4 image 205. \par Lesion not seen on prior PET/CT from 5/1/2019 or abdomen and pelvis CT from \par \par 2/2/2018 and is consistent with a new metastasis. \par Two other hepatic lesions described above, decreased in size since 2/2/2018, \par consistent with treated hepatic metastases. \par Anterior perisplenic omental nodule measures 0.7 cm on series 2 image 53, \par previously measuring 1.2 cm on PET/CT dated 5/1/2019. \par All the other previously seen omental nodules have resolved since 5/1/2019. \par ADDENDUM:\par Case was discussed with Dr. Cid in detail. It is also possible that the \par new 1.3 cm lesion in the liver since February 2, 2018 represents a treated \par metastasis similar to the other liver lesions. \par PET/CT would be necessary to assess disease activity. \par I have discussed case with Radiology, and it was determined that there was no clear cut evidence of progression. PET CT was ordered today.\par This was discussed with Serena, will proceed with Carbo/Taxol for now. \par \par 8/1/2019: Serena reports no major side effects from weekly Carbo/Taxol, she reports no neuropathy. She has completed 10 cycles altogether. \par PET CT on 7/24/2019 revealed \par 1. Since May 1, 2019, no definite new site of pathologic FDG uptake to \par suggest new biologic tumor activity; specifically, no pathologic FDG uptake \par within previously noted 1.3 cm lesion within hepatic segment 4. \par 2. Increased FDG uptake within several subcentimeter bilateral cervical \par lymph nodes, which is nonspecific and may be postinflammatory; max SUV 9.2 \par is noted within a 0.7 cm right level 2 cervical node. Attention on follow-up \par is suggested. \par 3. Few peritoneal nodules have overall decreased in size. \par 4. No additional site of pathologic FDG uptake. \par Images were personally reviewed by myself and discussed with Serena. \par She wishes to continue with chemotherapy. Will plan for 3 additional cycles. Will consider adding Avastin, but with h/o inconclusive/possible chronic PE ppx anticoagulation maybe necessary. \par \par 8/28/2019: Serena reports feeling well, she denies worsening neuropathy, worsening neurological symptoms, SOB, abdominal pain, bloating. She reports she is planned to undergo  eye surgery towards the end of September. We will plan to hold chemotherapy briefly after this cycle to facilitate adequate counts and minimize complications. We again have briefly discussed considering Avastin based therapy, will hold off on this prior to surgery. \par Serena's veins are becoming very scarce, she will benefit from port placement. May proceed without formal PAST, will check CBC prior to procedure, PT and PTT today.

## 2019-09-09 NOTE — ASSESSMENT
[FreeTextEntry1] : Papillary-serous endometrial cancer, stage IV\par --PET CT on 11/29/2017 revealed no clear GYN origin, extensive KEVIN, uptake in the right lung and pleura, thyroid nodule\par --Malignant pleural effusion, resolved\par --L side PleurX catheter was removed on 2/8/2018\par --Completed cycle 8  of carbo/taxol on 6/29/2018.  2 cycles were administered  postoperatively.\par --Restaging PET CT on 9/26/2018 (3 months post completion of chemo 6/29/2018)was essentially negative for recurrent disease\par --Restaging PET CT on 12/18/2018 reveals   New left upper quadrant peritoneal nodule measuring 8 mm with an SUV max  of 7.3. This is suggestive of biologic tumor activity.  No other FDG avid lesions seen throughout the scan.\par --restaging PET CT on 2/12/2019 revealed slight increase in size of left upper quadrant peritoneal nodule (1.1 cm, \par previously 0.8 cm) with stable FDG uptake, 7.7 SUV suspicious for biologic tumor activity.\par --Case was discussed with Dr. Massey at [facility], biopsy of peritoneal nodule is positive for recurrent endometrial cancer.\par --MRI brain on 4/23/2019 revealed multiple brain mets\par --S/p whole brain radiation completed 4/2019, required acute rehab\par --PET CT on 5/1/2019 subsequently revealed further disease progression \par --Restarted weekly carbo/taxol 3 on 1 off on 5/9/2019, continue with weekly Carbo/Taxol for now\par --Off Decadron and Keppra since 6/5/2019 \par --Restaging brain MRI on 6/8/2019 showed almost complete resolution of most supratentorial lesions and no new lesions.\par --Restaging CT C/A/P on 7/9/2019 did not reveal definite progression, there was a questionable filling defect suspicious for possible chronic PE/artifact, this was discussed with Radiology at length anticoagulation was not deemed to be necessary\par --PET CT on 7/24/2019 revealed positive response to therapy \par --Attention to follow up will be needed re cervical nodes, will hold off on biopsy for now\par --Continue with weekly Carbo/Taxol\par --May need to consider adding Avastin after eye surgery\par --Will briefly hold chemotherapy for eye surgery\par --Script for post placement was given to nurse navigator, PT/PTT today, CBC 2-3 day prior to procedure, she may proceed without additional preadmission testing\par \par Papillary thyroid cancer [id number], 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 Herrera \par --Follow up with Dr. Acevedo of endocrinology; he is addressing vitamin D, thyroid and diabetes\par \par Cataract of eye\par -- Saw ophthalmology. She is planned for surgery in near future, end of September\par --Will hold chemotherapy after this cycle, hold off on Avastin until after the surgery \par \par Follow up in 3 weeks\par

## 2019-09-09 NOTE — REVIEW OF SYSTEMS
[Fatigue] : fatigue [Vision Problems] : vision problems [Negative] : Neurological [Chest Pain] : no chest pain [Palpitations] : no palpitations [Lower Ext Edema] : no lower extremity edema [Shortness Of Breath] : no shortness of breath [Wheezing] : no wheezing [Cough] : no cough [SOB on Exertion] : no shortness of breath during exertion [FreeTextEntry9] : normal strength in upper and lower extremities  [FreeTextEntry3] : Difficulty seeing out of L eye, will see ophthalmologist [de-identified] : L visual field deficit, gait impairment has resolved

## 2019-09-10 DIAGNOSIS — H02.725: ICD-10-CM

## 2019-09-10 DIAGNOSIS — H02.724 MADAROSIS OF LEFT UPPER EYELID AND PERIOCULAR AREA: ICD-10-CM

## 2019-09-10 DIAGNOSIS — H10.523 ANGULAR BLEPHAROCONJUNCTIVITIS, BILATERAL: ICD-10-CM

## 2019-09-10 DIAGNOSIS — H25.812 COMBINED FORMS OF AGE-RELATED CATARACT, LEFT EYE: ICD-10-CM

## 2019-09-10 DIAGNOSIS — H02.721 MADAROSIS OF RIGHT UPPER EYELID AND PERIOCULAR AREA: ICD-10-CM

## 2019-09-10 DIAGNOSIS — H02.722: ICD-10-CM

## 2019-09-11 NOTE — H&P PST ADULT - FUNCTIONAL LEVEL PRIOR: BATHING
Patient called and told that the psa order is in place and if this is a [place] clinic they will see the order. Aida Parsons, BRANDON Staff Nurse     (0) independent

## 2019-09-13 ENCOUNTER — APPOINTMENT (OUTPATIENT)
Dept: INFUSION THERAPY | Facility: CLINIC | Age: 64
End: 2019-09-13

## 2019-09-13 ENCOUNTER — LABORATORY RESULT (OUTPATIENT)
Age: 64
End: 2019-09-13

## 2019-09-13 VITALS — SYSTOLIC BLOOD PRESSURE: 129 MMHG | DIASTOLIC BLOOD PRESSURE: 79 MMHG | HEART RATE: 88 BPM | RESPIRATION RATE: 18 BRPM

## 2019-09-13 LAB
APTT BLD: 32.6 SEC
HCT VFR BLD CALC: 26 %
HGB BLD-MCNC: 9 G/DL
INR PPP: 1.01 RATIO
MCHC RBC-ENTMCNC: 34.1 PG
MCHC RBC-ENTMCNC: 34.6 G/DL
MCV RBC AUTO: 98.5 FL
PLATELET # BLD AUTO: 145 K/UL
PMV BLD: 11.2 FL
PT BLD: 11.6 SEC
RBC # BLD: 2.64 M/UL
RBC # FLD: 14.5 %
T4 FREE SERPL-MCNC: 1.8 NG/DL
TSH SERPL-ACNC: 1.87 UIU/ML
WBC # FLD AUTO: 2.88 K/UL

## 2019-09-13 RX ORDER — DIPHENHYDRAMINE HCL 50 MG
25 CAPSULE ORAL ONCE
Refills: 0 | Status: COMPLETED | OUTPATIENT
Start: 2019-09-13 | End: 2019-09-13

## 2019-09-13 RX ORDER — PACLITAXEL 6 MG/ML
115 INJECTION, SOLUTION, CONCENTRATE INTRAVENOUS ONCE
Refills: 0 | Status: COMPLETED | OUTPATIENT
Start: 2019-09-13 | End: 2019-09-13

## 2019-09-13 RX ORDER — DEXAMETHASONE 0.5 MG/5ML
12 ELIXIR ORAL ONCE
Refills: 0 | Status: COMPLETED | OUTPATIENT
Start: 2019-09-13 | End: 2019-09-13

## 2019-09-13 RX ORDER — FAMOTIDINE 10 MG/ML
20 INJECTION INTRAVENOUS ONCE
Refills: 0 | Status: COMPLETED | OUTPATIENT
Start: 2019-09-13 | End: 2019-09-13

## 2019-09-13 RX ORDER — FOSAPREPITANT DIMEGLUMINE 150 MG/5ML
150 INJECTION, POWDER, LYOPHILIZED, FOR SOLUTION INTRAVENOUS ONCE
Refills: 0 | Status: COMPLETED | OUTPATIENT
Start: 2019-09-13 | End: 2019-09-13

## 2019-09-13 RX ORDER — CARBOPLATIN 50 MG
260 VIAL (EA) INTRAVENOUS ONCE
Refills: 0 | Status: COMPLETED | OUTPATIENT
Start: 2019-09-13 | End: 2019-09-13

## 2019-09-13 RX ADMIN — Medication 151.5 MILLIGRAM(S): at 09:59

## 2019-09-13 RX ADMIN — Medication 12 MILLIGRAM(S): at 11:10

## 2019-09-13 RX ADMIN — PACLITAXEL 269.17 MILLIGRAM(S): 6 INJECTION, SOLUTION, CONCENTRATE INTRAVENOUS at 10:47

## 2019-09-13 RX ADMIN — Medication 25 MILLIGRAM(S): at 13:10

## 2019-09-13 RX ADMIN — Medication 151.5 MILLIGRAM(S): at 10:00

## 2019-09-13 RX ADMIN — FOSAPREPITANT DIMEGLUMINE 150 MILLIGRAM(S): 150 INJECTION, POWDER, LYOPHILIZED, FOR SOLUTION INTRAVENOUS at 10:50

## 2019-09-13 RX ADMIN — Medication 175.33 MILLIGRAM(S): at 10:47

## 2019-09-13 RX ADMIN — Medication 25 MILLIGRAM(S): at 11:45

## 2019-09-13 RX ADMIN — FAMOTIDINE 156 MILLIGRAM(S): 10 INJECTION INTRAVENOUS at 10:00

## 2019-09-13 RX ADMIN — Medication 260 MILLIGRAM(S): at 15:50

## 2019-09-13 RX ADMIN — PACLITAXEL 115 MILLIGRAM(S): 6 INJECTION, SOLUTION, CONCENTRATE INTRAVENOUS at 12:50

## 2019-09-13 RX ADMIN — FAMOTIDINE 20 MILLIGRAM(S): 10 INJECTION INTRAVENOUS at 11:30

## 2019-09-13 RX ADMIN — FOSAPREPITANT DIMEGLUMINE 450 MILLIGRAM(S): 150 INJECTION, POWDER, LYOPHILIZED, FOR SOLUTION INTRAVENOUS at 09:59

## 2019-09-13 RX ADMIN — Medication 183 MILLIGRAM(S): at 10:00

## 2019-09-20 ENCOUNTER — OUTPATIENT (OUTPATIENT)
Dept: OUTPATIENT SERVICES | Facility: HOSPITAL | Age: 64
LOS: 1 days | Discharge: HOME | End: 2019-09-20
Payer: MEDICAID

## 2019-09-20 DIAGNOSIS — Z00.00 ENCOUNTER FOR GENERAL ADULT MEDICAL EXAMINATION WITHOUT ABNORMAL FINDINGS: ICD-10-CM

## 2019-09-20 DIAGNOSIS — Z98.890 OTHER SPECIFIED POSTPROCEDURAL STATES: Chronic | ICD-10-CM

## 2019-09-20 DIAGNOSIS — Z90.710 ACQUIRED ABSENCE OF BOTH CERVIX AND UTERUS: Chronic | ICD-10-CM

## 2019-09-20 PROCEDURE — 93010 ELECTROCARDIOGRAM REPORT: CPT

## 2019-09-24 ENCOUNTER — APPOINTMENT (OUTPATIENT)
Dept: HEMATOLOGY ONCOLOGY | Facility: CLINIC | Age: 64
End: 2019-09-24

## 2019-09-25 ENCOUNTER — LABORATORY RESULT (OUTPATIENT)
Age: 64
End: 2019-09-25

## 2019-09-25 ENCOUNTER — APPOINTMENT (OUTPATIENT)
Dept: HEMATOLOGY ONCOLOGY | Facility: CLINIC | Age: 64
End: 2019-09-25
Payer: MEDICAID

## 2019-09-25 VITALS
RESPIRATION RATE: 16 BRPM | WEIGHT: 168 LBS | HEART RATE: 79 BPM | DIASTOLIC BLOOD PRESSURE: 58 MMHG | BODY MASS INDEX: 27.99 KG/M2 | HEIGHT: 65 IN | TEMPERATURE: 96.9 F | SYSTOLIC BLOOD PRESSURE: 123 MMHG

## 2019-09-25 PROCEDURE — 99213 OFFICE O/P EST LOW 20 MIN: CPT

## 2019-09-27 ENCOUNTER — APPOINTMENT (OUTPATIENT)
Dept: INFUSION THERAPY | Facility: CLINIC | Age: 64
End: 2019-09-27

## 2019-09-27 NOTE — REVIEW OF SYSTEMS
[Fatigue] : fatigue [Vision Problems] : vision problems [Negative] : Allergic/Immunologic [Chest Pain] : no chest pain [Lower Ext Edema] : no lower extremity edema [Palpitations] : no palpitations [Shortness Of Breath] : no shortness of breath [Wheezing] : no wheezing [Cough] : no cough [FreeTextEntry3] : Difficulty seeing out of L eye, will see ophthalmologist [SOB on Exertion] : no shortness of breath during exertion [FreeTextEntry9] : normal strength in upper and lower extremities  [de-identified] : L visual field deficit, gait impairment has resolved

## 2019-09-27 NOTE — HISTORY OF PRESENT ILLNESS
[Disease: _____________________] : Disease: [unfilled] [AJCC Stage: ____] : AJCC Stage: [unfilled] [de-identified] : Serena is a rodrigue 61 yo female, who has started developing SOB approximately 2 months ago, she went to ER on 11/2/2017 and on CXR was noted to have a large right sided pleural effusion. She underwent a thoracentesis, 1.6L of fluid was drained. 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 IHC was pos for CK7, PAX8, CK5/6 and Ca125, negative for TTF-1/Napsin, calretinin, CK20, mammaglobin, p63, villin, HAM56, GCDFP15, TAY-EP4. \par \par Her visit on 12/1/2017 Serena had an excisional biopsy of cervical node on 12/13/2017 revealing met adenocarcinoma, primary source still was not clear. On 12/14/2017 Serena was admitted with SOB, Pleurx catheter was placed on 12/14/2017. 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 Serena received 1st dose of carbo (auc of 5)/taxol(175mg/m2) on 12/15/2017 without complications, but the next day sustained a fall 2/2 vasovagal episode and developed asymptomatic SAH, that resolved on imaging by 12/21/2017.  [de-identified] : KRas WT, EGFR WT, Alk WT, ROS1 WT, PDL1-1%\par Normal MMR protein expression [de-identified] : 11/24/2017: She reports some SOB, especially ARZATE today. No fevers, no weight loss (reports some weight gain), no GI,  or GYN symptoms, except for increasing abdominal girth, she reports no blood in the stool or urine and no vaginal bleeding. She reports no CP and no palpitations, she reports worsening nonproductive cough, no hemoptysis. She also reports difficulty lying on left side and lying down flat. \par She reports left on/off facial numbness several months in duration. She had a CT of her head performed in [facility] within 1 year.  Additionally there is a freely mobile left cervical node present on exam, as per patient this was in place for approximately 1 year. \par \par 12/1/2017: Serena is here for follow up today. She had a therapeutic thoracentesis in  on 11/27/2017, 2L of fluid were removed, with much improved respiratory status. PET CT and MRI of the brain were done on 11/29/2017, findings were discussed today. There remains no clear primary source of malignancy. We have discussed that the source of tumor may be Mullerian vs lung. regardless of source chemotherapy needs to be initiated ASAP. We have discussed Carbo/taxol regiment that will cover both Mullerian and lung origin. Side effects including myelosuppression, peripheral neuropathy, allergic reaction and others.\par \par 1/2/2017 Since last visit Serena had an excisional biopsy of cervical node on 12/13/2017 revealing met adenocarcinoma, primary source still was not clear. On 12/14/2017 Serena was admitted with SOB, Pleurx catheter was placed on 12/14/2017. 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 Serena received 1st dose of carbo (auc of 5)/taxol(175mg/m2) on 12/15/2017 without complications, but the next day sustained a fall 2/2 vasovagal episode and developed asymptomatic SAH, that resolved on imaging by 12/21/2017. Today Serena's SOB has significantly improved. She reports very mild neuropathy in fingertips only. She reports no headache and offers no other complaints. \par \par 1/26/2018: Complains of some neuropathy, otherwise tolerating chemo with no difficulty. \par \par 2/16/2018: restaging CT C/A/P reviewed, significant improvement noted. Will refer to GYN/ONC. Reports slightly worsening neuropathy, not -painful, taking Gabapentin, no other symptoms. For cycle 4 of carbo/taxol today.\par \par 3/9/2018: Serena met with Dr. Massey, she is planned for surgery on 5/7/2018, after completion of 6 cycles of carbo/taxol and restaging PET CT ordered for mid April and follow up with Dr. Massey on April 20. She reports worsening neuropathy, and occasional pain and changes in vision in her left eye, eye symptoms come and go and are not very bothersome. She reports no other symptoms. \par \par 3/30/2018; Serena is here for cycle 6 of carbo/taxol, she continues to have neuropathy in finger and toes, but offers no other complaints, chemo has been dose reduced. \par \par 4/27/2018: Results of restaging PET CT s/p 6 cycles of carbo/taxol revealed 1. No new areas of abnormal increased uptake is seen to indicate \par biologically active disease.\par \par 2. Persistent PET positive left thyroid mass with a max SUV 33.1, \par previously max SUV 34.8. Further evaluation with thyroid ultrasound and \par if needed fine-needle aspiration biopsy will be helpful.\par \par 3.   PET positive left cervical lymph nodes mainly level 2 on the left \par with a max SUV 5.47previously 18. Minimal increased uptake in the \par bilateral axillary lymph nodes most likely reactive(less than 2.5)\par \par 4. Weakly PET positive, max SUV 2.89 right pleura, previous max SUV 5.3 \par with non-FDG avid right pleural effusion. \par \par 5. Previously FDG avid right and left supraclavicular lymphadenopathy, \par left internal mammary, bilateral paratracheal, para-aortic, para \par vertebral, carinal, mesenteric, right and left iliacs, right inguinal \par lymphadenopathy, small in size and no longer FDG avid.\par \par 6. No abnormal increased uptake is seen in the  lobulated uterus.\par \par 7. Overall there is marked improvement in the FDG avid disease.\par This was reviewed with Serena. She met with Dr. Massey on 4/20/2018, surgery is planned for 5/7/2018. She will also joselyn to meet with ENT re FDG avid thyroid nodule. Will assist in making he an appointment for her. \par Persistent neuropathy on gabapentin. \par \par 6/29/18 ; Patient came for follow up visit , evaluation for chemotherapy cycle 8 of carbo / Taxol , patient tolerating medication well , except neuropathy  recommend to have gabapentin  300 mg po daily.\par \par 7/20/2018: Serena present for follow up today, she has completed total of 8 cycles of Carbo/Taxol. She reports significant neuropathy in her hands and feet. We will discontinue chemotherapy today and plan to follow with close observation. She has thyroidectomy planned with Dr. Herrera on 8/20/2018, obtaining clearance for PMD. She is also planning to fly to [place] at the end of September. She reports no SOB, no GI or  symptoms. \par \par 9/12/2018: Serena offers no significant complaints today. She states neuropathy in her hands has almost completely resolved and neuropathy in her feet is significantly better. She had undergone complete thyroidectomy by Dr. Herrera on 8/20/2018, pathology revealed papillary thyroid cancer, measuring 2 cm, pT1b, other additional foci of papillary carcinoma were also noted, no LVI, surgical margins were clear, LN 0/2 had no carcinoma, stage [id number]. She is following up with Dr. Herrera tomorrow. She has still not gone for her vacation in [place]. \par \par 10/10/2018: Restaging PET CT on 9/26/2018 reveals Since 4/16/2018,  1. Post thyroidectomy with diffuse increased FDG uptake at the thyroid  bed likely representing post-surgical changes.  2. Subtle increased uptake is seen in the lamina , right side at the  level of T8 with a max SUV 4.6. This is not sufficient for metastatic  disease. Bone scan or MRI examination with contrast will be helpful for  further evaluation.  3. No other areas of abnormal increased uptake is seen. Images were personally reviewed by myself and discussed with Serena. \par She also had an Echo on 9/24/2018 revealing EF of 63%. \par Serena reports ongoing fatigue, she is unable to lose weight. She is taking Synthroid and following with Dr. Acevedo. \par She reports stiff fingers at night only, no painful neuropathy. \par She denies any other symptoms today. \par \par 12/11/2018: Serena feels well, neuropathy in hands and feet is much improved. S he is now complaining of r-sided facial pain associated with some swelling, pain comes and goes. She has already seen Dr. Herrera, who ordered CT of sinuses and prescribed pain relief meds. She is also due for restaging PET CT. Serena feels well otherwise. \par \par 1/9/2019: Restaging PET CT was performed on 12/18/2018 it revealed COMPARISON : 9/24/2018.  New left upper quadrant peritoneal nodule measuring 8 mm with an SUV max  of 7.3. This is suggestive of biologic tumor activity.  No other FDG avid lesions seen throughout the scan. Images were personally reviewed by myself and discussed with Serena, originally over the phone and again today. I have originally suggested to try AI in the interim, Serena however reported diarrhea at the time of the scan and declined intervention for now. We will plan for restaging PET CT to be performed in 6-8 weeks, this will be ordered today. She will follow up with Dr. Massey at [facility] shortly and bring the disk for his review. I would also like her to meet with genetics, this will be arranged at [facility] with Dr. Massey's help. Serena reports no new symptoms today, her neuropathy is manageable and  improving. She still reports unilateral headache that was work up in the past. Neurology eval was again suggested to her. She is following closely with Endocrinology re thyroid management. She will have follow up with Dr. Herrera shortly as well. \par \par 2/20/2019: Serena offers no new complaints. PET CT from 2/12/2019 revealed \par Since PET/CT of December 19, 2018, no new sites of pathologic FDG uptake\par Slight increase in size of left upper quadrant peritoneal nodule (1.1 cm, \par previously 0.8 cm) with stable FDG uptake, 7.7 SUV suspicious for \par biologic tumor activity.\par No other sites of pathologic FDG uptake \par Images were personally reviewed by myself and discussed with Serena, case was also discussed with Dr. Massey at [facility]. Serena will have follow up with his shortly. She is following with endocrinology. reports improving neuropathy. No GI or  symptoms at this time. No weight loss. \par \par 3/20/2019: Serena had a peritoneal nodule biopsy done at [facility], I have received a call from Dr. 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 We have discussed that surgery though could be attempted will not be the best therapeutic approach, recommencement of systemic therapy was discussed. I have not received the results from [facility] yet. I have briefly discussed this with Serena today. Serena reports that she has acutely developed right lower extremity weakness 5-6 days ago, she did not inform any of her doctors about this. She also reports that her R upper extremity though not weak "does not feel normal either. She reports no back pain, there is no point tenderness, RLE is objectively weak on exam. I recommend ER evaluation to r/o CVA vs brain met, vs L-spine related issue. Recommend admission for work up. Serena is agreeable to get admitted. \par \par 5/8/2019: Serena present for follow up, she was diagnosed with multiple metastasis to the brain, MRI was done on 3/21/2019 and revealed \par Multiple supratentorial heterogeneously enhancing lesions consistent with \par metastatic disease. The appearance on the T2-weighted images is suggestive \par of adenocarcinoma. There is mass effect upon the left lateral ventricle and \par corpus callosum. \par She received whole brain irradiation by Dr. Dahl, completed it in the beginning of 4/2019, she is currently on steroid taper managed by Dr. Dahl, she is taking 4mg of Decadron daily. She will be stopping the Keppra as there have been no documented h/o seizures. She still reports impairment of the L visual field, she has an appointment coming up with Opthalmology. She reports no deficits walking after she has completed inpatient acute rehabilitation. \par Restaging PET CT on 5/1/2019 reveals COMPARISON : 2/12/2019 \par Multiple new FDG avid omental nodules largest measuring up to 10 mm, \par positive on nonattenuation corrected images. Findings are suggestive of \par biologic tumor activity. \par Again seen is a left upper quadrant peritoneal nodule, SUV max 6.8, \par previously 7.7 (12% decrease). \par Images were personally reviewed by myself and discussed with patient. \par She now reports mild abdominal bloating, no other symptoms. She reports her neuropathy has significantly improved. \par She has first evidence of recurrent disease documented 5.5 months after last carbo/taxol dose, the volume of disease was very small (1 8mm nodule), she was then observed for another 6 months and now she wishes to restart the chemotherapy. \par I have discussed with her that rechallenging with carbo/taxol may still prove to be effective as long as she gets restaged after 2 cycles. Given recent whole brain radiation and neuropathy I will offer her carbo/taxol weekly with does reduction on the taxol for neuropathy, We will plan to run carbo slowly to avoid allergic reaction. \par She is agreeable to start ASAP. \par \par 6/5/2019: Serena has completed 1 cycle of weekly Carbo/taxol, ran at slow rate to avoid Carbo reaction and has tolerated chemotherapy without difficulty, she does not complain of increase in neuropathy. She has ongoing vision changes in her L eye, she had no residual LE weakness. She is off Decadron and Keppra. She was advised to see ophthalmology. \par \par 7/3/2019: Serena is doing well.  Reports no problems with carbo/taxol.  Still complains of vision changes in L eye but is seeing ophthalmologist on 7/16.  Remains active around the house and cooks regularly.  No fevers, weight loss, anorexia.  ROS is otherwise only significant for occasional loose stools, no diarrhea.\par \par 7/17/2019: Serena is doing well, denies worsening neuropathy. She c/o feeling fatigued and tired. Her last chemo was 1 week ago(7/11/19) with carbo/taxol and is due again tomorrow. She saw ophthalmology and will need cataract surgery of the L eye. No c/o chest pain/SOB. No weight loss.\par CT C/A/P was done on 7/9/2019, images were personally reviewed by myself and discussed with several radiology attendings, they revealed \par CHEST:\par Filling defect within a segmental branch of the lower lobe pulmonary artery \par suspicious for pulmonary embolus. \par Stable left upper lobe and right middle lobe 3 mm nodules. \par CT abdomen and pelvis performed on the same day, see separate report. \par ADDENDUM: \par Please note that the morphology of the small thrombus within the left lower \par pulmonary artery is not indicative of an acute thrombus but rather a chronic \par appearance. \par This was discussed with Radiology, it was not deemed that thrombus was acute and may not have even been present at all, finding was deemed to be equivocal, based on radiology assessment anticoagulation was not indicated for the filling defect noted. Initially CTA was recommended, the recommendation was withdrawn upon further review. \par ABDOMEN/PELVIS:\par New 1.3 cm segment IV hepatic hypodensity seen on series 4 image 205. \par Lesion not seen on prior PET/CT from 5/1/2019 or abdomen and pelvis CT from \par \par 2/2/2018 and is consistent with a new metastasis. \par Two other hepatic lesions described above, decreased in size since 2/2/2018, \par consistent with treated hepatic metastases. \par Anterior perisplenic omental nodule measures 0.7 cm on series 2 image 53, \par previously measuring 1.2 cm on PET/CT dated 5/1/2019. \par All the other previously seen omental nodules have resolved since 5/1/2019. \par ADDENDUM:\par Case was discussed with Dr. Cid in detail. It is also possible that the \par new 1.3 cm lesion in the liver since February 2, 2018 represents a treated \par metastasis similar to the other liver lesions. \par PET/CT would be necessary to assess disease activity. \par I have discussed case with Radiology, and it was determined that there was no clear cut evidence of progression. PET CT was ordered today.\par This was discussed with Serena, will proceed with Carbo/Taxol for now. \par \par 8/1/2019: Serena reports no major side effects from weekly Carbo/Taxol, she reports no neuropathy. She has completed 10 cycles altogether. \par PET CT on 7/24/2019 revealed \par 1. Since May 1, 2019, no definite new site of pathologic FDG uptake to \par suggest new biologic tumor activity; specifically, no pathologic FDG uptake \par within previously noted 1.3 cm lesion within hepatic segment 4. \par 2. Increased FDG uptake within several subcentimeter bilateral cervical \par lymph nodes, which is nonspecific and may be postinflammatory; max SUV 9.2 \par is noted within a 0.7 cm right level 2 cervical node. Attention on follow-up \par is suggested. \par 3. Few peritoneal nodules have overall decreased in size. \par 4. No additional site of pathologic FDG uptake. \par Images were personally reviewed by myself and discussed with Serena. \par She wishes to continue with chemotherapy. Will plan for 3 additional cycles. Will consider adding Avastin, but with h/o inconclusive/possible chronic PE ppx anticoagulation maybe necessary. \par \par 8/28/2019: Serena reports feeling well, she denies worsening neuropathy, worsening neurological symptoms, SOB, abdominal pain, bloating. She reports she is planned to undergo  eye surgery towards the end of September. We will plan to hold chemotherapy briefly after this cycle to facilitate adequate counts and minimize complications. We again have briefly discussed considering Avastin based therapy, will hold off on this prior to surgery. 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 May proceed without formal PAST, will check CBC prior to procedure, PT and PTT today. \par \par 9/25/19:Serena reports feeling well, she denies any changes since last visit, No SOB, abdominal pain, bloating. She reports she is planned to undergo  eye surgery in 9/30/19  CBC reviewed with normal Hemogram . we will hold chem this week , she will resume after Cataract sx . 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 May proceed without formal PAST, will check CBC prior to procedure, PT and PTT today. \par

## 2019-09-27 NOTE — RESULTS/DATA
[FreeTextEntry1] : EXAM:  MR BRAIN WAW IC      \par \par \par PROCEDURE DATE:  06/08/2019  \par \par \par \par \par INTERPRETATION:  Clinical History / Reason for exam: Dizziness and \par vertigo, history of uterine and thyroid cancer, for evaluation of \par metastatic disease, lesion seen on prior MRI of the brain.\par \par MRI OF THE BRAIN WITHOUT AND WITH CONTRAST\par \par TECHNIQUE:\par \par Multiplanar multisequence imaging of the brain was performed on the \[facility] open magnet before and after the intravenous administration of \par 7.5 cc of Gadavist including brain lab protocol.\par \par COMPARISON:\par \par MRI of the brain without and with contrast dated March 12, 2019.\par \par FINDINGS:\par \par The previously described supratentorial lesions have almost completely \par resolved.\par \par The lesion in the left posterior frontal region now measures 1.4 cm in \par maximum diameter, the lesion in the anterior right frontal lobe measures \par 0.7 cm in maximum diameter, the second lesion in the right frontal lobe \par measures 0.1 cm in maximum diameter, the lesion in the right posterior \par parietal region measures 0.3 cm in maximum diameter, and the left \par occipital lobe lesion measures 1.2 cm in maximum diameter. (Previously \par measuring 2.6, 2.4, 0.5, 0.7, and 2.5 cm respectively).\par \par The third, fourth, and lateral ventricles are normal in size and \par position. There is no shift of the midline structures.\par \par The cerebellar tonsils are minimally low-lying (0.3 cm of cerebellar \par ectopia).\par \par Incidental note is made of a tiny medial cyst.\par \par There are scattered T2/FLAIR hyperintense signal intensities in the\par subcortical white matter which are nonspecific differential diagnostic\par possibilities include chronic ischemic change, foci of gliosis or\par demyelination.\par \par IMPRESSION:\par \par In comparison with the prior MRI of the brain dated March 21, 2019:\par \par There has been almost complete resolution of most of of the previously \par described supratentorial lesions.\par \par There are no new enhancing lesions.\par \par \par \par \par \par \par CRISTINA MÉNDEZ M.D., ATTENDING RADIOLOGIST\par This document has been electronically signed. Oren 10 2019  1:17PM\par   \par \par   \par \par \par [id number]^RI^DC\par \par EXAM:  PETCT SKUL-THI ONC FDG SUBS      \par \par \par PROCEDURE DATE:  05/01/2019  \par \par \par \par \par INTERPRETATION:  \par FDG PET CT STUDY   Subsequent  treatment strategy\par REASON: TUMOR IMAGING - PET with concurrently acquired CT for attenuation \par correction and anatomic localization; skull base to mid - thigh .\par \par CPT code 35441.\par \par Fasting blood glucose level:     91 mg/dl\par \par HISTORY: Endometrial cancer. Stage IV with brain metastatic disease.\par \par TECHNIQUE: Approximately 45 minutes after the intravenous administration \par of 10.7 mCi 18-Fluorine FDG, whole body PET images were acquired from \par base of skull to mid - thigh.\par \par CT protocol used for this PET/CT study is designed for attenuation \par correction and anatomic localization of PET findings.  This  CT \par is not designed to replace state-of-the-art diagnostic CT scans for \par specific imaging protocols of different body parts.\par \par COMPARISON : 2/12/2019.\par Correlation: MRI of the brain on 3/21/2019.\par \par FINDINGS:\par \par Head/Neck: No biologically active head/neck lesions.     \par Normal uptake within the brain, pharyngeal lymphoid tissue, salivary \par glands and laryngeal musculature is noted.    \par \par Thorax:   No suspicious hypermetabolic mediastinal, hilar, lung \par parenchymal lesions.\par \par Abdomen/Pelvis: Physiologic GI/  activity. \par \par Again seen is a left upper quadrant peritoneal nodule, SUV max 6.8, \par previously 7.7 (12% decrease). \par \par Multiple new FDG avid omental nodules largest of which is adjacent to the \par distal transverse colon, measuring 10 mm, positive on nonattenuation \par corrected images, axial image 134. \par \par No other abnormal visceral or guillaume tracer uptake is present.    \par \par Musculoskeletal :  No suspicious hypermetabolic osseous lesions.\par \par Additional CT findings:\par Status post hysterectomy.\par Colonic diverticulosis.\par \par IMPRESSION: \par \par COMPARISON : 2/12/2019\par \par Multiple new FDG avid omental nodules largest measuring up to 10 mm, \par positive on nonattenuation corrected images. Findings are suggestive of \par biologic tumor activity.\par \par Again seen is a left upper quadrant peritoneal nodule, SUV max 6.8, \par previously 7.7 (12% decrease). \par \par \par \par \par

## 2019-09-27 NOTE — PHYSICAL EXAM
[Restricted in physically strenuous activity but ambulatory and able to carry out work of a light or sedentary nature] : Status 1- Restricted in physically strenuous activity but ambulatory and able to carry out work of a light or sedentary nature, e.g., light house work, office work [Normal] : affect appropriate [de-identified] : CTA B/L [de-identified] : B/L cataracts appreciated

## 2019-09-27 NOTE — ASSESSMENT
[FreeTextEntry1] : Papillary-serous endometrial cancer, stage IV\par --PET CT on 11/29/2017 revealed no clear GYN origin, extensive KEVIN, uptake in the right lung and pleura, thyroid nodule\par --Malignant pleural effusion, resolved\par --L side PleurX catheter was removed on 2/8/2018\par --Completed cycle 8  of carbo/taxol on 6/29/2018.  2 cycles were administered  postoperatively.\par --Restaging PET CT on 9/26/2018 (3 months post completion of chemo 6/29/2018)was essentially negative for recurrent disease\par --Restaging PET CT on 12/18/2018 reveals   New left upper quadrant peritoneal nodule measuring 8 mm with an SUV max  of 7.3. This is suggestive of biologic tumor activity.  No other FDG avid lesions seen throughout the scan.\par --restaging PET CT on 2/12/2019 revealed slight increase in size of left upper quadrant peritoneal nodule (1.1 cm, \par previously 0.8 cm) with stable FDG uptake, 7.7 SUV suspicious for biologic tumor activity.\par --Case was discussed with Dr. Massey at [place], biopsy of peritoneal nodule is positive for recurrent endometrial cancer.\par --MRI brain on 4/23/2019 revealed multiple brain mets\par --S/p whole brain radiation completed 4/2019, required acute rehab\par --PET CT on 5/1/2019 subsequently revealed further disease progression \par --Restarted weekly carbo/taxol 3 on 1 off on 5/9/2019, continue with weekly Carbo/Taxol for now.\par - Patient will proceed with chemo Cycle #4 on 9/27/19 . \par --Off Decadron and Keppra since 6/5/2019 \par --Restaging brain MRI on 6/8/2019 showed almost complete resolution of most supratentorial lesions and no new lesions.\par --Restaging CT C/A/P on 7/9/2019 did not reveal definite progression, there was a questionable filling defect suspicious for possible chronic PE/artifact, this was discussed with Radiology at length anticoagulation was not deemed to be necessary\par --PET CT on 7/24/2019 revealed positive response to therapy \par --Attention to follow up will be needed re cervical nodes, will hold off on biopsy for now\par --Continue with weekly Carbo/Taxol\par --May need to consider adding Avastin after eye surgery\par --Will briefly hold chemotherapy for eye surgery.\par --Script for post placement was given to nurse navigator, PT/PTT today, CBC 2-3 day prior to procedure, she may proceed without additional preadmission testing\par \par Papillary thyroid cancer [id number], 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 Herrera \par --Follow up with Dr. Acevedo of endocrinology; he is addressing vitamin D, thyroid and diabetes\par \par Cataract of eye\par -- Saw ophthalmology. She is planned for surgery in 9/30/19 . \par --Will hold chemotherapy  on 9/27/19 she will resume it after surgery. \par \par Follow up in 3 weeks\par Patient seen and case discussed with Dr Taylor who agreed for the above plan of care.

## 2019-09-30 ENCOUNTER — OUTPATIENT (OUTPATIENT)
Dept: OUTPATIENT SERVICES | Facility: HOSPITAL | Age: 64
LOS: 1 days | Discharge: HOME | End: 2019-09-30

## 2019-09-30 VITALS — SYSTOLIC BLOOD PRESSURE: 108 MMHG | RESPIRATION RATE: 18 BRPM | HEART RATE: 82 BPM | DIASTOLIC BLOOD PRESSURE: 67 MMHG

## 2019-09-30 VITALS
OXYGEN SATURATION: 97 % | HEIGHT: 65 IN | DIASTOLIC BLOOD PRESSURE: 84 MMHG | WEIGHT: 162.04 LBS | TEMPERATURE: 97 F | RESPIRATION RATE: 17 BRPM | SYSTOLIC BLOOD PRESSURE: 115 MMHG | HEART RATE: 76 BPM

## 2019-09-30 DIAGNOSIS — Z90.710 ACQUIRED ABSENCE OF BOTH CERVIX AND UTERUS: Chronic | ICD-10-CM

## 2019-09-30 DIAGNOSIS — Z98.890 OTHER SPECIFIED POSTPROCEDURAL STATES: Chronic | ICD-10-CM

## 2019-09-30 LAB — GLUCOSE BLDC GLUCOMTR-MCNC: 93 MG/DL — SIGNIFICANT CHANGE UP (ref 70–99)

## 2019-09-30 RX ORDER — ONDANSETRON 8 MG/1
4 TABLET, FILM COATED ORAL ONCE
Refills: 0 | Status: DISCONTINUED | OUTPATIENT
Start: 2019-09-30 | End: 2019-10-22

## 2019-09-30 RX ORDER — ACETAMINOPHEN 500 MG
650 TABLET ORAL ONCE
Refills: 0 | Status: DISCONTINUED | OUTPATIENT
Start: 2019-09-30 | End: 2019-10-22

## 2019-09-30 NOTE — ASU PREOP CHECKLIST - TO WHOM
opportunity for questions/ answers rendered B Moises SALDAÑA opportunity for questions/ answers rendered

## 2019-10-01 LAB
ALBUMIN SERPL ELPH-MCNC: 4.2 G/DL
ALP BLD-CCNC: 81 U/L
ALT SERPL-CCNC: 24 U/L
ANION GAP SERPL CALC-SCNC: 11 MMOL/L
APTT BLD: 32.1 SEC
AST SERPL-CCNC: 21 U/L
BILIRUB SERPL-MCNC: 0.5 MG/DL
BUN SERPL-MCNC: 4 MG/DL
CALCIUM SERPL-MCNC: 8.9 MG/DL
CHLORIDE SERPL-SCNC: 104 MMOL/L
CO2 SERPL-SCNC: 28 MMOL/L
CREAT SERPL-MCNC: 0.6 MG/DL
GLUCOSE SERPL-MCNC: 95 MG/DL
HCT VFR BLD CALC: 27.9 %
HGB BLD-MCNC: 9 G/DL
INR PPP: 0.97 RATIO
MCHC RBC-ENTMCNC: 32.3 G/DL
MCHC RBC-ENTMCNC: 33.1 PG
MCV RBC AUTO: 102.6 FL
PLATELET # BLD AUTO: 146 K/UL
PMV BLD: 10 FL
POTASSIUM SERPL-SCNC: 3.7 MMOL/L
PROT SERPL-MCNC: 6.6 G/DL
PT BLD: 11.2 SEC
RBC # BLD: 2.72 M/UL
RBC # FLD: 15.8 %
SODIUM SERPL-SCNC: 143 MMOL/L
WBC # FLD AUTO: 3.46 K/UL

## 2019-10-03 ENCOUNTER — APPOINTMENT (OUTPATIENT)
Dept: INTERNAL MEDICINE | Facility: CLINIC | Age: 64
End: 2019-10-03
Payer: MEDICAID

## 2019-10-03 ENCOUNTER — OUTPATIENT (OUTPATIENT)
Dept: OUTPATIENT SERVICES | Facility: HOSPITAL | Age: 64
LOS: 1 days | Discharge: HOME | End: 2019-10-03

## 2019-10-03 VITALS
BODY MASS INDEX: 26.82 KG/M2 | SYSTOLIC BLOOD PRESSURE: 105 MMHG | WEIGHT: 161 LBS | DIASTOLIC BLOOD PRESSURE: 74 MMHG | HEIGHT: 65 IN | HEART RATE: 106 BPM | TEMPERATURE: 98 F

## 2019-10-03 DIAGNOSIS — Z98.890 OTHER SPECIFIED POSTPROCEDURAL STATES: Chronic | ICD-10-CM

## 2019-10-03 DIAGNOSIS — H26.9 UNSPECIFIED CATARACT: ICD-10-CM

## 2019-10-03 DIAGNOSIS — Z90.710 ACQUIRED ABSENCE OF BOTH CERVIX AND UTERUS: Chronic | ICD-10-CM

## 2019-10-03 PROCEDURE — 99214 OFFICE O/P EST MOD 30 MIN: CPT

## 2019-10-03 RX ORDER — ADHESIVE TAPE 3"X 2.3 YD
50 MCG TAPE, NON-MEDICATED TOPICAL
Refills: 0 | Status: DISCONTINUED | COMMUNITY
End: 2019-10-03

## 2019-10-03 RX ORDER — PANTOPRAZOLE 40 MG/1
40 TABLET, DELAYED RELEASE ORAL
Qty: 30 | Refills: 3 | Status: DISCONTINUED | COMMUNITY
Start: 2018-10-25 | End: 2019-10-03

## 2019-10-03 NOTE — ASSESSMENT
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 Herrera \par -on levothyroxine 100 mcg might need to increase dose \par \par Diabetes Mellitus type 2 \par last HBA1c april was 7.2\par patient on metformin \par repeat HBA1c and fu with Dr Acevedo \par opthalmo and podiatry at due date \par \par Vitamin D def \par resolved 58 level \par stop vitamin D and follow up with Dr Acevedo \par \par L Cataract\par s/p surgery 09/30\par improved vision \par \par #HCM\par needs to repeat mammogram - referral is given \par colonoscopy done in 2018 has a 5 mm polyp - has to repeat in 2023\par flu test deferred until consulted with HEm/onc

## 2019-10-03 NOTE — PHYSICAL EXAM
[No Acute Distress] : no acute distress [No JVD] : no jugular venous distention [Normal Outer Ear/Nose] : the outer ears and nose were normal in appearance [No Respiratory Distress] : no respiratory distress  [Supple] : supple [No Accessory Muscle Use] : no accessory muscle use [Clear to Auscultation] : lungs were clear to auscultation bilaterally [Normal S1, S2] : normal S1 and S2 [No Murmur] : no murmur heard [No Edema] : there was no peripheral edema [Non Tender] : non-tender [Soft] : abdomen soft [No Rash] : no rash [No HSM] : no HSM [Coordination Grossly Intact] : coordination grossly intact [de-identified] : tachycardic

## 2019-10-03 NOTE — HISTORY OF PRESENT ILLNESS
[FreeTextEntry1] : follow up [de-identified] : 65y/o F with PMH of Stage IV Endometrial Cancer s/p chemotherapy, Papillary Thyroid cancer s/p Thyroidectomy, hx of Malignant pleural effusion s/p pleurex cath and subsequent removal, DM, and Vit D insufficiency presents to clinic for routine follow up. she has interval history of cataract surgery after which she found " a bruise like lesion with itching on her abdomen that she is worried about \par but otherwise no active complaints \par \par \par Dr Yoon for oncology, Dr Acevedo for endocrinology, Dr Thompson for pulmonary, Dr Herrera for ENT. \par

## 2019-10-04 ENCOUNTER — LABORATORY RESULT (OUTPATIENT)
Age: 64
End: 2019-10-04

## 2019-10-04 ENCOUNTER — APPOINTMENT (OUTPATIENT)
Dept: INFUSION THERAPY | Facility: CLINIC | Age: 64
End: 2019-10-04
Payer: MEDICAID

## 2019-10-04 ENCOUNTER — APPOINTMENT (OUTPATIENT)
Dept: HEMATOLOGY ONCOLOGY | Facility: CLINIC | Age: 64
End: 2019-10-04
Payer: MEDICAID

## 2019-10-04 VITALS
TEMPERATURE: 98.2 F | RESPIRATION RATE: 16 BRPM | BODY MASS INDEX: 26.99 KG/M2 | WEIGHT: 162 LBS | HEIGHT: 65 IN | DIASTOLIC BLOOD PRESSURE: 67 MMHG | SYSTOLIC BLOOD PRESSURE: 104 MMHG | HEART RATE: 78 BPM

## 2019-10-04 DIAGNOSIS — Z85.42 PERSONAL HISTORY OF MALIGNANT NEOPLASM OF OTHER PARTS OF UTERUS: ICD-10-CM

## 2019-10-04 DIAGNOSIS — H25.12 AGE-RELATED NUCLEAR CATARACT, LEFT EYE: ICD-10-CM

## 2019-10-04 DIAGNOSIS — C73 MALIGNANT NEOPLASM OF THYROID GLAND: ICD-10-CM

## 2019-10-04 DIAGNOSIS — Z79.84 LONG TERM (CURRENT) USE OF ORAL HYPOGLYCEMIC DRUGS: ICD-10-CM

## 2019-10-04 DIAGNOSIS — Z91.048 OTHER NONMEDICINAL SUBSTANCE ALLERGY STATUS: ICD-10-CM

## 2019-10-04 LAB
HCT VFR BLD CALC: 31 %
HGB BLD-MCNC: 10.3 G/DL
MCHC RBC-ENTMCNC: 33.2 G/DL
MCHC RBC-ENTMCNC: 33.7 PG
MCV RBC AUTO: 101.3 FL
PLATELET # BLD AUTO: 201 K/UL
PMV BLD: 10.2 FL
RBC # BLD: 3.06 M/UL
RBC # FLD: 15.6 %
WBC # FLD AUTO: 4.77 K/UL

## 2019-10-04 PROCEDURE — 99213 OFFICE O/P EST LOW 20 MIN: CPT

## 2019-10-04 NOTE — HISTORY OF PRESENT ILLNESS
[Disease: _____________________] : Disease: [unfilled] [AJCC Stage: ____] : AJCC Stage: [unfilled] [de-identified] : Serena is a rodrigue 61 yo female, who has started developing SOB approximately 2 months ago, she went to ER on 11/2/2017 and on CXR was noted to have a large right sided pleural effusion. She underwent a thoracentesis, 1.6L of fluid was drained. 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 IHC was pos for CK7, PAX8, CK5/6 and Ca125, negative for TTF-1/Napsin, calretinin, CK20, mammaglobin, p63, villin, HAM56, GCDFP15, TAY-EP4. \par \par Her visit on 12/1/2017 Serena had an excisional biopsy of cervical node on 12/13/2017 revealing met adenocarcinoma, primary source still was not clear. On 12/14/2017 Serena was admitted with SOB, Pleurx catheter was placed on 12/14/2017. 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 Serena received 1st dose of carbo (auc of 5)/taxol(175mg/m2) on 12/15/2017 without complications, but the next day sustained a fall 2/2 vasovagal episode and developed asymptomatic SAH, that resolved on imaging by 12/21/2017.  [de-identified] : KRas WT, EGFR WT, Alk WT, ROS1 WT, PDL1-1%\par Normal MMR protein expression [de-identified] : 11/24/2017: She reports some SOB, especially ARZATE today. No fevers, no weight loss (reports some weight gain), no GI,  or GYN symptoms, except for increasing abdominal girth, she reports no blood in the stool or urine and no vaginal bleeding. She reports no CP and no palpitations, she reports worsening nonproductive cough, no hemoptysis. She also reports difficulty lying on left side and lying down flat. \par She reports left on/off facial numbness several months in duration. She had a CT of her head performed in [facility] within 1 year.  Additionally there is a freely mobile left cervical node present on exam, as per patient this was in place for approximately 1 year. \par \par 12/1/2017: Serena is here for follow up today. She had a therapeutic thoracentesis in  on 11/27/2017, 2L of fluid were removed, with much improved respiratory status. PET CT and MRI of the brain were done on 11/29/2017, findings were discussed today. There remains no clear primary source of malignancy. We have discussed that the source of tumor may be Mullerian vs lung. regardless of source chemotherapy needs to be initiated ASAP. We have discussed Carbo/taxol regiment that will cover both Mullerian and lung origin. Side effects including myelosuppression, peripheral neuropathy, allergic reaction and others.\par \par 1/2/2017 Since last visit Serena had an excisional biopsy of cervical node on 12/13/2017 revealing met adenocarcinoma, primary source still was not clear. On 12/14/2017 Serena was admitted with SOB, Pleurx catheter was placed on 12/14/2017. 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 Serena received 1st dose of carbo (auc of 5)/taxol(175mg/m2) on 12/15/2017 without complications, but the next day sustained a fall 2/2 vasovagal episode and developed asymptomatic SAH, that resolved on imaging by 12/21/2017. Today Serena's SOB has significantly improved. She reports very mild neuropathy in fingertips only. She reports no headache and offers no other complaints. \par \par 1/26/2018: Complains of some neuropathy, otherwise tolerating chemo with no difficulty. \par \par 2/16/2018: restaging CT C/A/P reviewed, significant improvement noted. Will refer to GYN/ONC. Reports slightly worsening neuropathy, not -painful, taking Gabapentin, no other symptoms. For cycle 4 of carbo/taxol today.\par \par 3/9/2018: Serena met with Dr. Massey, she is planned for surgery on 5/7/2018, after completion of 6 cycles of carbo/taxol and restaging PET CT ordered for mid April and follow up with Dr. Massey on April 20. She reports worsening neuropathy, and occasional pain and changes in vision in her left eye, eye symptoms come and go and are not very bothersome. She reports no other symptoms. \par \par 3/30/2018; Serena is here for cycle 6 of carbo/taxol, she continues to have neuropathy in finger and toes, but offers no other complaints, chemo has been dose reduced. \par \par 4/27/2018: Results of restaging PET CT s/p 6 cycles of carbo/taxol revealed 1. No new areas of abnormal increased uptake is seen to indicate \par biologically active disease.\par \par 2. Persistent PET positive left thyroid mass with a max SUV 33.1, \par previously max SUV 34.8. Further evaluation with thyroid ultrasound and \par if needed fine-needle aspiration biopsy will be helpful.\par \par 3.   PET positive left cervical lymph nodes mainly level 2 on the left \par with a max SUV 5.47previously 18. Minimal increased uptake in the \par bilateral axillary lymph nodes most likely reactive(less than 2.5)\par \par 4. Weakly PET positive, max SUV 2.89 right pleura, previous max SUV 5.3 \par with non-FDG avid right pleural effusion. \par \par 5. Previously FDG avid right and left supraclavicular lymphadenopathy, \par left internal mammary, bilateral paratracheal, para-aortic, para \par vertebral, carinal, mesenteric, right and left iliacs, right inguinal \par lymphadenopathy, small in size and no longer FDG avid.\par \par 6. No abnormal increased uptake is seen in the  lobulated uterus.\par \par 7. Overall there is marked improvement in the FDG avid disease.\par This was reviewed with Serena. She met with Dr. Massey on 4/20/2018, surgery is planned for 5/7/2018. She will also joselyn to meet with ENT re FDG avid thyroid nodule. Will assist in making he an appointment for her. \par Persistent neuropathy on gabapentin. \par \par 6/29/18 ; Patient came for follow up visit , evaluation for chemotherapy cycle 8 of carbo / Taxol , patient tolerating medication well , except neuropathy  recommend to have gabapentin  300 mg po daily.\par \par 7/20/2018: Serena present for follow up today, she has completed total of 8 cycles of Carbo/Taxol. She reports significant neuropathy in her hands and feet. We will discontinue chemotherapy today and plan to follow with close observation. She has thyroidectomy planned with Dr. Herrera on 8/20/2018, obtaining clearance for PMD. She is also planning to fly to [place] at the end of September. She reports no SOB, no GI or  symptoms. \par \par 9/12/2018: Serena offers no significant complaints today. She states neuropathy in her hands has almost completely resolved and neuropathy in her feet is significantly better. She had undergone complete thyroidectomy by Dr. Herrera on 8/20/2018, pathology revealed papillary thyroid cancer, measuring 2 cm, pT1b, other additional foci of papillary carcinoma were also noted, no LVI, surgical margins were clear, LN 0/2 had no carcinoma, stage [id number]. She is following up with Dr. Herrera tomorrow. She has still not gone for her vacation in [place]. \par \par 10/10/2018: Restaging PET CT on 9/26/2018 reveals Since 4/16/2018,  1. Post thyroidectomy with diffuse increased FDG uptake at the thyroid  bed likely representing post-surgical changes.  2. Subtle increased uptake is seen in the lamina , right side at the  level of T8 with a max SUV 4.6. This is not sufficient for metastatic  disease. Bone scan or MRI examination with contrast will be helpful for  further evaluation.  3. No other areas of abnormal increased uptake is seen. Images were personally reviewed by myself and discussed with Serena. \par She also had an Echo on 9/24/2018 revealing EF of 63%. \par Serena reports ongoing fatigue, she is unable to lose weight. She is taking Synthroid and following with Dr. Acevedo. \par She reports stiff fingers at night only, no painful neuropathy. \par She denies any other symptoms today. \par \par 12/11/2018: Serena feels well, neuropathy in hands and feet is much improved. S he is now complaining of r-sided facial pain associated with some swelling, pain comes and goes. She has already seen Dr. Herrera, who ordered CT of sinuses and prescribed pain relief meds. She is also due for restaging PET CT. Serena feels well otherwise. \par \par 1/9/2019: Restaging PET CT was performed on 12/18/2018 it revealed COMPARISON : 9/24/2018.  New left upper quadrant peritoneal nodule measuring 8 mm with an SUV max  of 7.3. This is suggestive of biologic tumor activity.  No other FDG avid lesions seen throughout the scan. Images were personally reviewed by myself and discussed with Serena, originally over the phone and again today. I have originally suggested to try AI in the interim, Serena however reported diarrhea at the time of the scan and declined intervention for now. We will plan for restaging PET CT to be performed in 6-8 weeks, this will be ordered today. She will follow up with Dr. Massey at [facility] shortly and bring the disk for his review. I would also like her to meet with genetics, this will be arranged at [facility] with Dr. Massey's help. Serena reports no new symptoms today, her neuropathy is manageable and  improving. She still reports unilateral headache that was work up in the past. Neurology eval was again suggested to her. She is following closely with Endocrinology re thyroid management. She will have follow up with Dr. Herrera shortly as well. \par \par 2/20/2019: Serena offers no new complaints. PET CT from 2/12/2019 revealed \par Since PET/CT of December 19, 2018, no new sites of pathologic FDG uptake\par Slight increase in size of left upper quadrant peritoneal nodule (1.1 cm, \par previously 0.8 cm) with stable FDG uptake, 7.7 SUV suspicious for \par biologic tumor activity.\par No other sites of pathologic FDG uptake \par Images were personally reviewed by myself and discussed with Serena, case was also discussed with Dr. Massey at [facility]. Serena will have follow up with his shortly. She is following with endocrinology. reports improving neuropathy. No GI or  symptoms at this time. No weight loss. \par \par 3/20/2019: Serena had a peritoneal nodule biopsy done at [facility], I have received a call from Dr. 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 We have discussed that surgery though could be attempted will not be the best therapeutic approach, recommencement of systemic therapy was discussed. I have not received the results from [facility] yet. I have briefly discussed this with Serena today. Serena reports that she has acutely developed right lower extremity weakness 5-6 days ago, she did not inform any of her doctors about this. She also reports that her R upper extremity though not weak "does not feel normal either. She reports no back pain, there is no point tenderness, RLE is objectively weak on exam. I recommend ER evaluation to r/o CVA vs brain met, vs L-spine related issue. Recommend admission for work up. Serena is agreeable to get admitted. \par \par 5/8/2019: Serena present for follow up, she was diagnosed with multiple metastasis to the brain, MRI was done on 3/21/2019 and revealed \par Multiple supratentorial heterogeneously enhancing lesions consistent with \par metastatic disease. The appearance on the T2-weighted images is suggestive \par of adenocarcinoma. There is mass effect upon the left lateral ventricle and \par corpus callosum. \par She received whole brain irradiation by Dr. Dahl, completed it in the beginning of 4/2019, she is currently on steroid taper managed by Dr. Dahl, she is taking 4mg of Decadron daily. She will be stopping the Keppra as there have been no documented h/o seizures. She still reports impairment of the L visual field, she has an appointment coming up with Opthalmology. She reports no deficits walking after she has completed inpatient acute rehabilitation. \par Restaging PET CT on 5/1/2019 reveals COMPARISON : 2/12/2019 \par Multiple new FDG avid omental nodules largest measuring up to 10 mm, \par positive on nonattenuation corrected images. Findings are suggestive of \par biologic tumor activity. \par Again seen is a left upper quadrant peritoneal nodule, SUV max 6.8, \par previously 7.7 (12% decrease). \par Images were personally reviewed by myself and discussed with patient. \par She now reports mild abdominal bloating, no other symptoms. She reports her neuropathy has significantly improved. \par She has first evidence of recurrent disease documented 5.5 months after last carbo/taxol dose, the volume of disease was very small (1 8mm nodule), she was then observed for another 6 months and now she wishes to restart the chemotherapy. \par I have discussed with her that rechallenging with carbo/taxol may still prove to be effective as long as she gets restaged after 2 cycles. Given recent whole brain radiation and neuropathy I will offer her carbo/taxol weekly with does reduction on the taxol for neuropathy, We will plan to run carbo slowly to avoid allergic reaction. \par She is agreeable to start ASAP. \par \par 6/5/2019: Serena has completed 1 cycle of weekly Carbo/taxol, ran at slow rate to avoid Carbo reaction and has tolerated chemotherapy without difficulty, she does not complain of increase in neuropathy. She has ongoing vision changes in her L eye, she had no residual LE weakness. She is off Decadron and Keppra. She was advised to see ophthalmology. \par \par 7/3/2019: Serena is doing well.  Reports no problems with carbo/taxol.  Still complains of vision changes in L eye but is seeing ophthalmologist on 7/16.  Remains active around the house and cooks regularly.  No fevers, weight loss, anorexia.  ROS is otherwise only significant for occasional loose stools, no diarrhea.\par \par 7/17/2019: Serena is doing well, denies worsening neuropathy. She c/o feeling fatigued and tired. Her last chemo was 1 week ago(7/11/19) with carbo/taxol and is due again tomorrow. She saw ophthalmology and will need cataract surgery of the L eye. No c/o chest pain/SOB. No weight loss.\par CT C/A/P was done on 7/9/2019, images were personally reviewed by myself and discussed with several radiology attendings, they revealed \par CHEST:\par Filling defect within a segmental branch of the lower lobe pulmonary artery \par suspicious for pulmonary embolus. \par Stable left upper lobe and right middle lobe 3 mm nodules. \par CT abdomen and pelvis performed on the same day, see separate report. \par ADDENDUM: \par Please note that the morphology of the small thrombus within the left lower \par pulmonary artery is not indicative of an acute thrombus but rather a chronic \par appearance. \par This was discussed with Radiology, it was not deemed that thrombus was acute and may not have even been present at all, finding was deemed to be equivocal, based on radiology assessment anticoagulation was not indicated for the filling defect noted. Initially CTA was recommended, the recommendation was withdrawn upon further review. \par ABDOMEN/PELVIS:\par New 1.3 cm segment IV hepatic hypodensity seen on series 4 image 205. \par Lesion not seen on prior PET/CT from 5/1/2019 or abdomen and pelvis CT from \par \par 2/2/2018 and is consistent with a new metastasis. \par Two other hepatic lesions described above, decreased in size since 2/2/2018, \par consistent with treated hepatic metastases. \par Anterior perisplenic omental nodule measures 0.7 cm on series 2 image 53, \par previously measuring 1.2 cm on PET/CT dated 5/1/2019. \par All the other previously seen omental nodules have resolved since 5/1/2019. \par ADDENDUM:\par Case was discussed with Dr. Cid in detail. It is also possible that the \par new 1.3 cm lesion in the liver since February 2, 2018 represents a treated \par metastasis similar to the other liver lesions. \par PET/CT would be necessary to assess disease activity. \par I have discussed case with Radiology, and it was determined that there was no clear cut evidence of progression. PET CT was ordered today.\par This was discussed with Serena, will proceed with Carbo/Taxol for now. \par \par 8/1/2019: Serena reports no major side effects from weekly Carbo/Taxol, she reports no neuropathy. She has completed 10 cycles altogether. \par PET CT on 7/24/2019 revealed \par 1. Since May 1, 2019, no definite new site of pathologic FDG uptake to \par suggest new biologic tumor activity; specifically, no pathologic FDG uptake \par within previously noted 1.3 cm lesion within hepatic segment 4. \par 2. Increased FDG uptake within several subcentimeter bilateral cervical \par lymph nodes, which is nonspecific and may be postinflammatory; max SUV 9.2 \par is noted within a 0.7 cm right level 2 cervical node. Attention on follow-up \par is suggested. \par 3. Few peritoneal nodules have overall decreased in size. \par 4. No additional site of pathologic FDG uptake. \par Images were personally reviewed by myself and discussed with Serena. \par She wishes to continue with chemotherapy. Will plan for 3 additional cycles. Will consider adding Avastin, but with h/o inconclusive/possible chronic PE ppx anticoagulation maybe necessary. \par \par 8/28/2019: Serena reports feeling well, she denies worsening neuropathy, worsening neurological symptoms, SOB, abdominal pain, bloating. She reports she is planned to undergo  eye surgery towards the end of September. We will plan to hold chemotherapy briefly after this cycle to facilitate adequate counts and minimize complications. We again have briefly discussed considering Avastin based therapy, will hold off on this prior to surgery. 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 May proceed without formal PAST, will check CBC prior to procedure, PT and PTT today. \par \par 9/25/19:Serena reports feeling well, she denies any changes since last visit, No SOB, abdominal pain, bloating. She reports she is planned to undergo  eye surgery in 9/30/19  CBC reviewed with normal Hemogram . we will hold chem this week , she will resume after Cataract sx . 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 May proceed without formal PAST, will check CBC prior to procedure, PT and PTT today. \par \par 10/4/2019: Serena underwent successful eye surgery on 9/30/2019, she reports she can see much better now. Last dose of chemotherapy was administered on 9/20, she then was on hold for surgery. She has completed 5 additional cycles of Carbo/taxol. She is due for restaging. Her disease is only accurately assessed on PET CT, prior attempts to obtain CT scans resulted in additional imaging with PET, as findings were inconclusive. PET CT will be ordered to restage. \par In addition she is due for MRI of the brain. She has had a small amount of weight loss, mild neuropathy is persistent. She offers no additional complaints. \par She will have port placed on Monday, will hold chemotherapy until restaging scans complete.

## 2019-10-04 NOTE — REVIEW OF SYSTEMS
[Fatigue] : fatigue [Vision Problems] : vision problems [Negative] : Allergic/Immunologic [Recent Change In Weight] : ~T no recent weight change [Chest Pain] : no chest pain [Palpitations] : no palpitations [Lower Ext Edema] : no lower extremity edema [Shortness Of Breath] : no shortness of breath [Wheezing] : no wheezing [Cough] : no cough [SOB on Exertion] : no shortness of breath during exertion [FreeTextEntry3] : Difficulty seeing out of L eye, much improved [FreeTextEntry9] : normal strength in upper and lower extremities  [de-identified] : L visual field deficit, gait impairment has resolved

## 2019-10-04 NOTE — ASSESSMENT
[FreeTextEntry1] : Papillary-serous endometrial cancer, stage IV\par --PET CT on 11/29/2017 revealed no clear GYN origin, extensive KEVIN, uptake in the right lung and pleura, thyroid nodule\par --Malignant pleural effusion, resolved\par --L side PleurX catheter was removed on 2/8/2018\par --Completed cycle 8  of carbo/taxol on 6/29/2018.  2 cycles were administered  postoperatively.\par --Restaging PET CT on 9/26/2018 (3 months post completion of chemo 6/29/2018)was essentially negative for recurrent disease\par --Restaging PET CT on 12/18/2018 reveals   New left upper quadrant peritoneal nodule measuring 8 mm with an SUV max  of 7.3. This is suggestive of biologic tumor activity.  No other FDG avid lesions seen throughout the scan.\par --restaging PET CT on 2/12/2019 revealed slight increase in size of left upper quadrant peritoneal nodule (1.1 cm, \par previously 0.8 cm) with stable FDG uptake, 7.7 SUV suspicious for biologic tumor activity.\par --Case was discussed with Dr. Massey at [facility], biopsy of peritoneal nodule is positive for recurrent endometrial cancer.\par --MRI brain on 4/23/2019 revealed multiple brain mets\par --S/p whole brain radiation completed 4/2019, required acute rehab\par --PET CT on 5/1/2019 subsequently revealed further disease progression \par --Restarted weekly carbo/taxol 3 on 1 off on 5/9/2019, continue with weekly Carbo/Taxol for now.\par - Patient will proceed with chemo Cycle #4 on 9/27/19 . \par --Off Decadron and Keppra since 6/5/2019 \par --Restaging brain MRI on 6/8/2019 showed almost complete resolution of most supratentorial lesions and no new lesions.\par --Restaging CT C/A/P on 7/9/2019 did not reveal definite progression, there was a questionable filling defect suspicious for possible chronic PE/artifact, this was discussed with Radiology at length anticoagulation was not deemed to be necessary\par --PET CT on 7/24/2019 revealed positive response to therapy \par --Attention to follow up will be needed re cervical nodes, will hold off on biopsy for now\par --Continue with weekly Carbo/Taxol\par --May need to consider adding Avastin after eye surgery\par --Will briefly hold chemotherapy for eye surgery, will continue to hold for port placement and restaging \par --Restaging PET CT and MRI of the brain was ordered on 10/4/2019 \par \par Papillary thyroid cancer [id number], 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 Herrera \par --Follow up with Dr. Acevedo of endocrinology; he is addressing vitamin D, thyroid and diabetes\par \par Cataract of eye\par -- S/p successful surgery on 9/30/19 . \par \par Follow up in 2 weeks\par Patient seen and case discussed with Dr Taylor who agreed for the above plan of care.

## 2019-10-04 NOTE — PHYSICAL EXAM
[Restricted in physically strenuous activity but ambulatory and able to carry out work of a light or sedentary nature] : Status 1- Restricted in physically strenuous activity but ambulatory and able to carry out work of a light or sedentary nature, e.g., light house work, office work [Normal] : affect appropriate [de-identified] : B/L cataracts appreciated [de-identified] : CTA B/L

## 2019-10-04 NOTE — RESULTS/DATA
PODIATRIC SURGERY CONSULTATION REPORT      Referring Provider: Lorene  Reason for Consultation: Left foot infection Gas Gangrene, Abcess rule out OM      Principal problem: <principal problem not specified>    Subjective .        History of present illness:   Ms. Elena, 59 y.o. years old female with past medical history significant for diabetes,hypertension presented to [facility] Emergency Department on 4/12/2017 for left foot pain and blister formation with redness and swelling after she punctured her foot while playing with her grandson a few weeks ago. She developed a fever of 101.5 and worsening symptoms which prompted her to come to the ED. Denies vomiting or fever. Podiatric Surgery consultation was requested for lower extremity management. History taken from: patient, Vibha RN, Case was discussed with patient and nursing staff     Review of Systems     Constitutional: See history of present illness, confusion   Eyes: no eye drainage, itching or redness.  HEENT: no mouth sores, dysphagia or nose bleed.  Respiratory: no for shortness of breath, cough or production of sputum.  Cardiovascular: no chest pain, no palpitations, no orthopnea.  Gastrointestinal: no nausea, vomiting or diarrhea. No abdominal pain, hematemesis or rectal bleeding.  Genitourinary: no dysuria or polyuria.  Hematologic/lymphatic: no lymph node abnormalities, no easy bruising or easy bleeding.  Musculoskeletal: no muscle or joint pain.  Skin: See history of present illness  Neurological: no loss of consciousness, no seizure, no headache.  Behavioral/Psych: no depression or suicidal ideation.  Endocrine: no hot flashes.  Immunologic: negative.     Past Medical History      Medical History         Past Medical History:   Diagnosis Date   • Arthritis     • Depression     • Diabetes mellitus     • Hypertension              Past Surgical History      Surgical History          Past Surgical History:   Procedure Laterality Date   •  "BACK SURGERY       • CATARACT EXTRACTION         Left    •  SECTION       • DENTAL PROCEDURE       • HYSTERECTOMY       • TOE AMPUTATION       • TUBAL ABDOMINAL LIGATION                Family History           Family History   Problem Relation Age of Onset   • Heart disease Mother     • Cancer Mother     • COPD Mother           Social History     Social History   Substance Use Topics   • Smoking status: Never Smoker   • Smokeless tobacco: None   • Alcohol use No         Allergies     Codeine; Penicillins; and Sulfa antibiotics        Objective          /51 (BP Location: Right arm, Patient Position: Lying)  Pulse 76  Temp (!) 101.5 °F (38.6 °C) (Oral)  Resp 18  Ht 66\" (167.6 cm)  Wt 157 lb 9 oz (71.5 kg)  SpO2 97%  BMI 25.43 kg/m2     Temp: [98 °F (36.7 °C)-101.5 °F (38.6 °C)] 101.5 °F (38.6 °C)           Intake/Output Summary (Last 24 hours) at 17 0908  Last data filed at 17 0300    Gross per 24 hour   Intake 0 ml   Output 0 ml   Net 0 ml            Physical Exam:      General Appearance:  Alert but seems confusedLe, cooperative, in no acute distress   Head:  Normocephalic, without obvious abnormality, atraumatic   Eyes:      Lids and lashes normal, conjunctivae and sclerae normal, no icterus, no pallor, corneas clear, PERRLA   Ears:  Ears appear intact with no abnormalities noted   Throat: No oral lesions, no thrush, oral mucosa moist   Neck: No adenopathy, supple, trachea midline, no thyromegaly, no carotid bruit, no JVD   Back:  No tenderness to percussion or palpation, range of motion normal   Lungs:  Clear to auscultation,respirations regular, even and unlabored. No wheezing, no ronchi and no crackles.   Heart:  Regular rhythm and normal rate, normal S1 and S2, no murmur, no gallop, no rub, no click   Chest Wall:  No abnormalities observed   Abdomen:  Normal bowel sounds, no masses, no organomegaly, soft non-tender, non-distended, no guarding, no rebound tenderness   Rectal:  " Deferred   Extremities: Left foot necrotic sloughing tissue entire first mtpj dorsum, medial and plantar with extensive erythema, edema, warmth. Mild crepitus with Range of motion first mtpj. Erythema extends tip hallux over 2nd and 3rd mtpj.  Clinical Gas Gangrene. Plantar 2mm puncture wound with drainaing purulent fluid.    Pulses: Pulses palpable and equal bilaterally   Skin: status post left foot I&D with erythema and drainage.    Lymph nodes: No palpable adenopathy   Neurologic: Awake, alert and oriented x 3. Following commands.         Results:        Results from last 7 days  Lab Units 04/12/17 2047   WBC 10*3/mm3 8.22            Lab Results   Component Value Date     NEUTROABS 5.84 04/12/2017            Results from last 7 days  Lab Units 04/12/17 2047   CREATININE mg/dL 1.78*          Imaging Results (last 24 hours)     Procedure Component Value Units Date/Time     XR Foot 2 View Left [[id number]] Updated: 04/12/17 2118            Results Review:  I have personally reviewed laboratory data, culture results, radiology studies and antimicrobial therapy.     Hospital Medications (active)        Dose Frequency Start End     acetaminophen (TYLENOL) tablet 487.5 mg 487.5 mg Every 4 Hours PRN 4/13/2017       Sig - Route: Take 1.5 tablets by mouth Every 4 (Four) Hours As Needed for Fever. - Oral     atenolol (TENORMIN) tablet 25 mg 25 mg Daily 4/13/2017       Sig - Route: Take 1 tablet by mouth Daily. - Oral     cefepime (MAXIPIME) 2 g/100 mL 0.9% NS (mbp) 2 g Every 8 Hours 4/13/2017       Sig - Route: Infuse 100 mL into a venous catheter Every 8 (Eight) Hours. - Intravenous     cetirizine (zyrTEC) tablet 10 mg 10 mg Daily 4/13/2017       Sig - Route: Take 1 tablet by mouth Daily. - Oral     clindamycin (CLEOCIN) 900 mg in dextrose 5% 50 mL IVPB (premix) 900 mg Once 4/12/2017 4/12/2017     Sig - Route: Infuse 50 mL into a venous catheter 1 (One) Time. - Intravenous     FLUoxetine (PROzac) capsule 20 mg 20 mg Daily  4/13/2017       Sig - Route: Take 1 capsule by mouth Daily. - Oral     gabapentin (NEURONTIN) capsule 800 mg 800 mg Every 8 Hours Scheduled 4/13/2017       Sig - Route: Take 2 capsules by mouth Every 8 (Eight) Hours. - Oral     HYDROcodone-acetaminophen (NORCO)  MG per tablet 1 tablet 1 tablet Every 8 Hours Scheduled 4/13/2017       Sig - Route: Take 1 tablet by mouth Every 8 (Eight) Hours. - Oral     hydrOXYzine (ATARAX) tablet 10 mg 10 mg Daily 4/13/2017       Sig - Route: Take 1 tablet by mouth Daily. - Oral     insulin detemir (LEVEMIR) injection 22 Units 22 Units Nightly 4/13/2017       Sig - Route: Inject 22 Units under the skin Every Night. - Subcutaneous     insulin regular (humuLIN R,novoLIN R) injection 12 Units 12 Units Once 4/12/2017 4/12/2017     Sig - Route: Inject 12 Units under the skin 1 (One) Time. - Subcutaneous     lidocaine PF 1% (XYLOCAINE) injection 10 mL 10 mL Once 4/12/2017       Sig - Route: Inject 10 mL as directed 1 (One) Time. - Injection     Cosign for Ordering: Accepted by Carlos Piper MD on 4/12/2017 10:11 PM     morphine injection 2 mg 2 mg Every 2 Hours PRN 4/13/2017 4/23/2017     Sig - Route: Infuse 1 mL into a venous catheter Every 2 (Two) Hours As Needed for Severe Pain (7-10). - Intravenous     Pharmacy Meds to Bed Consult   Daily 4/13/2017       Sig - Route: Daily. - Does not apply     sodium chloride 0.9 % bolus 1,000 mL 1,000 mL Once 4/12/2017 4/12/2017     Sig - Route: Infuse 1,000 mL into a venous catheter 1 (One) Time. - Intravenous     sodium chloride 0.9 % infusion 75 mL/hr Continuous 4/13/2017       Sig - Route: Infuse 75 mL/hr into a venous catheter Continuous. - Intravenous     temazepam (RESTORIL) capsule 30 mg 30 mg Nightly PRN 4/13/2017       Sig - Route: Take 2 capsules by mouth At Night As Needed for Sleep. - Oral     tiZANidine (ZANAFLEX) tablet 4 mg 4 mg Every 6 Hours Scheduled 4/13/2017       Sig - Route: Take 1 tablet by mouth Every 6 (Six) Hours. -  Oral     vancomycin (VANCOCIN) 1,000 mg in sodium chloride 0.9 % 250 mL IVPB 1,000 mg Once 4/12/2017 4/12/2017     Sig - Route: Infuse 1,000 mg into a venous catheter 1 (One) Time. - Intravenous     vancomycin (VANCOCIN) 1,000 mg in sodium chloride 0.9 % 250 mL IVPB 1,000 mg Every 24 Hours 4/13/2017       Sig - Route: Infuse 1,000 mg into a venous catheter Daily. - Intravenous     Pharmacy to dose vancomycin (Discontinued)   Continuous PRN 4/13/2017 4/13/2017     Sig - Route: Continuous As Needed for Consult. - Does not apply     Reason for Discontinue: Dose adjustment             PROBLEM LIST:         Patient Active Problem List   Diagnosis   • Cellulitis of foot, left          ASSESSMENT:     1. Gas Gangrene with Abcess, Cellulitis, Edema, Rule out septic Joint/Osteomyeltis Left foot  2. Diabetes with Neuropathy, Left.      PLAN:     Unfortunately the patient carries a poor limb prognosis in the setting of possible gas gangrene with abcess rule out om/septic joint. Although the patient has eaten breakfast and lunch today, will keep npo thereafter for immediate I and D with Deep Culture. Check CRP and sed rate with blood cultures. Advise Diagnostic imaging to evaluate the degree of Osteomyelitis involved. ID Following has advised pseudomonal and anaerobic coverage along with vancomycin until cultures back. Will Follow        Patients findings and recommendations were discussed with patient and nursing staff     Code Status: Full Code    Basilio Morris  04/14/2017  11:30 a.m.        [FreeTextEntry1] : EXAM:  MR BRAIN WAW IC      \par \par \par PROCEDURE DATE:  06/08/2019  \par \par \par \par \par INTERPRETATION:  Clinical History / Reason for exam: Dizziness and \par vertigo, history of uterine and thyroid cancer, for evaluation of \par metastatic disease, lesion seen on prior MRI of the brain.\par \par MRI OF THE BRAIN WITHOUT AND WITH CONTRAST\par \par TECHNIQUE:\par \par Multiplanar multisequence imaging of the brain was performed on the \[facility] open magnet before and after the intravenous administration of \par 7.5 cc of Gadavist including brain lab protocol.\par \par COMPARISON:\par \par MRI of the brain without and with contrast dated March 12, 2019.\par \par FINDINGS:\par \par The previously described supratentorial lesions have almost completely \par resolved.\par \par The lesion in the left posterior frontal region now measures 1.4 cm in \par maximum diameter, the lesion in the anterior right frontal lobe measures \par 0.7 cm in maximum diameter, the second lesion in the right frontal lobe \par measures 0.1 cm in maximum diameter, the lesion in the right posterior \par parietal region measures 0.3 cm in maximum diameter, and the left \par occipital lobe lesion measures 1.2 cm in maximum diameter. (Previously \par measuring 2.6, 2.4, 0.5, 0.7, and 2.5 cm respectively).\par \par The third, fourth, and lateral ventricles are normal in size and \par position. There is no shift of the midline structures.\par \par The cerebellar tonsils are minimally low-lying (0.3 cm of cerebellar \par ectopia).\par \par Incidental note is made of a tiny medial cyst.\par \par There are scattered T2/FLAIR hyperintense signal intensities in the\par subcortical white matter which are nonspecific differential diagnostic\par possibilities include chronic ischemic change, foci of gliosis or\par demyelination.\par \par IMPRESSION:\par \par In comparison with the prior MRI of the brain dated March 21, 2019:\par \par There has been almost complete resolution of most of of the previously \par described supratentorial lesions.\par \par There are no new enhancing lesions.\par \par \par \par \par \par \par CRISTINA MÉNDEZ M.D., ATTENDING RADIOLOGIST\par This document has been electronically signed. Oren 10 2019  1:17PM\par   \par \par   \par \par \par [id number]^RI^DC\par \par EXAM:  PETCT SKUL-THI ONC FDG SUBS      \par \par \par PROCEDURE DATE:  05/01/2019  \par \par \par \par \par INTERPRETATION:  \par FDG PET CT STUDY   Subsequent  treatment strategy\par REASON: TUMOR IMAGING - PET with concurrently acquired CT for attenuation \par correction and anatomic localization; skull base to mid - thigh .\par \par CPT code 38965.\par \par Fasting blood glucose level:     91 mg/dl\par \par HISTORY: Endometrial cancer. Stage IV with brain metastatic disease.\par \par TECHNIQUE: Approximately 45 minutes after the intravenous administration \par of 10.7 mCi 18-Fluorine FDG, whole body PET images were acquired from \par base of skull to mid - thigh.\par \par CT protocol used for this PET/CT study is designed for attenuation \par correction and anatomic localization of PET findings.  This  CT \par is not designed to replace state-of-the-art diagnostic CT scans for \par specific imaging protocols of different body parts.\par \par COMPARISON : 2/12/2019.\par Correlation: MRI of the brain on 3/21/2019.\par \par FINDINGS:\par \par Head/Neck: No biologically active head/neck lesions.     \par Normal uptake within the brain, pharyngeal lymphoid tissue, salivary \par glands and laryngeal musculature is noted.    \par \par Thorax:   No suspicious hypermetabolic mediastinal, hilar, lung \par parenchymal lesions.\par \par Abdomen/Pelvis: Physiologic GI/  activity. \par \par Again seen is a left upper quadrant peritoneal nodule, SUV max 6.8, \par previously 7.7 (12% decrease). \par \par Multiple new FDG avid omental nodules largest of which is adjacent to the \par distal transverse colon, measuring 10 mm, positive on nonattenuation \par corrected images, axial image 134. \par \par No other abnormal visceral or guillaume tracer uptake is present.    \par \par Musculoskeletal :  No suspicious hypermetabolic osseous lesions.\par \par Additional CT findings:\par Status post hysterectomy.\par Colonic diverticulosis.\par \par IMPRESSION: \par \par COMPARISON : 2/12/2019\par \par Multiple new FDG avid omental nodules largest measuring up to 10 mm, \par positive on nonattenuation corrected images. Findings are suggestive of \par biologic tumor activity.\par \par Again seen is a left upper quadrant peritoneal nodule, SUV max 6.8, \par previously 7.7 (12% decrease). \par \par \par \par \par

## 2019-10-07 ENCOUNTER — OUTPATIENT (OUTPATIENT)
Dept: OUTPATIENT SERVICES | Facility: HOSPITAL | Age: 64
LOS: 1 days | Discharge: HOME | End: 2019-10-07
Payer: MEDICAID

## 2019-10-07 DIAGNOSIS — Z98.890 OTHER SPECIFIED POSTPROCEDURAL STATES: Chronic | ICD-10-CM

## 2019-10-07 DIAGNOSIS — Z90.710 ACQUIRED ABSENCE OF BOTH CERVIX AND UTERUS: Chronic | ICD-10-CM

## 2019-10-07 LAB — GLUCOSE BLDC GLUCOMTR-MCNC: 107 MG/DL — HIGH (ref 70–99)

## 2019-10-07 PROCEDURE — 99152 MOD SED SAME PHYS/QHP 5/>YRS: CPT

## 2019-10-07 PROCEDURE — 77001 FLUOROGUIDE FOR VEIN DEVICE: CPT | Mod: 26

## 2019-10-07 PROCEDURE — 36561 INSERT TUNNELED CV CATH: CPT

## 2019-10-07 PROCEDURE — 76937 US GUIDE VASCULAR ACCESS: CPT | Mod: 26

## 2019-10-07 RX ORDER — CEFAZOLIN SODIUM 1 G
1000 VIAL (EA) INJECTION ONCE
Refills: 0 | Status: DISCONTINUED | OUTPATIENT
Start: 2019-10-07 | End: 2019-10-22

## 2019-10-07 NOTE — PROGRESS NOTE ADULT - SUBJECTIVE AND OBJECTIVE BOX
PREOPERATIVE DAY OF PROCEDURE EVALUATION:     I have personally seen and examined this patient. I agree with the history and physical which I have reviewed and noted any changes below:     Plan is for image guided port placement with intravenous conscious sedation    Procedure/ risks/ benefits/ goals/ alternatives were explained. All questions answered. Informed content obtained from patient. Consent placed in chart.

## 2019-10-07 NOTE — PROGRESS NOTE ADULT - SUBJECTIVE AND OBJECTIVE BOX
INTERVENTIONAL RADIOLOGY BRIEF-OPERATIVE NOTE    Procedure: Image guided port placement    Pre-Op Diagnosis: Endometrial cancer    Post-Op Diagnosis: Same    Attending: Elliot Landau  Resident: None    Anesthesia (type):  [ ] General Anesthesia  [x] Sedation  [ ] Spinal Anesthesia  [x] Local/Regional    Contrast: 0    Estimated Blood Loss: < 5 cc    Condition:   [ ] Critical  [ ] Serious  [ ] Fair   [x] Good    Findings/Follow up Plan of Care: Placement of 8 [place]ian single lumen Bard PowerPort via right internal jugular vein, catheter tip at cavoatrial junction. Patient tolerated procedure well. Catheter ready for immediate use.    Specimens Removed: None    Implants: Bard PowerPort    Complications: None    Disposition: D/C home      Please call Interventional Radiology [phone] with any questions, concerns, or issues.

## 2019-10-11 ENCOUNTER — APPOINTMENT (OUTPATIENT)
Dept: INFUSION THERAPY | Facility: CLINIC | Age: 64
End: 2019-10-11

## 2019-10-11 ENCOUNTER — LABORATORY RESULT (OUTPATIENT)
Age: 64
End: 2019-10-11

## 2019-10-11 DIAGNOSIS — C54.1 MALIGNANT NEOPLASM OF ENDOMETRIUM: ICD-10-CM

## 2019-10-11 DIAGNOSIS — E11.9 TYPE 2 DIABETES MELLITUS WITHOUT COMPLICATIONS: ICD-10-CM

## 2019-10-11 DIAGNOSIS — Z79.84 LONG TERM (CURRENT) USE OF ORAL HYPOGLYCEMIC DRUGS: ICD-10-CM

## 2019-10-11 DIAGNOSIS — C73 MALIGNANT NEOPLASM OF THYROID GLAND: ICD-10-CM

## 2019-10-11 DIAGNOSIS — Z91.048 OTHER NONMEDICINAL SUBSTANCE ALLERGY STATUS: ICD-10-CM

## 2019-10-11 DIAGNOSIS — D64.9 ANEMIA, UNSPECIFIED: ICD-10-CM

## 2019-10-11 DIAGNOSIS — C55 MALIGNANT NEOPLASM OF UTERUS, PART UNSPECIFIED: ICD-10-CM

## 2019-10-11 DIAGNOSIS — G62.9 POLYNEUROPATHY, UNSPECIFIED: ICD-10-CM

## 2019-10-11 LAB
HCT VFR BLD CALC: 31.8 %
HGB BLD-MCNC: 10.5 G/DL
MCHC RBC-ENTMCNC: 33 G/DL
MCHC RBC-ENTMCNC: 33.7 PG
MCV RBC AUTO: 101.9 FL
PLATELET # BLD AUTO: 217 K/UL
PMV BLD: 9.8 FL
RBC # BLD: 3.12 M/UL
RBC # FLD: 14.9 %
WBC # FLD AUTO: 4.82 K/UL

## 2019-10-15 ENCOUNTER — OUTPATIENT (OUTPATIENT)
Dept: OUTPATIENT SERVICES | Facility: HOSPITAL | Age: 64
LOS: 1 days | Discharge: HOME | End: 2019-10-15
Payer: MEDICAID

## 2019-10-15 DIAGNOSIS — Z98.890 OTHER SPECIFIED POSTPROCEDURAL STATES: Chronic | ICD-10-CM

## 2019-10-15 DIAGNOSIS — Z90.710 ACQUIRED ABSENCE OF BOTH CERVIX AND UTERUS: Chronic | ICD-10-CM

## 2019-10-15 DIAGNOSIS — C54.1 MALIGNANT NEOPLASM OF ENDOMETRIUM: ICD-10-CM

## 2019-10-15 PROCEDURE — 70553 MRI BRAIN STEM W/O & W/DYE: CPT | Mod: 26

## 2019-10-17 DIAGNOSIS — E03.9 HYPOTHYROIDISM, UNSPECIFIED: ICD-10-CM

## 2019-10-17 DIAGNOSIS — C54.1 MALIGNANT NEOPLASM OF ENDOMETRIUM: ICD-10-CM

## 2019-10-17 DIAGNOSIS — E11.9 TYPE 2 DIABETES MELLITUS WITHOUT COMPLICATIONS: ICD-10-CM

## 2019-10-17 DIAGNOSIS — D64.9 ANEMIA, UNSPECIFIED: ICD-10-CM

## 2019-10-18 ENCOUNTER — OUTPATIENT (OUTPATIENT)
Dept: OUTPATIENT SERVICES | Facility: HOSPITAL | Age: 64
LOS: 1 days | Discharge: HOME | End: 2019-10-18
Payer: MEDICAID

## 2019-10-18 DIAGNOSIS — Z98.890 OTHER SPECIFIED POSTPROCEDURAL STATES: Chronic | ICD-10-CM

## 2019-10-18 DIAGNOSIS — Z90.710 ACQUIRED ABSENCE OF BOTH CERVIX AND UTERUS: Chronic | ICD-10-CM

## 2019-10-18 DIAGNOSIS — C54.1 MALIGNANT NEOPLASM OF ENDOMETRIUM: ICD-10-CM

## 2019-10-18 PROCEDURE — 78815 PET IMAGE W/CT SKULL-THIGH: CPT | Mod: 26,PS

## 2019-10-23 ENCOUNTER — APPOINTMENT (OUTPATIENT)
Dept: HEMATOLOGY ONCOLOGY | Facility: CLINIC | Age: 64
End: 2019-10-23
Payer: MEDICAID

## 2019-10-23 ENCOUNTER — LABORATORY RESULT (OUTPATIENT)
Age: 64
End: 2019-10-23

## 2019-10-23 ENCOUNTER — OUTPATIENT (OUTPATIENT)
Dept: OUTPATIENT SERVICES | Facility: HOSPITAL | Age: 64
LOS: 1 days | Discharge: HOME | End: 2019-10-23

## 2019-10-23 VITALS
SYSTOLIC BLOOD PRESSURE: 120 MMHG | WEIGHT: 162 LBS | TEMPERATURE: 98 F | HEIGHT: 65 IN | DIASTOLIC BLOOD PRESSURE: 67 MMHG | RESPIRATION RATE: 14 BRPM | HEART RATE: 74 BPM | BODY MASS INDEX: 26.99 KG/M2

## 2019-10-23 DIAGNOSIS — Z98.890 OTHER SPECIFIED POSTPROCEDURAL STATES: Chronic | ICD-10-CM

## 2019-10-23 DIAGNOSIS — C54.1 MALIGNANT NEOPLASM OF ENDOMETRIUM: ICD-10-CM

## 2019-10-23 DIAGNOSIS — D64.9 ANEMIA, UNSPECIFIED: ICD-10-CM

## 2019-10-23 DIAGNOSIS — C80.1 MALIGNANT (PRIMARY) NEOPLASM, UNSPECIFIED: ICD-10-CM

## 2019-10-23 DIAGNOSIS — Z90.710 ACQUIRED ABSENCE OF BOTH CERVIX AND UTERUS: Chronic | ICD-10-CM

## 2019-10-23 DIAGNOSIS — D50.9 IRON DEFICIENCY ANEMIA, UNSPECIFIED: ICD-10-CM

## 2019-10-23 DIAGNOSIS — Z51.11 ENCOUNTER FOR ANTINEOPLASTIC CHEMOTHERAPY: ICD-10-CM

## 2019-10-23 PROCEDURE — 99214 OFFICE O/P EST MOD 30 MIN: CPT

## 2019-10-24 LAB
HCT VFR BLD CALC: 32.4 %
HGB BLD-MCNC: 10.7 G/DL
MCHC RBC-ENTMCNC: 33 G/DL
MCHC RBC-ENTMCNC: 33.3 PG
MCV RBC AUTO: 100.9 FL
PLATELET # BLD AUTO: 236 K/UL
PMV BLD: 9.5 FL
RBC # BLD: 3.21 M/UL
RBC # FLD: 13.8 %
WBC # FLD AUTO: 4.87 K/UL

## 2019-10-28 NOTE — HISTORY OF PRESENT ILLNESS
[Disease: _____________________] : Disease: [unfilled] [AJCC Stage: ____] : AJCC Stage: [unfilled] [de-identified] : Serena is a rodrigue 63 yo female, who has started developing SOB approximately 2 months ago, she went to ER on 11/2/2017 and on CXR was noted to have a large right sided pleural effusion. She underwent a thoracentesis, 1.6L of fluid was drained. 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 IHC was pos for CK7, PAX8, CK5/6 and Ca125, negative for TTF-1/Napsin, calretinin, CK20, mammaglobin, p63, villin, HAM56, GCDFP15, TAY-EP4. \par \par Her visit on 12/1/2017 Serena had an excisional biopsy of cervical node on 12/13/2017 revealing met adenocarcinoma, primary source still was not clear. On 12/14/2017 Serena was admitted with SOB, Pleurx catheter was placed on 12/14/2017. 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 Serena received 1st dose of carbo (auc of 5)/taxol(175mg/m2) on 12/15/2017 without complications, but the next day sustained a fall 2/2 vasovagal episode and developed asymptomatic SAH, that resolved on imaging by 12/21/2017.  [de-identified] : KRas WT, EGFR WT, Alk WT, ROS1 WT, PDL1-1%\par Normal MMR protein expression [de-identified] : 11/24/2017: She reports some SOB, especially ARZATE today. No fevers, no weight loss (reports some weight gain), no GI,  or GYN symptoms, except for increasing abdominal girth, she reports no blood in the stool or urine and no vaginal bleeding. She reports no CP and no palpitations, she reports worsening nonproductive cough, no hemoptysis. She also reports difficulty lying on left side and lying down flat. \par She reports left on/off facial numbness several months in duration. She had a CT of her head performed in [facility] within 1 year.  Additionally there is a freely mobile left cervical node present on exam, as per patient this was in place for approximately 1 year. \par \par 12/1/2017: Serena is here for follow up today. She had a therapeutic thoracentesis in  on 11/27/2017, 2L of fluid were removed, with much improved respiratory status. PET CT and MRI of the brain were done on 11/29/2017, findings were discussed today. There remains no clear primary source of malignancy. We have discussed that the source of tumor may be Mullerian vs lung. regardless of source chemotherapy needs to be initiated ASAP. We have discussed Carbo/taxol regiment that will cover both Mullerian and lung origin. Side effects including myelosuppression, peripheral neuropathy, allergic reaction and others.\par \par 1/2/2017 Since last visit Serena had an excisional biopsy of cervical node on 12/13/2017 revealing met adenocarcinoma, primary source still was not clear. On 12/14/2017 Serena was admitted with SOB, Pleurx catheter was placed on 12/14/2017. 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 Serena received 1st dose of carbo (auc of 5)/taxol(175mg/m2) on 12/15/2017 without complications, but the next day sustained a fall 2/2 vasovagal episode and developed asymptomatic SAH, that resolved on imaging by 12/21/2017. Today Serena's SOB has significantly improved. She reports very mild neuropathy in fingertips only. She reports no headache and offers no other complaints. \par \par 1/26/2018: Complains of some neuropathy, otherwise tolerating chemo with no difficulty. \par \par 2/16/2018: restaging CT C/A/P reviewed, significant improvement noted. Will refer to GYN/ONC. Reports slightly worsening neuropathy, not -painful, taking Gabapentin, no other symptoms. For cycle 4 of carbo/taxol today.\par \par 3/9/2018: Serena met with Dr. Massey, she is planned for surgery on 5/7/2018, after completion of 6 cycles of carbo/taxol and restaging PET CT ordered for mid April and follow up with Dr. Massey on April 20. She reports worsening neuropathy, and occasional pain and changes in vision in her left eye, eye symptoms come and go and are not very bothersome. She reports no other symptoms. \par \par 3/30/2018; Serena is here for cycle 6 of carbo/taxol, she continues to have neuropathy in finger and toes, but offers no other complaints, chemo has been dose reduced. \par \par 4/27/2018: Results of restaging PET CT s/p 6 cycles of carbo/taxol revealed 1. No new areas of abnormal increased uptake is seen to indicate \par biologically active disease.\par \par 2. Persistent PET positive left thyroid mass with a max SUV 33.1, \par previously max SUV 34.8. Further evaluation with thyroid ultrasound and \par if needed fine-needle aspiration biopsy will be helpful.\par \par 3.   PET positive left cervical lymph nodes mainly level 2 on the left \par with a max SUV 5.47previously 18. Minimal increased uptake in the \par bilateral axillary lymph nodes most likely reactive(less than 2.5)\par \par 4. Weakly PET positive, max SUV 2.89 right pleura, previous max SUV 5.3 \par with non-FDG avid right pleural effusion. \par \par 5. Previously FDG avid right and left supraclavicular lymphadenopathy, \par left internal mammary, bilateral paratracheal, para-aortic, para \par vertebral, carinal, mesenteric, right and left iliacs, right inguinal \par lymphadenopathy, small in size and no longer FDG avid.\par \par 6. No abnormal increased uptake is seen in the  lobulated uterus.\par \par 7. Overall there is marked improvement in the FDG avid disease.\par This was reviewed with Serena. She met with Dr. Massey on 4/20/2018, surgery is planned for 5/7/2018. She will also joselyn to meet with ENT re FDG avid thyroid nodule. Will assist in making he an appointment for her. \par Persistent neuropathy on gabapentin. \par \par 6/29/18 ; Patient came for follow up visit , evaluation for chemotherapy cycle 8 of carbo / Taxol , patient tolerating medication well , except neuropathy  recommend to have gabapentin  300 mg po daily.\par \par 7/20/2018: Serena present for follow up today, she has completed total of 8 cycles of Carbo/Taxol. She reports significant neuropathy in her hands and feet. We will discontinue chemotherapy today and plan to follow with close observation. She has thyroidectomy planned with Dr. Herrera on 8/20/2018, obtaining clearance for PMD. She is also planning to fly to [place] at the end of September. She reports no SOB, no GI or  symptoms. \par \par 9/12/2018: Serena offers no significant complaints today. She states neuropathy in her hands has almost completely resolved and neuropathy in her feet is significantly better. She had undergone complete thyroidectomy by Dr. Herrera on 8/20/2018, pathology revealed papillary thyroid cancer, measuring 2 cm, pT1b, other additional foci of papillary carcinoma were also noted, no LVI, surgical margins were clear, LN 0/2 had no carcinoma, stage [id number]. She is following up with Dr. Herrera tomorrow. She has still not gone for her vacation in [place]. \par \par 10/10/2018: Restaging PET CT on 9/26/2018 reveals Since 4/16/2018,  1. Post thyroidectomy with diffuse increased FDG uptake at the thyroid  bed likely representing post-surgical changes.  2. Subtle increased uptake is seen in the lamina , right side at the  level of T8 with a max SUV 4.6. This is not sufficient for metastatic  disease. Bone scan or MRI examination with contrast will be helpful for  further evaluation.  3. No other areas of abnormal increased uptake is seen. Images were personally reviewed by myself and discussed with Serena. \par She also had an Echo on 9/24/2018 revealing EF of 63%. \par Serena reports ongoing fatigue, she is unable to lose weight. She is taking Synthroid and following with Dr. Acevedo. \par She reports stiff fingers at night only, no painful neuropathy. \par She denies any other symptoms today. \par \par 12/11/2018: Serena feels well, neuropathy in hands and feet is much improved. S he is now complaining of r-sided facial pain associated with some swelling, pain comes and goes. She has already seen Dr. Herrera, who ordered CT of sinuses and prescribed pain relief meds. She is also due for restaging PET CT. Serena feels well otherwise. \par \par 1/9/2019: Restaging PET CT was performed on 12/18/2018 it revealed COMPARISON : 9/24/2018.  New left upper quadrant peritoneal nodule measuring 8 mm with an SUV max  of 7.3. This is suggestive of biologic tumor activity.  No other FDG avid lesions seen throughout the scan. Images were personally reviewed by myself and discussed with Serena, originally over the phone and again today. I have originally suggested to try AI in the interim, Serena however reported diarrhea at the time of the scan and declined intervention for now. We will plan for restaging PET CT to be performed in 6-8 weeks, this will be ordered today. She will follow up with Dr. Massey at [facility] shortly and bring the disk for his review. I would also like her to meet with genetics, this will be arranged at [facility] with Dr. Massey's help. Serena reports no new symptoms today, her neuropathy is manageable and  improving. She still reports unilateral headache that was work up in the past. Neurology eval was again suggested to her. She is following closely with Endocrinology re thyroid management. She will have follow up with Dr. Herrera shortly as well. \par \par 2/20/2019: Serena offers no new complaints. PET CT from 2/12/2019 revealed \par Since PET/CT of December 19, 2018, no new sites of pathologic FDG uptake\par Slight increase in size of left upper quadrant peritoneal nodule (1.1 cm, \par previously 0.8 cm) with stable FDG uptake, 7.7 SUV suspicious for \par biologic tumor activity.\par No other sites of pathologic FDG uptake \par Images were personally reviewed by myself and discussed with Serena, case was also discussed with Dr. Massey at [facility]. Serena will have follow up with his shortly. She is following with endocrinology. reports improving neuropathy. No GI or  symptoms at this time. No weight loss. \par \par 3/20/2019: Serena had a peritoneal nodule biopsy done at [facility], I have received a call from Dr. 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 We have discussed that surgery though could be attempted will not be the best therapeutic approach, recommencement of systemic therapy was discussed. I have not received the results from [facility] yet. I have briefly discussed this with Serena today. Serena reports that she has acutely developed right lower extremity weakness 5-6 days ago, she did not inform any of her doctors about this. She also reports that her R upper extremity though not weak "does not feel normal either. She reports no back pain, there is no point tenderness, RLE is objectively weak on exam. I recommend ER evaluation to r/o CVA vs brain met, vs L-spine related issue. Recommend admission for work up. Serena is agreeable to get admitted. \par \par 5/8/2019: Serena present for follow up, she was diagnosed with multiple metastasis to the brain, MRI was done on 3/21/2019 and revealed \par Multiple supratentorial heterogeneously enhancing lesions consistent with \par metastatic disease. The appearance on the T2-weighted images is suggestive \par of adenocarcinoma. There is mass effect upon the left lateral ventricle and \par corpus callosum. \par She received whole brain irradiation by Dr. Dahl, completed it in the beginning of 4/2019, she is currently on steroid taper managed by Dr. Dalh, she is taking 4mg of Decadron daily. She will be stopping the Keppra as there have been no documented h/o seizures. She still reports impairment of the L visual field, she has an appointment coming up with Opthalmology. She reports no deficits walking after she has completed inpatient acute rehabilitation. \par Restaging PET CT on 5/1/2019 reveals COMPARISON : 2/12/2019 \par Multiple new FDG avid omental nodules largest measuring up to 10 mm, \par positive on nonattenuation corrected images. Findings are suggestive of \par biologic tumor activity. \par Again seen is a left upper quadrant peritoneal nodule, SUV max 6.8, \par previously 7.7 (12% decrease). \par Images were personally reviewed by myself and discussed with patient. \par She now reports mild abdominal bloating, no other symptoms. She reports her neuropathy has significantly improved. \par She has first evidence of recurrent disease documented 5.5 months after last carbo/taxol dose, the volume of disease was very small (1 8mm nodule), she was then observed for another 6 months and now she wishes to restart the chemotherapy. \par I have discussed with her that rechallenging with carbo/taxol may still prove to be effective as long as she gets restaged after 2 cycles. Given recent whole brain radiation and neuropathy I will offer her carbo/taxol weekly with does reduction on the taxol for neuropathy, We will plan to run carbo slowly to avoid allergic reaction. \par She is agreeable to start ASAP. \par \par 6/5/2019: Serena has completed 1 cycle of weekly Carbo/taxol, ran at slow rate to avoid Carbo reaction and has tolerated chemotherapy without difficulty, she does not complain of increase in neuropathy. She has ongoing vision changes in her L eye, she had no residual LE weakness. She is off Decadron and Keppra. She was advised to see ophthalmology. \par \par 7/3/2019: Serena is doing well.  Reports no problems with carbo/taxol.  Still complains of vision changes in L eye but is seeing ophthalmologist on 7/16.  Remains active around the house and cooks regularly.  No fevers, weight loss, anorexia.  ROS is otherwise only significant for occasional loose stools, no diarrhea.\par \par 7/17/2019: Serena is doing well, denies worsening neuropathy. She c/o feeling fatigued and tired. Her last chemo was 1 week ago(7/11/19) with carbo/taxol and is due again tomorrow. She saw ophthalmology and will need cataract surgery of the L eye. No c/o chest pain/SOB. No weight loss.\par CT C/A/P was done on 7/9/2019, images were personally reviewed by myself and discussed with several radiology attendings, they revealed \par CHEST:\par Filling defect within a segmental branch of the lower lobe pulmonary artery \par suspicious for pulmonary embolus. \par Stable left upper lobe and right middle lobe 3 mm nodules. \par CT abdomen and pelvis performed on the same day, see separate report. \par ADDENDUM: \par Please note that the morphology of the small thrombus within the left lower \par pulmonary artery is not indicative of an acute thrombus but rather a chronic \par appearance. \par This was discussed with Radiology, it was not deemed that thrombus was acute and may not have even been present at all, finding was deemed to be equivocal, based on radiology assessment anticoagulation was not indicated for the filling defect noted. Initially CTA was recommended, the recommendation was withdrawn upon further review. \par ABDOMEN/PELVIS:\par New 1.3 cm segment IV hepatic hypodensity seen on series 4 image 205. \par Lesion not seen on prior PET/CT from 5/1/2019 or abdomen and pelvis CT from \par \par 2/2/2018 and is consistent with a new metastasis. \par Two other hepatic lesions described above, decreased in size since 2/2/2018, \par consistent with treated hepatic metastases. \par Anterior perisplenic omental nodule measures 0.7 cm on series 2 image 53, \par previously measuring 1.2 cm on PET/CT dated 5/1/2019. \par All the other previously seen omental nodules have resolved since 5/1/2019. \par ADDENDUM:\par Case was discussed with Dr. Cid in detail. It is also possible that the \par new 1.3 cm lesion in the liver since February 2, 2018 represents a treated \par metastasis similar to the other liver lesions. \par PET/CT would be necessary to assess disease activity. \par I have discussed case with Radiology, and it was determined that there was no clear cut evidence of progression. PET CT was ordered today.\par This was discussed with Serena, will proceed with Carbo/Taxol for now. \par \par 8/1/2019: Serena reports no major side effects from weekly Carbo/Taxol, she reports no neuropathy. She has completed 10 cycles altogether. \par PET CT on 7/24/2019 revealed \par 1. Since May 1, 2019, no definite new site of pathologic FDG uptake to \par suggest new biologic tumor activity; specifically, no pathologic FDG uptake \par within previously noted 1.3 cm lesion within hepatic segment 4. \par 2. Increased FDG uptake within several subcentimeter bilateral cervical \par lymph nodes, which is nonspecific and may be postinflammatory; max SUV 9.2 \par is noted within a 0.7 cm right level 2 cervical node. Attention on follow-up \par is suggested. \par 3. Few peritoneal nodules have overall decreased in size. \par 4. No additional site of pathologic FDG uptake. \par Images were personally reviewed by myself and discussed with Serena. \par She wishes to continue with chemotherapy. Will plan for 3 additional cycles. Will consider adding Avastin, but with h/o inconclusive/possible chronic PE ppx anticoagulation maybe necessary. \par \par 8/28/2019: Serena reports feeling well, she denies worsening neuropathy, worsening neurological symptoms, SOB, abdominal pain, bloating. She reports she is planned to undergo  eye surgery towards the end of September. We will plan to hold chemotherapy briefly after this cycle to facilitate adequate counts and minimize complications. We again have briefly discussed considering Avastin based therapy, will hold off on this prior to surgery. 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 May proceed without formal PAST, will check CBC prior to procedure, PT and PTT today. \par \par 9/25/19:Serena reports feeling well, she denies any changes since last visit, No SOB, abdominal pain, bloating. She reports she is planned to undergo  eye surgery in 9/30/19  CBC reviewed with normal Hemogram . we will hold chem this week , she will resume after Cataract sx . 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 May proceed without formal PAST, will check CBC prior to procedure, PT and PTT today. \par \par 10/4/2019: Serena underwent successful eye surgery on 9/30/2019, she reports she can see much better now. Last dose of chemotherapy was administered on 9/20, she then was on hold for surgery. She has completed 5 additional cycles of Carbo/taxol. She is due for restaging. Her disease is only accurately assessed on PET CT, prior attempts to obtain CT scans resulted in additional imaging with PET, as findings were inconclusive. PET CT will be ordered to restage. \par In addition she is due for MRI of the brain. She has had a small amount of weight loss, mild neuropathy is persistent. She offers no additional complaints. \par She will have port placed on Monday, will hold chemotherapy until restaging scans complete. \par \par 10/23/2019: Serena feels well, and denies any new symptoms. Her eye surgery was successful, vision is much improved now. She had restaging scans. \par PET CT on 10/18/2019 revealed COMPARISON : 7/24/2019. \par No pathologic uptake to suggest biologic tumor activity. \par MRI of the brain on 10/15/2019 revealed \par Comparison with June 8 and March 21, 2019: (Generally lesions improved \par markedly since March but slightly increased in size since June) \par 1. Lesions previously demonstrated on the study of June 8, 2019 has \par remained stable or minimally increased in size \par 2. Specifically, right frontal opercular lesion measures about 1 cm and \par left posterior inferior temporal lobe lesion measures about 1 cm. These have \par increased since the previous study. \par 3. Small punctiform lesions within the right frontal white matter and left \par frontal sulcus or more apparent than on the study of June 8 \par 4. These lesions are all much smaller than the earlier MRI of March 21, \par 2019. \par 5. No new lesions. \par Images were personally reviewed by myself and discussed with patient. She remains asymptomatic and off the steroids. \par I have also discussed the case with Dr. Dahl. I would like Serena to discuss the options with [facility], she maybe a candidate for brain SRS. \par She has been off Carbo/Taxol for a few weeks, she remains Platinum sensitive. I have discussed the option of switching her over to Avastin maintenance, will need to prophylax with low dose Eliquis as well, given questionable finding of small PE in the past. \par Side effects of Avastin were discussed at length, including HNT, proteinuria, small risk of DVT/PE, GI perforations etc. \par She is agreeable to start after [facility] consultation. \par \par

## 2019-10-28 NOTE — REVIEW OF SYSTEMS
[Fatigue] : fatigue [Vision Problems] : vision problems [Negative] : Allergic/Immunologic [Recent Change In Weight] : ~T no recent weight change [Chest Pain] : no chest pain [Palpitations] : no palpitations [Lower Ext Edema] : no lower extremity edema [Shortness Of Breath] : no shortness of breath [Wheezing] : no wheezing [Cough] : no cough [SOB on Exertion] : no shortness of breath during exertion [FreeTextEntry3] : Difficulty seeing out of L eye, much improved [FreeTextEntry9] : normal strength in upper and lower extremities  [de-identified] : L visual field deficit, gait impairment has resolved

## 2019-10-28 NOTE — RESULTS/DATA
[FreeTextEntry1] : EXAM:  MR BRAIN WAW IC      \par \par \par PROCEDURE DATE:  06/08/2019  \par \par \par \par \par INTERPRETATION:  Clinical History / Reason for exam: Dizziness and \par vertigo, history of uterine and thyroid cancer, for evaluation of \par metastatic disease, lesion seen on prior MRI of the brain.\par \par MRI OF THE BRAIN WITHOUT AND WITH CONTRAST\par \par TECHNIQUE:\par \par Multiplanar multisequence imaging of the brain was performed on the \[facility] open magnet before and after the intravenous administration of \par 7.5 cc of Gadavist including brain lab protocol.\par \par COMPARISON:\par \par MRI of the brain without and with contrast dated March 12, 2019.\par \par FINDINGS:\par \par The previously described supratentorial lesions have almost completely \par resolved.\par \par The lesion in the left posterior frontal region now measures 1.4 cm in \par maximum diameter, the lesion in the anterior right frontal lobe measures \par 0.7 cm in maximum diameter, the second lesion in the right frontal lobe \par measures 0.1 cm in maximum diameter, the lesion in the right posterior \par parietal region measures 0.3 cm in maximum diameter, and the left \par occipital lobe lesion measures 1.2 cm in maximum diameter. (Previously \par measuring 2.6, 2.4, 0.5, 0.7, and 2.5 cm respectively).\par \par The third, fourth, and lateral ventricles are normal in size and \par position. There is no shift of the midline structures.\par \par The cerebellar tonsils are minimally low-lying (0.3 cm of cerebellar \par ectopia).\par \par Incidental note is made of a tiny medial cyst.\par \par There are scattered T2/FLAIR hyperintense signal intensities in the\par subcortical white matter which are nonspecific differential diagnostic\par possibilities include chronic ischemic change, foci of gliosis or\par demyelination.\par \par IMPRESSION:\par \par In comparison with the prior MRI of the brain dated March 21, 2019:\par \par There has been almost complete resolution of most of of the previously \par described supratentorial lesions.\par \par There are no new enhancing lesions.\par \par \par \par \par \par \par CRISTINA MÉNDEZ M.D., ATTENDING RADIOLOGIST\par This document has been electronically signed. Oren 10 2019  1:17PM\par   \par \par   \par \par \par [id number]^RI^DC\par \par EXAM:  PETCT SKUL-THI ONC FDG SUBS      \par \par \par PROCEDURE DATE:  05/01/2019  \par \par \par \par \par INTERPRETATION:  \par FDG PET CT STUDY   Subsequent  treatment strategy\par REASON: TUMOR IMAGING - PET with concurrently acquired CT for attenuation \par correction and anatomic localization; skull base to mid - thigh .\par \par CPT code 73503.\par \par Fasting blood glucose level:     91 mg/dl\par \par HISTORY: Endometrial cancer. Stage IV with brain metastatic disease.\par \par TECHNIQUE: Approximately 45 minutes after the intravenous administration \par of 10.7 mCi 18-Fluorine FDG, whole body PET images were acquired from \par base of skull to mid - thigh.\par \par CT protocol used for this PET/CT study is designed for attenuation \par correction and anatomic localization of PET findings.  This  CT \par is not designed to replace state-of-the-art diagnostic CT scans for \par specific imaging protocols of different body parts.\par \par COMPARISON : 2/12/2019.\par Correlation: MRI of the brain on 3/21/2019.\par \par FINDINGS:\par \par Head/Neck: No biologically active head/neck lesions.     \par Normal uptake within the brain, pharyngeal lymphoid tissue, salivary \par glands and laryngeal musculature is noted.    \par \par Thorax:   No suspicious hypermetabolic mediastinal, hilar, lung \par parenchymal lesions.\par \par Abdomen/Pelvis: Physiologic GI/  activity. \par \par Again seen is a left upper quadrant peritoneal nodule, SUV max 6.8, \par previously 7.7 (12% decrease). \par \par Multiple new FDG avid omental nodules largest of which is adjacent to the \par distal transverse colon, measuring 10 mm, positive on nonattenuation \par corrected images, axial image 134. \par \par No other abnormal visceral or guillaume tracer uptake is present.    \par \par Musculoskeletal :  No suspicious hypermetabolic osseous lesions.\par \par Additional CT findings:\par Status post hysterectomy.\par Colonic diverticulosis.\par \par IMPRESSION: \par \par COMPARISON : 2/12/2019\par \par Multiple new FDG avid omental nodules largest measuring up to 10 mm, \par positive on nonattenuation corrected images. Findings are suggestive of \par biologic tumor activity.\par \par Again seen is a left upper quadrant peritoneal nodule, SUV max 6.8, \par previously 7.7 (12% decrease). \par \par \par \par \par

## 2019-10-28 NOTE — ASSESSMENT
[FreeTextEntry1] : Papillary-serous endometrial cancer, stage IV\par --PET CT on 11/29/2017 revealed no clear GYN origin, extensive KEVIN, uptake in the right lung and pleura, thyroid nodule\par --Malignant pleural effusion, resolved\par --L side PleurX catheter was removed on 2/8/2018\par --Completed cycle 8  of carbo/taxol on 6/29/2018.  2 cycles were administered  postoperatively.\par --Restaging PET CT on 9/26/2018 (3 months post completion of chemo 6/29/2018)was essentially negative for recurrent disease\par --Restaging PET CT on 12/18/2018 reveals   New left upper quadrant peritoneal nodule measuring 8 mm with an SUV max  of 7.3. This is suggestive of biologic tumor activity.  No other FDG avid lesions seen throughout the scan.\par --restaging PET CT on 2/12/2019 revealed slight increase in size of left upper quadrant peritoneal nodule (1.1 cm, \par previously 0.8 cm) with stable FDG uptake, 7.7 SUV suspicious for biologic tumor activity.\par --Case was discussed with Dr. Massey at [facility], biopsy of peritoneal nodule is positive for recurrent endometrial cancer.\par --MRI brain on 4/23/2019 revealed multiple brain mets\par --S/p whole brain radiation completed 4/2019, required acute rehab\par --PET CT on 5/1/2019 subsequently revealed further disease progression \par --Restarted weekly carbo/taxol 3 on 1 off on 5/9/2019, continue with weekly Carbo/Taxol for now.\par - Patient will proceed with chemo Cycle #4 on 9/27/19 . \par --Off Decadron and Keppra since 6/5/2019 \par --Restaging brain MRI on 6/8/2019 showed almost complete resolution of most supratentorial lesions and no new lesions.\par --Restaging CT C/A/P on 7/9/2019 did not reveal definite progression, there was a questionable filling defect suspicious for possible chronic PE/artifact, this was discussed with Radiology at length anticoagulation was not deemed to be necessary\par --PET CT on 7/24/2019 revealed positive response to therapy \par --Carbo/Taxol stopped, last dose 9/13/2019, on hold since, she remains platinum sensitive \par --Restaging PET CT on 10/18/2019 is NAD\par Restaging MRI of the brain on 10/15/2019 is suggestive for possible disease progression, no new lesions, she remains asymptomatic and off steroids\par --Short term MRI brain follow up \par --Refer to [facility] for brain SRS consideration \par --Plan to start on Avastin maintenance thereafter with Eliquis ppx for DVT/ PE \par \par Papillary thyroid cancer [id number], 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 Herrera \par --Follow up with Dr. Acevedo of endocrinology; he is addressing vitamin D, thyroid and diabetes\par \par Cataract of eye\par -- S/p successful surgery on 9/30/19 . \par \par Follow up in 2 weeks\par

## 2019-10-30 DIAGNOSIS — C79.31 SECONDARY MALIGNANT NEOPLASM OF BRAIN: ICD-10-CM

## 2019-10-31 ENCOUNTER — APPOINTMENT (OUTPATIENT)
Dept: RADIATION ONCOLOGY | Facility: HOSPITAL | Age: 64
End: 2019-10-31
Payer: MEDICAID

## 2019-10-31 VITALS
SYSTOLIC BLOOD PRESSURE: 103 MMHG | BODY MASS INDEX: 26.79 KG/M2 | DIASTOLIC BLOOD PRESSURE: 61 MMHG | TEMPERATURE: 97.7 F | RESPIRATION RATE: 16 BRPM | HEART RATE: 89 BPM | WEIGHT: 161 LBS

## 2019-10-31 PROCEDURE — 99213 OFFICE O/P EST LOW 20 MIN: CPT

## 2019-10-31 NOTE — PHYSICAL EXAM
[Normal] : normoactive bowel sounds, soft and nontender, no hepatosplenomegaly or masses appreciated [de-identified] : No focal findings.  Strength is essentially normal.  Gait is non-ataxic.

## 2019-10-31 NOTE — HISTORY OF PRESENT ILLNESS
[FreeTextEntry1] : 10/31/19: Pt returns for follow up to discuss MRI results and possible treatment.  In March of 2019 she became quite symptomatic with right sided weakness and trouble walking.  On MRI had multiple metastatic sites in the brain, although only three were sizable, the right left cerebellum, right operculum and left parietal.   She received 30 Gy on radiation in 10 treatments.  Her symptoms improved and she was weaned from steroids.  An MRI in June was quite positive with near complete resolution of the larger lesions and no new lesion.  Now she is here to discuss the newest scan from 2 weeks ago (Oct 15th).  It is read as showing progression at the three sites with possible new lesions.  She is here to discuss possible additional treatment.

## 2019-10-31 NOTE — VITALS
[ECOG Performance Status: 1 - Restricted in physically strenuous activity but ambulatory and able to carry out work of a light or sedentary nature] : Performance Status: 1 - Restricted in physically strenuous activity but ambulatory and able to carry out work of a light or sedentary nature, e.g., light house work, office work

## 2019-11-05 ENCOUNTER — APPOINTMENT (OUTPATIENT)
Dept: INFUSION THERAPY | Facility: CLINIC | Age: 64
End: 2019-11-05
Payer: SELF-PAY

## 2019-11-05 ENCOUNTER — LABORATORY RESULT (OUTPATIENT)
Age: 64
End: 2019-11-05

## 2019-11-05 ENCOUNTER — APPOINTMENT (OUTPATIENT)
Dept: HEMATOLOGY ONCOLOGY | Facility: CLINIC | Age: 64
End: 2019-11-05
Payer: SELF-PAY

## 2019-11-05 ENCOUNTER — OUTPATIENT (OUTPATIENT)
Dept: OUTPATIENT SERVICES | Facility: HOSPITAL | Age: 64
LOS: 1 days | Discharge: HOME | End: 2019-11-05

## 2019-11-05 VITALS
TEMPERATURE: 96.6 F | SYSTOLIC BLOOD PRESSURE: 105 MMHG | HEIGHT: 65 IN | WEIGHT: 158 LBS | BODY MASS INDEX: 26.33 KG/M2 | DIASTOLIC BLOOD PRESSURE: 66 MMHG | RESPIRATION RATE: 16 BRPM | HEART RATE: 79 BPM

## 2019-11-05 DIAGNOSIS — Z98.890 OTHER SPECIFIED POSTPROCEDURAL STATES: Chronic | ICD-10-CM

## 2019-11-05 DIAGNOSIS — Z90.710 ACQUIRED ABSENCE OF BOTH CERVIX AND UTERUS: Chronic | ICD-10-CM

## 2019-11-05 PROCEDURE — 99213 OFFICE O/P EST LOW 20 MIN: CPT

## 2019-11-05 RX ORDER — ACETAMINOPHEN 500 MG
650 TABLET ORAL ONCE
Refills: 0 | Status: COMPLETED | OUTPATIENT
Start: 2019-11-05 | End: 2019-11-05

## 2019-11-05 RX ORDER — DIPHENHYDRAMINE HCL 50 MG
25 CAPSULE ORAL ONCE
Refills: 0 | Status: COMPLETED | OUTPATIENT
Start: 2019-11-05 | End: 2019-11-05

## 2019-11-05 RX ORDER — BEVACIZUMAB 400 MG/16ML
1070 INJECTION, SOLUTION INTRAVENOUS ONCE
Refills: 0 | Status: COMPLETED | OUTPATIENT
Start: 2019-11-05 | End: 2019-11-05

## 2019-11-05 RX ADMIN — BEVACIZUMAB 1070 MILLIGRAM(S): 400 INJECTION, SOLUTION INTRAVENOUS at 13:10

## 2019-11-05 RX ADMIN — BEVACIZUMAB 95.2 MILLIGRAM(S): 400 INJECTION, SOLUTION INTRAVENOUS at 11:24

## 2019-11-05 RX ADMIN — Medication 25 MILLIGRAM(S): at 11:30

## 2019-11-05 RX ADMIN — Medication 650 MILLIGRAM(S): at 11:04

## 2019-11-05 RX ADMIN — Medication 101 MILLIGRAM(S): at 11:04

## 2019-11-05 RX ADMIN — Medication 650 MILLIGRAM(S): at 11:03

## 2019-11-05 NOTE — REVIEW OF SYSTEMS
[Fatigue] : fatigue [Vision Problems] : vision problems [Negative] : Allergic/Immunologic [Recent Change In Weight] : ~T no recent weight change [Chest Pain] : no chest pain [Palpitations] : no palpitations [Lower Ext Edema] : no lower extremity edema [Shortness Of Breath] : no shortness of breath [Wheezing] : no wheezing [Cough] : no cough [SOB on Exertion] : no shortness of breath during exertion [FreeTextEntry3] : Difficulty seeing out of L eye, much improved [FreeTextEntry9] : normal strength in upper and lower extremities  [de-identified] : L visual field deficit, gait impairment has resolved

## 2019-11-05 NOTE — RESULTS/DATA
[FreeTextEntry1] : EXAM:  MR BRAIN WAW IC      \par \par \par PROCEDURE DATE:  06/08/2019  \par \par \par \par \par INTERPRETATION:  Clinical History / Reason for exam: Dizziness and \par vertigo, history of uterine and thyroid cancer, for evaluation of \par metastatic disease, lesion seen on prior MRI of the brain.\par \par MRI OF THE BRAIN WITHOUT AND WITH CONTRAST\par \par TECHNIQUE:\par \par Multiplanar multisequence imaging of the brain was performed on the \[facility] open magnet before and after the intravenous administration of \par 7.5 cc of Gadavist including brain lab protocol.\par \par COMPARISON:\par \par MRI of the brain without and with contrast dated March 12, 2019.\par \par FINDINGS:\par \par The previously described supratentorial lesions have almost completely \par resolved.\par \par The lesion in the left posterior frontal region now measures 1.4 cm in \par maximum diameter, the lesion in the anterior right frontal lobe measures \par 0.7 cm in maximum diameter, the second lesion in the right frontal lobe \par measures 0.1 cm in maximum diameter, the lesion in the right posterior \par parietal region measures 0.3 cm in maximum diameter, and the left \par occipital lobe lesion measures 1.2 cm in maximum diameter. (Previously \par measuring 2.6, 2.4, 0.5, 0.7, and 2.5 cm respectively).\par \par The third, fourth, and lateral ventricles are normal in size and \par position. There is no shift of the midline structures.\par \par The cerebellar tonsils are minimally low-lying (0.3 cm of cerebellar \par ectopia).\par \par Incidental note is made of a tiny medial cyst.\par \par There are scattered T2/FLAIR hyperintense signal intensities in the\par subcortical white matter which are nonspecific differential diagnostic\par possibilities include chronic ischemic change, foci of gliosis or\par demyelination.\par \par IMPRESSION:\par \par In comparison with the prior MRI of the brain dated March 21, 2019:\par \par There has been almost complete resolution of most of of the previously \par described supratentorial lesions.\par \par There are no new enhancing lesions.\par \par \par \par \par \par \par CRISTINA MÉNDEZ M.D., ATTENDING RADIOLOGIST\par This document has been electronically signed. Oren 10 2019  1:17PM\par   \par \par   \par \par \par [id number]^RI^DC\par \par EXAM:  PETCT SKUL-THI ONC FDG SUBS      \par \par \par PROCEDURE DATE:  05/01/2019  \par \par \par \par \par INTERPRETATION:  \par FDG PET CT STUDY   Subsequent  treatment strategy\par REASON: TUMOR IMAGING - PET with concurrently acquired CT for attenuation \par correction and anatomic localization; skull base to mid - thigh .\par \par CPT code 52918.\par \par Fasting blood glucose level:     91 mg/dl\par \par HISTORY: Endometrial cancer. Stage IV with brain metastatic disease.\par \par TECHNIQUE: Approximately 45 minutes after the intravenous administration \par of 10.7 mCi 18-Fluorine FDG, whole body PET images were acquired from \par base of skull to mid - thigh.\par \par CT protocol used for this PET/CT study is designed for attenuation \par correction and anatomic localization of PET findings.  This  CT \par is not designed to replace state-of-the-art diagnostic CT scans for \par specific imaging protocols of different body parts.\par \par COMPARISON : 2/12/2019.\par Correlation: MRI of the brain on 3/21/2019.\par \par FINDINGS:\par \par Head/Neck: No biologically active head/neck lesions.     \par Normal uptake within the brain, pharyngeal lymphoid tissue, salivary \par glands and laryngeal musculature is noted.    \par \par Thorax:   No suspicious hypermetabolic mediastinal, hilar, lung \par parenchymal lesions.\par \par Abdomen/Pelvis: Physiologic GI/  activity. \par \par Again seen is a left upper quadrant peritoneal nodule, SUV max 6.8, \par previously 7.7 (12% decrease). \par \par Multiple new FDG avid omental nodules largest of which is adjacent to the \par distal transverse colon, measuring 10 mm, positive on nonattenuation \par corrected images, axial image 134. \par \par No other abnormal visceral or guillaume tracer uptake is present.    \par \par Musculoskeletal :  No suspicious hypermetabolic osseous lesions.\par \par Additional CT findings:\par Status post hysterectomy.\par Colonic diverticulosis.\par \par IMPRESSION: \par \par COMPARISON : 2/12/2019\par \par Multiple new FDG avid omental nodules largest measuring up to 10 mm, \par positive on nonattenuation corrected images. Findings are suggestive of \par biologic tumor activity.\par \par Again seen is a left upper quadrant peritoneal nodule, SUV max 6.8, \par previously 7.7 (12% decrease). \par \par \par \par \par

## 2019-11-05 NOTE — HISTORY OF PRESENT ILLNESS
[Disease: _____________________] : Disease: [unfilled] [AJCC Stage: ____] : AJCC Stage: [unfilled] [de-identified] : Serena is a rodrigue 61 yo female, who has started developing SOB approximately 2 months ago, she went to ER on 11/2/2017 and on CXR was noted to have a large right sided pleural effusion. She underwent a thoracentesis, 1.6L of fluid was drained. 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 IHC was pos for CK7, PAX8, CK5/6 and Ca125, negative for TTF-1/Napsin, calretinin, CK20, mammaglobin, p63, villin, HAM56, GCDFP15, TAY-EP4. \par \par Her visit on 12/1/2017 Serena had an excisional biopsy of cervical node on 12/13/2017 revealing met adenocarcinoma, primary source still was not clear. On 12/14/2017 Serena was admitted with SOB, Pleurx catheter was placed on 12/14/2017. 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 Serena received 1st dose of carbo (auc of 5)/taxol(175mg/m2) on 12/15/2017 without complications, but the next day sustained a fall 2/2 vasovagal episode and developed asymptomatic SAH, that resolved on imaging by 12/21/2017.  [de-identified] : KRas WT, EGFR WT, Alk WT, ROS1 WT, PDL1-1%\par Normal MMR protein expression [de-identified] : 11/24/2017: She reports some SOB, especially ARZATE today. No fevers, no weight loss (reports some weight gain), no GI,  or GYN symptoms, except for increasing abdominal girth, she reports no blood in the stool or urine and no vaginal bleeding. She reports no CP and no palpitations, she reports worsening nonproductive cough, no hemoptysis. She also reports difficulty lying on left side and lying down flat. \par She reports left on/off facial numbness several months in duration. She had a CT of her head performed in [facility] within 1 year.  Additionally there is a freely mobile left cervical node present on exam, as per patient this was in place for approximately 1 year. \par \par 12/1/2017: Serena is here for follow up today. She had a therapeutic thoracentesis in  on 11/27/2017, 2L of fluid were removed, with much improved respiratory status. PET CT and MRI of the brain were done on 11/29/2017, findings were discussed today. There remains no clear primary source of malignancy. We have discussed that the source of tumor may be Mullerian vs lung. regardless of source chemotherapy needs to be initiated ASAP. We have discussed Carbo/taxol regiment that will cover both Mullerian and lung origin. Side effects including myelosuppression, peripheral neuropathy, allergic reaction and others.\par \par 1/2/2017 Since last visit Serena had an excisional biopsy of cervical node on 12/13/2017 revealing met adenocarcinoma, primary source still was not clear. On 12/14/2017 Serena was admitted with SOB, Pleurx catheter was placed on 12/14/2017. 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 Serena received 1st dose of carbo (auc of 5)/taxol(175mg/m2) on 12/15/2017 without complications, but the next day sustained a fall 2/2 vasovagal episode and developed asymptomatic SAH, that resolved on imaging by 12/21/2017. Today Serena's SOB has significantly improved. She reports very mild neuropathy in fingertips only. She reports no headache and offers no other complaints. \par \par 1/26/2018: Complains of some neuropathy, otherwise tolerating chemo with no difficulty. \par \par 2/16/2018: restaging CT C/A/P reviewed, significant improvement noted. Will refer to GYN/ONC. Reports slightly worsening neuropathy, not -painful, taking Gabapentin, no other symptoms. For cycle 4 of carbo/taxol today.\par \par 3/9/2018: Serena met with Dr. Massey, she is planned for surgery on 5/7/2018, after completion of 6 cycles of carbo/taxol and restaging PET CT ordered for mid April and follow up with Dr. Massey on April 20. She reports worsening neuropathy, and occasional pain and changes in vision in her left eye, eye symptoms come and go and are not very bothersome. She reports no other symptoms. \par \par 3/30/2018; Serena is here for cycle 6 of carbo/taxol, she continues to have neuropathy in finger and toes, but offers no other complaints, chemo has been dose reduced. \par \par 4/27/2018: Results of restaging PET CT s/p 6 cycles of carbo/taxol revealed 1. No new areas of abnormal increased uptake is seen to indicate \par biologically active disease.\par \par 2. Persistent PET positive left thyroid mass with a max SUV 33.1, \par previously max SUV 34.8. Further evaluation with thyroid ultrasound and \par if needed fine-needle aspiration biopsy will be helpful.\par \par 3.   PET positive left cervical lymph nodes mainly level 2 on the left \par with a max SUV 5.47previously 18. Minimal increased uptake in the \par bilateral axillary lymph nodes most likely reactive(less than 2.5)\par \par 4. Weakly PET positive, max SUV 2.89 right pleura, previous max SUV 5.3 \par with non-FDG avid right pleural effusion. \par \par 5. Previously FDG avid right and left supraclavicular lymphadenopathy, \par left internal mammary, bilateral paratracheal, para-aortic, para \par vertebral, carinal, mesenteric, right and left iliacs, right inguinal \par lymphadenopathy, small in size and no longer FDG avid.\par \par 6. No abnormal increased uptake is seen in the  lobulated uterus.\par \par 7. Overall there is marked improvement in the FDG avid disease.\par This was reviewed with Serena. She met with Dr. Massey on 4/20/2018, surgery is planned for 5/7/2018. She will also joselyn to meet with ENT re FDG avid thyroid nodule. Will assist in making he an appointment for her. \par Persistent neuropathy on gabapentin. \par \par 6/29/18 ; Patient came for follow up visit , evaluation for chemotherapy cycle 8 of carbo / Taxol , patient tolerating medication well , except neuropathy  recommend to have gabapentin  300 mg po daily.\par \par 7/20/2018: Serena present for follow up today, she has completed total of 8 cycles of Carbo/Taxol. She reports significant neuropathy in her hands and feet. We will discontinue chemotherapy today and plan to follow with close observation. She has thyroidectomy planned with Dr. Herrera on 8/20/2018, obtaining clearance for PMD. She is also planning to fly to [place] at the end of September. She reports no SOB, no GI or  symptoms. \par \par 9/12/2018: Serena offers no significant complaints today. She states neuropathy in her hands has almost completely resolved and neuropathy in her feet is significantly better. She had undergone complete thyroidectomy by Dr. Herrera on 8/20/2018, pathology revealed papillary thyroid cancer, measuring 2 cm, pT1b, other additional foci of papillary carcinoma were also noted, no LVI, surgical margins were clear, LN 0/2 had no carcinoma, stage [id number]. She is following up with Dr. Herrera tomorrow. She has still not gone for her vacation in [place]. \par \par 10/10/2018: Restaging PET CT on 9/26/2018 reveals Since 4/16/2018,  1. Post thyroidectomy with diffuse increased FDG uptake at the thyroid  bed likely representing post-surgical changes.  2. Subtle increased uptake is seen in the lamina , right side at the  level of T8 with a max SUV 4.6. This is not sufficient for metastatic  disease. Bone scan or MRI examination with contrast will be helpful for  further evaluation.  3. No other areas of abnormal increased uptake is seen. Images were personally reviewed by myself and discussed with Serena. \par She also had an Echo on 9/24/2018 revealing EF of 63%. \par Serena reports ongoing fatigue, she is unable to lose weight. She is taking Synthroid and following with Dr. Acevedo. \par She reports stiff fingers at night only, no painful neuropathy. \par She denies any other symptoms today. \par \par 12/11/2018: Serena feels well, neuropathy in hands and feet is much improved. S he is now complaining of r-sided facial pain associated with some swelling, pain comes and goes. She has already seen Dr. Herrera, who ordered CT of sinuses and prescribed pain relief meds. She is also due for restaging PET CT. Serena feels well otherwise. \par \par 1/9/2019: Restaging PET CT was performed on 12/18/2018 it revealed COMPARISON : 9/24/2018.  New left upper quadrant peritoneal nodule measuring 8 mm with an SUV max  of 7.3. This is suggestive of biologic tumor activity.  No other FDG avid lesions seen throughout the scan. Images were personally reviewed by myself and discussed with Serena, originally over the phone and again today. I have originally suggested to try AI in the interim, Serena however reported diarrhea at the time of the scan and declined intervention for now. We will plan for restaging PET CT to be performed in 6-8 weeks, this will be ordered today. She will follow up with Dr. Massey at [facility] shortly and bring the disk for his review. I would also like her to meet with genetics, this will be arranged at [facility] with Dr. Massey's help. Serena reports no new symptoms today, her neuropathy is manageable and  improving. She still reports unilateral headache that was work up in the past. Neurology eval was again suggested to her. She is following closely with Endocrinology re thyroid management. She will have follow up with Dr. Herrera shortly as well. \par \par 2/20/2019: Serena offers no new complaints. PET CT from 2/12/2019 revealed \par Since PET/CT of December 19, 2018, no new sites of pathologic FDG uptake\par Slight increase in size of left upper quadrant peritoneal nodule (1.1 cm, \par previously 0.8 cm) with stable FDG uptake, 7.7 SUV suspicious for \par biologic tumor activity.\par No other sites of pathologic FDG uptake \par Images were personally reviewed by myself and discussed with Serena, case was also discussed with Dr. Massey at [facility]. Serena will have follow up with his shortly. She is following with endocrinology. reports improving neuropathy. No GI or  symptoms at this time. No weight loss. \par \par 3/20/2019: Serena had a peritoneal nodule biopsy done at [facility], I have received a call from Dr. 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 We have discussed that surgery though could be attempted will not be the best therapeutic approach, recommencement of systemic therapy was discussed. I have not received the results from [facility] yet. I have briefly discussed this with Serena today. Serena reports that she has acutely developed right lower extremity weakness 5-6 days ago, she did not inform any of her doctors about this. She also reports that her R upper extremity though not weak "does not feel normal either. She reports no back pain, there is no point tenderness, RLE is objectively weak on exam. I recommend ER evaluation to r/o CVA vs brain met, vs L-spine related issue. Recommend admission for work up. Serena is agreeable to get admitted. \par \par 5/8/2019: Serena present for follow up, she was diagnosed with multiple metastasis to the brain, MRI was done on 3/21/2019 and revealed \par Multiple supratentorial heterogeneously enhancing lesions consistent with \par metastatic disease. The appearance on the T2-weighted images is suggestive \par of adenocarcinoma. There is mass effect upon the left lateral ventricle and \par corpus callosum. \par She received whole brain irradiation by Dr. Dahl, completed it in the beginning of 4/2019, she is currently on steroid taper managed by Dr. Dahl, she is taking 4mg of Decadron daily. She will be stopping the Keppra as there have been no documented h/o seizures. She still reports impairment of the L visual field, she has an appointment coming up with Opthalmology. She reports no deficits walking after she has completed inpatient acute rehabilitation. \par Restaging PET CT on 5/1/2019 reveals COMPARISON : 2/12/2019 \par Multiple new FDG avid omental nodules largest measuring up to 10 mm, \par positive on nonattenuation corrected images. Findings are suggestive of \par biologic tumor activity. \par Again seen is a left upper quadrant peritoneal nodule, SUV max 6.8, \par previously 7.7 (12% decrease). \par Images were personally reviewed by myself and discussed with patient. \par She now reports mild abdominal bloating, no other symptoms. She reports her neuropathy has significantly improved. \par She has first evidence of recurrent disease documented 5.5 months after last carbo/taxol dose, the volume of disease was very small (1 8mm nodule), she was then observed for another 6 months and now she wishes to restart the chemotherapy. \par I have discussed with her that rechallenging with carbo/taxol may still prove to be effective as long as she gets restaged after 2 cycles. Given recent whole brain radiation and neuropathy I will offer her carbo/taxol weekly with does reduction on the taxol for neuropathy, We will plan to run carbo slowly to avoid allergic reaction. \par She is agreeable to start ASAP. \par \par 6/5/2019: Serena has completed 1 cycle of weekly Carbo/taxol, ran at slow rate to avoid Carbo reaction and has tolerated chemotherapy without difficulty, she does not complain of increase in neuropathy. She has ongoing vision changes in her L eye, she had no residual LE weakness. She is off Decadron and Keppra. She was advised to see ophthalmology. \par \par 7/3/2019: Serena is doing well.  Reports no problems with carbo/taxol.  Still complains of vision changes in L eye but is seeing ophthalmologist on 7/16.  Remains active around the house and cooks regularly.  No fevers, weight loss, anorexia.  ROS is otherwise only significant for occasional loose stools, no diarrhea.\par \par 7/17/2019: Serena is doing well, denies worsening neuropathy. She c/o feeling fatigued and tired. Her last chemo was 1 week ago(7/11/19) with carbo/taxol and is due again tomorrow. She saw ophthalmology and will need cataract surgery of the L eye. No c/o chest pain/SOB. No weight loss.\par CT C/A/P was done on 7/9/2019, images were personally reviewed by myself and discussed with several radiology attendings, they revealed \par CHEST:\par Filling defect within a segmental branch of the lower lobe pulmonary artery \par suspicious for pulmonary embolus. \par Stable left upper lobe and right middle lobe 3 mm nodules. \par CT abdomen and pelvis performed on the same day, see separate report. \par ADDENDUM: \par Please note that the morphology of the small thrombus within the left lower \par pulmonary artery is not indicative of an acute thrombus but rather a chronic \par appearance. \par This was discussed with Radiology, it was not deemed that thrombus was acute and may not have even been present at all, finding was deemed to be equivocal, based on radiology assessment anticoagulation was not indicated for the filling defect noted. Initially CTA was recommended, the recommendation was withdrawn upon further review. \par ABDOMEN/PELVIS:\par New 1.3 cm segment IV hepatic hypodensity seen on series 4 image 205. \par Lesion not seen on prior PET/CT from 5/1/2019 or abdomen and pelvis CT from \par \par 2/2/2018 and is consistent with a new metastasis. \par Two other hepatic lesions described above, decreased in size since 2/2/2018, \par consistent with treated hepatic metastases. \par Anterior perisplenic omental nodule measures 0.7 cm on series 2 image 53, \par previously measuring 1.2 cm on PET/CT dated 5/1/2019. \par All the other previously seen omental nodules have resolved since 5/1/2019. \par ADDENDUM:\par Case was discussed with Dr. Cid in detail. It is also possible that the \par new 1.3 cm lesion in the liver since February 2, 2018 represents a treated \par metastasis similar to the other liver lesions. \par PET/CT would be necessary to assess disease activity. \par I have discussed case with Radiology, and it was determined that there was no clear cut evidence of progression. PET CT was ordered today.\par This was discussed with Serena, will proceed with Carbo/Taxol for now. \par \par 8/1/2019: Serena reports no major side effects from weekly Carbo/Taxol, she reports no neuropathy. She has completed 10 cycles altogether. \par PET CT on 7/24/2019 revealed \par 1. Since May 1, 2019, no definite new site of pathologic FDG uptake to \par suggest new biologic tumor activity; specifically, no pathologic FDG uptake \par within previously noted 1.3 cm lesion within hepatic segment 4. \par 2. Increased FDG uptake within several subcentimeter bilateral cervical \par lymph nodes, which is nonspecific and may be postinflammatory; max SUV 9.2 \par is noted within a 0.7 cm right level 2 cervical node. Attention on follow-up \par is suggested. \par 3. Few peritoneal nodules have overall decreased in size. \par 4. No additional site of pathologic FDG uptake. \par Images were personally reviewed by myself and discussed with Serena. \par She wishes to continue with chemotherapy. Will plan for 3 additional cycles. Will consider adding Avastin, but with h/o inconclusive/possible chronic PE ppx anticoagulation maybe necessary. \par \par 8/28/2019: Serena reports feeling well, she denies worsening neuropathy, worsening neurological symptoms, SOB, abdominal pain, bloating. She reports she is planned to undergo  eye surgery towards the end of September. We will plan to hold chemotherapy briefly after this cycle to facilitate adequate counts and minimize complications. We again have briefly discussed considering Avastin based therapy, will hold off on this prior to surgery. 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 May proceed without formal PAST, will check CBC prior to procedure, PT and PTT today. \par \par 9/25/19:Serena reports feeling well, she denies any changes since last visit, No SOB, abdominal pain, bloating. She reports she is planned to undergo  eye surgery in 9/30/19  CBC reviewed with normal Hemogram . we will hold chem this week , she will resume after Cataract sx . 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 May proceed without formal PAST, will check CBC prior to procedure, PT and PTT today. \par \par 10/4/2019: Serean underwent successful eye surgery on 9/30/2019, she reports she can see much better now. Last dose of chemotherapy was administered on 9/20, she then was on hold for surgery. She has completed 5 additional cycles of Carbo/taxol. She is due for restaging. Her disease is only accurately assessed on PET CT, prior attempts to obtain CT scans resulted in additional imaging with PET, as findings were inconclusive. PET CT will be ordered to restage. \par In addition she is due for MRI of the brain. She has had a small amount of weight loss, mild neuropathy is persistent. She offers no additional complaints. \par She will have port placed on Monday, will hold chemotherapy until restaging scans complete. \par \par 10/23/2019: Serena feels well, and denies any new symptoms. Her eye surgery was successful, vision is much improved now. She had restaging scans. \par PET CT on 10/18/2019 revealed COMPARISON : 7/24/2019. \par No pathologic uptake to suggest biologic tumor activity. \par MRI of the brain on 10/15/2019 revealed \par Comparison with June 8 and March 21, 2019: (Generally lesions improved \par markedly since March but slightly increased in size since June) \par 1. Lesions previously demonstrated on the study of June 8, 2019 has \par remained stable or minimally increased in size \par 2. Specifically, right frontal opercular lesion measures about 1 cm and \par left posterior inferior temporal lobe lesion measures about 1 cm. These have \par increased since the previous study. \par 3. Small punctiform lesions within the right frontal white matter and left \par frontal sulcus or more apparent than on the study of June 8 \par 4. These lesions are all much smaller than the earlier MRI of March 21, \par 2019. \par 5. No new lesions. \par Images were personally reviewed by myself and discussed with patient. She remains asymptomatic and off the steroids. \par I have also discussed the case with Dr. Dahl. I would like Serena to discuss the options with [facility], she maybe a candidate for brain SRS. \par She has been off Carbo/Taxol for a few weeks, she remains Platinum sensitive. I have discussed the option of switching her over to Avastin maintenance, will need to prophylax with low dose Eliquis as well, given questionable finding of small PE in the past. \par Side effects of Avastin were discussed at length, including HNT, proteinuria, small risk of DVT/PE, GI perforations etc. \par She is agreeable to start after [facility] consultation. \par \par 11/5/2019: Serena feels OK, she reports acute onset head discomfort that lasts minutes and subsides, the episodes are becoming more frequent 1-2 times a day. She has seen Dr. Dahl, who wishes to hold off on offering SRS to the brain. Plan was to start on Avastin today. Serena reports she currently has a lapse in her insurance coverage till 12/2019, I will not be able to start he on ppx dose Eliquis for now, she instead will take baby ASA every other day, to modify risk of DVT/PE. Will plan to switch over to Eliquis at 2.5mg BID once insurance is active. She also contemplated going to [place] with her daughter, I am willing to hold Avastin until she comes back, but Serena wishes to start right away, I explained to her that flight may result in complications if that is her wish. She will cancel the trip and start with Avastin today. Side effects have been discussed.

## 2019-11-05 NOTE — PHYSICAL EXAM
[Restricted in physically strenuous activity but ambulatory and able to carry out work of a light or sedentary nature] : Status 1- Restricted in physically strenuous activity but ambulatory and able to carry out work of a light or sedentary nature, e.g., light house work, office work [Normal] : affect appropriate [de-identified] : B/L cataracts appreciated [de-identified] : CTA B/L

## 2019-11-05 NOTE — ASSESSMENT
[FreeTextEntry1] : Papillary-serous endometrial cancer, stage IV\par --PET CT on 11/29/2017 revealed no clear GYN origin, extensive KEVIN, uptake in the right lung and pleura, thyroid nodule\par --Malignant pleural effusion, resolved\par --L side PleurX catheter was removed on 2/8/2018\par --Completed cycle 8  of carbo/taxol on 6/29/2018.  2 cycles were administered  postoperatively.\par --Restaging PET CT on 9/26/2018 (3 months post completion of chemo 6/29/2018)was essentially negative for recurrent disease\par --Restaging PET CT on 12/18/2018 reveals   New left upper quadrant peritoneal nodule measuring 8 mm with an SUV max  of 7.3. This is suggestive of biologic tumor activity.  No other FDG avid lesions seen throughout the scan.\par --restaging PET CT on 2/12/2019 revealed slight increase in size of left upper quadrant peritoneal nodule (1.1 cm, \par previously 0.8 cm) with stable FDG uptake, 7.7 SUV suspicious for biologic tumor activity.\par --Case was discussed with Dr. Massey at [place], biopsy of peritoneal nodule is positive for recurrent endometrial cancer.\par --MRI brain on 4/23/2019 revealed multiple brain mets\par --S/p whole brain radiation completed 4/2019, required acute rehab\par --PET CT on 5/1/2019 subsequently revealed further disease progression \par --Restarted weekly carbo/taxol 3 on 1 off on 5/9/2019, continue with weekly Carbo/Taxol for now.\par - Patient will proceed with chemo Cycle #4 on 9/27/19 . \par --Off Decadron and Keppra since 6/5/2019 \par --Restaging brain MRI on 6/8/2019 showed almost complete resolution of most supratentorial lesions and no new lesions.\par --Restaging CT C/A/P on 7/9/2019 did not reveal definite progression, there was a questionable filling defect suspicious for possible chronic PE/artifact, this was discussed with Radiology at length anticoagulation was not deemed to be necessary\par --PET CT on 7/24/2019 revealed positive response to therapy \par --Carbo/Taxol stopped, last dose 9/13/2019, on hold since, she remains platinum sensitive \par --Restaging PET CT on 10/18/2019 is NAD\par Restaging MRI of the brain on 10/15/2019 is suggestive for possible disease progression, no new lesions, she remains asymptomatic and off steroids\par --Short term MRI brain follow up \par --Refer to [facility] for brain SRS consideration \par --Plan to start on Avastin maintenance on 11/5/2019 thereafter with Eliquis ppx for DVT/ PE, for now she will take baby ASA every other day \par \par Papillary thyroid cancer [id number], 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 Herrera \par --Follow up with Dr. Acevedo of endocrinology; he is addressing vitamin D, thyroid and diabetes\par \par Cataract of eye\par -- S/p successful surgery on 9/30/19 . \par \par Follow up in 3 weeks\par

## 2019-11-07 DIAGNOSIS — C80.1 MALIGNANT (PRIMARY) NEOPLASM, UNSPECIFIED: ICD-10-CM

## 2019-11-15 DIAGNOSIS — G62.9 POLYNEUROPATHY, UNSPECIFIED: ICD-10-CM

## 2019-11-15 LAB
ALBUMIN SERPL ELPH-MCNC: 4.2 G/DL
ALP BLD-CCNC: 71 U/L
ALT SERPL-CCNC: 21 U/L
ANION GAP SERPL CALC-SCNC: 17 MMOL/L
AST SERPL-CCNC: 22 U/L
BILIRUB DIRECT SERPL-MCNC: 0.2 MG/DL
BILIRUB INDIRECT SERPL-MCNC: 0.5 MG/DL
BILIRUB SERPL-MCNC: 0.7 MG/DL
BUN SERPL-MCNC: 8 MG/DL
CALCIUM SERPL-MCNC: 9.3 MG/DL
CHLORIDE SERPL-SCNC: 99 MMOL/L
CO2 SERPL-SCNC: 26 MMOL/L
CREAT SERPL-MCNC: 0.6 MG/DL
GLUCOSE SERPL-MCNC: 108 MG/DL
HCT VFR BLD CALC: 35 %
HGB BLD-MCNC: 11.7 G/DL
MAGNESIUM SERPL-MCNC: 1.8 MG/DL
MCHC RBC-ENTMCNC: 33.1 PG
MCHC RBC-ENTMCNC: 33.4 G/DL
MCV RBC AUTO: 99.2 FL
PLATELET # BLD AUTO: 246 K/UL
PMV BLD: 9.7 FL
POTASSIUM SERPL-SCNC: 3.6 MMOL/L
PROT SERPL-MCNC: 7 G/DL
RBC # BLD: 3.53 M/UL
RBC # FLD: 13 %
SODIUM SERPL-SCNC: 142 MMOL/L
WBC # FLD AUTO: 4.76 K/UL

## 2019-11-20 ENCOUNTER — LABORATORY RESULT (OUTPATIENT)
Age: 64
End: 2019-11-20

## 2019-11-20 ENCOUNTER — OUTPATIENT (OUTPATIENT)
Dept: OUTPATIENT SERVICES | Facility: HOSPITAL | Age: 64
LOS: 1 days | Discharge: HOME | End: 2019-11-20

## 2019-11-20 ENCOUNTER — APPOINTMENT (OUTPATIENT)
Dept: HEMATOLOGY ONCOLOGY | Facility: CLINIC | Age: 64
End: 2019-11-20
Payer: MEDICAID

## 2019-11-20 VITALS
WEIGHT: 153 LBS | DIASTOLIC BLOOD PRESSURE: 77 MMHG | HEART RATE: 97 BPM | SYSTOLIC BLOOD PRESSURE: 121 MMHG | HEIGHT: 65 IN | BODY MASS INDEX: 25.49 KG/M2 | RESPIRATION RATE: 14 BRPM | TEMPERATURE: 96.5 F

## 2019-11-20 DIAGNOSIS — K05.6 PERIODONTAL DISEASE, UNSPECIFIED: ICD-10-CM

## 2019-11-20 DIAGNOSIS — Z98.890 OTHER SPECIFIED POSTPROCEDURAL STATES: Chronic | ICD-10-CM

## 2019-11-20 DIAGNOSIS — Z90.710 ACQUIRED ABSENCE OF BOTH CERVIX AND UTERUS: Chronic | ICD-10-CM

## 2019-11-20 PROCEDURE — 99213 OFFICE O/P EST LOW 20 MIN: CPT

## 2019-11-21 LAB
ANION GAP SERPL CALC-SCNC: 16 MMOL/L
APPEARANCE: ABNORMAL
BILIRUBIN URINE: NEGATIVE
BLOOD URINE: NEGATIVE
BUN SERPL-MCNC: 7 MG/DL
CALCIUM SERPL-MCNC: 9.7 MG/DL
CHLORIDE SERPL-SCNC: 98 MMOL/L
CO2 SERPL-SCNC: 25 MMOL/L
COLOR: YELLOW
CREAT SERPL-MCNC: 0.6 MG/DL
GLUCOSE QUALITATIVE U: NEGATIVE
GLUCOSE SERPL-MCNC: 96 MG/DL
HCT VFR BLD CALC: 37.2 %
HGB BLD-MCNC: 12.3 G/DL
KETONES URINE: NEGATIVE
LEUKOCYTE ESTERASE URINE: ABNORMAL
MAGNESIUM SERPL-MCNC: 1.9 MG/DL
MCHC RBC-ENTMCNC: 32.1 PG
MCHC RBC-ENTMCNC: 33.1 G/DL
MCV RBC AUTO: 97.1 FL
NITRITE URINE: NEGATIVE
PH URINE: 6.5
PLATELET # BLD AUTO: 246 K/UL
PMV BLD: 9 FL
POTASSIUM SERPL-SCNC: 4.8 MMOL/L
PROTEIN URINE: NEGATIVE
RBC # BLD: 3.83 M/UL
RBC # FLD: 12.3 %
SODIUM SERPL-SCNC: 139 MMOL/L
SPECIFIC GRAVITY URINE: 1.02
UROBILINOGEN URINE: NORMAL
WBC # FLD AUTO: 5.5 K/UL

## 2019-11-26 ENCOUNTER — LABORATORY RESULT (OUTPATIENT)
Age: 64
End: 2019-11-26

## 2019-11-26 ENCOUNTER — APPOINTMENT (OUTPATIENT)
Dept: INFUSION THERAPY | Facility: CLINIC | Age: 64
End: 2019-11-26

## 2019-11-26 RX ORDER — BEVACIZUMAB 400 MG/16ML
1040 INJECTION, SOLUTION INTRAVENOUS ONCE
Refills: 0 | Status: COMPLETED | OUTPATIENT
Start: 2019-11-26 | End: 2019-11-26

## 2019-11-26 RX ORDER — ACETAMINOPHEN 500 MG
650 TABLET ORAL ONCE
Refills: 0 | Status: COMPLETED | OUTPATIENT
Start: 2019-11-26 | End: 2019-11-26

## 2019-11-26 RX ORDER — DIPHENHYDRAMINE HCL 50 MG
25 CAPSULE ORAL ONCE
Refills: 0 | Status: COMPLETED | OUTPATIENT
Start: 2019-11-26 | End: 2019-11-26

## 2019-11-26 RX ADMIN — Medication 650 MILLIGRAM(S): at 10:54

## 2019-11-26 RX ADMIN — BEVACIZUMAB 1040 MILLIGRAM(S): 400 INJECTION, SOLUTION INTRAVENOUS at 12:20

## 2019-11-26 RX ADMIN — Medication 25 MILLIGRAM(S): at 11:15

## 2019-11-26 RX ADMIN — BEVACIZUMAB 141.6 MILLIGRAM(S): 400 INJECTION, SOLUTION INTRAVENOUS at 11:19

## 2019-11-26 RX ADMIN — Medication 101 MILLIGRAM(S): at 10:54

## 2019-11-27 NOTE — ASSESSMENT
[FreeTextEntry1] : Papillary-serous endometrial cancer, stage IV\par --PET CT on 11/29/2017 revealed no clear GYN origin, extensive KEVIN, uptake in the right lung and pleura, thyroid nodule\par --Malignant pleural effusion, resolved\par --L side PleurX catheter was removed on 2/8/2018\par --Completed cycle 8  of carbo/taxol on 6/29/2018.  2 cycles were administered  postoperatively.\par --Restaging PET CT on 9/26/2018 (3 months post completion of chemo 6/29/2018)was essentially negative for recurrent disease\par --Restaging PET CT on 12/18/2018 reveals   New left upper quadrant peritoneal nodule measuring 8 mm with an SUV max  of 7.3. This is suggestive of biologic tumor activity.  No other FDG avid lesions seen throughout the scan.\par --restaging PET CT on 2/12/2019 revealed slight increase in size of left upper quadrant peritoneal nodule (1.1 cm, \par previously 0.8 cm) with stable FDG uptake, 7.7 SUV suspicious for biologic tumor activity.\par --Case was discussed with Dr. Massey at [facility], biopsy of peritoneal nodule is positive for recurrent endometrial cancer.\par --MRI brain on 4/23/2019 revealed multiple brain mets\par --S/p whole brain radiation completed 4/2019, required acute rehab\par --PET CT on 5/1/2019 subsequently revealed further disease progression \par --Restarted weekly carbo/taxol 3 on 1 off on 5/9/2019, continue with weekly Carbo/Taxol for now.\par - Patient will proceed with chemo Cycle #4 on 9/27/19 . \par --Off Decadron and Keppra since 6/5/2019 \par --Restaging brain MRI on 6/8/2019 showed almost complete resolution of most supratentorial lesions and no new lesions.\par --Restaging CT C/A/P on 7/9/2019 did not reveal definite progression, there was a questionable filling defect suspicious for possible chronic PE/artifact, this was discussed with Radiology at length anticoagulation was not deemed to be necessary\par --PET CT on 7/24/2019 revealed positive response to therapy \par --Carbo/Taxol stopped, last dose 9/13/2019, on hold since, she remains platinum sensitive \par --Restaging PET CT on 10/18/2019 is NAD\par --Restaging MRI of the brain on 10/15/2019 is suggestive for possible disease progression, no new lesions, she remains asymptomatic and off steroids, will follow closely \par --Short term MRI brain follow up \par --She was referred to [facility] for brain SRS consideration, close observation for now \par --Started on Avastin maintenance 11/5/2019, will consider adding Eliquis ppx for DVT/ PE, for now she will take baby ASA every other day \par --Proceed with 2nd cycle on 11/26/19 \par \par Papillary thyroid cancer [id number], 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 Herrera \par --Follow up with Dr. Acevedo of endocrinology; he is addressing vitamin D, thyroid and diabetes\par \par Cataract of eye\par -- S/p successful surgery on 9/30/19 . \par \par Follow up in 3 weeks\par Patient seen and examined by Dr. Cid who agreed for the above plan of care.

## 2019-11-27 NOTE — PHYSICAL EXAM
[Restricted in physically strenuous activity but ambulatory and able to carry out work of a light or sedentary nature] : Status 1- Restricted in physically strenuous activity but ambulatory and able to carry out work of a light or sedentary nature, e.g., light house work, office work [Normal] : affect appropriate [de-identified] : B/L cataracts appreciated [de-identified] : CTA B/L

## 2019-11-27 NOTE — HISTORY OF PRESENT ILLNESS
[AJCC Stage: ____] : AJCC Stage: [unfilled] [Disease: _____________________] : Disease: [unfilled] [de-identified] : 11/24/2017: She reports some SOB, especially ARZATE today. No fevers, no weight loss (reports some weight gain), no GI,  or GYN symptoms, except for increasing abdominal girth, she reports no blood in the stool or urine and no vaginal bleeding. She reports no CP and no palpitations, she reports worsening nonproductive cough, no hemoptysis. She also reports difficulty lying on left side and lying down flat. \par She reports left on/off facial numbness several months in duration. She had a CT of her head performed in [facility] within 1 year.  Additionally there is a freely mobile left cervical node present on exam, as per patient this was in place for approximately 1 year. \par \par 12/1/2017: Serena is here for follow up today. She had a therapeutic thoracentesis in  on 11/27/2017, 2L of fluid were removed, with much improved respiratory status. PET CT and MRI of the brain were done on 11/29/2017, findings were discussed today. There remains no clear primary source of malignancy. We have discussed that the source of tumor may be Mullerian vs lung. regardless of source chemotherapy needs to be initiated ASAP. We have discussed Carbo/taxol regiment that will cover both Mullerian and lung origin. Side effects including myelosuppression, peripheral neuropathy, allergic reaction and others.\par \par 1/2/2017 Since last visit Serena had an excisional biopsy of cervical node on 12/13/2017 revealing met adenocarcinoma, primary source still was not clear. On 12/14/2017 Serena was admitted with SOB, Pleurx catheter was placed on 12/14/2017. 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 Serena received 1st dose of carbo (auc of 5)/taxol(175mg/m2) on 12/15/2017 without complications, but the next day sustained a fall 2/2 vasovagal episode and developed asymptomatic SAH, that resolved on imaging by 12/21/2017. Today Serena's SOB has significantly improved. She reports very mild neuropathy in fingertips only. She reports no headache and offers no other complaints. \par \par 1/26/2018: Complains of some neuropathy, otherwise tolerating chemo with no difficulty. \par \par 2/16/2018: restaging CT C/A/P reviewed, significant improvement noted. Will refer to GYN/ONC. Reports slightly worsening neuropathy, not -painful, taking Gabapentin, no other symptoms. For cycle 4 of carbo/taxol today.\par \par 3/9/2018: Serena met with Dr. Massey, she is planned for surgery on 5/7/2018, after completion of 6 cycles of carbo/taxol and restaging PET CT ordered for mid April and follow up with Dr. Massey on April 20. She reports worsening neuropathy, and occasional pain and changes in vision in her left eye, eye symptoms come and go and are not very bothersome. She reports no other symptoms. \par \par 3/30/2018; Serena is here for cycle 6 of carbo/taxol, she continues to have neuropathy in finger and toes, but offers no other complaints, chemo has been dose reduced. \par \par 4/27/2018: Results of restaging PET CT s/p 6 cycles of carbo/taxol revealed 1. No new areas of abnormal increased uptake is seen to indicate \par biologically active disease.\par \par 2. Persistent PET positive left thyroid mass with a max SUV 33.1, \par previously max SUV 34.8. Further evaluation with thyroid ultrasound and \par if needed fine-needle aspiration biopsy will be helpful.\par \par 3.   PET positive left cervical lymph nodes mainly level 2 on the left \par with a max SUV 5.47previously 18. Minimal increased uptake in the \par bilateral axillary lymph nodes most likely reactive(less than 2.5)\par \par 4. Weakly PET positive, max SUV 2.89 right pleura, previous max SUV 5.3 \par with non-FDG avid right pleural effusion. \par \par 5. Previously FDG avid right and left supraclavicular lymphadenopathy, \par left internal mammary, bilateral paratracheal, para-aortic, para \par vertebral, carinal, mesenteric, right and left iliacs, right inguinal \par lymphadenopathy, small in size and no longer FDG avid.\par \par 6. No abnormal increased uptake is seen in the  lobulated uterus.\par \par 7. Overall there is marked improvement in the FDG avid disease.\par This was reviewed with Serena. She met with Dr. Massey on 4/20/2018, surgery is planned for 5/7/2018. She will also joselyn to meet with ENT re FDG avid thyroid nodule. Will assist in making he an appointment for her. \par Persistent neuropathy on gabapentin. \par \par 6/29/18 ; Patient came for follow up visit , evaluation for chemotherapy cycle 8 of carbo / Taxol , patient tolerating medication well , except neuropathy  recommend to have gabapentin  300 mg po daily.\par \par 7/20/2018: Serena present for follow up today, she has completed total of 8 cycles of Carbo/Taxol. She reports significant neuropathy in her hands and feet. We will discontinue chemotherapy today and plan to follow with close observation. She has thyroidectomy planned with Dr. Herrera on 8/20/2018, obtaining clearance for PMD. She is also planning to fly to [place] at the end of September. She reports no SOB, no GI or  symptoms. \par \par 9/12/2018: Serena offers no significant complaints today. She states neuropathy in her hands has almost completely resolved and neuropathy in her feet is significantly better. She had undergone complete thyroidectomy by Dr. Herrera on 8/20/2018, pathology revealed papillary thyroid cancer, measuring 2 cm, pT1b, other additional foci of papillary carcinoma were also noted, no LVI, surgical margins were clear, LN 0/2 had no carcinoma, stage [id number]. She is following up with Dr. Herrera tomorrow. She has still not gone for her vacation in [place]. \par \par 10/10/2018: Restaging PET CT on 9/26/2018 reveals Since 4/16/2018,  1. Post thyroidectomy with diffuse increased FDG uptake at the thyroid  bed likely representing post-surgical changes.  2. Subtle increased uptake is seen in the lamina , right side at the  level of T8 with a max SUV 4.6. This is not sufficient for metastatic  disease. Bone scan or MRI examination with contrast will be helpful for  further evaluation.  3. No other areas of abnormal increased uptake is seen. Images were personally reviewed by myself and discussed with Serena. \par She also had an Echo on 9/24/2018 revealing EF of 63%. \par Serena reports ongoing fatigue, she is unable to lose weight. She is taking Synthroid and following with Dr. Acevedo. \par She reports stiff fingers at night only, no painful neuropathy. \par She denies any other symptoms today. \par \par 12/11/2018: Serena feels well, neuropathy in hands and feet is much improved. S he is now complaining of r-sided facial pain associated with some swelling, pain comes and goes. She has already seen Dr. Herrera, who ordered CT of sinuses and prescribed pain relief meds. She is also due for restaging PET CT. Serena feels well otherwise. \par \par 1/9/2019: Restaging PET CT was performed on 12/18/2018 it revealed COMPARISON : 9/24/2018.  New left upper quadrant peritoneal nodule measuring 8 mm with an SUV max  of 7.3. This is suggestive of biologic tumor activity.  No other FDG avid lesions seen throughout the scan. Images were personally reviewed by myself and discussed with Serena, originally over the phone and again today. I have originally suggested to try AI in the interim, Serena however reported diarrhea at the time of the scan and declined intervention for now. We will plan for restaging PET CT to be performed in 6-8 weeks, this will be ordered today. She will follow up with Dr. Massey at [facility] shortly and bring the disk for his review. I would also like her to meet with genetics, this will be arranged at [facility] with Dr. Massey's help. Serena reports no new symptoms today, her neuropathy is manageable and  improving. She still reports unilateral headache that was work up in the past. Neurology eval was again suggested to her. She is following closely with Endocrinology re thyroid management. She will have follow up with Dr. Herrera shortly as well. \par \par 2/20/2019: Serena offers no new complaints. PET CT from 2/12/2019 revealed \par Since PET/CT of December 19, 2018, no new sites of pathologic FDG uptake\par Slight increase in size of left upper quadrant peritoneal nodule (1.1 cm, \par previously 0.8 cm) with stable FDG uptake, 7.7 SUV suspicious for \par biologic tumor activity.\par No other sites of pathologic FDG uptake \par Images were personally reviewed by myself and discussed with Serena, case was also discussed with Dr. Massey at [facility]. Serena will have follow up with his shortly. She is following with endocrinology. reports improving neuropathy. No GI or  symptoms at this time. No weight loss. \par \par 3/20/2019: Serena had a peritoneal nodule biopsy done at [facility], I have received a call from Dr. 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 We have discussed that surgery though could be attempted will not be the best therapeutic approach, recommencement of systemic therapy was discussed. I have not received the results from [facility] yet. I have briefly discussed this with Serena today. Serena reports that she has acutely developed right lower extremity weakness 5-6 days ago, she did not inform any of her doctors about this. She also reports that her R upper extremity though not weak "does not feel normal either. She reports no back pain, there is no point tenderness, RLE is objectively weak on exam. I recommend ER evaluation to r/o CVA vs brain met, vs L-spine related issue. Recommend admission for work up. Serena is agreeable to get admitted. \par \par 5/8/2019: Serena present for follow up, she was diagnosed with multiple metastasis to the brain, MRI was done on 3/21/2019 and revealed \par Multiple supratentorial heterogeneously enhancing lesions consistent with \par metastatic disease. The appearance on the T2-weighted images is suggestive \par of adenocarcinoma. There is mass effect upon the left lateral ventricle and \par corpus callosum. \par She received whole brain irradiation by Dr. Dahl, completed it in the beginning of 4/2019, she is currently on steroid taper managed by Dr. Dahl, she is taking 4mg of Decadron daily. She will be stopping the Keppra as there have been no documented h/o seizures. She still reports impairment of the L visual field, she has an appointment coming up with Opthalmology. She reports no deficits walking after she has completed inpatient acute rehabilitation. \par Restaging PET CT on 5/1/2019 reveals COMPARISON : 2/12/2019 \par Multiple new FDG avid omental nodules largest measuring up to 10 mm, \par positive on nonattenuation corrected images. Findings are suggestive of \par biologic tumor activity. \par Again seen is a left upper quadrant peritoneal nodule, SUV max 6.8, \par previously 7.7 (12% decrease). \par Images were personally reviewed by myself and discussed with patient. \par She now reports mild abdominal bloating, no other symptoms. She reports her neuropathy has significantly improved. \par She has first evidence of recurrent disease documented 5.5 months after last carbo/taxol dose, the volume of disease was very small (1 8mm nodule), she was then observed for another 6 months and now she wishes to restart the chemotherapy. \par I have discussed with her that rechallenging with carbo/taxol may still prove to be effective as long as she gets restaged after 2 cycles. Given recent whole brain radiation and neuropathy I will offer her carbo/taxol weekly with does reduction on the taxol for neuropathy, We will plan to run carbo slowly to avoid allergic reaction. \par She is agreeable to start ASAP. \par \par 6/5/2019: Serena has completed 1 cycle of weekly Carbo/taxol, ran at slow rate to avoid Carbo reaction and has tolerated chemotherapy without difficulty, she does not complain of increase in neuropathy. She has ongoing vision changes in her L eye, she had no residual LE weakness. She is off Decadron and Keppra. She was advised to see ophthalmology. \par \par 7/3/2019: Serena is doing well.  Reports no problems with carbo/taxol.  Still complains of vision changes in L eye but is seeing ophthalmologist on 7/16.  Remains active around the house and cooks regularly.  No fevers, weight loss, anorexia.  ROS is otherwise only significant for occasional loose stools, no diarrhea.\par \par 7/17/2019: Serena is doing well, denies worsening neuropathy. She c/o feeling fatigued and tired. Her last chemo was 1 week ago(7/11/19) with carbo/taxol and is due again tomorrow. She saw ophthalmology and will need cataract surgery of the L eye. No c/o chest pain/SOB. No weight loss.\par CT C/A/P was done on 7/9/2019, images were personally reviewed by myself and discussed with several radiology attendings, they revealed \par CHEST:\par Filling defect within a segmental branch of the lower lobe pulmonary artery \par suspicious for pulmonary embolus. \par Stable left upper lobe and right middle lobe 3 mm nodules. \par CT abdomen and pelvis performed on the same day, see separate report. \par ADDENDUM: \par Please note that the morphology of the small thrombus within the left lower \par pulmonary artery is not indicative of an acute thrombus but rather a chronic \par appearance. \par This was discussed with Radiology, it was not deemed that thrombus was acute and may not have even been present at all, finding was deemed to be equivocal, based on radiology assessment anticoagulation was not indicated for the filling defect noted. Initially CTA was recommended, the recommendation was withdrawn upon further review. \par ABDOMEN/PELVIS:\par New 1.3 cm segment IV hepatic hypodensity seen on series 4 image 205. \par Lesion not seen on prior PET/CT from 5/1/2019 or abdomen and pelvis CT from \par \par 2/2/2018 and is consistent with a new metastasis. \par Two other hepatic lesions described above, decreased in size since 2/2/2018, \par consistent with treated hepatic metastases. \par Anterior perisplenic omental nodule measures 0.7 cm on series 2 image 53, \par previously measuring 1.2 cm on PET/CT dated 5/1/2019. \par All the other previously seen omental nodules have resolved since 5/1/2019. \par ADDENDUM:\par Case was discussed with Dr. Cid in detail. It is also possible that the \par new 1.3 cm lesion in the liver since February 2, 2018 represents a treated \par metastasis similar to the other liver lesions. \par PET/CT would be necessary to assess disease activity. \par I have discussed case with Radiology, and it was determined that there was no clear cut evidence of progression. PET CT was ordered today.\par This was discussed with Serena, will proceed with Carbo/Taxol for now. \par \par 8/1/2019: Serena reports no major side effects from weekly Carbo/Taxol, she reports no neuropathy. She has completed 10 cycles altogether. \par PET CT on 7/24/2019 revealed \par 1. Since May 1, 2019, no definite new site of pathologic FDG uptake to \par suggest new biologic tumor activity; specifically, no pathologic FDG uptake \par within previously noted 1.3 cm lesion within hepatic segment 4. \par 2. Increased FDG uptake within several subcentimeter bilateral cervical \par lymph nodes, which is nonspecific and may be postinflammatory; max SUV 9.2 \par is noted within a 0.7 cm right level 2 cervical node. Attention on follow-up \par is suggested. \par 3. Few peritoneal nodules have overall decreased in size. \par 4. No additional site of pathologic FDG uptake. \par Images were personally reviewed by myself and discussed with Serena. \par She wishes to continue with chemotherapy. Will plan for 3 additional cycles. Will consider adding Avastin, but with h/o inconclusive/possible chronic PE ppx anticoagulation maybe necessary. \par \par 8/28/2019: Serena reports feeling well, she denies worsening neuropathy, worsening neurological symptoms, SOB, abdominal pain, bloating. She reports she is planned to undergo  eye surgery towards the end of September. We will plan to hold chemotherapy briefly after this cycle to facilitate adequate counts and minimize complications. We again have briefly discussed considering Avastin based therapy, will hold off on this prior to surgery. 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 May proceed without formal PAST, will check CBC prior to procedure, PT and PTT today. \par \par 9/25/19:Serena reports feeling well, she denies any changes since last visit, No SOB, abdominal pain, bloating. She reports she is planned to undergo  eye surgery in 9/30/19  CBC reviewed with normal Hemogram . we will hold chem this week , she will resume after Cataract sx . 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 May proceed without formal PAST, will check CBC prior to procedure, PT and PTT today. \par \par 10/4/2019: Serena underwent successful eye surgery on 9/30/2019, she reports she can see much better now. Last dose of chemotherapy was administered on 9/20, she then was on hold for surgery. She has completed 5 additional cycles of Carbo/taxol. She is due for restaging. Her disease is only accurately assessed on PET CT, prior attempts to obtain CT scans resulted in additional imaging with PET, as findings were inconclusive. PET CT will be ordered to restage. \par In addition she is due for MRI of the brain. She has had a small amount of weight loss, mild neuropathy is persistent. She offers no additional complaints. \par She will have port placed on Monday, will hold chemotherapy until restaging scans complete. \par \par 10/23/2019: Serena feels well, and denies any new symptoms. Her eye surgery was successful, vision is much improved now. She had restaging scans. \par PET CT on 10/18/2019 revealed COMPARISON : 7/24/2019. \par No pathologic uptake to suggest biologic tumor activity. \par MRI of the brain on 10/15/2019 revealed \par Comparison with June 8 and March 21, 2019: (Generally lesions improved \par markedly since March but slightly increased in size since June) \par 1. Lesions previously demonstrated on the study of June 8, 2019 has \par remained stable or minimally increased in size \par 2. Specifically, right frontal opercular lesion measures about 1 cm and \par left posterior inferior temporal lobe lesion measures about 1 cm. These have \par increased since the previous study. \par 3. Small punctiform lesions within the right frontal white matter and left \par frontal sulcus or more apparent than on the study of June 8 \par 4. These lesions are all much smaller than the earlier MRI of March 21, \par 2019. \par 5. No new lesions. \par Images were personally reviewed by myself and discussed with patient. She remains asymptomatic and off the steroids. \par I have also discussed the case with Dr. Dahl. I would like Serena to discuss the options with [facility], she maybe a candidate for brain SRS. \par She has been off Carbo/Taxol for a few weeks, she remains Platinum sensitive. I have discussed the option of switching her over to Avastin maintenance, will need to prophylax with low dose Eliquis as well, given questionable finding of small PE in the past. \par Side effects of Avastin were discussed at length, including HNT, proteinuria, small risk of DVT/PE, GI perforations etc. \par She is agreeable to start after [facility] consultation. \par \par 11/5/2019: Serena feels OK, she reports acute onset head discomfort that lasts minutes and subsides, the episodes are becoming more frequent 1-2 times a day. She has seen Dr. Dahl, who wishes to hold off on offering SRS to the brain. Plan was to start on Avastin today. Serena reports she currently has a lapse in her insurance coverage till 12/2019, I will not be able to start he on ppx dose Eliquis for now, she instead will take baby ASA every other day, to modify risk of DVT/PE. Will plan to switch over to Eliquis at 2.5mg BID once insurance is active. She also contemplated going to [place] with her daughter, I am willing to hold Avastin until she comes back, but Serena wishes to start right away, I explained to her that flight may result in complications if that is her wish. She will cancel the trip and start with Avastin today. Side effects have been discussed. \par \par 11/20/2019: Serena came for a follow up visit, she reports feeling well, she reports less fatigue. She is s/p first dose of Avastin on 11/5/2019, she tolerated Avastin without difficulty, she is due for the 2nd dose today. \par We communicated CBC result with HGB of 12.3, normal platelet and WBCs. She lost like 5 pounds secondary to gum problems, she will follow up with her dentist this week. She is currently on baby ASA every other day. Continue with single agent Avastin.  \par  [de-identified] : Serena is a rodrigue 63 yo female, who has started developing SOB approximately 2 months ago, she went to ER on 11/2/2017 and on CXR was noted to have a large right sided pleural effusion. She underwent a thoracentesis, 1.6L of fluid was drained. 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 IHC was pos for CK7, PAX8, CK5/6 and Ca125, negative for TTF-1/Napsin, calretinin, CK20, mammaglobin, p63, villin, HAM56, GCDFP15, TAY-EP4. \par \par Her visit on 12/1/2017 Serena had an excisional biopsy of cervical node on 12/13/2017 revealing met adenocarcinoma, primary source still was not clear. On 12/14/2017 Serena was admitted with SOB, Pleurx catheter was placed on 12/14/2017. 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 Serena received 1st dose of carbo (auc of 5)/taxol(175mg/m2) on 12/15/2017 without complications, but the next day sustained a fall 2/2 vasovagal episode and developed asymptomatic SAH, that resolved on imaging by 12/21/2017.  [de-identified] : KRas WT, EGFR WT, Alk WT, ROS1 WT, PDL1-1%\par Normal MMR protein expression

## 2019-11-27 NOTE — RESULTS/DATA
[FreeTextEntry1] : EXAM:  MR BRAIN WAW IC      \par \par \par PROCEDURE DATE:  06/08/2019  \par \par \par \par \par INTERPRETATION:  Clinical History / Reason for exam: Dizziness and \par vertigo, history of uterine and thyroid cancer, for evaluation of \par metastatic disease, lesion seen on prior MRI of the brain.\par \par MRI OF THE BRAIN WITHOUT AND WITH CONTRAST\par \par TECHNIQUE:\par \par Multiplanar multisequence imaging of the brain was performed on the \[facility] open magnet before and after the intravenous administration of \par 7.5 cc of Gadavist including brain lab protocol.\par \par COMPARISON:\par \par MRI of the brain without and with contrast dated March 12, 2019.\par \par FINDINGS:\par \par The previously described supratentorial lesions have almost completely \par resolved.\par \par The lesion in the left posterior frontal region now measures 1.4 cm in \par maximum diameter, the lesion in the anterior right frontal lobe measures \par 0.7 cm in maximum diameter, the second lesion in the right frontal lobe \par measures 0.1 cm in maximum diameter, the lesion in the right posterior \par parietal region measures 0.3 cm in maximum diameter, and the left \par occipital lobe lesion measures 1.2 cm in maximum diameter. (Previously \par measuring 2.6, 2.4, 0.5, 0.7, and 2.5 cm respectively).\par \par The third, fourth, and lateral ventricles are normal in size and \par position. There is no shift of the midline structures.\par \par The cerebellar tonsils are minimally low-lying (0.3 cm of cerebellar \par ectopia).\par \par Incidental note is made of a tiny medial cyst.\par \par There are scattered T2/FLAIR hyperintense signal intensities in the\par subcortical white matter which are nonspecific differential diagnostic\par possibilities include chronic ischemic change, foci of gliosis or\par demyelination.\par \par IMPRESSION:\par \par In comparison with the prior MRI of the brain dated March 21, 2019:\par \par There has been almost complete resolution of most of of the previously \par described supratentorial lesions.\par \par There are no new enhancing lesions.\par \par \par \par \par \par \par CRISTINA MÉNDEZ M.D., ATTENDING RADIOLOGIST\par This document has been electronically signed. Oren 10 2019  1:17PM\par   \par \par   \par \par \par [id number]^RI^DC\par \par EXAM:  PETCT SKUL-THI ONC FDG SUBS      \par \par \par PROCEDURE DATE:  05/01/2019  \par \par \par \par \par INTERPRETATION:  \par FDG PET CT STUDY   Subsequent  treatment strategy\par REASON: TUMOR IMAGING - PET with concurrently acquired CT for attenuation \par correction and anatomic localization; skull base to mid - thigh .\par \par CPT code 56709.\par \par Fasting blood glucose level:     91 mg/dl\par \par HISTORY: Endometrial cancer. Stage IV with brain metastatic disease.\par \par TECHNIQUE: Approximately 45 minutes after the intravenous administration \par of 10.7 mCi 18-Fluorine FDG, whole body PET images were acquired from \par base of skull to mid - thigh.\par \par CT protocol used for this PET/CT study is designed for attenuation \par correction and anatomic localization of PET findings.  This  CT \par is not designed to replace state-of-the-art diagnostic CT scans for \par specific imaging protocols of different body parts.\par \par COMPARISON : 2/12/2019.\par Correlation: MRI of the brain on 3/21/2019.\par \par FINDINGS:\par \par Head/Neck: No biologically active head/neck lesions.     \par Normal uptake within the brain, pharyngeal lymphoid tissue, salivary \par glands and laryngeal musculature is noted.    \par \par Thorax:   No suspicious hypermetabolic mediastinal, hilar, lung \par parenchymal lesions.\par \par Abdomen/Pelvis: Physiologic GI/  activity. \par \par Again seen is a left upper quadrant peritoneal nodule, SUV max 6.8, \par previously 7.7 (12% decrease). \par \par Multiple new FDG avid omental nodules largest of which is adjacent to the \par distal transverse colon, measuring 10 mm, positive on nonattenuation \par corrected images, axial image 134. \par \par No other abnormal visceral or guillaume tracer uptake is present.    \par \par Musculoskeletal :  No suspicious hypermetabolic osseous lesions.\par \par Additional CT findings:\par Status post hysterectomy.\par Colonic diverticulosis.\par \par IMPRESSION: \par \par COMPARISON : 2/12/2019\par \par Multiple new FDG avid omental nodules largest measuring up to 10 mm, \par positive on nonattenuation corrected images. Findings are suggestive of \par biologic tumor activity.\par \par Again seen is a left upper quadrant peritoneal nodule, SUV max 6.8, \par previously 7.7 (12% decrease). \par \par \par \par \par

## 2019-11-27 NOTE — REVIEW OF SYSTEMS
[Fatigue] : fatigue [Vision Problems] : vision problems [Negative] : Allergic/Immunologic [Recent Change In Weight] : ~T no recent weight change [Chest Pain] : no chest pain [Palpitations] : no palpitations [Lower Ext Edema] : no lower extremity edema [Shortness Of Breath] : no shortness of breath [Wheezing] : no wheezing [Cough] : no cough [SOB on Exertion] : no shortness of breath during exertion [FreeTextEntry3] : Difficulty seeing out of L eye, much improved [FreeTextEntry9] : normal strength in upper and lower extremities  [de-identified] : L visual field deficit, gait impairment has resolved

## 2019-11-29 LAB
ANION GAP SERPL CALC-SCNC: 13 MMOL/L
BUN SERPL-MCNC: 7 MG/DL
CALCIUM SERPL-MCNC: 8.9 MG/DL
CHLORIDE SERPL-SCNC: 101 MMOL/L
CO2 SERPL-SCNC: 26 MMOL/L
CREAT SERPL-MCNC: 0.6 MG/DL
GLUCOSE SERPL-MCNC: 87 MG/DL
HCT VFR BLD CALC: 35.4 %
HGB BLD-MCNC: 11.9 G/DL
MAGNESIUM SERPL-MCNC: 1.8 MG/DL
MCHC RBC-ENTMCNC: 31.8 PG
MCHC RBC-ENTMCNC: 33.6 G/DL
MCV RBC AUTO: 94.7 FL
PLATELET # BLD AUTO: 222 K/UL
PMV BLD: 9.6 FL
POTASSIUM SERPL-SCNC: 3.8 MMOL/L
RBC # BLD: 3.74 M/UL
RBC # FLD: 12.1 %
SODIUM SERPL-SCNC: 140 MMOL/L
URATE SERPL-MCNC: 5.8 MG/DL
WBC # FLD AUTO: 5.93 K/UL

## 2019-12-17 ENCOUNTER — LABORATORY RESULT (OUTPATIENT)
Age: 64
End: 2019-12-17

## 2019-12-17 ENCOUNTER — APPOINTMENT (OUTPATIENT)
Dept: HEMATOLOGY ONCOLOGY | Facility: CLINIC | Age: 64
End: 2019-12-17
Payer: MEDICAID

## 2019-12-17 ENCOUNTER — APPOINTMENT (OUTPATIENT)
Dept: INFUSION THERAPY | Facility: CLINIC | Age: 64
End: 2019-12-17
Payer: MEDICAID

## 2019-12-17 VITALS
RESPIRATION RATE: 14 BRPM | BODY MASS INDEX: 25.66 KG/M2 | SYSTOLIC BLOOD PRESSURE: 113 MMHG | TEMPERATURE: 97.9 F | DIASTOLIC BLOOD PRESSURE: 57 MMHG | WEIGHT: 154 LBS | HEIGHT: 65 IN | HEART RATE: 69 BPM

## 2019-12-17 LAB
APPEARANCE: CLEAR
BILIRUBIN URINE: NEGATIVE
BLOOD URINE: NEGATIVE
COLOR: YELLOW
GLUCOSE QUALITATIVE U: NEGATIVE
HCT VFR BLD CALC: 36.4 %
HGB BLD-MCNC: 12.3 G/DL
KETONES URINE: NEGATIVE
LEUKOCYTE ESTERASE URINE: ABNORMAL
MCHC RBC-ENTMCNC: 31.9 PG
MCHC RBC-ENTMCNC: 33.8 G/DL
MCV RBC AUTO: 94.5 FL
NITRITE URINE: NEGATIVE
PH URINE: 7
PLATELET # BLD AUTO: 207 K/UL
PMV BLD: 9.6 FL
PROTEIN URINE: NEGATIVE
RBC # BLD: 3.85 M/UL
RBC # FLD: 12.4 %
SPECIFIC GRAVITY URINE: 1.01
UROBILINOGEN URINE: NORMAL
WBC # FLD AUTO: 5.55 K/UL

## 2019-12-17 PROCEDURE — 99213 OFFICE O/P EST LOW 20 MIN: CPT

## 2019-12-17 RX ORDER — ACETAMINOPHEN 500 MG
650 TABLET ORAL ONCE
Refills: 0 | Status: COMPLETED | OUTPATIENT
Start: 2019-12-17 | End: 2019-12-17

## 2019-12-17 RX ORDER — DIPHENHYDRAMINE HCL 50 MG
25 CAPSULE ORAL ONCE
Refills: 0 | Status: COMPLETED | OUTPATIENT
Start: 2019-12-17 | End: 2019-12-17

## 2019-12-17 RX ORDER — BEVACIZUMAB 400 MG/16ML
1040 INJECTION, SOLUTION INTRAVENOUS ONCE
Refills: 0 | Status: COMPLETED | OUTPATIENT
Start: 2019-12-17 | End: 2019-12-17

## 2019-12-17 RX ADMIN — Medication 25 MILLIGRAM(S): at 12:40

## 2019-12-17 RX ADMIN — Medication 101 MILLIGRAM(S): at 12:20

## 2019-12-17 RX ADMIN — BEVACIZUMAB 141.6 MILLIGRAM(S): 400 INJECTION, SOLUTION INTRAVENOUS at 13:05

## 2019-12-17 RX ADMIN — Medication 650 MILLIGRAM(S): at 12:25

## 2019-12-17 RX ADMIN — Medication 103.2 MILLIGRAM(S): at 11:58

## 2019-12-17 RX ADMIN — BEVACIZUMAB 1040 MILLIGRAM(S): 400 INJECTION, SOLUTION INTRAVENOUS at 13:35

## 2019-12-17 RX ADMIN — Medication 650 MILLIGRAM(S): at 11:57

## 2019-12-17 RX ADMIN — Medication 100 MILLIGRAM(S): at 12:20

## 2019-12-19 LAB
ANION GAP SERPL CALC-SCNC: 15 MMOL/L
BUN SERPL-MCNC: 6 MG/DL
CALCIUM SERPL-MCNC: 8.9 MG/DL
CHLORIDE SERPL-SCNC: 101 MMOL/L
CO2 SERPL-SCNC: 25 MMOL/L
CREAT SERPL-MCNC: 0.6 MG/DL
GLUCOSE SERPL-MCNC: 83 MG/DL
MAGNESIUM SERPL-MCNC: 1.8 MG/DL
POTASSIUM SERPL-SCNC: 3.7 MMOL/L
SODIUM SERPL-SCNC: 141 MMOL/L

## 2019-12-25 NOTE — HISTORY OF PRESENT ILLNESS
[Disease: _____________________] : Disease: [unfilled] [AJCC Stage: ____] : AJCC Stage: [unfilled] [2] : 2, Moderate [de-identified] : Serena is a rodrigue 63 yo female, who has started developing SOB approximately 2 months ago, she went to ER on 11/2/2017 and on CXR was noted to have a large right sided pleural effusion. She underwent a thoracentesis, 1.6L of fluid was drained. 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 IHC was pos for CK7, PAX8, CK5/6 and Ca125, negative for TTF-1/Napsin, calretinin, CK20, mammaglobin, p63, villin, HAM56, GCDFP15, TAY-EP4. \par \par Her visit on 12/1/2017 Serena had an excisional biopsy of cervical node on 12/13/2017 revealing met adenocarcinoma, primary source still was not clear. On 12/14/2017 Serena was admitted with SOB, Pleurx catheter was placed on 12/14/2017. 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 Serena received 1st dose of carbo (auc of 5)/taxol(175mg/m2) on 12/15/2017 without complications, but the next day sustained a fall 2/2 vasovagal episode and developed asymptomatic SAH, that resolved on imaging by 12/21/2017.  [de-identified] : KRas WT, EGFR WT, Alk WT, ROS1 WT, PDL1-1%\par Normal MMR protein expression [de-identified] : 11/24/2017: She reports some SOB, especially ARZATE today. No fevers, no weight loss (reports some weight gain), no GI,  or GYN symptoms, except for increasing abdominal girth, she reports no blood in the stool or urine and no vaginal bleeding. She reports no CP and no palpitations, she reports worsening nonproductive cough, no hemoptysis. She also reports difficulty lying on left side and lying down flat. \par She reports left on/off facial numbness several months in duration. She had a CT of her head performed in [facility] within 1 year.  Additionally there is a freely mobile left cervical node present on exam, as per patient this was in place for approximately 1 year. \par \par 12/1/2017: Serena is here for follow up today. She had a therapeutic thoracentesis in  on 11/27/2017, 2L of fluid were removed, with much improved respiratory status. PET CT and MRI of the brain were done on 11/29/2017, findings were discussed today. There remains no clear primary source of malignancy. We have discussed that the source of tumor may be Mullerian vs lung. regardless of source chemotherapy needs to be initiated ASAP. We have discussed Carbo/taxol regiment that will cover both Mullerian and lung origin. Side effects including myelosuppression, peripheral neuropathy, allergic reaction and others.\par \par 1/2/2017 Since last visit Serena had an excisional biopsy of cervical node on 12/13/2017 revealing met adenocarcinoma, primary source still was not clear. On 12/14/2017 Serena was admitted with SOB, Pleurx catheter was placed on 12/14/2017. 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 Serena received 1st dose of carbo (auc of 5)/taxol(175mg/m2) on 12/15/2017 without complications, but the next day sustained a fall 2/2 vasovagal episode and developed asymptomatic SAH, that resolved on imaging by 12/21/2017. Today Serena's SOB has significantly improved. She reports very mild neuropathy in fingertips only. She reports no headache and offers no other complaints. \par \par 1/26/2018: Complains of some neuropathy, otherwise tolerating chemo with no difficulty. \par \par 2/16/2018: restaging CT C/A/P reviewed, significant improvement noted. Will refer to GYN/ONC. Reports slightly worsening neuropathy, not -painful, taking Gabapentin, no other symptoms. For cycle 4 of carbo/taxol today.\par \par 3/9/2018: Serena met with Dr. Massey, she is planned for surgery on 5/7/2018, after completion of 6 cycles of carbo/taxol and restaging PET CT ordered for mid April and follow up with Dr. Massey on April 20. She reports worsening neuropathy, and occasional pain and changes in vision in her left eye, eye symptoms come and go and are not very bothersome. She reports no other symptoms. \par \par 3/30/2018; Serena is here for cycle 6 of carbo/taxol, she continues to have neuropathy in finger and toes, but offers no other complaints, chemo has been dose reduced. \par \par 4/27/2018: Results of restaging PET CT s/p 6 cycles of carbo/taxol revealed 1. No new areas of abnormal increased uptake is seen to indicate \par biologically active disease.\par \par 2. Persistent PET positive left thyroid mass with a max SUV 33.1, \par previously max SUV 34.8. Further evaluation with thyroid ultrasound and \par if needed fine-needle aspiration biopsy will be helpful.\par \par 3.   PET positive left cervical lymph nodes mainly level 2 on the left \par with a max SUV 5.47previously 18. Minimal increased uptake in the \par bilateral axillary lymph nodes most likely reactive(less than 2.5)\par \par 4. Weakly PET positive, max SUV 2.89 right pleura, previous max SUV 5.3 \par with non-FDG avid right pleural effusion. \par \par 5. Previously FDG avid right and left supraclavicular lymphadenopathy, \par left internal mammary, bilateral paratracheal, para-aortic, para \par vertebral, carinal, mesenteric, right and left iliacs, right inguinal \par lymphadenopathy, small in size and no longer FDG avid.\par \par 6. No abnormal increased uptake is seen in the  lobulated uterus.\par \par 7. Overall there is marked improvement in the FDG avid disease.\par This was reviewed with Serena. She met with Dr. Massey on 4/20/2018, surgery is planned for 5/7/2018. She will also joselyn to meet with ENT re FDG avid thyroid nodule. Will assist in making he an appointment for her. \par Persistent neuropathy on gabapentin. \par \par 6/29/18 ; Patient came for follow up visit , evaluation for chemotherapy cycle 8 of carbo / Taxol , patient tolerating medication well , except neuropathy  recommend to have gabapentin  300 mg po daily.\par \par 7/20/2018: Serena present for follow up today, she has completed total of 8 cycles of Carbo/Taxol. She reports significant neuropathy in her hands and feet. We will discontinue chemotherapy today and plan to follow with close observation. She has thyroidectomy planned with Dr. Herrera on 8/20/2018, obtaining clearance for PMD. She is also planning to fly to [place] at the end of September. She reports no SOB, no GI or  symptoms. \par \par 9/12/2018: Serena offers no significant complaints today. She states neuropathy in her hands has almost completely resolved and neuropathy in her feet is significantly better. She had undergone complete thyroidectomy by Dr. Herrera on 8/20/2018, pathology revealed papillary thyroid cancer, measuring 2 cm, pT1b, other additional foci of papillary carcinoma were also noted, no LVI, surgical margins were clear, LN 0/2 had no carcinoma, stage [id number]. She is following up with Dr. Herrera tomorrow. She has still not gone for her vacation in [place]. \par \par 10/10/2018: Restaging PET CT on 9/26/2018 reveals Since 4/16/2018,  1. Post thyroidectomy with diffuse increased FDG uptake at the thyroid  bed likely representing post-surgical changes.  2. Subtle increased uptake is seen in the lamina , right side at the  level of T8 with a max SUV 4.6. This is not sufficient for metastatic  disease. Bone scan or MRI examination with contrast will be helpful for  further evaluation.  3. No other areas of abnormal increased uptake is seen. Images were personally reviewed by myself and discussed with Serena. \par She also had an Echo on 9/24/2018 revealing EF of 63%. \par Serena reports ongoing fatigue, she is unable to lose weight. She is taking Synthroid and following with Dr. Acevedo. \par She reports stiff fingers at night only, no painful neuropathy. \par She denies any other symptoms today. \par \par 12/11/2018: Serena feels well, neuropathy in hands and feet is much improved. S he is now complaining of r-sided facial pain associated with some swelling, pain comes and goes. She has already seen Dr. Herrera, who ordered CT of sinuses and prescribed pain relief meds. She is also due for restaging PET CT. Serena feels well otherwise. \par \par 1/9/2019: Restaging PET CT was performed on 12/18/2018 it revealed COMPARISON : 9/24/2018.  New left upper quadrant peritoneal nodule measuring 8 mm with an SUV max  of 7.3. This is suggestive of biologic tumor activity.  No other FDG avid lesions seen throughout the scan. Images were personally reviewed by myself and discussed with Serena, originally over the phone and again today. I have originally suggested to try AI in the interim, Serena however reported diarrhea at the time of the scan and declined intervention for now. We will plan for restaging PET CT to be performed in 6-8 weeks, this will be ordered today. She will follow up with Dr. Massey at [facility] shortly and bring the disk for his review. I would also like her to meet with genetics, this will be arranged at [facility] with Dr. Massey's help. Serena reports no new symptoms today, her neuropathy is manageable and  improving. She still reports unilateral headache that was work up in the past. Neurology eval was again suggested to her. She is following closely with Endocrinology re thyroid management. She will have follow up with Dr. Herrera shortly as well. \par \par 2/20/2019: Serena offers no new complaints. PET CT from 2/12/2019 revealed \par Since PET/CT of December 19, 2018, no new sites of pathologic FDG uptake\par Slight increase in size of left upper quadrant peritoneal nodule (1.1 cm, \par previously 0.8 cm) with stable FDG uptake, 7.7 SUV suspicious for \par biologic tumor activity.\par No other sites of pathologic FDG uptake \par Images were personally reviewed by myself and discussed with Serena, case was also discussed with Dr. Massey at [facility]. Serena will have follow up with his shortly. She is following with endocrinology. reports improving neuropathy. No GI or  symptoms at this time. No weight loss. \par \par 3/20/2019: Serena had a peritoneal nodule biopsy done at [facility], I have received a call from Dr. 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 We have discussed that surgery though could be attempted will not be the best therapeutic approach, recommencement of systemic therapy was discussed. I have not received the results from [facility] yet. I have briefly discussed this with Serena today. Serena reports that she has acutely developed right lower extremity weakness 5-6 days ago, she did not inform any of her doctors about this. She also reports that her R upper extremity though not weak "does not feel normal either. She reports no back pain, there is no point tenderness, RLE is objectively weak on exam. I recommend ER evaluation to r/o CVA vs brain met, vs L-spine related issue. Recommend admission for work up. Serena is agreeable to get admitted. \par \par 5/8/2019: Serena present for follow up, she was diagnosed with multiple metastasis to the brain, MRI was done on 3/21/2019 and revealed \par Multiple supratentorial heterogeneously enhancing lesions consistent with \par metastatic disease. The appearance on the T2-weighted images is suggestive \par of adenocarcinoma. There is mass effect upon the left lateral ventricle and \par corpus callosum. \par She received whole brain irradiation by Dr. Dahl, completed it in the beginning of 4/2019, she is currently on steroid taper managed by Dr. Dahl, she is taking 4mg of Decadron daily. She will be stopping the Keppra as there have been no documented h/o seizures. She still reports impairment of the L visual field, she has an appointment coming up with Opthalmology. She reports no deficits walking after she has completed inpatient acute rehabilitation. \par Restaging PET CT on 5/1/2019 reveals COMPARISON : 2/12/2019 \par Multiple new FDG avid omental nodules largest measuring up to 10 mm, \par positive on nonattenuation corrected images. Findings are suggestive of \par biologic tumor activity. \par Again seen is a left upper quadrant peritoneal nodule, SUV max 6.8, \par previously 7.7 (12% decrease). \par Images were personally reviewed by myself and discussed with patient. \par She now reports mild abdominal bloating, no other symptoms. She reports her neuropathy has significantly improved. \par She has first evidence of recurrent disease documented 5.5 months after last carbo/taxol dose, the volume of disease was very small (1 8mm nodule), she was then observed for another 6 months and now she wishes to restart the chemotherapy. \par I have discussed with her that rechallenging with carbo/taxol may still prove to be effective as long as she gets restaged after 2 cycles. Given recent whole brain radiation and neuropathy I will offer her carbo/taxol weekly with does reduction on the taxol for neuropathy, We will plan to run carbo slowly to avoid allergic reaction. \par She is agreeable to start ASAP. \par \par 6/5/2019: Serena has completed 1 cycle of weekly Carbo/taxol, ran at slow rate to avoid Carbo reaction and has tolerated chemotherapy without difficulty, she does not complain of increase in neuropathy. She has ongoing vision changes in her L eye, she had no residual LE weakness. She is off Decadron and Keppra. She was advised to see ophthalmology. \par \par 7/3/2019: Serena is doing well.  Reports no problems with carbo/taxol.  Still complains of vision changes in L eye but is seeing ophthalmologist on 7/16.  Remains active around the house and cooks regularly.  No fevers, weight loss, anorexia.  ROS is otherwise only significant for occasional loose stools, no diarrhea.\par \par 7/17/2019: Serena is doing well, denies worsening neuropathy. She c/o feeling fatigued and tired. Her last chemo was 1 week ago(7/11/19) with carbo/taxol and is due again tomorrow. She saw ophthalmology and will need cataract surgery of the L eye. No c/o chest pain/SOB. No weight loss.\par CT C/A/P was done on 7/9/2019, images were personally reviewed by myself and discussed with several radiology attendings, they revealed \par CHEST:\par Filling defect within a segmental branch of the lower lobe pulmonary artery \par suspicious for pulmonary embolus. \par Stable left upper lobe and right middle lobe 3 mm nodules. \par CT abdomen and pelvis performed on the same day, see separate report. \par ADDENDUM: \par Please note that the morphology of the small thrombus within the left lower \par pulmonary artery is not indicative of an acute thrombus but rather a chronic \par appearance. \par This was discussed with Radiology, it was not deemed that thrombus was acute and may not have even been present at all, finding was deemed to be equivocal, based on radiology assessment anticoagulation was not indicated for the filling defect noted. Initially CTA was recommended, the recommendation was withdrawn upon further review. \par ABDOMEN/PELVIS:\par New 1.3 cm segment IV hepatic hypodensity seen on series 4 image 205. \par Lesion not seen on prior PET/CT from 5/1/2019 or abdomen and pelvis CT from \par \par 2/2/2018 and is consistent with a new metastasis. \par Two other hepatic lesions described above, decreased in size since 2/2/2018, \par consistent with treated hepatic metastases. \par Anterior perisplenic omental nodule measures 0.7 cm on series 2 image 53, \par previously measuring 1.2 cm on PET/CT dated 5/1/2019. \par All the other previously seen omental nodules have resolved since 5/1/2019. \par ADDENDUM:\par Case was discussed with Dr. Cid in detail. It is also possible that the \par new 1.3 cm lesion in the liver since February 2, 2018 represents a treated \par metastasis similar to the other liver lesions. \par PET/CT would be necessary to assess disease activity. \par I have discussed case with Radiology, and it was determined that there was no clear cut evidence of progression. PET CT was ordered today.\par This was discussed with Serena, will proceed with Carbo/Taxol for now. \par \par 8/1/2019: Serena reports no major side effects from weekly Carbo/Taxol, she reports no neuropathy. She has completed 10 cycles altogether. \par PET CT on 7/24/2019 revealed \par 1. Since May 1, 2019, no definite new site of pathologic FDG uptake to \par suggest new biologic tumor activity; specifically, no pathologic FDG uptake \par within previously noted 1.3 cm lesion within hepatic segment 4. \par 2. Increased FDG uptake within several subcentimeter bilateral cervical \par lymph nodes, which is nonspecific and may be postinflammatory; max SUV 9.2 \par is noted within a 0.7 cm right level 2 cervical node. Attention on follow-up \par is suggested. \par 3. Few peritoneal nodules have overall decreased in size. \par 4. No additional site of pathologic FDG uptake. \par Images were personally reviewed by myself and discussed with Serena. \par She wishes to continue with chemotherapy. Will plan for 3 additional cycles. Will consider adding Avastin, but with h/o inconclusive/possible chronic PE ppx anticoagulation maybe necessary. \par \par 8/28/2019: Serena reports feeling well, she denies worsening neuropathy, worsening neurological symptoms, SOB, abdominal pain, bloating. She reports she is planned to undergo  eye surgery towards the end of September. We will plan to hold chemotherapy briefly after this cycle to facilitate adequate counts and minimize complications. We again have briefly discussed considering Avastin based therapy, will hold off on this prior to surgery. 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 May proceed without formal PAST, will check CBC prior to procedure, PT and PTT today. \par \par 9/25/19:Serena reports feeling well, she denies any changes since last visit, No SOB, abdominal pain, bloating. She reports she is planned to undergo  eye surgery in 9/30/19  CBC reviewed with normal Hemogram . we will hold chem this week , she will resume after Cataract sx . 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 May proceed without formal PAST, will check CBC prior to procedure, PT and PTT today. \par \par 10/4/2019: Serena underwent successful eye surgery on 9/30/2019, she reports she can see much better now. Last dose of chemotherapy was administered on 9/20, she then was on hold for surgery. She has completed 5 additional cycles of Carbo/taxol. She is due for restaging. Her disease is only accurately assessed on PET CT, prior attempts to obtain CT scans resulted in additional imaging with PET, as findings were inconclusive. PET CT will be ordered to restage. \par In addition she is due for MRI of the brain. She has had a small amount of weight loss, mild neuropathy is persistent. She offers no additional complaints. \par She will have port placed on Monday, will hold chemotherapy until restaging scans complete. \par \par 10/23/2019: Serena feels well, and denies any new symptoms. Her eye surgery was successful, vision is much improved now. She had restaging scans. \par PET CT on 10/18/2019 revealed COMPARISON : 7/24/2019. \par No pathologic uptake to suggest biologic tumor activity. \par MRI of the brain on 10/15/2019 revealed \par Comparison with June 8 and March 21, 2019: (Generally lesions improved \par markedly since March but slightly increased in size since June) \par 1. Lesions previously demonstrated on the study of June 8, 2019 has \par remained stable or minimally increased in size \par 2. Specifically, right frontal opercular lesion measures about 1 cm and \par left posterior inferior temporal lobe lesion measures about 1 cm. These have \par increased since the previous study. \par 3. Small punctiform lesions within the right frontal white matter and left \par frontal sulcus or more apparent than on the study of June 8 \par 4. These lesions are all much smaller than the earlier MRI of March 21, \par 2019. \par 5. No new lesions. \par Images were personally reviewed by myself and discussed with patient. She remains asymptomatic and off the steroids. \par I have also discussed the case with Dr. Dahl. I would like Serena to discuss the options with [facility], she maybe a candidate for brain SRS. \par She has been off Carbo/Taxol for a few weeks, she remains Platinum sensitive. I have discussed the option of switching her over to Avastin maintenance, will need to prophylax with low dose Eliquis as well, given questionable finding of small PE in the past. \par Side effects of Avastin were discussed at length, including HNT, proteinuria, small risk of DVT/PE, GI perforations etc. \par She is agreeable to start after [facility] consultation. \par \par 11/5/2019: Serena feels OK, she reports acute onset head discomfort that lasts minutes and subsides, the episodes are becoming more frequent 1-2 times a day. She has seen Dr. Dahl, who wishes to hold off on offering SRS to the brain. Plan was to start on Avastin today. Serena reports she currently has a lapse in her insurance coverage till 12/2019, I will not be able to start he on ppx dose Eliquis for now, she instead will take baby ASA every other day, to modify risk of DVT/PE. Will plan to switch over to Eliquis at 2.5mg BID once insurance is active. She also contemplated going to [place] with her daughter, I am willing to hold Avastin until she comes back, but Serena wishes to start right away, I explained to her that flight may result in complications if that is her wish. She will cancel the trip and start with Avastin today. Side effects have been discussed. \par \par 11/20/2019: Serena came for a follow up visit, she reports feeling well, she reports less fatigue. She is s/p first dose of Avastin on 11/5/2019, she tolerated Avastin without difficulty, she is due for the 2nd dose today. \par We communicated CBC result with HGB of 12.3, normal platelet and WBCs. She lost like 5 pounds secondary to gum problems, she will follow up with her dentist this week. She is currently on baby ASA every other day. Continue with single agent Avastin.  \par \par 12/17/2019: Serena came for a follow up visit, she reports feeling well, she experienced pruritic rash on last Saturday 12/14/19, which improved over 2 days with Benadryl alone.  Today her skin rash has completely resolved. We will add Solu-Medrol 100 mg IV prior to Avastin. Serena reports less fatigue. She is due for the 3rd dose of Avastin today. \par We communicated CBC result with HGB of 12.3, normal platelet and WBCs. \par  [FreeTextEntry3] : pruritic reaction  [FreeTextEntry5] : prednisone 25 mg po 2x/ day.

## 2019-12-25 NOTE — REVIEW OF SYSTEMS
[Fatigue] : fatigue [Vision Problems] : vision problems [Negative] : Allergic/Immunologic [Recent Change In Weight] : ~T no recent weight change [Chest Pain] : no chest pain [Palpitations] : no palpitations [Lower Ext Edema] : no lower extremity edema [Shortness Of Breath] : no shortness of breath [Wheezing] : no wheezing [Cough] : no cough [SOB on Exertion] : no shortness of breath during exertion [FreeTextEntry3] : Difficulty seeing out of L eye, much improved [FreeTextEntry9] : normal strength in upper and lower extremities  [de-identified] : L visual field deficit, gait impairment has resolved

## 2019-12-25 NOTE — RESULTS/DATA
[FreeTextEntry1] : EXAM:  MR BRAIN WAW IC      \par \par \par PROCEDURE DATE:  06/08/2019  \par \par \par \par \par INTERPRETATION:  Clinical History / Reason for exam: Dizziness and \par vertigo, history of uterine and thyroid cancer, for evaluation of \par metastatic disease, lesion seen on prior MRI of the brain.\par \par MRI OF THE BRAIN WITHOUT AND WITH CONTRAST\par \par TECHNIQUE:\par \par Multiplanar multisequence imaging of the brain was performed on the \[facility] open magnet before and after the intravenous administration of \par 7.5 cc of Gadavist including brain lab protocol.\par \par COMPARISON:\par \par MRI of the brain without and with contrast dated March 12, 2019.\par \par FINDINGS:\par \par The previously described supratentorial lesions have almost completely \par resolved.\par \par The lesion in the left posterior frontal region now measures 1.4 cm in \par maximum diameter, the lesion in the anterior right frontal lobe measures \par 0.7 cm in maximum diameter, the second lesion in the right frontal lobe \par measures 0.1 cm in maximum diameter, the lesion in the right posterior \par parietal region measures 0.3 cm in maximum diameter, and the left \par occipital lobe lesion measures 1.2 cm in maximum diameter. (Previously \par measuring 2.6, 2.4, 0.5, 0.7, and 2.5 cm respectively).\par \par The third, fourth, and lateral ventricles are normal in size and \par position. There is no shift of the midline structures.\par \par The cerebellar tonsils are minimally low-lying (0.3 cm of cerebellar \par ectopia).\par \par Incidental note is made of a tiny medial cyst.\par \par There are scattered T2/FLAIR hyperintense signal intensities in the\par subcortical white matter which are nonspecific differential diagnostic\par possibilities include chronic ischemic change, foci of gliosis or\par demyelination.\par \par IMPRESSION:\par \par In comparison with the prior MRI of the brain dated March 21, 2019:\par \par There has been almost complete resolution of most of of the previously \par described supratentorial lesions.\par \par There are no new enhancing lesions.\par \par \par \par \par \par \par CRISTINA MÉNDEZ M.D., ATTENDING RADIOLOGIST\par This document has been electronically signed. Oren 10 2019  1:17PM\par   \par \par   \par \par \par [id number]^RI^DC\par \par EXAM:  PETCT SKUL-THI ONC FDG SUBS      \par \par \par PROCEDURE DATE:  05/01/2019  \par \par \par \par \par INTERPRETATION:  \par FDG PET CT STUDY   Subsequent  treatment strategy\par REASON: TUMOR IMAGING - PET with concurrently acquired CT for attenuation \par correction and anatomic localization; skull base to mid - thigh .\par \par CPT code 67301.\par \par Fasting blood glucose level:     91 mg/dl\par \par HISTORY: Endometrial cancer. Stage IV with brain metastatic disease.\par \par TECHNIQUE: Approximately 45 minutes after the intravenous administration \par of 10.7 mCi 18-Fluorine FDG, whole body PET images were acquired from \par base of skull to mid - thigh.\par \par CT protocol used for this PET/CT study is designed for attenuation \par correction and anatomic localization of PET findings.  This  CT \par is not designed to replace state-of-the-art diagnostic CT scans for \par specific imaging protocols of different body parts.\par \par COMPARISON : 2/12/2019.\par Correlation: MRI of the brain on 3/21/2019.\par \par FINDINGS:\par \par Head/Neck: No biologically active head/neck lesions.     \par Normal uptake within the brain, pharyngeal lymphoid tissue, salivary \par glands and laryngeal musculature is noted.    \par \par Thorax:   No suspicious hypermetabolic mediastinal, hilar, lung \par parenchymal lesions.\par \par Abdomen/Pelvis: Physiologic GI/  activity. \par \par Again seen is a left upper quadrant peritoneal nodule, SUV max 6.8, \par previously 7.7 (12% decrease). \par \par Multiple new FDG avid omental nodules largest of which is adjacent to the \par distal transverse colon, measuring 10 mm, positive on nonattenuation \par corrected images, axial image 134. \par \par No other abnormal visceral or guillaume tracer uptake is present.    \par \par Musculoskeletal :  No suspicious hypermetabolic osseous lesions.\par \par Additional CT findings:\par Status post hysterectomy.\par Colonic diverticulosis.\par \par IMPRESSION: \par \par COMPARISON : 2/12/2019\par \par Multiple new FDG avid omental nodules largest measuring up to 10 mm, \par positive on nonattenuation corrected images. Findings are suggestive of \par biologic tumor activity.\par \par Again seen is a left upper quadrant peritoneal nodule, SUV max 6.8, \par previously 7.7 (12% decrease). \par \par \par \par \par

## 2019-12-25 NOTE — ASSESSMENT
[FreeTextEntry1] : Papillary-serous endometrial cancer, stage IV\par --PET CT on 11/29/2017 revealed no clear GYN origin, extensive KEVIN, uptake in the right lung and pleura, thyroid nodule\par --Malignant pleural effusion, resolved\par --L side PleurX catheter was removed on 2/8/2018\par --Completed cycle 8  of carbo/taxol on 6/29/2018.  2 cycles were administered  postoperatively.\par --Restaging PET CT on 9/26/2018 (3 months post completion of chemo 6/29/2018)was essentially negative for recurrent disease\par --Restaging PET CT on 12/18/2018 reveals   New left upper quadrant peritoneal nodule measuring 8 mm with an SUV max  of 7.3. This is suggestive of biologic tumor activity.  No other FDG avid lesions seen throughout the scan.\par --restaging PET CT on 2/12/2019 revealed slight increase in size of left upper quadrant peritoneal nodule (1.1 cm, \par previously 0.8 cm) with stable FDG uptake, 7.7 SUV suspicious for biologic tumor activity.\par --Case was discussed with Dr. Massey at [place], biopsy of peritoneal nodule is positive for recurrent endometrial cancer.\par --MRI brain on 4/23/2019 revealed multiple brain mets\par --S/p whole brain radiation completed 4/2019, required acute rehab\par --PET CT on 5/1/2019 subsequently revealed further disease progression \par --Restarted weekly carbo/taxol 3 on 1 off on 5/9/2019, continue with weekly Carbo/Taxol for now.\par --Off Decadron and Keppra since 6/5/2019 \par --Restaging brain MRI on 6/8/2019 showed almost complete resolution of most supratentorial lesions and no new lesions.\par --Restaging CT C/A/P on 7/9/2019 did not reveal definite progression, there was a questionable filling defect suspicious for possible chronic PE/artifact, this was discussed with Radiology at length anticoagulation was not deemed to be necessary\par --PET CT on 7/24/2019 revealed positive response to therapy \par --Carbo/Taxol stopped, last dose 9/13/2019, on hold since, she remains platinum sensitive \par --Restaging PET CT on 10/18/2019 is NAD\par --Restaging MRI of the brain on 10/15/2019 is suggestive for possible disease progression, no new lesions, she remains asymptomatic and off steroids, will follow closely \par --Short term MRI brain follow up \par --She was referred to [facility] for brain SRS consideration, close observation for now \par --Started on Avastin maintenance 11/5/2019, will consider adding Eliquis ppx for DVT/ PE, for now she will take baby ASA every other day \par --Proceed with 3nd cycle on 12/17/19, will add Solumedrol to premedicate \par --We sent for prednisone 10 mg po if needed for additional skin issues\par --Patient well aware to contact us if any side effect developed.\par \par Papillary thyroid cancer [id number], 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 Herrera \par --Follow up with Dr. Acevedo of endocrinology; he is addressing vitamin D, thyroid and diabetes\par \par Cataract of eye\par -- S/p successful surgery on 9/30/19 . \par \par Follow up in 3 weeks\par Patient seen and examined by Dr. Cid who agreed for the above plan of care.

## 2019-12-25 NOTE — PHYSICAL EXAM
[Restricted in physically strenuous activity but ambulatory and able to carry out work of a light or sedentary nature] : Status 1- Restricted in physically strenuous activity but ambulatory and able to carry out work of a light or sedentary nature, e.g., light house work, office work [Normal] : affect appropriate [de-identified] : B/L cataracts appreciated [de-identified] : CTA B/L [de-identified] : pruritic reaction from  Avastin  treatment.

## 2019-12-31 DIAGNOSIS — Z51.11 ENCOUNTER FOR ANTINEOPLASTIC CHEMOTHERAPY: ICD-10-CM

## 2019-12-31 DIAGNOSIS — C79.31 SECONDARY MALIGNANT NEOPLASM OF BRAIN: ICD-10-CM

## 2019-12-31 DIAGNOSIS — C54.1 MALIGNANT NEOPLASM OF ENDOMETRIUM: ICD-10-CM

## 2020-01-01 ENCOUNTER — OUTPATIENT (OUTPATIENT)
Dept: OUTPATIENT SERVICES | Facility: HOSPITAL | Age: 65
LOS: 1 days | Discharge: HOME | End: 2020-01-01
Payer: MEDICARE

## 2020-01-01 ENCOUNTER — APPOINTMENT (OUTPATIENT)
Dept: INFUSION THERAPY | Facility: CLINIC | Age: 65
End: 2020-01-01
Payer: MEDICAID

## 2020-01-01 ENCOUNTER — APPOINTMENT (OUTPATIENT)
Dept: INFUSION THERAPY | Facility: CLINIC | Age: 65
End: 2020-01-01

## 2020-01-01 ENCOUNTER — LABORATORY RESULT (OUTPATIENT)
Age: 65
End: 2020-01-01

## 2020-01-01 ENCOUNTER — APPOINTMENT (OUTPATIENT)
Dept: HEMATOLOGY ONCOLOGY | Facility: CLINIC | Age: 65
End: 2020-01-01
Payer: MEDICARE

## 2020-01-01 ENCOUNTER — OUTPATIENT (OUTPATIENT)
Dept: OUTPATIENT SERVICES | Facility: HOSPITAL | Age: 65
LOS: 1 days | Discharge: HOME | End: 2020-01-01
Payer: MEDICAID

## 2020-01-01 ENCOUNTER — APPOINTMENT (OUTPATIENT)
Dept: HEMATOLOGY ONCOLOGY | Facility: CLINIC | Age: 65
End: 2020-01-01

## 2020-01-01 ENCOUNTER — OUTPATIENT (OUTPATIENT)
Dept: OUTPATIENT SERVICES | Facility: HOSPITAL | Age: 65
LOS: 1 days | Discharge: HOME | End: 2020-01-01

## 2020-01-01 ENCOUNTER — APPOINTMENT (OUTPATIENT)
Dept: INFUSION THERAPY | Facility: CLINIC | Age: 65
End: 2020-01-01
Payer: MEDICARE

## 2020-01-01 ENCOUNTER — RESULT REVIEW (OUTPATIENT)
Age: 65
End: 2020-01-01

## 2020-01-01 ENCOUNTER — APPOINTMENT (OUTPATIENT)
Dept: INTERNAL MEDICINE | Facility: CLINIC | Age: 65
End: 2020-01-01
Payer: MEDICAID

## 2020-01-01 ENCOUNTER — OUTPATIENT (OUTPATIENT)
Dept: OUTPATIENT SERVICES | Facility: HOSPITAL | Age: 65
LOS: 1 days | End: 2020-01-01

## 2020-01-01 ENCOUNTER — APPOINTMENT (OUTPATIENT)
Dept: HEMATOLOGY ONCOLOGY | Facility: CLINIC | Age: 65
End: 2020-01-01
Payer: MEDICAID

## 2020-01-01 ENCOUNTER — RX RENEWAL (OUTPATIENT)
Age: 65
End: 2020-01-01

## 2020-01-01 ENCOUNTER — OUTPATIENT (OUTPATIENT)
Dept: OUTPATIENT SERVICES | Facility: HOSPITAL | Age: 65
LOS: 1 days | End: 2020-01-01
Payer: MEDICAID

## 2020-01-01 ENCOUNTER — RESULT CHARGE (OUTPATIENT)
Age: 65
End: 2020-01-01

## 2020-01-01 VITALS
WEIGHT: 141 LBS | SYSTOLIC BLOOD PRESSURE: 130 MMHG | TEMPERATURE: 98 F | HEART RATE: 64 BPM | DIASTOLIC BLOOD PRESSURE: 87 MMHG | HEIGHT: 64 IN | BODY MASS INDEX: 24.07 KG/M2

## 2020-01-01 VITALS
HEIGHT: 64 IN | DIASTOLIC BLOOD PRESSURE: 66 MMHG | TEMPERATURE: 96.9 F | SYSTOLIC BLOOD PRESSURE: 124 MMHG | WEIGHT: 148 LBS | RESPIRATION RATE: 14 BRPM | HEART RATE: 64 BPM | BODY MASS INDEX: 25.27 KG/M2

## 2020-01-01 VITALS
BODY MASS INDEX: 24.24 KG/M2 | HEART RATE: 85 BPM | HEIGHT: 64 IN | TEMPERATURE: 98.6 F | RESPIRATION RATE: 14 BRPM | DIASTOLIC BLOOD PRESSURE: 87 MMHG | SYSTOLIC BLOOD PRESSURE: 138 MMHG | WEIGHT: 142 LBS

## 2020-01-01 VITALS
SYSTOLIC BLOOD PRESSURE: 132 MMHG | WEIGHT: 147 LBS | TEMPERATURE: 97.6 F | HEART RATE: 72 BPM | DIASTOLIC BLOOD PRESSURE: 74 MMHG | RESPIRATION RATE: 16 BRPM | HEIGHT: 64 IN | BODY MASS INDEX: 25.1 KG/M2

## 2020-01-01 VITALS
TEMPERATURE: 97.1 F | SYSTOLIC BLOOD PRESSURE: 113 MMHG | HEIGHT: 64 IN | BODY MASS INDEX: 25.27 KG/M2 | WEIGHT: 148 LBS | DIASTOLIC BLOOD PRESSURE: 71 MMHG | HEART RATE: 77 BPM | RESPIRATION RATE: 16 BRPM

## 2020-01-01 VITALS
TEMPERATURE: 97.4 F | SYSTOLIC BLOOD PRESSURE: 132 MMHG | WEIGHT: 143 LBS | DIASTOLIC BLOOD PRESSURE: 78 MMHG | HEIGHT: 64 IN | RESPIRATION RATE: 14 BRPM | BODY MASS INDEX: 24.41 KG/M2 | HEART RATE: 71 BPM

## 2020-01-01 VITALS
TEMPERATURE: 97.6 F | DIASTOLIC BLOOD PRESSURE: 81 MMHG | RESPIRATION RATE: 14 BRPM | HEIGHT: 64 IN | BODY MASS INDEX: 25.1 KG/M2 | WEIGHT: 147 LBS | HEART RATE: 94 BPM | SYSTOLIC BLOOD PRESSURE: 117 MMHG

## 2020-01-01 VITALS
SYSTOLIC BLOOD PRESSURE: 121 MMHG | RESPIRATION RATE: 14 BRPM | TEMPERATURE: 97.7 F | HEIGHT: 64 IN | WEIGHT: 143 LBS | DIASTOLIC BLOOD PRESSURE: 77 MMHG | HEART RATE: 63 BPM | BODY MASS INDEX: 24.41 KG/M2

## 2020-01-01 VITALS
DIASTOLIC BLOOD PRESSURE: 64 MMHG | BODY MASS INDEX: 23.56 KG/M2 | WEIGHT: 138 LBS | SYSTOLIC BLOOD PRESSURE: 111 MMHG | TEMPERATURE: 98.3 F | HEIGHT: 64 IN | HEART RATE: 66 BPM | RESPIRATION RATE: 14 BRPM

## 2020-01-01 VITALS
BODY MASS INDEX: 24.92 KG/M2 | SYSTOLIC BLOOD PRESSURE: 124 MMHG | TEMPERATURE: 96.9 F | WEIGHT: 146 LBS | DIASTOLIC BLOOD PRESSURE: 66 MMHG | HEIGHT: 64 IN | HEART RATE: 63 BPM | RESPIRATION RATE: 16 BRPM

## 2020-01-01 VITALS
SYSTOLIC BLOOD PRESSURE: 104 MMHG | HEART RATE: 65 BPM | WEIGHT: 129 LBS | TEMPERATURE: 98.1 F | HEIGHT: 64 IN | DIASTOLIC BLOOD PRESSURE: 65 MMHG | BODY MASS INDEX: 22.02 KG/M2 | RESPIRATION RATE: 14 BRPM

## 2020-01-01 VITALS
HEART RATE: 74 BPM | BODY MASS INDEX: 25.44 KG/M2 | TEMPERATURE: 98.1 F | SYSTOLIC BLOOD PRESSURE: 99 MMHG | DIASTOLIC BLOOD PRESSURE: 54 MMHG | WEIGHT: 149 LBS | HEIGHT: 64 IN

## 2020-01-01 VITALS
HEART RATE: 81 BPM | WEIGHT: 137 LBS | TEMPERATURE: 98.5 F | SYSTOLIC BLOOD PRESSURE: 138 MMHG | HEIGHT: 64 IN | RESPIRATION RATE: 14 BRPM | DIASTOLIC BLOOD PRESSURE: 66 MMHG | BODY MASS INDEX: 23.39 KG/M2

## 2020-01-01 VITALS
TEMPERATURE: 98.3 F | WEIGHT: 136 LBS | HEART RATE: 72 BPM | HEIGHT: 64 IN | RESPIRATION RATE: 14 BRPM | BODY MASS INDEX: 23.22 KG/M2 | SYSTOLIC BLOOD PRESSURE: 131 MMHG | DIASTOLIC BLOOD PRESSURE: 67 MMHG

## 2020-01-01 VITALS
SYSTOLIC BLOOD PRESSURE: 116 MMHG | HEART RATE: 93 BPM | WEIGHT: 138 LBS | HEIGHT: 64 IN | DIASTOLIC BLOOD PRESSURE: 78 MMHG | BODY MASS INDEX: 23.56 KG/M2 | TEMPERATURE: 98.1 F | RESPIRATION RATE: 14 BRPM

## 2020-01-01 VITALS
HEART RATE: 66 BPM | DIASTOLIC BLOOD PRESSURE: 58 MMHG | SYSTOLIC BLOOD PRESSURE: 125 MMHG | TEMPERATURE: 97.9 F | RESPIRATION RATE: 18 BRPM

## 2020-01-01 DIAGNOSIS — J91.0 MALIGNANT PLEURAL EFFUSION: ICD-10-CM

## 2020-01-01 DIAGNOSIS — E11.9 TYPE 2 DIABETES MELLITUS W/OUT COMPLICATIONS: ICD-10-CM

## 2020-01-01 DIAGNOSIS — Z90.710 ACQUIRED ABSENCE OF BOTH CERVIX AND UTERUS: Chronic | ICD-10-CM

## 2020-01-01 DIAGNOSIS — Z98.890 OTHER SPECIFIED POSTPROCEDURAL STATES: Chronic | ICD-10-CM

## 2020-01-01 DIAGNOSIS — C80.1 MALIGNANT (PRIMARY) NEOPLASM, UNSPECIFIED: ICD-10-CM

## 2020-01-01 DIAGNOSIS — G62.9 POLYNEUROPATHY, UNSPECIFIED: ICD-10-CM

## 2020-01-01 DIAGNOSIS — Z11.59 ENCOUNTER FOR SCREENING FOR OTHER VIRAL DISEASES: ICD-10-CM

## 2020-01-01 DIAGNOSIS — C54.1 MALIGNANT NEOPLASM OF ENDOMETRIUM: ICD-10-CM

## 2020-01-01 DIAGNOSIS — Z02.89 ENCOUNTER FOR OTHER ADMINISTRATIVE EXAMINATIONS: ICD-10-CM

## 2020-01-01 DIAGNOSIS — C79.31 SECONDARY MALIGNANT NEOPLASM OF BRAIN: ICD-10-CM

## 2020-01-01 DIAGNOSIS — Z71.89 OTHER SPECIFIED COUNSELING: ICD-10-CM

## 2020-01-01 DIAGNOSIS — Z51.11 ENCOUNTER FOR ANTINEOPLASTIC CHEMOTHERAPY: ICD-10-CM

## 2020-01-01 DIAGNOSIS — E03.9 HYPOTHYROIDISM, UNSPECIFIED: ICD-10-CM

## 2020-01-01 DIAGNOSIS — K04.7 PERIAPICAL ABSCESS W/OUT SINUS: ICD-10-CM

## 2020-01-01 DIAGNOSIS — Z51.12 ENCOUNTER FOR ANTINEOPLASTIC IMMUNOTHERAPY: ICD-10-CM

## 2020-01-01 DIAGNOSIS — S06.6X9A TRAUMATIC SUBARACHNOID HEMORRHAGE WITH LOSS OF CONSCIOUSNESS OF UNSPECIFIED DURATION, INITIAL ENCOUNTER: ICD-10-CM

## 2020-01-01 DIAGNOSIS — R51 HEADACHE: ICD-10-CM

## 2020-01-01 DIAGNOSIS — R92.8 OTHER ABNORMAL AND INCONCLUSIVE FINDINGS ON DIAGNOSTIC IMAGING OF BREAST: ICD-10-CM

## 2020-01-01 DIAGNOSIS — R06.00 DYSPNEA, UNSPECIFIED: ICD-10-CM

## 2020-01-01 DIAGNOSIS — C73 MALIGNANT NEOPLASM OF THYROID GLAND: ICD-10-CM

## 2020-01-01 DIAGNOSIS — Z00.00 ENCOUNTER FOR GENERAL ADULT MEDICAL EXAMINATION W/OUT ABNORMAL FINDINGS: ICD-10-CM

## 2020-01-01 DIAGNOSIS — E11.9 TYPE 2 DIABETES MELLITUS WITHOUT COMPLICATIONS: ICD-10-CM

## 2020-01-01 LAB
ALBUMIN SERPL ELPH-MCNC: 3.7 G/DL
ALBUMIN SERPL ELPH-MCNC: 3.9 G/DL
ALBUMIN SERPL ELPH-MCNC: 4 G/DL
ALBUMIN SERPL ELPH-MCNC: 4.1 G/DL
ALBUMIN SERPL ELPH-MCNC: 4.1 G/DL
ALBUMIN SERPL ELPH-MCNC: 4.2 G/DL
ALBUMIN SERPL ELPH-MCNC: 4.3 G/DL
ALBUMIN SERPL ELPH-MCNC: 4.3 G/DL
ALBUMIN SERPL ELPH-MCNC: 4.4 G/DL
ALBUMIN SERPL ELPH-MCNC: 4.4 G/DL
ALBUMIN SERPL ELPH-MCNC: 4.5 G/DL
ALP BLD-CCNC: 67 U/L
ALP BLD-CCNC: 68 U/L
ALP BLD-CCNC: 68 U/L
ALP BLD-CCNC: 69 U/L
ALP BLD-CCNC: 69 U/L
ALP BLD-CCNC: 70 U/L
ALP BLD-CCNC: 70 U/L
ALP BLD-CCNC: 72 U/L
ALP BLD-CCNC: 73 U/L
ALP BLD-CCNC: 74 U/L
ALP BLD-CCNC: 78 U/L
ALP BLD-CCNC: 78 U/L
ALP BLD-CCNC: 80 U/L
ALP BLD-CCNC: 90 U/L
ALP BLD-CCNC: 92 U/L
ALT SERPL-CCNC: 10 U/L
ALT SERPL-CCNC: 11 U/L
ALT SERPL-CCNC: 13 U/L
ALT SERPL-CCNC: 14 U/L
ALT SERPL-CCNC: 21 U/L
ALT SERPL-CCNC: 22 U/L
ALT SERPL-CCNC: 7 U/L
ALT SERPL-CCNC: 8 U/L
ALT SERPL-CCNC: 8 U/L
ALT SERPL-CCNC: 9 U/L
ANION GAP SERPL CALC-SCNC: 10 MMOL/L
ANION GAP SERPL CALC-SCNC: 11 MMOL/L
ANION GAP SERPL CALC-SCNC: 12 MMOL/L
ANION GAP SERPL CALC-SCNC: 13 MMOL/L
ANION GAP SERPL CALC-SCNC: 14 MMOL/L
ANION GAP SERPL CALC-SCNC: 14 MMOL/L
ANION GAP SERPL CALC-SCNC: 15 MMOL/L
ANION GAP SERPL CALC-SCNC: 16 MMOL/L
ANION GAP SERPL CALC-SCNC: 17 MMOL/L
ANION GAP SERPL CALC-SCNC: 17 MMOL/L
APPEARANCE: ABNORMAL
APPEARANCE: CLEAR
AST SERPL-CCNC: 12 U/L
AST SERPL-CCNC: 13 U/L
AST SERPL-CCNC: 14 U/L
AST SERPL-CCNC: 15 U/L
AST SERPL-CCNC: 16 U/L
AST SERPL-CCNC: 16 U/L
AST SERPL-CCNC: 17 U/L
AST SERPL-CCNC: 22 U/L
AST SERPL-CCNC: 22 U/L
AST SERPL-CCNC: 27 U/L
AST SERPL-CCNC: 28 U/L
BACTERIA UR CULT: NORMAL
BILIRUB SERPL-MCNC: 0.4 MG/DL
BILIRUB SERPL-MCNC: 0.5 MG/DL
BILIRUB SERPL-MCNC: 0.5 MG/DL
BILIRUB SERPL-MCNC: 0.6 MG/DL
BILIRUB SERPL-MCNC: 0.6 MG/DL
BILIRUB SERPL-MCNC: 0.7 MG/DL
BILIRUB SERPL-MCNC: 0.8 MG/DL
BILIRUB SERPL-MCNC: 0.9 MG/DL
BILIRUB SERPL-MCNC: 0.9 MG/DL
BILIRUB SERPL-MCNC: 1 MG/DL
BILIRUB SERPL-MCNC: 1 MG/DL
BILIRUB SERPL-MCNC: 1.4 MG/DL
BILIRUBIN URINE: NEGATIVE
BLOOD URINE: NEGATIVE
BUN SERPL-MCNC: 10 MG/DL
BUN SERPL-MCNC: 10 MG/DL
BUN SERPL-MCNC: 11 MG/DL
BUN SERPL-MCNC: 11 MG/DL
BUN SERPL-MCNC: 13 MG/DL
BUN SERPL-MCNC: 6 MG/DL
BUN SERPL-MCNC: 7 MG/DL
BUN SERPL-MCNC: 7 MG/DL
BUN SERPL-MCNC: 8 MG/DL
BUN SERPL-MCNC: 8 MG/DL
BUN SERPL-MCNC: 9 MG/DL
CALCIUM SERPL-MCNC: 8.5 MG/DL
CALCIUM SERPL-MCNC: 8.9 MG/DL
CALCIUM SERPL-MCNC: 9 MG/DL
CALCIUM SERPL-MCNC: 9.1 MG/DL
CALCIUM SERPL-MCNC: 9.1 MG/DL
CALCIUM SERPL-MCNC: 9.3 MG/DL
CALCIUM SERPL-MCNC: 9.4 MG/DL
CALCIUM SERPL-MCNC: 9.5 MG/DL
CALCIUM SERPL-MCNC: 9.5 MG/DL
CALCIUM SERPL-MCNC: 9.8 MG/DL
CANCER AG125 SERPL-ACNC: 10 U/ML
CANCER AG125 SERPL-ACNC: 5 U/ML
CANCER AG125 SERPL-ACNC: 6 U/ML
CANCER AG125 SERPL-ACNC: 7 U/ML
CANCER AG125 SERPL-ACNC: 8 U/ML
CHLORIDE SERPL-SCNC: 100 MMOL/L
CHLORIDE SERPL-SCNC: 101 MMOL/L
CHLORIDE SERPL-SCNC: 102 MMOL/L
CHLORIDE SERPL-SCNC: 105 MMOL/L
CHLORIDE SERPL-SCNC: 95 MMOL/L
CHLORIDE SERPL-SCNC: 97 MMOL/L
CHLORIDE SERPL-SCNC: 97 MMOL/L
CHLORIDE SERPL-SCNC: 98 MMOL/L
CHLORIDE SERPL-SCNC: 98 MMOL/L
CHLORIDE SERPL-SCNC: 99 MMOL/L
CO2 SERPL-SCNC: 24 MMOL/L
CO2 SERPL-SCNC: 24 MMOL/L
CO2 SERPL-SCNC: 25 MMOL/L
CO2 SERPL-SCNC: 25 MMOL/L
CO2 SERPL-SCNC: 26 MMOL/L
CO2 SERPL-SCNC: 27 MMOL/L
CO2 SERPL-SCNC: 28 MMOL/L
CO2 SERPL-SCNC: 29 MMOL/L
COLOR: NORMAL
COLOR: YELLOW
CREAT SERPL-MCNC: 0.5 MG/DL
CREAT SERPL-MCNC: 0.6 MG/DL
CREAT SERPL-MCNC: 0.7 MG/DL
GLUCOSE BLDC GLUCOMTR-MCNC: 80 MG/DL — SIGNIFICANT CHANGE UP (ref 70–99)
GLUCOSE BLDC GLUCOMTR-MCNC: 83 MG/DL — SIGNIFICANT CHANGE UP (ref 70–99)
GLUCOSE BLDC GLUCOMTR-MCNC: 84 MG/DL — SIGNIFICANT CHANGE UP (ref 70–99)
GLUCOSE QUALITATIVE U: NEGATIVE
GLUCOSE SERPL-MCNC: 102 MG/DL
GLUCOSE SERPL-MCNC: 74 MG/DL
GLUCOSE SERPL-MCNC: 75 MG/DL
GLUCOSE SERPL-MCNC: 77 MG/DL
GLUCOSE SERPL-MCNC: 77 MG/DL
GLUCOSE SERPL-MCNC: 78 MG/DL
GLUCOSE SERPL-MCNC: 79 MG/DL
GLUCOSE SERPL-MCNC: 82 MG/DL
GLUCOSE SERPL-MCNC: 82 MG/DL
GLUCOSE SERPL-MCNC: 83 MG/DL
GLUCOSE SERPL-MCNC: 84 MG/DL
GLUCOSE SERPL-MCNC: 84 MG/DL
GLUCOSE SERPL-MCNC: 86 MG/DL
GLUCOSE SERPL-MCNC: 90 MG/DL
GLUCOSE SERPL-MCNC: 92 MG/DL
HCT VFR BLD CALC: 38.7 %
HCT VFR BLD CALC: 39.4 %
HCT VFR BLD CALC: 40.7 %
HCT VFR BLD CALC: 40.8 %
HCT VFR BLD CALC: 40.8 %
HCT VFR BLD CALC: 40.9 %
HCT VFR BLD CALC: 41.1 %
HCT VFR BLD CALC: 41.2 %
HCT VFR BLD CALC: 41.4 %
HCT VFR BLD CALC: 41.5 %
HCT VFR BLD CALC: 41.6 %
HCT VFR BLD CALC: 41.8 %
HCT VFR BLD CALC: 42.7 %
HCT VFR BLD CALC: 44.4 %
HGB BLD-MCNC: 13.1 G/DL
HGB BLD-MCNC: 13.2 G/DL
HGB BLD-MCNC: 13.2 G/DL
HGB BLD-MCNC: 13.7 G/DL
HGB BLD-MCNC: 13.7 G/DL
HGB BLD-MCNC: 13.8 G/DL
HGB BLD-MCNC: 13.9 G/DL
HGB BLD-MCNC: 14.1 G/DL
HGB BLD-MCNC: 14.7 G/DL
HGB BLD-MCNC: 15.3 G/DL
KETONES URINE: NEGATIVE
LEUKOCYTE ESTERASE URINE: ABNORMAL
MAGNESIUM SERPL-MCNC: 1.8 MG/DL
MAGNESIUM SERPL-MCNC: 1.9 MG/DL
MCHC RBC-ENTMCNC: 30 PG
MCHC RBC-ENTMCNC: 30.3 PG
MCHC RBC-ENTMCNC: 30.4 PG
MCHC RBC-ENTMCNC: 30.4 PG
MCHC RBC-ENTMCNC: 30.5 PG
MCHC RBC-ENTMCNC: 30.7 PG
MCHC RBC-ENTMCNC: 30.8 PG
MCHC RBC-ENTMCNC: 30.9 PG
MCHC RBC-ENTMCNC: 30.9 PG
MCHC RBC-ENTMCNC: 31 PG
MCHC RBC-ENTMCNC: 31 PG
MCHC RBC-ENTMCNC: 31.1 PG
MCHC RBC-ENTMCNC: 31.3 PG
MCHC RBC-ENTMCNC: 31.4 PG
MCHC RBC-ENTMCNC: 32.4 G/DL
MCHC RBC-ENTMCNC: 33.2 G/DL
MCHC RBC-ENTMCNC: 33.2 G/DL
MCHC RBC-ENTMCNC: 33.3 G/DL
MCHC RBC-ENTMCNC: 33.5 G/DL
MCHC RBC-ENTMCNC: 33.5 G/DL
MCHC RBC-ENTMCNC: 33.6 G/DL
MCHC RBC-ENTMCNC: 33.7 G/DL
MCHC RBC-ENTMCNC: 33.7 G/DL
MCHC RBC-ENTMCNC: 33.9 G/DL
MCHC RBC-ENTMCNC: 34.1 G/DL
MCHC RBC-ENTMCNC: 34.4 G/DL
MCHC RBC-ENTMCNC: 34.5 G/DL
MCV RBC AUTO: 89.6 FL
MCV RBC AUTO: 89.7 FL
MCV RBC AUTO: 90.3 FL
MCV RBC AUTO: 90.5 FL
MCV RBC AUTO: 90.5 FL
MCV RBC AUTO: 90.6 FL
MCV RBC AUTO: 90.9 FL
MCV RBC AUTO: 91 FL
MCV RBC AUTO: 91.5 FL
MCV RBC AUTO: 91.6 FL
MCV RBC AUTO: 91.7 FL
MCV RBC AUTO: 91.9 FL
MCV RBC AUTO: 92.7 FL
MCV RBC AUTO: 93.1 FL
MCV RBC AUTO: 93.9 FL
MCV RBC AUTO: 94.2 FL
NITRITE URINE: NEGATIVE
PH URINE: 6
PH URINE: 6.5
PLATELET # BLD AUTO: 127 K/UL
PLATELET # BLD AUTO: 141 K/UL
PLATELET # BLD AUTO: 157 K/UL
PLATELET # BLD AUTO: 185 K/UL
PLATELET # BLD AUTO: 188 K/UL
PLATELET # BLD AUTO: 194 K/UL
PLATELET # BLD AUTO: 207 K/UL
PLATELET # BLD AUTO: 210 K/UL
PLATELET # BLD AUTO: 218 K/UL
PLATELET # BLD AUTO: 232 K/UL
PLATELET # BLD AUTO: 234 K/UL
PLATELET # BLD AUTO: 243 K/UL
PLATELET # BLD AUTO: 255 K/UL
PLATELET # BLD AUTO: 262 K/UL
PLATELET # BLD AUTO: 268 K/UL
PLATELET # BLD AUTO: 283 K/UL
PMV BLD: 10 FL
PMV BLD: 10 FL
PMV BLD: 10.3 FL
PMV BLD: 9 FL
PMV BLD: 9.3 FL
PMV BLD: 9.3 FL
PMV BLD: 9.5 FL
PMV BLD: 9.5 FL
PMV BLD: 9.6 FL
PMV BLD: 9.6 FL
PMV BLD: 9.7 FL
PMV BLD: 9.8 FL
PMV BLD: 9.9 FL
POTASSIUM SERPL-SCNC: 3.8 MMOL/L
POTASSIUM SERPL-SCNC: 3.9 MMOL/L
POTASSIUM SERPL-SCNC: 4 MMOL/L
POTASSIUM SERPL-SCNC: 4.1 MMOL/L
POTASSIUM SERPL-SCNC: 4.2 MMOL/L
PROT SERPL-MCNC: 6.1 G/DL
PROT SERPL-MCNC: 6.4 G/DL
PROT SERPL-MCNC: 6.6 G/DL
PROT SERPL-MCNC: 6.7 G/DL
PROT SERPL-MCNC: 6.7 G/DL
PROT SERPL-MCNC: 6.8 G/DL
PROT SERPL-MCNC: 6.8 G/DL
PROT SERPL-MCNC: 6.9 G/DL
PROT SERPL-MCNC: 7 G/DL
PROT SERPL-MCNC: 7 G/DL
PROT SERPL-MCNC: 7.1 G/DL
PROT SERPL-MCNC: 7.3 G/DL
PROTEIN URINE: ABNORMAL
PROTEIN URINE: NEGATIVE
PROTEIN URINE: NORMAL
RBC # BLD: 4.21 M/UL
RBC # BLD: 4.23 M/UL
RBC # BLD: 4.33 M/UL
RBC # BLD: 4.41 M/UL
RBC # BLD: 4.45 M/UL
RBC # BLD: 4.48 M/UL
RBC # BLD: 4.49 M/UL
RBC # BLD: 4.5 M/UL
RBC # BLD: 4.5 M/UL
RBC # BLD: 4.52 M/UL
RBC # BLD: 4.55 M/UL
RBC # BLD: 4.57 M/UL
RBC # BLD: 4.62 M/UL
RBC # BLD: 4.63 M/UL
RBC # BLD: 4.72 M/UL
RBC # BLD: 4.95 M/UL
RBC # FLD: 12.9 %
RBC # FLD: 13 %
RBC # FLD: 13.1 %
RBC # FLD: 13.1 %
RBC # FLD: 13.2 %
RBC # FLD: 13.3 %
RBC # FLD: 13.6 %
RBC # FLD: 13.8 %
RBC # FLD: 14.2 %
RBC # FLD: 14.2 %
RBC # FLD: 14.3 %
RBC # FLD: 14.4 %
SODIUM SERPL-SCNC: 136 MMOL/L
SODIUM SERPL-SCNC: 137 MMOL/L
SODIUM SERPL-SCNC: 137 MMOL/L
SODIUM SERPL-SCNC: 139 MMOL/L
SODIUM SERPL-SCNC: 139 MMOL/L
SODIUM SERPL-SCNC: 140 MMOL/L
SODIUM SERPL-SCNC: 141 MMOL/L
SODIUM SERPL-SCNC: 142 MMOL/L
SPECIFIC GRAVITY URINE: 1.01
SPECIFIC GRAVITY URINE: 1.01
SPECIFIC GRAVITY URINE: 1.02
T3 SERPL-MCNC: 111 NG/DL
T3 SERPL-MCNC: 112 NG/DL
T3 SERPL-MCNC: 125 NG/DL
T3 SERPL-MCNC: 132 NG/DL
T4 FREE SERPL-MCNC: 1.8 NG/DL
T4 FREE SERPL-MCNC: 1.8 NG/DL
T4 SERPL-MCNC: 11.3 UG/DL
T4 SERPL-MCNC: 12.6 UG/DL
T4 SERPL-MCNC: 12.7 UG/DL
THYROGLOB AB SERPL-ACNC: <20 IU/ML
THYROGLOB AB SERPL-ACNC: <20 IU/ML
THYROGLOB SERPL-MCNC: 0.24 NG/ML
THYROGLOB SERPL-MCNC: 0.54 NG/ML
TSH SERPL-ACNC: 0.02 UIU/ML
TSH SERPL-ACNC: 0.04 UIU/ML
TSH SERPL-ACNC: 0.09 UIU/ML
TSH SERPL-ACNC: 0.14 UIU/ML
TSH SERPL-ACNC: 0.2 UIU/ML
TSH SERPL-ACNC: 0.23 UIU/ML
TSH SERPL-ACNC: 0.26 UIU/ML
TSH SERPL-ACNC: 0.26 UIU/ML
TSH SERPL-ACNC: 0.39 UIU/ML
TSH SERPL-ACNC: 0.55 UIU/ML
TSH SERPL-ACNC: 1.25 UIU/ML
UROBILINOGEN URINE: NORMAL
WBC # FLD AUTO: 11.31 K/UL
WBC # FLD AUTO: 4.51 K/UL
WBC # FLD AUTO: 4.6 K/UL
WBC # FLD AUTO: 5.42 K/UL
WBC # FLD AUTO: 5.45 K/UL
WBC # FLD AUTO: 5.93 K/UL
WBC # FLD AUTO: 5.97 K/UL
WBC # FLD AUTO: 6.22 K/UL
WBC # FLD AUTO: 6.22 K/UL
WBC # FLD AUTO: 6.27 K/UL
WBC # FLD AUTO: 6.27 K/UL
WBC # FLD AUTO: 6.35 K/UL
WBC # FLD AUTO: 6.49 K/UL
WBC # FLD AUTO: 6.61 K/UL
WBC # FLD AUTO: 6.93 K/UL
WBC # FLD AUTO: 9.24 K/UL

## 2020-01-01 PROCEDURE — 70553 MRI BRAIN STEM W/O & W/DYE: CPT | Mod: 26

## 2020-01-01 PROCEDURE — 99214 OFFICE O/P EST MOD 30 MIN: CPT

## 2020-01-01 PROCEDURE — 99213 OFFICE O/P EST LOW 20 MIN: CPT

## 2020-01-01 PROCEDURE — 93306 TTE W/DOPPLER COMPLETE: CPT | Mod: 26

## 2020-01-01 PROCEDURE — 99212 OFFICE O/P EST SF 10 MIN: CPT

## 2020-01-01 PROCEDURE — 78815 PET IMAGE W/CT SKULL-THIGH: CPT | Mod: 26,PS

## 2020-01-01 PROCEDURE — 99214 OFFICE O/P EST MOD 30 MIN: CPT | Mod: GC

## 2020-01-01 PROCEDURE — G9001: CPT

## 2020-01-01 RX ORDER — ACETAMINOPHEN 500 MG
650 TABLET ORAL ONCE
Refills: 0 | Status: COMPLETED | OUTPATIENT
Start: 2020-01-01 | End: 2020-01-01

## 2020-01-01 RX ORDER — LEVOTHYROXINE SODIUM 0.09 MG/1
88 TABLET ORAL DAILY
Qty: 30 | Refills: 5 | Status: ACTIVE | COMMUNITY
Start: 2020-02-20 | End: 1900-01-01

## 2020-01-01 RX ORDER — PEMBROLIZUMAB 25 MG/ML
200 INJECTION, SOLUTION INTRAVENOUS ONCE
Refills: 0 | Status: COMPLETED | OUTPATIENT
Start: 2020-01-01 | End: 2020-01-01

## 2020-01-01 RX ORDER — ALCOHOL ANTISEPTIC PADS
PADS, MEDICATED (EA) TOPICAL
Qty: 90 | Refills: 5 | Status: ACTIVE | COMMUNITY
Start: 2019-04-23 | End: 1900-01-01

## 2020-01-01 RX ORDER — CIPROFLOXACIN HYDROCHLORIDE 500 MG/1
500 TABLET, FILM COATED ORAL
Qty: 10 | Refills: 0 | Status: COMPLETED | COMMUNITY
Start: 2020-01-01 | End: 2020-01-01

## 2020-01-01 RX ORDER — LEVOTHYROXINE SODIUM 100 UG/1
100 TABLET ORAL DAILY
Qty: 90 | Refills: 0 | Status: DISCONTINUED | COMMUNITY
End: 2020-01-01

## 2020-01-01 RX ORDER — GABAPENTIN 100 MG/1
100 CAPSULE ORAL
Qty: 120 | Refills: 2 | Status: ACTIVE | COMMUNITY
Start: 2020-01-01 | End: 1900-01-01

## 2020-01-01 RX ORDER — BLOOD SUGAR DIAGNOSTIC
STRIP MISCELLANEOUS DAILY
Qty: 1 | Refills: 5 | Status: DISCONTINUED | COMMUNITY
Start: 2019-07-08 | End: 2020-01-01

## 2020-01-01 RX ORDER — DIPHENHYDRAMINE HCL 50 MG
25 CAPSULE ORAL ONCE
Refills: 0 | Status: COMPLETED | OUTPATIENT
Start: 2020-01-01 | End: 2020-01-01

## 2020-01-01 RX ORDER — BEVACIZUMAB 400 MG/16ML
1020 INJECTION, SOLUTION INTRAVENOUS ONCE
Refills: 0 | Status: COMPLETED | OUTPATIENT
Start: 2020-01-01 | End: 2020-01-01

## 2020-01-01 RX ORDER — METFORMIN HYDROCHLORIDE 500 MG/1
500 TABLET, COATED ORAL
Refills: 0 | Status: DISCONTINUED | COMMUNITY
End: 2020-01-01

## 2020-01-01 RX ORDER — LANCETS 28 GAUGE
EACH MISCELLANEOUS
Qty: 1 | Refills: 2 | Status: ACTIVE | COMMUNITY
Start: 2020-01-01 | End: 1900-01-01

## 2020-01-01 RX ORDER — UBIDECARENONE/VIT E ACET 100MG-5
50 MCG CAPSULE ORAL AT BEDTIME
Refills: 0 | Status: ACTIVE | COMMUNITY
Start: 2020-01-01

## 2020-01-01 RX ORDER — AMOXICILLIN 875 MG/1
875 TABLET, FILM COATED ORAL
Qty: 10 | Refills: 0 | Status: COMPLETED | COMMUNITY
Start: 2020-01-01 | End: 2020-01-01

## 2020-01-01 RX ORDER — BLOOD SUGAR DIAGNOSTIC
STRIP MISCELLANEOUS
Qty: 50 | Refills: 5 | Status: ACTIVE | COMMUNITY
Start: 2019-04-25

## 2020-01-01 RX ORDER — GABAPENTIN 100 MG
100 TABLET ORAL 3 TIMES DAILY
Refills: 0 | Status: DISCONTINUED | COMMUNITY
End: 2020-01-01

## 2020-01-01 RX ORDER — AZITHROMYCIN 250 MG/1
250 TABLET, FILM COATED ORAL
Qty: 6 | Refills: 0 | Status: ACTIVE | COMMUNITY
Start: 2020-01-01 | End: 1900-01-01

## 2020-01-01 RX ORDER — METFORMIN ER 500 MG 500 MG/1
500 TABLET ORAL DAILY
Qty: 30 | Refills: 5 | Status: ACTIVE | COMMUNITY
Start: 2019-04-25 | End: 1900-01-01

## 2020-01-01 RX ADMIN — Medication 25 MILLIGRAM(S): at 12:20

## 2020-01-01 RX ADMIN — PEMBROLIZUMAB 200 MILLIGRAM(S): 25 INJECTION, SOLUTION INTRAVENOUS at 12:40

## 2020-01-01 RX ADMIN — BEVACIZUMAB 1020 MILLIGRAM(S): 400 INJECTION, SOLUTION INTRAVENOUS at 14:15

## 2020-01-01 RX ADMIN — BEVACIZUMAB 281.6 MILLIGRAM(S): 400 INJECTION, SOLUTION INTRAVENOUS at 12:05

## 2020-01-01 RX ADMIN — PEMBROLIZUMAB 216 MILLIGRAM(S): 25 INJECTION, SOLUTION INTRAVENOUS at 14:08

## 2020-01-01 RX ADMIN — BEVACIZUMAB 281.6 MILLIGRAM(S): 400 INJECTION, SOLUTION INTRAVENOUS at 13:02

## 2020-01-01 RX ADMIN — Medication 25 MILLIGRAM(S): at 12:50

## 2020-01-01 RX ADMIN — Medication 25 MILLIGRAM(S): at 13:15

## 2020-01-01 RX ADMIN — Medication 650 MILLIGRAM(S): at 11:57

## 2020-01-01 RX ADMIN — Medication 650 MILLIGRAM(S): at 11:28

## 2020-01-01 RX ADMIN — Medication 25 MILLIGRAM(S): at 11:35

## 2020-01-01 RX ADMIN — PEMBROLIZUMAB 200 MILLIGRAM(S): 25 INJECTION, SOLUTION INTRAVENOUS at 13:22

## 2020-01-01 RX ADMIN — Medication 101 MILLIGRAM(S): at 11:32

## 2020-01-01 RX ADMIN — Medication 650 MILLIGRAM(S): at 12:39

## 2020-01-01 RX ADMIN — BEVACIZUMAB 281.6 MILLIGRAM(S): 400 INJECTION, SOLUTION INTRAVENOUS at 12:19

## 2020-01-01 RX ADMIN — PEMBROLIZUMAB 216 MILLIGRAM(S): 25 INJECTION, SOLUTION INTRAVENOUS at 15:34

## 2020-01-01 RX ADMIN — Medication 103.2 MILLIGRAM(S): at 12:19

## 2020-01-01 RX ADMIN — BEVACIZUMAB 1020 MILLIGRAM(S): 400 INJECTION, SOLUTION INTRAVENOUS at 14:10

## 2020-01-01 RX ADMIN — Medication 650 MILLIGRAM(S): at 11:19

## 2020-01-01 RX ADMIN — PEMBROLIZUMAB 200 MILLIGRAM(S): 25 INJECTION, SOLUTION INTRAVENOUS at 14:38

## 2020-01-01 RX ADMIN — Medication 25 MILLIGRAM(S): at 11:50

## 2020-01-01 RX ADMIN — Medication 101 MILLIGRAM(S): at 11:20

## 2020-01-01 RX ADMIN — Medication 101 MILLIGRAM(S): at 13:15

## 2020-01-01 RX ADMIN — BEVACIZUMAB 1020 MILLIGRAM(S): 400 INJECTION, SOLUTION INTRAVENOUS at 12:49

## 2020-01-01 RX ADMIN — PEMBROLIZUMAB 200 MILLIGRAM(S): 25 INJECTION, SOLUTION INTRAVENOUS at 16:04

## 2020-01-01 RX ADMIN — Medication 103.2 MILLIGRAM(S): at 11:32

## 2020-01-01 RX ADMIN — BEVACIZUMAB 281.6 MILLIGRAM(S): 400 INJECTION, SOLUTION INTRAVENOUS at 13:17

## 2020-01-01 RX ADMIN — Medication 100 MILLIGRAM(S): at 12:40

## 2020-01-01 RX ADMIN — Medication 650 MILLIGRAM(S): at 11:33

## 2020-01-01 RX ADMIN — Medication 650 MILLIGRAM(S): at 12:28

## 2020-01-01 RX ADMIN — PEMBROLIZUMAB 216 MILLIGRAM(S): 25 INJECTION, SOLUTION INTRAVENOUS at 13:09

## 2020-01-01 RX ADMIN — Medication 103.2 MILLIGRAM(S): at 13:15

## 2020-01-01 RX ADMIN — Medication 100 MILLIGRAM(S): at 13:15

## 2020-01-01 RX ADMIN — BEVACIZUMAB 281.6 MILLIGRAM(S): 400 INJECTION, SOLUTION INTRAVENOUS at 13:29

## 2020-01-01 RX ADMIN — Medication 103.2 MILLIGRAM(S): at 12:52

## 2020-01-01 RX ADMIN — Medication 100 MILLIGRAM(S): at 12:00

## 2020-01-01 RX ADMIN — Medication 101 MILLIGRAM(S): at 13:00

## 2020-01-01 RX ADMIN — Medication 103.2 MILLIGRAM(S): at 12:40

## 2020-01-01 RX ADMIN — BEVACIZUMAB 1020 MILLIGRAM(S): 400 INJECTION, SOLUTION INTRAVENOUS at 13:35

## 2020-01-01 RX ADMIN — Medication 103.2 MILLIGRAM(S): at 12:20

## 2020-01-01 RX ADMIN — Medication 650 MILLIGRAM(S): at 12:40

## 2020-01-01 RX ADMIN — PEMBROLIZUMAB 200 MILLIGRAM(S): 25 INJECTION, SOLUTION INTRAVENOUS at 13:40

## 2020-01-01 RX ADMIN — Medication 25 MILLIGRAM(S): at 13:35

## 2020-01-01 RX ADMIN — PEMBROLIZUMAB 216 MILLIGRAM(S): 25 INJECTION, SOLUTION INTRAVENOUS at 12:10

## 2020-01-01 RX ADMIN — Medication 101 MILLIGRAM(S): at 12:27

## 2020-01-01 RX ADMIN — Medication 100 MILLIGRAM(S): at 13:30

## 2020-01-01 RX ADMIN — Medication 101 MILLIGRAM(S): at 11:57

## 2020-01-01 RX ADMIN — BEVACIZUMAB 1020 MILLIGRAM(S): 400 INJECTION, SOLUTION INTRAVENOUS at 14:00

## 2020-01-01 RX ADMIN — Medication 101 MILLIGRAM(S): at 11:28

## 2020-01-01 RX ADMIN — PEMBROLIZUMAB 216 MILLIGRAM(S): 25 INJECTION, SOLUTION INTRAVENOUS at 12:52

## 2020-01-01 RX ADMIN — Medication 650 MILLIGRAM(S): at 12:52

## 2020-01-01 RX ADMIN — Medication 100 MILLIGRAM(S): at 13:00

## 2020-01-01 RX ADMIN — BEVACIZUMAB 281.6 MILLIGRAM(S): 400 INJECTION, SOLUTION INTRAVENOUS at 13:45

## 2020-01-01 RX ADMIN — PEMBROLIZUMAB 216 MILLIGRAM(S): 25 INJECTION, SOLUTION INTRAVENOUS at 13:31

## 2020-01-01 RX ADMIN — Medication 103.2 MILLIGRAM(S): at 11:40

## 2020-01-07 ENCOUNTER — APPOINTMENT (OUTPATIENT)
Dept: HEMATOLOGY ONCOLOGY | Facility: CLINIC | Age: 65
End: 2020-01-07
Payer: MEDICAID

## 2020-01-07 ENCOUNTER — APPOINTMENT (OUTPATIENT)
Dept: INFUSION THERAPY | Facility: CLINIC | Age: 65
End: 2020-01-07
Payer: MEDICAID

## 2020-01-07 ENCOUNTER — LABORATORY RESULT (OUTPATIENT)
Age: 65
End: 2020-01-07

## 2020-01-07 VITALS
DIASTOLIC BLOOD PRESSURE: 62 MMHG | HEIGHT: 65 IN | TEMPERATURE: 98.3 F | RESPIRATION RATE: 14 BRPM | SYSTOLIC BLOOD PRESSURE: 119 MMHG | HEART RATE: 73 BPM | BODY MASS INDEX: 25.16 KG/M2 | WEIGHT: 151 LBS

## 2020-01-07 PROCEDURE — 99213 OFFICE O/P EST LOW 20 MIN: CPT

## 2020-01-07 RX ORDER — ACETAMINOPHEN 500 MG
650 TABLET ORAL ONCE
Refills: 0 | Status: COMPLETED | OUTPATIENT
Start: 2020-01-07 | End: 2020-01-07

## 2020-01-07 RX ORDER — HYDROCORTISONE 20 MG
100 TABLET ORAL ONCE
Refills: 0 | Status: DISCONTINUED | OUTPATIENT
Start: 2020-01-07 | End: 2020-01-07

## 2020-01-07 RX ORDER — DIPHENHYDRAMINE HCL 50 MG
25 CAPSULE ORAL ONCE
Refills: 0 | Status: COMPLETED | OUTPATIENT
Start: 2020-01-07 | End: 2020-01-07

## 2020-01-07 RX ORDER — BEVACIZUMAB 400 MG/16ML
1040 INJECTION, SOLUTION INTRAVENOUS ONCE
Refills: 0 | Status: COMPLETED | OUTPATIENT
Start: 2020-01-07 | End: 2020-01-07

## 2020-01-07 RX ADMIN — Medication 100 MILLIGRAM(S): at 12:35

## 2020-01-07 RX ADMIN — BEVACIZUMAB 283.2 MILLIGRAM(S): 400 INJECTION, SOLUTION INTRAVENOUS at 12:32

## 2020-01-07 RX ADMIN — Medication 101 MILLIGRAM(S): at 11:14

## 2020-01-07 RX ADMIN — Medication 650 MILLIGRAM(S): at 11:14

## 2020-01-07 RX ADMIN — Medication 25 MILLIGRAM(S): at 11:35

## 2020-01-07 RX ADMIN — BEVACIZUMAB 1040 MILLIGRAM(S): 400 INJECTION, SOLUTION INTRAVENOUS at 13:05

## 2020-01-07 RX ADMIN — Medication 103.2 MILLIGRAM(S): at 12:14

## 2020-01-07 NOTE — REVIEW OF SYSTEMS
[Fatigue] : fatigue [Easy Bleeding] : a tendency for easy bleeding [Negative] : Allergic/Immunologic [Recent Change In Weight] : ~T no recent weight change [Chest Pain] : no chest pain [Palpitations] : no palpitations [Lower Ext Edema] : no lower extremity edema [Shortness Of Breath] : no shortness of breath [Cough] : no cough [Wheezing] : no wheezing [SOB on Exertion] : no shortness of breath during exertion [FreeTextEntry3] : vision improved in left eye. [de-identified] : np visual field deficit, no gait impairment [FreeTextEntry9] : normal strength in upper and lower extremities  [de-identified] : mild gingival bleeding

## 2020-01-07 NOTE — HISTORY OF PRESENT ILLNESS
[Disease: _____________________] : Disease: [unfilled] [AJCC Stage: ____] : AJCC Stage: [unfilled] [2] : 2, Moderate [de-identified] : Serena is a rodrigue 65 yo female, who has started developing SOB approximately 2 months ago, she went to ER on 11/2/2017 and on CXR was noted to have a large right sided pleural effusion. She underwent a thoracentesis, 1.6L of fluid was drained. 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 IHC was pos for CK7, PAX8, CK5/6 and Ca125, negative for TTF-1/Napsin, calretinin, CK20, mammaglobin, p63, villin, HAM56, GCDFP15, TAY-EP4. \par \par Her visit on 12/1/2017 Serena had an excisional biopsy of cervical node on 12/13/2017 revealing met adenocarcinoma, primary source still was not clear. On 12/14/2017 Serena was admitted with SOB, Pleurx catheter was placed on 12/14/2017. 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 Serena received 1st dose of carbo (auc of 5)/taxol(175mg/m2) on 12/15/2017 without complications, but the next day sustained a fall 2/2 vasovagal episode and developed asymptomatic SAH, that resolved on imaging by 12/21/2017.  [de-identified] : KRas WT, EGFR WT, Alk WT, ROS1 WT, PDL1-1%\par Normal MMR protein expression [de-identified] : 11/24/2017: She reports some SOB, especially ARZATE today. No fevers, no weight loss (reports some weight gain), no GI,  or GYN symptoms, except for increasing abdominal girth, she reports no blood in the stool or urine and no vaginal bleeding. She reports no CP and no palpitations, she reports worsening nonproductive cough, no hemoptysis. She also reports difficulty lying on left side and lying down flat. \par She reports left on/off facial numbness several months in duration. She had a CT of her head performed in [facility] within 1 year.  Additionally there is a freely mobile left cervical node present on exam, as per patient this was in place for approximately 1 year. \par \par 12/1/2017: Serena is here for follow up today. She had a therapeutic thoracentesis in  on 11/27/2017, 2L of fluid were removed, with much improved respiratory status. PET CT and MRI of the brain were done on 11/29/2017, findings were discussed today. There remains no clear primary source of malignancy. We have discussed that the source of tumor may be Mullerian vs lung. regardless of source chemotherapy needs to be initiated ASAP. We have discussed Carbo/taxol regiment that will cover both Mullerian and lung origin. Side effects including myelosuppression, peripheral neuropathy, allergic reaction and others.\par \par 1/2/2017 Since last visit Serena had an excisional biopsy of cervical node on 12/13/2017 revealing met adenocarcinoma, primary source still was not clear. On 12/14/2017 Serena was admitted with SOB, Pleurx catheter was placed on 12/14/2017. 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 Serena received 1st dose of carbo (auc of 5)/taxol(175mg/m2) on 12/15/2017 without complications, but the next day sustained a fall 2/2 vasovagal episode and developed asymptomatic SAH, that resolved on imaging by 12/21/2017. Today Serena's SOB has significantly improved. She reports very mild neuropathy in fingertips only. She reports no headache and offers no other complaints. \par \par 1/26/2018: Complains of some neuropathy, otherwise tolerating chemo with no difficulty. \par \par 2/16/2018: restaging CT C/A/P reviewed, significant improvement noted. Will refer to GYN/ONC. Reports slightly worsening neuropathy, not -painful, taking Gabapentin, no other symptoms. For cycle 4 of carbo/taxol today.\par \par 3/9/2018: Serena met with Dr. Massey, she is planned for surgery on 5/7/2018, after completion of 6 cycles of carbo/taxol and restaging PET CT ordered for mid April and follow up with Dr. Massey on April 20. She reports worsening neuropathy, and occasional pain and changes in vision in her left eye, eye symptoms come and go and are not very bothersome. She reports no other symptoms. \par \par 3/30/2018; Serena is here for cycle 6 of carbo/taxol, she continues to have neuropathy in finger and toes, but offers no other complaints, chemo has been dose reduced. \par \par 4/27/2018: Results of restaging PET CT s/p 6 cycles of carbo/taxol revealed 1. No new areas of abnormal increased uptake is seen to indicate \par biologically active disease.\par \par 2. Persistent PET positive left thyroid mass with a max SUV 33.1, \par previously max SUV 34.8. Further evaluation with thyroid ultrasound and \par if needed fine-needle aspiration biopsy will be helpful.\par \par 3.   PET positive left cervical lymph nodes mainly level 2 on the left \par with a max SUV 5.47previously 18. Minimal increased uptake in the \par bilateral axillary lymph nodes most likely reactive(less than 2.5)\par \par 4. Weakly PET positive, max SUV 2.89 right pleura, previous max SUV 5.3 \par with non-FDG avid right pleural effusion. \par \par 5. Previously FDG avid right and left supraclavicular lymphadenopathy, \par left internal mammary, bilateral paratracheal, para-aortic, para \par vertebral, carinal, mesenteric, right and left iliacs, right inguinal \par lymphadenopathy, small in size and no longer FDG avid.\par \par 6. No abnormal increased uptake is seen in the  lobulated uterus.\par \par 7. Overall there is marked improvement in the FDG avid disease.\par This was reviewed with Serena. She met with Dr. Massey on 4/20/2018, surgery is planned for 5/7/2018. She will also joselyn to meet with ENT re FDG avid thyroid nodule. Will assist in making he an appointment for her. \par Persistent neuropathy on gabapentin. \par \par 6/29/18 ; Patient came for follow up visit , evaluation for chemotherapy cycle 8 of carbo / Taxol , patient tolerating medication well , except neuropathy  recommend to have gabapentin  300 mg po daily.\par \par 7/20/2018: Serena present for follow up today, she has completed total of 8 cycles of Carbo/Taxol. She reports significant neuropathy in her hands and feet. We will discontinue chemotherapy today and plan to follow with close observation. She has thyroidectomy planned with Dr. Herrera on 8/20/2018, obtaining clearance for PMD. She is also planning to fly to [place] at the end of September. She reports no SOB, no GI or  symptoms. \par \par 9/12/2018: Serena offers no significant complaints today. She states neuropathy in her hands has almost completely resolved and neuropathy in her feet is significantly better. She had undergone complete thyroidectomy by Dr. Herrera on 8/20/2018, pathology revealed papillary thyroid cancer, measuring 2 cm, pT1b, other additional foci of papillary carcinoma were also noted, no LVI, surgical margins were clear, LN 0/2 had no carcinoma, stage [id number]. She is following up with Dr. Herrera tomorrow. She has still not gone for her vacation in [place]. \par \par 10/10/2018: Restaging PET CT on 9/26/2018 reveals Since 4/16/2018,  1. Post thyroidectomy with diffuse increased FDG uptake at the thyroid  bed likely representing post-surgical changes.  2. Subtle increased uptake is seen in the lamina , right side at the  level of T8 with a max SUV 4.6. This is not sufficient for metastatic  disease. Bone scan or MRI examination with contrast will be helpful for  further evaluation.  3. No other areas of abnormal increased uptake is seen. Images were personally reviewed by myself and discussed with Serena. \par She also had an Echo on 9/24/2018 revealing EF of 63%. \par Serena reports ongoing fatigue, she is unable to lose weight. She is taking Synthroid and following with Dr. Acevedo. \par She reports stiff fingers at night only, no painful neuropathy. \par She denies any other symptoms today. \par \par 12/11/2018: Serena feels well, neuropathy in hands and feet is much improved. S he is now complaining of r-sided facial pain associated with some swelling, pain comes and goes. She has already seen Dr. Herrera, who ordered CT of sinuses and prescribed pain relief meds. She is also due for restaging PET CT. Serena feels well otherwise. \par \par 1/9/2019: Restaging PET CT was performed on 12/18/2018 it revealed COMPARISON : 9/24/2018.  New left upper quadrant peritoneal nodule measuring 8 mm with an SUV max  of 7.3. This is suggestive of biologic tumor activity.  No other FDG avid lesions seen throughout the scan. Images were personally reviewed by myself and discussed with Serena, originally over the phone and again today. I have originally suggested to try AI in the interim, Serena however reported diarrhea at the time of the scan and declined intervention for now. We will plan for restaging PET CT to be performed in 6-8 weeks, this will be ordered today. She will follow up with Dr. Massey at [place] shortly and bring the disk for his review. I would also like her to meet with genetics, this will be arranged at [place] with Dr. Massey's help. Serena reports no new symptoms today, her neuropathy is manageable and  improving. She still reports unilateral headache that was work up in the past. Neurology eval was again suggested to her. She is following closely with Endocrinology re thyroid management. She will have follow up with Dr. Herrera shortly as well. \par \par 2/20/2019: Serena offers no new complaints. PET CT from 2/12/2019 revealed \par Since PET/CT of December 19, 2018, no new sites of pathologic FDG uptake\par Slight increase in size of left upper quadrant peritoneal nodule (1.1 cm, \par previously 0.8 cm) with stable FDG uptake, 7.7 SUV suspicious for \par biologic tumor activity.\par No other sites of pathologic FDG uptake \par Images were personally reviewed by myself and discussed with Serena, case was also discussed with Dr. Massey at [place]. Serena will have follow up with his shortly. She is following with endocrinology. reports improving neuropathy. No GI or  symptoms at this time. No weight loss. \par \par 3/20/2019: Serena had a peritoneal nodule biopsy done at [place], I have received a call from Dr. 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 We have discussed that surgery though could be attempted will not be the best therapeutic approach, recommencement of systemic therapy was discussed. I have not received the results from [place] yet. I have briefly discussed this with Serena today. Serena reports that she has acutely developed right lower extremity weakness 5-6 days ago, she did not inform any of her doctors about this. She also reports that her R upper extremity though not weak "does not feel normal either. She reports no back pain, there is no point tenderness, RLE is objectively weak on exam. I recommend ER evaluation to r/o CVA vs brain met, vs L-spine related issue. Recommend admission for work up. Serena is agreeable to get admitted. \par \par 5/8/2019: Serena present for follow up, she was diagnosed with multiple metastasis to the brain, MRI was done on 3/21/2019 and revealed \par Multiple supratentorial heterogeneously enhancing lesions consistent with \par metastatic disease. The appearance on the T2-weighted images is suggestive \par of adenocarcinoma. There is mass effect upon the left lateral ventricle and \par corpus callosum. \par She received whole brain irradiation by Dr. Dahl, completed it in the beginning of 4/2019, she is currently on steroid taper managed by Dr. Dahl, she is taking 4mg of Decadron daily. She will be stopping the Keppra as there have been no documented h/o seizures. She still reports impairment of the L visual field, she has an appointment coming up with Opthalmology. She reports no deficits walking after she has completed inpatient acute rehabilitation. \par Restaging PET CT on 5/1/2019 reveals COMPARISON : 2/12/2019 \par Multiple new FDG avid omental nodules largest measuring up to 10 mm, \par positive on nonattenuation corrected images. Findings are suggestive of \par biologic tumor activity. \par Again seen is a left upper quadrant peritoneal nodule, SUV max 6.8, \par previously 7.7 (12% decrease). \par Images were personally reviewed by myself and discussed with patient. \par She now reports mild abdominal bloating, no other symptoms. She reports her neuropathy has significantly improved. \par She has first evidence of recurrent disease documented 5.5 months after last carbo/taxol dose, the volume of disease was very small (1 8mm nodule), she was then observed for another 6 months and now she wishes to restart the chemotherapy. \par I have discussed with her that rechallenging with carbo/taxol may still prove to be effective as long as she gets restaged after 2 cycles. Given recent whole brain radiation and neuropathy I will offer her carbo/taxol weekly with does reduction on the taxol for neuropathy, We will plan to run carbo slowly to avoid allergic reaction. \par She is agreeable to start ASAP. \par \par 6/5/2019: Serena has completed 1 cycle of weekly Carbo/taxol, ran at slow rate to avoid Carbo reaction and has tolerated chemotherapy without difficulty, she does not complain of increase in neuropathy. She has ongoing vision changes in her L eye, she had no residual LE weakness. She is off Decadron and Keppra. She was advised to see ophthalmology. \par \par 7/3/2019: Serena is doing well.  Reports no problems with carbo/taxol.  Still complains of vision changes in L eye but is seeing ophthalmologist on 7/16.  Remains active around the house and cooks regularly.  No fevers, weight loss, anorexia.  ROS is otherwise only significant for occasional loose stools, no diarrhea.\par \par 7/17/2019: Serena is doing well, denies worsening neuropathy. She c/o feeling fatigued and tired. Her last chemo was 1 week ago(7/11/19) with carbo/taxol and is due again tomorrow. She saw ophthalmology and will need cataract surgery of the L eye. No c/o chest pain/SOB. No weight loss.\par CT C/A/P was done on 7/9/2019, images were personally reviewed by myself and discussed with several radiology attendings, they revealed \par CHEST:\par Filling defect within a segmental branch of the lower lobe pulmonary artery \par suspicious for pulmonary embolus. \par Stable left upper lobe and right middle lobe 3 mm nodules. \par CT abdomen and pelvis performed on the same day, see separate report. \par ADDENDUM: \par Please note that the morphology of the small thrombus within the left lower \par pulmonary artery is not indicative of an acute thrombus but rather a chronic \par appearance. \par This was discussed with Radiology, it was not deemed that thrombus was acute and may not have even been present at all, finding was deemed to be equivocal, based on radiology assessment anticoagulation was not indicated for the filling defect noted. Initially CTA was recommended, the recommendation was withdrawn upon further review. \par ABDOMEN/PELVIS:\par New 1.3 cm segment IV hepatic hypodensity seen on series 4 image 205. \par Lesion not seen on prior PET/CT from 5/1/2019 or abdomen and pelvis CT from \par \par 2/2/2018 and is consistent with a new metastasis. \par Two other hepatic lesions described above, decreased in size since 2/2/2018, \par consistent with treated hepatic metastases. \par Anterior perisplenic omental nodule measures 0.7 cm on series 2 image 53, \par previously measuring 1.2 cm on PET/CT dated 5/1/2019. \par All the other previously seen omental nodules have resolved since 5/1/2019. \par ADDENDUM:\par Case was discussed with Dr. Cid in detail. It is also possible that the \par new 1.3 cm lesion in the liver since February 2, 2018 represents a treated \par metastasis similar to the other liver lesions. \par PET/CT would be necessary to assess disease activity. \par I have discussed case with Radiology, and it was determined that there was no clear cut evidence of progression. PET CT was ordered today.\par This was discussed with Serena, will proceed with Carbo/Taxol for now. \par \par 8/1/2019: Serena reports no major side effects from weekly Carbo/Taxol, she reports no neuropathy. She has completed 10 cycles altogether. \par PET CT on 7/24/2019 revealed \par 1. Since May 1, 2019, no definite new site of pathologic FDG uptake to \par suggest new biologic tumor activity; specifically, no pathologic FDG uptake \par within previously noted 1.3 cm lesion within hepatic segment 4. \par 2. Increased FDG uptake within several subcentimeter bilateral cervical \par lymph nodes, which is nonspecific and may be postinflammatory; max SUV 9.2 \par is noted within a 0.7 cm right level 2 cervical node. Attention on follow-up \par is suggested. \par 3. Few peritoneal nodules have overall decreased in size. \par 4. No additional site of pathologic FDG uptake. \par Images were personally reviewed by myself and discussed with Serena. \par She wishes to continue with chemotherapy. Will plan for 3 additional cycles. Will consider adding Avastin, but with h/o inconclusive/possible chronic PE ppx anticoagulation maybe necessary. \par \par 8/28/2019: Serena reports feeling well, she denies worsening neuropathy, worsening neurological symptoms, SOB, abdominal pain, bloating. She reports she is planned to undergo  eye surgery towards the end of September. We will plan to hold chemotherapy briefly after this cycle to facilitate adequate counts and minimize complications. We again have briefly discussed considering Avastin based therapy, will hold off on this prior to surgery. 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 May proceed without formal PAST, will check CBC prior to procedure, PT and PTT today. \par \par 9/25/19:Serena reports feeling well, she denies any changes since last visit, No SOB, abdominal pain, bloating. She reports she is planned to undergo  eye surgery in 9/30/19  CBC reviewed with normal Hemogram . we will hold chem this week , she will resume after Cataract sx . 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 May proceed without formal PAST, will check CBC prior to procedure, PT and PTT today. \par \par 10/4/2019: Serena underwent successful eye surgery on 9/30/2019, she reports she can see much better now. Last dose of chemotherapy was administered on 9/20, she then was on hold for surgery. She has completed 5 additional cycles of Carbo/taxol. She is due for restaging. Her disease is only accurately assessed on PET CT, prior attempts to obtain CT scans resulted in additional imaging with PET, as findings were inconclusive. PET CT will be ordered to restage. \par In addition she is due for MRI of the brain. She has had a small amount of weight loss, mild neuropathy is persistent. She offers no additional complaints. \par She will have port placed on Monday, will hold chemotherapy until restaging scans complete. \par \par 10/23/2019: Serena feels well, and denies any new symptoms. Her eye surgery was successful, vision is much improved now. She had restaging scans. \par PET CT on 10/18/2019 revealed COMPARISON : 7/24/2019. \par No pathologic uptake to suggest biologic tumor activity. \par MRI of the brain on 10/15/2019 revealed \par Comparison with June 8 and March 21, 2019: (Generally lesions improved \par markedly since March but slightly increased in size since June) \par 1. Lesions previously demonstrated on the study of June 8, 2019 has \par remained stable or minimally increased in size \par 2. Specifically, right frontal opercular lesion measures about 1 cm and \par left posterior inferior temporal lobe lesion measures about 1 cm. These have \par increased since the previous study. \par 3. Small punctiform lesions within the right frontal white matter and left \par frontal sulcus or more apparent than on the study of June 8 \par 4. These lesions are all much smaller than the earlier MRI of March 21, \par 2019. \par 5. No new lesions. \par Images were personally reviewed by myself and discussed with patient. She remains asymptomatic and off the steroids. \par I have also discussed the case with Dr. aDhl. I would like Serena to discuss the options with [facility], she maybe a candidate for brain SRS. \par She has been off Carbo/Taxol for a few weeks, she remains Platinum sensitive. I have discussed the option of switching her over to Avastin maintenance, will need to prophylax with low dose Eliquis as well, given questionable finding of small PE in the past. \par Side effects of Avastin were discussed at length, including HNT, proteinuria, small risk of DVT/PE, GI perforations etc. \par She is agreeable to start after [facility] consultation. \par \par 11/5/2019: Serena feels OK, she reports acute onset head discomfort that lasts minutes and subsides, the episodes are becoming more frequent 1-2 times a day. She has seen Dr. Dahl, who wishes to hold off on offering SRS to the brain. Plan was to start on Avastin today. Serena reports she currently has a lapse in her insurance coverage till 12/2019, I will not be able to start he on ppx dose Eliquis for now, she instead will take baby ASA every other day, to modify risk of DVT/PE. Will plan to switch over to Eliquis at 2.5mg BID once insurance is active. She also contemplated going to [place] with her daughter, I am willing to hold Avastin until she comes back, but Serena wishes to start right away, I explained to her that flight may result in complications if that is her wish. She will cancel the trip and start with Avastin today. Side effects have been discussed. \par \par 11/20/2019: Serena came for a follow up visit, she reports feeling well, she reports less fatigue. She is s/p first dose of Avastin on 11/5/2019, she tolerated Avastin without difficulty, she is due for the 2nd dose today. \par We communicated CBC result with HGB of 12.3, normal platelet and WBCs. She lost like 5 pounds secondary to gum problems, she will follow up with her dentist this week. She is currently on baby ASA every other day. Continue with single agent Avastin.  \par \par 12/17/2019: Serena came for a follow up visit, she reports feeling well, she experienced pruritic rash on last Saturday 12/14/19, which improved over 2 days with Benadryl alone.  Today her skin rash has completely resolved. We will add Solu-Medrol 100 mg IV prior to Avastin. Serena reports less fatigue. She is due for the 3rd dose of Avastin today. \par We communicated CBC result with HGB of 12.3, normal platelet and WBCs. \par \par 1/7/2019: Serena is doing well, reports good appetite, she has lost 1lb. Reports no GI or pulmonary symptoms. She reports her gums are bleeding occasionally. No new neurological symptoms. She reports no rash after last cycle of Avastin.  [FreeTextEntry3] : pruritic reaction  [FreeTextEntry5] : prednisone 25 mg po 2x/ day.

## 2020-01-07 NOTE — PHYSICAL EXAM
[Restricted in physically strenuous activity but ambulatory and able to carry out work of a light or sedentary nature] : Status 1- Restricted in physically strenuous activity but ambulatory and able to carry out work of a light or sedentary nature, e.g., light house work, office work [Normal] : affect appropriate [de-identified] : B/L cataracts appreciated [de-identified] : pruritic reaction from  Avastin  treatment.  [de-identified] : CTA B/L

## 2020-01-07 NOTE — RESULTS/DATA
[FreeTextEntry1] : EXAM:  MR BRAIN WAW IC      \par \par \par PROCEDURE DATE:  06/08/2019  \par \par \par \par \par INTERPRETATION:  Clinical History / Reason for exam: Dizziness and \par vertigo, history of uterine and thyroid cancer, for evaluation of \par metastatic disease, lesion seen on prior MRI of the brain.\par \par MRI OF THE BRAIN WITHOUT AND WITH CONTRAST\par \par TECHNIQUE:\par \par Multiplanar multisequence imaging of the brain was performed on the \[facility] open magnet before and after the intravenous administration of \par 7.5 cc of Gadavist including brain lab protocol.\par \par COMPARISON:\par \par MRI of the brain without and with contrast dated March 12, 2019.\par \par FINDINGS:\par \par The previously described supratentorial lesions have almost completely \par resolved.\par \par The lesion in the left posterior frontal region now measures 1.4 cm in \par maximum diameter, the lesion in the anterior right frontal lobe measures \par 0.7 cm in maximum diameter, the second lesion in the right frontal lobe \par measures 0.1 cm in maximum diameter, the lesion in the right posterior \par parietal region measures 0.3 cm in maximum diameter, and the left \par occipital lobe lesion measures 1.2 cm in maximum diameter. (Previously \par measuring 2.6, 2.4, 0.5, 0.7, and 2.5 cm respectively).\par \par The third, fourth, and lateral ventricles are normal in size and \par position. There is no shift of the midline structures.\par \par The cerebellar tonsils are minimally low-lying (0.3 cm of cerebellar \par ectopia).\par \par Incidental note is made of a tiny medial cyst.\par \par There are scattered T2/FLAIR hyperintense signal intensities in the\par subcortical white matter which are nonspecific differential diagnostic\par possibilities include chronic ischemic change, foci of gliosis or\par demyelination.\par \par IMPRESSION:\par \par In comparison with the prior MRI of the brain dated March 21, 2019:\par \par There has been almost complete resolution of most of of the previously \par described supratentorial lesions.\par \par There are no new enhancing lesions.\par \par \par \par \par \par \par CRISTINA MÉNDEZ M.D., ATTENDING RADIOLOGIST\par This document has been electronically signed. Oren 10 2019  1:17PM\par   \par \par   \par \par \par [id number]^RI^DC\par \par EXAM:  PETCT SKUL-THI ONC FDG SUBS      \par \par \par PROCEDURE DATE:  05/01/2019  \par \par \par \par \par INTERPRETATION:  \par FDG PET CT STUDY   Subsequent  treatment strategy\par REASON: TUMOR IMAGING - PET with concurrently acquired CT for attenuation \par correction and anatomic localization; skull base to mid - thigh .\par \par CPT code 76006.\par \par Fasting blood glucose level:     91 mg/dl\par \par HISTORY: Endometrial cancer. Stage IV with brain metastatic disease.\par \par TECHNIQUE: Approximately 45 minutes after the intravenous administration \par of 10.7 mCi 18-Fluorine FDG, whole body PET images were acquired from \par base of skull to mid - thigh.\par \par CT protocol used for this PET/CT study is designed for attenuation \par correction and anatomic localization of PET findings.  This  CT \par is not designed to replace state-of-the-art diagnostic CT scans for \par specific imaging protocols of different body parts.\par \par COMPARISON : 2/12/2019.\par Correlation: MRI of the brain on 3/21/2019.\par \par FINDINGS:\par \par Head/Neck: No biologically active head/neck lesions.     \par Normal uptake within the brain, pharyngeal lymphoid tissue, salivary \par glands and laryngeal musculature is noted.    \par \par Thorax:   No suspicious hypermetabolic mediastinal, hilar, lung \par parenchymal lesions.\par \par Abdomen/Pelvis: Physiologic GI/  activity. \par \par Again seen is a left upper quadrant peritoneal nodule, SUV max 6.8, \par previously 7.7 (12% decrease). \par \par Multiple new FDG avid omental nodules largest of which is adjacent to the \par distal transverse colon, measuring 10 mm, positive on nonattenuation \par corrected images, axial image 134. \par \par No other abnormal visceral or guillaume tracer uptake is present.    \par \par Musculoskeletal :  No suspicious hypermetabolic osseous lesions.\par \par Additional CT findings:\par Status post hysterectomy.\par Colonic diverticulosis.\par \par IMPRESSION: \par \par COMPARISON : 2/12/2019\par \par Multiple new FDG avid omental nodules largest measuring up to 10 mm, \par positive on nonattenuation corrected images. Findings are suggestive of \par biologic tumor activity.\par \par Again seen is a left upper quadrant peritoneal nodule, SUV max 6.8, \par previously 7.7 (12% decrease). \par \par \par \par \par

## 2020-01-07 NOTE — ASSESSMENT
[FreeTextEntry1] : Papillary-serous endometrial cancer, stage IV\par --PET CT on 11/29/2017 revealed no clear GYN origin, extensive KEVIN, uptake in the right lung and pleura, thyroid nodule\par --Malignant pleural effusion, resolved\par --L side PleurX catheter was removed on 2/8/2018\par --Completed cycle 8  of carbo/taxol on 6/29/2018.  2 cycles were administered  postoperatively.\par --Restaging PET CT on 9/26/2018 (3 months post completion of chemo 6/29/2018)was essentially negative for recurrent disease\par --Restaging PET CT on 12/18/2018 reveals   New left upper quadrant peritoneal nodule measuring 8 mm with an SUV max  of 7.3. This is suggestive of biologic tumor activity.  No other FDG avid lesions seen throughout the scan.\par --restaging PET CT on 2/12/2019 revealed slight increase in size of left upper quadrant peritoneal nodule (1.1 cm, \par previously 0.8 cm) with stable FDG uptake, 7.7 SUV suspicious for biologic tumor activity.\par --Case was discussed with Dr. Massey at [facility], biopsy of peritoneal nodule is positive for recurrent endometrial cancer.\par --MRI brain on 4/23/2019 revealed multiple brain mets\par --S/p whole brain radiation completed 4/2019, required acute rehab\par --PET CT on 5/1/2019 subsequently revealed further disease progression \par --Restarted weekly carbo/taxol 3 on 1 off on 5/9/2019, continue with weekly Carbo/Taxol for now.\par --Off Decadron and Keppra since 6/5/2019 \par --Restaging brain MRI on 6/8/2019 showed almost complete resolution of most supratentorial lesions and no new lesions.\par --Restaging CT C/A/P on 7/9/2019 did not reveal definite progression, there was a questionable filling defect suspicious for possible chronic PE/artifact, this was discussed with Radiology at length anticoagulation was not deemed to be necessary\par --PET CT on 7/24/2019 revealed positive response to therapy \par --Carbo/Taxol stopped, last dose 9/13/2019, on hold since, she remains platinum sensitive \par --Restaging PET CT on 10/18/2019 is NAD\par --Restaging MRI of the brain on 10/15/2019 is suggestive for possible disease progression, no new lesions, she remains asymptomatic and off steroids, will follow closely \par --Short term MRI brain follow up \par --She was referred to [facility] for brain SRS consideration, close observation for now \par --Started on Avastin maintenance 11/5/2019, will consider adding Eliquis ppx for DVT/ PE, for now she will take baby ASA every other day \par --Proceed with 4 cycle on 1/7/20, continue with Solumedrol to premedicate \par --We sent for prednisone 10 mg po if needed for additional skin issues\par --Patient well aware to contact us if any side effect developed\par --Restaging PET CT and MRI of the brain were ordered on 1/7/2019\par \par Papillary thyroid cancer [id number], 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 Herrera \par --Follow up with Dr. Acevedo of endocrinology; he is addressing vitamin D, thyroid and diabetes\par \par Cataract of eye\par -- S/p successful surgery on 9/30/19 . \par \par Follow up in 3 weeks\par

## 2020-01-13 LAB
ALBUMIN SERPL ELPH-MCNC: 4.2 G/DL
ALP BLD-CCNC: 78 U/L
ALT SERPL-CCNC: 11 U/L
ANION GAP SERPL CALC-SCNC: 13 MMOL/L
APPEARANCE: CLEAR
AST SERPL-CCNC: 16 U/L
BILIRUB SERPL-MCNC: 0.4 MG/DL
BILIRUBIN URINE: NEGATIVE
BLOOD URINE: NEGATIVE
BUN SERPL-MCNC: 8 MG/DL
CALCIUM SERPL-MCNC: 9.6 MG/DL
CHLORIDE SERPL-SCNC: 100 MMOL/L
CO2 SERPL-SCNC: 27 MMOL/L
COLOR: NORMAL
CREAT SERPL-MCNC: 0.6 MG/DL
GLUCOSE QUALITATIVE U: NEGATIVE
GLUCOSE SERPL-MCNC: 93 MG/DL
HCT VFR BLD CALC: 40.2 %
HGB BLD-MCNC: 13.4 G/DL
KETONES URINE: NEGATIVE
LEUKOCYTE ESTERASE URINE: NEGATIVE
MCHC RBC-ENTMCNC: 31.3 PG
MCHC RBC-ENTMCNC: 33.3 G/DL
MCV RBC AUTO: 93.9 FL
NITRITE URINE: NEGATIVE
PH URINE: 7
PLATELET # BLD AUTO: 243 K/UL
PMV BLD: 9.6 FL
POTASSIUM SERPL-SCNC: 3.9 MMOL/L
PROT SERPL-MCNC: 7.1 G/DL
PROTEIN URINE: NEGATIVE
RBC # BLD: 4.28 M/UL
RBC # FLD: 12.4 %
SODIUM SERPL-SCNC: 140 MMOL/L
SPECIFIC GRAVITY URINE: 1.01
UROBILINOGEN URINE: NORMAL
WBC # FLD AUTO: 6.04 K/UL

## 2020-01-15 ENCOUNTER — OUTPATIENT (OUTPATIENT)
Dept: OUTPATIENT SERVICES | Facility: HOSPITAL | Age: 65
LOS: 1 days | Discharge: HOME | End: 2020-01-15
Payer: MEDICAID

## 2020-01-15 DIAGNOSIS — Z98.890 OTHER SPECIFIED POSTPROCEDURAL STATES: Chronic | ICD-10-CM

## 2020-01-15 DIAGNOSIS — Z90.710 ACQUIRED ABSENCE OF BOTH CERVIX AND UTERUS: Chronic | ICD-10-CM

## 2020-01-15 DIAGNOSIS — C54.1 MALIGNANT NEOPLASM OF ENDOMETRIUM: ICD-10-CM

## 2020-01-15 PROCEDURE — 70553 MRI BRAIN STEM W/O & W/DYE: CPT | Mod: 26

## 2020-01-16 LAB
25(OH)D3 SERPL-MCNC: 46 NG/ML
ESTIMATED AVERAGE GLUCOSE: 120 MG/DL
HBA1C MFR BLD HPLC: 5.8 %

## 2020-01-22 ENCOUNTER — OUTPATIENT (OUTPATIENT)
Dept: OUTPATIENT SERVICES | Facility: HOSPITAL | Age: 65
LOS: 1 days | Discharge: HOME | End: 2020-01-22
Payer: MEDICAID

## 2020-01-22 DIAGNOSIS — Z98.890 OTHER SPECIFIED POSTPROCEDURAL STATES: Chronic | ICD-10-CM

## 2020-01-22 DIAGNOSIS — C54.1 MALIGNANT NEOPLASM OF ENDOMETRIUM: ICD-10-CM

## 2020-01-22 DIAGNOSIS — Z90.710 ACQUIRED ABSENCE OF BOTH CERVIX AND UTERUS: Chronic | ICD-10-CM

## 2020-01-22 LAB — GLUCOSE BLDC GLUCOMTR-MCNC: 71 MG/DL — SIGNIFICANT CHANGE UP (ref 70–99)

## 2020-01-22 PROCEDURE — 78815 PET IMAGE W/CT SKULL-THIGH: CPT | Mod: 26,PS

## 2020-01-28 ENCOUNTER — APPOINTMENT (OUTPATIENT)
Dept: INFUSION THERAPY | Facility: CLINIC | Age: 65
End: 2020-01-28
Payer: MEDICAID

## 2020-01-28 ENCOUNTER — LABORATORY RESULT (OUTPATIENT)
Age: 65
End: 2020-01-28

## 2020-01-28 ENCOUNTER — OUTPATIENT (OUTPATIENT)
Dept: OUTPATIENT SERVICES | Facility: HOSPITAL | Age: 65
LOS: 1 days | Discharge: HOME | End: 2020-01-28

## 2020-01-28 ENCOUNTER — APPOINTMENT (OUTPATIENT)
Dept: HEMATOLOGY ONCOLOGY | Facility: CLINIC | Age: 65
End: 2020-01-28
Payer: MEDICAID

## 2020-01-28 VITALS
SYSTOLIC BLOOD PRESSURE: 135 MMHG | DIASTOLIC BLOOD PRESSURE: 81 MMHG | WEIGHT: 150 LBS | HEIGHT: 65 IN | HEART RATE: 72 BPM | RESPIRATION RATE: 14 BRPM | TEMPERATURE: 97.5 F | BODY MASS INDEX: 24.99 KG/M2

## 2020-01-28 DIAGNOSIS — C79.31 SECONDARY MALIGNANT NEOPLASM OF BRAIN: ICD-10-CM

## 2020-01-28 DIAGNOSIS — Z90.710 ACQUIRED ABSENCE OF BOTH CERVIX AND UTERUS: Chronic | ICD-10-CM

## 2020-01-28 DIAGNOSIS — Z98.890 OTHER SPECIFIED POSTPROCEDURAL STATES: Chronic | ICD-10-CM

## 2020-01-28 DIAGNOSIS — Z51.11 ENCOUNTER FOR ANTINEOPLASTIC CHEMOTHERAPY: ICD-10-CM

## 2020-01-28 DIAGNOSIS — C54.1 MALIGNANT NEOPLASM OF ENDOMETRIUM: ICD-10-CM

## 2020-01-28 PROCEDURE — 99214 OFFICE O/P EST MOD 30 MIN: CPT

## 2020-01-28 RX ORDER — BEVACIZUMAB 400 MG/16ML
1020 INJECTION, SOLUTION INTRAVENOUS ONCE
Refills: 0 | Status: COMPLETED | OUTPATIENT
Start: 2020-01-28 | End: 2020-01-28

## 2020-01-28 RX ORDER — DIPHENHYDRAMINE HCL 50 MG
25 CAPSULE ORAL ONCE
Refills: 0 | Status: COMPLETED | OUTPATIENT
Start: 2020-01-28 | End: 2020-01-28

## 2020-01-28 RX ORDER — ACETAMINOPHEN 500 MG
650 TABLET ORAL ONCE
Refills: 0 | Status: COMPLETED | OUTPATIENT
Start: 2020-01-28 | End: 2020-01-28

## 2020-01-28 RX ADMIN — Medication 650 MILLIGRAM(S): at 14:25

## 2020-01-28 RX ADMIN — Medication 103.2 MILLIGRAM(S): at 14:24

## 2020-01-28 RX ADMIN — BEVACIZUMAB 281.6 MILLIGRAM(S): 400 INJECTION, SOLUTION INTRAVENOUS at 15:01

## 2020-01-28 RX ADMIN — Medication 101 MILLIGRAM(S): at 14:25

## 2020-01-29 NOTE — REVIEW OF SYSTEMS
[Fatigue] : fatigue [Easy Bleeding] : a tendency for easy bleeding [Negative] : Neurological [Palpitations] : no palpitations [Chest Pain] : no chest pain [Recent Change In Weight] : ~T no recent weight change [Lower Ext Edema] : no lower extremity edema [Wheezing] : no wheezing [Shortness Of Breath] : no shortness of breath [FreeTextEntry3] : vision improved in left eye. [Cough] : no cough [SOB on Exertion] : no shortness of breath during exertion [FreeTextEntry9] : normal strength in upper and lower extremities  [de-identified] : mild gingival bleeding [de-identified] : np visual field deficit, no gait impairment

## 2020-01-29 NOTE — ASSESSMENT
[FreeTextEntry1] : Papillary-serous endometrial cancer, stage IV\par --PET CT on 11/29/2017 revealed no clear GYN origin, extensive KEVIN, uptake in the right lung and pleura, thyroid nodule\par --Malignant pleural effusion, resolved\par --L side PleurX catheter was removed on 2/8/2018\par --Completed cycle 8  of carbo/taxol on 6/29/2018.  2 cycles were administered  postoperatively.\par --Restaging PET CT on 9/26/2018 (3 months post completion of chemo 6/29/2018)was essentially negative for recurrent disease\par --Restaging PET CT on 12/18/2018 reveals   New left upper quadrant peritoneal nodule measuring 8 mm with an SUV max  of 7.3. This is suggestive of biologic tumor activity.  No other FDG avid lesions seen throughout the scan.\par --restaging PET CT on 2/12/2019 revealed slight increase in size of left upper quadrant peritoneal nodule (1.1 cm, \par previously 0.8 cm) with stable FDG uptake, 7.7 SUV suspicious for biologic tumor activity.\par --Case was discussed with Dr. Massey at [place], biopsy of peritoneal nodule is positive for recurrent endometrial cancer.\par --MRI brain on 4/23/2019 revealed multiple brain mets\par --S/p whole brain radiation completed 4/2019, required acute rehab\par --PET CT on 5/1/2019 subsequently revealed further disease progression \par --Restarted weekly carbo/taxol 3 on 1 off on 5/9/2019, continue with weekly Carbo/Taxol for now.\par --Off Decadron and Keppra since 6/5/2019 \par --Restaging brain MRI on 6/8/2019 showed almost complete resolution of most supratentorial lesions and no new lesions.\par --Restaging CT C/A/P on 7/9/2019 did not reveal definite progression, there was a questionable filling defect suspicious for possible chronic PE/artifact, this was discussed with Radiology at length anticoagulation was not deemed to be necessary\par --PET CT on 7/24/2019 revealed positive response to therapy \par --Carbo/Taxol stopped, last dose 9/13/2019, on hold since, she remains platinum sensitive \par --Restaging PET CT on 10/18/2019 is NAD\par --Restaging MRI of the brain on 10/15/2019 is suggestive for possible disease progression, no new lesions, she remains asymptomatic and off steroids, will follow closely \par --Short term MRI brain follow up \par --She was referred to [facility] for brain SRS consideration, close observation for now \par --Started on Avastin maintenance 11/5/2019, will consider adding Eliquis ppx for DVT/ PE, for now she will take baby ASA every other day \par --Proceed with 4 cycle on 1/7/20, continue with Solumedrol to premedicate \par --We sent for prednisone 10 mg po if needed for additional skin issues\par --Patient well aware to contact us if any side effect developed\par --Restaging PET CT on 1/22/2020 reveals single small perisplenic area of activity, suspicious for disease progression, asymptomatic \par --MRI of the brain 1/15/2020 reveals overall good response to Avastin \par --Proceed with cycle 5 of Avastin on 1/28/2020 after weighing risks/benefits with plan for short term follow up imaging \par --Other options discussed is switching to Keytruda/Lenvima combination, this is a consideration in the future \par \par Papillary thyroid cancer [id number], 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 Herrera \par --Follow up with Dr. Acevedo of endocrinology; he is addressing vitamin D, thyroid and diabetes\par \par Cataract of eye\par -- S/p successful surgery on 9/30/19 . \par \par Follow up in 3 weeks\par 
no weight-bearing restrictions

## 2020-01-29 NOTE — HISTORY OF PRESENT ILLNESS
[Disease: _____________________] : Disease: [unfilled] [AJCC Stage: ____] : AJCC Stage: [unfilled] [2] : 2, Moderate [de-identified] : Serena is a rodrigue 65 yo female, who has started developing SOB approximately 2 months ago, she went to ER on 11/2/2017 and on CXR was noted to have a large right sided pleural effusion. She underwent a thoracentesis, 1.6L of fluid was drained. 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 IHC was pos for CK7, PAX8, CK5/6 and Ca125, negative for TTF-1/Napsin, calretinin, CK20, mammaglobin, p63, villin, HAM56, GCDFP15, TAY-EP4. \par \par Her visit on 12/1/2017 Serena had an excisional biopsy of cervical node on 12/13/2017 revealing met adenocarcinoma, primary source still was not clear. On 12/14/2017 Serena was admitted with SOB, Pleurx catheter was placed on 12/14/2017. 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 Serena received 1st dose of carbo (auc of 5)/taxol(175mg/m2) on 12/15/2017 without complications, but the next day sustained a fall 2/2 vasovagal episode and developed asymptomatic SAH, that resolved on imaging by 12/21/2017.  [de-identified] : 11/24/2017: She reports some SOB, especially ARZATE today. No fevers, no weight loss (reports some weight gain), no GI,  or GYN symptoms, except for increasing abdominal girth, she reports no blood in the stool or urine and no vaginal bleeding. She reports no CP and no palpitations, she reports worsening nonproductive cough, no hemoptysis. She also reports difficulty lying on left side and lying down flat. \par She reports left on/off facial numbness several months in duration. She had a CT of her head performed in [facility] within 1 year.  Additionally there is a freely mobile left cervical node present on exam, as per patient this was in place for approximately 1 year. \par \par 12/1/2017: Serena is here for follow up today. She had a therapeutic thoracentesis in  on 11/27/2017, 2L of fluid were removed, with much improved respiratory status. PET CT and MRI of the brain were done on 11/29/2017, findings were discussed today. There remains no clear primary source of malignancy. We have discussed that the source of tumor may be Mullerian vs lung. regardless of source chemotherapy needs to be initiated ASAP. We have discussed Carbo/taxol regiment that will cover both Mullerian and lung origin. Side effects including myelosuppression, peripheral neuropathy, allergic reaction and others.\par \par 1/2/2017 Since last visit Serena had an excisional biopsy of cervical node on 12/13/2017 revealing met adenocarcinoma, primary source still was not clear. On 12/14/2017 Serena was admitted with SOB, Pleurx catheter was placed on 12/14/2017. 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 Serena received 1st dose of carbo (auc of 5)/taxol(175mg/m2) on 12/15/2017 without complications, but the next day sustained a fall 2/2 vasovagal episode and developed asymptomatic SAH, that resolved on imaging by 12/21/2017. Today Serena's SOB has significantly improved. She reports very mild neuropathy in fingertips only. She reports no headache and offers no other complaints. \par \par 1/26/2018: Complains of some neuropathy, otherwise tolerating chemo with no difficulty. \par \par 2/16/2018: restaging CT C/A/P reviewed, significant improvement noted. Will refer to GYN/ONC. Reports slightly worsening neuropathy, not -painful, taking Gabapentin, no other symptoms. For cycle 4 of carbo/taxol today.\par \par 3/9/2018: Serena met with Dr. Massey, she is planned for surgery on 5/7/2018, after completion of 6 cycles of carbo/taxol and restaging PET CT ordered for mid April and follow up with Dr. Massey on April 20. She reports worsening neuropathy, and occasional pain and changes in vision in her left eye, eye symptoms come and go and are not very bothersome. She reports no other symptoms. \par \par 3/30/2018; Serena is here for cycle 6 of carbo/taxol, she continues to have neuropathy in finger and toes, but offers no other complaints, chemo has been dose reduced. \par \par 4/27/2018: Results of restaging PET CT s/p 6 cycles of carbo/taxol revealed 1. No new areas of abnormal increased uptake is seen to indicate \par biologically active disease.\par \par 2. Persistent PET positive left thyroid mass with a max SUV 33.1, \par previously max SUV 34.8. Further evaluation with thyroid ultrasound and \par if needed fine-needle aspiration biopsy will be helpful.\par \par 3.   PET positive left cervical lymph nodes mainly level 2 on the left \par with a max SUV 5.47previously 18. Minimal increased uptake in the \par bilateral axillary lymph nodes most likely reactive(less than 2.5)\par \par 4. Weakly PET positive, max SUV 2.89 right pleura, previous max SUV 5.3 \par with non-FDG avid right pleural effusion. \par \par 5. Previously FDG avid right and left supraclavicular lymphadenopathy, \par left internal mammary, bilateral paratracheal, para-aortic, para \par vertebral, carinal, mesenteric, right and left iliacs, right inguinal \par lymphadenopathy, small in size and no longer FDG avid.\par \par 6. No abnormal increased uptake is seen in the  lobulated uterus.\par \par 7. Overall there is marked improvement in the FDG avid disease.\par This was reviewed with Serena. She met with Dr. Massey on 4/20/2018, surgery is planned for 5/7/2018. She will also joselyn to meet with ENT re FDG avid thyroid nodule. Will assist in making he an appointment for her. \par Persistent neuropathy on gabapentin. \par \par 6/29/18 ; Patient came for follow up visit , evaluation for chemotherapy cycle 8 of carbo / Taxol , patient tolerating medication well , except neuropathy  recommend to have gabapentin  300 mg po daily.\par \par 7/20/2018: Serena present for follow up today, she has completed total of 8 cycles of Carbo/Taxol. She reports significant neuropathy in her hands and feet. We will discontinue chemotherapy today and plan to follow with close observation. She has thyroidectomy planned with Dr. Herrera on 8/20/2018, obtaining clearance for PMD. She is also planning to fly to [place] at the end of September. She reports no SOB, no GI or  symptoms. \par \par 9/12/2018: Serena offers no significant complaints today. She states neuropathy in her hands has almost completely resolved and neuropathy in her feet is significantly better. She had undergone complete thyroidectomy by Dr. Herrera on 8/20/2018, pathology revealed papillary thyroid cancer, measuring 2 cm, pT1b, other additional foci of papillary carcinoma were also noted, no LVI, surgical margins were clear, LN 0/2 had no carcinoma, stage [id number]. She is following up with Dr. Herrera tomorrow. She has still not gone for her vacation in [place]. \par \par 10/10/2018: Restaging PET CT on 9/26/2018 reveals Since 4/16/2018,  1. Post thyroidectomy with diffuse increased FDG uptake at the thyroid  bed likely representing post-surgical changes.  2. Subtle increased uptake is seen in the lamina , right side at the  level of T8 with a max SUV 4.6. This is not sufficient for metastatic  disease. Bone scan or MRI examination with contrast will be helpful for  further evaluation.  3. No other areas of abnormal increased uptake is seen. Images were personally reviewed by myself and discussed with Serena. \par She also had an Echo on 9/24/2018 revealing EF of 63%. \par Serena reports ongoing fatigue, she is unable to lose weight. She is taking Synthroid and following with Dr. Acevedo. \par She reports stiff fingers at night only, no painful neuropathy. \par She denies any other symptoms today. \par \par 12/11/2018: Serena feels well, neuropathy in hands and feet is much improved. S he is now complaining of r-sided facial pain associated with some swelling, pain comes and goes. She has already seen Dr. Herrera, who ordered CT of sinuses and prescribed pain relief meds. She is also due for restaging PET CT. Serena feels well otherwise. \par \par 1/9/2019: Restaging PET CT was performed on 12/18/2018 it revealed COMPARISON : 9/24/2018.  New left upper quadrant peritoneal nodule measuring 8 mm with an SUV max  of 7.3. This is suggestive of biologic tumor activity.  No other FDG avid lesions seen throughout the scan. Images were personally reviewed by myself and discussed with Serena, originally over the phone and again today. I have originally suggested to try AI in the interim, Serena however reported diarrhea at the time of the scan and declined intervention for now. We will plan for restaging PET CT to be performed in 6-8 weeks, this will be ordered today. She will follow up with Dr. Massey at [facility] shortly and bring the disk for his review. I would also like her to meet with genetics, this will be arranged at [facility] with Dr. Massey's help. Serena reports no new symptoms today, her neuropathy is manageable and  improving. She still reports unilateral headache that was work up in the past. Neurology eval was again suggested to her. She is following closely with Endocrinology re thyroid management. She will have follow up with Dr. Herrera shortly as well. \par \par 2/20/2019: Serena offers no new complaints. PET CT from 2/12/2019 revealed \par Since PET/CT of December 19, 2018, no new sites of pathologic FDG uptake\par Slight increase in size of left upper quadrant peritoneal nodule (1.1 cm, \par previously 0.8 cm) with stable FDG uptake, 7.7 SUV suspicious for \par biologic tumor activity.\par No other sites of pathologic FDG uptake \par Images were personally reviewed by myself and discussed with Serena, case was also discussed with Dr. Massey at [facility]. Serena will have follow up with his shortly. She is following with endocrinology. reports improving neuropathy. No GI or  symptoms at this time. No weight loss. \par \par 3/20/2019: Serena had a peritoneal nodule biopsy done at [facility], I have received a call from Dr. 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 We have discussed that surgery though could be attempted will not be the best therapeutic approach, recommencement of systemic therapy was discussed. I have not received the results from [facility] yet. I have briefly discussed this with Serena today. Serena reports that she has acutely developed right lower extremity weakness 5-6 days ago, she did not inform any of her doctors about this. She also reports that her R upper extremity though not weak "does not feel normal either. She reports no back pain, there is no point tenderness, RLE is objectively weak on exam. I recommend ER evaluation to r/o CVA vs brain met, vs L-spine related issue. Recommend admission for work up. Serena is agreeable to get admitted. \par \par 5/8/2019: Serena present for follow up, she was diagnosed with multiple metastasis to the brain, MRI was done on 3/21/2019 and revealed \par Multiple supratentorial heterogeneously enhancing lesions consistent with \par metastatic disease. The appearance on the T2-weighted images is suggestive \par of adenocarcinoma. There is mass effect upon the left lateral ventricle and \par corpus callosum. \par She received whole brain irradiation by Dr. Dahl, completed it in the beginning of 4/2019, she is currently on steroid taper managed by Dr. Dahl, she is taking 4mg of Decadron daily. She will be stopping the Keppra as there have been no documented h/o seizures. She still reports impairment of the L visual field, she has an appointment coming up with Opthalmology. She reports no deficits walking after she has completed inpatient acute rehabilitation. \par Restaging PET CT on 5/1/2019 reveals COMPARISON : 2/12/2019 \par Multiple new FDG avid omental nodules largest measuring up to 10 mm, \par positive on nonattenuation corrected images. Findings are suggestive of \par biologic tumor activity. \par Again seen is a left upper quadrant peritoneal nodule, SUV max 6.8, \par previously 7.7 (12% decrease). \par Images were personally reviewed by myself and discussed with patient. \par She now reports mild abdominal bloating, no other symptoms. She reports her neuropathy has significantly improved. \par She has first evidence of recurrent disease documented 5.5 months after last carbo/taxol dose, the volume of disease was very small (1 8mm nodule), she was then observed for another 6 months and now she wishes to restart the chemotherapy. \par I have discussed with her that rechallenging with carbo/taxol may still prove to be effective as long as she gets restaged after 2 cycles. Given recent whole brain radiation and neuropathy I will offer her carbo/taxol weekly with does reduction on the taxol for neuropathy, We will plan to run carbo slowly to avoid allergic reaction. \par She is agreeable to start ASAP. \par \par 6/5/2019: Serena has completed 1 cycle of weekly Carbo/taxol, ran at slow rate to avoid Carbo reaction and has tolerated chemotherapy without difficulty, she does not complain of increase in neuropathy. She has ongoing vision changes in her L eye, she had no residual LE weakness. She is off Decadron and Keppra. She was advised to see ophthalmology. \par \par 7/3/2019: Serena is doing well.  Reports no problems with carbo/taxol.  Still complains of vision changes in L eye but is seeing ophthalmologist on 7/16.  Remains active around the house and cooks regularly.  No fevers, weight loss, anorexia.  ROS is otherwise only significant for occasional loose stools, no diarrhea.\par \par 7/17/2019: Serena is doing well, denies worsening neuropathy. She c/o feeling fatigued and tired. Her last chemo was 1 week ago(7/11/19) with carbo/taxol and is due again tomorrow. She saw ophthalmology and will need cataract surgery of the L eye. No c/o chest pain/SOB. No weight loss.\par CT C/A/P was done on 7/9/2019, images were personally reviewed by myself and discussed with several radiology attendings, they revealed \par CHEST:\par Filling defect within a segmental branch of the lower lobe pulmonary artery \par suspicious for pulmonary embolus. \par Stable left upper lobe and right middle lobe 3 mm nodules. \par CT abdomen and pelvis performed on the same day, see separate report. \par ADDENDUM: \par Please note that the morphology of the small thrombus within the left lower \par pulmonary artery is not indicative of an acute thrombus but rather a chronic \par appearance. \par This was discussed with Radiology, it was not deemed that thrombus was acute and may not have even been present at all, finding was deemed to be equivocal, based on radiology assessment anticoagulation was not indicated for the filling defect noted. Initially CTA was recommended, the recommendation was withdrawn upon further review. \par ABDOMEN/PELVIS:\par New 1.3 cm segment IV hepatic hypodensity seen on series 4 image 205. \par Lesion not seen on prior PET/CT from 5/1/2019 or abdomen and pelvis CT from \par \par 2/2/2018 and is consistent with a new metastasis. \par Two other hepatic lesions described above, decreased in size since 2/2/2018, \par consistent with treated hepatic metastases. \par Anterior perisplenic omental nodule measures 0.7 cm on series 2 image 53, \par previously measuring 1.2 cm on PET/CT dated 5/1/2019. \par All the other previously seen omental nodules have resolved since 5/1/2019. \par ADDENDUM:\par Case was discussed with Dr. Cid in detail. It is also possible that the \par new 1.3 cm lesion in the liver since February 2, 2018 represents a treated \par metastasis similar to the other liver lesions. \par PET/CT would be necessary to assess disease activity. \par I have discussed case with Radiology, and it was determined that there was no clear cut evidence of progression. PET CT was ordered today.\par This was discussed with Serena, will proceed with Carbo/Taxol for now. \par \par 8/1/2019: Serena reports no major side effects from weekly Carbo/Taxol, she reports no neuropathy. She has completed 10 cycles altogether. \par PET CT on 7/24/2019 revealed \par 1. Since May 1, 2019, no definite new site of pathologic FDG uptake to \par suggest new biologic tumor activity; specifically, no pathologic FDG uptake \par within previously noted 1.3 cm lesion within hepatic segment 4. \par 2. Increased FDG uptake within several subcentimeter bilateral cervical \par lymph nodes, which is nonspecific and may be postinflammatory; max SUV 9.2 \par is noted within a 0.7 cm right level 2 cervical node. Attention on follow-up \par is suggested. \par 3. Few peritoneal nodules have overall decreased in size. \par 4. No additional site of pathologic FDG uptake. \par Images were personally reviewed by myself and discussed with Serena. \par She wishes to continue with chemotherapy. Will plan for 3 additional cycles. Will consider adding Avastin, but with h/o inconclusive/possible chronic PE ppx anticoagulation maybe necessary. \par \par 8/28/2019: Serena reports feeling well, she denies worsening neuropathy, worsening neurological symptoms, SOB, abdominal pain, bloating. She reports she is planned to undergo  eye surgery towards the end of September. We will plan to hold chemotherapy briefly after this cycle to facilitate adequate counts and minimize complications. We again have briefly discussed considering Avastin based therapy, will hold off on this prior to surgery. 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 May proceed without formal PAST, will check CBC prior to procedure, PT and PTT today. \par \par 9/25/19:Serena reports feeling well, she denies any changes since last visit, No SOB, abdominal pain, bloating. She reports she is planned to undergo  eye surgery in 9/30/19  CBC reviewed with normal Hemogram . we will hold chem this week , she will resume after Cataract sx . 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 May proceed without formal PAST, will check CBC prior to procedure, PT and PTT today. \par \par 10/4/2019: Serena underwent successful eye surgery on 9/30/2019, she reports she can see much better now. Last dose of chemotherapy was administered on 9/20, she then was on hold for surgery. She has completed 5 additional cycles of Carbo/taxol. She is due for restaging. Her disease is only accurately assessed on PET CT, prior attempts to obtain CT scans resulted in additional imaging with PET, as findings were inconclusive. PET CT will be ordered to restage. \par In addition she is due for MRI of the brain. She has had a small amount of weight loss, mild neuropathy is persistent. She offers no additional complaints. \par She will have port placed on Monday, will hold chemotherapy until restaging scans complete. \par \par 10/23/2019: Serena feels well, and denies any new symptoms. Her eye surgery was successful, vision is much improved now. She had restaging scans. \par PET CT on 10/18/2019 revealed COMPARISON : 7/24/2019. \par No pathologic uptake to suggest biologic tumor activity. \par MRI of the brain on 10/15/2019 revealed \par Comparison with June 8 and March 21, 2019: (Generally lesions improved \par markedly since March but slightly increased in size since June) \par 1. Lesions previously demonstrated on the study of June 8, 2019 has \par remained stable or minimally increased in size \par 2. Specifically, right frontal opercular lesion measures about 1 cm and \par left posterior inferior temporal lobe lesion measures about 1 cm. These have \par increased since the previous study. \par 3. Small punctiform lesions within the right frontal white matter and left \par frontal sulcus or more apparent than on the study of June 8 \par 4. These lesions are all much smaller than the earlier MRI of March 21, \par 2019. \par 5. No new lesions. \par Images were personally reviewed by myself and discussed with patient. She remains asymptomatic and off the steroids. \par I have also discussed the case with Dr. Dahl. I would like Serena to discuss the options with [facility], she maybe a candidate for brain SRS. \par She has been off Carbo/Taxol for a few weeks, she remains Platinum sensitive. I have discussed the option of switching her over to Avastin maintenance, will need to prophylax with low dose Eliquis as well, given questionable finding of small PE in the past. \par Side effects of Avastin were discussed at length, including HNT, proteinuria, small risk of DVT/PE, GI perforations etc. \par She is agreeable to start after [facility] consultation. \par \par 11/5/2019: Serena feels OK, she reports acute onset head discomfort that lasts minutes and subsides, the episodes are becoming more frequent 1-2 times a day. She has seen Dr. Dahl, who wishes to hold off on offering SRS to the brain. Plan was to start on Avastin today. Serena reports she currently has a lapse in her insurance coverage till 12/2019, I will not be able to start he on ppx dose Eliquis for now, she instead will take baby ASA every other day, to modify risk of DVT/PE. Will plan to switch over to Eliquis at 2.5mg BID once insurance is active. She also contemplated going to [place] with her daughter, I am willing to hold Avastin until she comes back, but Serena wishes to start right away, I explained to her that flight may result in complications if that is her wish. She will cancel the trip and start with Avastin today. Side effects have been discussed. \par \par 11/20/2019: Serena came for a follow up visit, she reports feeling well, she reports less fatigue. She is s/p first dose of Avastin on 11/5/2019, she tolerated Avastin without difficulty, she is due for the 2nd dose today. \par We communicated CBC result with HGB of 12.3, normal platelet and WBCs. She lost like 5 pounds secondary to gum problems, she will follow up with her dentist this week. She is currently on baby ASA every other day. Continue with single agent Avastin.  \par \par 12/17/2019: Serena came for a follow up visit, she reports feeling well, she experienced pruritic rash on last Saturday 12/14/19, which improved over 2 days with Benadryl alone.  Today her skin rash has completely resolved. We will add Solu-Medrol 100 mg IV prior to Avastin. Serena reports less fatigue. She is due for the 3rd dose of Avastin today. \par We communicated CBC result with HGB of 12.3, normal platelet and WBCs. \par \par 1/7/2019: Serena is doing well, reports good appetite, she has lost 1lb. Reports no GI or pulmonary symptoms. She reports her gums are bleeding occasionally. No new neurological symptoms. She reports no rash after last cycle of Avastin. \par \par 1/28/2020: Serena is here for follow up visit, she has no difficulty tolerating Avastin, she reports L sided chronic eye discomfort, neuropathic in nature. Neurontin has been helpful in the past, will reorder this. \par PET CT on 1/22/2020 revealed COMPARISON : 10/18/2019. \par Anterior perisplenic nodule measuring 8 mm is FDG avid, SUV max 8.9, \par consistent with biologic tumor activity. \par MRI of the brain on 1/15/2020 revealed \par In comparison to the previous brain MRI dated 10/15/2019: \par 1. Redemonstrated scattered enhancing lesions consistent with metastatic \par disease, with overall good treatment response since the prior exam. \par 2. No significant interval change in the ovoid lesion in the superior left \par frontal lobe measuring 12 mm. \par 3. Decreased size of the right opercular lesion measuring 6 mm, previously \par 11 mm. Decreased size of the left inferior occipital/tentorial lesion \par measuring 6 mm, previously 11 mm. The previously seen right frontal white \par matter punctate lesion is no longer visualized. \par 4. No new lesions are demonstrated. \par All images were personally reviewed by myself and discussed with Serena. I explained to her I am concerned about the perisplenic lesion concerning for disease progression, but it is isolated and very small, asymptomatic. There is definitely a positive response to Avastin in the brain and given that, we will continue with Avastin with short term follow up imaging. Serena knows to inform me with any new symptoms immediately.  [de-identified] : KRas WT, EGFR WT, Alk WT, ROS1 WT, PDL1-1%\par Normal MMR protein expression [FreeTextEntry3] : pruritic reaction  [FreeTextEntry5] : prednisone 25 mg po 2x/ day.

## 2020-01-29 NOTE — RESULTS/DATA
[FreeTextEntry1] : EXAM:  MR BRAIN WAW IC      \par \par \par PROCEDURE DATE:  06/08/2019  \par \par \par \par \par INTERPRETATION:  Clinical History / Reason for exam: Dizziness and \par vertigo, history of uterine and thyroid cancer, for evaluation of \par metastatic disease, lesion seen on prior MRI of the brain.\par \par MRI OF THE BRAIN WITHOUT AND WITH CONTRAST\par \par TECHNIQUE:\par \par Multiplanar multisequence imaging of the brain was performed on the \[facility] open magnet before and after the intravenous administration of \par 7.5 cc of Gadavist including brain lab protocol.\par \par COMPARISON:\par \par MRI of the brain without and with contrast dated March 12, 2019.\par \par FINDINGS:\par \par The previously described supratentorial lesions have almost completely \par resolved.\par \par The lesion in the left posterior frontal region now measures 1.4 cm in \par maximum diameter, the lesion in the anterior right frontal lobe measures \par 0.7 cm in maximum diameter, the second lesion in the right frontal lobe \par measures 0.1 cm in maximum diameter, the lesion in the right posterior \par parietal region measures 0.3 cm in maximum diameter, and the left \par occipital lobe lesion measures 1.2 cm in maximum diameter. (Previously \par measuring 2.6, 2.4, 0.5, 0.7, and 2.5 cm respectively).\par \par The third, fourth, and lateral ventricles are normal in size and \par position. There is no shift of the midline structures.\par \par The cerebellar tonsils are minimally low-lying (0.3 cm of cerebellar \par ectopia).\par \par Incidental note is made of a tiny medial cyst.\par \par There are scattered T2/FLAIR hyperintense signal intensities in the\par subcortical white matter which are nonspecific differential diagnostic\par possibilities include chronic ischemic change, foci of gliosis or\par demyelination.\par \par IMPRESSION:\par \par In comparison with the prior MRI of the brain dated March 21, 2019:\par \par There has been almost complete resolution of most of of the previously \par described supratentorial lesions.\par \par There are no new enhancing lesions.\par \par \par \par \par \par \par CRISTINA MÉNDEZ M.D., ATTENDING RADIOLOGIST\par This document has been electronically signed. Oren 10 2019  1:17PM\par   \par \par   \par \par \par [id number]^RI^DC\par \par EXAM:  PETCT SKUL-THI ONC FDG SUBS      \par \par \par PROCEDURE DATE:  05/01/2019  \par \par \par \par \par INTERPRETATION:  \par FDG PET CT STUDY   Subsequent  treatment strategy\par REASON: TUMOR IMAGING - PET with concurrently acquired CT for attenuation \par correction and anatomic localization; skull base to mid - thigh .\par \par CPT code 70692.\par \par Fasting blood glucose level:     91 mg/dl\par \par HISTORY: Endometrial cancer. Stage IV with brain metastatic disease.\par \par TECHNIQUE: Approximately 45 minutes after the intravenous administration \par of 10.7 mCi 18-Fluorine FDG, whole body PET images were acquired from \par base of skull to mid - thigh.\par \par CT protocol used for this PET/CT study is designed for attenuation \par correction and anatomic localization of PET findings.  This  CT \par is not designed to replace state-of-the-art diagnostic CT scans for \par specific imaging protocols of different body parts.\par \par COMPARISON : 2/12/2019.\par Correlation: MRI of the brain on 3/21/2019.\par \par FINDINGS:\par \par Head/Neck: No biologically active head/neck lesions.     \par Normal uptake within the brain, pharyngeal lymphoid tissue, salivary \par glands and laryngeal musculature is noted.    \par \par Thorax:   No suspicious hypermetabolic mediastinal, hilar, lung \par parenchymal lesions.\par \par Abdomen/Pelvis: Physiologic GI/  activity. \par \par Again seen is a left upper quadrant peritoneal nodule, SUV max 6.8, \par previously 7.7 (12% decrease). \par \par Multiple new FDG avid omental nodules largest of which is adjacent to the \par distal transverse colon, measuring 10 mm, positive on nonattenuation \par corrected images, axial image 134. \par \par No other abnormal visceral or guillaume tracer uptake is present.    \par \par Musculoskeletal :  No suspicious hypermetabolic osseous lesions.\par \par Additional CT findings:\par Status post hysterectomy.\par Colonic diverticulosis.\par \par IMPRESSION: \par \par COMPARISON : 2/12/2019\par \par Multiple new FDG avid omental nodules largest measuring up to 10 mm, \par positive on nonattenuation corrected images. Findings are suggestive of \par biologic tumor activity.\par \par Again seen is a left upper quadrant peritoneal nodule, SUV max 6.8, \par previously 7.7 (12% decrease). \par \par \par \par \par

## 2020-01-30 ENCOUNTER — APPOINTMENT (OUTPATIENT)
Dept: RADIATION ONCOLOGY | Facility: HOSPITAL | Age: 65
End: 2020-01-30
Payer: MEDICAID

## 2020-01-30 VITALS
WEIGHT: 153.13 LBS | SYSTOLIC BLOOD PRESSURE: 124 MMHG | HEART RATE: 76 BPM | BODY MASS INDEX: 25.51 KG/M2 | RESPIRATION RATE: 16 BRPM | TEMPERATURE: 98.4 F | HEIGHT: 65 IN | DIASTOLIC BLOOD PRESSURE: 78 MMHG

## 2020-01-30 PROCEDURE — 99212 OFFICE O/P EST SF 10 MIN: CPT

## 2020-01-30 RX ORDER — PREDNISONE 10 MG/1
10 TABLET ORAL DAILY
Qty: 20 | Refills: 1 | Status: DISCONTINUED | COMMUNITY
Start: 2019-12-17 | End: 2020-01-30

## 2020-01-30 NOTE — VITALS
[Maximal Pain Intensity: 10/10] : 10/10 [Least Pain Intensity: 0/10] : 0/10 [Pain Location: ___] : Pain Location: [unfilled] [ECOG Performance Status: 1 - Restricted in physically strenuous activity but ambulatory and able to carry out work of a light or sedentary nature] : Performance Status: 1 - Restricted in physically strenuous activity but ambulatory and able to carry out work of a light or sedentary nature, e.g., light house work, office work

## 2020-01-30 NOTE — REASON FOR VISIT
[Routine Follow-Up] : routine follow-up visit for [Brain Tumor] : brain tumor [Family Member] : family member

## 2020-01-30 NOTE — PHYSICAL EXAM
[Normal] : supple with no thyromegaly or masses appreciated [de-identified] : scalp is still epilated [de-identified] : CN ok, motor full and equal, gait is non-ataxic, Romberg negative

## 2020-02-03 LAB
APPEARANCE: CLEAR
BILIRUBIN URINE: NEGATIVE
BLOOD URINE: NEGATIVE
CANCER AG125 SERPL-ACNC: 6 U/ML
COLOR: NORMAL
GLUCOSE QUALITATIVE U: NEGATIVE
HCT VFR BLD CALC: 39 %
HGB BLD-MCNC: 13.2 G/DL
KETONES URINE: ABNORMAL
LEUKOCYTE ESTERASE URINE: ABNORMAL
MCHC RBC-ENTMCNC: 31.2 PG
MCHC RBC-ENTMCNC: 33.8 G/DL
MCV RBC AUTO: 92.2 FL
NITRITE URINE: NEGATIVE
PH URINE: 6
PLATELET # BLD AUTO: 236 K/UL
PMV BLD: 9.9 FL
PROTEIN URINE: NEGATIVE
RBC # BLD: 4.23 M/UL
RBC # FLD: 12.7 %
SPECIFIC GRAVITY URINE: 1.02
UROBILINOGEN URINE: NORMAL
WBC # FLD AUTO: 7.02 K/UL

## 2020-02-18 ENCOUNTER — LABORATORY RESULT (OUTPATIENT)
Age: 65
End: 2020-02-18

## 2020-02-18 ENCOUNTER — APPOINTMENT (OUTPATIENT)
Dept: INFUSION THERAPY | Facility: CLINIC | Age: 65
End: 2020-02-18
Payer: MEDICAID

## 2020-02-18 ENCOUNTER — APPOINTMENT (OUTPATIENT)
Dept: HEMATOLOGY ONCOLOGY | Facility: CLINIC | Age: 65
End: 2020-02-18
Payer: MEDICAID

## 2020-02-18 VITALS
RESPIRATION RATE: 16 BRPM | TEMPERATURE: 96.8 F | HEIGHT: 64 IN | BODY MASS INDEX: 25.95 KG/M2 | WEIGHT: 152 LBS | HEART RATE: 89 BPM | DIASTOLIC BLOOD PRESSURE: 58 MMHG | OXYGEN SATURATION: 99 % | SYSTOLIC BLOOD PRESSURE: 114 MMHG

## 2020-02-18 DIAGNOSIS — R51 HEADACHE: ICD-10-CM

## 2020-02-18 PROCEDURE — 99213 OFFICE O/P EST LOW 20 MIN: CPT

## 2020-02-18 RX ORDER — BEVACIZUMAB 400 MG/16ML
1020 INJECTION, SOLUTION INTRAVENOUS ONCE
Refills: 0 | Status: COMPLETED | OUTPATIENT
Start: 2020-02-18 | End: 2020-02-18

## 2020-02-18 RX ORDER — DIPHENHYDRAMINE HCL 50 MG
25 CAPSULE ORAL ONCE
Refills: 0 | Status: COMPLETED | OUTPATIENT
Start: 2020-02-18 | End: 2020-02-18

## 2020-02-18 RX ORDER — ACETAMINOPHEN 500 MG
650 TABLET ORAL ONCE
Refills: 0 | Status: COMPLETED | OUTPATIENT
Start: 2020-02-18 | End: 2020-02-18

## 2020-02-18 RX ADMIN — Medication 25 MILLIGRAM(S): at 15:10

## 2020-02-18 RX ADMIN — Medication 100 MILLIGRAM(S): at 14:40

## 2020-02-18 RX ADMIN — Medication 650 MILLIGRAM(S): at 14:26

## 2020-02-18 RX ADMIN — BEVACIZUMAB 281.6 MILLIGRAM(S): 400 INJECTION, SOLUTION INTRAVENOUS at 15:30

## 2020-02-18 RX ADMIN — Medication 650 MILLIGRAM(S): at 14:25

## 2020-02-18 RX ADMIN — BEVACIZUMAB 1020 MILLIGRAM(S): 400 INJECTION, SOLUTION INTRAVENOUS at 16:00

## 2020-02-18 RX ADMIN — Medication 103.2 MILLIGRAM(S): at 14:25

## 2020-02-18 RX ADMIN — Medication 101 MILLIGRAM(S): at 14:50

## 2020-02-19 NOTE — END OF VISIT
[FreeTextEntry3] : I was physically present for the key portions of the evaluation and management service provided.  I agree with the history and physical, and plan which I have reviewed and edited where appropriate.\par \par \par

## 2020-02-19 NOTE — PHYSICAL EXAM
[Restricted in physically strenuous activity but ambulatory and able to carry out work of a light or sedentary nature] : Status 1- Restricted in physically strenuous activity but ambulatory and able to carry out work of a light or sedentary nature, e.g., light house work, office work [Normal] : grossly intact [de-identified] : B/L cataracts [de-identified] : CTA B/L [de-identified] : mild left facial swelling

## 2020-02-19 NOTE — REVIEW OF SYSTEMS
[Fatigue] : fatigue [Easy Bleeding] : a tendency for easy bleeding [Negative] : Allergic/Immunologic [Recent Change In Weight] : ~T no recent weight change [Chest Pain] : no chest pain [Palpitations] : no palpitations [Lower Ext Edema] : no lower extremity edema [Shortness Of Breath] : no shortness of breath [Wheezing] : no wheezing [Cough] : no cough [SOB on Exertion] : no shortness of breath during exertion [FreeTextEntry9] : normal strength in upper and lower extremities  [de-identified] : no gait impairment- numbness and swelling of bilateral hands  [de-identified] : mild gingival bleeding

## 2020-02-19 NOTE — HISTORY OF PRESENT ILLNESS
[Disease: _____________________] : Disease: [unfilled] [AJCC Stage: ____] : AJCC Stage: [unfilled] [2] : 2, Moderate [de-identified] : Serena is a rodrigue 65 yo female, who has started developing SOB approximately 2 months ago, she went to ER on 11/2/2017 and on CXR was noted to have a large right sided pleural effusion. She underwent a thoracentesis, 1.6L of fluid was drained. 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 IHC was pos for CK7, PAX8, CK5/6 and Ca125, negative for TTF-1/Napsin, calretinin, CK20, mammaglobin, p63, villin, HAM56, GCDFP15, TAY-EP4. \par \par Her visit on 12/1/2017 Serena had an excisional biopsy of cervical node on 12/13/2017 revealing met adenocarcinoma, primary source still was not clear. On 12/14/2017 Serena was admitted with SOB, Pleurx catheter was placed on 12/14/2017. 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 Serena received 1st dose of carbo (auc of 5)/taxol(175mg/m2) on 12/15/2017 without complications, but the next day sustained a fall 2/2 vasovagal episode and developed asymptomatic SAH, that resolved on imaging by 12/21/2017.  [de-identified] : KRas WT, EGFR WT, Alk WT, ROS1 WT, PDL1-1%\par Normal MMR protein expression [de-identified] : 11/24/2017: She reports some SOB, especially ARZATE today. No fevers, no weight loss (reports some weight gain), no GI,  or GYN symptoms, except for increasing abdominal girth, she reports no blood in the stool or urine and no vaginal bleeding. She reports no CP and no palpitations, she reports worsening nonproductive cough, no hemoptysis. She also reports difficulty lying on left side and lying down flat. \par She reports left on/off facial numbness several months in duration. She had a CT of her head performed in [facility] within 1 year.  Additionally there is a freely mobile left cervical node present on exam, as per patient this was in place for approximately 1 year. \par \par 12/1/2017: Nidhi is here for follow up today. She had a therapeutic thoracentesis in  on 11/27/2017, 2L of fluid were removed, with much improved respiratory status. PET CT and MRI of the brain were done on 11/29/2017, findings were discussed today. There remains no clear primary source of malignancy. We have discussed that the source of tumor may be Mullerian vs lung. regardless of source chemotherapy needs to be initiated ASAP. We have discussed Carbo/taxol regiment that will cover both Mullerian and lung origin. Side effects including myelosuppression, peripheral neuropathy, allergic reaction and others.\par \par 1/2/2017 Since last visit Nidhi had an excisional biopsy of cervical node on 12/13/2017 revealing met adenocarcinoma, primary source still was not clear. On 12/14/2017 Nidhi was admitted with SOB, Pleurx catheter was placed on 12/14/2017. 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 Nidhi received 1st dose of carbo (auc of 5)/taxol(175mg/m2) on 12/15/2017 without complications, but the next day sustained a fall 2/2 vasovagal episode and developed asymptomatic SAH, that resolved on imaging by 12/21/2017. Today Nidhi's SOB has significantly improved. She reports very mild neuropathy in fingertips only. She reports no headache and offers no other complaints. \par \par 1/26/2018: Complains of some neuropathy, otherwise tolerating chemo with no difficulty. \par \par 2/16/2018: restaging CT C/A/P reviewed, significant improvement noted. Will refer to GYN/ONC. Reports slightly worsening neuropathy, not -painful, taking Gabapentin, no other symptoms. For cycle 4 of carbo/taxol today.\par \par 3/9/2018: Nidhi met with Dr. Massey, she is planned for surgery on 5/7/2018, after completion of 6 cycles of carbo/taxol and restaging PET CT ordered for mid April and follow up with Dr. Massey on April 20. She reports worsening neuropathy, and occasional pain and changes in vision in her left eye, eye symptoms come and go and are not very bothersome. She reports no other symptoms. \par \par 3/30/2018; Nidhi is here for cycle 6 of carbo/taxol, she continues to have neuropathy in finger and toes, but offers no other complaints, chemo has been dose reduced. \par \par 4/27/2018: Results of restaging PET CT s/p 6 cycles of carbo/taxol revealed 1. No new areas of abnormal increased uptake is seen to indicate \par biologically active disease.\par \par 2. Persistent PET positive left thyroid mass with a max SUV 33.1, \par previously max SUV 34.8. Further evaluation with thyroid ultrasound and \par if needed fine-needle aspiration biopsy will be helpful.\par \par 3.   PET positive left cervical lymph nodes mainly level 2 on the left \par with a max SUV 5.47previously 18. Minimal increased uptake in the \par bilateral axillary lymph nodes most likely reactive(less than 2.5)\par \par 4. Weakly PET positive, max SUV 2.89 right pleura, previous max SUV 5.3 \par with non-FDG avid right pleural effusion. \par \par 5. Previously FDG avid right and left supraclavicular lymphadenopathy, \par left internal mammary, bilateral paratracheal, para-aortic, para \par vertebral, carinal, mesenteric, right and left iliacs, right inguinal \par lymphadenopathy, small in size and no longer FDG avid.\par \par 6. No abnormal increased uptake is seen in the  lobulated uterus.\par \par 7. Overall there is marked improvement in the FDG avid disease.\par This was reviewed with Nidhi. She met with Dr. Massey on 4/20/2018, surgery is planned for 5/7/2018. She will also joselyn to meet with ENT re FDG avid thyroid nodule. Will assist in making he an appointment for her. \par Persistent neuropathy on gabapentin. \par \par 6/29/18 ; Patient came for follow up visit , evaluation for chemotherapy cycle 8 of carbo / Taxol , patient tolerating medication well , except neuropathy  recommend to have gabapentin  300 mg po daily.\par \par 7/20/2018: Nidhi present for follow up today, she has completed total of 8 cycles of Carbo/Taxol. She reports significant neuropathy in her hands and feet. We will discontinue chemotherapy today and plan to follow with close observation. She has thyroidectomy planned with Dr. Herrera on 8/20/2018, obtaining clearance for PMD. She is also planning to fly to [place] at the end of September. She reports no SOB, no GI or  symptoms. \par \par 9/12/2018: Nidhi offers no significant complaints today. She states neuropathy in her hands has almost completely resolved and neuropathy in her feet is significantly better. She had undergone complete thyroidectomy by Dr. Herrera on 8/20/2018, pathology revealed papillary thyroid cancer, measuring 2 cm, pT1b, other additional foci of papillary carcinoma were also noted, no LVI, surgical margins were clear, LN 0/2 had no carcinoma, stage [id number]. She is following up with Dr. Herrera tomorrow. She has still not gone for her vacation in [place]. \par \par 10/10/2018: Restaging PET CT on 9/26/2018 reveals Since 4/16/2018,  1. Post thyroidectomy with diffuse increased FDG uptake at the thyroid  bed likely representing post-surgical changes.  2. Subtle increased uptake is seen in the lamina , right side at the  level of T8 with a max SUV 4.6. This is not sufficient for metastatic  disease. Bone scan or MRI examination with contrast will be helpful for  further evaluation.  3. No other areas of abnormal increased uptake is seen. Images were personally reviewed by myself and discussed with Nidhi. \par She also had an Echo on 9/24/2018 revealing EF of 63%. \par Nidhi reports ongoing fatigue, she is unable to lose weight. She is taking Synthroid and following with Dr. Acevedo. \par She reports stiff fingers at night only, no painful neuropathy. \par She denies any other symptoms today. \par \par 12/11/2018: Nidhi feels well, neuropathy in hands and feet is much improved. S he is now complaining of r-sided facial pain associated with some swelling, pain comes and goes. She has already seen Dr. Herrera, who ordered CT of sinuses and prescribed pain relief meds. She is also due for restaging PET CT. Nidhi feels well otherwise. \par \par 1/9/2019: Restaging PET CT was performed on 12/18/2018 it revealed COMPARISON : 9/24/2018.  New left upper quadrant peritoneal nodule measuring 8 mm with an SUV max  of 7.3. This is suggestive of biologic tumor activity.  No other FDG avid lesions seen throughout the scan. Images were personally reviewed by myself and discussed with Nidhi, originally over the phone and again today. I have originally suggested to try AI in the interim, Nidhi however reported diarrhea at the time of the scan and declined intervention for now. We will plan for restaging PET CT to be performed in 6-8 weeks, this will be ordered today. She will follow up with Dr. Massey at [facility] shortly and bring the disk for his review. I would also like her to meet with genetics, this will be arranged at [facility] with Dr. Massey's help. Nidhi reports no new symptoms today, her neuropathy is manageable and  improving. She still reports unilateral headache that was work up in the past. Neurology eval was again suggested to her. She is following closely with Endocrinology re thyroid management. She will have follow up with Dr. Herrera shortly as well. \par \par 2/20/2019: Nidhi offers no new complaints. PET CT from 2/12/2019 revealed \par Since PET/CT of December 19, 2018, no new sites of pathologic FDG uptake\par Slight increase in size of left upper quadrant peritoneal nodule (1.1 cm, \par previously 0.8 cm) with stable FDG uptake, 7.7 SUV suspicious for \par biologic tumor activity.\par No other sites of pathologic FDG uptake \par Images were personally reviewed by myself and discussed with Nidhi, case was also discussed with Dr. Massey at [facility]. Nidhi will have follow up with his shortly. She is following with endocrinology. reports improving neuropathy. No GI or  symptoms at this time. No weight loss. \par \par 3/20/2019: Nidhi had a peritoneal nodule biopsy done at [facility], I have received a call from Dr. 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 We have discussed that surgery though could be attempted will not be the best therapeutic approach, recommencement of systemic therapy was discussed. I have not received the results from [facility] yet. I have briefly discussed this with Nidhi today. Nidhi reports that she has acutely developed right lower extremity weakness 5-6 days ago, she did not inform any of her doctors about this. She also reports that her R upper extremity though not weak "does not feel normal either. She reports no back pain, there is no point tenderness, RLE is objectively weak on exam. I recommend ER evaluation to r/o CVA vs brain met, vs L-spine related issue. Recommend admission for work up. Nidhi is agreeable to get admitted. \par \par 5/8/2019: Nidhi present for follow up, she was diagnosed with multiple metastasis to the brain, MRI was done on 3/21/2019 and revealed \par Multiple supratentorial heterogeneously enhancing lesions consistent with \par metastatic disease. The appearance on the T2-weighted images is suggestive \par of adenocarcinoma. There is mass effect upon the left lateral ventricle and \par corpus callosum. \par She received whole brain irradiation by Dr. Dahl, completed it in the beginning of 4/2019, she is currently on steroid taper managed by Dr. Dahl, she is taking 4mg of Decadron daily. She will be stopping the Keppra as there have been no documented h/o seizures. She still reports impairment of the L visual field, she has an appointment coming up with Opthalmology. She reports no deficits walking after she has completed inpatient acute rehabilitation. \par Restaging PET CT on 5/1/2019 reveals COMPARISON : 2/12/2019 \par Multiple new FDG avid omental nodules largest measuring up to 10 mm, \par positive on nonattenuation corrected images. Findings are suggestive of \par biologic tumor activity. \par Again seen is a left upper quadrant peritoneal nodule, SUV max 6.8, \par previously 7.7 (12% decrease). \par Images were personally reviewed by myself and discussed with patient. \par She now reports mild abdominal bloating, no other symptoms. She reports her neuropathy has significantly improved. \par She has first evidence of recurrent disease documented 5.5 months after last carbo/taxol dose, the volume of disease was very small (1 8mm nodule), she was then observed for another 6 months and now she wishes to restart the chemotherapy. \par I have discussed with her that rechallenging with carbo/taxol may still prove to be effective as long as she gets restaged after 2 cycles. Given recent whole brain radiation and neuropathy I will offer her carbo/taxol weekly with does reduction on the taxol for neuropathy, We will plan to run carbo slowly to avoid allergic reaction. \par She is agreeable to start ASAP. \par \par 6/5/2019: Nidhi has completed 1 cycle of weekly Carbo/taxol, ran at slow rate to avoid Carbo reaction and has tolerated chemotherapy without difficulty, she does not complain of increase in neuropathy. She has ongoing vision changes in her L eye, she had no residual LE weakness. She is off Decadron and Keppra. She was advised to see ophthalmology. \par \par 7/3/2019: Nidhi is doing well.  Reports no problems with carbo/taxol.  Still complains of vision changes in L eye but is seeing ophthalmologist on 7/16.  Remains active around the house and cooks regularly.  No fevers, weight loss, anorexia.  ROS is otherwise only significant for occasional loose stools, no diarrhea.\par \par 7/17/2019: Nidhi is doing well, denies worsening neuropathy. She c/o feeling fatigued and tired. Her last chemo was 1 week ago(7/11/19) with carbo/taxol and is due again tomorrow. She saw ophthalmology and will need cataract surgery of the L eye. No c/o chest pain/SOB. No weight loss.\par CT C/A/P was done on 7/9/2019, images were personally reviewed by myself and discussed with several radiology attendings, they revealed \par CHEST:\par Filling defect within a segmental branch of the lower lobe pulmonary artery \par suspicious for pulmonary embolus. \par Stable left upper lobe and right middle lobe 3 mm nodules. \par CT abdomen and pelvis performed on the same day, see separate report. \par ADDENDUM: \par Please note that the morphology of the small thrombus within the left lower \par pulmonary artery is not indicative of an acute thrombus but rather a chronic \par appearance. \par This was discussed with Radiology, it was not deemed that thrombus was acute and may not have even been present at all, finding was deemed to be equivocal, based on radiology assessment anticoagulation was not indicated for the filling defect noted. Initially CTA was recommended, the recommendation was withdrawn upon further review. \par ABDOMEN/PELVIS:\par New 1.3 cm segment IV hepatic hypodensity seen on series 4 image 205. \par Lesion not seen on prior PET/CT from 5/1/2019 or abdomen and pelvis CT from \par \par 2/2/2018 and is consistent with a new metastasis. \par Two other hepatic lesions described above, decreased in size since 2/2/2018, \par consistent with treated hepatic metastases. \par Anterior perisplenic omental nodule measures 0.7 cm on series 2 image 53, \par previously measuring 1.2 cm on PET/CT dated 5/1/2019. \par All the other previously seen omental nodules have resolved since 5/1/2019. \par ADDENDUM:\par Case was discussed with Dr. Cid in detail. It is also possible that the \par new 1.3 cm lesion in the liver since February 2, 2018 represents a treated \par metastasis similar to the other liver lesions. \par PET/CT would be necessary to assess disease activity. \par I have discussed case with Radiology, and it was determined that there was no clear cut evidence of progression. PET CT was ordered today.\par This was discussed with Nidhi, will proceed with Carbo/Taxol for now. \par \par 8/1/2019: Nidhi reports no major side effects from weekly Carbo/Taxol, she reports no neuropathy. She has completed 10 cycles altogether. \par PET CT on 7/24/2019 revealed \par 1. Since May 1, 2019, no definite new site of pathologic FDG uptake to \par suggest new biologic tumor activity; specifically, no pathologic FDG uptake \par within previously noted 1.3 cm lesion within hepatic segment 4. \par 2. Increased FDG uptake within several subcentimeter bilateral cervical \par lymph nodes, which is nonspecific and may be postinflammatory; max SUV 9.2 \par is noted within a 0.7 cm right level 2 cervical node. Attention on follow-up \par is suggested. \par 3. Few peritoneal nodules have overall decreased in size. \par 4. No additional site of pathologic FDG uptake. \par Images were personally reviewed by myself and discussed with Nidhi. \par She wishes to continue with chemotherapy. Will plan for 3 additional cycles. Will consider adding Avastin, but with h/o inconclusive/possible chronic PE ppx anticoagulation maybe necessary. \par \par 8/28/2019: Nidhi reports feeling well, she denies worsening neuropathy, worsening neurological symptoms, SOB, abdominal pain, bloating. She reports she is planned to undergo  eye surgery towards the end of September. We will plan to hold chemotherapy briefly after this cycle to facilitate adequate counts and minimize complications. We again have briefly discussed considering Avastin based therapy, will hold off on this prior to surgery. 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 May proceed without formal PAST, will check CBC prior to procedure, PT and PTT today. \par \par 9/25/19:Nidhi reports feeling well, she denies any changes since last visit, No SOB, abdominal pain, bloating. She reports she is planned to undergo  eye surgery in 9/30/19  CBC reviewed with normal Hemogram . we will hold chem this week , she will resume after Cataract sx . 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 May proceed without formal PAST, will check CBC prior to procedure, PT and PTT today. \par \par 10/4/2019: Nidhi underwent successful eye surgery on 9/30/2019, she reports she can see much better now. Last dose of chemotherapy was administered on 9/20, she then was on hold for surgery. She has completed 5 additional cycles of Carbo/taxol. She is due for restaging. Her disease is only accurately assessed on PET CT, prior attempts to obtain CT scans resulted in additional imaging with PET, as findings were inconclusive. PET CT will be ordered to restage. \par In addition she is due for MRI of the brain. She has had a small amount of weight loss, mild neuropathy is persistent. She offers no additional complaints. \par She will have port placed on Monday, will hold chemotherapy until restaging scans complete. \par \par 10/23/2019: Nidhi feels well, and denies any new symptoms. Her eye surgery was successful, vision is much improved now. She had restaging scans. \par PET CT on 10/18/2019 revealed COMPARISON : 7/24/2019. \par No pathologic uptake to suggest biologic tumor activity. \par MRI of the brain on 10/15/2019 revealed \par Comparison with June 8 and March 21, 2019: (Generally lesions improved \par markedly since March but slightly increased in size since June) \par 1. Lesions previously demonstrated on the study of June 8, 2019 has \par remained stable or minimally increased in size \par 2. Specifically, right frontal opercular lesion measures about 1 cm and \par left posterior inferior temporal lobe lesion measures about 1 cm. These have \par increased since the previous study. \par 3. Small punctiform lesions within the right frontal white matter and left \par frontal sulcus or more apparent than on the study of June 8 \par 4. These lesions are all much smaller than the earlier MRI of March 21, \par 2019. \par 5. No new lesions. \par Images were personally reviewed by myself and discussed with patient. She remains asymptomatic and off the steroids. \par I have also discussed the case with Dr. Dahl. I would like Nidhi to discuss the options with [facility], she maybe a candidate for brain SRS. \par She has been off Carbo/Taxol for a few weeks, she remains Platinum sensitive. I have discussed the option of switching her over to Avastin maintenance, will need to prophylax with low dose Eliquis as well, given questionable finding of small PE in the past. \par Side effects of Avastin were discussed at length, including HNT, proteinuria, small risk of DVT/PE, GI perforations etc. \par She is agreeable to start after [facility] consultation. \par \par 11/5/2019: Nidhi feels OK, she reports acute onset head discomfort that lasts minutes and subsides, the episodes are becoming more frequent 1-2 times a day. She has seen Dr. Dahl, who wishes to hold off on offering SRS to the brain. Plan was to start on Avastin today. Nidhi reports she currently has a lapse in her insurance coverage till 12/2019, I will not be able to start he on ppx dose Eliquis for now, she instead will take baby ASA every other day, to modify risk of DVT/PE. Will plan to switch over to Eliquis at 2.5mg BID once insurance is active. She also contemplated going to [place] with her daughter, I am willing to hold Avastin until she comes back, but Nidhi wishes to start right away, I explained to her that flight may result in complications if that is her wish. She will cancel the trip and start with Avastin today. Side effects have been discussed. \par \par 11/20/2019: Nidhi came for a follow up visit, she reports feeling well, she reports less fatigue. She is s/p first dose of Avastin on 11/5/2019, she tolerated Avastin without difficulty, she is due for the 2nd dose today. \par We communicated CBC result with HGB of 12.3, normal platelet and WBCs. She lost like 5 pounds secondary to gum problems, she will follow up with her dentist this week. She is currently on baby ASA every other day. Continue with single agent Avastin.  \par \par 12/17/2019: Nidhi came for a follow up visit, she reports feeling well, she experienced pruritic rash on last Saturday 12/14/19, which improved over 2 days with Benadryl alone.  Today her skin rash has completely resolved. We will add Solu-Medrol 100 mg IV prior to Avastin. Nidhi reports less fatigue. She is due for the 3rd dose of Avastin today. \par We communicated CBC result with HGB of 12.3, normal platelet and WBCs. \par \par 1/7/2019: Nidhi is doing well, reports good appetite, she has lost 1lb. Reports no GI or pulmonary symptoms. She reports her gums are bleeding occasionally. No new neurological symptoms. She reports no rash after last cycle of Avastin. \par \par 1/28/2020: Nidhi is here for follow up visit, she has no difficulty tolerating Avastin, she reports L sided chronic eye discomfort, neuropathic in nature. Neurontin has been helpful in the past, will reorder this. \par PET CT on 1/22/2020 revealed COMPARISON : 10/18/2019. \par Anterior perisplenic nodule measuring 8 mm is FDG avid, SUV max 8.9, \par consistent with biologic tumor activity. \par MRI of the brain on 1/15/2020 revealed \par In comparison to the previous brain MRI dated 10/15/2019: \par 1. Redemonstrated scattered enhancing lesions consistent with metastatic \par disease, with overall good treatment response since the prior exam. \par 2. No significant interval change in the ovoid lesion in the superior left \par frontal lobe measuring 12 mm. \par 3. Decreased size of the right opercular lesion measuring 6 mm, previously \par 11 mm. Decreased size of the left inferior occipital/tentorial lesion \par measuring 6 mm, previously 11 mm. The previously seen right frontal white \par matter punctate lesion is no longer visualized. \par 4. No new lesions are demonstrated. \par All images were personally reviewed by myself and discussed with Nidhi. I explained to her I am concerned about the perisplenic lesion concerning for disease progression, but it is isolated and very small, asymptomatic. There is definitely a positive response to Avastin in the brain and given that, we will continue with Avastin with short term follow up imaging. Nidhi knows to inform me with any new symptoms immediately. \par \par 2/18/2020: NIDHI BANKS is a 64 year old female here today for follow up visit. She completed 5 cycles of Avastin; tolerating well at this time. She denies fever, chills, change in mental status, or change in weight. She complains of left painful facial swelling. In addition, she complains of swelling of fingers, joint pain and numbness in her toes. She continues on Gabapentin 100mg TID with no symptom relief and wishes to try to go up on the dose. She complains of mild gingival bleeding in the morning. Last PET showed new isolated perisplenic lesion suspicious for disease progression, however MRI of the brain appeared better on Avastin, so the plan was to continue with Avastin with short term follow up. \par  [FreeTextEntry3] : pruritic reaction  [FreeTextEntry5] : prednisone 25 mg po 2x/ day.

## 2020-02-19 NOTE — RESULTS/DATA
[FreeTextEntry1] : EXAM:  MR BRAIN WAW IC      \par \par \par PROCEDURE DATE:  06/08/2019  \par \par \par \par \par INTERPRETATION:  Clinical History / Reason for exam: Dizziness and \par vertigo, history of uterine and thyroid cancer, for evaluation of \par metastatic disease, lesion seen on prior MRI of the brain.\par \par MRI OF THE BRAIN WITHOUT AND WITH CONTRAST\par \par TECHNIQUE:\par \par Multiplanar multisequence imaging of the brain was performed on the \[facility] open magnet before and after the intravenous administration of \par 7.5 cc of Gadavist including brain lab protocol.\par \par COMPARISON:\par \par MRI of the brain without and with contrast dated March 12, 2019.\par \par FINDINGS:\par \par The previously described supratentorial lesions have almost completely \par resolved.\par \par The lesion in the left posterior frontal region now measures 1.4 cm in \par maximum diameter, the lesion in the anterior right frontal lobe measures \par 0.7 cm in maximum diameter, the second lesion in the right frontal lobe \par measures 0.1 cm in maximum diameter, the lesion in the right posterior \par parietal region measures 0.3 cm in maximum diameter, and the left \par occipital lobe lesion measures 1.2 cm in maximum diameter. (Previously \par measuring 2.6, 2.4, 0.5, 0.7, and 2.5 cm respectively).\par \par The third, fourth, and lateral ventricles are normal in size and \par position. There is no shift of the midline structures.\par \par The cerebellar tonsils are minimally low-lying (0.3 cm of cerebellar \par ectopia).\par \par Incidental note is made of a tiny medial cyst.\par \par There are scattered T2/FLAIR hyperintense signal intensities in the\par subcortical white matter which are nonspecific differential diagnostic\par possibilities include chronic ischemic change, foci of gliosis or\par demyelination.\par \par IMPRESSION:\par \par In comparison with the prior MRI of the brain dated March 21, 2019:\par \par There has been almost complete resolution of most of of the previously \par described supratentorial lesions.\par \par There are no new enhancing lesions.\par \par \par \par \par \par \par CRISTINA MÉNDEZ M.D., ATTENDING RADIOLOGIST\par This document has been electronically signed. Oren 10 2019  1:17PM\par   \par \par   \par \par \par [id number]^RI^DC\par \par EXAM:  PETCT SKUL-THI ONC FDG SUBS      \par \par \par PROCEDURE DATE:  05/01/2019  \par \par \par \par \par INTERPRETATION:  \par FDG PET CT STUDY   Subsequent  treatment strategy\par REASON: TUMOR IMAGING - PET with concurrently acquired CT for attenuation \par correction and anatomic localization; skull base to mid - thigh .\par \par CPT code 56371.\par \par Fasting blood glucose level:     91 mg/dl\par \par HISTORY: Endometrial cancer. Stage IV with brain metastatic disease.\par \par TECHNIQUE: Approximately 45 minutes after the intravenous administration \par of 10.7 mCi 18-Fluorine FDG, whole body PET images were acquired from \par base of skull to mid - thigh.\par \par CT protocol used for this PET/CT study is designed for attenuation \par correction and anatomic localization of PET findings.  This  CT \par is not designed to replace state-of-the-art diagnostic CT scans for \par specific imaging protocols of different body parts.\par \par COMPARISON : 2/12/2019.\par Correlation: MRI of the brain on 3/21/2019.\par \par FINDINGS:\par \par Head/Neck: No biologically active head/neck lesions.     \par Normal uptake within the brain, pharyngeal lymphoid tissue, salivary \par glands and laryngeal musculature is noted.    \par \par Thorax:   No suspicious hypermetabolic mediastinal, hilar, lung \par parenchymal lesions.\par \par Abdomen/Pelvis: Physiologic GI/  activity. \par \par Again seen is a left upper quadrant peritoneal nodule, SUV max 6.8, \par previously 7.7 (12% decrease). \par \par Multiple new FDG avid omental nodules largest of which is adjacent to the \par distal transverse colon, measuring 10 mm, positive on nonattenuation \par corrected images, axial image 134. \par \par No other abnormal visceral or guillaume tracer uptake is present.    \par \par Musculoskeletal :  No suspicious hypermetabolic osseous lesions.\par \par Additional CT findings:\par Status post hysterectomy.\par Colonic diverticulosis.\par \par IMPRESSION: \par \par COMPARISON : 2/12/2019\par \par Multiple new FDG avid omental nodules largest measuring up to 10 mm, \par positive on nonattenuation corrected images. Findings are suggestive of \par biologic tumor activity.\par \par Again seen is a left upper quadrant peritoneal nodule, SUV max 6.8, \par previously 7.7 (12% decrease). \par \par \par \par \par

## 2020-02-19 NOTE — ASSESSMENT
[FreeTextEntry1] : Papillary- serous endometrial cancer, stage IV\par --PET CT on 11/29/2017 revealed no clear GYN origin, extensive KEVIN, uptake in the right lung and pleura, thyroid nodule\par --Malignant pleural effusion, resolved\par --L side PleurX catheter was removed on 2/8/2018\par --Completed cycle 8  of carbo/taxol on 6/29/2018.  2 cycles were administered  postoperatively.\par --Restaging PET CT on 9/26/2018 (3 months post completion of chemo 6/29/2018)was essentially negative for recurrent disease\par --Restaging PET CT on 12/18/2018 reveals   New left upper quadrant peritoneal nodule measuring 8 mm with an SUV max  of 7.3. This is suggestive of biologic tumor activity.  No other FDG avid lesions seen throughout the scan.\par --Restaging PET CT on 2/12/2019 revealed slight increase in size of left upper quadrant peritoneal nodule (1.1 cm, \par previously 0.8 cm) with stable FDG uptake, 7.7 SUV suspicious for biologic tumor activity.\par --Case was discussed with Dr. Massey at [place], biopsy of peritoneal nodule is positive for recurrent endometrial cancer.\par --MRI brain on 4/23/2019 revealed multiple brain mets\par --S/p whole brain radiation completed 4/2019, required acute rehab\par --PET CT on 5/1/2019 subsequently revealed further disease progression \par --Restarted weekly carbo/taxol 3 on 1 off on 5/9/2019, continue with weekly Carbo/Taxol for now.\par --Off Decadron and Keppra since 6/5/2019 \par --Restaging brain MRI on 6/8/2019 showed almost complete resolution of most supratentorial lesions and no new lesions.\par --Restaging CT C/A/P on 7/9/2019 did not reveal definite progression, there was a questionable filling defect suspicious for possible chronic PE/artifact, this was discussed with Radiology at length anticoagulation was not deemed to be necessary\par --PET CT on 7/24/2019 revealed positive response to therapy \par --Carbo/Taxol stopped, last dose 9/13/2019, on hold since, she remains platinum sensitive \par --Restaging PET CT on 10/18/2019 is NAD\par --Restaging MRI of the brain on 10/15/2019 is suggestive for possible disease progression, no new lesions, she remains asymptomatic and off steroids, will follow closely \par --Short term MRI brain follow up \par --She was referred to [facility] for brain SRS consideration, close observation for now \par --Started on Avastin maintenance 11/5/2019, will consider adding Eliquis ppx for DVT/ PE, for now she will take baby ASA every other day \par --Proceed with 4 cycle on 1/7/20, continue with Solumedrol to premedicate \par --We sent for prednisone 10 mg po if needed for additional skin issues\par --Patient well aware to contact us if any side effect developed\par --MRI of the brain 1/15/2020 reveals overall good response to Avastin\par --Restaging PET CT on 1/22/2020 reveals single small perisplenic area of activity, suspicious for disease progression, asymptomatic \par --Proceed with cycle 6 of Avastin on 2/20/2020 after weighing risks/benefits with plan for short term follow up imaging \par -- Continues to have neuropathic pain- will increase Gabapentin to 200mg TID\par --Other options discussed is switching to Keytruda/Lenvima combination, this is a consideration in the future \par \par Papillary thyroid cancer [id number], 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 Herrera \par --Follow up with Dr. Acevedo of endocrinology; he is addressing vitamin D, thyroid and diabetes\par \par Cataract of eye\par -- S/p successful surgery on 9/30/19 . \par \par Follow up in 3 weeks\par Patient seen and examined with Dr. Cid who agrees with plan of care.\par \par

## 2020-02-20 ENCOUNTER — APPOINTMENT (OUTPATIENT)
Dept: INTERNAL MEDICINE | Facility: CLINIC | Age: 65
End: 2020-02-20
Payer: MEDICAID

## 2020-02-20 ENCOUNTER — OUTPATIENT (OUTPATIENT)
Dept: OUTPATIENT SERVICES | Facility: HOSPITAL | Age: 65
LOS: 1 days | Discharge: HOME | End: 2020-02-20

## 2020-02-20 VITALS
SYSTOLIC BLOOD PRESSURE: 136 MMHG | HEIGHT: 64 IN | BODY MASS INDEX: 25.78 KG/M2 | WEIGHT: 151 LBS | HEART RATE: 73 BPM | DIASTOLIC BLOOD PRESSURE: 78 MMHG

## 2020-02-20 DIAGNOSIS — Z98.890 OTHER SPECIFIED POSTPROCEDURAL STATES: Chronic | ICD-10-CM

## 2020-02-20 DIAGNOSIS — Z90.710 ACQUIRED ABSENCE OF BOTH CERVIX AND UTERUS: Chronic | ICD-10-CM

## 2020-02-20 DIAGNOSIS — K21.9 GASTRO-ESOPHAGEAL REFLUX DISEASE W/OUT ESOPHAGITIS: ICD-10-CM

## 2020-02-20 PROCEDURE — 99214 OFFICE O/P EST MOD 30 MIN: CPT | Mod: GC

## 2020-02-20 NOTE — HISTORY OF PRESENT ILLNESS
[FreeTextEntry1] : Pt is here for routine follow up. [de-identified] : 64 F w/ Stage 4 endometrial carcinoma s/p ELEONORA, w/ mets to brain s/p RT, on active chemotherapy, thyroid cancer s/p thyroidectomy on levothyroxine, steroid induced diabetes, hx of malignant pleural effusion s/p pleurex catheter and subsequent removal, and vitamin D insufficiency here for routine follow up visit. Pt has no active complaints.

## 2020-02-20 NOTE — END OF VISIT
[] : Resident [FreeTextEntry3] : Patient was seen and examined with resident. I discussed pt's medical issues and plan in detail as written above.\par

## 2020-02-20 NOTE — ASSESSMENT
[FreeTextEntry1] : 64 F w/ Stage 4 endometrial carcinoma s/p ELEONORA, w/ mets to brain s/p RT, on active chemotherapy, thyroid cancer s/p thyroidectomy on levothyroxine, steroid induced diabetes, hx of malignant pleural effusion s/p pleurex catheter and subsequent removal, and vitamin D insufficiency here for routine follow up visit.\par \par # Endometrial cancer\par - follows with oncology, on active chemotherapy\par \par # Hypothyroidism 2/2 thyroid cancer s/p thyroidectomy\par - levothyroxine 88\par - refer to dr. galdamez\par \par # Steroid induced diabetes\par - c/w metformin\par \par # L cataract s/p surgery\par \par # GERD- PPI\par \par # HCM\par - Flu vaccine admin (pt discussed it with her oncologist dr. aceves)\par - Mammogram ordered\par - f/u 3 months

## 2020-02-20 NOTE — PHYSICAL EXAM
[No Acute Distress] : no acute distress [Well Nourished] : well nourished [No Respiratory Distress] : no respiratory distress  [Clear to Auscultation] : lungs were clear to auscultation bilaterally [Normal Rate] : normal rate  [Regular Rhythm] : with a regular rhythm [Normal S1, S2] : normal S1 and S2 [Normal Affect] : the affect was normal [Normal Insight/Judgement] : insight and judgment were intact

## 2020-02-24 DIAGNOSIS — C54.1 MALIGNANT NEOPLASM OF ENDOMETRIUM: ICD-10-CM

## 2020-02-24 DIAGNOSIS — Z00.00 ENCOUNTER FOR GENERAL ADULT MEDICAL EXAMINATION WITHOUT ABNORMAL FINDINGS: ICD-10-CM

## 2020-02-24 DIAGNOSIS — E11.9 TYPE 2 DIABETES MELLITUS WITHOUT COMPLICATIONS: ICD-10-CM

## 2020-02-24 DIAGNOSIS — G62.9 POLYNEUROPATHY, UNSPECIFIED: ICD-10-CM

## 2020-02-24 DIAGNOSIS — E03.9 HYPOTHYROIDISM, UNSPECIFIED: ICD-10-CM

## 2020-02-24 LAB
APPEARANCE: CLEAR
BILIRUBIN URINE: NEGATIVE
BLOOD URINE: NEGATIVE
CANCER AG125 SERPL-ACNC: 6 U/ML
COLOR: NORMAL
FERRITIN SERPL-MCNC: 537 NG/ML
GLUCOSE QUALITATIVE U: NEGATIVE
HCT VFR BLD CALC: 41.2 %
HGB BLD-MCNC: 13.9 G/DL
KETONES URINE: NEGATIVE
LEUKOCYTE ESTERASE URINE: NEGATIVE
MCHC RBC-ENTMCNC: 31.4 PG
MCHC RBC-ENTMCNC: 33.7 G/DL
MCV RBC AUTO: 93 FL
NITRITE URINE: NEGATIVE
PH URINE: 6.5
PLATELET # BLD AUTO: 258 K/UL
PMV BLD: 9.3 FL
PROTEIN URINE: NEGATIVE
RBC # BLD: 4.43 M/UL
RBC # FLD: 13 %
SPECIFIC GRAVITY URINE: 1.02
UROBILINOGEN URINE: NORMAL
WBC # FLD AUTO: 8.71 K/UL

## 2020-03-10 ENCOUNTER — LABORATORY RESULT (OUTPATIENT)
Age: 65
End: 2020-03-10

## 2020-03-10 ENCOUNTER — APPOINTMENT (OUTPATIENT)
Dept: HEMATOLOGY ONCOLOGY | Facility: CLINIC | Age: 65
End: 2020-03-10
Payer: MEDICAID

## 2020-03-10 ENCOUNTER — APPOINTMENT (OUTPATIENT)
Dept: INFUSION THERAPY | Facility: CLINIC | Age: 65
End: 2020-03-10
Payer: MEDICAID

## 2020-03-10 VITALS
TEMPERATURE: 97.7 F | HEIGHT: 64 IN | BODY MASS INDEX: 25.27 KG/M2 | WEIGHT: 148 LBS | HEART RATE: 72 BPM | SYSTOLIC BLOOD PRESSURE: 135 MMHG | RESPIRATION RATE: 16 BRPM | DIASTOLIC BLOOD PRESSURE: 72 MMHG

## 2020-03-10 PROCEDURE — 99213 OFFICE O/P EST LOW 20 MIN: CPT

## 2020-03-10 RX ORDER — ACETAMINOPHEN 500 MG
650 TABLET ORAL ONCE
Refills: 0 | Status: COMPLETED | OUTPATIENT
Start: 2020-03-10 | End: 2020-03-10

## 2020-03-10 RX ORDER — GABAPENTIN 100 MG/1
100 CAPSULE ORAL 3 TIMES DAILY
Qty: 180 | Refills: 2 | Status: DISCONTINUED | COMMUNITY
Start: 2020-01-28 | End: 2020-03-10

## 2020-03-10 RX ORDER — BEVACIZUMAB 400 MG/16ML
1020 INJECTION, SOLUTION INTRAVENOUS ONCE
Refills: 0 | Status: COMPLETED | OUTPATIENT
Start: 2020-03-10 | End: 2020-03-10

## 2020-03-10 RX ORDER — DIPHENHYDRAMINE HCL 50 MG
25 CAPSULE ORAL ONCE
Refills: 0 | Status: COMPLETED | OUTPATIENT
Start: 2020-03-10 | End: 2020-03-10

## 2020-03-10 RX ADMIN — Medication 650 MILLIGRAM(S): at 12:28

## 2020-03-10 RX ADMIN — Medication 103.2 MILLIGRAM(S): at 12:48

## 2020-03-10 RX ADMIN — BEVACIZUMAB 1020 MILLIGRAM(S): 400 INJECTION, SOLUTION INTRAVENOUS at 13:55

## 2020-03-10 RX ADMIN — BEVACIZUMAB 281.6 MILLIGRAM(S): 400 INJECTION, SOLUTION INTRAVENOUS at 13:25

## 2020-03-10 RX ADMIN — Medication 25 MILLIGRAM(S): at 12:48

## 2020-03-10 RX ADMIN — Medication 100 MILLIGRAM(S): at 13:08

## 2020-03-10 RX ADMIN — Medication 101 MILLIGRAM(S): at 12:28

## 2020-03-11 LAB
ALBUMIN SERPL ELPH-MCNC: 4 G/DL
ALP BLD-CCNC: 74 U/L
ALT SERPL-CCNC: 9 U/L
ANION GAP SERPL CALC-SCNC: 15 MMOL/L
APPEARANCE: CLEAR
AST SERPL-CCNC: 14 U/L
BILIRUB SERPL-MCNC: 0.4 MG/DL
BILIRUBIN URINE: NEGATIVE
BLOOD URINE: NEGATIVE
BUN SERPL-MCNC: 6 MG/DL
CALCIUM SERPL-MCNC: 9.2 MG/DL
CHLORIDE SERPL-SCNC: 100 MMOL/L
CO2 SERPL-SCNC: 26 MMOL/L
COLOR: NORMAL
CREAT SERPL-MCNC: 0.7 MG/DL
GLUCOSE QUALITATIVE U: NEGATIVE
GLUCOSE SERPL-MCNC: 82 MG/DL
HCT VFR BLD CALC: 38.2 %
HGB BLD-MCNC: 12.8 G/DL
KETONES URINE: NEGATIVE
LEUKOCYTE ESTERASE URINE: NEGATIVE
MCHC RBC-ENTMCNC: 31.1 PG
MCHC RBC-ENTMCNC: 33.5 G/DL
MCV RBC AUTO: 92.9 FL
NITRITE URINE: NEGATIVE
PH URINE: 6.5
PLATELET # BLD AUTO: 323 K/UL
PMV BLD: 9.2 FL
POTASSIUM SERPL-SCNC: 3.9 MMOL/L
PROT SERPL-MCNC: 7.2 G/DL
PROTEIN URINE: NEGATIVE
RBC # BLD: 4.11 M/UL
RBC # FLD: 13.5 %
SODIUM SERPL-SCNC: 141 MMOL/L
SPECIFIC GRAVITY URINE: 1.01
UROBILINOGEN URINE: NORMAL
WBC # FLD AUTO: 7.6 K/UL

## 2020-03-12 LAB — CANCER AG125 SERPL-ACNC: 7 U/ML

## 2020-03-29 NOTE — ASSESSMENT
[FreeTextEntry1] : Papillary- serous endometrial cancer, stage IV\par --PET CT on 11/29/2017 revealed no clear GYN origin, extensive KEVIN, uptake in the right lung and pleura, thyroid nodule\par --Malignant pleural effusion, resolved\par --L side PleurX catheter was removed on 2/8/2018\par --Completed cycle 8  of carbo/taxol on 6/29/2018.  2 cycles were administered  postoperatively.\par --Restaging PET CT on 9/26/2018 (3 months post completion of chemo 6/29/2018)was essentially negative for recurrent disease\par --Restaging PET CT on 12/18/2018 reveals   New left upper quadrant peritoneal nodule measuring 8 mm with an SUV max  of 7.3. This is suggestive of biologic tumor activity.  No other FDG avid lesions seen throughout the scan.\par --Restaging PET CT on 2/12/2019 revealed slight increase in size of left upper quadrant peritoneal nodule (1.1 cm, \par previously 0.8 cm) with stable FDG uptake, 7.7 SUV suspicious for biologic tumor activity.\par --Case was discussed with Dr. Massey at [place], biopsy of peritoneal nodule is positive for recurrent endometrial cancer.\par --MRI brain on 4/23/2019 revealed multiple brain mets\par --S/p whole brain radiation completed 4/2019, required acute rehab\par --PET CT on 5/1/2019 subsequently revealed further disease progression \par --Restarted weekly carbo/taxol 3 on 1 off on 5/9/2019, continue with weekly Carbo/Taxol for now.\par --Off Decadron and Keppra since 6/5/2019 \par --Restaging brain MRI on 6/8/2019 showed almost complete resolution of most supratentorial lesions and no new lesions.\par --Restaging CT C/A/P on 7/9/2019 did not reveal definite progression, there was a questionable filling defect suspicious for possible chronic PE/artifact, this was discussed with Radiology at length anticoagulation was not deemed to be necessary\par --PET CT on 7/24/2019 revealed positive response to therapy \par --Carbo/Taxol stopped, last dose 9/13/2019, on hold since, she remains platinum sensitive \par --Restaging PET CT on 10/18/2019 is NAD\par --Restaging MRI of the brain on 10/15/2019 is suggestive for possible disease progression, no new lesions, she remains asymptomatic and off steroids, will follow closely \par --She was referred to [facility] for brain SRS consideration, close observation for now \par --Started on Avastin maintenance 11/5/2019, will consider adding Eliquis ppx for DVT/ PE, for now she will take baby ASA every other day  \par --We sent for prednisone 10 mg po if needed for additional skin issues\par --Patient well aware to contact us if any side effect developed\par --MRI of the brain 1/15/2020 reveals overall good response to Avastin\par --Restaging PET CT on 1/22/2020 reveals single small perisplenic area of activity, suspicious for disease progression, asymptomatic \par --Proceed with cycle 7 of Avastin on 3/10/2020 after weighing risks/benefits with plan for short term follow up imaging \par -- Continues to have neuropathic pain- will increase Gabapentin to 200mg TID\par -- Restaging MRI of brain ordered on 3/10/2020\par --Other options discussed is switching to Keytruda/Lenvima combination, this is a consideration in the future \par \par Papillary thyroid cancer [id number], 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 Herrera \par --Follow up with Dr. Acevedo of endocrinology; he is addressing vitamin D, thyroid and diabetes\par \par Cataract of eye\par -- S/p successful surgery on 9/30/19 . \par \par Follow up in 3 weeks\par Patient seen and examined with Dr. Cid who agrees with plan of care.\par \par

## 2020-03-29 NOTE — HISTORY OF PRESENT ILLNESS
[Disease: _____________________] : Disease: [unfilled] [AJCC Stage: ____] : AJCC Stage: [unfilled] [2] : 2, Moderate [de-identified] : Serena is a rodrigue 63 yo female, who has started developing SOB approximately 2 months ago, she went to ER on 11/2/2017 and on CXR was noted to have a large right sided pleural effusion. She underwent a thoracentesis, 1.6L of fluid was drained. 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 IHC was pos for CK7, PAX8, CK5/6 and Ca125, negative for TTF-1/Napsin, calretinin, CK20, mammaglobin, p63, villin, HAM56, GCDFP15, TAY-EP4. \par \par Her visit on 12/1/2017 Serena had an excisional biopsy of cervical node on 12/13/2017 revealing met adenocarcinoma, primary source still was not clear. On 12/14/2017 Serena was admitted with SOB, Pleurx catheter was placed on 12/14/2017. 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 Serena received 1st dose of carbo (auc of 5)/taxol(175mg/m2) on 12/15/2017 without complications, but the next day sustained a fall 2/2 vasovagal episode and developed asymptomatic SAH, that resolved on imaging by 12/21/2017.  [de-identified] : KRas WT, EGFR WT, Alk WT, ROS1 WT, PDL1-1%\par Normal MMR protein expression [de-identified] : 11/24/2017: She reports some SOB, especially ARZATE today. No fevers, no weight loss (reports some weight gain), no GI,  or GYN symptoms, except for increasing abdominal girth, she reports no blood in the stool or urine and no vaginal bleeding. She reports no CP and no palpitations, she reports worsening nonproductive cough, no hemoptysis. She also reports difficulty lying on left side and lying down flat. \par She reports left on/off facial numbness several months in duration. She had a CT of her head performed in [facility] within 1 year.  Additionally there is a freely mobile left cervical node present on exam, as per patient this was in place for approximately 1 year. \par \par 12/1/2017: Nidhi is here for follow up today. She had a therapeutic thoracentesis in  on 11/27/2017, 2L of fluid were removed, with much improved respiratory status. PET CT and MRI of the brain were done on 11/29/2017, findings were discussed today. There remains no clear primary source of malignancy. We have discussed that the source of tumor may be Mullerian vs lung. regardless of source chemotherapy needs to be initiated ASAP. We have discussed Carbo/taxol regiment that will cover both Mullerian and lung origin. Side effects including myelosuppression, peripheral neuropathy, allergic reaction and others.\par \par 1/2/2017 Since last visit Nidhi had an excisional biopsy of cervical node on 12/13/2017 revealing met adenocarcinoma, primary source still was not clear. On 12/14/2017 Nidhi was admitted with SOB, Pleurx catheter was placed on 12/14/2017. 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 Nidhi received 1st dose of carbo (auc of 5)/taxol(175mg/m2) on 12/15/2017 without complications, but the next day sustained a fall 2/2 vasovagal episode and developed asymptomatic SAH, that resolved on imaging by 12/21/2017. Today Nidhi's SOB has significantly improved. She reports very mild neuropathy in fingertips only. She reports no headache and offers no other complaints. \par \par 1/26/2018: Complains of some neuropathy, otherwise tolerating chemo with no difficulty. \par \par 2/16/2018: restaging CT C/A/P reviewed, significant improvement noted. Will refer to GYN/ONC. Reports slightly worsening neuropathy, not -painful, taking Gabapentin, no other symptoms. For cycle 4 of carbo/taxol today.\par \par 3/9/2018: Nidhi met with Dr. Massey, she is planned for surgery on 5/7/2018, after completion of 6 cycles of carbo/taxol and restaging PET CT ordered for mid April and follow up with Dr. Massey on April 20. She reports worsening neuropathy, and occasional pain and changes in vision in her left eye, eye symptoms come and go and are not very bothersome. She reports no other symptoms. \par \par 3/30/2018; Nidhi is here for cycle 6 of carbo/taxol, she continues to have neuropathy in finger and toes, but offers no other complaints, chemo has been dose reduced. \par \par 4/27/2018: Results of restaging PET CT s/p 6 cycles of carbo/taxol revealed 1. No new areas of abnormal increased uptake is seen to indicate \par biologically active disease.\par \par 2. Persistent PET positive left thyroid mass with a max SUV 33.1, \par previously max SUV 34.8. Further evaluation with thyroid ultrasound and \par if needed fine-needle aspiration biopsy will be helpful.\par \par 3.   PET positive left cervical lymph nodes mainly level 2 on the left \par with a max SUV 5.47previously 18. Minimal increased uptake in the \par bilateral axillary lymph nodes most likely reactive(less than 2.5)\par \par 4. Weakly PET positive, max SUV 2.89 right pleura, previous max SUV 5.3 \par with non-FDG avid right pleural effusion. \par \par 5. Previously FDG avid right and left supraclavicular lymphadenopathy, \par left internal mammary, bilateral paratracheal, para-aortic, para \par vertebral, carinal, mesenteric, right and left iliacs, right inguinal \par lymphadenopathy, small in size and no longer FDG avid.\par \par 6. No abnormal increased uptake is seen in the  lobulated uterus.\par \par 7. Overall there is marked improvement in the FDG avid disease.\par This was reviewed with Nidhi. She met with Dr. Massey on 4/20/2018, surgery is planned for 5/7/2018. She will also joselyn to meet with ENT re FDG avid thyroid nodule. Will assist in making he an appointment for her. \par Persistent neuropathy on gabapentin. \par \par 6/29/18 ; Patient came for follow up visit , evaluation for chemotherapy cycle 8 of carbo / Taxol , patient tolerating medication well , except neuropathy  recommend to have gabapentin  300 mg po daily.\par \par 7/20/2018: Nidhi present for follow up today, she has completed total of 8 cycles of Carbo/Taxol. She reports significant neuropathy in her hands and feet. We will discontinue chemotherapy today and plan to follow with close observation. She has thyroidectomy planned with Dr. Herrera on 8/20/2018, obtaining clearance for PMD. She is also planning to fly to [place] at the end of September. She reports no SOB, no GI or  symptoms. \par \par 9/12/2018: Nidhi offers no significant complaints today. She states neuropathy in her hands has almost completely resolved and neuropathy in her feet is significantly better. She had undergone complete thyroidectomy by Dr. Herrera on 8/20/2018, pathology revealed papillary thyroid cancer, measuring 2 cm, pT1b, other additional foci of papillary carcinoma were also noted, no LVI, surgical margins were clear, LN 0/2 had no carcinoma, stage [id number]. She is following up with Dr. Herrera tomorrow. She has still not gone for her vacation in [place]. \par \par 10/10/2018: Restaging PET CT on 9/26/2018 reveals Since 4/16/2018,  1. Post thyroidectomy with diffuse increased FDG uptake at the thyroid  bed likely representing post-surgical changes.  2. Subtle increased uptake is seen in the lamina , right side at the  level of T8 with a max SUV 4.6. This is not sufficient for metastatic  disease. Bone scan or MRI examination with contrast will be helpful for  further evaluation.  3. No other areas of abnormal increased uptake is seen. Images were personally reviewed by myself and discussed with Nidhi. \par She also had an Echo on 9/24/2018 revealing EF of 63%. \par Nidhi reports ongoing fatigue, she is unable to lose weight. She is taking Synthroid and following with Dr. Acevedo. \par She reports stiff fingers at night only, no painful neuropathy. \par She denies any other symptoms today. \par \par 12/11/2018: Nidhi feels well, neuropathy in hands and feet is much improved. S he is now complaining of r-sided facial pain associated with some swelling, pain comes and goes. She has already seen Dr. Herrera, who ordered CT of sinuses and prescribed pain relief meds. She is also due for restaging PET CT. Nidhi feels well otherwise. \par \par 1/9/2019: Restaging PET CT was performed on 12/18/2018 it revealed COMPARISON : 9/24/2018.  New left upper quadrant peritoneal nodule measuring 8 mm with an SUV max  of 7.3. This is suggestive of biologic tumor activity.  No other FDG avid lesions seen throughout the scan. Images were personally reviewed by myself and discussed with Nidhi, originally over the phone and again today. I have originally suggested to try AI in the interim, Nidhi however reported diarrhea at the time of the scan and declined intervention for now. We will plan for restaging PET CT to be performed in 6-8 weeks, this will be ordered today. She will follow up with Dr. Massey at [place] shortly and bring the disk for his review. I would also like her to meet with genetics, this will be arranged at [place] with Dr. Massey's help. Nidhi reports no new symptoms today, her neuropathy is manageable and  improving. She still reports unilateral headache that was work up in the past. Neurology eval was again suggested to her. She is following closely with Endocrinology re thyroid management. She will have follow up with Dr. Herrera shortly as well. \par \par 2/20/2019: Nidhi offers no new complaints. PET CT from 2/12/2019 revealed \par Since PET/CT of December 19, 2018, no new sites of pathologic FDG uptake\par Slight increase in size of left upper quadrant peritoneal nodule (1.1 cm, \par previously 0.8 cm) with stable FDG uptake, 7.7 SUV suspicious for \par biologic tumor activity.\par No other sites of pathologic FDG uptake \par Images were personally reviewed by myself and discussed with Nidhi, case was also discussed with Dr. Massey at [place]. Nidhi will have follow up with his shortly. She is following with endocrinology. reports improving neuropathy. No GI or  symptoms at this time. No weight loss. \par \par 3/20/2019: Nidhi had a peritoneal nodule biopsy done at [place], I have received a call from Dr. 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 We have discussed that surgery though could be attempted will not be the best therapeutic approach, recommencement of systemic therapy was discussed. I have not received the results from [place] yet. I have briefly discussed this with Nidhi today. Nidhi reports that she has acutely developed right lower extremity weakness 5-6 days ago, she did not inform any of her doctors about this. She also reports that her R upper extremity though not weak "does not feel normal either. She reports no back pain, there is no point tenderness, RLE is objectively weak on exam. I recommend ER evaluation to r/o CVA vs brain met, vs L-spine related issue. Recommend admission for work up. Nidhi is agreeable to get admitted. \par \par 5/8/2019: Nidhi present for follow up, she was diagnosed with multiple metastasis to the brain, MRI was done on 3/21/2019 and revealed \par Multiple supratentorial heterogeneously enhancing lesions consistent with \par metastatic disease. The appearance on the T2-weighted images is suggestive \par of adenocarcinoma. There is mass effect upon the left lateral ventricle and \par corpus callosum. \par She received whole brain irradiation by Dr. Dahl, completed it in the beginning of 4/2019, she is currently on steroid taper managed by Dr. Dahl, she is taking 4mg of Decadron daily. She will be stopping the Keppra as there have been no documented h/o seizures. She still reports impairment of the L visual field, she has an appointment coming up with Opthalmology. She reports no deficits walking after she has completed inpatient acute rehabilitation. \par Restaging PET CT on 5/1/2019 reveals COMPARISON : 2/12/2019 \par Multiple new FDG avid omental nodules largest measuring up to 10 mm, \par positive on nonattenuation corrected images. Findings are suggestive of \par biologic tumor activity. \par Again seen is a left upper quadrant peritoneal nodule, SUV max 6.8, \par previously 7.7 (12% decrease). \par Images were personally reviewed by myself and discussed with patient. \par She now reports mild abdominal bloating, no other symptoms. She reports her neuropathy has significantly improved. \par She has first evidence of recurrent disease documented 5.5 months after last carbo/taxol dose, the volume of disease was very small (1 8mm nodule), she was then observed for another 6 months and now she wishes to restart the chemotherapy. \par I have discussed with her that rechallenging with carbo/taxol may still prove to be effective as long as she gets restaged after 2 cycles. Given recent whole brain radiation and neuropathy I will offer her carbo/taxol weekly with does reduction on the taxol for neuropathy, We will plan to run carbo slowly to avoid allergic reaction. \par She is agreeable to start ASAP. \par \par 6/5/2019: Nidhi has completed 1 cycle of weekly Carbo/taxol, ran at slow rate to avoid Carbo reaction and has tolerated chemotherapy without difficulty, she does not complain of increase in neuropathy. She has ongoing vision changes in her L eye, she had no residual LE weakness. She is off Decadron and Keppra. She was advised to see ophthalmology. \par \par 7/3/2019: Nidhi is doing well.  Reports no problems with carbo/taxol.  Still complains of vision changes in L eye but is seeing ophthalmologist on 7/16.  Remains active around the house and cooks regularly.  No fevers, weight loss, anorexia.  ROS is otherwise only significant for occasional loose stools, no diarrhea.\par \par 7/17/2019: Nidhi is doing well, denies worsening neuropathy. She c/o feeling fatigued and tired. Her last chemo was 1 week ago(7/11/19) with carbo/taxol and is due again tomorrow. She saw ophthalmology and will need cataract surgery of the L eye. No c/o chest pain/SOB. No weight loss.\par CT C/A/P was done on 7/9/2019, images were personally reviewed by myself and discussed with several radiology attendings, they revealed \par CHEST:\par Filling defect within a segmental branch of the lower lobe pulmonary artery \par suspicious for pulmonary embolus. \par Stable left upper lobe and right middle lobe 3 mm nodules. \par CT abdomen and pelvis performed on the same day, see separate report. \par ADDENDUM: \par Please note that the morphology of the small thrombus within the left lower \par pulmonary artery is not indicative of an acute thrombus but rather a chronic \par appearance. \par This was discussed with Radiology, it was not deemed that thrombus was acute and may not have even been present at all, finding was deemed to be equivocal, based on radiology assessment anticoagulation was not indicated for the filling defect noted. Initially CTA was recommended, the recommendation was withdrawn upon further review. \par ABDOMEN/PELVIS:\par New 1.3 cm segment IV hepatic hypodensity seen on series 4 image 205. \par Lesion not seen on prior PET/CT from 5/1/2019 or abdomen and pelvis CT from \par \par 2/2/2018 and is consistent with a new metastasis. \par Two other hepatic lesions described above, decreased in size since 2/2/2018, \par consistent with treated hepatic metastases. \par Anterior perisplenic omental nodule measures 0.7 cm on series 2 image 53, \par previously measuring 1.2 cm on PET/CT dated 5/1/2019. \par All the other previously seen omental nodules have resolved since 5/1/2019. \par ADDENDUM:\par Case was discussed with Dr. Cid in detail. It is also possible that the \par new 1.3 cm lesion in the liver since February 2, 2018 represents a treated \par metastasis similar to the other liver lesions. \par PET/CT would be necessary to assess disease activity. \par I have discussed case with Radiology, and it was determined that there was no clear cut evidence of progression. PET CT was ordered today.\par This was discussed with Nidhi, will proceed with Carbo/Taxol for now. \par \par 8/1/2019: Nidhi reports no major side effects from weekly Carbo/Taxol, she reports no neuropathy. She has completed 10 cycles altogether. \par PET CT on 7/24/2019 revealed \par 1. Since May 1, 2019, no definite new site of pathologic FDG uptake to \par suggest new biologic tumor activity; specifically, no pathologic FDG uptake \par within previously noted 1.3 cm lesion within hepatic segment 4. \par 2. Increased FDG uptake within several subcentimeter bilateral cervical \par lymph nodes, which is nonspecific and may be postinflammatory; max SUV 9.2 \par is noted within a 0.7 cm right level 2 cervical node. Attention on follow-up \par is suggested. \par 3. Few peritoneal nodules have overall decreased in size. \par 4. No additional site of pathologic FDG uptake. \par Images were personally reviewed by myself and discussed with Nidhi. \par She wishes to continue with chemotherapy. Will plan for 3 additional cycles. Will consider adding Avastin, but with h/o inconclusive/possible chronic PE ppx anticoagulation maybe necessary. \par \par 8/28/2019: Nidhi reports feeling well, she denies worsening neuropathy, worsening neurological symptoms, SOB, abdominal pain, bloating. She reports she is planned to undergo  eye surgery towards the end of September. We will plan to hold chemotherapy briefly after this cycle to facilitate adequate counts and minimize complications. We again have briefly discussed considering Avastin based therapy, will hold off on this prior to surgery. 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 May proceed without formal PAST, will check CBC prior to procedure, PT and PTT today. \par \par 9/25/19:Nidhi reports feeling well, she denies any changes since last visit, No SOB, abdominal pain, bloating. She reports she is planned to undergo  eye surgery in 9/30/19  CBC reviewed with normal Hemogram . we will hold chem this week , she will resume after Cataract sx . 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 May proceed without formal PAST, will check CBC prior to procedure, PT and PTT today. \par \par 10/4/2019: Nidhi underwent successful eye surgery on 9/30/2019, she reports she can see much better now. Last dose of chemotherapy was administered on 9/20, she then was on hold for surgery. She has completed 5 additional cycles of Carbo/taxol. She is due for restaging. Her disease is only accurately assessed on PET CT, prior attempts to obtain CT scans resulted in additional imaging with PET, as findings were inconclusive. PET CT will be ordered to restage. \par In addition she is due for MRI of the brain. She has had a small amount of weight loss, mild neuropathy is persistent. She offers no additional complaints. \par She will have port placed on Monday, will hold chemotherapy until restaging scans complete. \par \par 10/23/2019: Nidhi feels well, and denies any new symptoms. Her eye surgery was successful, vision is much improved now. She had restaging scans. \par PET CT on 10/18/2019 revealed COMPARISON : 7/24/2019. \par No pathologic uptake to suggest biologic tumor activity. \par MRI of the brain on 10/15/2019 revealed \par Comparison with June 8 and March 21, 2019: (Generally lesions improved \par markedly since March but slightly increased in size since June) \par 1. Lesions previously demonstrated on the study of June 8, 2019 has \par remained stable or minimally increased in size \par 2. Specifically, right frontal opercular lesion measures about 1 cm and \par left posterior inferior temporal lobe lesion measures about 1 cm. These have \par increased since the previous study. \par 3. Small punctiform lesions within the right frontal white matter and left \par frontal sulcus or more apparent than on the study of June 8 \par 4. These lesions are all much smaller than the earlier MRI of March 21, \par 2019. \par 5. No new lesions. \par Images were personally reviewed by myself and discussed with patient. She remains asymptomatic and off the steroids. \par I have also discussed the case with Dr. Dahl. I would like Nidhi to discuss the options with [facility], she maybe a candidate for brain SRS. \par She has been off Carbo/Taxol for a few weeks, she remains Platinum sensitive. I have discussed the option of switching her over to Avastin maintenance, will need to prophylax with low dose Eliquis as well, given questionable finding of small PE in the past. \par Side effects of Avastin were discussed at length, including HNT, proteinuria, small risk of DVT/PE, GI perforations etc. \par She is agreeable to start after [facility] consultation. \par \par 11/5/2019: Nidhi feels OK, she reports acute onset head discomfort that lasts minutes and subsides, the episodes are becoming more frequent 1-2 times a day. She has seen Dr. Dahl, who wishes to hold off on offering SRS to the brain. Plan was to start on Avastin today. Nidhi reports she currently has a lapse in her insurance coverage till 12/2019, I will not be able to start he on ppx dose Eliquis for now, she instead will take baby ASA every other day, to modify risk of DVT/PE. Will plan to switch over to Eliquis at 2.5mg BID once insurance is active. She also contemplated going to [place] with her daughter, I am willing to hold Avastin until she comes back, but Nidhi wishes to start right away, I explained to her that flight may result in complications if that is her wish. She will cancel the trip and start with Avastin today. Side effects have been discussed. \par \par 11/20/2019: Nidhi came for a follow up visit, she reports feeling well, she reports less fatigue. She is s/p first dose of Avastin on 11/5/2019, she tolerated Avastin without difficulty, she is due for the 2nd dose today. \par We communicated CBC result with HGB of 12.3, normal platelet and WBCs. She lost like 5 pounds secondary to gum problems, she will follow up with her dentist this week. She is currently on baby ASA every other day. Continue with single agent Avastin.  \par \par 12/17/2019: Nidhi came for a follow up visit, she reports feeling well, she experienced pruritic rash on last Saturday 12/14/19, which improved over 2 days with Benadryl alone.  Today her skin rash has completely resolved. We will add Solu-Medrol 100 mg IV prior to Avastin. Nidhi reports less fatigue. She is due for the 3rd dose of Avastin today. \par We communicated CBC result with HGB of 12.3, normal platelet and WBCs. \par \par 1/7/2019: Nidhi is doing well, reports good appetite, she has lost 1lb. Reports no GI or pulmonary symptoms. She reports her gums are bleeding occasionally. No new neurological symptoms. She reports no rash after last cycle of Avastin. \par \par 1/28/2020: Nidhi is here for follow up visit, she has no difficulty tolerating Avastin, she reports L sided chronic eye discomfort, neuropathic in nature. Neurontin has been helpful in the past, will reorder this. \par PET CT on 1/22/2020 revealed COMPARISON : 10/18/2019. \par Anterior perisplenic nodule measuring 8 mm is FDG avid, SUV max 8.9, \par consistent with biologic tumor activity. \par MRI of the brain on 1/15/2020 revealed \par In comparison to the previous brain MRI dated 10/15/2019: \par 1. Redemonstrated scattered enhancing lesions consistent with metastatic \par disease, with overall good treatment response since the prior exam. \par 2. No significant interval change in the ovoid lesion in the superior left \par frontal lobe measuring 12 mm. \par 3. Decreased size of the right opercular lesion measuring 6 mm, previously \par 11 mm. Decreased size of the left inferior occipital/tentorial lesion \par measuring 6 mm, previously 11 mm. The previously seen right frontal white \par matter punctate lesion is no longer visualized. \par 4. No new lesions are demonstrated. \par All images were personally reviewed by myself and discussed with Nidhi. I explained to her I am concerned about the perisplenic lesion concerning for disease progression, but it is isolated and very small, asymptomatic. There is definitely a positive response to Avastin in the brain and given that, we will continue with Avastin with short term follow up imaging. Nidhi knows to inform me with any new symptoms immediately. \par \par 2/18/2020: NIDHI BANKS is a 64 year old female here today for follow up visit. She completed 5 cycles of Avastin; tolerating well at this time. She denies fever, chills, change in mental status, or change in weight. She complains of left painful facial swelling. In addition, she complains of swelling of fingers, joint pain and numbness in her toes. She continues on Gabapentin 100mg TID with no symptom relief and wishes to try to go up on the dose. She complains of mild gingival bleeding in the morning. Last PET showed new isolated perisplenic lesion suspicious for disease progression, however MRI of the brain appeared better on Avastin, so the plan was to continue with Avastin with short term follow up. \par \par 3/10/2020: NIDHI BANKS is a 64 year old female here today for follow up visit. She denies new neurological symptoms, skin rash, fever, or change in weight. She continues of Gabapentin which was increased to 200 mg TID; neuropathy improving. She continues to complain of mild gingival bleeding. Left facial swelling and numbness resolved. She has completed cycle 6 of Avastin without complication. \par  [FreeTextEntry3] : pruritic reaction  [FreeTextEntry5] : prednisone 25 mg po 2x/ day.

## 2020-03-29 NOTE — PHYSICAL EXAM
[Restricted in physically strenuous activity but ambulatory and able to carry out work of a light or sedentary nature] : Status 1- Restricted in physically strenuous activity but ambulatory and able to carry out work of a light or sedentary nature, e.g., light house work, office work [Normal] : affect appropriate [de-identified] : B/L cataracts [de-identified] : CTA B/L [de-identified] : mild left facial swelling improving

## 2020-03-29 NOTE — RESULTS/DATA
[FreeTextEntry1] : EXAM:  MR BRAIN WAW IC      \par \par \par PROCEDURE DATE:  06/08/2019  \par \par \par \par \par INTERPRETATION:  Clinical History / Reason for exam: Dizziness and \par vertigo, history of uterine and thyroid cancer, for evaluation of \par metastatic disease, lesion seen on prior MRI of the brain.\par \par MRI OF THE BRAIN WITHOUT AND WITH CONTRAST\par \par TECHNIQUE:\par \par Multiplanar multisequence imaging of the brain was performed on the \[facility] open magnet before and after the intravenous administration of \par 7.5 cc of Gadavist including brain lab protocol.\par \par COMPARISON:\par \par MRI of the brain without and with contrast dated March 12, 2019.\par \par FINDINGS:\par \par The previously described supratentorial lesions have almost completely \par resolved.\par \par The lesion in the left posterior frontal region now measures 1.4 cm in \par maximum diameter, the lesion in the anterior right frontal lobe measures \par 0.7 cm in maximum diameter, the second lesion in the right frontal lobe \par measures 0.1 cm in maximum diameter, the lesion in the right posterior \par parietal region measures 0.3 cm in maximum diameter, and the left \par occipital lobe lesion measures 1.2 cm in maximum diameter. (Previously \par measuring 2.6, 2.4, 0.5, 0.7, and 2.5 cm respectively).\par \par The third, fourth, and lateral ventricles are normal in size and \par position. There is no shift of the midline structures.\par \par The cerebellar tonsils are minimally low-lying (0.3 cm of cerebellar \par ectopia).\par \par Incidental note is made of a tiny medial cyst.\par \par There are scattered T2/FLAIR hyperintense signal intensities in the\par subcortical white matter which are nonspecific differential diagnostic\par possibilities include chronic ischemic change, foci of gliosis or\par demyelination.\par \par IMPRESSION:\par \par In comparison with the prior MRI of the brain dated March 21, 2019:\par \par There has been almost complete resolution of most of of the previously \par described supratentorial lesions.\par \par There are no new enhancing lesions.\par \par \par \par \par \par \par CRISTINA MÉNDEZ M.D., ATTENDING RADIOLOGIST\par This document has been electronically signed. Oren 10 2019  1:17PM\par   \par \par   \par \par \par [id number]^RI^DC\par \par EXAM:  PETCT SKUL-THI ONC FDG SUBS      \par \par \par PROCEDURE DATE:  05/01/2019  \par \par \par \par \par INTERPRETATION:  \par FDG PET CT STUDY   Subsequent  treatment strategy\par REASON: TUMOR IMAGING - PET with concurrently acquired CT for attenuation \par correction and anatomic localization; skull base to mid - thigh .\par \par CPT code 32376.\par \par Fasting blood glucose level:     91 mg/dl\par \par HISTORY: Endometrial cancer. Stage IV with brain metastatic disease.\par \par TECHNIQUE: Approximately 45 minutes after the intravenous administration \par of 10.7 mCi 18-Fluorine FDG, whole body PET images were acquired from \par base of skull to mid - thigh.\par \par CT protocol used for this PET/CT study is designed for attenuation \par correction and anatomic localization of PET findings.  This  CT \par is not designed to replace state-of-the-art diagnostic CT scans for \par specific imaging protocols of different body parts.\par \par COMPARISON : 2/12/2019.\par Correlation: MRI of the brain on 3/21/2019.\par \par FINDINGS:\par \par Head/Neck: No biologically active head/neck lesions.     \par Normal uptake within the brain, pharyngeal lymphoid tissue, salivary \par glands and laryngeal musculature is noted.    \par \par Thorax:   No suspicious hypermetabolic mediastinal, hilar, lung \par parenchymal lesions.\par \par Abdomen/Pelvis: Physiologic GI/  activity. \par \par Again seen is a left upper quadrant peritoneal nodule, SUV max 6.8, \par previously 7.7 (12% decrease). \par \par Multiple new FDG avid omental nodules largest of which is adjacent to the \par distal transverse colon, measuring 10 mm, positive on nonattenuation \par corrected images, axial image 134. \par \par No other abnormal visceral or guillaume tracer uptake is present.    \par \par Musculoskeletal :  No suspicious hypermetabolic osseous lesions.\par \par Additional CT findings:\par Status post hysterectomy.\par Colonic diverticulosis.\par \par IMPRESSION: \par \par COMPARISON : 2/12/2019\par \par Multiple new FDG avid omental nodules largest measuring up to 10 mm, \par positive on nonattenuation corrected images. Findings are suggestive of \par biologic tumor activity.\par \par Again seen is a left upper quadrant peritoneal nodule, SUV max 6.8, \par previously 7.7 (12% decrease). \par \par \par \par \par

## 2020-03-29 NOTE — REVIEW OF SYSTEMS
[Fatigue] : fatigue [Easy Bleeding] : a tendency for easy bleeding [Negative] : Allergic/Immunologic [Recent Change In Weight] : ~T no recent weight change [Chest Pain] : no chest pain [Palpitations] : no palpitations [Lower Ext Edema] : no lower extremity edema [Shortness Of Breath] : no shortness of breath [Wheezing] : no wheezing [Cough] : no cough [SOB on Exertion] : no shortness of breath during exertion [FreeTextEntry9] : normal strength in upper and lower extremities  [de-identified] : no gait impairment- numbness and swelling of bilateral hands  [de-identified] : mild gingival bleeding

## 2020-03-31 PROBLEM — K04.7 DENTAL INFECTION: Status: ACTIVE | Noted: 2020-01-01

## 2020-04-01 NOTE — REVIEW OF SYSTEMS
[Fatigue] : fatigue [Easy Bleeding] : a tendency for easy bleeding [Negative] : Allergic/Immunologic [Recent Change In Weight] : ~T no recent weight change [Chest Pain] : no chest pain [Palpitations] : no palpitations [Lower Ext Edema] : no lower extremity edema [Shortness Of Breath] : no shortness of breath [Wheezing] : no wheezing [Cough] : no cough [SOB on Exertion] : no shortness of breath during exertion [FreeTextEntry9] : normal strength in upper and lower extremities  [de-identified] : no gait impairment- numbness and swelling of bilateral hands  [de-identified] : mild gingival bleeding

## 2020-04-01 NOTE — ASSESSMENT
[FreeTextEntry1] : Papillary- serous endometrial cancer, stage IV\par --PET CT on 11/29/2017 revealed no clear GYN origin, extensive KEVIN, uptake in the right lung and pleura, thyroid nodule\par --Malignant pleural effusion, resolved\par --L side PleurX catheter was removed on 2/8/2018\par --Completed cycle 8  of carbo/taxol on 6/29/2018.  2 cycles were administered  postoperatively.\par --Restaging PET CT on 9/26/2018 (3 months post completion of chemo 6/29/2018)was essentially negative for recurrent disease\par --Restaging PET CT on 12/18/2018 reveals   New left upper quadrant peritoneal nodule measuring 8 mm with an SUV max  of 7.3. This is suggestive of biologic tumor activity.  No other FDG avid lesions seen throughout the scan.\par --Restaging PET CT on 2/12/2019 revealed slight increase in size of left upper quadrant peritoneal nodule (1.1 cm, \par previously 0.8 cm) with stable FDG uptake, 7.7 SUV suspicious for biologic tumor activity.\par --Case was discussed with Dr. Massey at [facility], biopsy of peritoneal nodule is positive for recurrent endometrial cancer.\par --MRI brain on 4/23/2019 revealed multiple brain mets\par --S/p whole brain radiation completed 4/2019, required acute rehab\par --PET CT on 5/1/2019 subsequently revealed further disease progression \par --Restarted weekly carbo/taxol 3 on 1 off on 5/9/2019, continue with weekly Carbo/Taxol for now.\par --Off Decadron and Keppra since 6/5/2019 \par --Restaging brain MRI on 6/8/2019 showed almost complete resolution of most supratentorial lesions and no new lesions.\par --Restaging CT C/A/P on 7/9/2019 did not reveal definite progression, there was a questionable filling defect suspicious for possible chronic PE/artifact, this was discussed with Radiology at length anticoagulation was not deemed to be necessary\par --PET CT on 7/24/2019 revealed positive response to therapy \par --Carbo/Taxol stopped, last dose 9/13/2019, on hold since, she remains platinum sensitive \par --Restaging PET CT on 10/18/2019 is NAD\par --Restaging MRI of the brain on 10/15/2019 is suggestive for possible disease progression, no new lesions, she remains asymptomatic and off steroids, will follow closely \par --She was referred to [facility] for brain SRS consideration, close observation for now \par --Started on Avastin maintenance 11/5/2019, will consider adding Eliquis ppx for DVT/ PE, for now she will take baby ASA every other day  \par --We sent for prednisone 10 mg po if needed for additional skin issues\par --Patient well aware to contact us if any side effect developed\par --MRI of the brain 1/15/2020 reveals overall good response to Avastin\par --Restaging PET CT on 1/22/2020 reveals single small perisplenic area of activity, suspicious for disease progression, asymptomatic \par --Proceed with cycle 8 of Avastin on 3/31/2020 after weighing risks/benefits with plan for short term follow up imaging \par -- pain is improved with gabapentin\par -- Restaging MRI of brain ordered on 3/10/2020, scheduled to get it on 4/2/20. \par --Other options discussed is switching to Keytruda/Lenvima combination, this is a consideration in the future \par \par Papillary thyroid cancer [id number], 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 Herrera \par --Follow up with Dr. Acevedo of endocrinology; he is addressing vitamin D, thyroid and diabetes\par \par Cataract of eye\par -- S/p successful surgery on 9/30/19 . \par \par Follow up in 3 weeks\par Patient seen and examined with Dr. Cid who agrees with plan of care.\par \par

## 2020-04-01 NOTE — HISTORY OF PRESENT ILLNESS
[Disease: _____________________] : Disease: [unfilled] [AJCC Stage: ____] : AJCC Stage: [unfilled] [2] : 2, Moderate [de-identified] : Serena is a rodrigue 65 yo female, who has started developing SOB approximately 2 months ago, she went to ER on 11/2/2017 and on CXR was noted to have a large right sided pleural effusion. She underwent a thoracentesis, 1.6L of fluid was drained. 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 IHC was pos for CK7, PAX8, CK5/6 and Ca125, negative for TTF-1/Napsin, calretinin, CK20, mammaglobin, p63, villin, HAM56, GCDFP15, TAY-EP4. \par \par Her visit on 12/1/2017 Serena had an excisional biopsy of cervical node on 12/13/2017 revealing met adenocarcinoma, primary source still was not clear. On 12/14/2017 Serena was admitted with SOB, Pleurx catheter was placed on 12/14/2017. 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 Serena received 1st dose of carbo (auc of 5)/taxol(175mg/m2) on 12/15/2017 without complications, but the next day sustained a fall 2/2 vasovagal episode and developed asymptomatic SAH, that resolved on imaging by 12/21/2017.  [de-identified] : KRas WT, EGFR WT, Alk WT, ROS1 WT, PDL1-1%\par Normal MMR protein expression [de-identified] : 11/24/2017: She reports some SOB, especially ARZATE today. No fevers, no weight loss (reports some weight gain), no GI,  or GYN symptoms, except for increasing abdominal girth, she reports no blood in the stool or urine and no vaginal bleeding. She reports no CP and no palpitations, she reports worsening nonproductive cough, no hemoptysis. She also reports difficulty lying on left side and lying down flat. \par She reports left on/off facial numbness several months in duration. She had a CT of her head performed in [facility] within 1 year.  Additionally there is a freely mobile left cervical node present on exam, as per patient this was in place for approximately 1 year. \par \par 12/1/2017: Nidhi is here for follow up today. She had a therapeutic thoracentesis in  on 11/27/2017, 2L of fluid were removed, with much improved respiratory status. PET CT and MRI of the brain were done on 11/29/2017, findings were discussed today. There remains no clear primary source of malignancy. We have discussed that the source of tumor may be Mullerian vs lung. regardless of source chemotherapy needs to be initiated ASAP. We have discussed Carbo/taxol regiment that will cover both Mullerian and lung origin. Side effects including myelosuppression, peripheral neuropathy, allergic reaction and others.\par \par 1/2/2017 Since last visit Nidhi had an excisional biopsy of cervical node on 12/13/2017 revealing met adenocarcinoma, primary source still was not clear. On 12/14/2017 Nidhi was admitted with SOB, Pleurx catheter was placed on 12/14/2017. 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 Nidhi received 1st dose of carbo (auc of 5)/taxol(175mg/m2) on 12/15/2017 without complications, but the next day sustained a fall 2/2 vasovagal episode and developed asymptomatic SAH, that resolved on imaging by 12/21/2017. Today Nidhi's SOB has significantly improved. She reports very mild neuropathy in fingertips only. She reports no headache and offers no other complaints. \par \par 1/26/2018: Complains of some neuropathy, otherwise tolerating chemo with no difficulty. \par \par 2/16/2018: restaging CT C/A/P reviewed, significant improvement noted. Will refer to GYN/ONC. Reports slightly worsening neuropathy, not -painful, taking Gabapentin, no other symptoms. For cycle 4 of carbo/taxol today.\par \par 3/9/2018: Nidhi met with Dr. Massey, she is planned for surgery on 5/7/2018, after completion of 6 cycles of carbo/taxol and restaging PET CT ordered for mid April and follow up with Dr. Massey on April 20. She reports worsening neuropathy, and occasional pain and changes in vision in her left eye, eye symptoms come and go and are not very bothersome. She reports no other symptoms. \par \par 3/30/2018; Nidhi is here for cycle 6 of carbo/taxol, she continues to have neuropathy in finger and toes, but offers no other complaints, chemo has been dose reduced. \par \par 4/27/2018: Results of restaging PET CT s/p 6 cycles of carbo/taxol revealed 1. No new areas of abnormal increased uptake is seen to indicate \par biologically active disease.\par \par 2. Persistent PET positive left thyroid mass with a max SUV 33.1, \par previously max SUV 34.8. Further evaluation with thyroid ultrasound and \par if needed fine-needle aspiration biopsy will be helpful.\par \par 3.   PET positive left cervical lymph nodes mainly level 2 on the left \par with a max SUV 5.47previously 18. Minimal increased uptake in the \par bilateral axillary lymph nodes most likely reactive(less than 2.5)\par \par 4. Weakly PET positive, max SUV 2.89 right pleura, previous max SUV 5.3 \par with non-FDG avid right pleural effusion. \par \par 5. Previously FDG avid right and left supraclavicular lymphadenopathy, \par left internal mammary, bilateral paratracheal, para-aortic, para \par vertebral, carinal, mesenteric, right and left iliacs, right inguinal \par lymphadenopathy, small in size and no longer FDG avid.\par \par 6. No abnormal increased uptake is seen in the  lobulated uterus.\par \par 7. Overall there is marked improvement in the FDG avid disease.\par This was reviewed with Nidhi. She met with Dr. Massey on 4/20/2018, surgery is planned for 5/7/2018. She will also joselyn to meet with ENT re FDG avid thyroid nodule. Will assist in making he an appointment for her. \par Persistent neuropathy on gabapentin. \par \par 6/29/18 ; Patient came for follow up visit , evaluation for chemotherapy cycle 8 of carbo / Taxol , patient tolerating medication well , except neuropathy  recommend to have gabapentin  300 mg po daily.\par \par 7/20/2018: Nidhi present for follow up today, she has completed total of 8 cycles of Carbo/Taxol. She reports significant neuropathy in her hands and feet. We will discontinue chemotherapy today and plan to follow with close observation. She has thyroidectomy planned with Dr. Herrera on 8/20/2018, obtaining clearance for PMD. She is also planning to fly to [place] at the end of September. She reports no SOB, no GI or  symptoms. \par \par 9/12/2018: Nidhi offers no significant complaints today. She states neuropathy in her hands has almost completely resolved and neuropathy in her feet is significantly better. She had undergone complete thyroidectomy by Dr. Herrera on 8/20/2018, pathology revealed papillary thyroid cancer, measuring 2 cm, pT1b, other additional foci of papillary carcinoma were also noted, no LVI, surgical margins were clear, LN 0/2 had no carcinoma, stage [id number]. She is following up with Dr. Herrera tomorrow. She has still not gone for her vacation in [place]. \par \par 10/10/2018: Restaging PET CT on 9/26/2018 reveals Since 4/16/2018,  1. Post thyroidectomy with diffuse increased FDG uptake at the thyroid  bed likely representing post-surgical changes.  2. Subtle increased uptake is seen in the lamina , right side at the  level of T8 with a max SUV 4.6. This is not sufficient for metastatic  disease. Bone scan or MRI examination with contrast will be helpful for  further evaluation.  3. No other areas of abnormal increased uptake is seen. Images were personally reviewed by myself and discussed with Nidhi. \par She also had an Echo on 9/24/2018 revealing EF of 63%. \par Nidhi reports ongoing fatigue, she is unable to lose weight. She is taking Synthroid and following with Dr. Acevedo. \par She reports stiff fingers at night only, no painful neuropathy. \par She denies any other symptoms today. \par \par 12/11/2018: Nidhi feels well, neuropathy in hands and feet is much improved. S he is now complaining of r-sided facial pain associated with some swelling, pain comes and goes. She has already seen Dr. Herrera, who ordered CT of sinuses and prescribed pain relief meds. She is also due for restaging PET CT. Nidhi feels well otherwise. \par \par 1/9/2019: Restaging PET CT was performed on 12/18/2018 it revealed COMPARISON : 9/24/2018.  New left upper quadrant peritoneal nodule measuring 8 mm with an SUV max  of 7.3. This is suggestive of biologic tumor activity.  No other FDG avid lesions seen throughout the scan. Images were personally reviewed by myself and discussed with Nidhi, originally over the phone and again today. I have originally suggested to try AI in the interim, Nidhi however reported diarrhea at the time of the scan and declined intervention for now. We will plan for restaging PET CT to be performed in 6-8 weeks, this will be ordered today. She will follow up with Dr. Massey at [place] shortly and bring the disk for his review. I would also like her to meet with genetics, this will be arranged at [place] with Dr. Massey's help. Nidhi reports no new symptoms today, her neuropathy is manageable and  improving. She still reports unilateral headache that was work up in the past. Neurology eval was again suggested to her. She is following closely with Endocrinology re thyroid management. She will have follow up with Dr. Herrera shortly as well. \par \par 2/20/2019: Nidhi offers no new complaints. PET CT from 2/12/2019 revealed \par Since PET/CT of December 19, 2018, no new sites of pathologic FDG uptake\par Slight increase in size of left upper quadrant peritoneal nodule (1.1 cm, \par previously 0.8 cm) with stable FDG uptake, 7.7 SUV suspicious for \par biologic tumor activity.\par No other sites of pathologic FDG uptake \par Images were personally reviewed by myself and discussed with Nidhi, case was also discussed with Dr. Massey at [place]. Nidhi will have follow up with his shortly. She is following with endocrinology. reports improving neuropathy. No GI or  symptoms at this time. No weight loss. \par \par 3/20/2019: Nidhi had a peritoneal nodule biopsy done at [place], I have received a call from Dr. 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 We have discussed that surgery though could be attempted will not be the best therapeutic approach, recommencement of systemic therapy was discussed. I have not received the results from [place] yet. I have briefly discussed this with Nidhi today. Nidhi reports that she has acutely developed right lower extremity weakness 5-6 days ago, she did not inform any of her doctors about this. She also reports that her R upper extremity though not weak "does not feel normal either. She reports no back pain, there is no point tenderness, RLE is objectively weak on exam. I recommend ER evaluation to r/o CVA vs brain met, vs L-spine related issue. Recommend admission for work up. Nidhi is agreeable to get admitted. \par \par 5/8/2019: Nidhi present for follow up, she was diagnosed with multiple metastasis to the brain, MRI was done on 3/21/2019 and revealed \par Multiple supratentorial heterogeneously enhancing lesions consistent with \par metastatic disease. The appearance on the T2-weighted images is suggestive \par of adenocarcinoma. There is mass effect upon the left lateral ventricle and \par corpus callosum. \par She received whole brain irradiation by Dr. Dahl, completed it in the beginning of 4/2019, she is currently on steroid taper managed by Dr. Dahl, she is taking 4mg of Decadron daily. She will be stopping the Keppra as there have been no documented h/o seizures. She still reports impairment of the L visual field, she has an appointment coming up with Opthalmology. She reports no deficits walking after she has completed inpatient acute rehabilitation. \par Restaging PET CT on 5/1/2019 reveals COMPARISON : 2/12/2019 \par Multiple new FDG avid omental nodules largest measuring up to 10 mm, \par positive on nonattenuation corrected images. Findings are suggestive of \par biologic tumor activity. \par Again seen is a left upper quadrant peritoneal nodule, SUV max 6.8, \par previously 7.7 (12% decrease). \par Images were personally reviewed by myself and discussed with patient. \par She now reports mild abdominal bloating, no other symptoms. She reports her neuropathy has significantly improved. \par She has first evidence of recurrent disease documented 5.5 months after last carbo/taxol dose, the volume of disease was very small (1 8mm nodule), she was then observed for another 6 months and now she wishes to restart the chemotherapy. \par I have discussed with her that rechallenging with carbo/taxol may still prove to be effective as long as she gets restaged after 2 cycles. Given recent whole brain radiation and neuropathy I will offer her carbo/taxol weekly with does reduction on the taxol for neuropathy, We will plan to run carbo slowly to avoid allergic reaction. \par She is agreeable to start ASAP. \par \par 6/5/2019: Nidhi has completed 1 cycle of weekly Carbo/taxol, ran at slow rate to avoid Carbo reaction and has tolerated chemotherapy without difficulty, she does not complain of increase in neuropathy. She has ongoing vision changes in her L eye, she had no residual LE weakness. She is off Decadron and Keppra. She was advised to see ophthalmology. \par \par 7/3/2019: Nidhi is doing well.  Reports no problems with carbo/taxol.  Still complains of vision changes in L eye but is seeing ophthalmologist on 7/16.  Remains active around the house and cooks regularly.  No fevers, weight loss, anorexia.  ROS is otherwise only significant for occasional loose stools, no diarrhea.\par \par 7/17/2019: Nidhi is doing well, denies worsening neuropathy. She c/o feeling fatigued and tired. Her last chemo was 1 week ago(7/11/19) with carbo/taxol and is due again tomorrow. She saw ophthalmology and will need cataract surgery of the L eye. No c/o chest pain/SOB. No weight loss.\par CT C/A/P was done on 7/9/2019, images were personally reviewed by myself and discussed with several radiology attendings, they revealed \par CHEST:\par Filling defect within a segmental branch of the lower lobe pulmonary artery \par suspicious for pulmonary embolus. \par Stable left upper lobe and right middle lobe 3 mm nodules. \par CT abdomen and pelvis performed on the same day, see separate report. \par ADDENDUM: \par Please note that the morphology of the small thrombus within the left lower \par pulmonary artery is not indicative of an acute thrombus but rather a chronic \par appearance. \par This was discussed with Radiology, it was not deemed that thrombus was acute and may not have even been present at all, finding was deemed to be equivocal, based on radiology assessment anticoagulation was not indicated for the filling defect noted. Initially CTA was recommended, the recommendation was withdrawn upon further review. \par ABDOMEN/PELVIS:\par New 1.3 cm segment IV hepatic hypodensity seen on series 4 image 205. \par Lesion not seen on prior PET/CT from 5/1/2019 or abdomen and pelvis CT from \par \par 2/2/2018 and is consistent with a new metastasis. \par Two other hepatic lesions described above, decreased in size since 2/2/2018, \par consistent with treated hepatic metastases. \par Anterior perisplenic omental nodule measures 0.7 cm on series 2 image 53, \par previously measuring 1.2 cm on PET/CT dated 5/1/2019. \par All the other previously seen omental nodules have resolved since 5/1/2019. \par ADDENDUM:\par Case was discussed with Dr. Cid in detail. It is also possible that the \par new 1.3 cm lesion in the liver since February 2, 2018 represents a treated \par metastasis similar to the other liver lesions. \par PET/CT would be necessary to assess disease activity. \par I have discussed case with Radiology, and it was determined that there was no clear cut evidence of progression. PET CT was ordered today.\par This was discussed with Nidhi, will proceed with Carbo/Taxol for now. \par \par 8/1/2019: Nidhi reports no major side effects from weekly Carbo/Taxol, she reports no neuropathy. She has completed 10 cycles altogether. \par PET CT on 7/24/2019 revealed \par 1. Since May 1, 2019, no definite new site of pathologic FDG uptake to \par suggest new biologic tumor activity; specifically, no pathologic FDG uptake \par within previously noted 1.3 cm lesion within hepatic segment 4. \par 2. Increased FDG uptake within several subcentimeter bilateral cervical \par lymph nodes, which is nonspecific and may be postinflammatory; max SUV 9.2 \par is noted within a 0.7 cm right level 2 cervical node. Attention on follow-up \par is suggested. \par 3. Few peritoneal nodules have overall decreased in size. \par 4. No additional site of pathologic FDG uptake. \par Images were personally reviewed by myself and discussed with Nidhi. \par She wishes to continue with chemotherapy. Will plan for 3 additional cycles. Will consider adding Avastin, but with h/o inconclusive/possible chronic PE ppx anticoagulation maybe necessary. \par \par 8/28/2019: Nidhi reports feeling well, she denies worsening neuropathy, worsening neurological symptoms, SOB, abdominal pain, bloating. She reports she is planned to undergo  eye surgery towards the end of September. We will plan to hold chemotherapy briefly after this cycle to facilitate adequate counts and minimize complications. We again have briefly discussed considering Avastin based therapy, will hold off on this prior to surgery. 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 May proceed without formal PAST, will check CBC prior to procedure, PT and PTT today. \par \par 9/25/19:Nidhi reports feeling well, she denies any changes since last visit, No SOB, abdominal pain, bloating. She reports she is planned to undergo  eye surgery in 9/30/19  CBC reviewed with normal Hemogram . we will hold chem this week , she will resume after Cataract sx . 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 May proceed without formal PAST, will check CBC prior to procedure, PT and PTT today. \par \par 10/4/2019: Nidhi underwent successful eye surgery on 9/30/2019, she reports she can see much better now. Last dose of chemotherapy was administered on 9/20, she then was on hold for surgery. She has completed 5 additional cycles of Carbo/taxol. She is due for restaging. Her disease is only accurately assessed on PET CT, prior attempts to obtain CT scans resulted in additional imaging with PET, as findings were inconclusive. PET CT will be ordered to restage. \par In addition she is due for MRI of the brain. She has had a small amount of weight loss, mild neuropathy is persistent. She offers no additional complaints. \par She will have port placed on Monday, will hold chemotherapy until restaging scans complete. \par \par 10/23/2019: Nidhi feels well, and denies any new symptoms. Her eye surgery was successful, vision is much improved now. She had restaging scans. \par PET CT on 10/18/2019 revealed COMPARISON : 7/24/2019. \par No pathologic uptake to suggest biologic tumor activity. \par MRI of the brain on 10/15/2019 revealed \par Comparison with June 8 and March 21, 2019: (Generally lesions improved \par markedly since March but slightly increased in size since June) \par 1. Lesions previously demonstrated on the study of June 8, 2019 has \par remained stable or minimally increased in size \par 2. Specifically, right frontal opercular lesion measures about 1 cm and \par left posterior inferior temporal lobe lesion measures about 1 cm. These have \par increased since the previous study. \par 3. Small punctiform lesions within the right frontal white matter and left \par frontal sulcus or more apparent than on the study of June 8 \par 4. These lesions are all much smaller than the earlier MRI of March 21, \par 2019. \par 5. No new lesions. \par Images were personally reviewed by myself and discussed with patient. She remains asymptomatic and off the steroids. \par I have also discussed the case with Dr. Dahl. I would like Nidhi to discuss the options with [facility], she maybe a candidate for brain SRS. \par She has been off Carbo/Taxol for a few weeks, she remains Platinum sensitive. I have discussed the option of switching her over to Avastin maintenance, will need to prophylax with low dose Eliquis as well, given questionable finding of small PE in the past. \par Side effects of Avastin were discussed at length, including HNT, proteinuria, small risk of DVT/PE, GI perforations etc. \par She is agreeable to start after [facility] consultation. \par \par 11/5/2019: Nidhi feels OK, she reports acute onset head discomfort that lasts minutes and subsides, the episodes are becoming more frequent 1-2 times a day. She has seen Dr. Dahl, who wishes to hold off on offering SRS to the brain. Plan was to start on Avastin today. Nidhi reports she currently has a lapse in her insurance coverage till 12/2019, I will not be able to start he on ppx dose Eliquis for now, she instead will take baby ASA every other day, to modify risk of DVT/PE. Will plan to switch over to Eliquis at 2.5mg BID once insurance is active. She also contemplated going to [place] with her daughter, I am willing to hold Avastin until she comes back, but Nidhi wishes to start right away, I explained to her that flight may result in complications if that is her wish. She will cancel the trip and start with Avastin today. Side effects have been discussed. \par \par 11/20/2019: Nidhi came for a follow up visit, she reports feeling well, she reports less fatigue. She is s/p first dose of Avastin on 11/5/2019, she tolerated Avastin without difficulty, she is due for the 2nd dose today. \par We communicated CBC result with HGB of 12.3, normal platelet and WBCs. She lost like 5 pounds secondary to gum problems, she will follow up with her dentist this week. She is currently on baby ASA every other day. Continue with single agent Avastin.  \par \par 12/17/2019: Nidhi came for a follow up visit, she reports feeling well, she experienced pruritic rash on last Saturday 12/14/19, which improved over 2 days with Benadryl alone.  Today her skin rash has completely resolved. We will add Solu-Medrol 100 mg IV prior to Avastin. Nidhi reports less fatigue. She is due for the 3rd dose of Avastin today. \par We communicated CBC result with HGB of 12.3, normal platelet and WBCs. \par \par 1/7/2019: Nidhi is doing well, reports good appetite, she has lost 1lb. Reports no GI or pulmonary symptoms. She reports her gums are bleeding occasionally. No new neurological symptoms. She reports no rash after last cycle of Avastin. \par \par 1/28/2020: Nidhi is here for follow up visit, she has no difficulty tolerating Avastin, she reports L sided chronic eye discomfort, neuropathic in nature. Neurontin has been helpful in the past, will reorder this. \par PET CT on 1/22/2020 revealed COMPARISON : 10/18/2019. \par Anterior perisplenic nodule measuring 8 mm is FDG avid, SUV max 8.9, \par consistent with biologic tumor activity. \par MRI of the brain on 1/15/2020 revealed \par In comparison to the previous brain MRI dated 10/15/2019: \par 1. Redemonstrated scattered enhancing lesions consistent with metastatic \par disease, with overall good treatment response since the prior exam. \par 2. No significant interval change in the ovoid lesion in the superior left \par frontal lobe measuring 12 mm. \par 3. Decreased size of the right opercular lesion measuring 6 mm, previously \par 11 mm. Decreased size of the left inferior occipital/tentorial lesion \par measuring 6 mm, previously 11 mm. The previously seen right frontal white \par matter punctate lesion is no longer visualized. \par 4. No new lesions are demonstrated. \par All images were personally reviewed by myself and discussed with Nidhi. I explained to her I am concerned about the perisplenic lesion concerning for disease progression, but it is isolated and very small, asymptomatic. There is definitely a positive response to Avastin in the brain and given that, we will continue with Avastin with short term follow up imaging. Nidhi knows to inform me with any new symptoms immediately. \par \par 2/18/2020: NIDHI BANKS is a 64 year old female here today for follow up visit. She completed 5 cycles of Avastin; tolerating well at this time. She denies fever, chills, change in mental status, or change in weight. She complains of left painful facial swelling. In addition, she complains of swelling of fingers, joint pain and numbness in her toes. She continues on Gabapentin 100mg TID with no symptom relief and wishes to try to go up on the dose. She complains of mild gingival bleeding in the morning. Last PET showed new isolated perisplenic lesion suspicious for disease progression, however MRI of the brain appeared better on Avastin, so the plan was to continue with Avastin with short term follow up. \par \par 3/10/2020: NIDHI BANKS is a 64 year old female here today for follow up visit. She denies new neurological symptoms, skin rash, fever, or change in weight. She continues of Gabapentin which was increased to 200 mg TID; neuropathy improving. She continues to complain of mild gingival bleeding. Left facial swelling and numbness resolved. She has completed cycle 6 of Avastin without complication. \par \par 3/31/66633: Nidhi presented today for routine f/u, she feels fine. Denies any chest pain, sob, fever, chills and cough. Reports that she continues to have gum bleeding. Neuropathy is better with gabapentin, she decreased the dose to 4 pills a day. She tolerated 7th cycle well and is due to get 8th cycle today . She will get MRI brain on 4/2/20.\par She reports minor gum bleeding from Avastin and dental infection on the left, will prescribe Amoxicillin. \par  [FreeTextEntry3] : pruritic reaction  [FreeTextEntry5] : prednisone 25 mg po 2x/ day.

## 2020-04-01 NOTE — PHYSICAL EXAM
[Restricted in physically strenuous activity but ambulatory and able to carry out work of a light or sedentary nature] : Status 1- Restricted in physically strenuous activity but ambulatory and able to carry out work of a light or sedentary nature, e.g., light house work, office work [Normal] : affect appropriate [de-identified] : B/L cataracts [de-identified] : CTA B/L [de-identified] : mild left facial swelling improving

## 2020-04-01 NOTE — RESULTS/DATA
[FreeTextEntry1] : EXAM:  MR BRAIN WAW IC      \par \par \par PROCEDURE DATE:  06/08/2019  \par \par \par \par \par INTERPRETATION:  Clinical History / Reason for exam: Dizziness and \par vertigo, history of uterine and thyroid cancer, for evaluation of \par metastatic disease, lesion seen on prior MRI of the brain.\par \par MRI OF THE BRAIN WITHOUT AND WITH CONTRAST\par \par TECHNIQUE:\par \par Multiplanar multisequence imaging of the brain was performed on the \[facility] open magnet before and after the intravenous administration of \par 7.5 cc of Gadavist including brain lab protocol.\par \par COMPARISON:\par \par MRI of the brain without and with contrast dated March 12, 2019.\par \par FINDINGS:\par \par The previously described supratentorial lesions have almost completely \par resolved.\par \par The lesion in the left posterior frontal region now measures 1.4 cm in \par maximum diameter, the lesion in the anterior right frontal lobe measures \par 0.7 cm in maximum diameter, the second lesion in the right frontal lobe \par measures 0.1 cm in maximum diameter, the lesion in the right posterior \par parietal region measures 0.3 cm in maximum diameter, and the left \par occipital lobe lesion measures 1.2 cm in maximum diameter. (Previously \par measuring 2.6, 2.4, 0.5, 0.7, and 2.5 cm respectively).\par \par The third, fourth, and lateral ventricles are normal in size and \par position. There is no shift of the midline structures.\par \par The cerebellar tonsils are minimally low-lying (0.3 cm of cerebellar \par ectopia).\par \par Incidental note is made of a tiny medial cyst.\par \par There are scattered T2/FLAIR hyperintense signal intensities in the\par subcortical white matter which are nonspecific differential diagnostic\par possibilities include chronic ischemic change, foci of gliosis or\par demyelination.\par \par IMPRESSION:\par \par In comparison with the prior MRI of the brain dated March 21, 2019:\par \par There has been almost complete resolution of most of of the previously \par described supratentorial lesions.\par \par There are no new enhancing lesions.\par \par \par \par \par \par \par CRISTINA MÉNDEZ M.D., ATTENDING RADIOLOGIST\par This document has been electronically signed. Oren 10 2019  1:17PM\par   \par \par   \par \par \par [id number]^RI^DC\par \par EXAM:  PETCT SKUL-THI ONC FDG SUBS      \par \par \par PROCEDURE DATE:  05/01/2019  \par \par \par \par \par INTERPRETATION:  \par FDG PET CT STUDY   Subsequent  treatment strategy\par REASON: TUMOR IMAGING - PET with concurrently acquired CT for attenuation \par correction and anatomic localization; skull base to mid - thigh .\par \par CPT code 15954.\par \par Fasting blood glucose level:     91 mg/dl\par \par HISTORY: Endometrial cancer. Stage IV with brain metastatic disease.\par \par TECHNIQUE: Approximately 45 minutes after the intravenous administration \par of 10.7 mCi 18-Fluorine FDG, whole body PET images were acquired from \par base of skull to mid - thigh.\par \par CT protocol used for this PET/CT study is designed for attenuation \par correction and anatomic localization of PET findings.  This  CT \par is not designed to replace state-of-the-art diagnostic CT scans for \par specific imaging protocols of different body parts.\par \par COMPARISON : 2/12/2019.\par Correlation: MRI of the brain on 3/21/2019.\par \par FINDINGS:\par \par Head/Neck: No biologically active head/neck lesions.     \par Normal uptake within the brain, pharyngeal lymphoid tissue, salivary \par glands and laryngeal musculature is noted.    \par \par Thorax:   No suspicious hypermetabolic mediastinal, hilar, lung \par parenchymal lesions.\par \par Abdomen/Pelvis: Physiologic GI/  activity. \par \par Again seen is a left upper quadrant peritoneal nodule, SUV max 6.8, \par previously 7.7 (12% decrease). \par \par Multiple new FDG avid omental nodules largest of which is adjacent to the \par distal transverse colon, measuring 10 mm, positive on nonattenuation \par corrected images, axial image 134. \par \par No other abnormal visceral or guillaume tracer uptake is present.    \par \par Musculoskeletal :  No suspicious hypermetabolic osseous lesions.\par \par Additional CT findings:\par Status post hysterectomy.\par Colonic diverticulosis.\par \par IMPRESSION: \par \par COMPARISON : 2/12/2019\par \par Multiple new FDG avid omental nodules largest measuring up to 10 mm, \par positive on nonattenuation corrected images. Findings are suggestive of \par biologic tumor activity.\par \par Again seen is a left upper quadrant peritoneal nodule, SUV max 6.8, \par previously 7.7 (12% decrease). \par \par \par \par \par

## 2020-04-21 NOTE — END OF VISIT
[] : Fellow [FreeTextEntry3] : I was physically present for the key portions of the evaluation and management service provided.  I agree with the history and physical, and plan which I have reviewed and edited where appropriate.\par \par

## 2020-04-21 NOTE — ASSESSMENT
[FreeTextEntry1] : Papillary- serous endometrial cancer, stage IV\par --PET CT on 11/29/2017 revealed no clear GYN origin, extensive KEVIN, uptake in the right lung and pleura, thyroid nodule\par --Malignant pleural effusion, resolved\par --L side PleurX catheter was removed on 2/8/2018\par --Completed cycle 8  of carbo/taxol on 6/29/2018.  2 cycles were administered  postoperatively.\par --Restaging PET CT on 9/26/2018 (3 months post completion of chemo 6/29/2018)was essentially negative for recurrent disease\par --Restaging PET CT on 12/18/2018 reveals   New left upper quadrant peritoneal nodule measuring 8 mm with an SUV max  of 7.3. This is suggestive of biologic tumor activity.  No other FDG avid lesions seen throughout the scan.\par --Restaging PET CT on 2/12/2019 revealed slight increase in size of left upper quadrant peritoneal nodule (1.1 cm, \par previously 0.8 cm) with stable FDG uptake, 7.7 SUV suspicious for biologic tumor activity.\par --Case was discussed with Dr. Massey at [facility], biopsy of peritoneal nodule is positive for recurrent endometrial cancer.\par --MRI brain on 4/23/2019 revealed multiple brain mets\par --S/p whole brain radiation completed 4/2019, required acute rehab\par --PET CT on 5/1/2019 subsequently revealed further disease progression \par --Restarted weekly carbo/taxol 3 on 1 off on 5/9/2019, continue with weekly Carbo/Taxol for now.\par --Off Decadron and Keppra since 6/5/2019 \par --Restaging brain MRI on 6/8/2019 showed almost complete resolution of most supratentorial lesions and no new lesions.\par --Restaging CT C/A/P on 7/9/2019 did not reveal definite progression, there was a questionable filling defect suspicious for possible chronic PE/artifact, this was discussed with Radiology at length anticoagulation was not deemed to be necessary\par --PET CT on 7/24/2019 revealed positive response to therapy \par --Carbo/Taxol stopped, last dose 9/13/2019, on hold since, she remains platinum sensitive \par --Restaging PET CT on 10/18/2019 is NAD\par --Restaging MRI of the brain on 10/15/2019 is suggestive for possible disease progression, no new lesions, she remains asymptomatic and off steroids, will follow closely \par --She was referred to [facility] for brain SRS consideration, close observation for now \par --Started on Avastin maintenance 11/5/2019, will consider adding Eliquis ppx for DVT/ PE, for now she will take baby ASA every other day  \par --We sent for prednisone 10 mg po if needed for additional skin issues\par --Patient well aware to contact us if any side effect developed\par --MRI of the brain 1/15/2020 reveals overall good response to Avastin\par --Restaging PET CT on 1/22/2020 reveals single small perisplenic area of activity, suspicious for disease progression, asymptomatic \par --Proceed with cycle 9 of Avastin on 4/21/2020 after weighing risks/benefits with plan for short term follow up imaging \par -- pain is improved with gabapentin\par -- Restaging MRI of brain on 4/2/2020 showed stable metastases; no new enhancing lesions are identified.\par -- Restaging PET CT ordered on 4/21/2020 to evaluate perisplenic activity from previous PET scan \par --Other options discussed is switching to Keytruda/Lenvima combination, this is a consideration in the future \par \par Papillary thyroid cancer [id number], 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 Herrera \par --Follow up with Dr. Acevedo of endocrinology; he is addressing vitamin D, thyroid and diabetes\par \par Cataract of eye\par -- S/p successful surgery on 9/30/19 . \par \par Follow up in 3 weeks\par Patient seen and examined with Dr. Cid who agrees with plan of care.\par \par

## 2020-04-21 NOTE — REVIEW OF SYSTEMS
[Fatigue] : fatigue [Easy Bleeding] : a tendency for easy bleeding [Negative] : Allergic/Immunologic [Recent Change In Weight] : ~T no recent weight change [Lower Ext Edema] : no lower extremity edema [Palpitations] : no palpitations [Chest Pain] : no chest pain [Wheezing] : no wheezing [Shortness Of Breath] : no shortness of breath [Cough] : no cough [FreeTextEntry9] : normal strength in upper and lower extremities  [SOB on Exertion] : no shortness of breath during exertion [de-identified] : mild gingival bleeding [de-identified] : no gait impairment- numbness and swelling of bilateral hands

## 2020-04-21 NOTE — RESULTS/DATA
[FreeTextEntry1] : EXAM:  MR BRAIN WAW IC      \par \par \par PROCEDURE DATE:  06/08/2019  \par \par \par \par \par INTERPRETATION:  Clinical History / Reason for exam: Dizziness and \par vertigo, history of uterine and thyroid cancer, for evaluation of \par metastatic disease, lesion seen on prior MRI of the brain.\par \par MRI OF THE BRAIN WITHOUT AND WITH CONTRAST\par \par TECHNIQUE:\par \par Multiplanar multisequence imaging of the brain was performed on the \[facility] open magnet before and after the intravenous administration of \par 7.5 cc of Gadavist including brain lab protocol.\par \par COMPARISON:\par \par MRI of the brain without and with contrast dated March 12, 2019.\par \par FINDINGS:\par \par The previously described supratentorial lesions have almost completely \par resolved.\par \par The lesion in the left posterior frontal region now measures 1.4 cm in \par maximum diameter, the lesion in the anterior right frontal lobe measures \par 0.7 cm in maximum diameter, the second lesion in the right frontal lobe \par measures 0.1 cm in maximum diameter, the lesion in the right posterior \par parietal region measures 0.3 cm in maximum diameter, and the left \par occipital lobe lesion measures 1.2 cm in maximum diameter. (Previously \par measuring 2.6, 2.4, 0.5, 0.7, and 2.5 cm respectively).\par \par The third, fourth, and lateral ventricles are normal in size and \par position. There is no shift of the midline structures.\par \par The cerebellar tonsils are minimally low-lying (0.3 cm of cerebellar \par ectopia).\par \par Incidental note is made of a tiny medial cyst.\par \par There are scattered T2/FLAIR hyperintense signal intensities in the\par subcortical white matter which are nonspecific differential diagnostic\par possibilities include chronic ischemic change, foci of gliosis or\par demyelination.\par \par IMPRESSION:\par \par In comparison with the prior MRI of the brain dated March 21, 2019:\par \par There has been almost complete resolution of most of of the previously \par described supratentorial lesions.\par \par There are no new enhancing lesions.\par \par \par \par \par \par \par CRISTINA MÉNDEZ M.D., ATTENDING RADIOLOGIST\par This document has been electronically signed. Oren 10 2019  1:17PM\par   \par \par   \par \par \par [id number]^RI^DC\par \par EXAM:  PETCT SKUL-THI ONC FDG SUBS      \par \par \par PROCEDURE DATE:  05/01/2019  \par \par \par \par \par INTERPRETATION:  \par FDG PET CT STUDY   Subsequent  treatment strategy\par REASON: TUMOR IMAGING - PET with concurrently acquired CT for attenuation \par correction and anatomic localization; skull base to mid - thigh .\par \par CPT code 15822.\par \par Fasting blood glucose level:     91 mg/dl\par \par HISTORY: Endometrial cancer. Stage IV with brain metastatic disease.\par \par TECHNIQUE: Approximately 45 minutes after the intravenous administration \par of 10.7 mCi 18-Fluorine FDG, whole body PET images were acquired from \par base of skull to mid - thigh.\par \par CT protocol used for this PET/CT study is designed for attenuation \par correction and anatomic localization of PET findings.  This  CT \par is not designed to replace state-of-the-art diagnostic CT scans for \par specific imaging protocols of different body parts.\par \par COMPARISON : 2/12/2019.\par Correlation: MRI of the brain on 3/21/2019.\par \par FINDINGS:\par \par Head/Neck: No biologically active head/neck lesions.     \par Normal uptake within the brain, pharyngeal lymphoid tissue, salivary \par glands and laryngeal musculature is noted.    \par \par Thorax:   No suspicious hypermetabolic mediastinal, hilar, lung \par parenchymal lesions.\par \par Abdomen/Pelvis: Physiologic GI/  activity. \par \par Again seen is a left upper quadrant peritoneal nodule, SUV max 6.8, \par previously 7.7 (12% decrease). \par \par Multiple new FDG avid omental nodules largest of which is adjacent to the \par distal transverse colon, measuring 10 mm, positive on nonattenuation \par corrected images, axial image 134. \par \par No other abnormal visceral or guillaume tracer uptake is present.    \par \par Musculoskeletal :  No suspicious hypermetabolic osseous lesions.\par \par Additional CT findings:\par Status post hysterectomy.\par Colonic diverticulosis.\par \par IMPRESSION: \par \par COMPARISON : 2/12/2019\par \par Multiple new FDG avid omental nodules largest measuring up to 10 mm, \par positive on nonattenuation corrected images. Findings are suggestive of \par biologic tumor activity.\par \par Again seen is a left upper quadrant peritoneal nodule, SUV max 6.8, \par previously 7.7 (12% decrease). \par \par \par \par \par

## 2020-04-21 NOTE — PHYSICAL EXAM
[Restricted in physically strenuous activity but ambulatory and able to carry out work of a light or sedentary nature] : Status 1- Restricted in physically strenuous activity but ambulatory and able to carry out work of a light or sedentary nature, e.g., light house work, office work [Normal] : affect appropriate [de-identified] : mild left facial swelling improving [de-identified] : CTA B/L [de-identified] : B/L cataracts

## 2020-04-21 NOTE — HISTORY OF PRESENT ILLNESS
[Disease: _____________________] : Disease: [unfilled] [AJCC Stage: ____] : AJCC Stage: [unfilled] [2] : 2, Moderate [de-identified] : Serena is a rodrigue 63 yo female, who has started developing SOB approximately 2 months ago, she went to ER on 11/2/2017 and on CXR was noted to have a large right sided pleural effusion. She underwent a thoracentesis, 1.6L of fluid was drained. 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 IHC was pos for CK7, PAX8, CK5/6 and Ca125, negative for TTF-1/Napsin, calretinin, CK20, mammaglobin, p63, villin, HAM56, GCDFP15, TAY-EP4. \par \par Her visit on 12/1/2017 Serena had an excisional biopsy of cervical node on 12/13/2017 revealing met adenocarcinoma, primary source still was not clear. On 12/14/2017 Serena was admitted with SOB, Pleurx catheter was placed on 12/14/2017. 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 Serena received 1st dose of carbo (auc of 5)/taxol(175mg/m2) on 12/15/2017 without complications, but the next day sustained a fall 2/2 vasovagal episode and developed asymptomatic SAH, that resolved on imaging by 12/21/2017.  [de-identified] : 11/24/2017: She reports some SOB, especially ARZATE today. No fevers, no weight loss (reports some weight gain), no GI,  or GYN symptoms, except for increasing abdominal girth, she reports no blood in the stool or urine and no vaginal bleeding. She reports no CP and no palpitations, she reports worsening nonproductive cough, no hemoptysis. She also reports difficulty lying on left side and lying down flat. \par She reports left on/off facial numbness several months in duration. She had a CT of her head performed in [facility] within 1 year.  Additionally there is a freely mobile left cervical node present on exam, as per patient this was in place for approximately 1 year. \par \par 12/1/2017: Nidhi is here for follow up today. She had a therapeutic thoracentesis in  on 11/27/2017, 2L of fluid were removed, with much improved respiratory status. PET CT and MRI of the brain were done on 11/29/2017, findings were discussed today. There remains no clear primary source of malignancy. We have discussed that the source of tumor may be Mullerian vs lung. regardless of source chemotherapy needs to be initiated ASAP. We have discussed Carbo/taxol regiment that will cover both Mullerian and lung origin. Side effects including myelosuppression, peripheral neuropathy, allergic reaction and others.\par \par 1/2/2017 Since last visit Nidhi had an excisional biopsy of cervical node on 12/13/2017 revealing met adenocarcinoma, primary source still was not clear. On 12/14/2017 Nidhi was admitted with SOB, Pleurx catheter was placed on 12/14/2017. 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 Nidhi received 1st dose of carbo (auc of 5)/taxol(175mg/m2) on 12/15/2017 without complications, but the next day sustained a fall 2/2 vasovagal episode and developed asymptomatic SAH, that resolved on imaging by 12/21/2017. Today Nidhi's SOB has significantly improved. She reports very mild neuropathy in fingertips only. She reports no headache and offers no other complaints. \par \par 1/26/2018: Complains of some neuropathy, otherwise tolerating chemo with no difficulty. \par \par 2/16/2018: restaging CT C/A/P reviewed, significant improvement noted. Will refer to GYN/ONC. Reports slightly worsening neuropathy, not -painful, taking Gabapentin, no other symptoms. For cycle 4 of carbo/taxol today.\par \par 3/9/2018: Nidhi met with Dr. Massey, she is planned for surgery on 5/7/2018, after completion of 6 cycles of carbo/taxol and restaging PET CT ordered for mid April and follow up with Dr. Massey on April 20. She reports worsening neuropathy, and occasional pain and changes in vision in her left eye, eye symptoms come and go and are not very bothersome. She reports no other symptoms. \par \par 3/30/2018; Nidhi is here for cycle 6 of carbo/taxol, she continues to have neuropathy in finger and toes, but offers no other complaints, chemo has been dose reduced. \par \par 4/27/2018: Results of restaging PET CT s/p 6 cycles of carbo/taxol revealed 1. No new areas of abnormal increased uptake is seen to indicate \par biologically active disease.\par \par 2. Persistent PET positive left thyroid mass with a max SUV 33.1, \par previously max SUV 34.8. Further evaluation with thyroid ultrasound and \par if needed fine-needle aspiration biopsy will be helpful.\par \par 3.   PET positive left cervical lymph nodes mainly level 2 on the left \par with a max SUV 5.47previously 18. Minimal increased uptake in the \par bilateral axillary lymph nodes most likely reactive(less than 2.5)\par \par 4. Weakly PET positive, max SUV 2.89 right pleura, previous max SUV 5.3 \par with non-FDG avid right pleural effusion. \par \par 5. Previously FDG avid right and left supraclavicular lymphadenopathy, \par left internal mammary, bilateral paratracheal, para-aortic, para \par vertebral, carinal, mesenteric, right and left iliacs, right inguinal \par lymphadenopathy, small in size and no longer FDG avid.\par \par 6. No abnormal increased uptake is seen in the  lobulated uterus.\par \par 7. Overall there is marked improvement in the FDG avid disease.\par This was reviewed with Nidhi. She met with Dr. Massey on 4/20/2018, surgery is planned for 5/7/2018. She will also joselyn to meet with ENT re FDG avid thyroid nodule. Will assist in making he an appointment for her. \par Persistent neuropathy on gabapentin. \par \par 6/29/18 ; Patient came for follow up visit , evaluation for chemotherapy cycle 8 of carbo / Taxol , patient tolerating medication well , except neuropathy  recommend to have gabapentin  300 mg po daily.\par \par 7/20/2018: Nidhi present for follow up today, she has completed total of 8 cycles of Carbo/Taxol. She reports significant neuropathy in her hands and feet. We will discontinue chemotherapy today and plan to follow with close observation. She has thyroidectomy planned with Dr. Herrera on 8/20/2018, obtaining clearance for PMD. She is also planning to fly to [place] at the end of September. She reports no SOB, no GI or  symptoms. \par \par 9/12/2018: Nidhi offers no significant complaints today. She states neuropathy in her hands has almost completely resolved and neuropathy in her feet is significantly better. She had undergone complete thyroidectomy by Dr. Herrera on 8/20/2018, pathology revealed papillary thyroid cancer, measuring 2 cm, pT1b, other additional foci of papillary carcinoma were also noted, no LVI, surgical margins were clear, LN 0/2 had no carcinoma, stage [id number]. She is following up with Dr. Herrera tomorrow. She has still not gone for her vacation in [place]. \par \par 10/10/2018: Restaging PET CT on 9/26/2018 reveals Since 4/16/2018,  1. Post thyroidectomy with diffuse increased FDG uptake at the thyroid  bed likely representing post-surgical changes.  2. Subtle increased uptake is seen in the lamina , right side at the  level of T8 with a max SUV 4.6. This is not sufficient for metastatic  disease. Bone scan or MRI examination with contrast will be helpful for  further evaluation.  3. No other areas of abnormal increased uptake is seen. Images were personally reviewed by myself and discussed with Nidhi. \par She also had an Echo on 9/24/2018 revealing EF of 63%. \par Nidhi reports ongoing fatigue, she is unable to lose weight. She is taking Synthroid and following with Dr. Acevedo. \par She reports stiff fingers at night only, no painful neuropathy. \par She denies any other symptoms today. \par \par 12/11/2018: Nidhi feels well, neuropathy in hands and feet is much improved. S he is now complaining of r-sided facial pain associated with some swelling, pain comes and goes. She has already seen Dr. Herrera, who ordered CT of sinuses and prescribed pain relief meds. She is also due for restaging PET CT. Nidhi feels well otherwise. \par \par 1/9/2019: Restaging PET CT was performed on 12/18/2018 it revealed COMPARISON : 9/24/2018.  New left upper quadrant peritoneal nodule measuring 8 mm with an SUV max  of 7.3. This is suggestive of biologic tumor activity.  No other FDG avid lesions seen throughout the scan. Images were personally reviewed by myself and discussed with Nidhi, originally over the phone and again today. I have originally suggested to try AI in the interim, Nidhi however reported diarrhea at the time of the scan and declined intervention for now. We will plan for restaging PET CT to be performed in 6-8 weeks, this will be ordered today. She will follow up with Dr. Massey at [place] shortly and bring the disk for his review. I would also like her to meet with genetics, this will be arranged at [place] with Dr. Massey's help. Ndihi reports no new symptoms today, her neuropathy is manageable and  improving. She still reports unilateral headache that was work up in the past. Neurology eval was again suggested to her. She is following closely with Endocrinology re thyroid management. She will have follow up with Dr. Herrera shortly as well. \par \par 2/20/2019: Nidhi offers no new complaints. PET CT from 2/12/2019 revealed \par Since PET/CT of December 19, 2018, no new sites of pathologic FDG uptake\par Slight increase in size of left upper quadrant peritoneal nodule (1.1 cm, \par previously 0.8 cm) with stable FDG uptake, 7.7 SUV suspicious for \par biologic tumor activity.\par No other sites of pathologic FDG uptake \par Images were personally reviewed by myself and discussed with Nidhi, case was also discussed with Dr. Massey at [place]. Nidhi will have follow up with his shortly. She is following with endocrinology. reports improving neuropathy. No GI or  symptoms at this time. No weight loss. \par \par 3/20/2019: Nidhi had a peritoneal nodule biopsy done at [place], I have received a call from Dr. 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 We have discussed that surgery though could be attempted will not be the best therapeutic approach, recommencement of systemic therapy was discussed. I have not received the results from [place] yet. I have briefly discussed this with Nidhi today. Nidhi reports that she has acutely developed right lower extremity weakness 5-6 days ago, she did not inform any of her doctors about this. She also reports that her R upper extremity though not weak "does not feel normal either. She reports no back pain, there is no point tenderness, RLE is objectively weak on exam. I recommend ER evaluation to r/o CVA vs brain met, vs L-spine related issue. Recommend admission for work up. Nidhi is agreeable to get admitted. \par \par 5/8/2019: Nidhi present for follow up, she was diagnosed with multiple metastasis to the brain, MRI was done on 3/21/2019 and revealed \par Multiple supratentorial heterogeneously enhancing lesions consistent with \par metastatic disease. The appearance on the T2-weighted images is suggestive \par of adenocarcinoma. There is mass effect upon the left lateral ventricle and \par corpus callosum. \par She received whole brain irradiation by Dr. Dahl, completed it in the beginning of 4/2019, she is currently on steroid taper managed by Dr. Dahl, she is taking 4mg of Decadron daily. She will be stopping the Keppra as there have been no documented h/o seizures. She still reports impairment of the L visual field, she has an appointment coming up with Opthalmology. She reports no deficits walking after she has completed inpatient acute rehabilitation. \par Restaging PET CT on 5/1/2019 reveals COMPARISON : 2/12/2019 \par Multiple new FDG avid omental nodules largest measuring up to 10 mm, \par positive on nonattenuation corrected images. Findings are suggestive of \par biologic tumor activity. \par Again seen is a left upper quadrant peritoneal nodule, SUV max 6.8, \par previously 7.7 (12% decrease). \par Images were personally reviewed by myself and discussed with patient. \par She now reports mild abdominal bloating, no other symptoms. She reports her neuropathy has significantly improved. \par She has first evidence of recurrent disease documented 5.5 months after last carbo/taxol dose, the volume of disease was very small (1 8mm nodule), she was then observed for another 6 months and now she wishes to restart the chemotherapy. \par I have discussed with her that rechallenging with carbo/taxol may still prove to be effective as long as she gets restaged after 2 cycles. Given recent whole brain radiation and neuropathy I will offer her carbo/taxol weekly with does reduction on the taxol for neuropathy, We will plan to run carbo slowly to avoid allergic reaction. \par She is agreeable to start ASAP. \par \par 6/5/2019: Nidhi has completed 1 cycle of weekly Carbo/taxol, ran at slow rate to avoid Carbo reaction and has tolerated chemotherapy without difficulty, she does not complain of increase in neuropathy. She has ongoing vision changes in her L eye, she had no residual LE weakness. She is off Decadron and Keppra. She was advised to see ophthalmology. \par \par 7/3/2019: Nidhi is doing well.  Reports no problems with carbo/taxol.  Still complains of vision changes in L eye but is seeing ophthalmologist on 7/16.  Remains active around the house and cooks regularly.  No fevers, weight loss, anorexia.  ROS is otherwise only significant for occasional loose stools, no diarrhea.\par \par 7/17/2019: Nidhi is doing well, denies worsening neuropathy. She c/o feeling fatigued and tired. Her last chemo was 1 week ago(7/11/19) with carbo/taxol and is due again tomorrow. She saw ophthalmology and will need cataract surgery of the L eye. No c/o chest pain/SOB. No weight loss.\par CT C/A/P was done on 7/9/2019, images were personally reviewed by myself and discussed with several radiology attendings, they revealed \par CHEST:\par Filling defect within a segmental branch of the lower lobe pulmonary artery \par suspicious for pulmonary embolus. \par Stable left upper lobe and right middle lobe 3 mm nodules. \par CT abdomen and pelvis performed on the same day, see separate report. \par ADDENDUM: \par Please note that the morphology of the small thrombus within the left lower \par pulmonary artery is not indicative of an acute thrombus but rather a chronic \par appearance. \par This was discussed with Radiology, it was not deemed that thrombus was acute and may not have even been present at all, finding was deemed to be equivocal, based on radiology assessment anticoagulation was not indicated for the filling defect noted. Initially CTA was recommended, the recommendation was withdrawn upon further review. \par ABDOMEN/PELVIS:\par New 1.3 cm segment IV hepatic hypodensity seen on series 4 image 205. \par Lesion not seen on prior PET/CT from 5/1/2019 or abdomen and pelvis CT from \par \par 2/2/2018 and is consistent with a new metastasis. \par Two other hepatic lesions described above, decreased in size since 2/2/2018, \par consistent with treated hepatic metastases. \par Anterior perisplenic omental nodule measures 0.7 cm on series 2 image 53, \par previously measuring 1.2 cm on PET/CT dated 5/1/2019. \par All the other previously seen omental nodules have resolved since 5/1/2019. \par ADDENDUM:\par Case was discussed with Dr. Cid in detail. It is also possible that the \par new 1.3 cm lesion in the liver since February 2, 2018 represents a treated \par metastasis similar to the other liver lesions. \par PET/CT would be necessary to assess disease activity. \par I have discussed case with Radiology, and it was determined that there was no clear cut evidence of progression. PET CT was ordered today.\par This was discussed with Nidhi, will proceed with Carbo/Taxol for now. \par \par 8/1/2019: Nidhi reports no major side effects from weekly Carbo/Taxol, she reports no neuropathy. She has completed 10 cycles altogether. \par PET CT on 7/24/2019 revealed \par 1. Since May 1, 2019, no definite new site of pathologic FDG uptake to \par suggest new biologic tumor activity; specifically, no pathologic FDG uptake \par within previously noted 1.3 cm lesion within hepatic segment 4. \par 2. Increased FDG uptake within several subcentimeter bilateral cervical \par lymph nodes, which is nonspecific and may be postinflammatory; max SUV 9.2 \par is noted within a 0.7 cm right level 2 cervical node. Attention on follow-up \par is suggested. \par 3. Few peritoneal nodules have overall decreased in size. \par 4. No additional site of pathologic FDG uptake. \par Images were personally reviewed by myself and discussed with Nidhi. \par She wishes to continue with chemotherapy. Will plan for 3 additional cycles. Will consider adding Avastin, but with h/o inconclusive/possible chronic PE ppx anticoagulation maybe necessary. \par \par 8/28/2019: Nidhi reports feeling well, she denies worsening neuropathy, worsening neurological symptoms, SOB, abdominal pain, bloating. She reports she is planned to undergo  eye surgery towards the end of September. We will plan to hold chemotherapy briefly after this cycle to facilitate adequate counts and minimize complications. We again have briefly discussed considering Avastin based therapy, will hold off on this prior to surgery. 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 May proceed without formal PAST, will check CBC prior to procedure, PT and PTT today. \par \par 9/25/19:Nidhi reports feeling well, she denies any changes since last visit, No SOB, abdominal pain, bloating. She reports she is planned to undergo  eye surgery in 9/30/19  CBC reviewed with normal Hemogram . we will hold chem this week , she will resume after Cataract sx . 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 May proceed without formal PAST, will check CBC prior to procedure, PT and PTT today. \par \par 10/4/2019: Nidhi underwent successful eye surgery on 9/30/2019, she reports she can see much better now. Last dose of chemotherapy was administered on 9/20, she then was on hold for surgery. She has completed 5 additional cycles of Carbo/taxol. She is due for restaging. Her disease is only accurately assessed on PET CT, prior attempts to obtain CT scans resulted in additional imaging with PET, as findings were inconclusive. PET CT will be ordered to restage. \par In addition she is due for MRI of the brain. She has had a small amount of weight loss, mild neuropathy is persistent. She offers no additional complaints. \par She will have port placed on Monday, will hold chemotherapy until restaging scans complete. \par \par 10/23/2019: Nidhi feels well, and denies any new symptoms. Her eye surgery was successful, vision is much improved now. She had restaging scans. \par PET CT on 10/18/2019 revealed COMPARISON : 7/24/2019. \par No pathologic uptake to suggest biologic tumor activity. \par MRI of the brain on 10/15/2019 revealed \par Comparison with June 8 and March 21, 2019: (Generally lesions improved \par markedly since March but slightly increased in size since June) \par 1. Lesions previously demonstrated on the study of June 8, 2019 has \par remained stable or minimally increased in size \par 2. Specifically, right frontal opercular lesion measures about 1 cm and \par left posterior inferior temporal lobe lesion measures about 1 cm. These have \par increased since the previous study. \par 3. Small punctiform lesions within the right frontal white matter and left \par frontal sulcus or more apparent than on the study of June 8 \par 4. These lesions are all much smaller than the earlier MRI of March 21, \par 2019. \par 5. No new lesions. \par Images were personally reviewed by myself and discussed with patient. She remains asymptomatic and off the steroids. \par I have also discussed the case with Dr. Dahl. I would like Nidhi to discuss the options with [facility], she maybe a candidate for brain SRS. \par She has been off Carbo/Taxol for a few weeks, she remains Platinum sensitive. I have discussed the option of switching her over to Avastin maintenance, will need to prophylax with low dose Eliquis as well, given questionable finding of small PE in the past. \par Side effects of Avastin were discussed at length, including HNT, proteinuria, small risk of DVT/PE, GI perforations etc. \par She is agreeable to start after [facility] consultation. \par \par 11/5/2019: Nidhi feels OK, she reports acute onset head discomfort that lasts minutes and subsides, the episodes are becoming more frequent 1-2 times a day. She has seen Dr. Dahl, who wishes to hold off on offering SRS to the brain. Plan was to start on Avastin today. Nidhi reports she currently has a lapse in her insurance coverage till 12/2019, I will not be able to start he on ppx dose Eliquis for now, she instead will take baby ASA every other day, to modify risk of DVT/PE. Will plan to switch over to Eliquis at 2.5mg BID once insurance is active. She also contemplated going to [place] with her daughter, I am willing to hold Avastin until she comes back, but Nidhi wishes to start right away, I explained to her that flight may result in complications if that is her wish. She will cancel the trip and start with Avastin today. Side effects have been discussed. \par \par 11/20/2019: Nidhi came for a follow up visit, she reports feeling well, she reports less fatigue. She is s/p first dose of Avastin on 11/5/2019, she tolerated Avastin without difficulty, she is due for the 2nd dose today. \par We communicated CBC result with HGB of 12.3, normal platelet and WBCs. She lost like 5 pounds secondary to gum problems, she will follow up with her dentist this week. She is currently on baby ASA every other day. Continue with single agent Avastin.  \par \par 12/17/2019: Nidhi came for a follow up visit, she reports feeling well, she experienced pruritic rash on last Saturday 12/14/19, which improved over 2 days with Benadryl alone.  Today her skin rash has completely resolved. We will add Solu-Medrol 100 mg IV prior to Avastin. Nidhi reports less fatigue. She is due for the 3rd dose of Avastin today. \par We communicated CBC result with HGB of 12.3, normal platelet and WBCs. \par \par 1/7/2019: Nidhi is doing well, reports good appetite, she has lost 1lb. Reports no GI or pulmonary symptoms. She reports her gums are bleeding occasionally. No new neurological symptoms. She reports no rash after last cycle of Avastin. \par \par 1/28/2020: Nidhi is here for follow up visit, she has no difficulty tolerating Avastin, she reports L sided chronic eye discomfort, neuropathic in nature. Neurontin has been helpful in the past, will reorder this. \par PET CT on 1/22/2020 revealed COMPARISON : 10/18/2019. \par Anterior perisplenic nodule measuring 8 mm is FDG avid, SUV max 8.9, \par consistent with biologic tumor activity. \par MRI of the brain on 1/15/2020 revealed \par In comparison to the previous brain MRI dated 10/15/2019: \par 1. Redemonstrated scattered enhancing lesions consistent with metastatic \par disease, with overall good treatment response since the prior exam. \par 2. No significant interval change in the ovoid lesion in the superior left \par frontal lobe measuring 12 mm. \par 3. Decreased size of the right opercular lesion measuring 6 mm, previously \par 11 mm. Decreased size of the left inferior occipital/tentorial lesion \par measuring 6 mm, previously 11 mm. The previously seen right frontal white \par matter punctate lesion is no longer visualized. \par 4. No new lesions are demonstrated. \par All images were personally reviewed by myself and discussed with Nidhi. I explained to her I am concerned about the perisplenic lesion concerning for disease progression, but it is isolated and very small, asymptomatic. There is definitely a positive response to Avastin in the brain and given that, we will continue with Avastin with short term follow up imaging. Nidhi knows to inform me with any new symptoms immediately. \par \par 2/18/2020: NIDHI BANKS is a 64 year old female here today for follow up visit. She completed 5 cycles of Avastin; tolerating well at this time. She denies fever, chills, change in mental status, or change in weight. She complains of left painful facial swelling. In addition, she complains of swelling of fingers, joint pain and numbness in her toes. She continues on Gabapentin 100mg TID with no symptom relief and wishes to try to go up on the dose. She complains of mild gingival bleeding in the morning. Last PET showed new isolated perisplenic lesion suspicious for disease progression, however MRI of the brain appeared better on Avastin, so the plan was to continue with Avastin with short term follow up. \par \par 3/10/2020: NIDHI BANKS is a 64 year old female here today for follow up visit. She denies new neurological symptoms, skin rash, fever, or change in weight. She continues of Gabapentin which was increased to 200 mg TID; neuropathy improving. She continues to complain of mild gingival bleeding. Left facial swelling and numbness resolved. She has completed cycle 6 of Avastin without complication. \par \par 3/31/67119: Nidhi presented today for routine f/u, she feels fine. Denies any chest pain, sob, fever, chills and cough. Reports that she continues to have gum bleeding. Neuropathy is better with gabapentin, she decreased the dose to 4 pills a day. She tolerated 7th cycle well and is due to get 8th cycle today . She will get MRI brain on 4/2/20.\par She reports minor gum bleeding from Avastin and dental infection on the left, will prescribe Amoxicillin. \par \par 4/21/2020: NIDHI BANKS is a 64 year old female here today for follow up visit for endometrial cancer. S/p cycle 8 of Avastin. Patient denies fever, chills, SOB, cough and pain. She states that her gums have minimal bleeding and dental infection improved after amoxicillin. She complains of mild fatigue. Restaging MRI of the brain was completed on 4/2/2020 which showed stable bilateral parenchymal hemorrhagic lesions compatible with known metastases; no new enhancing lesions are identified. \par Images were personally reviewed by MD Jose Roberto and discussed with patient. \par \par \par  [de-identified] : KRas WT, EGFR WT, Alk WT, ROS1 WT, PDL1-1%\par Normal MMR protein expression [FreeTextEntry3] : pruritic reaction  [FreeTextEntry5] : prednisone 25 mg po 2x/ day.

## 2020-05-13 NOTE — PHYSICAL EXAM
[Restricted in physically strenuous activity but ambulatory and able to carry out work of a light or sedentary nature] : Status 1- Restricted in physically strenuous activity but ambulatory and able to carry out work of a light or sedentary nature, e.g., light house work, office work [Normal] : grossly intact [de-identified] : B/L cataracts [de-identified] : CTA B/L [de-identified] : mild left facial swelling improving

## 2020-05-13 NOTE — HISTORY OF PRESENT ILLNESS
[Disease: _____________________] : Disease: [unfilled] [AJCC Stage: ____] : AJCC Stage: [unfilled] [2] : 2, Moderate [de-identified] : Serena is a rodrigue 65 yo female, who has started developing SOB approximately 2 months ago, she went to ER on 11/2/2017 and on CXR was noted to have a large right sided pleural effusion. She underwent a thoracentesis, 1.6L of fluid was drained. 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 IHC was pos for CK7, PAX8, CK5/6 and Ca125, negative for TTF-1/Napsin, calretinin, CK20, mammaglobin, p63, villin, HAM56, GCDFP15, TAY-EP4. \par \par Her visit on 12/1/2017 Serena had an excisional biopsy of cervical node on 12/13/2017 revealing met adenocarcinoma, primary source still was not clear. On 12/14/2017 Serena was admitted with SOB, Pleurx catheter was placed on 12/14/2017. 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 Serena received 1st dose of carbo (auc of 5)/taxol(175mg/m2) on 12/15/2017 without complications, but the next day sustained a fall 2/2 vasovagal episode and developed asymptomatic SAH, that resolved on imaging by 12/21/2017.  [de-identified] : KRas WT, EGFR WT, Alk WT, ROS1 WT, PDL1-1%\par Normal MMR protein expression [de-identified] : 11/24/2017: She reports some SOB, especially ARZATE today. No fevers, no weight loss (reports some weight gain), no GI,  or GYN symptoms, except for increasing abdominal girth, she reports no blood in the stool or urine and no vaginal bleeding. She reports no CP and no palpitations, she reports worsening nonproductive cough, no hemoptysis. She also reports difficulty lying on left side and lying down flat. \par She reports left on/off facial numbness several months in duration. She had a CT of her head performed in [facility] within 1 year.  Additionally there is a freely mobile left cervical node present on exam, as per patient this was in place for approximately 1 year. \par \par 12/1/2017: Nidhi is here for follow up today. She had a therapeutic thoracentesis in  on 11/27/2017, 2L of fluid were removed, with much improved respiratory status. PET CT and MRI of the brain were done on 11/29/2017, findings were discussed today. There remains no clear primary source of malignancy. We have discussed that the source of tumor may be Mullerian vs lung. regardless of source chemotherapy needs to be initiated ASAP. We have discussed Carbo/taxol regiment that will cover both Mullerian and lung origin. Side effects including myelosuppression, peripheral neuropathy, allergic reaction and others.\par \par 1/2/2017 Since last visit Nidhi had an excisional biopsy of cervical node on 12/13/2017 revealing met adenocarcinoma, primary source still was not clear. On 12/14/2017 Nidhi was admitted with SOB, Pleurx catheter was placed on 12/14/2017. 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 Nidhi received 1st dose of carbo (auc of 5)/taxol(175mg/m2) on 12/15/2017 without complications, but the next day sustained a fall 2/2 vasovagal episode and developed asymptomatic SAH, that resolved on imaging by 12/21/2017. Today Nidhi's SOB has significantly improved. She reports very mild neuropathy in fingertips only. She reports no headache and offers no other complaints. \par \par 1/26/2018: Complains of some neuropathy, otherwise tolerating chemo with no difficulty. \par \par 2/16/2018: restaging CT C/A/P reviewed, significant improvement noted. Will refer to GYN/ONC. Reports slightly worsening neuropathy, not -painful, taking Gabapentin, no other symptoms. For cycle 4 of carbo/taxol today.\par \par 3/9/2018: Nidhi met with Dr. Massey, she is planned for surgery on 5/7/2018, after completion of 6 cycles of carbo/taxol and restaging PET CT ordered for mid April and follow up with Dr. Massey on April 20. She reports worsening neuropathy, and occasional pain and changes in vision in her left eye, eye symptoms come and go and are not very bothersome. She reports no other symptoms. \par \par 3/30/2018; Nidhi is here for cycle 6 of carbo/taxol, she continues to have neuropathy in finger and toes, but offers no other complaints, chemo has been dose reduced. \par \par 4/27/2018: Results of restaging PET CT s/p 6 cycles of carbo/taxol revealed 1. No new areas of abnormal increased uptake is seen to indicate \par biologically active disease.\par \par 2. Persistent PET positive left thyroid mass with a max SUV 33.1, \par previously max SUV 34.8. Further evaluation with thyroid ultrasound and \par if needed fine-needle aspiration biopsy will be helpful.\par \par 3.   PET positive left cervical lymph nodes mainly level 2 on the left \par with a max SUV 5.47previously 18. Minimal increased uptake in the \par bilateral axillary lymph nodes most likely reactive(less than 2.5)\par \par 4. Weakly PET positive, max SUV 2.89 right pleura, previous max SUV 5.3 \par with non-FDG avid right pleural effusion. \par \par 5. Previously FDG avid right and left supraclavicular lymphadenopathy, \par left internal mammary, bilateral paratracheal, para-aortic, para \par vertebral, carinal, mesenteric, right and left iliacs, right inguinal \par lymphadenopathy, small in size and no longer FDG avid.\par \par 6. No abnormal increased uptake is seen in the  lobulated uterus.\par \par 7. Overall there is marked improvement in the FDG avid disease.\par This was reviewed with Nidhi. She met with Dr. Massey on 4/20/2018, surgery is planned for 5/7/2018. She will also joselyn to meet with ENT re FDG avid thyroid nodule. Will assist in making he an appointment for her. \par Persistent neuropathy on gabapentin. \par \par 6/29/18 ; Patient came for follow up visit , evaluation for chemotherapy cycle 8 of carbo / Taxol , patient tolerating medication well , except neuropathy  recommend to have gabapentin  300 mg po daily.\par \par 7/20/2018: Nidhi present for follow up today, she has completed total of 8 cycles of Carbo/Taxol. She reports significant neuropathy in her hands and feet. We will discontinue chemotherapy today and plan to follow with close observation. She has thyroidectomy planned with Dr. Herrera on 8/20/2018, obtaining clearance for PMD. She is also planning to fly to [place] at the end of September. She reports no SOB, no GI or  symptoms. \par \par 9/12/2018: Nidhi offers no significant complaints today. She states neuropathy in her hands has almost completely resolved and neuropathy in her feet is significantly better. She had undergone complete thyroidectomy by Dr. Herrera on 8/20/2018, pathology revealed papillary thyroid cancer, measuring 2 cm, pT1b, other additional foci of papillary carcinoma were also noted, no LVI, surgical margins were clear, LN 0/2 had no carcinoma, stage [id number]. She is following up with Dr. Herrera tomorrow. She has still not gone for her vacation in [place]. \par \par 10/10/2018: Restaging PET CT on 9/26/2018 reveals Since 4/16/2018,  1. Post thyroidectomy with diffuse increased FDG uptake at the thyroid  bed likely representing post-surgical changes.  2. Subtle increased uptake is seen in the lamina , right side at the  level of T8 with a max SUV 4.6. This is not sufficient for metastatic  disease. Bone scan or MRI examination with contrast will be helpful for  further evaluation.  3. No other areas of abnormal increased uptake is seen. Images were personally reviewed by myself and discussed with Nidhi. \par She also had an Echo on 9/24/2018 revealing EF of 63%. \par Nidhi reports ongoing fatigue, she is unable to lose weight. She is taking Synthroid and following with Dr. Acevedo. \par She reports stiff fingers at night only, no painful neuropathy. \par She denies any other symptoms today. \par \par 12/11/2018: Nidhi feels well, neuropathy in hands and feet is much improved. S he is now complaining of r-sided facial pain associated with some swelling, pain comes and goes. She has already seen Dr. Herrera, who ordered CT of sinuses and prescribed pain relief meds. She is also due for restaging PET CT. Nidhi feels well otherwise. \par \par 1/9/2019: Restaging PET CT was performed on 12/18/2018 it revealed COMPARISON : 9/24/2018.  New left upper quadrant peritoneal nodule measuring 8 mm with an SUV max  of 7.3. This is suggestive of biologic tumor activity.  No other FDG avid lesions seen throughout the scan. Images were personally reviewed by myself and discussed with Nidhi, originally over the phone and again today. I have originally suggested to try AI in the interim, Nidhi however reported diarrhea at the time of the scan and declined intervention for now. We will plan for restaging PET CT to be performed in 6-8 weeks, this will be ordered today. She will follow up with Dr. Massey at [place] shortly and bring the disk for his review. I would also like her to meet with genetics, this will be arranged at [place] with Dr. Massey's help. Nidhi reports no new symptoms today, her neuropathy is manageable and  improving. She still reports unilateral headache that was work up in the past. Neurology eval was again suggested to her. She is following closely with Endocrinology re thyroid management. She will have follow up with Dr. Herrera shortly as well. \par \par 2/20/2019: Nidhi offers no new complaints. PET CT from 2/12/2019 revealed \par Since PET/CT of December 19, 2018, no new sites of pathologic FDG uptake\par Slight increase in size of left upper quadrant peritoneal nodule (1.1 cm, \par previously 0.8 cm) with stable FDG uptake, 7.7 SUV suspicious for \par biologic tumor activity.\par No other sites of pathologic FDG uptake \par Images were personally reviewed by myself and discussed with Nidhi, case was also discussed with Dr. Massey at [place]. Nidhi will have follow up with his shortly. She is following with endocrinology. reports improving neuropathy. No GI or  symptoms at this time. No weight loss. \par \par 3/20/2019: Nidhi had a peritoneal nodule biopsy done at [place], I have received a call from Dr. 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 We have discussed that surgery though could be attempted will not be the best therapeutic approach, recommencement of systemic therapy was discussed. I have not received the results from [facility] [place] yet. I have briefly discussed this with Nidhi today. Nidhi reports that she has acutely developed right lower extremity weakness 5-6 days ago, she did not inform any of her doctors about this. She also reports that her R upper extremity though not weak "does not feel normal either. She reports no back pain, there is no point tenderness, RLE is objectively weak on exam. I recommend ER evaluation to r/o CVA vs brain met, vs L-spine related issue. Recommend admission for work up. Nidhi is agreeable to get admitted. \par \par 5/8/2019: Nidhi present for follow up, she was diagnosed with multiple metastasis to the brain, MRI was done on 3/21/2019 and revealed \par Multiple supratentorial heterogeneously enhancing lesions consistent with \par metastatic disease. The appearance on the T2-weighted images is suggestive \par of adenocarcinoma. There is mass effect upon the left lateral ventricle and \par corpus callosum. \par She received whole brain irradiation by Dr. Dahl, completed it in the beginning of 4/2019, she is currently on steroid taper managed by Dr. Dahl, she is taking 4mg of Decadron daily. She will be stopping the Keppra as there have been no documented h/o seizures. She still reports impairment of the L visual field, she has an appointment coming up with Opthalmology. She reports no deficits walking after she has completed inpatient acute rehabilitation. \par Restaging PET CT on 5/1/2019 reveals COMPARISON : 2/12/2019 \par Multiple new FDG avid omental nodules largest measuring up to 10 mm, \par positive on nonattenuation corrected images. Findings are suggestive of \par biologic tumor activity. \par Again seen is a left upper quadrant peritoneal nodule, SUV max 6.8, \par previously 7.7 (12% decrease). \par Images were personally reviewed by myself and discussed with patient. \par She now reports mild abdominal bloating, no other symptoms. She reports her neuropathy has significantly improved. \par She has first evidence of recurrent disease documented 5.5 months after last carbo/taxol dose, the volume of disease was very small (1 8mm nodule), she was then observed for another 6 months and now she wishes to restart the chemotherapy. \par I have discussed with her that rechallenging with carbo/taxol may still prove to be effective as long as she gets restaged after 2 cycles. Given recent whole brain radiation and neuropathy I will offer her carbo/taxol weekly with does reduction on the taxol for neuropathy, We will plan to run carbo slowly to avoid allergic reaction. \par She is agreeable to start ASAP. \par \par 6/5/2019: Nidhi has completed 1 cycle of weekly Carbo/taxol, ran at slow rate to avoid Carbo reaction and has tolerated chemotherapy without difficulty, she does not complain of increase in neuropathy. She has ongoing vision changes in her L eye, she had no residual LE weakness. She is off Decadron and Keppra. She was advised to see ophthalmology. \par \par 7/3/2019: Nidhi is doing well.  Reports no problems with carbo/taxol.  Still complains of vision changes in L eye but is seeing ophthalmologist on 7/16.  Remains active around the house and cooks regularly.  No fevers, weight loss, anorexia.  ROS is otherwise only significant for occasional loose stools, no diarrhea.\par \par 7/17/2019: Nidhi is doing well, denies worsening neuropathy. She c/o feeling fatigued and tired. Her last chemo was 1 week ago(7/11/19) with carbo/taxol and is due again tomorrow. She saw ophthalmology and will need cataract surgery of the L eye. No c/o chest pain/SOB. No weight loss.\par CT C/A/P was done on 7/9/2019, images were personally reviewed by myself and discussed with several radiology attendings, they revealed \par CHEST:\par Filling defect within a segmental branch of the lower lobe pulmonary artery \par suspicious for pulmonary embolus. \par Stable left upper lobe and right middle lobe 3 mm nodules. \par CT abdomen and pelvis performed on the same day, see separate report. \par ADDENDUM: \par Please note that the morphology of the small thrombus within the left lower \par pulmonary artery is not indicative of an acute thrombus but rather a chronic \par appearance. \par This was discussed with Radiology, it was not deemed that thrombus was acute and may not have even been present at all, finding was deemed to be equivocal, based on radiology assessment anticoagulation was not indicated for the filling defect noted. Initially CTA was recommended, the recommendation was withdrawn upon further review. \par ABDOMEN/PELVIS:\par New 1.3 cm segment IV hepatic hypodensity seen on series 4 image 205. \par Lesion not seen on prior PET/CT from 5/1/2019 or abdomen and pelvis CT from \par \par 2/2/2018 and is consistent with a new metastasis. \par Two other hepatic lesions described above, decreased in size since 2/2/2018, \par consistent with treated hepatic metastases. \par Anterior perisplenic omental nodule measures 0.7 cm on series 2 image 53, \par previously measuring 1.2 cm on PET/CT dated 5/1/2019. \par All the other previously seen omental nodules have resolved since 5/1/2019. \par ADDENDUM:\par Case was discussed with Dr. Cid in detail. It is also possible that the \par new 1.3 cm lesion in the liver since February 2, 2018 represents a treated \par metastasis similar to the other liver lesions. \par PET/CT would be necessary to assess disease activity. \par I have discussed case with Radiology, and it was determined that there was no clear cut evidence of progression. PET CT was ordered today.\par This was discussed with Nidhi, will proceed with Carbo/Taxol for now. \par \par 8/1/2019: Nidhi reports no major side effects from weekly Carbo/Taxol, she reports no neuropathy. She has completed 10 cycles altogether. \par PET CT on 7/24/2019 revealed \par 1. Since May 1, 2019, no definite new site of pathologic FDG uptake to \par suggest new biologic tumor activity; specifically, no pathologic FDG uptake \par within previously noted 1.3 cm lesion within hepatic segment 4. \par 2. Increased FDG uptake within several subcentimeter bilateral cervical \par lymph nodes, which is nonspecific and may be postinflammatory; max SUV 9.2 \par is noted within a 0.7 cm right level 2 cervical node. Attention on follow-up \par is suggested. \par 3. Few peritoneal nodules have overall decreased in size. \par 4. No additional site of pathologic FDG uptake. \par Images were personally reviewed by myself and discussed with Nidhi. \par She wishes to continue with chemotherapy. Will plan for 3 additional cycles. Will consider adding Avastin, but with h/o inconclusive/possible chronic PE ppx anticoagulation maybe necessary. \par \par 8/28/2019: Nidhi reports feeling well, she denies worsening neuropathy, worsening neurological symptoms, SOB, abdominal pain, bloating. She reports she is planned to undergo  eye surgery towards the end of September. We will plan to hold chemotherapy briefly after this cycle to facilitate adequate counts and minimize complications. We again have briefly discussed considering Avastin based therapy, will hold off on this prior to surgery. 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 May proceed without formal PAST, will check CBC prior to procedure, PT and PTT today. \par \par 9/25/19:Nidhi reports feeling well, she denies any changes since last visit, No SOB, abdominal pain, bloating. She reports she is planned to undergo  eye surgery in 9/30/19  CBC reviewed with normal Hemogram . we will hold chem this week , she will resume after Cataract sx . 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 May proceed without formal PAST, will check CBC prior to procedure, PT and PTT today. \par \par 10/4/2019: Nidhi underwent successful eye surgery on 9/30/2019, she reports she can see much better now. Last dose of chemotherapy was administered on 9/20, she then was on hold for surgery. She has completed 5 additional cycles of Carbo/taxol. She is due for restaging. Her disease is only accurately assessed on PET CT, prior attempts to obtain CT scans resulted in additional imaging with PET, as findings were inconclusive. PET CT will be ordered to restage. \par In addition she is due for MRI of the brain. She has had a small amount of weight loss, mild neuropathy is persistent. She offers no additional complaints. \par She will have port placed on Monday, will hold chemotherapy until restaging scans complete. \par \par 10/23/2019: Nidhi feels well, and denies any new symptoms. Her eye surgery was successful, vision is much improved now. She had restaging scans. \par PET CT on 10/18/2019 revealed COMPARISON : 7/24/2019. \par No pathologic uptake to suggest biologic tumor activity. \par MRI of the brain on 10/15/2019 revealed \par Comparison with June 8 and March 21, 2019: (Generally lesions improved \par markedly since March but slightly increased in size since June) \par 1. Lesions previously demonstrated on the study of June 8, 2019 has \par remained stable or minimally increased in size \par 2. Specifically, right frontal opercular lesion measures about 1 cm and \par left posterior inferior temporal lobe lesion measures about 1 cm. These have \par increased since the previous study. \par 3. Small punctiform lesions within the right frontal white matter and left \par frontal sulcus or more apparent than on the study of June 8 \par 4. These lesions are all much smaller than the earlier MRI of March 21, \par 2019. \par 5. No new lesions. \par Images were personally reviewed by myself and discussed with patient. She remains asymptomatic and off the steroids. \par I have also discussed the case with Dr. Dahl. I would like Nidhi to discuss the options with [facility], she maybe a candidate for brain SRS. \par She has been off Carbo/Taxol for a few weeks, she remains Platinum sensitive. I have discussed the option of switching her over to Avastin maintenance, will need to prophylax with low dose Eliquis as well, given questionable finding of small PE in the past. \par Side effects of Avastin were discussed at length, including HNT, proteinuria, small risk of DVT/PE, GI perforations etc. \par She is agreeable to start after [facility] consultation. \par \par 11/5/2019: Nidhi feels OK, she reports acute onset head discomfort that lasts minutes and subsides, the episodes are becoming more frequent 1-2 times a day. She has seen Dr. Dahl, who wishes to hold off on offering SRS to the brain. Plan was to start on Avastin today. Nidhi reports she currently has a lapse in her insurance coverage till 12/2019, I will not be able to start he on ppx dose Eliquis for now, she instead will take baby ASA every other day, to modify risk of DVT/PE. Will plan to switch over to Eliquis at 2.5mg BID once insurance is active. She also contemplated going to [place] with her daughter, I am willing to hold Avastin until she comes back, but Nidhi wishes to start right away, I explained to her that flight may result in complications if that is her wish. She will cancel the trip and start with Avastin today. Side effects have been discussed. \par \par 11/20/2019: Nidhi came for a follow up visit, she reports feeling well, she reports less fatigue. She is s/p first dose of Avastin on 11/5/2019, she tolerated Avastin without difficulty, she is due for the 2nd dose today. \par We communicated CBC result with HGB of 12.3, normal platelet and WBCs. She lost like 5 pounds secondary to gum problems, she will follow up with her dentist this week. She is currently on baby ASA every other day. Continue with single agent Avastin.  \par \par 12/17/2019: Nidhi came for a follow up visit, she reports feeling well, she experienced pruritic rash on last Saturday 12/14/19, which improved over 2 days with Benadryl alone.  Today her skin rash has completely resolved. We will add Solu-Medrol 100 mg IV prior to Avastin. Nidhi reports less fatigue. She is due for the 3rd dose of Avastin today. \par We communicated CBC result with HGB of 12.3, normal platelet and WBCs. \par \par 1/7/2019: Nidhi is doing well, reports good appetite, she has lost 1lb. Reports no GI or pulmonary symptoms. She reports her gums are bleeding occasionally. No new neurological symptoms. She reports no rash after last cycle of Avastin. \par \par 1/28/2020: Nidhi is here for follow up visit, she has no difficulty tolerating Avastin, she reports L sided chronic eye discomfort, neuropathic in nature. Neurontin has been helpful in the past, will reorder this. \par PET CT on 1/22/2020 revealed COMPARISON : 10/18/2019. \par Anterior perisplenic nodule measuring 8 mm is FDG avid, SUV max 8.9, \par consistent with biologic tumor activity. \par MRI of the brain on 1/15/2020 revealed \par In comparison to the previous brain MRI dated 10/15/2019: \par 1. Redemonstrated scattered enhancing lesions consistent with metastatic \par disease, with overall good treatment response since the prior exam. \par 2. No significant interval change in the ovoid lesion in the superior left \par frontal lobe measuring 12 mm. \par 3. Decreased size of the right opercular lesion measuring 6 mm, previously \par 11 mm. Decreased size of the left inferior occipital/tentorial lesion \par measuring 6 mm, previously 11 mm. The previously seen right frontal white \par matter punctate lesion is no longer visualized. \par 4. No new lesions are demonstrated. \par All images were personally reviewed by myself and discussed with Nidhi. I explained to her I am concerned about the perisplenic lesion concerning for disease progression, but it is isolated and very small, asymptomatic. There is definitely a positive response to Avastin in the brain and given that, we will continue with Avastin with short term follow up imaging. Nidhi knows to inform me with any new symptoms immediately. \par \par 2/18/2020: NIDHI BANKS is a 64 year old female here today for follow up visit. She completed 5 cycles of Avastin; tolerating well at this time. She denies fever, chills, change in mental status, or change in weight. She complains of left painful facial swelling. In addition, she complains of swelling of fingers, joint pain and numbness in her toes. She continues on Gabapentin 100mg TID with no symptom relief and wishes to try to go up on the dose. She complains of mild gingival bleeding in the morning. Last PET showed new isolated perisplenic lesion suspicious for disease progression, however MRI of the brain appeared better on Avastin, so the plan was to continue with Avastin with short term follow up. \par \par 3/10/2020: NIDHI BANKS is a 64 year old female here today for follow up visit. She denies new neurological symptoms, skin rash, fever, or change in weight. She continues of Gabapentin which was increased to 200 mg TID; neuropathy improving. She continues to complain of mild gingival bleeding. Left facial swelling and numbness resolved. She has completed cycle 6 of Avastin without complication. \par \par 3/31/72187: Nidhi presented today for routine f/u, she feels fine. Denies any chest pain, sob, fever, chills and cough. Reports that she continues to have gum bleeding. Neuropathy is better with gabapentin, she decreased the dose to 4 pills a day. She tolerated 7th cycle well and is due to get 8th cycle today . She will get MRI brain on 4/2/20.\par She reports minor gum bleeding from Avastin and dental infection on the left, will prescribe Amoxicillin. \par \par 4/21/2020: NIDHI BANKS is a 64 year old female here today for follow up visit for endometrial cancer. S/p cycle 8 of Avastin. Patient denies fever, chills, SOB, cough and pain. She states that her gums have minimal bleeding and dental infection improved after amoxicillin. She complains of mild fatigue. Restaging MRI of the brain was completed on 4/2/2020 which showed stable bilateral parenchymal hemorrhagic lesions compatible with known metastases; no new enhancing lesions are identified. \par Images were personally reviewed by MD Jose Roberto and discussed with patient. \par \par 5/12/2020: MRI brain on 4/2/2020 reveals 1. Stable bilateral parenchymal hemorrhagic lesions compatible with known \par metastases. \par 2. No new enhancing lesions are identified. \par PET CT on 4/30/2020 revealed Right upper quadrant peritoneal implant adjacent to the inferior tip of the \par liver 4.7 (image [id number]) \par Right lower quadrant 0.5 cm peritoneal nodule 7.2 (image 111) \par Right lower quadrant subcentimeter peritoneal nodule 2.6 (image [id number]) \par Anterior perisplenic peritoneal nodule 12.3-48% increase from 8.9 \par (remeasured) previously \par Stable non-FDG avid (attenuation corrected and nonattenuation corrected \par images) 4 mm right middle lobe pulmonary nodule-image 181) \par  No other definite sites of abnormal FDG uptake \par IMPRESSION: \par Compared to 1/22/2020: \par new FDG avid peritoneal implants in right lower quadrant of the abdomen \par suspicious for biologic tumor activity \par 48% interval increase in SUV in anterior perisplenic peritoneal nodule (SUV \par 12.3 vs 8.9 remeasured) \par In retrospect stable FDG avid peritoneal implant in the right upper quadrant \par of the abdomen (SUV 4.7 vs 3.8 remeasured-image 134) \par No other definite sites of abnormal FDG uptake \par Images were personally reviewed by MD Jose Roberto and discussed with patient.\par Given minimal degree of progression in the abdomen and persistent control intracranially, we have discussed continuing Avastin for now and short term restaging. \par Nidhi offers no complaints today.  [FreeTextEntry3] : pruritic reaction  [FreeTextEntry5] : prednisone 25 mg po 2x/ day.

## 2020-05-13 NOTE — REVIEW OF SYSTEMS
[Fatigue] : fatigue [Easy Bleeding] : a tendency for easy bleeding [Negative] : Allergic/Immunologic [Recent Change In Weight] : ~T no recent weight change [Chest Pain] : no chest pain [Palpitations] : no palpitations [Lower Ext Edema] : no lower extremity edema [Wheezing] : no wheezing [Shortness Of Breath] : no shortness of breath [Cough] : no cough [SOB on Exertion] : no shortness of breath during exertion [FreeTextEntry9] : normal strength in upper and lower extremities  [de-identified] : no gait impairment- numbness and swelling of bilateral hands  [de-identified] : mild gingival bleeding

## 2020-05-13 NOTE — ASSESSMENT
[FreeTextEntry1] : Papillary- serous endometrial cancer, stage IV\par --PET CT on 11/29/2017 revealed no clear GYN origin, extensive KEVIN, uptake in the right lung and pleura, thyroid nodule\par --Malignant pleural effusion, resolved\par --L side PleurX catheter was removed on 2/8/2018\par --Completed cycle 8  of carbo/taxol on 6/29/2018.  2 cycles were administered  postoperatively.\par --Restaging PET CT on 9/26/2018 (3 months post completion of chemo 6/29/2018)was essentially negative for recurrent disease\par --Restaging PET CT on 12/18/2018 reveals   New left upper quadrant peritoneal nodule measuring 8 mm with an SUV max  of 7.3. This is suggestive of biologic tumor activity.  No other FDG avid lesions seen throughout the scan.\par --Restaging PET CT on 2/12/2019 revealed slight increase in size of left upper quadrant peritoneal nodule (1.1 cm, \par previously 0.8 cm) with stable FDG uptake, 7.7 SUV suspicious for biologic tumor activity.\par --Case was discussed with Dr. Massey at [place], biopsy of peritoneal nodule is positive for recurrent endometrial cancer.\par --MRI brain on 4/23/2019 revealed multiple brain mets\par --S/p whole brain radiation completed 4/2019, required acute rehab\par --PET CT on 5/1/2019 subsequently revealed further disease progression \par --Restarted weekly carbo/taxol 3 on 1 off on 5/9/2019, continue with weekly Carbo/Taxol for now.\par --Off Decadron and Keppra since 6/5/2019 \par --Restaging brain MRI on 6/8/2019 showed almost complete resolution of most supratentorial lesions and no new lesions.\par --Restaging CT C/A/P on 7/9/2019 did not reveal definite progression, there was a questionable filling defect suspicious for possible chronic PE/artifact, this was discussed with Radiology at length anticoagulation was not deemed to be necessary\par --PET CT on 7/24/2019 revealed positive response to therapy \par --Carbo/Taxol stopped, last dose 9/13/2019, on hold since, she remains platinum sensitive \par --Restaging PET CT on 10/18/2019 is NAD\par --Restaging MRI of the brain on 10/15/2019 is suggestive for possible disease progression, no new lesions, she remains asymptomatic and off steroids, will follow closely \par --She was referred to [facility] for brain SRS consideration, close observation for now \par --Started on Avastin maintenance 11/5/2019, will consider adding Eliquis ppx for DVT/ PE, for now she will take baby ASA every other day  \par --We sent for prednisone 10 mg po if needed for additional skin issues\par --Patient well aware to contact us if any side effect developed\par --MRI of the brain 1/15/2020 reveals overall good response to Avastin\par --Restaging PET CT on 1/22/2020 reveals single small perisplenic area of activity, suspicious for disease progression, asymptomatic \par --Proceed with cycle 10 of Avastin on 5/12/2020 after weighing risks/benefits with plan for short term follow up imaging \par -- pain is improved with gabapentin\par -- Restaging MRI of brain on 4/2/2020 showed stable metastases; no new enhancing lesions are identified.\par -- Restaging PET CT on 4/30/2020 reveals minimal progression in the abdomen and pelvis\par --Other options discussed is switching to Keytruda/Lenvima combination, this is a consideration in the future \par \par Papillary thyroid cancer [id number], 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 Herrera \par --Follow up with Dr. Acevedo of endocrinology; he is addressing vitamin D, thyroid and diabetes\par \par Cataract of eye\par -- S/p successful surgery on 9/30/19 . \par \par Follow up in 3 weeks\par \par \par

## 2020-05-13 NOTE — RESULTS/DATA
[FreeTextEntry1] : EXAM:  MR BRAIN WAW IC      \par \par \par PROCEDURE DATE:  06/08/2019  \par \par \par \par \par INTERPRETATION:  Clinical History / Reason for exam: Dizziness and \par vertigo, history of uterine and thyroid cancer, for evaluation of \par metastatic disease, lesion seen on prior MRI of the brain.\par \par MRI OF THE BRAIN WITHOUT AND WITH CONTRAST\par \par TECHNIQUE:\par \par Multiplanar multisequence imaging of the brain was performed on the \[facility] open magnet before and after the intravenous administration of \par 7.5 cc of Gadavist including brain lab protocol.\par \par COMPARISON:\par \par MRI of the brain without and with contrast dated March 12, 2019.\par \par FINDINGS:\par \par The previously described supratentorial lesions have almost completely \par resolved.\par \par The lesion in the left posterior frontal region now measures 1.4 cm in \par maximum diameter, the lesion in the anterior right frontal lobe measures \par 0.7 cm in maximum diameter, the second lesion in the right frontal lobe \par measures 0.1 cm in maximum diameter, the lesion in the right posterior \par parietal region measures 0.3 cm in maximum diameter, and the left \par occipital lobe lesion measures 1.2 cm in maximum diameter. (Previously \par measuring 2.6, 2.4, 0.5, 0.7, and 2.5 cm respectively).\par \par The third, fourth, and lateral ventricles are normal in size and \par position. There is no shift of the midline structures.\par \par The cerebellar tonsils are minimally low-lying (0.3 cm of cerebellar \par ectopia).\par \par Incidental note is made of a tiny medial cyst.\par \par There are scattered T2/FLAIR hyperintense signal intensities in the\par subcortical white matter which are nonspecific differential diagnostic\par possibilities include chronic ischemic change, foci of gliosis or\par demyelination.\par \par IMPRESSION:\par \par In comparison with the prior MRI of the brain dated March 21, 2019:\par \par There has been almost complete resolution of most of of the previously \par described supratentorial lesions.\par \par There are no new enhancing lesions.\par \par \par \par \par \par \par CRISTINA MÉNDEZ M.D., ATTENDING RADIOLOGIST\par This document has been electronically signed. Oren 10 2019  1:17PM\par   \par \par   \par \par \par [id number]^RI^DC\par \par EXAM:  PETCT SKUL-THI ONC FDG SUBS      \par \par \par PROCEDURE DATE:  05/01/2019  \par \par \par \par \par INTERPRETATION:  \par FDG PET CT STUDY   Subsequent  treatment strategy\par REASON: TUMOR IMAGING - PET with concurrently acquired CT for attenuation \par correction and anatomic localization; skull base to mid - thigh .\par \par CPT code 89382.\par \par Fasting blood glucose level:     91 mg/dl\par \par HISTORY: Endometrial cancer. Stage IV with brain metastatic disease.\par \par TECHNIQUE: Approximately 45 minutes after the intravenous administration \par of 10.7 mCi 18-Fluorine FDG, whole body PET images were acquired from \par base of skull to mid - thigh.\par \par CT protocol used for this PET/CT study is designed for attenuation \par correction and anatomic localization of PET findings.  This  CT \par is not designed to replace state-of-the-art diagnostic CT scans for \par specific imaging protocols of different body parts.\par \par COMPARISON : 2/12/2019.\par Correlation: MRI of the brain on 3/21/2019.\par \par FINDINGS:\par \par Head/Neck: No biologically active head/neck lesions.     \par Normal uptake within the brain, pharyngeal lymphoid tissue, salivary \par glands and laryngeal musculature is noted.    \par \par Thorax:   No suspicious hypermetabolic mediastinal, hilar, lung \par parenchymal lesions.\par \par Abdomen/Pelvis: Physiologic GI/  activity. \par \par Again seen is a left upper quadrant peritoneal nodule, SUV max 6.8, \par previously 7.7 (12% decrease). \par \par Multiple new FDG avid omental nodules largest of which is adjacent to the \par distal transverse colon, measuring 10 mm, positive on nonattenuation \par corrected images, axial image 134. \par \par No other abnormal visceral or guillaume tracer uptake is present.    \par \par Musculoskeletal :  No suspicious hypermetabolic osseous lesions.\par \par Additional CT findings:\par Status post hysterectomy.\par Colonic diverticulosis.\par \par IMPRESSION: \par \par COMPARISON : 2/12/2019\par \par Multiple new FDG avid omental nodules largest measuring up to 10 mm, \par positive on nonattenuation corrected images. Findings are suggestive of \par biologic tumor activity.\par \par Again seen is a left upper quadrant peritoneal nodule, SUV max 6.8, \par previously 7.7 (12% decrease). \par \par \par \par \par

## 2020-06-15 NOTE — REVIEW OF SYSTEMS
[Fatigue] : fatigue [Easy Bleeding] : a tendency for easy bleeding [Negative] : Neurological [Recent Change In Weight] : ~T no recent weight change [Chest Pain] : no chest pain [Lower Ext Edema] : no lower extremity edema [Shortness Of Breath] : no shortness of breath [Palpitations] : no palpitations [Wheezing] : no wheezing [Cough] : no cough [FreeTextEntry9] : normal strength in upper and lower extremities  [SOB on Exertion] : no shortness of breath during exertion [de-identified] : no gait impairment- numbness and swelling of bilateral hands  [de-identified] : mild gingival bleeding

## 2020-06-15 NOTE — PHYSICAL EXAM
[Restricted in physically strenuous activity but ambulatory and able to carry out work of a light or sedentary nature] : Status 1- Restricted in physically strenuous activity but ambulatory and able to carry out work of a light or sedentary nature, e.g., light house work, office work [Normal] : grossly intact [de-identified] : B/L cataracts [de-identified] : CTA B/L

## 2020-06-15 NOTE — RESULTS/DATA
[FreeTextEntry1] : EXAM:  MR BRAIN WAW IC      \par \par \par PROCEDURE DATE:  06/08/2019  \par \par \par \par \par INTERPRETATION:  Clinical History / Reason for exam: Dizziness and \par vertigo, history of uterine and thyroid cancer, for evaluation of \par metastatic disease, lesion seen on prior MRI of the brain.\par \par MRI OF THE BRAIN WITHOUT AND WITH CONTRAST\par \par TECHNIQUE:\par \par Multiplanar multisequence imaging of the brain was performed on the \[facility] open magnet before and after the intravenous administration of \par 7.5 cc of Gadavist including brain lab protocol.\par \par COMPARISON:\par \par MRI of the brain without and with contrast dated March 12, 2019.\par \par FINDINGS:\par \par The previously described supratentorial lesions have almost completely \par resolved.\par \par The lesion in the left posterior frontal region now measures 1.4 cm in \par maximum diameter, the lesion in the anterior right frontal lobe measures \par 0.7 cm in maximum diameter, the second lesion in the right frontal lobe \par measures 0.1 cm in maximum diameter, the lesion in the right posterior \par parietal region measures 0.3 cm in maximum diameter, and the left \par occipital lobe lesion measures 1.2 cm in maximum diameter. (Previously \par measuring 2.6, 2.4, 0.5, 0.7, and 2.5 cm respectively).\par \par The third, fourth, and lateral ventricles are normal in size and \par position. There is no shift of the midline structures.\par \par The cerebellar tonsils are minimally low-lying (0.3 cm of cerebellar \par ectopia).\par \par Incidental note is made of a tiny medial cyst.\par \par There are scattered T2/FLAIR hyperintense signal intensities in the\par subcortical white matter which are nonspecific differential diagnostic\par possibilities include chronic ischemic change, foci of gliosis or\par demyelination.\par \par IMPRESSION:\par \par In comparison with the prior MRI of the brain dated March 21, 2019:\par \par There has been almost complete resolution of most of of the previously \par described supratentorial lesions.\par \par There are no new enhancing lesions.\par \par \par \par \par \par \par CRISTINA MÉNDEZ M.D., ATTENDING RADIOLOGIST\par This document has been electronically signed. Oren 10 2019  1:17PM\par   \par \par   \par \par \par [id number]^RI^DC\par \par EXAM:  PETCT SKUL-THI ONC FDG SUBS      \par \par \par PROCEDURE DATE:  05/01/2019  \par \par \par \par \par INTERPRETATION:  \par FDG PET CT STUDY   Subsequent  treatment strategy\par REASON: TUMOR IMAGING - PET with concurrently acquired CT for attenuation \par correction and anatomic localization; skull base to mid - thigh .\par \par CPT code 24911.\par \par Fasting blood glucose level:     91 mg/dl\par \par HISTORY: Endometrial cancer. Stage IV with brain metastatic disease.\par \par TECHNIQUE: Approximately 45 minutes after the intravenous administration \par of 10.7 mCi 18-Fluorine FDG, whole body PET images were acquired from \par base of skull to mid - thigh.\par \par CT protocol used for this PET/CT study is designed for attenuation \par correction and anatomic localization of PET findings.  This  CT \par is not designed to replace state-of-the-art diagnostic CT scans for \par specific imaging protocols of different body parts.\par \par COMPARISON : 2/12/2019.\par Correlation: MRI of the brain on 3/21/2019.\par \par FINDINGS:\par \par Head/Neck: No biologically active head/neck lesions.     \par Normal uptake within the brain, pharyngeal lymphoid tissue, salivary \par glands and laryngeal musculature is noted.    \par \par Thorax:   No suspicious hypermetabolic mediastinal, hilar, lung \par parenchymal lesions.\par \par Abdomen/Pelvis: Physiologic GI/  activity. \par \par Again seen is a left upper quadrant peritoneal nodule, SUV max 6.8, \par previously 7.7 (12% decrease). \par \par Multiple new FDG avid omental nodules largest of which is adjacent to the \par distal transverse colon, measuring 10 mm, positive on nonattenuation \par corrected images, axial image 134. \par \par No other abnormal visceral or guillaume tracer uptake is present.    \par \par Musculoskeletal :  No suspicious hypermetabolic osseous lesions.\par \par Additional CT findings:\par Status post hysterectomy.\par Colonic diverticulosis.\par \par IMPRESSION: \par \par COMPARISON : 2/12/2019\par \par Multiple new FDG avid omental nodules largest measuring up to 10 mm, \par positive on nonattenuation corrected images. Findings are suggestive of \par biologic tumor activity.\par \par Again seen is a left upper quadrant peritoneal nodule, SUV max 6.8, \par previously 7.7 (12% decrease). \par \par \par \par \par

## 2020-06-15 NOTE — ASSESSMENT
[FreeTextEntry1] : Papillary- serous endometrial cancer, stage IV\par --PET CT on 11/29/2017 revealed no clear GYN origin, extensive KEVIN, uptake in the right lung and pleura, thyroid nodule\par --Malignant pleural effusion, resolved\par --L side PleurX catheter was removed on 2/8/2018\par --Completed cycle 8  of carbo/taxol on 6/29/2018.  2 cycles were administered  postoperatively.\par --Restaging PET CT on 9/26/2018 (3 months post completion of chemo 6/29/2018)was essentially negative for recurrent disease\par --Restaging PET CT on 12/18/2018 reveals   New left upper quadrant peritoneal nodule measuring 8 mm with an SUV max  of 7.3. This is suggestive of biologic tumor activity.  No other FDG avid lesions seen throughout the scan.\par --Restaging PET CT on 2/12/2019 revealed slight increase in size of left upper quadrant peritoneal nodule (1.1 cm, \par previously 0.8 cm) with stable FDG uptake, 7.7 SUV suspicious for biologic tumor activity.\par --Case was discussed with Dr. Massey at [place], biopsy of peritoneal nodule is positive for recurrent endometrial cancer.\par --MRI brain on 4/23/2019 revealed multiple brain mets\par --S/p whole brain radiation completed 4/2019, required acute rehab\par --PET CT on 5/1/2019 subsequently revealed further disease progression \par --Restarted weekly carbo/taxol 3 on 1 off on 5/9/2019, continue with weekly Carbo/Taxol for now.\par --Off Decadron and Keppra since 6/5/2019 \par --Restaging brain MRI on 6/8/2019 showed almost complete resolution of most supratentorial lesions and no new lesions.\par --Restaging CT C/A/P on 7/9/2019 did not reveal definite progression, there was a questionable filling defect suspicious for possible chronic PE/artifact, this was discussed with Radiology at length anticoagulation was not deemed to be necessary\par --PET CT on 7/24/2019 revealed positive response to therapy \par --Carbo/Taxol stopped, last dose 9/13/2019, on hold since, she remains platinum sensitive \par --Restaging PET CT on 10/18/2019 is NAD\par --Restaging MRI of the brain on 10/15/2019 is suggestive for possible disease progression, no new lesions, she remains asymptomatic and off steroids, will follow closely \par --She was referred to [facility] for brain SRS consideration, close observation for now \par --Started on Avastin maintenance 11/5/2019, will consider adding Eliquis ppx for DVT/ PE, for now she will take baby ASA every other day  \par --We sent for prednisone 10 mg po if needed for additional skin issues\par --Patient well aware to contact us if any side effect developed\par --MRI of the brain 1/15/2020 reveals overall good response to Avastin\par --Restaging PET CT on 1/22/2020 reveals single small perisplenic area of activity, suspicious for disease progression, asymptomatic \par --Proceed with cycle 10 of Avastin on 5/12/2020 after weighing risks/benefits with plan for short term follow up imaging \par -- pain is improved with gabapentin\par -- Restaging MRI of brain on 4/2/2020 showed stable metastases; no new enhancing lesions are identified.\par -- Restaging PET CT on 4/30/2020 reveals minimal progression in the abdomen and pelvis\par -- Other options discussed is switching to Keytruda/Lenvima combination, this is a consideration in the future \par -- We will c/w with avastin for now since it is working in the brain and she has a minimal progression in abdomen. She will be rescanned next visit\par \par Papillary thyroid cancer [id number], 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 Herrera \par --Follow up with Dr. Acevedo of endocrinology; he is addressing vitamin D, thyroid and diabetes\par \par Cataract of eye\par -- S/p successful surgery on 9/30/19 . \par \par Follow up in 3 weeks\par The patient was seen and examined by Dr Cid who agreed with the above plan\par \par \par

## 2020-06-15 NOTE — HISTORY OF PRESENT ILLNESS
[Disease: _____________________] : Disease: [unfilled] [AJCC Stage: ____] : AJCC Stage: [unfilled] [2] : 2, Moderate [de-identified] : Serena is a rodrigue 65 yo female, who has started developing SOB approximately 2 months ago, she went to ER on 11/2/2017 and on CXR was noted to have a large right sided pleural effusion. She underwent a thoracentesis, 1.6L of fluid was drained. 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 IHC was pos for CK7, PAX8, CK5/6 and Ca125, negative for TTF-1/Napsin, calretinin, CK20, mammaglobin, p63, villin, HAM56, GCDFP15, TAY-EP4. \par \par Her visit on 12/1/2017 Serena had an excisional biopsy of cervical node on 12/13/2017 revealing met adenocarcinoma, primary source still was not clear. On 12/14/2017 Serena was admitted with SOB, Pleurx catheter was placed on 12/14/2017. 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 Serena received 1st dose of carbo (auc of 5)/taxol(175mg/m2) on 12/15/2017 without complications, but the next day sustained a fall 2/2 vasovagal episode and developed asymptomatic SAH, that resolved on imaging by 12/21/2017.  [de-identified] : KRas WT, EGFR WT, Alk WT, ROS1 WT, PDL1-1%\par Normal MMR protein expression [de-identified] : 11/24/2017: She reports some SOB, especially ARZATE today. No fevers, no weight loss (reports some weight gain), no GI,  or GYN symptoms, except for increasing abdominal girth, she reports no blood in the stool or urine and no vaginal bleeding. She reports no CP and no palpitations, she reports worsening nonproductive cough, no hemoptysis. She also reports difficulty lying on left side and lying down flat. \par She reports left on/off facial numbness several months in duration. She had a CT of her head performed in [facility] within 1 year.  Additionally there is a freely mobile left cervical node present on exam, as per patient this was in place for approximately 1 year. \par \par 12/1/2017: Nidhi is here for follow up today. She had a therapeutic thoracentesis in  on 11/27/2017, 2L of fluid were removed, with much improved respiratory status. PET CT and MRI of the brain were done on 11/29/2017, findings were discussed today. There remains no clear primary source of malignancy. We have discussed that the source of tumor may be Mullerian vs lung. regardless of source chemotherapy needs to be initiated ASAP. We have discussed Carbo/taxol regiment that will cover both Mullerian and lung origin. Side effects including myelosuppression, peripheral neuropathy, allergic reaction and others.\par \par 1/2/2017 Since last visit Nidhi had an excisional biopsy of cervical node on 12/13/2017 revealing met adenocarcinoma, primary source still was not clear. On 12/14/2017 Nidhi was admitted with SOB, Pleurx catheter was placed on 12/14/2017. 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 Nidhi received 1st dose of carbo (auc of 5)/taxol(175mg/m2) on 12/15/2017 without complications, but the next day sustained a fall 2/2 vasovagal episode and developed asymptomatic SAH, that resolved on imaging by 12/21/2017. Today Nidhi's SOB has significantly improved. She reports very mild neuropathy in fingertips only. She reports no headache and offers no other complaints. \par \par 1/26/2018: Complains of some neuropathy, otherwise tolerating chemo with no difficulty. \par \par 2/16/2018: restaging CT C/A/P reviewed, significant improvement noted. Will refer to GYN/ONC. Reports slightly worsening neuropathy, not -painful, taking Gabapentin, no other symptoms. For cycle 4 of carbo/taxol today.\par \par 3/9/2018: Nidhi met with Dr. Massey, she is planned for surgery on 5/7/2018, after completion of 6 cycles of carbo/taxol and restaging PET CT ordered for mid April and follow up with Dr. Massey on April 20. She reports worsening neuropathy, and occasional pain and changes in vision in her left eye, eye symptoms come and go and are not very bothersome. She reports no other symptoms. \par \par 3/30/2018; Nidhi is here for cycle 6 of carbo/taxol, she continues to have neuropathy in finger and toes, but offers no other complaints, chemo has been dose reduced. \par \par 4/27/2018: Results of restaging PET CT s/p 6 cycles of carbo/taxol revealed 1. No new areas of abnormal increased uptake is seen to indicate \par biologically active disease.\par \par 2. Persistent PET positive left thyroid mass with a max SUV 33.1, \par previously max SUV 34.8. Further evaluation with thyroid ultrasound and \par if needed fine-needle aspiration biopsy will be helpful.\par \par 3.   PET positive left cervical lymph nodes mainly level 2 on the left \par with a max SUV 5.47previously 18. Minimal increased uptake in the \par bilateral axillary lymph nodes most likely reactive(less than 2.5)\par \par 4. Weakly PET positive, max SUV 2.89 right pleura, previous max SUV 5.3 \par with non-FDG avid right pleural effusion. \par \par 5. Previously FDG avid right and left supraclavicular lymphadenopathy, \par left internal mammary, bilateral paratracheal, para-aortic, para \par vertebral, carinal, mesenteric, right and left iliacs, right inguinal \par lymphadenopathy, small in size and no longer FDG avid.\par \par 6. No abnormal increased uptake is seen in the  lobulated uterus.\par \par 7. Overall there is marked improvement in the FDG avid disease.\par This was reviewed with Nidhi. She met with Dr. Massey on 4/20/2018, surgery is planned for 5/7/2018. She will also joselyn to meet with ENT re FDG avid thyroid nodule. Will assist in making he an appointment for her. \par Persistent neuropathy on gabapentin. \par \par 6/29/18 ; Patient came for follow up visit , evaluation for chemotherapy cycle 8 of carbo / Taxol , patient tolerating medication well , except neuropathy  recommend to have gabapentin  300 mg po daily.\par \par 7/20/2018: Nidhi present for follow up today, she has completed total of 8 cycles of Carbo/Taxol. She reports significant neuropathy in her hands and feet. We will discontinue chemotherapy today and plan to follow with close observation. She has thyroidectomy planned with Dr. Herrera on 8/20/2018, obtaining clearance for PMD. She is also planning to fly to [place] at the end of September. She reports no SOB, no GI or  symptoms. \par \par 9/12/2018: Nidhi offers no significant complaints today. She states neuropathy in her hands has almost completely resolved and neuropathy in her feet is significantly better. She had undergone complete thyroidectomy by Dr. Herrera on 8/20/2018, pathology revealed papillary thyroid cancer, measuring 2 cm, pT1b, other additional foci of papillary carcinoma were also noted, no LVI, surgical margins were clear, LN 0/2 had no carcinoma, stage [id number]. She is following up with Dr. Herrera tomorrow. She has still not gone for her vacation in [place]. \par \par 10/10/2018: Restaging PET CT on 9/26/2018 reveals Since 4/16/2018,  1. Post thyroidectomy with diffuse increased FDG uptake at the thyroid  bed likely representing post-surgical changes.  2. Subtle increased uptake is seen in the lamina , right side at the  level of T8 with a max SUV 4.6. This is not sufficient for metastatic  disease. Bone scan or MRI examination with contrast will be helpful for  further evaluation.  3. No other areas of abnormal increased uptake is seen. Images were personally reviewed by myself and discussed with Nidhi. \par She also had an Echo on 9/24/2018 revealing EF of 63%. \par Nidhi reports ongoing fatigue, she is unable to lose weight. She is taking Synthroid and following with Dr. Acevedo. \par She reports stiff fingers at night only, no painful neuropathy. \par She denies any other symptoms today. \par \par 12/11/2018: Nidhi feels well, neuropathy in hands and feet is much improved. S he is now complaining of r-sided facial pain associated with some swelling, pain comes and goes. She has already seen Dr. Herrera, who ordered CT of sinuses and prescribed pain relief meds. She is also due for restaging PET CT. Nidhi feels well otherwise. \par \par 1/9/2019: Restaging PET CT was performed on 12/18/2018 it revealed COMPARISON : 9/24/2018.  New left upper quadrant peritoneal nodule measuring 8 mm with an SUV max  of 7.3. This is suggestive of biologic tumor activity.  No other FDG avid lesions seen throughout the scan. Images were personally reviewed by myself and discussed with Nidhi, originally over the phone and again today. I have originally suggested to try AI in the interim, Nidhi however reported diarrhea at the time of the scan and declined intervention for now. We will plan for restaging PET CT to be performed in 6-8 weeks, this will be ordered today. She will follow up with Dr. Massey at [facility] shortly and bring the disk for his review. I would also like her to meet with genetics, this will be arranged at [facility] with Dr. Massey's help. Nidhi reports no new symptoms today, her neuropathy is manageable and  improving. She still reports unilateral headache that was work up in the past. Neurology eval was again suggested to her. She is following closely with Endocrinology re thyroid management. She will have follow up with Dr. Herrera shortly as well. \par \par 2/20/2019: Nidhi offers no new complaints. PET CT from 2/12/2019 revealed \par Since PET/CT of December 19, 2018, no new sites of pathologic FDG uptake\par Slight increase in size of left upper quadrant peritoneal nodule (1.1 cm, \par previously 0.8 cm) with stable FDG uptake, 7.7 SUV suspicious for \par biologic tumor activity.\par No other sites of pathologic FDG uptake \par Images were personally reviewed by myself and discussed with Nidhi, case was also discussed with Dr. Massey at [facility]. Nidhi will have follow up with his shortly. She is following with endocrinology. reports improving neuropathy. No GI or  symptoms at this time. No weight loss. \par \par 3/20/2019: Nidhi had a peritoneal nodule biopsy done at [facility], I have received a call from Dr. 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 We have discussed that surgery though could be attempted will not be the best therapeutic approach, recommencement of systemic therapy was discussed. I have not received the results from [facility] yet. I have briefly discussed this with Nidhi today. Nidhi reports that she has acutely developed right lower extremity weakness 5-6 days ago, she did not inform any of her doctors about this. She also reports that her R upper extremity though not weak "does not feel normal either. She reports no back pain, there is no point tenderness, RLE is objectively weak on exam. I recommend ER evaluation to r/o CVA vs brain met, vs L-spine related issue. Recommend admission for work up. Nidhi is agreeable to get admitted. \par \par 5/8/2019: Nidhi present for follow up, she was diagnosed with multiple metastasis to the brain, MRI was done on 3/21/2019 and revealed \par Multiple supratentorial heterogeneously enhancing lesions consistent with \par metastatic disease. The appearance on the T2-weighted images is suggestive \par of adenocarcinoma. There is mass effect upon the left lateral ventricle and \par corpus callosum. \par She received whole brain irradiation by Dr. Dahl, completed it in the beginning of 4/2019, she is currently on steroid taper managed by Dr. Dahl, she is taking 4mg of Decadron daily. She will be stopping the Keppra as there have been no documented h/o seizures. She still reports impairment of the L visual field, she has an appointment coming up with Opthalmology. She reports no deficits walking after she has completed inpatient acute rehabilitation. \par Restaging PET CT on 5/1/2019 reveals COMPARISON : 2/12/2019 \par Multiple new FDG avid omental nodules largest measuring up to 10 mm, \par positive on nonattenuation corrected images. Findings are suggestive of \par biologic tumor activity. \par Again seen is a left upper quadrant peritoneal nodule, SUV max 6.8, \par previously 7.7 (12% decrease). \par Images were personally reviewed by myself and discussed with patient. \par She now reports mild abdominal bloating, no other symptoms. She reports her neuropathy has significantly improved. \par She has first evidence of recurrent disease documented 5.5 months after last carbo/taxol dose, the volume of disease was very small (1 8mm nodule), she was then observed for another 6 months and now she wishes to restart the chemotherapy. \par I have discussed with her that rechallenging with carbo/taxol may still prove to be effective as long as she gets restaged after 2 cycles. Given recent whole brain radiation and neuropathy I will offer her carbo/taxol weekly with does reduction on the taxol for neuropathy, We will plan to run carbo slowly to avoid allergic reaction. \par She is agreeable to start ASAP. \par \par 6/5/2019: Nidhi has completed 1 cycle of weekly Carbo/taxol, ran at slow rate to avoid Carbo reaction and has tolerated chemotherapy without difficulty, she does not complain of increase in neuropathy. She has ongoing vision changes in her L eye, she had no residual LE weakness. She is off Decadron and Keppra. She was advised to see ophthalmology. \par \par 7/3/2019: Nidhi is doing well.  Reports no problems with carbo/taxol.  Still complains of vision changes in L eye but is seeing ophthalmologist on 7/16.  Remains active around the house and cooks regularly.  No fevers, weight loss, anorexia.  ROS is otherwise only significant for occasional loose stools, no diarrhea.\par \par 7/17/2019: Nidhi is doing well, denies worsening neuropathy. She c/o feeling fatigued and tired. Her last chemo was 1 week ago(7/11/19) with carbo/taxol and is due again tomorrow. She saw ophthalmology and will need cataract surgery of the L eye. No c/o chest pain/SOB. No weight loss.\par CT C/A/P was done on 7/9/2019, images were personally reviewed by myself and discussed with several radiology attendings, they revealed \par CHEST:\par Filling defect within a segmental branch of the lower lobe pulmonary artery \par suspicious for pulmonary embolus. \par Stable left upper lobe and right middle lobe 3 mm nodules. \par CT abdomen and pelvis performed on the same day, see separate report. \par ADDENDUM: \par Please note that the morphology of the small thrombus within the left lower \par pulmonary artery is not indicative of an acute thrombus but rather a chronic \par appearance. \par This was discussed with Radiology, it was not deemed that thrombus was acute and may not have even been present at all, finding was deemed to be equivocal, based on radiology assessment anticoagulation was not indicated for the filling defect noted. Initially CTA was recommended, the recommendation was withdrawn upon further review. \par ABDOMEN/PELVIS:\par New 1.3 cm segment IV hepatic hypodensity seen on series 4 image 205. \par Lesion not seen on prior PET/CT from 5/1/2019 or abdomen and pelvis CT from \par \par 2/2/2018 and is consistent with a new metastasis. \par Two other hepatic lesions described above, decreased in size since 2/2/2018, \par consistent with treated hepatic metastases. \par Anterior perisplenic omental nodule measures 0.7 cm on series 2 image 53, \par previously measuring 1.2 cm on PET/CT dated 5/1/2019. \par All the other previously seen omental nodules have resolved since 5/1/2019. \par ADDENDUM:\par Case was discussed with Dr. Cid in detail. It is also possible that the \par new 1.3 cm lesion in the liver since February 2, 2018 represents a treated \par metastasis similar to the other liver lesions. \par PET/CT would be necessary to assess disease activity. \par I have discussed case with Radiology, and it was determined that there was no clear cut evidence of progression. PET CT was ordered today.\par This was discussed with Nidhi, will proceed with Carbo/Taxol for now. \par \par 8/1/2019: Nidhi reports no major side effects from weekly Carbo/Taxol, she reports no neuropathy. She has completed 10 cycles altogether. \par PET CT on 7/24/2019 revealed \par 1. Since May 1, 2019, no definite new site of pathologic FDG uptake to \par suggest new biologic tumor activity; specifically, no pathologic FDG uptake \par within previously noted 1.3 cm lesion within hepatic segment 4. \par 2. Increased FDG uptake within several subcentimeter bilateral cervical \par lymph nodes, which is nonspecific and may be postinflammatory; max SUV 9.2 \par is noted within a 0.7 cm right level 2 cervical node. Attention on follow-up \par is suggested. \par 3. Few peritoneal nodules have overall decreased in size. \par 4. No additional site of pathologic FDG uptake. \par Images were personally reviewed by myself and discussed with Nidhi. \par She wishes to continue with chemotherapy. Will plan for 3 additional cycles. Will consider adding Avastin, but with h/o inconclusive/possible chronic PE ppx anticoagulation maybe necessary. \par \par 8/28/2019: Nidhi reports feeling well, she denies worsening neuropathy, worsening neurological symptoms, SOB, abdominal pain, bloating. She reports she is planned to undergo  eye surgery towards the end of September. We will plan to hold chemotherapy briefly after this cycle to facilitate adequate counts and minimize complications. We again have briefly discussed considering Avastin based therapy, will hold off on this prior to surgery. 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 May proceed without formal PAST, will check CBC prior to procedure, PT and PTT today. \par \par 9/25/19:Nidhi reports feeling well, she denies any changes since last visit, No SOB, abdominal pain, bloating. She reports she is planned to undergo  eye surgery in 9/30/19  CBC reviewed with normal Hemogram . we will hold chem this week , she will resume after Cataract sx . 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 May proceed without formal PAST, will check CBC prior to procedure, PT and PTT today. \par \par 10/4/2019: Nidhi underwent successful eye surgery on 9/30/2019, she reports she can see much better now. Last dose of chemotherapy was administered on 9/20, she then was on hold for surgery. She has completed 5 additional cycles of Carbo/taxol. She is due for restaging. Her disease is only accurately assessed on PET CT, prior attempts to obtain CT scans resulted in additional imaging with PET, as findings were inconclusive. PET CT will be ordered to restage. \par In addition she is due for MRI of the brain. She has had a small amount of weight loss, mild neuropathy is persistent. She offers no additional complaints. \par She will have port placed on Monday, will hold chemotherapy until restaging scans complete. \par \par 10/23/2019: Nidhi feels well, and denies any new symptoms. Her eye surgery was successful, vision is much improved now. She had restaging scans. \par PET CT on 10/18/2019 revealed COMPARISON : 7/24/2019. \par No pathologic uptake to suggest biologic tumor activity. \par MRI of the brain on 10/15/2019 revealed \par Comparison with June 8 and March 21, 2019: (Generally lesions improved \par markedly since March but slightly increased in size since June) \par 1. Lesions previously demonstrated on the study of June 8, 2019 has \par remained stable or minimally increased in size \par 2. Specifically, right frontal opercular lesion measures about 1 cm and \par left posterior inferior temporal lobe lesion measures about 1 cm. These have \par increased since the previous study. \par 3. Small punctiform lesions within the right frontal white matter and left \par frontal sulcus or more apparent than on the study of June 8 \par 4. These lesions are all much smaller than the earlier MRI of March 21, \par 2019. \par 5. No new lesions. \par Images were personally reviewed by myself and discussed with patient. She remains asymptomatic and off the steroids. \par I have also discussed the case with Dr. Dahl. I would like Nidhi to discuss the options with [facility], she maybe a candidate for brain SRS. \par She has been off Carbo/Taxol for a few weeks, she remains Platinum sensitive. I have discussed the option of switching her over to Avastin maintenance, will need to prophylax with low dose Eliquis as well, given questionable finding of small PE in the past. \par Side effects of Avastin were discussed at length, including HNT, proteinuria, small risk of DVT/PE, GI perforations etc. \par She is agreeable to start after [facility] consultation. \par \par 11/5/2019: Nidhi feels OK, she reports acute onset head discomfort that lasts minutes and subsides, the episodes are becoming more frequent 1-2 times a day. She has seen Dr. Dahl, who wishes to hold off on offering SRS to the brain. Plan was to start on Avastin today. Nidhi reports she currently has a lapse in her insurance coverage till 12/2019, I will not be able to start he on ppx dose Eliquis for now, she instead will take baby ASA every other day, to modify risk of DVT/PE. Will plan to switch over to Eliquis at 2.5mg BID once insurance is active. She also contemplated going to [place] with her daughter, I am willing to hold Avastin until she comes back, but Nidhi wishes to start right away, I explained to her that flight may result in complications if that is her wish. She will cancel the trip and start with Avastin today. Side effects have been discussed. \par \par 11/20/2019: Nidhi came for a follow up visit, she reports feeling well, she reports less fatigue. She is s/p first dose of Avastin on 11/5/2019, she tolerated Avastin without difficulty, she is due for the 2nd dose today. \par We communicated CBC result with HGB of 12.3, normal platelet and WBCs. She lost like 5 pounds secondary to gum problems, she will follow up with her dentist this week. She is currently on baby ASA every other day. Continue with single agent Avastin.  \par \par 12/17/2019: Nidhi came for a follow up visit, she reports feeling well, she experienced pruritic rash on last Saturday 12/14/19, which improved over 2 days with Benadryl alone.  Today her skin rash has completely resolved. We will add Solu-Medrol 100 mg IV prior to Avastin. Nidhi reports less fatigue. She is due for the 3rd dose of Avastin today. \par We communicated CBC result with HGB of 12.3, normal platelet and WBCs. \par \par 1/7/2019: Nidhi is doing well, reports good appetite, she has lost 1lb. Reports no GI or pulmonary symptoms. She reports her gums are bleeding occasionally. No new neurological symptoms. She reports no rash after last cycle of Avastin. \par \par 1/28/2020: Nidhi is here for follow up visit, she has no difficulty tolerating Avastin, she reports L sided chronic eye discomfort, neuropathic in nature. Neurontin has been helpful in the past, will reorder this. \par PET CT on 1/22/2020 revealed COMPARISON : 10/18/2019. \par Anterior perisplenic nodule measuring 8 mm is FDG avid, SUV max 8.9, \par consistent with biologic tumor activity. \par MRI of the brain on 1/15/2020 revealed \par In comparison to the previous brain MRI dated 10/15/2019: \par 1. Redemonstrated scattered enhancing lesions consistent with metastatic \par disease, with overall good treatment response since the prior exam. \par 2. No significant interval change in the ovoid lesion in the superior left \par frontal lobe measuring 12 mm. \par 3. Decreased size of the right opercular lesion measuring 6 mm, previously \par 11 mm. Decreased size of the left inferior occipital/tentorial lesion \par measuring 6 mm, previously 11 mm. The previously seen right frontal white \par matter punctate lesion is no longer visualized. \par 4. No new lesions are demonstrated. \par All images were personally reviewed by myself and discussed with Nidhi. I explained to her I am concerned about the perisplenic lesion concerning for disease progression, but it is isolated and very small, asymptomatic. There is definitely a positive response to Avastin in the brain and given that, we will continue with Avastin with short term follow up imaging. Nidhi knows to inform me with any new symptoms immediately. \par \par 2/18/2020: NIDHI BANKS is a 64 year old female here today for follow up visit. She completed 5 cycles of Avastin; tolerating well at this time. She denies fever, chills, change in mental status, or change in weight. She complains of left painful facial swelling. In addition, she complains of swelling of fingers, joint pain and numbness in her toes. She continues on Gabapentin 100mg TID with no symptom relief and wishes to try to go up on the dose. She complains of mild gingival bleeding in the morning. Last PET showed new isolated perisplenic lesion suspicious for disease progression, however MRI of the brain appeared better on Avastin, so the plan was to continue with Avastin with short term follow up. \par \par 3/10/2020: NIDHI BANKS is a 64 year old female here today for follow up visit. She denies new neurological symptoms, skin rash, fever, or change in weight. She continues of Gabapentin which was increased to 200 mg TID; neuropathy improving. She continues to complain of mild gingival bleeding. Left facial swelling and numbness resolved. She has completed cycle 6 of Avastin without complication. \par \par 3/31/73447: Nidhi presented today for routine f/u, she feels fine. Denies any chest pain, sob, fever, chills and cough. Reports that she continues to have gum bleeding. Neuropathy is better with gabapentin, she decreased the dose to 4 pills a day. She tolerated 7th cycle well and is due to get 8th cycle today . She will get MRI brain on 4/2/20.\par She reports minor gum bleeding from Avastin and dental infection on the left, will prescribe Amoxicillin. \par \par 4/21/2020: NIDHI BANKS is a 64 year old female here today for follow up visit for endometrial cancer. S/p cycle 8 of Avastin. Patient denies fever, chills, SOB, cough and pain. She states that her gums have minimal bleeding and dental infection improved after amoxicillin. She complains of mild fatigue. Restaging MRI of the brain was completed on 4/2/2020 which showed stable bilateral parenchymal hemorrhagic lesions compatible with known metastases; no new enhancing lesions are identified. \par Images were personally reviewed by MD Jose Roberto and discussed with patient. \par \par 5/12/2020: MRI brain on 4/2/2020 reveals 1. Stable bilateral parenchymal hemorrhagic lesions compatible with known \par metastases. \par 2. No new enhancing lesions are identified. \par PET CT on 4/30/2020 revealed Right upper quadrant peritoneal implant adjacent to the inferior tip of the \par liver 4.7 (image [id number]) \par Right lower quadrant 0.5 cm peritoneal nodule 7.2 (image 111) \par Right lower quadrant subcentimeter peritoneal nodule 2.6 (image [id number]) \par Anterior perisplenic peritoneal nodule 12.3-48% increase from 8.9 \par (remeasured) previously \par Stable non-FDG avid (attenuation corrected and nonattenuation corrected \par images) 4 mm right middle lobe pulmonary nodule-image 181) \par  No other definite sites of abnormal FDG uptake \par IMPRESSION: \par Compared to 1/22/2020: \par new FDG avid peritoneal implants in right lower quadrant of the abdomen \par suspicious for biologic tumor activity \par 48% interval increase in SUV in anterior perisplenic peritoneal nodule (SUV \par 12.3 vs 8.9 remeasured) \par In retrospect stable FDG avid peritoneal implant in the right upper quadrant \par of the abdomen (SUV 4.7 vs 3.8 remeasured-image 134) \par No other definite sites of abnormal FDG uptake \par Images were personally reviewed by MD Jose Roberto and discussed with patient.\par Given minimal degree of progression in the abdomen and persistent control intracranially, we have discussed continuing Avastin for now and short term restaging. \par Nidhi offers no complaints today. \par \par 6/2/2020: The patient is here for follow up. She is due for Avastin today . She has  no major complaints . She reported gum bleeding and swelling, which is much better today . She also reported hand swelling bilaterally. She denies fever, chills, no respiratory, cardiac, GI/ symptoms. She denies headaches or neurological deficits.  [FreeTextEntry5] : prednisone 25 mg po 2x/ day.  [FreeTextEntry3] : pruritic reaction

## 2020-06-23 PROBLEM — R06.00 DYSPNEA: Status: ACTIVE | Noted: 2017-11-17

## 2020-06-24 NOTE — REVIEW OF SYSTEMS
[Fatigue] : fatigue [Easy Bleeding] : a tendency for easy bleeding [Negative] : Allergic/Immunologic [Chest Pain] : no chest pain [Palpitations] : no palpitations [Recent Change In Weight] : ~T no recent weight change [Wheezing] : no wheezing [Lower Ext Edema] : no lower extremity edema [Shortness Of Breath] : no shortness of breath [FreeTextEntry9] : normal strength in upper and lower extremities  [Cough] : no cough [SOB on Exertion] : no shortness of breath during exertion [de-identified] : no gait impairment- numbness and swelling of bilateral hands  [de-identified] : mild gingival bleeding

## 2020-06-24 NOTE — ASSESSMENT
[FreeTextEntry1] : Papillary- serous endometrial cancer, stage IV\par --PET CT on 11/29/2017 revealed no clear GYN origin, extensive KEVIN, uptake in the right lung and pleura, thyroid nodule\par --Malignant pleural effusion, resolved\par --L side PleurX catheter was removed on 2/8/2018\par --Completed cycle 8  of carbo/taxol on 6/29/2018.  2 cycles were administered  postoperatively.\par --Restaging PET CT on 9/26/2018 (3 months post completion of chemo 6/29/2018)was essentially negative for recurrent disease\par --Restaging PET CT on 12/18/2018 reveals   New left upper quadrant peritoneal nodule measuring 8 mm with an SUV max  of 7.3. This is suggestive of biologic tumor activity.  No other FDG avid lesions seen throughout the scan.\par --Restaging PET CT on 2/12/2019 revealed slight increase in size of left upper quadrant peritoneal nodule (1.1 cm, \par previously 0.8 cm) with stable FDG uptake, 7.7 SUV suspicious for biologic tumor activity.\par --Case was discussed with Dr. Massey at [place], biopsy of peritoneal nodule is positive for recurrent endometrial cancer.\par --MRI brain on 4/23/2019 revealed multiple brain mets\par --S/p whole brain radiation completed 4/2019, required acute rehab\par --PET CT on 5/1/2019 subsequently revealed further disease progression \par --Restarted weekly carbo/taxol 3 on 1 off on 5/9/2019, continue with weekly Carbo/Taxol for now.\par --Off Decadron and Keppra since 6/5/2019 \par --Restaging brain MRI on 6/8/2019 showed almost complete resolution of most supratentorial lesions and no new lesions.\par --Restaging CT C/A/P on 7/9/2019 did not reveal definite progression, there was a questionable filling defect suspicious for possible chronic PE/artifact, this was discussed with Radiology at length anticoagulation was not deemed to be necessary\par --PET CT on 7/24/2019 revealed positive response to therapy \par --Carbo/Taxol stopped, last dose 9/13/2019, on hold since, she remains platinum sensitive \par --Restaging PET CT on 10/18/2019 is NAD\par --Restaging MRI of the brain on 10/15/2019 is suggestive for possible disease progression, no new lesions, she remains asymptomatic and off steroids, will follow closely \par --She was referred to [facility] for brain S[facility] consideration, close observation for now \par --Started on Avastin maintenance 11/5/2019, will consider adding Eliquis ppx for DVT/ PE, for now she will take baby ASA every other day  \par --We sent for prednisone 10 mg po if needed for additional skin issues\par --Patient well aware to contact us if any side effect developed\par --MRI of the brain 1/15/2020 reveals overall good response to Avastin\par --Restaging PET CT on 1/22/2020 reveals single small perisplenic area of activity, suspicious for disease progression, asymptomatic \par --Proceed with cycle 10 of Avastin on 5/12/2020 after weighing risks/benefits with plan for short term follow up imaging \par -- pain is improved with gabapentin\par -- Restaging MRI of brain on 4/2/2020 showed stable metastases; no new enhancing lesions are identified.\par -- Restaging PET CT on 4/30/2020 reveals minimal progression in the abdomen and pelvis\par -- Other options discussed is switching to Keytruda/Lenvima combination, this is a consideration in the future \par -- We will c/w with avastin for now since it is working in the brain and she has a minimal progression in abdomen. She will be rescanned next visit.\par --She is due for MRI of the Brain prior to the following visit in 3 weeks July, 2020, prescription was provided\par \par Papillary thyroid cancer [id number], 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 Herrera \par --Follow up with Dr. Acevedo of endocrinology; he is addressing vitamin D, thyroid and diabetes\par \par Cataract of eye\par -- S/p successful surgery on 9/30/19 . \par \par Follow up in 3 weeks\par The patient was seen and examined by Dr Cid who agreed with the above plan\par \par \par

## 2020-06-24 NOTE — END OF VISIT
[FreeTextEntry3] : Patient was seen seen examined with NP Carmen, agree with above plan of care.\par

## 2020-06-24 NOTE — RESULTS/DATA
[FreeTextEntry1] : EXAM:  MR BRAIN WAW IC      \par \par \par PROCEDURE DATE:  06/08/2019  \par \par \par \par \par INTERPRETATION:  Clinical History / Reason for exam: Dizziness and \par vertigo, history of uterine and thyroid cancer, for evaluation of \par metastatic disease, lesion seen on prior MRI of the brain.\par \par MRI OF THE BRAIN WITHOUT AND WITH CONTRAST\par \par TECHNIQUE:\par \par Multiplanar multisequence imaging of the brain was performed on the \[facility] open magnet before and after the intravenous administration of \par 7.5 cc of Gadavist including brain lab protocol.\par \par COMPARISON:\par \par MRI of the brain without and with contrast dated March 12, 2019.\par \par FINDINGS:\par \par The previously described supratentorial lesions have almost completely \par resolved.\par \par The lesion in the left posterior frontal region now measures 1.4 cm in \par maximum diameter, the lesion in the anterior right frontal lobe measures \par 0.7 cm in maximum diameter, the second lesion in the right frontal lobe \par measures 0.1 cm in maximum diameter, the lesion in the right posterior \par parietal region measures 0.3 cm in maximum diameter, and the left \par occipital lobe lesion measures 1.2 cm in maximum diameter. (Previously \par measuring 2.6, 2.4, 0.5, 0.7, and 2.5 cm respectively).\par \par The third, fourth, and lateral ventricles are normal in size and \par position. There is no shift of the midline structures.\par \par The cerebellar tonsils are minimally low-lying (0.3 cm of cerebellar \par ectopia).\par \par Incidental note is made of a tiny medial cyst.\par \par There are scattered T2/FLAIR hyperintense signal intensities in the\par subcortical white matter which are nonspecific differential diagnostic\par possibilities include chronic ischemic change, foci of gliosis or\par demyelination.\par \par IMPRESSION:\par \par In comparison with the prior MRI of the brain dated March 21, 2019:\par \par There has been almost complete resolution of most of of the previously \par described supratentorial lesions.\par \par There are no new enhancing lesions.\par \par \par \par \par \par \par CRISTINA MÉNDEZ M.D., ATTENDING RADIOLOGIST\par This document has been electronically signed. Oren 10 2019  1:17PM\par   \par \par   \par \par \par [id number]^RI^DC\par \par EXAM:  PETCT SKUL-THI ONC FDG SUBS      \par \par \par PROCEDURE DATE:  05/01/2019  \par \par \par \par \par INTERPRETATION:  \par FDG PET CT STUDY   Subsequent  treatment strategy\par REASON: TUMOR IMAGING - PET with concurrently acquired CT for attenuation \par correction and anatomic localization; skull base to mid - thigh .\par \par CPT code 47750.\par \par Fasting blood glucose level:     91 mg/dl\par \par HISTORY: Endometrial cancer. Stage IV with brain metastatic disease.\par \par TECHNIQUE: Approximately 45 minutes after the intravenous administration \par of 10.7 mCi 18-Fluorine FDG, whole body PET images were acquired from \par base of skull to mid - thigh.\par \par CT protocol used for this PET/CT study is designed for attenuation \par correction and anatomic localization of PET findings.  This  CT \par is not designed to replace state-of-the-art diagnostic CT scans for \par specific imaging protocols of different body parts.\par \par COMPARISON : 2/12/2019.\par Correlation: MRI of the brain on 3/21/2019.\par \par FINDINGS:\par \par Head/Neck: No biologically active head/neck lesions.     \par Normal uptake within the brain, pharyngeal lymphoid tissue, salivary \par glands and laryngeal musculature is noted.    \par \par Thorax:   No suspicious hypermetabolic mediastinal, hilar, lung \par parenchymal lesions.\par \par Abdomen/Pelvis: Physiologic GI/  activity. \par \par Again seen is a left upper quadrant peritoneal nodule, SUV max 6.8, \par previously 7.7 (12% decrease). \par \par Multiple new FDG avid omental nodules largest of which is adjacent to the \par distal transverse colon, measuring 10 mm, positive on nonattenuation \par corrected images, axial image 134. \par \par No other abnormal visceral or guillaume tracer uptake is present.    \par \par Musculoskeletal :  No suspicious hypermetabolic osseous lesions.\par \par Additional CT findings:\par Status post hysterectomy.\par Colonic diverticulosis.\par \par IMPRESSION: \par \par COMPARISON : 2/12/2019\par \par Multiple new FDG avid omental nodules largest measuring up to 10 mm, \par positive on nonattenuation corrected images. Findings are suggestive of \par biologic tumor activity.\par \par Again seen is a left upper quadrant peritoneal nodule, SUV max 6.8, \par previously 7.7 (12% decrease). \par \par \par \par \par

## 2020-06-24 NOTE — PHYSICAL EXAM
[Restricted in physically strenuous activity but ambulatory and able to carry out work of a light or sedentary nature] : Status 1- Restricted in physically strenuous activity but ambulatory and able to carry out work of a light or sedentary nature, e.g., light house work, office work [Normal] : affect appropriate [de-identified] : B/L cataracts [de-identified] : CTA B/L

## 2020-06-24 NOTE — HISTORY OF PRESENT ILLNESS
[Disease: _____________________] : Disease: [unfilled] [AJCC Stage: ____] : AJCC Stage: [unfilled] [2] : 2, Moderate [de-identified] : Serena is a rodrigue 63 yo female, who has started developing SOB approximately 2 months ago, she went to ER on 11/2/2017 and on CXR was noted to have a large right sided pleural effusion. She underwent a thoracentesis, 1.6L of fluid was drained. 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 IHC was pos for CK7, PAX8, CK5/6 and Ca125, negative for TTF-1/Napsin, calretinin, CK20, mammaglobin, p63, villin, HAM56, GCDFP15, TAY-EP4. \par \par Her visit on 12/1/2017 Serena had an excisional biopsy of cervical node on 12/13/2017 revealing met adenocarcinoma, primary source still was not clear. On 12/14/2017 Serena was admitted with SOB, Pleurx catheter was placed on 12/14/2017. 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 Serena received 1st dose of carbo (auc of 5)/taxol(175mg/m2) on 12/15/2017 without complications, but the next day sustained a fall 2/2 vasovagal episode and developed asymptomatic SAH, that resolved on imaging by 12/21/2017.  [de-identified] : KRas WT, EGFR WT, Alk WT, ROS1 WT, PDL1-1%\par Normal MMR protein expression [FreeTextEntry5] : prednisone 25 mg po 2x/ day.  [FreeTextEntry3] : pruritic reaction  [de-identified] : 11/24/2017: She reports some SOB, especially ARZATE today. No fevers, no weight loss (reports some weight gain), no GI,  or GYN symptoms, except for increasing abdominal girth, she reports no blood in the stool or urine and no vaginal bleeding. She reports no CP and no palpitations, she reports worsening nonproductive cough, no hemoptysis. She also reports difficulty lying on left side and lying down flat. \par She reports left on/off facial numbness several months in duration. She had a CT of her head performed in [facility] within 1 year.  Additionally there is a freely mobile left cervical node present on exam, as per patient this was in place for approximately 1 year. \par \par 12/1/2017: Nidhi is here for follow up today. She had a therapeutic thoracentesis in  on 11/27/2017, 2L of fluid were removed, with much improved respiratory status. PET CT and MRI of the brain were done on 11/29/2017, findings were discussed today. There remains no clear primary source of malignancy. We have discussed that the source of tumor may be Mullerian vs lung. regardless of source chemotherapy needs to be initiated ASAP. We have discussed Carbo/taxol regiment that will cover both Mullerian and lung origin. Side effects including myelosuppression, peripheral neuropathy, allergic reaction and others.\par \par 1/2/2017 Since last visit Nidhi had an excisional biopsy of cervical node on 12/13/2017 revealing met adenocarcinoma, primary source still was not clear. On 12/14/2017 Nidhi was admitted with SOB, Pleurx catheter was placed on 12/14/2017. 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 Nidhi received 1st dose of carbo (auc of 5)/taxol(175mg/m2) on 12/15/2017 without complications, but the next day sustained a fall 2/2 vasovagal episode and developed asymptomatic SAH, that resolved on imaging by 12/21/2017. Today Nidhi's SOB has significantly improved. She reports very mild neuropathy in fingertips only. She reports no headache and offers no other complaints. \par \par 1/26/2018: Complains of some neuropathy, otherwise tolerating chemo with no difficulty. \par \par 2/16/2018: restaging CT C/A/P reviewed, significant improvement noted. Will refer to GYN/ONC. Reports slightly worsening neuropathy, not -painful, taking Gabapentin, no other symptoms. For cycle 4 of carbo/taxol today.\par \par 3/9/2018: Nidhi met with Dr. Massey, she is planned for surgery on 5/7/2018, after completion of 6 cycles of carbo/taxol and restaging PET CT ordered for mid April and follow up with Dr. Massey on April 20. She reports worsening neuropathy, and occasional pain and changes in vision in her left eye, eye symptoms come and go and are not very bothersome. She reports no other symptoms. \par \par 3/30/2018; Nidhi is here for cycle 6 of carbo/taxol, she continues to have neuropathy in finger and toes, but offers no other complaints, chemo has been dose reduced. \par \par 4/27/2018: Results of restaging PET CT s/p 6 cycles of carbo/taxol revealed 1. No new areas of abnormal increased uptake is seen to indicate \par biologically active disease.\par \par 2. Persistent PET positive left thyroid mass with a max SUV 33.1, \par previously max SUV 34.8. Further evaluation with thyroid ultrasound and \par if needed fine-needle aspiration biopsy will be helpful.\par \par 3.   PET positive left cervical lymph nodes mainly level 2 on the left \par with a max SUV 5.47previously 18. Minimal increased uptake in the \par bilateral axillary lymph nodes most likely reactive(less than 2.5)\par \par 4. Weakly PET positive, max SUV 2.89 right pleura, previous max SUV 5.3 \par with non-FDG avid right pleural effusion. \par \par 5. Previously FDG avid right and left supraclavicular lymphadenopathy, \par left internal mammary, bilateral paratracheal, para-aortic, para \par vertebral, carinal, mesenteric, right and left iliacs, right inguinal \par lymphadenopathy, small in size and no longer FDG avid.\par \par 6. No abnormal increased uptake is seen in the  lobulated uterus.\par \par 7. Overall there is marked improvement in the FDG avid disease.\par This was reviewed with Nidhi. She met with Dr. Massey on 4/20/2018, surgery is planned for 5/7/2018. She will also joselyn to meet with ENT re FDG avid thyroid nodule. Will assist in making he an appointment for her. \par Persistent neuropathy on gabapentin. \par \par 6/29/18 ; Patient came for follow up visit , evaluation for chemotherapy cycle 8 of carbo / Taxol , patient tolerating medication well , except neuropathy  recommend to have gabapentin  300 mg po daily.\par \par 7/20/2018: Nidhi present for follow up today, she has completed total of 8 cycles of Carbo/Taxol. She reports significant neuropathy in her hands and feet. We will discontinue chemotherapy today and plan to follow with close observation. She has thyroidectomy planned with Dr. Herrera on 8/20/2018, obtaining clearance for PMD. She is also planning to fly to [place] at the end of September. She reports no SOB, no GI or  symptoms. \par \par 9/12/2018: Nidhi offers no significant complaints today. She states neuropathy in her hands has almost completely resolved and neuropathy in her feet is significantly better. She had undergone complete thyroidectomy by Dr. Herrera on 8/20/2018, pathology revealed papillary thyroid cancer, measuring 2 cm, pT1b, other additional foci of papillary carcinoma were also noted, no LVI, surgical margins were clear, LN 0/2 had no carcinoma, stage [id number]. She is following up with Dr. Herrera tomorrow. She has still not gone for her vacation in [place]. \par \par 10/10/2018: Restaging PET CT on 9/26/2018 reveals Since 4/16/2018,  1. Post thyroidectomy with diffuse increased FDG uptake at the thyroid  bed likely representing post-surgical changes.  2. Subtle increased uptake is seen in the lamina , right side at the  level of T8 with a max SUV 4.6. This is not sufficient for metastatic  disease. Bone scan or MRI examination with contrast will be helpful for  further evaluation.  3. No other areas of abnormal increased uptake is seen. Images were personally reviewed by myself and discussed with Nidhi. \par She also had an Echo on 9/24/2018 revealing EF of 63%. \par Nidhi reports ongoing fatigue, she is unable to lose weight. She is taking Synthroid and following with Dr. Acevedo. \par She reports stiff fingers at night only, no painful neuropathy. \par She denies any other symptoms today. \par \par 12/11/2018: Nidhi feels well, neuropathy in hands and feet is much improved. S he is now complaining of r-sided facial pain associated with some swelling, pain comes and goes. She has already seen Dr. Herrera, who ordered CT of sinuses and prescribed pain relief meds. She is also due for restaging PET CT. Nidhi feels well otherwise. \par \par 1/9/2019: Restaging PET CT was performed on 12/18/2018 it revealed COMPARISON : 9/24/2018.  New left upper quadrant peritoneal nodule measuring 8 mm with an SUV max  of 7.3. This is suggestive of biologic tumor activity.  No other FDG avid lesions seen throughout the scan. Images were personally reviewed by myself and discussed with Nidhi, originally over the phone and again today. I have originally suggested to try AI in the interim, Nidhi however reported diarrhea at the time of the scan and declined intervention for now. We will plan for restaging PET CT to be performed in 6-8 weeks, this will be ordered today. She will follow up with Dr. Massey at [place] shortly and bring the disk for his review. I would also like her to meet with genetics, this will be arranged at [place] with Dr. Massey's help. Nidhi reports no new symptoms today, her neuropathy is manageable and  improving. She still reports unilateral headache that was work up in the past. Neurology eval was again suggested to her. She is following closely with Endocrinology re thyroid management. She will have follow up with Dr. Herrera shortly as well. \par \par 2/20/2019: Nidhi offers no new complaints. PET CT from 2/12/2019 revealed \par Since PET/CT of December 19, 2018, no new sites of pathologic FDG uptake\par Slight increase in size of left upper quadrant peritoneal nodule (1.1 cm, \par previously 0.8 cm) with stable FDG uptake, 7.7 SUV suspicious for \par biologic tumor activity.\par No other sites of pathologic FDG uptake \par Images were personally reviewed by myself and discussed with Nidhi, case was also discussed with Dr. Massey at [place]. Nidhi will have follow up with his shortly. She is following with endocrinology. reports improving neuropathy. No GI or  symptoms at this time. No weight loss. \par \par 3/20/2019: Nidhi had a peritoneal nodule biopsy done at [place], I have received a call from Dr. 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 We have discussed that surgery though could be attempted will not be the best therapeutic approach, recommencement of systemic therapy was discussed. I have not received the results from [place] yet. I have briefly discussed this with Nidhi today. Nidhi reports that she has acutely developed right lower extremity weakness 5-6 days ago, she did not inform any of her doctors about this. She also reports that her R upper extremity though not weak "does not feel normal either. She reports no back pain, there is no point tenderness, RLE is objectively weak on exam. I recommend ER evaluation to r/o CVA vs brain met, vs L-spine related issue. Recommend admission for work up. Nidhi is agreeable to get admitted. \par \par 5/8/2019: Nidhi present for follow up, she was diagnosed with multiple metastasis to the brain, MRI was done on 3/21/2019 and revealed \par Multiple supratentorial heterogeneously enhancing lesions consistent with \par metastatic disease. The appearance on the T2-weighted images is suggestive \par of adenocarcinoma. There is mass effect upon the left lateral ventricle and \par corpus callosum. \par She received whole brain irradiation by Dr. Dahl, completed it in the beginning of 4/2019, she is currently on steroid taper managed by Dr. Dahl, she is taking 4mg of Decadron daily. She will be stopping the Keppra as there have been no documented h/o seizures. She still reports impairment of the L visual field, she has an appointment coming up with Opthalmology. She reports no deficits walking after she has completed inpatient acute rehabilitation. \par Restaging PET CT on 5/1/2019 reveals COMPARISON : 2/12/2019 \par Multiple new FDG avid omental nodules largest measuring up to 10 mm, \par positive on nonattenuation corrected images. Findings are suggestive of \par biologic tumor activity. \par Again seen is a left upper quadrant peritoneal nodule, SUV max 6.8, \par previously 7.7 (12% decrease). \par Images were personally reviewed by myself and discussed with patient. \par She now reports mild abdominal bloating, no other symptoms. She reports her neuropathy has significantly improved. \par She has first evidence of recurrent disease documented 5.5 months after last carbo/taxol dose, the volume of disease was very small (1 8mm nodule), she was then observed for another 6 months and now she wishes to restart the chemotherapy. \par I have discussed with her that rechallenging with carbo/taxol may still prove to be effective as long as she gets restaged after 2 cycles. Given recent whole brain radiation and neuropathy I will offer her carbo/taxol weekly with does reduction on the taxol for neuropathy, We will plan to run carbo slowly to avoid allergic reaction. \par She is agreeable to start ASAP. \par \par 6/5/2019: Nidhi has completed 1 cycle of weekly Carbo/taxol, ran at slow rate to avoid Carbo reaction and has tolerated chemotherapy without difficulty, she does not complain of increase in neuropathy. She has ongoing vision changes in her L eye, she had no residual LE weakness. She is off Decadron and Keppra. She was advised to see ophthalmology. \par \par 7/3/2019: Nidhi is doing well.  Reports no problems with carbo/taxol.  Still complains of vision changes in L eye but is seeing ophthalmologist on 7/16.  Remains active around the house and cooks regularly.  No fevers, weight loss, anorexia.  ROS is otherwise only significant for occasional loose stools, no diarrhea.\par \par 7/17/2019: Nidhi is doing well, denies worsening neuropathy. She c/o feeling fatigued and tired. Her last chemo was 1 week ago(7/11/19) with carbo/taxol and is due again tomorrow. She saw ophthalmology and will need cataract surgery of the L eye. No c/o chest pain/SOB. No weight loss.\par CT C/A/P was done on 7/9/2019, images were personally reviewed by myself and discussed with several radiology attendings, they revealed \par CHEST:\par Filling defect within a segmental branch of the lower lobe pulmonary artery \par suspicious for pulmonary embolus. \par Stable left upper lobe and right middle lobe 3 mm nodules. \par CT abdomen and pelvis performed on the same day, see separate report. \par ADDENDUM: \par Please note that the morphology of the small thrombus within the left lower \par pulmonary artery is not indicative of an acute thrombus but rather a chronic \par appearance. \par This was discussed with Radiology, it was not deemed that thrombus was acute and may not have even been present at all, finding was deemed to be equivocal, based on radiology assessment anticoagulation was not indicated for the filling defect noted. Initially CTA was recommended, the recommendation was withdrawn upon further review. \par ABDOMEN/PELVIS:\par New 1.3 cm segment IV hepatic hypodensity seen on series 4 image 205. \par Lesion not seen on prior PET/CT from 5/1/2019 or abdomen and pelvis CT from \par \par 2/2/2018 and is consistent with a new metastasis. \par Two other hepatic lesions described above, decreased in size since 2/2/2018, \par consistent with treated hepatic metastases. \par Anterior perisplenic omental nodule measures 0.7 cm on series 2 image 53, \par previously measuring 1.2 cm on PET/CT dated 5/1/2019. \par All the other previously seen omental nodules have resolved since 5/1/2019. \par ADDENDUM:\par Case was discussed with Dr. Cid in detail. It is also possible that the \par new 1.3 cm lesion in the liver since February 2, 2018 represents a treated \par metastasis similar to the other liver lesions. \par PET/CT would be necessary to assess disease activity. \par I have discussed case with Radiology, and it was determined that there was no clear cut evidence of progression. PET CT was ordered today.\par This was discussed with Nidhi, will proceed with Carbo/Taxol for now. \par \par 8/1/2019: Nidhi reports no major side effects from weekly Carbo/Taxol, she reports no neuropathy. She has completed 10 cycles altogether. \par PET CT on 7/24/2019 revealed \par 1. Since May 1, 2019, no definite new site of pathologic FDG uptake to \par suggest new biologic tumor activity; specifically, no pathologic FDG uptake \par within previously noted 1.3 cm lesion within hepatic segment 4. \par 2. Increased FDG uptake within several subcentimeter bilateral cervical \par lymph nodes, which is nonspecific and may be postinflammatory; max SUV 9.2 \par is noted within a 0.7 cm right level 2 cervical node. Attention on follow-up \par is suggested. \par 3. Few peritoneal nodules have overall decreased in size. \par 4. No additional site of pathologic FDG uptake. \par Images were personally reviewed by myself and discussed with Nidhi. \par She wishes to continue with chemotherapy. Will plan for 3 additional cycles. Will consider adding Avastin, but with h/o inconclusive/possible chronic PE ppx anticoagulation maybe necessary. \par \par 8/28/2019: Nidhi reports feeling well, she denies worsening neuropathy, worsening neurological symptoms, SOB, abdominal pain, bloating. She reports she is planned to undergo  eye surgery towards the end of September. We will plan to hold chemotherapy briefly after this cycle to facilitate adequate counts and minimize complications. We again have briefly discussed considering Avastin based therapy, will hold off on this prior to surgery. 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 May proceed without formal PAST, will check CBC prior to procedure, PT and PTT today. \par \par 9/25/19:Nidhi reports feeling well, she denies any changes since last visit, No SOB, abdominal pain, bloating. She reports she is planned to undergo  eye surgery in 9/30/19  CBC reviewed with normal Hemogram . we will hold chem this week , she will resume after Cataract sx . 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 May proceed without formal PAST, will check CBC prior to procedure, PT and PTT today. \par \par 10/4/2019: Nidhi underwent successful eye surgery on 9/30/2019, she reports she can see much better now. Last dose of chemotherapy was administered on 9/20, she then was on hold for surgery. She has completed 5 additional cycles of Carbo/taxol. She is due for restaging. Her disease is only accurately assessed on PET CT, prior attempts to obtain CT scans resulted in additional imaging with PET, as findings were inconclusive. PET CT will be ordered to restage. \par In addition she is due for MRI of the brain. She has had a small amount of weight loss, mild neuropathy is persistent. She offers no additional complaints. \par She will have port placed on Monday, will hold chemotherapy until restaging scans complete. \par \par 10/23/2019: Nidhi feels well, and denies any new symptoms. Her eye surgery was successful, vision is much improved now. She had restaging scans. \par PET CT on 10/18/2019 revealed COMPARISON : 7/24/2019. \par No pathologic uptake to suggest biologic tumor activity. \par MRI of the brain on 10/15/2019 revealed \par Comparison with June 8 and March 21, 2019: (Generally lesions improved \par markedly since March but slightly increased in size since June) \par 1. Lesions previously demonstrated on the study of June 8, 2019 has \par remained stable or minimally increased in size \par 2. Specifically, right frontal opercular lesion measures about 1 cm and \par left posterior inferior temporal lobe lesion measures about 1 cm. These have \par increased since the previous study. \par 3. Small punctiform lesions within the right frontal white matter and left \par frontal sulcus or more apparent than on the study of June 8 \par 4. These lesions are all much smaller than the earlier MRI of March 21, \par 2019. \par 5. No new lesions. \par Images were personally reviewed by myself and discussed with patient. She remains asymptomatic and off the steroids. \par I have also discussed the case with Dr. Dahl. I would like Nidhi to discuss the options with [facility], she maybe a candidate for brain SRS. \par She has been off Carbo/Taxol for a few weeks, she remains Platinum sensitive. I have discussed the option of switching her over to Avastin maintenance, will need to prophylax with low dose Eliquis as well, given questionable finding of small PE in the past. \par Side effects of Avastin were discussed at length, including HNT, proteinuria, small risk of DVT/PE, GI perforations etc. \par She is agreeable to start after [facility] consultation. \par \par 11/5/2019: Nidhi feels OK, she reports acute onset head discomfort that lasts minutes and subsides, the episodes are becoming more frequent 1-2 times a day. She has seen Dr. Dahl, who wishes to hold off on offering SRS to the brain. Plan was to start on Avastin today. Nidhi reports she currently has a lapse in her insurance coverage till 12/2019, I will not be able to start he on ppx dose Eliquis for now, she instead will take baby ASA every other day, to modify risk of DVT/PE. Will plan to switch over to Eliquis at 2.5mg BID once insurance is active. She also contemplated going to [place] with her daughter, I am willing to hold Avastin until she comes back, but Nidhi wishes to start right away, I explained to her that flight may result in complications if that is her wish. She will cancel the trip and start with Avastin today. Side effects have been discussed. \par \par 11/20/2019: Nidhi came for a follow up visit, she reports feeling well, she reports less fatigue. She is s/p first dose of Avastin on 11/5/2019, she tolerated Avastin without difficulty, she is due for the 2nd dose today. \par We communicated CBC result with HGB of 12.3, normal platelet and WBCs. She lost like 5 pounds secondary to gum problems, she will follow up with her dentist this week. She is currently on baby ASA every other day. Continue with single agent Avastin.  \par \par 12/17/2019: Nidhi came for a follow up visit, she reports feeling well, she experienced pruritic rash on last Saturday 12/14/19, which improved over 2 days with Benadryl alone.  Today her skin rash has completely resolved. We will add Solu-Medrol 100 mg IV prior to Avastin. Nidhi reports less fatigue. She is due for the 3rd dose of Avastin today. \par We communicated CBC result with HGB of 12.3, normal platelet and WBCs. \par \par 1/7/2019: Nidhi is doing well, reports good appetite, she has lost 1lb. Reports no GI or pulmonary symptoms. She reports her gums are bleeding occasionally. No new neurological symptoms. She reports no rash after last cycle of Avastin. \par \par 1/28/2020: Nidhi is here for follow up visit, she has no difficulty tolerating Avastin, she reports L sided chronic eye discomfort, neuropathic in nature. Neurontin has been helpful in the past, will reorder this. \par PET CT on 1/22/2020 revealed COMPARISON : 10/18/2019. \par Anterior perisplenic nodule measuring 8 mm is FDG avid, SUV max 8.9, \par consistent with biologic tumor activity. \par MRI of the brain on 1/15/2020 revealed \par In comparison to the previous brain MRI dated 10/15/2019: \par 1. Redemonstrated scattered enhancing lesions consistent with metastatic \par disease, with overall good treatment response since the prior exam. \par 2. No significant interval change in the ovoid lesion in the superior left \par frontal lobe measuring 12 mm. \par 3. Decreased size of the right opercular lesion measuring 6 mm, previously \par 11 mm. Decreased size of the left inferior occipital/tentorial lesion \par measuring 6 mm, previously 11 mm. The previously seen right frontal white \par matter punctate lesion is no longer visualized. \par 4. No new lesions are demonstrated. \par All images were personally reviewed by myself and discussed with Nidhi. I explained to her I am concerned about the perisplenic lesion concerning for disease progression, but it is isolated and very small, asymptomatic. There is definitely a positive response to Avastin in the brain and given that, we will continue with Avastin with short term follow up imaging. Nidhi knows to inform me with any new symptoms immediately. \par \par 2/18/2020: NIDHI BANKS is a 64 year old female here today for follow up visit. She completed 5 cycles of Avastin; tolerating well at this time. She denies fever, chills, change in mental status, or change in weight. She complains of left painful facial swelling. In addition, she complains of swelling of fingers, joint pain and numbness in her toes. She continues on Gabapentin 100mg TID with no symptom relief and wishes to try to go up on the dose. She complains of mild gingival bleeding in the morning. Last PET showed new isolated perisplenic lesion suspicious for disease progression, however MRI of the brain appeared better on Avastin, so the plan was to continue with Avastin with short term follow up. \par \par 3/10/2020: NIDHI BANKS is a 64 year old female here today for follow up visit. She denies new neurological symptoms, skin rash, fever, or change in weight. She continues of Gabapentin which was increased to 200 mg TID; neuropathy improving. She continues to complain of mild gingival bleeding. Left facial swelling and numbness resolved. She has completed cycle 6 of Avastin without complication. \par \par 3/31/71016: Nidhi presented today for routine f/u, she feels fine. Denies any chest pain, sob, fever, chills and cough. Reports that she continues to have gum bleeding. Neuropathy is better with gabapentin, she decreased the dose to 4 pills a day. She tolerated 7th cycle well and is due to get 8th cycle today . She will get MRI brain on 4/2/20.\par She reports minor gum bleeding from Avastin and dental infection on the left, will prescribe Amoxicillin. \par \par 4/21/2020: NIDHI BANKS is a 64 year old female here today for follow up visit for endometrial cancer. S/p cycle 8 of Avastin. Patient denies fever, chills, SOB, cough and pain. She states that her gums have minimal bleeding and dental infection improved after amoxicillin. She complains of mild fatigue. Restaging MRI of the brain was completed on 4/2/2020 which showed stable bilateral parenchymal hemorrhagic lesions compatible with known metastases; no new enhancing lesions are identified. \par Images were personally reviewed by MD Jose Roberto and discussed with patient. \par \par 5/12/2020: MRI brain on 4/2/2020 reveals 1. Stable bilateral parenchymal hemorrhagic lesions compatible with known \par metastases. \par 2. No new enhancing lesions are identified. \par PET CT on 4/30/2020 revealed Right upper quadrant peritoneal implant adjacent to the inferior tip of the \par liver 4.7 (image [id number]) \par Right lower quadrant 0.5 cm peritoneal nodule 7.2 (image 111) \par Right lower quadrant subcentimeter peritoneal nodule 2.6 (image [id number]) \par Anterior perisplenic peritoneal nodule 12.3-48% increase from 8.9 \par (remeasured) previously \par Stable non-FDG avid (attenuation corrected and nonattenuation corrected \par images) 4 mm right middle lobe pulmonary nodule-image 181) \par  No other definite sites of abnormal FDG uptake \par IMPRESSION: \par Compared to 1/22/2020: \par new FDG avid peritoneal implants in right lower quadrant of the abdomen \par suspicious for biologic tumor activity \par 48% interval increase in SUV in anterior perisplenic peritoneal nodule (SUV \par 12.3 vs 8.9 remeasured) \par In retrospect stable FDG avid peritoneal implant in the right upper quadrant \par of the abdomen (SUV 4.7 vs 3.8 remeasured-image 134) \par No other definite sites of abnormal FDG uptake \par Images were personally reviewed by MD Jose Rboerto and discussed with patient.\par Given minimal degree of progression in the abdomen and persistent control intracranially, we have discussed continuing Avastin for now and short term restaging. \par Nidhi offers no complaints today. \par \par 6/2/2020: The patient is here for follow up. She is due for Avastin today . She has  no major complaints . She reported gum bleeding and swelling, which is much better today . She also reported hand swelling bilaterally. She denies fever, chills, no respiratory, cardiac, GI/ symptoms. She denies headaches or neurological deficits. \par \par 6/23/2020: Nidhi is 65 years old female came for a follow up visit for papillary-serous endometrial carcinoma with brain metastasis.  She reported feeling well. \par She denies dizziness, change of mental status, fever, chills, SOB. \par We will continue the current treatment with  Avastin one every 3 weeks.\par She will have MRI of the brain prior to the following visit.

## 2020-07-14 PROBLEM — J91.0 PLEURAL EFFUSION, MALIGNANT: Status: ACTIVE | Noted: 2017-11-24

## 2020-07-14 NOTE — REVIEW OF SYSTEMS
[Fatigue] : fatigue [Easy Bleeding] : a tendency for easy bleeding [Negative] : Allergic/Immunologic [Recent Change In Weight] : ~T no recent weight change [Palpitations] : no palpitations [Chest Pain] : no chest pain [Shortness Of Breath] : no shortness of breath [Lower Ext Edema] : no lower extremity edema [Cough] : no cough [Wheezing] : no wheezing [SOB on Exertion] : no shortness of breath during exertion [FreeTextEntry9] : normal strength in upper and lower extremities  [de-identified] : no gait impairment- numbness and swelling of bilateral hands  [de-identified] : mild gingival bleeding

## 2020-07-14 NOTE — PHYSICAL EXAM
[Restricted in physically strenuous activity but ambulatory and able to carry out work of a light or sedentary nature] : Status 1- Restricted in physically strenuous activity but ambulatory and able to carry out work of a light or sedentary nature, e.g., light house work, office work [Normal] : affect appropriate [de-identified] : B/L cataracts [de-identified] : CTA B/L

## 2020-07-14 NOTE — RESULTS/DATA
[FreeTextEntry1] : EXAM:  MR BRAIN WAW IC      \par \par \par PROCEDURE DATE:  06/08/2019  \par \par \par \par \par INTERPRETATION:  Clinical History / Reason for exam: Dizziness and \par vertigo, history of uterine and thyroid cancer, for evaluation of \par metastatic disease, lesion seen on prior MRI of the brain.\par \par MRI OF THE BRAIN WITHOUT AND WITH CONTRAST\par \par TECHNIQUE:\par \par Multiplanar multisequence imaging of the brain was performed on the \[facility] open magnet before and after the intravenous administration of \par 7.5 cc of Gadavist including brain lab protocol.\par \par COMPARISON:\par \par MRI of the brain without and with contrast dated March 12, 2019.\par \par FINDINGS:\par \par The previously described supratentorial lesions have almost completely \par resolved.\par \par The lesion in the left posterior frontal region now measures 1.4 cm in \par maximum diameter, the lesion in the anterior right frontal lobe measures \par 0.7 cm in maximum diameter, the second lesion in the right frontal lobe \par measures 0.1 cm in maximum diameter, the lesion in the right posterior \par parietal region measures 0.3 cm in maximum diameter, and the left \par occipital lobe lesion measures 1.2 cm in maximum diameter. (Previously \par measuring 2.6, 2.4, 0.5, 0.7, and 2.5 cm respectively).\par \par The third, fourth, and lateral ventricles are normal in size and \par position. There is no shift of the midline structures.\par \par The cerebellar tonsils are minimally low-lying (0.3 cm of cerebellar \par ectopia).\par \par Incidental note is made of a tiny medial cyst.\par \par There are scattered T2/FLAIR hyperintense signal intensities in the\par subcortical white matter which are nonspecific differential diagnostic\par possibilities include chronic ischemic change, foci of gliosis or\par demyelination.\par \par IMPRESSION:\par \par In comparison with the prior MRI of the brain dated March 21, 2019:\par \par There has been almost complete resolution of most of of the previously \par described supratentorial lesions.\par \par There are no new enhancing lesions.\par \par \par \par \par \par \par CRISTINA MÉNDEZ M.D., ATTENDING RADIOLOGIST\par This document has been electronically signed. Oren 10 2019  1:17PM\par   \par \par   \par \par \par [id number]^RI^DC\par \par EXAM:  PETCT SKUL-THI ONC FDG SUBS      \par \par \par PROCEDURE DATE:  05/01/2019  \par \par \par \par \par INTERPRETATION:  \par FDG PET CT STUDY   Subsequent  treatment strategy\par REASON: TUMOR IMAGING - PET with concurrently acquired CT for attenuation \par correction and anatomic localization; skull base to mid - thigh .\par \par CPT code 43356.\par \par Fasting blood glucose level:     91 mg/dl\par \par HISTORY: Endometrial cancer. Stage IV with brain metastatic disease.\par \par TECHNIQUE: Approximately 45 minutes after the intravenous administration \par of 10.7 mCi 18-Fluorine FDG, whole body PET images were acquired from \par base of skull to mid - thigh.\par \par CT protocol used for this PET/CT study is designed for attenuation \par correction and anatomic localization of PET findings.  This  CT \par is not designed to replace state-of-the-art diagnostic CT scans for \par specific imaging protocols of different body parts.\par \par COMPARISON : 2/12/2019.\par Correlation: MRI of the brain on 3/21/2019.\par \par FINDINGS:\par \par Head/Neck: No biologically active head/neck lesions.     \par Normal uptake within the brain, pharyngeal lymphoid tissue, salivary \par glands and laryngeal musculature is noted.    \par \par Thorax:   No suspicious hypermetabolic mediastinal, hilar, lung \par parenchymal lesions.\par \par Abdomen/Pelvis: Physiologic GI/  activity. \par \par Again seen is a left upper quadrant peritoneal nodule, SUV max 6.8, \par previously 7.7 (12% decrease). \par \par Multiple new FDG avid omental nodules largest of which is adjacent to the \par distal transverse colon, measuring 10 mm, positive on nonattenuation \par corrected images, axial image 134. \par \par No other abnormal visceral or guillaume tracer uptake is present.    \par \par Musculoskeletal :  No suspicious hypermetabolic osseous lesions.\par \par Additional CT findings:\par Status post hysterectomy.\par Colonic diverticulosis.\par \par IMPRESSION: \par \par COMPARISON : 2/12/2019\par \par Multiple new FDG avid omental nodules largest measuring up to 10 mm, \par positive on nonattenuation corrected images. Findings are suggestive of \par biologic tumor activity.\par \par Again seen is a left upper quadrant peritoneal nodule, SUV max 6.8, \par previously 7.7 (12% decrease). \par \par \par \par \par

## 2020-07-14 NOTE — HISTORY OF PRESENT ILLNESS
[Disease: _____________________] : Disease: [unfilled] [AJCC Stage: ____] : AJCC Stage: [unfilled] [de-identified] : Serena is a rodrigue 65 yo female, who has started developing SOB approximately 2 months ago, she went to ER on 11/2/2017 and on CXR was noted to have a large right sided pleural effusion. She underwent a thoracentesis, 1.6L of fluid was drained. 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 IHC was pos for CK7, PAX8, CK5/6 and Ca125, negative for TTF-1/Napsin, calretinin, CK20, mammaglobin, p63, villin, HAM56, GCDFP15, TAY-EP4. \par \par Her visit on 12/1/2017 Serena had an excisional biopsy of cervical node on 12/13/2017 revealing met adenocarcinoma, primary source still was not clear. On 12/14/2017 Serena was admitted with SOB, Pleurx catheter was placed on 12/14/2017. 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 Serena received 1st dose of carbo (auc of 5)/taxol(175mg/m2) on 12/15/2017 without complications, but the next day sustained a fall 2/2 vasovagal episode and developed asymptomatic SAH, that resolved on imaging by 12/21/2017.  [de-identified] : KRas WT, EGFR WT, Alk WT, ROS1 WT, PDL1-1%\par Normal MMR protein expression [de-identified] : 11/24/2017: She reports some SOB, especially ARZATE today. No fevers, no weight loss (reports some weight gain), no GI,  or GYN symptoms, except for increasing abdominal girth, she reports no blood in the stool or urine and no vaginal bleeding. She reports no CP and no palpitations, she reports worsening nonproductive cough, no hemoptysis. She also reports difficulty lying on left side and lying down flat. \par She reports left on/off facial numbness several months in duration. She had a CT of her head performed in [facility] within 1 year.  Additionally there is a freely mobile left cervical node present on exam, as per patient this was in place for approximately 1 year. \par \par 12/1/2017: Nidhi is here for follow up today. She had a therapeutic thoracentesis in  on 11/27/2017, 2L of fluid were removed, with much improved respiratory status. PET CT and MRI of the brain were done on 11/29/2017, findings were discussed today. There remains no clear primary source of malignancy. We have discussed that the source of tumor may be Mullerian vs lung. regardless of source chemotherapy needs to be initiated ASAP. We have discussed Carbo/taxol regiment that will cover both Mullerian and lung origin. Side effects including myelosuppression, peripheral neuropathy, allergic reaction and others.\par \par 1/2/2017 Since last visit Nidhi had an excisional biopsy of cervical node on 12/13/2017 revealing met adenocarcinoma, primary source still was not clear. On 12/14/2017 Nidhi was admitted with SOB, Pleurx catheter was placed on 12/14/2017. 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 Nidhi received 1st dose of carbo (auc of 5)/taxol(175mg/m2) on 12/15/2017 without complications, but the next day sustained a fall 2/2 vasovagal episode and developed asymptomatic SAH, that resolved on imaging by 12/21/2017. Today Nidhi's SOB has significantly improved. She reports very mild neuropathy in fingertips only. She reports no headache and offers no other complaints. \par \par 1/26/2018: Complains of some neuropathy, otherwise tolerating chemo with no difficulty. \par \par 2/16/2018: restaging CT C/A/P reviewed, significant improvement noted. Will refer to GYN/ONC. Reports slightly worsening neuropathy, not -painful, taking Gabapentin, no other symptoms. For cycle 4 of carbo/taxol today.\par \par 3/9/2018: Nidhi met with Dr. Massey, she is planned for surgery on 5/7/2018, after completion of 6 cycles of carbo/taxol and restaging PET CT ordered for mid April and follow up with Dr. Massey on April 20. She reports worsening neuropathy, and occasional pain and changes in vision in her left eye, eye symptoms come and go and are not very bothersome. She reports no other symptoms. \par \par 3/30/2018; Nidhi is here for cycle 6 of carbo/taxol, she continues to have neuropathy in finger and toes, but offers no other complaints, chemo has been dose reduced. \par \par 4/27/2018: Results of restaging PET CT s/p 6 cycles of carbo/taxol revealed 1. No new areas of abnormal increased uptake is seen to indicate \par biologically active disease.\par \par 2. Persistent PET positive left thyroid mass with a max SUV 33.1, \par previously max SUV 34.8. Further evaluation with thyroid ultrasound and \par if needed fine-needle aspiration biopsy will be helpful.\par \par 3.   PET positive left cervical lymph nodes mainly level 2 on the left \par with a max SUV 5.47previously 18. Minimal increased uptake in the \par bilateral axillary lymph nodes most likely reactive(less than 2.5)\par \par 4. Weakly PET positive, max SUV 2.89 right pleura, previous max SUV 5.3 \par with non-FDG avid right pleural effusion. \par \par 5. Previously FDG avid right and left supraclavicular lymphadenopathy, \par left internal mammary, bilateral paratracheal, para-aortic, para \par vertebral, carinal, mesenteric, right and left iliacs, right inguinal \par lymphadenopathy, small in size and no longer FDG avid.\par \par 6. No abnormal increased uptake is seen in the  lobulated uterus.\par \par 7. Overall there is marked improvement in the FDG avid disease.\par This was reviewed with Nidhi. She met with Dr. Massey on 4/20/2018, surgery is planned for 5/7/2018. She will also joselyn to meet with ENT re FDG avid thyroid nodule. Will assist in making he an appointment for her. \par Persistent neuropathy on gabapentin. \par \par 6/29/18 ; Patient came for follow up visit , evaluation for chemotherapy cycle 8 of carbo / Taxol , patient tolerating medication well , except neuropathy  recommend to have gabapentin  300 mg po daily.\par \par 7/20/2018: Nidhi present for follow up today, she has completed total of 8 cycles of Carbo/Taxol. She reports significant neuropathy in her hands and feet. We will discontinue chemotherapy today and plan to follow with close observation. She has thyroidectomy planned with Dr. Herrera on 8/20/2018, obtaining clearance for PMD. She is also planning to fly to [place] at the end of September. She reports no SOB, no GI or  symptoms. \par \par 9/12/2018: Nidhi offers no significant complaints today. She states neuropathy in her hands has almost completely resolved and neuropathy in her feet is significantly better. She had undergone complete thyroidectomy by Dr. Herrera on 8/20/2018, pathology revealed papillary thyroid cancer, measuring 2 cm, pT1b, other additional foci of papillary carcinoma were also noted, no LVI, surgical margins were clear, LN 0/2 had no carcinoma, stage [id number]. She is following up with Dr. Herrera tomorrow. She has still not gone for her vacation in [place]. \par \par 10/10/2018: Restaging PET CT on 9/26/2018 reveals Since 4/16/2018,  1. Post thyroidectomy with diffuse increased FDG uptake at the thyroid  bed likely representing post-surgical changes.  2. Subtle increased uptake is seen in the lamina , right side at the  level of T8 with a max SUV 4.6. This is not sufficient for metastatic  disease. Bone scan or MRI examination with contrast will be helpful for  further evaluation.  3. No other areas of abnormal increased uptake is seen. Images were personally reviewed by myself and discussed with Nidhi. \par She also had an Echo on 9/24/2018 revealing EF of 63%. \par Nidhi reports ongoing fatigue, she is unable to lose weight. She is taking Synthroid and following with Dr. Acevedo. \par She reports stiff fingers at night only, no painful neuropathy. \par She denies any other symptoms today. \par \par 12/11/2018: Nidhi feels well, neuropathy in hands and feet is much improved. S he is now complaining of r-sided facial pain associated with some swelling, pain comes and goes. She has already seen Dr. Herrera, who ordered CT of sinuses and prescribed pain relief meds. She is also due for restaging PET CT. Nidhi feels well otherwise. \par \par 1/9/2019: Restaging PET CT was performed on 12/18/2018 it revealed COMPARISON : 9/24/2018.  New left upper quadrant peritoneal nodule measuring 8 mm with an SUV max  of 7.3. This is suggestive of biologic tumor activity.  No other FDG avid lesions seen throughout the scan. Images were personally reviewed by myself and discussed with Nidhi, originally over the phone and again today. I have originally suggested to try AI in the interim, Nidhi however reported diarrhea at the time of the scan and declined intervention for now. We will plan for restaging PET CT to be performed in 6-8 weeks, this will be ordered today. She will follow up with Dr. Massey at [facility] shortly and bring the disk for his review. I would also like her to meet with genetics, this will be arranged at [facility] with Dr. Massey's help. Nidhi reports no new symptoms today, her neuropathy is manageable and  improving. She still reports unilateral headache that was work up in the past. Neurology eval was again suggested to her. She is following closely with Endocrinology re thyroid management. She will have follow up with Dr. Herrera shortly as well. \par \par 2/20/2019: Nidhi offers no new complaints. PET CT from 2/12/2019 revealed \par Since PET/CT of December 19, 2018, no new sites of pathologic FDG uptake\par Slight increase in size of left upper quadrant peritoneal nodule (1.1 cm, \par previously 0.8 cm) with stable FDG uptake, 7.7 SUV suspicious for \par biologic tumor activity.\par No other sites of pathologic FDG uptake \par Images were personally reviewed by myself and discussed with Nidhi, case was also discussed with Dr. Massey at [facility]. Nidhi will have follow up with his shortly. She is following with endocrinology. reports improving neuropathy. No GI or  symptoms at this time. No weight loss. \par \par 3/20/2019: Nidhi had a peritoneal nodule biopsy done at [facility], I have received a call from Dr. 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 We have discussed that surgery though could be attempted will not be the best therapeutic approach, recommencement of systemic therapy was discussed. I have not received the results from [facility] yet. I have briefly discussed this with Nidhi today. Nidhi reports that she has acutely developed right lower extremity weakness 5-6 days ago, she did not inform any of her doctors about this. She also reports that her R upper extremity though not weak "does not feel normal either. She reports no back pain, there is no point tenderness, RLE is objectively weak on exam. I recommend ER evaluation to r/o CVA vs brain met, vs L-spine related issue. Recommend admission for work up. Nidhi is agreeable to get admitted. \par \par 5/8/2019: Nidhi present for follow up, she was diagnosed with multiple metastasis to the brain, MRI was done on 3/21/2019 and revealed \par Multiple supratentorial heterogeneously enhancing lesions consistent with \par metastatic disease. The appearance on the T2-weighted images is suggestive \par of adenocarcinoma. There is mass effect upon the left lateral ventricle and \par corpus callosum. \par She received whole brain irradiation by Dr. Dahl, completed it in the beginning of 4/2019, she is currently on steroid taper managed by Dr. Dahl, she is taking 4mg of Decadron daily. She will be stopping the Keppra as there have been no documented h/o seizures. She still reports impairment of the L visual field, she has an appointment coming up with Opthalmology. She reports no deficits walking after she has completed inpatient acute rehabilitation. \par Restaging PET CT on 5/1/2019 reveals COMPARISON : 2/12/2019 \par Multiple new FDG avid omental nodules largest measuring up to 10 mm, \par positive on nonattenuation corrected images. Findings are suggestive of \par biologic tumor activity. \par Again seen is a left upper quadrant peritoneal nodule, SUV max 6.8, \par previously 7.7 (12% decrease). \par Images were personally reviewed by myself and discussed with patient. \par She now reports mild abdominal bloating, no other symptoms. She reports her neuropathy has significantly improved. \par She has first evidence of recurrent disease documented 5.5 months after last carbo/taxol dose, the volume of disease was very small (1 8mm nodule), she was then observed for another 6 months and now she wishes to restart the chemotherapy. \par I have discussed with her that rechallenging with carbo/taxol may still prove to be effective as long as she gets restaged after 2 cycles. Given recent whole brain radiation and neuropathy I will offer her carbo/taxol weekly with does reduction on the taxol for neuropathy, We will plan to run carbo slowly to avoid allergic reaction. \par She is agreeable to start ASAP. \par \par 6/5/2019: Nidhi has completed 1 cycle of weekly Carbo/taxol, ran at slow rate to avoid Carbo reaction and has tolerated chemotherapy without difficulty, she does not complain of increase in neuropathy. She has ongoing vision changes in her L eye, she had no residual LE weakness. She is off Decadron and Keppra. She was advised to see ophthalmology. \par \par 7/3/2019: Nidhi is doing well.  Reports no problems with carbo/taxol.  Still complains of vision changes in L eye but is seeing ophthalmologist on 7/16.  Remains active around the house and cooks regularly.  No fevers, weight loss, anorexia.  ROS is otherwise only significant for occasional loose stools, no diarrhea.\par \par 7/17/2019: Nidhi is doing well, denies worsening neuropathy. She c/o feeling fatigued and tired. Her last chemo was 1 week ago(7/11/19) with carbo/taxol and is due again tomorrow. She saw ophthalmology and will need cataract surgery of the L eye. No c/o chest pain/SOB. No weight loss.\par CT C/A/P was done on 7/9/2019, images were personally reviewed by myself and discussed with several radiology attendings, they revealed \par CHEST:\par Filling defect within a segmental branch of the lower lobe pulmonary artery \par suspicious for pulmonary embolus. \par Stable left upper lobe and right middle lobe 3 mm nodules. \par CT abdomen and pelvis performed on the same day, see separate report. \par ADDENDUM: \par Please note that the morphology of the small thrombus within the left lower \par pulmonary artery is not indicative of an acute thrombus but rather a chronic \par appearance. \par This was discussed with Radiology, it was not deemed that thrombus was acute and may not have even been present at all, finding was deemed to be equivocal, based on radiology assessment anticoagulation was not indicated for the filling defect noted. Initially CTA was recommended, the recommendation was withdrawn upon further review. \par ABDOMEN/PELVIS:\par New 1.3 cm segment IV hepatic hypodensity seen on series 4 image 205. \par Lesion not seen on prior PET/CT from 5/1/2019 or abdomen and pelvis CT from \par \par 2/2/2018 and is consistent with a new metastasis. \par Two other hepatic lesions described above, decreased in size since 2/2/2018, \par consistent with treated hepatic metastases. \par Anterior perisplenic omental nodule measures 0.7 cm on series 2 image 53, \par previously measuring 1.2 cm on PET/CT dated 5/1/2019. \par All the other previously seen omental nodules have resolved since 5/1/2019. \par ADDENDUM:\par Case was discussed with Dr. Cid in detail. It is also possible that the \par new 1.3 cm lesion in the liver since February 2, 2018 represents a treated \par metastasis similar to the other liver lesions. \par PET/CT would be necessary to assess disease activity. \par I have discussed case with Radiology, and it was determined that there was no clear cut evidence of progression. PET CT was ordered today.\par This was discussed with Nidhi, will proceed with Carbo/Taxol for now. \par \par 8/1/2019: Nidhi reports no major side effects from weekly Carbo/Taxol, she reports no neuropathy. She has completed 10 cycles altogether. \par PET CT on 7/24/2019 revealed \par 1. Since May 1, 2019, no definite new site of pathologic FDG uptake to \par suggest new biologic tumor activity; specifically, no pathologic FDG uptake \par within previously noted 1.3 cm lesion within hepatic segment 4. \par 2. Increased FDG uptake within several subcentimeter bilateral cervical \par lymph nodes, which is nonspecific and may be postinflammatory; max SUV 9.2 \par is noted within a 0.7 cm right level 2 cervical node. Attention on follow-up \par is suggested. \par 3. Few peritoneal nodules have overall decreased in size. \par 4. No additional site of pathologic FDG uptake. \par Images were personally reviewed by myself and discussed with Nidhi. \par She wishes to continue with chemotherapy. Will plan for 3 additional cycles. Will consider adding Avastin, but with h/o inconclusive/possible chronic PE ppx anticoagulation maybe necessary. \par \par 8/28/2019: Nidhi reports feeling well, she denies worsening neuropathy, worsening neurological symptoms, SOB, abdominal pain, bloating. She reports she is planned to undergo  eye surgery towards the end of September. We will plan to hold chemotherapy briefly after this cycle to facilitate adequate counts and minimize complications. We again have briefly discussed considering Avastin based therapy, will hold off on this prior to surgery. 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 May proceed without formal PAST, will check CBC prior to procedure, PT and PTT today. \par \par 9/25/19:Nidhi reports feeling well, she denies any changes since last visit, No SOB, abdominal pain, bloating. She reports she is planned to undergo  eye surgery in 9/30/19  CBC reviewed with normal Hemogram . we will hold chem this week , she will resume after Cataract sx . 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 May proceed without formal PAST, will check CBC prior to procedure, PT and PTT today. \par \par 10/4/2019: Nidhi underwent successful eye surgery on 9/30/2019, she reports she can see much better now. Last dose of chemotherapy was administered on 9/20, she then was on hold for surgery. She has completed 5 additional cycles of Carbo/taxol. She is due for restaging. Her disease is only accurately assessed on PET CT, prior attempts to obtain CT scans resulted in additional imaging with PET, as findings were inconclusive. PET CT will be ordered to restage. \par In addition she is due for MRI of the brain. She has had a small amount of weight loss, mild neuropathy is persistent. She offers no additional complaints. \par She will have port placed on Monday, will hold chemotherapy until restaging scans complete. \par \par 10/23/2019: Nidhi feels well, and denies any new symptoms. Her eye surgery was successful, vision is much improved now. She had restaging scans. \par PET CT on 10/18/2019 revealed COMPARISON : 7/24/2019. \par No pathologic uptake to suggest biologic tumor activity. \par MRI of the brain on 10/15/2019 revealed \par Comparison with June 8 and March 21, 2019: (Generally lesions improved \par markedly since March but slightly increased in size since June) \par 1. Lesions previously demonstrated on the study of June 8, 2019 has \par remained stable or minimally increased in size \par 2. Specifically, right frontal opercular lesion measures about 1 cm and \par left posterior inferior temporal lobe lesion measures about 1 cm. These have \par increased since the previous study. \par 3. Small punctiform lesions within the right frontal white matter and left \par frontal sulcus or more apparent than on the study of June 8 \par 4. These lesions are all much smaller than the earlier MRI of March 21, \par 2019. \par 5. No new lesions. \par Images were personally reviewed by myself and discussed with patient. She remains asymptomatic and off the steroids. \par I have also discussed the case with Dr. Dahl. I would like Nidhi to discuss the options with [facility], she maybe a candidate for brain SRS. \par She has been off Carbo/Taxol for a few weeks, she remains Platinum sensitive. I have discussed the option of switching her over to Avastin maintenance, will need to prophylax with low dose Eliquis as well, given questionable finding of small PE in the past. \par Side effects of Avastin were discussed at length, including HNT, proteinuria, small risk of DVT/PE, GI perforations etc. \par She is agreeable to start after [facility] consultation. \par \par 11/5/2019: Nidhi feels OK, she reports acute onset head discomfort that lasts minutes and subsides, the episodes are becoming more frequent 1-2 times a day. She has seen Dr. Dahl, who wishes to hold off on offering SRS to the brain. Plan was to start on Avastin today. Nidhi reports she currently has a lapse in her insurance coverage till 12/2019, I will not be able to start he on ppx dose Eliquis for now, she instead will take baby ASA every other day, to modify risk of DVT/PE. Will plan to switch over to Eliquis at 2.5mg BID once insurance is active. She also contemplated going to [place] with her daughter, I am willing to hold Avastin until she comes back, but Nidhi wishes to start right away, I explained to her that flight may result in complications if that is her wish. She will cancel the trip and start with Avastin today. Side effects have been discussed. \par \par 11/20/2019: Nidhi came for a follow up visit, she reports feeling well, she reports less fatigue. She is s/p first dose of Avastin on 11/5/2019, she tolerated Avastin without difficulty, she is due for the 2nd dose today. \par We communicated CBC result with HGB of 12.3, normal platelet and WBCs. She lost like 5 pounds secondary to gum problems, she will follow up with her dentist this week. She is currently on baby ASA every other day. Continue with single agent Avastin.  \par \par 12/17/2019: Nidhi came for a follow up visit, she reports feeling well, she experienced pruritic rash on last Saturday 12/14/19, which improved over 2 days with Benadryl alone.  Today her skin rash has completely resolved. We will add Solu-Medrol 100 mg IV prior to Avastin. Nidhi reports less fatigue. She is due for the 3rd dose of Avastin today. \par We communicated CBC result with HGB of 12.3, normal platelet and WBCs. \par \par 1/7/2019: Nidhi is doing well, reports good appetite, she has lost 1lb. Reports no GI or pulmonary symptoms. She reports her gums are bleeding occasionally. No new neurological symptoms. She reports no rash after last cycle of Avastin. \par \par 1/28/2020: Nidhi is here for follow up visit, she has no difficulty tolerating Avastin, she reports L sided chronic eye discomfort, neuropathic in nature. Neurontin has been helpful in the past, will reorder this. \par PET CT on 1/22/2020 revealed COMPARISON : 10/18/2019. \par Anterior perisplenic nodule measuring 8 mm is FDG avid, SUV max 8.9, \par consistent with biologic tumor activity. \par MRI of the brain on 1/15/2020 revealed \par In comparison to the previous brain MRI dated 10/15/2019: \par 1. Redemonstrated scattered enhancing lesions consistent with metastatic \par disease, with overall good treatment response since the prior exam. \par 2. No significant interval change in the ovoid lesion in the superior left \par frontal lobe measuring 12 mm. \par 3. Decreased size of the right opercular lesion measuring 6 mm, previously \par 11 mm. Decreased size of the left inferior occipital/tentorial lesion \par measuring 6 mm, previously 11 mm. The previously seen right frontal white \par matter punctate lesion is no longer visualized. \par 4. No new lesions are demonstrated. \par All images were personally reviewed by myself and discussed with Nidhi. I explained to her I am concerned about the perisplenic lesion concerning for disease progression, but it is isolated and very small, asymptomatic. There is definitely a positive response to Avastin in the brain and given that, we will continue with Avastin with short term follow up imaging. Nidhi knows to inform me with any new symptoms immediately. \par \par 2/18/2020: NIDHI BANKS is a 64 year old female here today for follow up visit. She completed 5 cycles of Avastin; tolerating well at this time. She denies fever, chills, change in mental status, or change in weight. She complains of left painful facial swelling. In addition, she complains of swelling of fingers, joint pain and numbness in her toes. She continues on Gabapentin 100mg TID with no symptom relief and wishes to try to go up on the dose. She complains of mild gingival bleeding in the morning. Last PET showed new isolated perisplenic lesion suspicious for disease progression, however MRI of the brain appeared better on Avastin, so the plan was to continue with Avastin with short term follow up. \par \par 3/10/2020: NIDHI BANKS is a 64 year old female here today for follow up visit. She denies new neurological symptoms, skin rash, fever, or change in weight. She continues of Gabapentin which was increased to 200 mg TID; neuropathy improving. She continues to complain of mild gingival bleeding. Left facial swelling and numbness resolved. She has completed cycle 6 of Avastin without complication. \par \par 3/31/68854: Nidhi presented today for routine f/u, she feels fine. Denies any chest pain, sob, fever, chills and cough. Reports that she continues to have gum bleeding. Neuropathy is better with gabapentin, she decreased the dose to 4 pills a day. She tolerated 7th cycle well and is due to get 8th cycle today . She will get MRI brain on 4/2/20.\par She reports minor gum bleeding from Avastin and dental infection on the left, will prescribe Amoxicillin. \par \par 4/21/2020: NIDHI BANKS is a 64 year old female here today for follow up visit for endometrial cancer. S/p cycle 8 of Avastin. Patient denies fever, chills, SOB, cough and pain. She states that her gums have minimal bleeding and dental infection improved after amoxicillin. She complains of mild fatigue. Restaging MRI of the brain was completed on 4/2/2020 which showed stable bilateral parenchymal hemorrhagic lesions compatible with known metastases; no new enhancing lesions are identified. \par Images were personally reviewed by MD Jose Roberto and discussed with patient. \par \par 5/12/2020: MRI brain on 4/2/2020 reveals 1. Stable bilateral parenchymal hemorrhagic lesions compatible with known \par metastases. \par 2. No new enhancing lesions are identified. \par PET CT on 4/30/2020 revealed Right upper quadrant peritoneal implant adjacent to the inferior tip of the \par liver 4.7 (image [id number]) \par Right lower quadrant 0.5 cm peritoneal nodule 7.2 (image 111) \par Right lower quadrant subcentimeter peritoneal nodule 2.6 (image [id number]) \par Anterior perisplenic peritoneal nodule 12.3-48% increase from 8.9 \par (remeasured) previously \par Stable non-FDG avid (attenuation corrected and nonattenuation corrected \par images) 4 mm right middle lobe pulmonary nodule-image 181) \par  No other definite sites of abnormal FDG uptake \par IMPRESSION: \par Compared to 1/22/2020: \par new FDG avid peritoneal implants in right lower quadrant of the abdomen \par suspicious for biologic tumor activity \par 48% interval increase in SUV in anterior perisplenic peritoneal nodule (SUV \par 12.3 vs 8.9 remeasured) \par In retrospect stable FDG avid peritoneal implant in the right upper quadrant \par of the abdomen (SUV 4.7 vs 3.8 remeasured-image 134) \par No other definite sites of abnormal FDG uptake \par Images were personally reviewed by MD Jose Roberto and discussed with patient.\par Given minimal degree of progression in the abdomen and persistent control intracranially, we have discussed continuing Avastin for now and short term restaging. \par Nidhi offers no complaints today. \par \par 6/2/2020: The patient is here for follow up. She is due for Avastin today . She has  no major complaints . She reported gum bleeding and swelling, which is much better today . She also reported hand swelling bilaterally. She denies fever, chills, no respiratory, cardiac, GI/ symptoms. She denies headaches or neurological deficits. \par \par 6/23/2020: Nidhi is 65 years old female came for a follow up visit for papillary-serous endometrial carcinoma with brain metastasis.  She reported feeling well. \par She denies dizziness, change of mental status, fever, chills, SOB. 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  She reported feeling well. \par She denies dizziness, change of mental status, fever, chills, SOB. \par We reviewed MRI of the brain from 7/5/20 that revealed \par The study is considered stable since April 2, 2020 with the following \par findings: \par 1. 3 lesions are being followed, in the left frontal, right frontal and \par left occipital lobes. \par 2. The lesions are essentially stable, nonenhancing and probably mildly \par hemorrhagic \par 3. A punctiform lesion in the right frontal lobe (series 11 image 7) was \par probably present on the prior study. This is stable and probably not new. \par 4. Any differences in size are probably due to differences in the way the \par patient was scanned. \par Images were personally reviewed by MD Jose Roberto and discussed with patient. \par We will continue the current treatment with  Avastin one every 3 weeks.  Patient reports improvement in neuropathy with gabapentin 200 mg every 12 hrs.\par Patient is due for repeat PET Scan at the end of July,2020.

## 2020-07-14 NOTE — ASSESSMENT
[FreeTextEntry1] : Papillary- serous endometrial cancer, stage IV\par --PET CT on 11/29/2017 revealed no clear GYN origin, extensive KEVIN, uptake in the right lung and pleura, thyroid nodule\par --Malignant pleural effusion, resolved\par --L side PleurX catheter was removed on 2/8/2018\par --Completed cycle 8  of carbo/taxol on 6/29/2018.  2 cycles were administered  postoperatively.\par --Restaging PET CT on 9/26/2018 (3 months post completion of chemo 6/29/2018)was essentially negative for recurrent disease\par --Restaging PET CT on 12/18/2018 reveals   New left upper quadrant peritoneal nodule measuring 8 mm with an SUV max  of 7.3. This is suggestive of biologic tumor activity.  No other FDG avid lesions seen throughout the scan.\par --Restaging PET CT on 2/12/2019 revealed slight increase in size of left upper quadrant peritoneal nodule (1.1 cm, \par previously 0.8 cm) with stable FDG uptake, 7.7 SUV suspicious for biologic tumor activity.\par --Case was discussed with Dr. Massey at [place], biopsy of peritoneal nodule is positive for recurrent endometrial cancer.\par --MRI brain on 4/23/2019 revealed multiple brain mets\par --S/p whole brain radiation completed 4/2019, required acute rehab\par --PET CT on 5/1/2019 subsequently revealed further disease progression \par --Restarted weekly carbo/taxol 3 on 1 off on 5/9/2019, continue with weekly Carbo/Taxol for now.\par --Off Decadron and Keppra since 6/5/2019 \par --Restaging brain MRI on 6/8/2019 showed almost complete resolution of most supratentorial lesions and no new lesions.\par --Restaging CT C/A/P on 7/9/2019 did not reveal definite progression, there was a questionable filling defect suspicious for possible chronic PE/artifact, this was discussed with Radiology at length anticoagulation was not deemed to be necessary\par --PET CT on 7/24/2019 revealed positive response to therapy \par --Carbo/Taxol stopped, last dose 9/13/2019, on hold since, she remains platinum sensitive \par --Restaging PET CT on 10/18/2019 is NAD\par --Restaging MRI of the brain on 10/15/2019 is suggestive for possible disease progression, no new lesions, she remains asymptomatic and off steroids, will follow closely \par --She was referred to [facility] for brain SRS consideration, close observation for now \par --Started on Avastin maintenance 11/5/2019, will consider adding Eliquis ppx for DVT/ PE, for now she will take baby ASA every other day  \par --We sent for prednisone 10 mg po if needed for additional skin issues\par --Patient well aware to contact us if any side effect developed\par --MRI of the brain 1/15/2020 reveals overall good response to Avastin\par --Restaging PET CT on 1/22/2020 reveals single small perisplenic area of activity, suspicious for disease progression, asymptomatic \par --Proceed with cycle 10 of Avastin on 5/12/2020 after weighing risks/benefits with plan for short term follow up imaging \par -- Reviewed  MRI of brain on 7/5/20  showed stable metastases; no new enhancing lesions are identified.\par -- Restaging PET CT on 4/30/2020 reveals minimal progression in the abdomen and pelvis\par -- Other options discussed is switching to Keytruda/Lenvima combination, this is a consideration in the future \par -- We will c/w with Avastin for now since it is working in the brain and she has a minimal progression in abdomen. 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 Herrera \par --Follow up with Dr. Acevedo of endocrinology; he is addressing vitamin D, thyroid and diabetes\par \par Cataract of eye\par -- S/p successful surgery on 9/30/19 . \par \par Follow up in 3 weeks\par The patient was seen and examined by Dr Cid who agreed with the above plan\par \par \par

## 2020-07-15 NOTE — ASU DISCHARGE PLAN (ADULT/PEDIATRIC). - DISCHARGE TO
Pt presented C/O dysuria, hematuria, burning and urinary frequency for the last 2 days.    Nursing Home/Extended Care Facility Home

## 2020-08-19 PROBLEM — R92.8 ABNORMAL MAMMOGRAM: Status: ACTIVE | Noted: 2018-08-15

## 2020-08-19 NOTE — HISTORY OF PRESENT ILLNESS
[Disease: _____________________] : Disease: [unfilled] [AJCC Stage: ____] : AJCC Stage: [unfilled] [de-identified] : Serena is a rodrigue 65 yo female, who has started developing SOB approximately 2 months ago, she went to ER on 11/2/2017 and on CXR was noted to have a large right sided pleural effusion. She underwent a thoracentesis, 1.6L of fluid was drained. 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 IHC was pos for CK7, PAX8, CK5/6 and Ca125, negative for TTF-1/Napsin, calretinin, CK20, mammaglobin, p63, villin, HAM56, GCDFP15, TAY-EP4. \par \par Her visit on 12/1/2017 Serena had an excisional biopsy of cervical node on 12/13/2017 revealing met adenocarcinoma, primary source still was not clear. On 12/14/2017 Serena was admitted with SOB, Pleurx catheter was placed on 12/14/2017. 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 Serena received 1st dose of carbo (auc of 5)/taxol(175mg/m2) on 12/15/2017 without complications, but the next day sustained a fall 2/2 vasovagal episode and developed asymptomatic SAH, that resolved on imaging by 12/21/2017.  [de-identified] : KRas WT, EGFR WT, Alk WT, ROS1 WT, PDL1-1%\par Normal MMR protein expression [de-identified] : 11/24/2017: She reports some SOB, especially ARZATE today. No fevers, no weight loss (reports some weight gain), no GI,  or GYN symptoms, except for increasing abdominal girth, she reports no blood in the stool or urine and no vaginal bleeding. She reports no CP and no palpitations, she reports worsening nonproductive cough, no hemoptysis. She also reports difficulty lying on left side and lying down flat. \par She reports left on/off facial numbness several months in duration. She had a CT of her head performed in [facility] within 1 year.  Additionally there is a freely mobile left cervical node present on exam, as per patient this was in place for approximately 1 year. \par \par 12/1/2017: Nidhi is here for follow up today. She had a therapeutic thoracentesis in  on 11/27/2017, 2L of fluid were removed, with much improved respiratory status. PET CT and MRI of the brain were done on 11/29/2017, findings were discussed today. There remains no clear primary source of malignancy. We have discussed that the source of tumor may be Mullerian vs lung. regardless of source chemotherapy needs to be initiated ASAP. We have discussed Carbo/taxol regiment that will cover both Mullerian and lung origin. Side effects including myelosuppression, peripheral neuropathy, allergic reaction and others.\par \par 1/2/2017 Since last visit Nidhi had an excisional biopsy of cervical node on 12/13/2017 revealing met adenocarcinoma, primary source still was not clear. On 12/14/2017 Nidhi was admitted with SOB, Pleurx catheter was placed on 12/14/2017. 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 Nidhi received 1st dose of carbo (auc of 5)/taxol(175mg/m2) on 12/15/2017 without complications, but the next day sustained a fall 2/2 vasovagal episode and developed asymptomatic SAH, that resolved on imaging by 12/21/2017. Today Nidhi's SOB has significantly improved. She reports very mild neuropathy in fingertips only. She reports no headache and offers no other complaints. \par \par 1/26/2018: Complains of some neuropathy, otherwise tolerating chemo with no difficulty. \par \par 2/16/2018: restaging CT C/A/P reviewed, significant improvement noted. Will refer to GYN/ONC. Reports slightly worsening neuropathy, not -painful, taking Gabapentin, no other symptoms. For cycle 4 of carbo/taxol today.\par \par 3/9/2018: Nidhi met with Dr. Massey, she is planned for surgery on 5/7/2018, after completion of 6 cycles of carbo/taxol and restaging PET CT ordered for mid April and follow up with Dr. Massey on April 20. She reports worsening neuropathy, and occasional pain and changes in vision in her left eye, eye symptoms come and go and are not very bothersome. She reports no other symptoms. \par \par 3/30/2018; Nidhi is here for cycle 6 of carbo/taxol, she continues to have neuropathy in finger and toes, but offers no other complaints, chemo has been dose reduced. \par \par 4/27/2018: Results of restaging PET CT s/p 6 cycles of carbo/taxol revealed 1. No new areas of abnormal increased uptake is seen to indicate \par biologically active disease.\par \par 2. Persistent PET positive left thyroid mass with a max SUV 33.1, \par previously max SUV 34.8. Further evaluation with thyroid ultrasound and \par if needed fine-needle aspiration biopsy will be helpful.\par \par 3.   PET positive left cervical lymph nodes mainly level 2 on the left \par with a max SUV 5.47previously 18. Minimal increased uptake in the \par bilateral axillary lymph nodes most likely reactive(less than 2.5)\par \par 4. Weakly PET positive, max SUV 2.89 right pleura, previous max SUV 5.3 \par with non-FDG avid right pleural effusion. \par \par 5. Previously FDG avid right and left supraclavicular lymphadenopathy, \par left internal mammary, bilateral paratracheal, para-aortic, para \par vertebral, carinal, mesenteric, right and left iliacs, right inguinal \par lymphadenopathy, small in size and no longer FDG avid.\par \par 6. No abnormal increased uptake is seen in the  lobulated uterus.\par \par 7. Overall there is marked improvement in the FDG avid disease.\par This was reviewed with Nidhi. She met with Dr. Massey on 4/20/2018, surgery is planned for 5/7/2018. She will also joselyn to meet with ENT re FDG avid thyroid nodule. Will assist in making he an appointment for her. \par Persistent neuropathy on gabapentin. \par \par 6/29/18 ; Patient came for follow up visit , evaluation for chemotherapy cycle 8 of carbo / Taxol , patient tolerating medication well , except neuropathy  recommend to have gabapentin  300 mg po daily.\par \par 7/20/2018: Nidhi present for follow up today, she has completed total of 8 cycles of Carbo/Taxol. She reports significant neuropathy in her hands and feet. We will discontinue chemotherapy today and plan to follow with close observation. She has thyroidectomy planned with Dr. Herrera on 8/20/2018, obtaining clearance for PMD. She is also planning to fly to [place] at the end of September. She reports no SOB, no GI or  symptoms. \par \par 9/12/2018: Nidhi offers no significant complaints today. She states neuropathy in her hands has almost completely resolved and neuropathy in her feet is significantly better. She had undergone complete thyroidectomy by Dr. Herrera on 8/20/2018, pathology revealed papillary thyroid cancer, measuring 2 cm, pT1b, other additional foci of papillary carcinoma were also noted, no LVI, surgical margins were clear, LN 0/2 had no carcinoma, stage [id number]. She is following up with Dr. Herrera tomorrow. She has still not gone for her vacation in [place]. \par \par 10/10/2018: Restaging PET CT on 9/26/2018 reveals Since 4/16/2018,  1. Post thyroidectomy with diffuse increased FDG uptake at the thyroid  bed likely representing post-surgical changes.  2. Subtle increased uptake is seen in the lamina , right side at the  level of T8 with a max SUV 4.6. This is not sufficient for metastatic  disease. Bone scan or MRI examination with contrast will be helpful for  further evaluation.  3. No other areas of abnormal increased uptake is seen. Images were personally reviewed by myself and discussed with Nidhi. \par She also had an Echo on 9/24/2018 revealing EF of 63%. \par Nidhi reports ongoing fatigue, she is unable to lose weight. She is taking Synthroid and following with Dr. Acevedo. \par She reports stiff fingers at night only, no painful neuropathy. \par She denies any other symptoms today. \par \par 12/11/2018: Nidhi feels well, neuropathy in hands and feet is much improved. S he is now complaining of r-sided facial pain associated with some swelling, pain comes and goes. She has already seen Dr. Herrera, who ordered CT of sinuses and prescribed pain relief meds. She is also due for restaging PET CT. Nidhi feels well otherwise. \par \par 1/9/2019: Restaging PET CT was performed on 12/18/2018 it revealed COMPARISON : 9/24/2018.  New left upper quadrant peritoneal nodule measuring 8 mm with an SUV max  of 7.3. This is suggestive of biologic tumor activity.  No other FDG avid lesions seen throughout the scan. Images were personally reviewed by myself and discussed with Nidhi, originally over the phone and again today. I have originally suggested to try AI in the interim, Nidhi however reported diarrhea at the time of the scan and declined intervention for now. We will plan for restaging PET CT to be performed in 6-8 weeks, this will be ordered today. She will follow up with Dr. Massey at [facility] shortly and bring the disk for his review. I would also like her to meet with genetics, this will be arranged at [facility] with Dr. Massey's help. Nidhi reports no new symptoms today, her neuropathy is manageable and  improving. She still reports unilateral headache that was work up in the past. Neurology eval was again suggested to her. She is following closely with Endocrinology re thyroid management. She will have follow up with Dr. Herrera shortly as well. \par \par 2/20/2019: Nidhi offers no new complaints. PET CT from 2/12/2019 revealed \par Since PET/CT of December 19, 2018, no new sites of pathologic FDG uptake\par Slight increase in size of left upper quadrant peritoneal nodule (1.1 cm, \par previously 0.8 cm) with stable FDG uptake, 7.7 SUV suspicious for \par biologic tumor activity.\par No other sites of pathologic FDG uptake \par Images were personally reviewed by myself and discussed with Nidhi, case was also discussed with Dr. Massey at [facility]. Nidhi will have follow up with his shortly. She is following with endocrinology. reports improving neuropathy. No GI or  symptoms at this time. No weight loss. \par \par 3/20/2019: Nidhi had a peritoneal nodule biopsy done at [facility], I have received a call from Dr. 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 We have discussed that surgery though could be attempted will not be the best therapeutic approach, recommencement of systemic therapy was discussed. I have not received the results from [facility] yet. I have briefly discussed this with Nidhi today. Nidhi reports that she has acutely developed right lower extremity weakness 5-6 days ago, she did not inform any of her doctors about this. She also reports that her R upper extremity though not weak "does not feel normal either. She reports no back pain, there is no point tenderness, RLE is objectively weak on exam. I recommend ER evaluation to r/o CVA vs brain met, vs L-spine related issue. Recommend admission for work up. Nidhi is agreeable to get admitted. \par \par 5/8/2019: Nidhi present for follow up, she was diagnosed with multiple metastasis to the brain, MRI was done on 3/21/2019 and revealed \par Multiple supratentorial heterogeneously enhancing lesions consistent with \par metastatic disease. The appearance on the T2-weighted images is suggestive \par of adenocarcinoma. There is mass effect upon the left lateral ventricle and \par corpus callosum. \par She received whole brain irradiation by Dr. Dahl, completed it in the beginning of 4/2019, she is currently on steroid taper managed by Dr. Dahl, she is taking 4mg of Decadron daily. She will be stopping the Keppra as there have been no documented h/o seizures. She still reports impairment of the L visual field, she has an appointment coming up with Opthalmology. She reports no deficits walking after she has completed inpatient acute rehabilitation. \par Restaging PET CT on 5/1/2019 reveals COMPARISON : 2/12/2019 \par Multiple new FDG avid omental nodules largest measuring up to 10 mm, \par positive on nonattenuation corrected images. Findings are suggestive of \par biologic tumor activity. \par Again seen is a left upper quadrant peritoneal nodule, SUV max 6.8, \par previously 7.7 (12% decrease). \par Images were personally reviewed by myself and discussed with patient. \par She now reports mild abdominal bloating, no other symptoms. She reports her neuropathy has significantly improved. \par She has first evidence of recurrent disease documented 5.5 months after last carbo/taxol dose, the volume of disease was very small (1 8mm nodule), she was then observed for another 6 months and now she wishes to restart the chemotherapy. \par I have discussed with her that rechallenging with carbo/taxol may still prove to be effective as long as she gets restaged after 2 cycles. Given recent whole brain radiation and neuropathy I will offer her carbo/taxol weekly with does reduction on the taxol for neuropathy, We will plan to run carbo slowly to avoid allergic reaction. \par She is agreeable to start ASAP. \par \par 6/5/2019: Nidhi has completed 1 cycle of weekly Carbo/taxol, ran at slow rate to avoid Carbo reaction and has tolerated chemotherapy without difficulty, she does not complain of increase in neuropathy. She has ongoing vision changes in her L eye, she had no residual LE weakness. She is off Decadron and Keppra. She was advised to see ophthalmology. \par \par 7/3/2019: Nidhi is doing well.  Reports no problems with carbo/taxol.  Still complains of vision changes in L eye but is seeing ophthalmologist on 7/16.  Remains active around the house and cooks regularly.  No fevers, weight loss, anorexia.  ROS is otherwise only significant for occasional loose stools, no diarrhea.\par \par 7/17/2019: Nidhi is doing well, denies worsening neuropathy. She c/o feeling fatigued and tired. Her last chemo was 1 week ago(7/11/19) with carbo/taxol and is due again tomorrow. She saw ophthalmology and will need cataract surgery of the L eye. No c/o chest pain/SOB. No weight loss.\par CT C/A/P was done on 7/9/2019, images were personally reviewed by myself and discussed with several radiology attendings, they revealed \par CHEST:\par Filling defect within a segmental branch of the lower lobe pulmonary artery \par suspicious for pulmonary embolus. \par Stable left upper lobe and right middle lobe 3 mm nodules. \par CT abdomen and pelvis performed on the same day, see separate report. \par ADDENDUM: \par Please note that the morphology of the small thrombus within the left lower \par pulmonary artery is not indicative of an acute thrombus but rather a chronic \par appearance. \par This was discussed with Radiology, it was not deemed that thrombus was acute and may not have even been present at all, finding was deemed to be equivocal, based on radiology assessment anticoagulation was not indicated for the filling defect noted. Initially CTA was recommended, the recommendation was withdrawn upon further review. \par ABDOMEN/PELVIS:\par New 1.3 cm segment IV hepatic hypodensity seen on series 4 image 205. \par Lesion not seen on prior PET/CT from 5/1/2019 or abdomen and pelvis CT from \par \par 2/2/2018 and is consistent with a new metastasis. \par Two other hepatic lesions described above, decreased in size since 2/2/2018, \par consistent with treated hepatic metastases. \par Anterior perisplenic omental nodule measures 0.7 cm on series 2 image 53, \par previously measuring 1.2 cm on PET/CT dated 5/1/2019. \par All the other previously seen omental nodules have resolved since 5/1/2019. \par ADDENDUM:\par Case was discussed with Dr. Cid in detail. It is also possible that the \par new 1.3 cm lesion in the liver since February 2, 2018 represents a treated \par metastasis similar to the other liver lesions. \par PET/CT would be necessary to assess disease activity. \par I have discussed case with Radiology, and it was determined that there was no clear cut evidence of progression. PET CT was ordered today.\par This was discussed with Nidhi, will proceed with Carbo/Taxol for now. \par \par 8/1/2019: Nidhi reports no major side effects from weekly Carbo/Taxol, she reports no neuropathy. She has completed 10 cycles altogether. \par PET CT on 7/24/2019 revealed \par 1. Since May 1, 2019, no definite new site of pathologic FDG uptake to \par suggest new biologic tumor activity; specifically, no pathologic FDG uptake \par within previously noted 1.3 cm lesion within hepatic segment 4. \par 2. Increased FDG uptake within several subcentimeter bilateral cervical \par lymph nodes, which is nonspecific and may be postinflammatory; max SUV 9.2 \par is noted within a 0.7 cm right level 2 cervical node. Attention on follow-up \par is suggested. \par 3. Few peritoneal nodules have overall decreased in size. \par 4. No additional site of pathologic FDG uptake. \par Images were personally reviewed by myself and discussed with Nidhi. \par She wishes to continue with chemotherapy. Will plan for 3 additional cycles. Will consider adding Avastin, but with h/o inconclusive/possible chronic PE ppx anticoagulation maybe necessary. \par \par 8/28/2019: Nidhi reports feeling well, she denies worsening neuropathy, worsening neurological symptoms, SOB, abdominal pain, bloating. She reports she is planned to undergo  eye surgery towards the end of September. We will plan to hold chemotherapy briefly after this cycle to facilitate adequate counts and minimize complications. We again have briefly discussed considering Avastin based therapy, will hold off on this prior to surgery. 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 May proceed without formal PAST, will check CBC prior to procedure, PT and PTT today. \par \par 9/25/19:Nidhi reports feeling well, she denies any changes since last visit, No SOB, abdominal pain, bloating. She reports she is planned to undergo  eye surgery in 9/30/19  CBC reviewed with normal Hemogram . we will hold chem this week , she will resume after Cataract sx . 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 May proceed without formal PAST, will check CBC prior to procedure, PT and PTT today. \par \par 10/4/2019: Nidhi underwent successful eye surgery on 9/30/2019, she reports she can see much better now. Last dose of chemotherapy was administered on 9/20, she then was on hold for surgery. She has completed 5 additional cycles of Carbo/taxol. She is due for restaging. Her disease is only accurately assessed on PET CT, prior attempts to obtain CT scans resulted in additional imaging with PET, as findings were inconclusive. PET CT will be ordered to restage. \par In addition she is due for MRI of the brain. She has had a small amount of weight loss, mild neuropathy is persistent. She offers no additional complaints. \par She will have port placed on Monday, will hold chemotherapy until restaging scans complete. \par \par 10/23/2019: Nidhi feels well, and denies any new symptoms. Her eye surgery was successful, vision is much improved now. She had restaging scans. \par PET CT on 10/18/2019 revealed COMPARISON : 7/24/2019. \par No pathologic uptake to suggest biologic tumor activity. \par MRI of the brain on 10/15/2019 revealed \par Comparison with June 8 and March 21, 2019: (Generally lesions improved \par markedly since March but slightly increased in size since June) \par 1. Lesions previously demonstrated on the study of June 8, 2019 has \par remained stable or minimally increased in size \par 2. Specifically, right frontal opercular lesion measures about 1 cm and \par left posterior inferior temporal lobe lesion measures about 1 cm. These have \par increased since the previous study. \par 3. Small punctiform lesions within the right frontal white matter and left \par frontal sulcus or more apparent than on the study of June 8 \par 4. These lesions are all much smaller than the earlier MRI of March 21, \par 2019. \par 5. No new lesions. \par Images were personally reviewed by myself and discussed with patient. She remains asymptomatic and off the steroids. \par I have also discussed the case with Dr. Dahl. I would like Nidhi to discuss the options with [facility], she maybe a candidate for brain SRS. \par She has been off Carbo/Taxol for a few weeks, she remains Platinum sensitive. I have discussed the option of switching her over to Avastin maintenance, will need to prophylax with low dose Eliquis as well, given questionable finding of small PE in the past. \par Side effects of Avastin were discussed at length, including HNT, proteinuria, small risk of DVT/PE, GI perforations etc. \par She is agreeable to start after [facility] consultation. \par \par 11/5/2019: Nidhi feels OK, she reports acute onset head discomfort that lasts minutes and subsides, the episodes are becoming more frequent 1-2 times a day. She has seen Dr. Dahl, who wishes to hold off on offering SRS to the brain. Plan was to start on Avastin today. Nidhi reports she currently has a lapse in her insurance coverage till 12/2019, I will not be able to start he on ppx dose Eliquis for now, she instead will take baby ASA every other day, to modify risk of DVT/PE. Will plan to switch over to Eliquis at 2.5mg BID once insurance is active. She also contemplated going to [place] with her daughter, I am willing to hold Avastin until she comes back, but Nidhi wishes to start right away, I explained to her that flight may result in complications if that is her wish. She will cancel the trip and start with Avastin today. Side effects have been discussed. \par \par 11/20/2019: Nidhi came for a follow up visit, she reports feeling well, she reports less fatigue. She is s/p first dose of Avastin on 11/5/2019, she tolerated Avastin without difficulty, she is due for the 2nd dose today. \par We communicated CBC result with HGB of 12.3, normal platelet and WBCs. She lost like 5 pounds secondary to gum problems, she will follow up with her dentist this week. She is currently on baby ASA every other day. Continue with single agent Avastin.  \par \par 12/17/2019: Nidhi came for a follow up visit, she reports feeling well, she experienced pruritic rash on last Saturday 12/14/19, which improved over 2 days with Benadryl alone.  Today her skin rash has completely resolved. We will add Solu-Medrol 100 mg IV prior to Avastin. Nidhi reports less fatigue. She is due for the 3rd dose of Avastin today. \par We communicated CBC result with HGB of 12.3, normal platelet and WBCs. \par \par 1/7/2019: Nidhi is doing well, reports good appetite, she has lost 1lb. Reports no GI or pulmonary symptoms. She reports her gums are bleeding occasionally. No new neurological symptoms. She reports no rash after last cycle of Avastin. \par \par 1/28/2020: Nidhi is here for follow up visit, she has no difficulty tolerating Avastin, she reports L sided chronic eye discomfort, neuropathic in nature. Neurontin has been helpful in the past, will reorder this. \par PET CT on 1/22/2020 revealed COMPARISON : 10/18/2019. \par Anterior perisplenic nodule measuring 8 mm is FDG avid, SUV max 8.9, \par consistent with biologic tumor activity. \par MRI of the brain on 1/15/2020 revealed \par In comparison to the previous brain MRI dated 10/15/2019: \par 1. Redemonstrated scattered enhancing lesions consistent with metastatic \par disease, with overall good treatment response since the prior exam. \par 2. No significant interval change in the ovoid lesion in the superior left \par frontal lobe measuring 12 mm. \par 3. Decreased size of the right opercular lesion measuring 6 mm, previously \par 11 mm. Decreased size of the left inferior occipital/tentorial lesion \par measuring 6 mm, previously 11 mm. The previously seen right frontal white \par matter punctate lesion is no longer visualized. \par 4. No new lesions are demonstrated. \par All images were personally reviewed by myself and discussed with Nidhi. I explained to her I am concerned about the perisplenic lesion concerning for disease progression, but it is isolated and very small, asymptomatic. There is definitely a positive response to Avastin in the brain and given that, we will continue with Avastin with short term follow up imaging. Nidhi knows to inform me with any new symptoms immediately. \par \par 2/18/2020: NIDHI BANKS is a 64 year old female here today for follow up visit. She completed 5 cycles of Avastin; tolerating well at this time. She denies fever, chills, change in mental status, or change in weight. She complains of left painful facial swelling. In addition, she complains of swelling of fingers, joint pain and numbness in her toes. She continues on Gabapentin 100mg TID with no symptom relief and wishes to try to go up on the dose. She complains of mild gingival bleeding in the morning. Last PET showed new isolated perisplenic lesion suspicious for disease progression, however MRI of the brain appeared better on Avastin, so the plan was to continue with Avastin with short term follow up. \par \par 3/10/2020: NIDHI BANKS is a 64 year old female here today for follow up visit. She denies new neurological symptoms, skin rash, fever, or change in weight. She continues of Gabapentin which was increased to 200 mg TID; neuropathy improving. She continues to complain of mild gingival bleeding. Left facial swelling and numbness resolved. She has completed cycle 6 of Avastin without complication. \par \par 3/31/10802: Nidhi presented today for routine f/u, she feels fine. Denies any chest pain, sob, fever, chills and cough. Reports that she continues to have gum bleeding. Neuropathy is better with gabapentin, she decreased the dose to 4 pills a day. She tolerated 7th cycle well and is due to get 8th cycle today . She will get MRI brain on 4/2/20.\par She reports minor gum bleeding from Avastin and dental infection on the left, will prescribe Amoxicillin. \par \par 4/21/2020: NIDHI BANKS is a 64 year old female here today for follow up visit for endometrial cancer. S/p cycle 8 of Avastin. Patient denies fever, chills, SOB, cough and pain. She states that her gums have minimal bleeding and dental infection improved after amoxicillin. She complains of mild fatigue. Restaging MRI of the brain was completed on 4/2/2020 which showed stable bilateral parenchymal hemorrhagic lesions compatible with known metastases; no new enhancing lesions are identified. \par Images were personally reviewed by MD Jose Roberto and discussed with patient. \par \par 5/12/2020: MRI brain on 4/2/2020 reveals 1. Stable bilateral parenchymal hemorrhagic lesions compatible with known \par metastases. \par 2. No new enhancing lesions are identified. \par PET CT on 4/30/2020 revealed Right upper quadrant peritoneal implant adjacent to the inferior tip of the \par liver 4.7 (image [id number]) \par Right lower quadrant 0.5 cm peritoneal nodule 7.2 (image 111) \par Right lower quadrant subcentimeter peritoneal nodule 2.6 (image [id number]) \par Anterior perisplenic peritoneal nodule 12.3-48% increase from 8.9 \par (remeasured) previously \par Stable non-FDG avid (attenuation corrected and nonattenuation corrected \par images) 4 mm right middle lobe pulmonary nodule-image 181) \par  No other definite sites of abnormal FDG uptake \par IMPRESSION: \par Compared to 1/22/2020: \par new FDG avid peritoneal implants in right lower quadrant of the abdomen \par suspicious for biologic tumor activity \par 48% interval increase in SUV in anterior perisplenic peritoneal nodule (SUV \par 12.3 vs 8.9 remeasured) \par In retrospect stable FDG avid peritoneal implant in the right upper quadrant \par of the abdomen (SUV 4.7 vs 3.8 remeasured-image 134) \par No other definite sites of abnormal FDG uptake \par Images were personally reviewed by MD Jose Roberto and discussed with patient.\par Given minimal degree of progression in the abdomen and persistent control intracranially, we have discussed continuing Avastin for now and short term restaging. \par Nidhi offers no complaints today. \par \par 6/2/2020: The patient is here for follow up. She is due for Avastin today . She has  no major complaints . She reported gum bleeding and swelling, which is much better today . She also reported hand swelling bilaterally. She denies fever, chills, no respiratory, cardiac, GI/ symptoms. She denies headaches or neurological deficits. \par \par 6/23/2020: Nidhi is 65 years old female came for a follow up visit for papillary-serous endometrial carcinoma with brain metastasis.  She reported feeling well. \par She denies dizziness, change of mental status, fever, chills, SOB. 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  She reported feeling well. \par She denies dizziness, change of mental status, fever, chills, SOB. \par We reviewed MRI of the brain from 7/5/20 that revealed \par The study is considered stable since April 2, 2020 with the following \par findings: \par 1. 3 lesions are being followed, in the left frontal, right frontal and \par left occipital lobes. \par 2. The lesions are essentially stable, nonenhancing and probably mildly \par hemorrhagic \par 3. A punctiform lesion in the right frontal lobe (series 11 image 7) was \par probably present on the prior study. This is stable and probably not new. \par 4. Any differences in size are probably due to differences in the way the \par patient was scanned. \par Images were personally reviewed by MD Jose Roberto and discussed with patient. \par We will continue the current treatment with  Avastin one every 3 weeks.  Patient reports improvement in neuropathy with gabapentin 200 mg every 12 hrs.\par Patient is due for repeat PET Scan at the end of July,2020.\par \par 8/4/2020: NIDHI BANKS is a 65 year old female here today for follow up visit for endometrial cancer. S/p cycle 11 of Avastin. Patient denies fever, chills, SOB, cough and pain.\par PET CT from 7/29/2020 revealed \par Compared to 4/30/2020: \par 1 new FDG avid near midline peritoneal implant (SUV 4.3) consistent with biologic tumor activity \par 1 new left cervical level V FDG avid lymph node (SUV 8.7) suspicious for biologic tumor activity in light of the history of papillary thyroid carcinoma \par Interval increases in SUV: 115% increase in SUV in right lower quadrant peritoneal implant \par 97% increase in SUV in right upper quadrant peritoneal implant -66% increase in SUV in level 3 cervical lymph node \par Interval 36% decrease in SUV in anterior perisplenic nodule (7.9 vs 12.3) \par Stable FDG avid right upper quadrant implants and right level 5 cervical lymph node \par No other sites of abnormal FDG uptake \par We discussed other different treatment option.\par Images were personally reviewed by MD Jose Roberto and discussed with patient.\par We have discussed that there is undeniable progression systemically but still benefit in the CNS. I will review her chart and determine if restarting weekly carbo/taxol is of value, vs adding an additional agent vs starting on Keytruda/Lenvima is at this point indicated. \par Patient will continue with one additional dose of Avastin today.

## 2020-08-19 NOTE — RESULTS/DATA
[FreeTextEntry1] : EXAM:  MR BRAIN WAW IC      \par \par \par PROCEDURE DATE:  06/08/2019  \par \par \par \par \par INTERPRETATION:  Clinical History / Reason for exam: Dizziness and \par vertigo, history of uterine and thyroid cancer, for evaluation of \par metastatic disease, lesion seen on prior MRI of the brain.\par \par MRI OF THE BRAIN WITHOUT AND WITH CONTRAST\par \par TECHNIQUE:\par \par Multiplanar multisequence imaging of the brain was performed on the \[facility] open magnet before and after the intravenous administration of \par 7.5 cc of Gadavist including brain lab protocol.\par \par COMPARISON:\par \par MRI of the brain without and with contrast dated March 12, 2019.\par \par FINDINGS:\par \par The previously described supratentorial lesions have almost completely \par resolved.\par \par The lesion in the left posterior frontal region now measures 1.4 cm in \par maximum diameter, the lesion in the anterior right frontal lobe measures \par 0.7 cm in maximum diameter, the second lesion in the right frontal lobe \par measures 0.1 cm in maximum diameter, the lesion in the right posterior \par parietal region measures 0.3 cm in maximum diameter, and the left \par occipital lobe lesion measures 1.2 cm in maximum diameter. (Previously \par measuring 2.6, 2.4, 0.5, 0.7, and 2.5 cm respectively).\par \par The third, fourth, and lateral ventricles are normal in size and \par position. There is no shift of the midline structures.\par \par The cerebellar tonsils are minimally low-lying (0.3 cm of cerebellar \par ectopia).\par \par Incidental note is made of a tiny medial cyst.\par \par There are scattered T2/FLAIR hyperintense signal intensities in the\par subcortical white matter which are nonspecific differential diagnostic\par possibilities include chronic ischemic change, foci of gliosis or\par demyelination.\par \par IMPRESSION:\par \par In comparison with the prior MRI of the brain dated March 21, 2019:\par \par There has been almost complete resolution of most of of the previously \par described supratentorial lesions.\par \par There are no new enhancing lesions.\par \par \par \par \par \par \par CRISTINA MÉNDEZ M.D., ATTENDING RADIOLOGIST\par This document has been electronically signed. Oren 10 2019  1:17PM\par   \par \par   \par \par \par [id number]^RI^DC\par \par EXAM:  PETCT SKUL-THI ONC FDG SUBS      \par \par \par PROCEDURE DATE:  05/01/2019  \par \par \par \par \par INTERPRETATION:  \par FDG PET CT STUDY   Subsequent  treatment strategy\par REASON: TUMOR IMAGING - PET with concurrently acquired CT for attenuation \par correction and anatomic localization; skull base to mid - thigh .\par \par CPT code 52744.\par \par Fasting blood glucose level:     91 mg/dl\par \par HISTORY: Endometrial cancer. Stage IV with brain metastatic disease.\par \par TECHNIQUE: Approximately 45 minutes after the intravenous administration \par of 10.7 mCi 18-Fluorine FDG, whole body PET images were acquired from \par base of skull to mid - thigh.\par \par CT protocol used for this PET/CT study is designed for attenuation \par correction and anatomic localization of PET findings.  This  CT \par is not designed to replace state-of-the-art diagnostic CT scans for \par specific imaging protocols of different body parts.\par \par COMPARISON : 2/12/2019.\par Correlation: MRI of the brain on 3/21/2019.\par \par FINDINGS:\par \par Head/Neck: No biologically active head/neck lesions.     \par Normal uptake within the brain, pharyngeal lymphoid tissue, salivary \par glands and laryngeal musculature is noted.    \par \par Thorax:   No suspicious hypermetabolic mediastinal, hilar, lung \par parenchymal lesions.\par \par Abdomen/Pelvis: Physiologic GI/  activity. \par \par Again seen is a left upper quadrant peritoneal nodule, SUV max 6.8, \par previously 7.7 (12% decrease). \par \par Multiple new FDG avid omental nodules largest of which is adjacent to the \par distal transverse colon, measuring 10 mm, positive on nonattenuation \par corrected images, axial image 134. \par \par No other abnormal visceral or guillaume tracer uptake is present.    \par \par Musculoskeletal :  No suspicious hypermetabolic osseous lesions.\par \par Additional CT findings:\par Status post hysterectomy.\par Colonic diverticulosis.\par \par IMPRESSION: \par \par COMPARISON : 2/12/2019\par \par Multiple new FDG avid omental nodules largest measuring up to 10 mm, \par positive on nonattenuation corrected images. Findings are suggestive of \par biologic tumor activity.\par \par Again seen is a left upper quadrant peritoneal nodule, SUV max 6.8, \par previously 7.7 (12% decrease). \par \par \par \par \par

## 2020-08-19 NOTE — PHYSICAL EXAM
[Restricted in physically strenuous activity but ambulatory and able to carry out work of a light or sedentary nature] : Status 1- Restricted in physically strenuous activity but ambulatory and able to carry out work of a light or sedentary nature, e.g., light house work, office work [Normal] : affect appropriate [de-identified] : B/L cataracts [de-identified] : CTA B/L

## 2020-08-19 NOTE — ASSESSMENT
[FreeTextEntry1] : Papillary- serous endometrial cancer, stage IV\par --PET CT on 11/29/2017 revealed no clear GYN origin, extensive KEVIN, uptake in the right lung and pleura, thyroid nodule\par --Malignant pleural effusion, resolved\par --L side PleurX catheter was removed on 2/8/2018\par --Completed cycle 8  of carbo/taxol on 6/29/2018.  2 cycles were administered  postoperatively.\par --Restaging PET CT on 9/26/2018 (3 months post completion of chemo 6/29/2018)was essentially negative for recurrent disease\par --Restaging PET CT on 12/18/2018 reveals   New left upper quadrant peritoneal nodule measuring 8 mm with an SUV max  of 7.3. This is suggestive of biologic tumor activity.  No other FDG avid lesions seen throughout the scan.\par --Restaging PET CT on 2/12/2019 revealed slight increase in size of left upper quadrant peritoneal nodule (1.1 cm, \par previously 0.8 cm) with stable FDG uptake, 7.7 SUV suspicious for biologic tumor activity.\par --Case was discussed with Dr. Massey at [facility], biopsy of peritoneal nodule is positive for recurrent endometrial cancer.\par --MRI brain on 4/23/2019 revealed multiple brain mets\par --S/p whole brain radiation completed 4/2019, required acute rehab\par --PET CT on 5/1/2019 subsequently revealed further disease progression \par --Restarted weekly carbo/taxol 3 on 1 off on 5/9/2019, continue with weekly Carbo/Taxol for now.\par --Off Decadron and Keppra since 6/5/2019 \par --Restaging brain MRI on 6/8/2019 showed almost complete resolution of most supratentorial lesions and no new lesions.\par --Restaging CT C/A/P on 7/9/2019 did not reveal definite progression, there was a questionable filling defect suspicious for possible chronic PE/artifact, this was discussed with Radiology at length anticoagulation was not deemed to be necessary\par --PET CT on 7/24/2019 revealed positive response to therapy \par --Carbo/Taxol stopped, last dose 9/13/2019, on hold since, she remains platinum sensitive \par --Restaging PET CT on 10/18/2019 is NAD\par --Restaging MRI of the brain on 10/15/2019 is suggestive for possible disease progression, no new lesions, she remains asymptomatic and off steroids, will follow closely \par --She was referred to [facility] for brain SRS consideration, close observation for now \par --Started on Avastin maintenance 11/5/2019, will consider adding Eliquis ppx for DVT/ PE, for now she will take baby ASA every other day  \par --We sent for prednisone 10 mg po if needed for additional skin issues\par --Patient well aware to contact us if any side effect developed\par --MRI of the brain 1/15/2020 reveals overall good response to Avastin\par --Restaging PET CT on 1/22/2020 reveals single small perisplenic area of activity, suspicious for disease progression, asymptomatic \par --Proceed with cycle 10 of Avastin on 5/12/2020 after weighing risks/benefits with plan for short term follow up imaging \par -- Reviewed  MRI of brain on 7/5/20  showed stable metastases; no new enhancing lesions are identified.\par -- Restaging PET CT on 4/30/2020 reveals minimal progression in the abdomen and pelvis\par -- Other options discussed is switching to Keytruda/Lenvima combination, this is a consideration in the future \par -- We will c/w with Avastin for now since it is working in the brain and she has a minimal progression in abdomen. \par --PET CT from 7/29/2020 reveals additional evidence of progression, minimal and not symptomatic\par --Will continue with Avastin today with plan to switch therapy with next cycle\par \par Papillary thyroid cancer [id number], 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 Herrera \par --Follow up with Dr. Acevedo of endocrinology; he is addressing vitamin D, thyroid and diabetes\par \par Cataract of eye\par -- S/p successful surgery on 9/30/19 . \par \par Follow up in 3 weeks\par The patient was seen and examined by Dr Cid who agreed with the above plan\par \par \par

## 2020-08-19 NOTE — REVIEW OF SYSTEMS
[Fatigue] : fatigue [Easy Bleeding] : a tendency for easy bleeding [Negative] : Allergic/Immunologic [Recent Change In Weight] : ~T no recent weight change [Chest Pain] : no chest pain [Palpitations] : no palpitations [Lower Ext Edema] : no lower extremity edema [Shortness Of Breath] : no shortness of breath [Wheezing] : no wheezing [Cough] : no cough [SOB on Exertion] : no shortness of breath during exertion [FreeTextEntry9] : normal strength in upper and lower extremities  [de-identified] : no gait impairment- numbness and swelling of bilateral hands  [de-identified] : mild gingival bleeding

## 2020-09-03 PROBLEM — Z00.00 ENCOUNTER FOR PREVENTIVE HEALTH EXAMINATION: Status: ACTIVE | Noted: 2017-11-06

## 2020-09-03 PROBLEM — E03.9 HYPOTHYROIDISM: Status: ACTIVE | Noted: 2019-10-03

## 2020-09-03 PROBLEM — E11.9 DIABETES MELLITUS: Status: ACTIVE | Noted: 2018-08-04

## 2020-09-03 NOTE — ASSESSMENT
[FreeTextEntry1] : 64 F w/ Stage 4 endometrial carcinoma s/p ELEONORA, w/ mets to brain s/p RT, on active chemotherapy, thyroid cancer s/p thyroidectomy on levothyroxine, steroid induced diabetes, hx of malignant pleural effusion s/p pleurex catheter and subsequent removal, and vitamin D insufficiency here for routine follow up visit.\par \par # Endometrial cancer\par - follows with oncology\par - The patient started Immunotherapy ( Ketruda last week) every 3 weeks \par - The patient also started Lenvima daily po\par \par #Metastatic brain cancer: \par - Took 10 sessions of radiotherapy \par - lesions are stable ( not gone but stable in size and place)\par \par # Hypothyroidism 2/2 thyroid cancer s/p thyroidectomy\par - levothyroxine 88mg \par \par # Steroid induced diabetes\par - c/w metformin\par - Repeat HbA1C% before next visit \par \par # HCM\par - The patient asked about the FLU vaccine, advice to be given on October \par - f/u 6 months.

## 2020-09-03 NOTE — HISTORY OF PRESENT ILLNESS
[Family Member] : family member [FreeTextEntry1] : Follow up visit  [de-identified] : 65-year-old, Female  w/ Stage 4 endometrial carcinoma s/p ELEONORA, w/ mets to brain s/p RT ( 10 sessions),on Immunotherapy ( Pembrolizumab), thyroid cancer s/p thyroidectomy on levothyroxine, steroid induced diabetes, hx of malignant pleural effusion s/p pleurex catheter and subsequent removal, and vitamin D insufficiency here for routine follow up visit. \par \par The patient complains of some teeth pain which makes eating a little bit harder. The patient has a good appetite but is losing weight, today at 141lbs. No fever, no night sweats no other major complaints. The patient endorses mild gingival bleeding if she does not be careful while brushing. \par \par

## 2020-09-03 NOTE — PHYSICAL EXAM
[No Acute Distress] : no acute distress [Normal Voice/Communication] : normal voice/communication [Normal Nasal Mucosa] : the nasal mucosa was normal [Normal Oropharynx] : the oropharynx was normal [No Respiratory Distress] : no respiratory distress  [No Accessory Muscle Use] : no accessory muscle use [No JVD] : no jugular venous distention [Normal Rate] : normal rate  [Clear to Auscultation] : lungs were clear to auscultation bilaterally [Normal S1, S2] : normal S1 and S2 [Regular Rhythm] : with a regular rhythm [No Murmur] : no murmur heard [Non Tender] : non-tender [Soft] : abdomen soft [No Masses] : no abdominal mass palpated [Non-distended] : non-distended [No Skin Lesions] : no skin lesions [No Rash] : no rash [Normal Gait] : normal gait [Coordination Grossly Intact] : coordination grossly intact [de-identified] : moderately injected conjunctiva, anicteric sclera  [de-identified] : left sided small lymph node ?  [de-identified] : No ulcers or lesions seen in the oral cavity [de-identified] : upper extremity edema [de-identified] : possible left anterior cervical adenopathy

## 2020-09-03 NOTE — END OF VISIT
[FreeTextEntry3] : Patient was seen and examined with resident. I discussed pt's medical issues and plan in detail as written above.\par  [] : Resident

## 2020-09-06 NOTE — HISTORY OF PRESENT ILLNESS
[Disease: _____________________] : Disease: [unfilled] [AJCC Stage: ____] : AJCC Stage: [unfilled] [de-identified] : Serena is a rodrigue 63 yo female, who has started developing SOB approximately 2 months ago, she went to ER on 11/2/2017 and on CXR was noted to have a large right sided pleural effusion. She underwent a thoracentesis, 1.6L of fluid was drained. 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 IHC was pos for CK7, PAX8, CK5/6 and Ca125, negative for TTF-1/Napsin, calretinin, CK20, mammaglobin, p63, villin, HAM56, GCDFP15, TAY-EP4. \par \par Her visit on 12/1/2017 Serena had an excisional biopsy of cervical node on 12/13/2017 revealing met adenocarcinoma, primary source still was not clear. On 12/14/2017 Serena was admitted with SOB, Pleurx catheter was placed on 12/14/2017. 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 Serena received 1st dose of carbo (auc of 5)/taxol(175mg/m2) on 12/15/2017 without complications, but the next day sustained a fall 2/2 vasovagal episode and developed asymptomatic SAH, that resolved on imaging by 12/21/2017.  [de-identified] : KRas WT, EGFR WT, Alk WT, ROS1 WT, PDL1-1%\par Normal MMR protein expression [de-identified] : 11/24/2017: She reports some SOB, especially ARZATE today. No fevers, no weight loss (reports some weight gain), no GI,  or GYN symptoms, except for increasing abdominal girth, she reports no blood in the stool or urine and no vaginal bleeding. She reports no CP and no palpitations, she reports worsening nonproductive cough, no hemoptysis. She also reports difficulty lying on left side and lying down flat. \par She reports left on/off facial numbness several months in duration. She had a CT of her head performed in [facility] within 1 year.  Additionally there is a freely mobile left cervical node present on exam, as per patient this was in place for approximately 1 year. \par \par 12/1/2017: Nidhi is here for follow up today. She had a therapeutic thoracentesis in  on 11/27/2017, 2L of fluid were removed, with much improved respiratory status. PET CT and MRI of the brain were done on 11/29/2017, findings were discussed today. There remains no clear primary source of malignancy. We have discussed that the source of tumor may be Mullerian vs lung. regardless of source chemotherapy needs to be initiated ASAP. We have discussed Carbo/taxol regiment that will cover both Mullerian and lung origin. Side effects including myelosuppression, peripheral neuropathy, allergic reaction and others.\par \par 1/2/2017 Since last visit Nidhi had an excisional biopsy of cervical node on 12/13/2017 revealing met adenocarcinoma, primary source still was not clear. On 12/14/2017 Nidhi was admitted with SOB, Pleurx catheter was placed on 12/14/2017. 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 Nidhi received 1st dose of carbo (auc of 5)/taxol(175mg/m2) on 12/15/2017 without complications, but the next day sustained a fall 2/2 vasovagal episode and developed asymptomatic SAH, that resolved on imaging by 12/21/2017. Today Nidhi's SOB has significantly improved. She reports very mild neuropathy in fingertips only. She reports no headache and offers no other complaints. \par \par 1/26/2018: Complains of some neuropathy, otherwise tolerating chemo with no difficulty. \par \par 2/16/2018: restaging CT C/A/P reviewed, significant improvement noted. Will refer to GYN/ONC. Reports slightly worsening neuropathy, not -painful, taking Gabapentin, no other symptoms. For cycle 4 of carbo/taxol today.\par \par 3/9/2018: Nidhi met with Dr. Massey, she is planned for surgery on 5/7/2018, after completion of 6 cycles of carbo/taxol and restaging PET CT ordered for mid April and follow up with Dr. Massey on April 20. She reports worsening neuropathy, and occasional pain and changes in vision in her left eye, eye symptoms come and go and are not very bothersome. She reports no other symptoms. \par \par 3/30/2018; Nidhi is here for cycle 6 of carbo/taxol, she continues to have neuropathy in finger and toes, but offers no other complaints, chemo has been dose reduced. \par \par 4/27/2018: Results of restaging PET CT s/p 6 cycles of carbo/taxol revealed 1. No new areas of abnormal increased uptake is seen to indicate \par biologically active disease.\par \par 2. Persistent PET positive left thyroid mass with a max SUV 33.1, \par previously max SUV 34.8. Further evaluation with thyroid ultrasound and \par if needed fine-needle aspiration biopsy will be helpful.\par \par 3.   PET positive left cervical lymph nodes mainly level 2 on the left \par with a max SUV 5.47previously 18. Minimal increased uptake in the \par bilateral axillary lymph nodes most likely reactive(less than 2.5)\par \par 4. Weakly PET positive, max SUV 2.89 right pleura, previous max SUV 5.3 \par with non-FDG avid right pleural effusion. \par \par 5. Previously FDG avid right and left supraclavicular lymphadenopathy, \par left internal mammary, bilateral paratracheal, para-aortic, para \par vertebral, carinal, mesenteric, right and left iliacs, right inguinal \par lymphadenopathy, small in size and no longer FDG avid.\par \par 6. No abnormal increased uptake is seen in the  lobulated uterus.\par \par 7. Overall there is marked improvement in the FDG avid disease.\par This was reviewed with Nidhi. She met with Dr. Massey on 4/20/2018, surgery is planned for 5/7/2018. She will also joselyn to meet with ENT re FDG avid thyroid nodule. Will assist in making he an appointment for her. \par Persistent neuropathy on gabapentin. \par \par 6/29/18 ; Patient came for follow up visit , evaluation for chemotherapy cycle 8 of carbo / Taxol , patient tolerating medication well , except neuropathy  recommend to have gabapentin  300 mg po daily.\par \par 7/20/2018: Nidhi present for follow up today, she has completed total of 8 cycles of Carbo/Taxol. She reports significant neuropathy in her hands and feet. We will discontinue chemotherapy today and plan to follow with close observation. She has thyroidectomy planned with Dr. Herrera on 8/20/2018, obtaining clearance for PMD. She is also planning to fly to [place] at the end of September. She reports no SOB, no GI or  symptoms. \par \par 9/12/2018: Nidhi offers no significant complaints today. She states neuropathy in her hands has almost completely resolved and neuropathy in her feet is significantly better. She had undergone complete thyroidectomy by Dr. Herrera on 8/20/2018, pathology revealed papillary thyroid cancer, measuring 2 cm, pT1b, other additional foci of papillary carcinoma were also noted, no LVI, surgical margins were clear, LN 0/2 had no carcinoma, stage [id number]. She is following up with Dr. Herrera tomorrow. She has still not gone for her vacation in [place]. \par \par 10/10/2018: Restaging PET CT on 9/26/2018 reveals Since 4/16/2018,  1. Post thyroidectomy with diffuse increased FDG uptake at the thyroid  bed likely representing post-surgical changes.  2. Subtle increased uptake is seen in the lamina , right side at the  level of T8 with a max SUV 4.6. This is not sufficient for metastatic  disease. Bone scan or MRI examination with contrast will be helpful for  further evaluation.  3. No other areas of abnormal increased uptake is seen. Images were personally reviewed by myself and discussed with Nidhi. \par She also had an Echo on 9/24/2018 revealing EF of 63%. \par Nidhi reports ongoing fatigue, she is unable to lose weight. She is taking Synthroid and following with Dr. Acevedo. \par She reports stiff fingers at night only, no painful neuropathy. \par She denies any other symptoms today. \par \par 12/11/2018: Nidhi feels well, neuropathy in hands and feet is much improved. S he is now complaining of r-sided facial pain associated with some swelling, pain comes and goes. She has already seen Dr. Herrera, who ordered CT of sinuses and prescribed pain relief meds. She is also due for restaging PET CT. Nidhi feels well otherwise. \par \par 1/9/2019: Restaging PET CT was performed on 12/18/2018 it revealed COMPARISON : 9/24/2018.  New left upper quadrant peritoneal nodule measuring 8 mm with an SUV max  of 7.3. This is suggestive of biologic tumor activity.  No other FDG avid lesions seen throughout the scan. Images were personally reviewed by myself and discussed with Nidhi, originally over the phone and again today. I have originally suggested to try AI in the interim, Nidhi however reported diarrhea at the time of the scan and declined intervention for now. We will plan for restaging PET CT to be performed in 6-8 weeks, this will be ordered today. She will follow up with Dr. Massey at [facility] shortly and bring the disk for his review. I would also like her to meet with genetics, this will be arranged at [facility] with Dr. Massey's help. Nidhi reports no new symptoms today, her neuropathy is manageable and  improving. She still reports unilateral headache that was work up in the past. Neurology eval was again suggested to her. She is following closely with Endocrinology re thyroid management. She will have follow up with Dr. Herrera shortly as well. \par \par 2/20/2019: Nidhi offers no new complaints. PET CT from 2/12/2019 revealed \par Since PET/CT of December 19, 2018, no new sites of pathologic FDG uptake\par Slight increase in size of left upper quadrant peritoneal nodule (1.1 cm, \par previously 0.8 cm) with stable FDG uptake, 7.7 SUV suspicious for \par biologic tumor activity.\par No other sites of pathologic FDG uptake \par Images were personally reviewed by myself and discussed with Nidhi, case was also discussed with Dr. Massey at [facility]. Nidhi will have follow up with his shortly. She is following with endocrinology. reports improving neuropathy. No GI or  symptoms at this time. No weight loss. \par \par 3/20/2019: Nidhi had a peritoneal nodule biopsy done at [facility], I have received a call from Dr. 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 We have discussed that surgery though could be attempted will not be the best therapeutic approach, recommencement of systemic therapy was discussed. I have not received the results from [facility] yet. I have briefly discussed this with Nidhi today. Nidhi reports that she has acutely developed right lower extremity weakness 5-6 days ago, she did not inform any of her doctors about this. She also reports that her R upper extremity though not weak "does not feel normal either. She reports no back pain, there is no point tenderness, RLE is objectively weak on exam. I recommend ER evaluation to r/o CVA vs brain met, vs L-spine related issue. Recommend admission for work up. Nidhi is agreeable to get admitted. \par \par 5/8/2019: Nidhi present for follow up, she was diagnosed with multiple metastasis to the brain, MRI was done on 3/21/2019 and revealed \par Multiple supratentorial heterogeneously enhancing lesions consistent with \par metastatic disease. The appearance on the T2-weighted images is suggestive \par of adenocarcinoma. There is mass effect upon the left lateral ventricle and \par corpus callosum. \par She received whole brain irradiation by Dr. Dahl, completed it in the beginning of 4/2019, she is currently on steroid taper managed by Dr. Dahl, she is taking 4mg of Decadron daily. She will be stopping the Keppra as there have been no documented h/o seizures. She still reports impairment of the L visual field, she has an appointment coming up with Opthalmology. She reports no deficits walking after she has completed inpatient acute rehabilitation. \par Restaging PET CT on 5/1/2019 reveals COMPARISON : 2/12/2019 \par Multiple new FDG avid omental nodules largest measuring up to 10 mm, \par positive on nonattenuation corrected images. Findings are suggestive of \par biologic tumor activity. \par Again seen is a left upper quadrant peritoneal nodule, SUV max 6.8, \par previously 7.7 (12% decrease). \par Images were personally reviewed by myself and discussed with patient. \par She now reports mild abdominal bloating, no other symptoms. She reports her neuropathy has significantly improved. \par She has first evidence of recurrent disease documented 5.5 months after last carbo/taxol dose, the volume of disease was very small (1 8mm nodule), she was then observed for another 6 months and now she wishes to restart the chemotherapy. \par I have discussed with her that rechallenging with carbo/taxol may still prove to be effective as long as she gets restaged after 2 cycles. Given recent whole brain radiation and neuropathy I will offer her carbo/taxol weekly with does reduction on the taxol for neuropathy, We will plan to run carbo slowly to avoid allergic reaction. \par She is agreeable to start ASAP. \par \par 6/5/2019: Nidhi has completed 1 cycle of weekly Carbo/taxol, ran at slow rate to avoid Carbo reaction and has tolerated chemotherapy without difficulty, she does not complain of increase in neuropathy. She has ongoing vision changes in her L eye, she had no residual LE weakness. She is off Decadron and Keppra. She was advised to see ophthalmology. \par \par 7/3/2019: Nidhi is doing well.  Reports no problems with carbo/taxol.  Still complains of vision changes in L eye but is seeing ophthalmologist on 7/16.  Remains active around the house and cooks regularly.  No fevers, weight loss, anorexia.  ROS is otherwise only significant for occasional loose stools, no diarrhea.\par \par 7/17/2019: Nidhi is doing well, denies worsening neuropathy. She c/o feeling fatigued and tired. Her last chemo was 1 week ago(7/11/19) with carbo/taxol and is due again tomorrow. She saw ophthalmology and will need cataract surgery of the L eye. No c/o chest pain/SOB. No weight loss.\par CT C/A/P was done on 7/9/2019, images were personally reviewed by myself and discussed with several radiology attendings, they revealed \par CHEST:\par Filling defect within a segmental branch of the lower lobe pulmonary artery \par suspicious for pulmonary embolus. \par Stable left upper lobe and right middle lobe 3 mm nodules. \par CT abdomen and pelvis performed on the same day, see separate report. \par ADDENDUM: \par Please note that the morphology of the small thrombus within the left lower \par pulmonary artery is not indicative of an acute thrombus but rather a chronic \par appearance. \par This was discussed with Radiology, it was not deemed that thrombus was acute and may not have even been present at all, finding was deemed to be equivocal, based on radiology assessment anticoagulation was not indicated for the filling defect noted. Initially CTA was recommended, the recommendation was withdrawn upon further review. \par ABDOMEN/PELVIS:\par New 1.3 cm segment IV hepatic hypodensity seen on series 4 image 205. \par Lesion not seen on prior PET/CT from 5/1/2019 or abdomen and pelvis CT from \par \par 2/2/2018 and is consistent with a new metastasis. \par Two other hepatic lesions described above, decreased in size since 2/2/2018, \par consistent with treated hepatic metastases. \par Anterior perisplenic omental nodule measures 0.7 cm on series 2 image 53, \par previously measuring 1.2 cm on PET/CT dated 5/1/2019. \par All the other previously seen omental nodules have resolved since 5/1/2019. \par ADDENDUM:\par Case was discussed with Dr. Cid in detail. It is also possible that the \par new 1.3 cm lesion in the liver since February 2, 2018 represents a treated \par metastasis similar to the other liver lesions. \par PET/CT would be necessary to assess disease activity. \par I have discussed case with Radiology, and it was determined that there was no clear cut evidence of progression. PET CT was ordered today.\par This was discussed with Nidhi, will proceed with Carbo/Taxol for now. \par \par 8/1/2019: Nidhi reports no major side effects from weekly Carbo/Taxol, she reports no neuropathy. She has completed 10 cycles altogether. \par PET CT on 7/24/2019 revealed \par 1. Since May 1, 2019, no definite new site of pathologic FDG uptake to \par suggest new biologic tumor activity; specifically, no pathologic FDG uptake \par within previously noted 1.3 cm lesion within hepatic segment 4. \par 2. Increased FDG uptake within several subcentimeter bilateral cervical \par lymph nodes, which is nonspecific and may be postinflammatory; max SUV 9.2 \par is noted within a 0.7 cm right level 2 cervical node. Attention on follow-up \par is suggested. \par 3. Few peritoneal nodules have overall decreased in size. \par 4. No additional site of pathologic FDG uptake. \par Images were personally reviewed by myself and discussed with Nidhi. \par She wishes to continue with chemotherapy. Will plan for 3 additional cycles. Will consider adding Avastin, but with h/o inconclusive/possible chronic PE ppx anticoagulation maybe necessary. \par \par 8/28/2019: Nidhi reports feeling well, she denies worsening neuropathy, worsening neurological symptoms, SOB, abdominal pain, bloating. She reports she is planned to undergo  eye surgery towards the end of September. We will plan to hold chemotherapy briefly after this cycle to facilitate adequate counts and minimize complications. We again have briefly discussed considering Avastin based therapy, will hold off on this prior to surgery. 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 May proceed without formal PAST, will check CBC prior to procedure, PT and PTT today. \par \par 9/25/19:Nidhi reports feeling well, she denies any changes since last visit, No SOB, abdominal pain, bloating. She reports she is planned to undergo  eye surgery in 9/30/19  CBC reviewed with normal Hemogram . we will hold chem this week , she will resume after Cataract sx . 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 May proceed without formal PAST, will check CBC prior to procedure, PT and PTT today. \par \par 10/4/2019: Nidhi underwent successful eye surgery on 9/30/2019, she reports she can see much better now. Last dose of chemotherapy was administered on 9/20, she then was on hold for surgery. She has completed 5 additional cycles of Carbo/taxol. She is due for restaging. Her disease is only accurately assessed on PET CT, prior attempts to obtain CT scans resulted in additional imaging with PET, as findings were inconclusive. PET CT will be ordered to restage. \par In addition she is due for MRI of the brain. She has had a small amount of weight loss, mild neuropathy is persistent. She offers no additional complaints. \par She will have port placed on Monday, will hold chemotherapy until restaging scans complete. \par \par 10/23/2019: Nidhi feels well, and denies any new symptoms. Her eye surgery was successful, vision is much improved now. She had restaging scans. \par PET CT on 10/18/2019 revealed COMPARISON : 7/24/2019. \par No pathologic uptake to suggest biologic tumor activity. \par MRI of the brain on 10/15/2019 revealed \par Comparison with June 8 and March 21, 2019: (Generally lesions improved \par markedly since March but slightly increased in size since June) \par 1. Lesions previously demonstrated on the study of June 8, 2019 has \par remained stable or minimally increased in size \par 2. Specifically, right frontal opercular lesion measures about 1 cm and \par left posterior inferior temporal lobe lesion measures about 1 cm. These have \par increased since the previous study. \par 3. Small punctiform lesions within the right frontal white matter and left \par frontal sulcus or more apparent than on the study of June 8 \par 4. These lesions are all much smaller than the earlier MRI of March 21, \par 2019. \par 5. No new lesions. \par Images were personally reviewed by myself and discussed with patient. She remains asymptomatic and off the steroids. \par I have also discussed the case with Dr. Dhal. I would like Nidhi to discuss the options with [facility], she maybe a candidate for brain SRS. \par She has been off Carbo/Taxol for a few weeks, she remains Platinum sensitive. I have discussed the option of switching her over to Avastin maintenance, will need to prophylax with low dose Eliquis as well, given questionable finding of small PE in the past. \par Side effects of Avastin were discussed at length, including HNT, proteinuria, small risk of DVT/PE, GI perforations etc. \par She is agreeable to start after [facility] consultation. \par \par 11/5/2019: Nidhi feels OK, she reports acute onset head discomfort that lasts minutes and subsides, the episodes are becoming more frequent 1-2 times a day. She has seen Dr. Dahl, who wishes to hold off on offering SRS to the brain. Plan was to start on Avastin today. Nidhi reports she currently has a lapse in her insurance coverage till 12/2019, I will not be able to start he on ppx dose Eliquis for now, she instead will take baby ASA every other day, to modify risk of DVT/PE. Will plan to switch over to Eliquis at 2.5mg BID once insurance is active. She also contemplated going to [place] with her daughter, I am willing to hold Avastin until she comes back, but Nidhi wishes to start right away, I explained to her that flight may result in complications if that is her wish. She will cancel the trip and start with Avastin today. Side effects have been discussed. \par \par 11/20/2019: Nidhi came for a follow up visit, she reports feeling well, she reports less fatigue. She is s/p first dose of Avastin on 11/5/2019, she tolerated Avastin without difficulty, she is due for the 2nd dose today. \par We communicated CBC result with HGB of 12.3, normal platelet and WBCs. She lost like 5 pounds secondary to gum problems, she will follow up with her dentist this week. She is currently on baby ASA every other day. Continue with single agent Avastin.  \par \par 12/17/2019: Nidhi came for a follow up visit, she reports feeling well, she experienced pruritic rash on last Saturday 12/14/19, which improved over 2 days with Benadryl alone.  Today her skin rash has completely resolved. We will add Solu-Medrol 100 mg IV prior to Avastin. Nidhi reports less fatigue. She is due for the 3rd dose of Avastin today. \par We communicated CBC result with HGB of 12.3, normal platelet and WBCs. \par \par 1/7/2019: Nidhi is doing well, reports good appetite, she has lost 1lb. Reports no GI or pulmonary symptoms. She reports her gums are bleeding occasionally. No new neurological symptoms. She reports no rash after last cycle of Avastin. \par \par 1/28/2020: Nidhi is here for follow up visit, she has no difficulty tolerating Avastin, she reports L sided chronic eye discomfort, neuropathic in nature. Neurontin has been helpful in the past, will reorder this. \par PET CT on 1/22/2020 revealed COMPARISON : 10/18/2019. \par Anterior perisplenic nodule measuring 8 mm is FDG avid, SUV max 8.9, \par consistent with biologic tumor activity. \par MRI of the brain on 1/15/2020 revealed \par In comparison to the previous brain MRI dated 10/15/2019: \par 1. Redemonstrated scattered enhancing lesions consistent with metastatic \par disease, with overall good treatment response since the prior exam. \par 2. No significant interval change in the ovoid lesion in the superior left \par frontal lobe measuring 12 mm. \par 3. Decreased size of the right opercular lesion measuring 6 mm, previously \par 11 mm. Decreased size of the left inferior occipital/tentorial lesion \par measuring 6 mm, previously 11 mm. The previously seen right frontal white \par matter punctate lesion is no longer visualized. \par 4. No new lesions are demonstrated. \par All images were personally reviewed by myself and discussed with Nidhi. I explained to her I am concerned about the perisplenic lesion concerning for disease progression, but it is isolated and very small, asymptomatic. There is definitely a positive response to Avastin in the brain and given that, we will continue with Avastin with short term follow up imaging. Nidhi knows to inform me with any new symptoms immediately. \par \par 2/18/2020: NIDHI BANKS is a 64 year old female here today for follow up visit. She completed 5 cycles of Avastin; tolerating well at this time. She denies fever, chills, change in mental status, or change in weight. She complains of left painful facial swelling. In addition, she complains of swelling of fingers, joint pain and numbness in her toes. She continues on Gabapentin 100mg TID with no symptom relief and wishes to try to go up on the dose. She complains of mild gingival bleeding in the morning. Last PET showed new isolated perisplenic lesion suspicious for disease progression, however MRI of the brain appeared better on Avastin, so the plan was to continue with Avastin with short term follow up. \par \par 3/10/2020: NIDHI BANKS is a 64 year old female here today for follow up visit. She denies new neurological symptoms, skin rash, fever, or change in weight. She continues of Gabapentin which was increased to 200 mg TID; neuropathy improving. She continues to complain of mild gingival bleeding. Left facial swelling and numbness resolved. She has completed cycle 6 of Avastin without complication. \par \par 3/31/09170: Nidhi presented today for routine f/u, she feels fine. Denies any chest pain, sob, fever, chills and cough. Reports that she continues to have gum bleeding. Neuropathy is better with gabapentin, she decreased the dose to 4 pills a day. She tolerated 7th cycle well and is due to get 8th cycle today . She will get MRI brain on 4/2/20.\par She reports minor gum bleeding from Avastin and dental infection on the left, will prescribe Amoxicillin. \par \par 4/21/2020: NIDHI BANKS is a 64 year old female here today for follow up visit for endometrial cancer. S/p cycle 8 of Avastin. Patient denies fever, chills, SOB, cough and pain. She states that her gums have minimal bleeding and dental infection improved after amoxicillin. She complains of mild fatigue. Restaging MRI of the brain was completed on 4/2/2020 which showed stable bilateral parenchymal hemorrhagic lesions compatible with known metastases; no new enhancing lesions are identified. \par Images were personally reviewed by MD Jose Roberto and discussed with patient. \par \par 5/12/2020: MRI brain on 4/2/2020 reveals 1. Stable bilateral parenchymal hemorrhagic lesions compatible with known \par metastases. \par 2. No new enhancing lesions are identified. \par PET CT on 4/30/2020 revealed Right upper quadrant peritoneal implant adjacent to the inferior tip of the \par liver 4.7 (image [id number]) \par Right lower quadrant 0.5 cm peritoneal nodule 7.2 (image 111) \par Right lower quadrant subcentimeter peritoneal nodule 2.6 (image [id number]) \par Anterior perisplenic peritoneal nodule 12.3-48% increase from 8.9 \par (remeasured) previously \par Stable non-FDG avid (attenuation corrected and nonattenuation corrected \par images) 4 mm right middle lobe pulmonary nodule-image 181) \par  No other definite sites of abnormal FDG uptake \par IMPRESSION: \par Compared to 1/22/2020: \par new FDG avid peritoneal implants in right lower quadrant of the abdomen \par suspicious for biologic tumor activity \par 48% interval increase in SUV in anterior perisplenic peritoneal nodule (SUV \par 12.3 vs 8.9 remeasured) \par In retrospect stable FDG avid peritoneal implant in the right upper quadrant \par of the abdomen (SUV 4.7 vs 3.8 remeasured-image 134) \par No other definite sites of abnormal FDG uptake \par Images were personally reviewed by MD Jose Roberto and discussed with patient.\par Given minimal degree of progression in the abdomen and persistent control intracranially, we have discussed continuing Avastin for now and short term restaging. \par Nidhi offers no complaints today. \par \par 6/2/2020: The patient is here for follow up. She is due for Avastin today . She has  no major complaints . She reported gum bleeding and swelling, which is much better today . She also reported hand swelling bilaterally. She denies fever, chills, no respiratory, cardiac, GI/ symptoms. She denies headaches or neurological deficits. \par \par 6/23/2020: Nidhi is 65 years old female came for a follow up visit for papillary-serous endometrial carcinoma with brain metastasis.  She reported feeling well. \par She denies dizziness, change of mental status, fever, chills, SOB. 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  She reported feeling well. \par She denies dizziness, change of mental status, fever, chills, SOB. \par We reviewed MRI of the brain from 7/5/20 that revealed \par The study is considered stable since April 2, 2020 with the following \par findings: \par 1. 3 lesions are being followed, in the left frontal, right frontal and \par left occipital lobes. \par 2. The lesions are essentially stable, nonenhancing and probably mildly \par hemorrhagic \par 3. A punctiform lesion in the right frontal lobe (series 11 image 7) was \par probably present on the prior study. This is stable and probably not new. \par 4. Any differences in size are probably due to differences in the way the \par patient was scanned. \par Images were personally reviewed by MD Jose Roberto and discussed with patient. \par We will continue the current treatment with  Avastin one every 3 weeks.  Patient reports improvement in neuropathy with gabapentin 200 mg every 12 hrs.\par Patient is due for repeat PET Scan at the end of July,2020.\par \par 8/4/2020: NIDHI BANKS is a 65 year old female here today for follow up visit for endometrial cancer. S/p cycle 11 of Avastin. Patient denies fever, chills, SOB, cough and pain.\par PET CT from 7/29/2020 revealed \par Compared to 4/30/2020: \par 1 new FDG avid near midline peritoneal implant (SUV 4.3) consistent with biologic tumor activity \par 1 new left cervical level V FDG avid lymph node (SUV 8.7) suspicious for biologic tumor activity in light of the history of papillary thyroid carcinoma \par Interval increases in SUV: 115% increase in SUV in right lower quadrant peritoneal implant \par 97% increase in SUV in right upper quadrant peritoneal implant -66% increase in SUV in level 3 cervical lymph node \par Interval 36% decrease in SUV in anterior perisplenic nodule (7.9 vs 12.3) \par Stable FDG avid right upper quadrant implants and right level 5 cervical lymph node \par No other sites of abnormal FDG uptake \par We discussed other different treatment option.\par Images were personally reviewed by MD Jose Roberto and discussed with patient.\par We have discussed that there is undeniable progression systemically but still benefit in the CNS. I will review her chart and determine if restarting weekly carbo/taxol is of value, vs adding an additional agent vs starting on Keytruda/Lenvima is at this point indicated. \par Patient will continue with one additional dose of Avastin today.\par \par 8/25/2020: Nidhi is feeling well, no complaints today. At this point given minimal but sustained progression we will plan to switch over to Kaytruda combination with Lenvima. \par We have gone over side effects of immunotherapy at length, including but not limited to autoimmune mediated pneumonitis, enteritis, dermatitis, thyroiditis, damage to the kidneys, liver, pancreas, pituitary gland etc. patient and family voice understanding and are in agreement to proceed with treatment.\par Side effects of Lenvima including, but not limited to, cytopenias, that may require blood product transfusions and lead to increased risk of infection, requiring hospitalization, allergic reactions, that can rarely be life-threatening, skin reactions, nausea/vomiting, mucositis, diarrhea/constipation, QT prolongation, GI perforation, HTN etc were discussed at length, patient and family voice undestaging, all questions were answered to patient's satisfaction.\par

## 2020-09-06 NOTE — RESULTS/DATA
[FreeTextEntry1] : EXAM:  MR BRAIN WAW IC      \par \par \par PROCEDURE DATE:  06/08/2019  \par \par \par \par \par INTERPRETATION:  Clinical History / Reason for exam: Dizziness and \par vertigo, history of uterine and thyroid cancer, for evaluation of \par metastatic disease, lesion seen on prior MRI of the brain.\par \par MRI OF THE BRAIN WITHOUT AND WITH CONTRAST\par \par TECHNIQUE:\par \par Multiplanar multisequence imaging of the brain was performed on the \[facility] open magnet before and after the intravenous administration of \par 7.5 cc of Gadavist including brain lab protocol.\par \par COMPARISON:\par \par MRI of the brain without and with contrast dated March 12, 2019.\par \par FINDINGS:\par \par The previously described supratentorial lesions have almost completely \par resolved.\par \par The lesion in the left posterior frontal region now measures 1.4 cm in \par maximum diameter, the lesion in the anterior right frontal lobe measures \par 0.7 cm in maximum diameter, the second lesion in the right frontal lobe \par measures 0.1 cm in maximum diameter, the lesion in the right posterior \par parietal region measures 0.3 cm in maximum diameter, and the left \par occipital lobe lesion measures 1.2 cm in maximum diameter. (Previously \par measuring 2.6, 2.4, 0.5, 0.7, and 2.5 cm respectively).\par \par The third, fourth, and lateral ventricles are normal in size and \par position. There is no shift of the midline structures.\par \par The cerebellar tonsils are minimally low-lying (0.3 cm of cerebellar \par ectopia).\par \par Incidental note is made of a tiny medial cyst.\par \par There are scattered T2/FLAIR hyperintense signal intensities in the\par subcortical white matter which are nonspecific differential diagnostic\par possibilities include chronic ischemic change, foci of gliosis or\par demyelination.\par \par IMPRESSION:\par \par In comparison with the prior MRI of the brain dated March 21, 2019:\par \par There has been almost complete resolution of most of of the previously \par described supratentorial lesions.\par \par There are no new enhancing lesions.\par \par \par \par \par \par \par CRISTINA MÉNDEZ M.D., ATTENDING RADIOLOGIST\par This document has been electronically signed. Oren 10 2019  1:17PM\par   \par \par   \par \par \par [id number]^RI^DC\par \par EXAM:  PETCT SKUL-THI ONC FDG SUBS      \par \par \par PROCEDURE DATE:  05/01/2019  \par \par \par \par \par INTERPRETATION:  \par FDG PET CT STUDY   Subsequent  treatment strategy\par REASON: TUMOR IMAGING - PET with concurrently acquired CT for attenuation \par correction and anatomic localization; skull base to mid - thigh .\par \par CPT code 96705.\par \par Fasting blood glucose level:     91 mg/dl\par \par HISTORY: Endometrial cancer. Stage IV with brain metastatic disease.\par \par TECHNIQUE: Approximately 45 minutes after the intravenous administration \par of 10.7 mCi 18-Fluorine FDG, whole body PET images were acquired from \par base of skull to mid - thigh.\par \par CT protocol used for this PET/CT study is designed for attenuation \par correction and anatomic localization of PET findings.  This  CT \par is not designed to replace state-of-the-art diagnostic CT scans for \par specific imaging protocols of different body parts.\par \par COMPARISON : 2/12/2019.\par Correlation: MRI of the brain on 3/21/2019.\par \par FINDINGS:\par \par Head/Neck: No biologically active head/neck lesions.     \par Normal uptake within the brain, pharyngeal lymphoid tissue, salivary \par glands and laryngeal musculature is noted.    \par \par Thorax:   No suspicious hypermetabolic mediastinal, hilar, lung \par parenchymal lesions.\par \par Abdomen/Pelvis: Physiologic GI/  activity. \par \par Again seen is a left upper quadrant peritoneal nodule, SUV max 6.8, \par previously 7.7 (12% decrease). \par \par Multiple new FDG avid omental nodules largest of which is adjacent to the \par distal transverse colon, measuring 10 mm, positive on nonattenuation \par corrected images, axial image 134. \par \par No other abnormal visceral or guillaume tracer uptake is present.    \par \par Musculoskeletal :  No suspicious hypermetabolic osseous lesions.\par \par Additional CT findings:\par Status post hysterectomy.\par Colonic diverticulosis.\par \par IMPRESSION: \par \par COMPARISON : 2/12/2019\par \par Multiple new FDG avid omental nodules largest measuring up to 10 mm, \par positive on nonattenuation corrected images. Findings are suggestive of \par biologic tumor activity.\par \par Again seen is a left upper quadrant peritoneal nodule, SUV max 6.8, \par previously 7.7 (12% decrease). \par \par \par \par \par

## 2020-09-06 NOTE — PHYSICAL EXAM
[Restricted in physically strenuous activity but ambulatory and able to carry out work of a light or sedentary nature] : Status 1- Restricted in physically strenuous activity but ambulatory and able to carry out work of a light or sedentary nature, e.g., light house work, office work [Normal] : affect appropriate [de-identified] : B/L cataracts [de-identified] : CTA B/L

## 2020-09-06 NOTE — ASSESSMENT
[FreeTextEntry1] : Papillary- serous endometrial cancer, stage IV\par --PET CT on 11/29/2017 revealed no clear GYN origin, extensive KEVIN, uptake in the right lung and pleura, thyroid nodule\par --Malignant pleural effusion, resolved\par --L side PleurX catheter was removed on 2/8/2018\par --Completed cycle 8  of carbo/taxol on 6/29/2018.  2 cycles were administered  postoperatively.\par --Restaging PET CT on 9/26/2018 (3 months post completion of chemo 6/29/2018)was essentially negative for recurrent disease\par --Restaging PET CT on 12/18/2018 reveals   New left upper quadrant peritoneal nodule measuring 8 mm with an SUV max  of 7.3. This is suggestive of biologic tumor activity.  No other FDG avid lesions seen throughout the scan.\par --Restaging PET CT on 2/12/2019 revealed slight increase in size of left upper quadrant peritoneal nodule (1.1 cm, \par previously 0.8 cm) with stable FDG uptake, 7.7 SUV suspicious for biologic tumor activity.\par --Case was discussed with Dr. Massey at [place], biopsy of peritoneal nodule is positive for recurrent endometrial cancer.\par --MRI brain on 4/23/2019 revealed multiple brain mets\par --S/p whole brain radiation completed 4/2019, required acute rehab\par --PET CT on 5/1/2019 subsequently revealed further disease progression \par --Restarted weekly carbo/taxol 3 on 1 off on 5/9/2019, continue with weekly Carbo/Taxol for now.\par --Off Decadron and Keppra since 6/5/2019 \par --Restaging brain MRI on 6/8/2019 showed almost complete resolution of most supratentorial lesions and no new lesions.\par --Restaging CT C/A/P on 7/9/2019 did not reveal definite progression, there was a questionable filling defect suspicious for possible chronic PE/artifact, this was discussed with Radiology at length anticoagulation was not deemed to be necessary\par --PET CT on 7/24/2019 revealed positive response to therapy \par --Carbo/Taxol stopped, last dose 9/13/2019, on hold since, she remains platinum sensitive \par --Restaging PET CT on 10/18/2019 is NAD\par --Restaging MRI of the brain on 10/15/2019 is suggestive for possible disease progression, no new lesions, she remains asymptomatic and off steroids, will follow closely \par --She was referred to [facility] for brain SRS consideration, close observation for now \par --Started on Avastin maintenance 11/5/2019, will consider adding Eliquis ppx for DVT/ PE, for now she will take baby ASA every other day  \par --We sent for prednisone 10 mg po if needed for additional skin issues\par --Patient well aware to contact us if any side effect developed\par --MRI of the brain 1/15/2020 reveals overall good response to Avastin\par --Restaging PET CT on 1/22/2020 reveals single small perisplenic area of activity, suspicious for disease progression, asymptomatic \par --Proceed with cycle 10 of Avastin on 5/12/2020 after weighing risks/benefits with plan for short term follow up imaging \par -- Reviewed  MRI of brain on 7/5/20  showed stable metastases; no new enhancing lesions are identified.\par -- Restaging PET CT on 4/30/2020 reveals minimal progression in the abdomen and pelvis\par -- Other options discussed is switching to Keytruda/Lenvima combination, this is a consideration in the future \par -- We will c/w with Avastin for now since it is working in the brain and she has a minimal progression in abdomen. \par --PET CT from 7/29/2020 reveals additional evidence of progression, minimal and not symptomatic\par --Last dose of Avastin on 8/4/2020\par --Start on Lenvima/Keytruda 8/25/2020, will start Lenvima at 10mg daily with plan to dose escalate as tolerated, weekly bloodwork\par \par Papillary thyroid cancer [id number], 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 Herrera \par --Follow up with Dr. Acevedo of endocrinology; he is addressing vitamin D, thyroid and diabetes\par \par Cataract of eye\par -- S/p successful surgery on 9/30/19 . \par \par Follow up in 3 weeks\par \par \par

## 2020-09-06 NOTE — REVIEW OF SYSTEMS
[Fatigue] : fatigue [Easy Bleeding] : a tendency for easy bleeding [Negative] : Allergic/Immunologic [Recent Change In Weight] : ~T no recent weight change [Chest Pain] : no chest pain [Palpitations] : no palpitations [Lower Ext Edema] : no lower extremity edema [Shortness Of Breath] : no shortness of breath [Wheezing] : no wheezing [Cough] : no cough [SOB on Exertion] : no shortness of breath during exertion [FreeTextEntry9] : normal strength in upper and lower extremities  [de-identified] : no gait impairment- numbness and swelling of bilateral hands  [de-identified] : mild gingival bleeding

## 2020-09-18 NOTE — RESULTS/DATA
[FreeTextEntry1] : EXAM:  MR BRAIN WAW IC      \par \par \par PROCEDURE DATE:  06/08/2019  \par \par \par \par \par INTERPRETATION:  Clinical History / Reason for exam: Dizziness and \par vertigo, history of uterine and thyroid cancer, for evaluation of \par metastatic disease, lesion seen on prior MRI of the brain.\par \par MRI OF THE BRAIN WITHOUT AND WITH CONTRAST\par \par TECHNIQUE:\par \par Multiplanar multisequence imaging of the brain was performed on the \[facility] open magnet before and after the intravenous administration of \par 7.5 cc of Gadavist including brain lab protocol.\par \par COMPARISON:\par \par MRI of the brain without and with contrast dated March 12, 2019.\par \par FINDINGS:\par \par The previously described supratentorial lesions have almost completely \par resolved.\par \par The lesion in the left posterior frontal region now measures 1.4 cm in \par maximum diameter, the lesion in the anterior right frontal lobe measures \par 0.7 cm in maximum diameter, the second lesion in the right frontal lobe \par measures 0.1 cm in maximum diameter, the lesion in the right posterior \par parietal region measures 0.3 cm in maximum diameter, and the left \par occipital lobe lesion measures 1.2 cm in maximum diameter. (Previously \par measuring 2.6, 2.4, 0.5, 0.7, and 2.5 cm respectively).\par \par The third, fourth, and lateral ventricles are normal in size and \par position. There is no shift of the midline structures.\par \par The cerebellar tonsils are minimally low-lying (0.3 cm of cerebellar \par ectopia).\par \par Incidental note is made of a tiny medial cyst.\par \par There are scattered T2/FLAIR hyperintense signal intensities in the\par subcortical white matter which are nonspecific differential diagnostic\par possibilities include chronic ischemic change, foci of gliosis or\par demyelination.\par \par IMPRESSION:\par \par In comparison with the prior MRI of the brain dated March 21, 2019:\par \par There has been almost complete resolution of most of of the previously \par described supratentorial lesions.\par \par There are no new enhancing lesions.\par \par \par \par \par \par \par CRISTINA MÉNDEZ M.D., ATTENDING RADIOLOGIST\par This document has been electronically signed. Oren 10 2019  1:17PM\par   \par \par   \par \par \par [id number]^RI^DC\par \par EXAM:  PETCT SKUL-THI ONC FDG SUBS      \par \par \par PROCEDURE DATE:  05/01/2019  \par \par \par \par \par INTERPRETATION:  \par FDG PET CT STUDY   Subsequent  treatment strategy\par REASON: TUMOR IMAGING - PET with concurrently acquired CT for attenuation \par correction and anatomic localization; skull base to mid - thigh .\par \par CPT code 57789.\par \par Fasting blood glucose level:     91 mg/dl\par \par HISTORY: Endometrial cancer. Stage IV with brain metastatic disease.\par \par TECHNIQUE: Approximately 45 minutes after the intravenous administration \par of 10.7 mCi 18-Fluorine FDG, whole body PET images were acquired from \par base of skull to mid - thigh.\par \par CT protocol used for this PET/CT study is designed for attenuation \par correction and anatomic localization of PET findings.  This  CT \par is not designed to replace state-of-the-art diagnostic CT scans for \par specific imaging protocols of different body parts.\par \par COMPARISON : 2/12/2019.\par Correlation: MRI of the brain on 3/21/2019.\par \par FINDINGS:\par \par Head/Neck: No biologically active head/neck lesions.     \par Normal uptake within the brain, pharyngeal lymphoid tissue, salivary \par glands and laryngeal musculature is noted.    \par \par Thorax:   No suspicious hypermetabolic mediastinal, hilar, lung \par parenchymal lesions.\par \par Abdomen/Pelvis: Physiologic GI/  activity. \par \par Again seen is a left upper quadrant peritoneal nodule, SUV max 6.8, \par previously 7.7 (12% decrease). \par \par Multiple new FDG avid omental nodules largest of which is adjacent to the \par distal transverse colon, measuring 10 mm, positive on nonattenuation \par corrected images, axial image 134. \par \par No other abnormal visceral or guillaume tracer uptake is present.    \par \par Musculoskeletal :  No suspicious hypermetabolic osseous lesions.\par \par Additional CT findings:\par Status post hysterectomy.\par Colonic diverticulosis.\par \par IMPRESSION: \par \par COMPARISON : 2/12/2019\par \par Multiple new FDG avid omental nodules largest measuring up to 10 mm, \par positive on nonattenuation corrected images. Findings are suggestive of \par biologic tumor activity.\par \par Again seen is a left upper quadrant peritoneal nodule, SUV max 6.8, \par previously 7.7 (12% decrease). \par \par \par \par \par

## 2020-09-18 NOTE — HISTORY OF PRESENT ILLNESS
[Disease: _____________________] : Disease: [unfilled] [AJCC Stage: ____] : AJCC Stage: [unfilled] [de-identified] : Serena is a rodrigue 63 yo female, who has started developing SOB approximately 2 months ago, she went to ER on 11/2/2017 and on CXR was noted to have a large right sided pleural effusion. She underwent a thoracentesis, 1.6L of fluid was drained. 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 IHC was pos for CK7, PAX8, CK5/6 and Ca125, negative for TTF-1/Napsin, calretinin, CK20, mammaglobin, p63, villin, HAM56, GCDFP15, TAY-EP4. \par \par Her visit on 12/1/2017 Serena had an excisional biopsy of cervical node on 12/13/2017 revealing met adenocarcinoma, primary source still was not clear. On 12/14/2017 Serena was admitted with SOB, Pleurx catheter was placed on 12/14/2017. 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 Serena received 1st dose of carbo (auc of 5)/taxol(175mg/m2) on 12/15/2017 without complications, but the next day sustained a fall 2/2 vasovagal episode and developed asymptomatic SAH, that resolved on imaging by 12/21/2017.  [de-identified] : KRas WT, EGFR WT, Alk WT, ROS1 WT, PDL1-1%\par Normal MMR protein expression [de-identified] : 11/24/2017: She reports some SOB, especially ARZATE today. No fevers, no weight loss (reports some weight gain), no GI,  or GYN symptoms, except for increasing abdominal girth, she reports no blood in the stool or urine and no vaginal bleeding. She reports no CP and no palpitations, she reports worsening nonproductive cough, no hemoptysis. She also reports difficulty lying on left side and lying down flat. \par She reports left on/off facial numbness several months in duration. She had a CT of her head performed in [facility] within 1 year.  Additionally there is a freely mobile left cervical node present on exam, as per patient this was in place for approximately 1 year. \par \par 12/1/2017: Nidhi is here for follow up today. She had a therapeutic thoracentesis in  on 11/27/2017, 2L of fluid were removed, with much improved respiratory status. PET CT and MRI of the brain were done on 11/29/2017, findings were discussed today. There remains no clear primary source of malignancy. We have discussed that the source of tumor may be Mullerian vs lung. regardless of source chemotherapy needs to be initiated ASAP. We have discussed Carbo/taxol regiment that will cover both Mullerian and lung origin. Side effects including myelosuppression, peripheral neuropathy, allergic reaction and others.\par \par 1/2/2017 Since last visit Nidhi had an excisional biopsy of cervical node on 12/13/2017 revealing met adenocarcinoma, primary source still was not clear. On 12/14/2017 Nidhi was admitted with SOB, Pleurx catheter was placed on 12/14/2017. 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 Nidhi received 1st dose of carbo (auc of 5)/taxol(175mg/m2) on 12/15/2017 without complications, but the next day sustained a fall 2/2 vasovagal episode and developed asymptomatic SAH, that resolved on imaging by 12/21/2017. Today Nidhi's SOB has significantly improved. She reports very mild neuropathy in fingertips only. She reports no headache and offers no other complaints. \par \par 1/26/2018: Complains of some neuropathy, otherwise tolerating chemo with no difficulty. \par \par 2/16/2018: restaging CT C/A/P reviewed, significant improvement noted. Will refer to GYN/ONC. Reports slightly worsening neuropathy, not -painful, taking Gabapentin, no other symptoms. For cycle 4 of carbo/taxol today.\par \par 3/9/2018: Nidhi met with Dr. Massey, she is planned for surgery on 5/7/2018, after completion of 6 cycles of carbo/taxol and restaging PET CT ordered for mid April and follow up with Dr. Massey on April 20. She reports worsening neuropathy, and occasional pain and changes in vision in her left eye, eye symptoms come and go and are not very bothersome. She reports no other symptoms. \par \par 3/30/2018; Nidhi is here for cycle 6 of carbo/taxol, she continues to have neuropathy in finger and toes, but offers no other complaints, chemo has been dose reduced. \par \par 4/27/2018: Results of restaging PET CT s/p 6 cycles of carbo/taxol revealed 1. No new areas of abnormal increased uptake is seen to indicate \par biologically active disease.\par \par 2. Persistent PET positive left thyroid mass with a max SUV 33.1, \par previously max SUV 34.8. Further evaluation with thyroid ultrasound and \par if needed fine-needle aspiration biopsy will be helpful.\par \par 3.   PET positive left cervical lymph nodes mainly level 2 on the left \par with a max SUV 5.47previously 18. Minimal increased uptake in the \par bilateral axillary lymph nodes most likely reactive(less than 2.5)\par \par 4. Weakly PET positive, max SUV 2.89 right pleura, previous max SUV 5.3 \par with non-FDG avid right pleural effusion. \par \par 5. Previously FDG avid right and left supraclavicular lymphadenopathy, \par left internal mammary, bilateral paratracheal, para-aortic, para \par vertebral, carinal, mesenteric, right and left iliacs, right inguinal \par lymphadenopathy, small in size and no longer FDG avid.\par \par 6. No abnormal increased uptake is seen in the  lobulated uterus.\par \par 7. Overall there is marked improvement in the FDG avid disease.\par This was reviewed with Nidhi. She met with Dr. Massey on 4/20/2018, surgery is planned for 5/7/2018. She will also joselyn to meet with ENT re FDG avid thyroid nodule. Will assist in making he an appointment for her. \par Persistent neuropathy on gabapentin. \par \par 6/29/18 ; Patient came for follow up visit , evaluation for chemotherapy cycle 8 of carbo / Taxol , patient tolerating medication well , except neuropathy  recommend to have gabapentin  300 mg po daily.\par \par 7/20/2018: Nidhi present for follow up today, she has completed total of 8 cycles of Carbo/Taxol. She reports significant neuropathy in her hands and feet. We will discontinue chemotherapy today and plan to follow with close observation. She has thyroidectomy planned with Dr. Herrera on 8/20/2018, obtaining clearance for PMD. She is also planning to fly to [place] at the end of September. She reports no SOB, no GI or  symptoms. \par \par 9/12/2018: Nidhi offers no significant complaints today. She states neuropathy in her hands has almost completely resolved and neuropathy in her feet is significantly better. She had undergone complete thyroidectomy by Dr. Herrera on 8/20/2018, pathology revealed papillary thyroid cancer, measuring 2 cm, pT1b, other additional foci of papillary carcinoma were also noted, no LVI, surgical margins were clear, LN 0/2 had no carcinoma, stage [id number]. She is following up with Dr. Herrera tomorrow. She has still not gone for her vacation in [place]. \par \par 10/10/2018: Restaging PET CT on 9/26/2018 reveals Since 4/16/2018,  1. Post thyroidectomy with diffuse increased FDG uptake at the thyroid  bed likely representing post-surgical changes.  2. Subtle increased uptake is seen in the lamina , right side at the  level of T8 with a max SUV 4.6. This is not sufficient for metastatic  disease. Bone scan or MRI examination with contrast will be helpful for  further evaluation.  3. No other areas of abnormal increased uptake is seen. Images were personally reviewed by myself and discussed with Nidhi. \par She also had an Echo on 9/24/2018 revealing EF of 63%. \par Nidhi reports ongoing fatigue, she is unable to lose weight. She is taking Synthroid and following with Dr. Acevedo. \par She reports stiff fingers at night only, no painful neuropathy. \par She denies any other symptoms today. \par \par 12/11/2018: Nidhi feels well, neuropathy in hands and feet is much improved. S he is now complaining of r-sided facial pain associated with some swelling, pain comes and goes. She has already seen Dr. Herrera, who ordered CT of sinuses and prescribed pain relief meds. She is also due for restaging PET CT. Nidhi feels well otherwise. \par \par 1/9/2019: Restaging PET CT was performed on 12/18/2018 it revealed COMPARISON : 9/24/2018.  New left upper quadrant peritoneal nodule measuring 8 mm with an SUV max  of 7.3. This is suggestive of biologic tumor activity.  No other FDG avid lesions seen throughout the scan. Images were personally reviewed by myself and discussed with Nidhi, originally over the phone and again today. I have originally suggested to try AI in the interim, Nidhi however reported diarrhea at the time of the scan and declined intervention for now. We will plan for restaging PET CT to be performed in 6-8 weeks, this will be ordered today. She will follow up with Dr. Massey at [facility] shortly and bring the disk for his review. I would also like her to meet with genetics, this will be arranged at [facility] with Dr. Massey's help. Nidhi reports no new symptoms today, her neuropathy is manageable and  improving. She still reports unilateral headache that was work up in the past. Neurology eval was again suggested to her. She is following closely with Endocrinology re thyroid management. She will have follow up with Dr. Herrera shortly as well. \par \par 2/20/2019: Nidhi offers no new complaints. PET CT from 2/12/2019 revealed \par Since PET/CT of December 19, 2018, no new sites of pathologic FDG uptake\par Slight increase in size of left upper quadrant peritoneal nodule (1.1 cm, \par previously 0.8 cm) with stable FDG uptake, 7.7 SUV suspicious for \par biologic tumor activity.\par No other sites of pathologic FDG uptake \par Images were personally reviewed by myself and discussed with Nidhi, case was also discussed with Dr. Massey at [facility]. Nidhi will have follow up with his shortly. She is following with endocrinology. reports improving neuropathy. No GI or  symptoms at this time. No weight loss. \par \par 3/20/2019: Nidhi had a peritoneal nodule biopsy done at [facility], I have received a call from Dr. 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 We have discussed that surgery though could be attempted will not be the best therapeutic approach, recommencement of systemic therapy was discussed. I have not received the results from [facility] yet. I have briefly discussed this with Nidhi today. Nidhi reports that she has acutely developed right lower extremity weakness 5-6 days ago, she did not inform any of her doctors about this. She also reports that her R upper extremity though not weak "does not feel normal either. She reports no back pain, there is no point tenderness, RLE is objectively weak on exam. I recommend ER evaluation to r/o CVA vs brain met, vs L-spine related issue. Recommend admission for work up. Nidhi is agreeable to get admitted. \par \par 5/8/2019: Nidhi present for follow up, she was diagnosed with multiple metastasis to the brain, MRI was done on 3/21/2019 and revealed \par Multiple supratentorial heterogeneously enhancing lesions consistent with \par metastatic disease. The appearance on the T2-weighted images is suggestive \par of adenocarcinoma. There is mass effect upon the left lateral ventricle and \par corpus callosum. \par She received whole brain irradiation by Dr. Dahl, completed it in the beginning of 4/2019, she is currently on steroid taper managed by Dr. Dahl, she is taking 4mg of Decadron daily. She will be stopping the Keppra as there have been no documented h/o seizures. She still reports impairment of the L visual field, she has an appointment coming up with Opthalmology. She reports no deficits walking after she has completed inpatient acute rehabilitation. \par Restaging PET CT on 5/1/2019 reveals COMPARISON : 2/12/2019 \par Multiple new FDG avid omental nodules largest measuring up to 10 mm, \par positive on nonattenuation corrected images. Findings are suggestive of \par biologic tumor activity. \par Again seen is a left upper quadrant peritoneal nodule, SUV max 6.8, \par previously 7.7 (12% decrease). \par Images were personally reviewed by myself and discussed with patient. \par She now reports mild abdominal bloating, no other symptoms. She reports her neuropathy has significantly improved. \par She has first evidence of recurrent disease documented 5.5 months after last carbo/taxol dose, the volume of disease was very small (1 8mm nodule), she was then observed for another 6 months and now she wishes to restart the chemotherapy. \par I have discussed with her that rechallenging with carbo/taxol may still prove to be effective as long as she gets restaged after 2 cycles. Given recent whole brain radiation and neuropathy I will offer her carbo/taxol weekly with does reduction on the taxol for neuropathy, We will plan to run carbo slowly to avoid allergic reaction. \par She is agreeable to start ASAP. \par \par 6/5/2019: Nidhi has completed 1 cycle of weekly Carbo/taxol, ran at slow rate to avoid Carbo reaction and has tolerated chemotherapy without difficulty, she does not complain of increase in neuropathy. She has ongoing vision changes in her L eye, she had no residual LE weakness. She is off Decadron and Keppra. She was advised to see ophthalmology. \par \par 7/3/2019: Nidhi is doing well.  Reports no problems with carbo/taxol.  Still complains of vision changes in L eye but is seeing ophthalmologist on 7/16.  Remains active around the house and cooks regularly.  No fevers, weight loss, anorexia.  ROS is otherwise only significant for occasional loose stools, no diarrhea.\par \par 7/17/2019: Nidhi is doing well, denies worsening neuropathy. She c/o feeling fatigued and tired. Her last chemo was 1 week ago(7/11/19) with carbo/taxol and is due again tomorrow. She saw ophthalmology and will need cataract surgery of the L eye. No c/o chest pain/SOB. No weight loss.\par CT C/A/P was done on 7/9/2019, images were personally reviewed by myself and discussed with several radiology attendings, they revealed \par CHEST:\par Filling defect within a segmental branch of the lower lobe pulmonary artery \par suspicious for pulmonary embolus. \par Stable left upper lobe and right middle lobe 3 mm nodules. \par CT abdomen and pelvis performed on the same day, see separate report. \par ADDENDUM: \par Please note that the morphology of the small thrombus within the left lower \par pulmonary artery is not indicative of an acute thrombus but rather a chronic \par appearance. \par This was discussed with Radiology, it was not deemed that thrombus was acute and may not have even been present at all, finding was deemed to be equivocal, based on radiology assessment anticoagulation was not indicated for the filling defect noted. Initially CTA was recommended, the recommendation was withdrawn upon further review. \par ABDOMEN/PELVIS:\par New 1.3 cm segment IV hepatic hypodensity seen on series 4 image 205. \par Lesion not seen on prior PET/CT from 5/1/2019 or abdomen and pelvis CT from \par \par 2/2/2018 and is consistent with a new metastasis. \par Two other hepatic lesions described above, decreased in size since 2/2/2018, \par consistent with treated hepatic metastases. \par Anterior perisplenic omental nodule measures 0.7 cm on series 2 image 53, \par previously measuring 1.2 cm on PET/CT dated 5/1/2019. \par All the other previously seen omental nodules have resolved since 5/1/2019. \par ADDENDUM:\par Case was discussed with Dr. Cid in detail. It is also possible that the \par new 1.3 cm lesion in the liver since February 2, 2018 represents a treated \par metastasis similar to the other liver lesions. \par PET/CT would be necessary to assess disease activity. \par I have discussed case with Radiology, and it was determined that there was no clear cut evidence of progression. PET CT was ordered today.\par This was discussed with Nidhi, will proceed with Carbo/Taxol for now. \par \par 8/1/2019: Nidhi reports no major side effects from weekly Carbo/Taxol, she reports no neuropathy. She has completed 10 cycles altogether. \par PET CT on 7/24/2019 revealed \par 1. Since May 1, 2019, no definite new site of pathologic FDG uptake to \par suggest new biologic tumor activity; specifically, no pathologic FDG uptake \par within previously noted 1.3 cm lesion within hepatic segment 4. \par 2. Increased FDG uptake within several subcentimeter bilateral cervical \par lymph nodes, which is nonspecific and may be postinflammatory; max SUV 9.2 \par is noted within a 0.7 cm right level 2 cervical node. Attention on follow-up \par is suggested. \par 3. Few peritoneal nodules have overall decreased in size. \par 4. No additional site of pathologic FDG uptake. \par Images were personally reviewed by myself and discussed with Nidhi. \par She wishes to continue with chemotherapy. Will plan for 3 additional cycles. Will consider adding Avastin, but with h/o inconclusive/possible chronic PE ppx anticoagulation maybe necessary. \par \par 8/28/2019: Nidhi reports feeling well, she denies worsening neuropathy, worsening neurological symptoms, SOB, abdominal pain, bloating. She reports she is planned to undergo  eye surgery towards the end of September. We will plan to hold chemotherapy briefly after this cycle to facilitate adequate counts and minimize complications. We again have briefly discussed considering Avastin based therapy, will hold off on this prior to surgery. 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 May proceed without formal PAST, will check CBC prior to procedure, PT and PTT today. \par \par 9/25/19:Nidhi reports feeling well, she denies any changes since last visit, No SOB, abdominal pain, bloating. She reports she is planned to undergo  eye surgery in 9/30/19  CBC reviewed with normal Hemogram . we will hold chem this week , she will resume after Cataract sx . 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 May proceed without formal PAST, will check CBC prior to procedure, PT and PTT today. \par \par 10/4/2019: Nidhi underwent successful eye surgery on 9/30/2019, she reports she can see much better now. Last dose of chemotherapy was administered on 9/20, she then was on hold for surgery. She has completed 5 additional cycles of Carbo/taxol. She is due for restaging. Her disease is only accurately assessed on PET CT, prior attempts to obtain CT scans resulted in additional imaging with PET, as findings were inconclusive. PET CT will be ordered to restage. \par In addition she is due for MRI of the brain. She has had a small amount of weight loss, mild neuropathy is persistent. She offers no additional complaints. \par She will have port placed on Monday, will hold chemotherapy until restaging scans complete. \par \par 10/23/2019: Nidhi feels well, and denies any new symptoms. Her eye surgery was successful, vision is much improved now. She had restaging scans. \par PET CT on 10/18/2019 revealed COMPARISON : 7/24/2019. \par No pathologic uptake to suggest biologic tumor activity. \par MRI of the brain on 10/15/2019 revealed \par Comparison with June 8 and March 21, 2019: (Generally lesions improved \par markedly since March but slightly increased in size since June) \par 1. Lesions previously demonstrated on the study of June 8, 2019 has \par remained stable or minimally increased in size \par 2. Specifically, right frontal opercular lesion measures about 1 cm and \par left posterior inferior temporal lobe lesion measures about 1 cm. These have \par increased since the previous study. \par 3. Small punctiform lesions within the right frontal white matter and left \par frontal sulcus or more apparent than on the study of June 8 \par 4. These lesions are all much smaller than the earlier MRI of March 21, \par 2019. \par 5. No new lesions. \par Images were personally reviewed by myself and discussed with patient. She remains asymptomatic and off the steroids. \par I have also discussed the case with Dr. Dahl. I would like Nidhi to discuss the options with [facility], she maybe a candidate for brain SRS. \par She has been off Carbo/Taxol for a few weeks, she remains Platinum sensitive. I have discussed the option of switching her over to Avastin maintenance, will need to prophylax with low dose Eliquis as well, given questionable finding of small PE in the past. \par Side effects of Avastin were discussed at length, including HNT, proteinuria, small risk of DVT/PE, GI perforations etc. \par She is agreeable to start after [facility] consultation. \par \par 11/5/2019: Nidhi feels OK, she reports acute onset head discomfort that lasts minutes and subsides, the episodes are becoming more frequent 1-2 times a day. She has seen Dr. Dahl, who wishes to hold off on offering SRS to the brain. Plan was to start on Avastin today. Nidhi reports she currently has a lapse in her insurance coverage till 12/2019, I will not be able to start he on ppx dose Eliquis for now, she instead will take baby ASA every other day, to modify risk of DVT/PE. Will plan to switch over to Eliquis at 2.5mg BID once insurance is active. She also contemplated going to [place] with her daughter, I am willing to hold Avastin until she comes back, but Nidhi wishes to start right away, I explained to her that flight may result in complications if that is her wish. She will cancel the trip and start with Avastin today. Side effects have been discussed. \par \par 11/20/2019: Nidhi came for a follow up visit, she reports feeling well, she reports less fatigue. She is s/p first dose of Avastin on 11/5/2019, she tolerated Avastin without difficulty, she is due for the 2nd dose today. \par We communicated CBC result with HGB of 12.3, normal platelet and WBCs. She lost like 5 pounds secondary to gum problems, she will follow up with her dentist this week. She is currently on baby ASA every other day. Continue with single agent Avastin.  \par \par 12/17/2019: Nidhi came for a follow up visit, she reports feeling well, she experienced pruritic rash on last Saturday 12/14/19, which improved over 2 days with Benadryl alone.  Today her skin rash has completely resolved. We will add Solu-Medrol 100 mg IV prior to Avastin. Nidhi reports less fatigue. She is due for the 3rd dose of Avastin today. \par We communicated CBC result with HGB of 12.3, normal platelet and WBCs. \par \par 1/7/2019: Nidhi is doing well, reports good appetite, she has lost 1lb. Reports no GI or pulmonary symptoms. She reports her gums are bleeding occasionally. No new neurological symptoms. She reports no rash after last cycle of Avastin. \par \par 1/28/2020: Nidhi is here for follow up visit, she has no difficulty tolerating Avastin, she reports L sided chronic eye discomfort, neuropathic in nature. Neurontin has been helpful in the past, will reorder this. \par PET CT on 1/22/2020 revealed COMPARISON : 10/18/2019. \par Anterior perisplenic nodule measuring 8 mm is FDG avid, SUV max 8.9, \par consistent with biologic tumor activity. \par MRI of the brain on 1/15/2020 revealed \par In comparison to the previous brain MRI dated 10/15/2019: \par 1. Redemonstrated scattered enhancing lesions consistent with metastatic \par disease, with overall good treatment response since the prior exam. \par 2. No significant interval change in the ovoid lesion in the superior left \par frontal lobe measuring 12 mm. \par 3. Decreased size of the right opercular lesion measuring 6 mm, previously \par 11 mm. Decreased size of the left inferior occipital/tentorial lesion \par measuring 6 mm, previously 11 mm. The previously seen right frontal white \par matter punctate lesion is no longer visualized. \par 4. No new lesions are demonstrated. \par All images were personally reviewed by myself and discussed with Nidhi. I explained to her I am concerned about the perisplenic lesion concerning for disease progression, but it is isolated and very small, asymptomatic. There is definitely a positive response to Avastin in the brain and given that, we will continue with Avastin with short term follow up imaging. Nidhi knows to inform me with any new symptoms immediately. \par \par 2/18/2020: NIDHI BANKS is a 64 year old female here today for follow up visit. She completed 5 cycles of Avastin; tolerating well at this time. She denies fever, chills, change in mental status, or change in weight. She complains of left painful facial swelling. In addition, she complains of swelling of fingers, joint pain and numbness in her toes. She continues on Gabapentin 100mg TID with no symptom relief and wishes to try to go up on the dose. She complains of mild gingival bleeding in the morning. Last PET showed new isolated perisplenic lesion suspicious for disease progression, however MRI of the brain appeared better on Avastin, so the plan was to continue with Avastin with short term follow up. \par \par 3/10/2020: NIDHI BANKS is a 64 year old female here today for follow up visit. She denies new neurological symptoms, skin rash, fever, or change in weight. She continues of Gabapentin which was increased to 200 mg TID; neuropathy improving. She continues to complain of mild gingival bleeding. Left facial swelling and numbness resolved. She has completed cycle 6 of Avastin without complication. \par \par 3/31/58112: Nidhi presented today for routine f/u, she feels fine. Denies any chest pain, sob, fever, chills and cough. Reports that she continues to have gum bleeding. Neuropathy is better with gabapentin, she decreased the dose to 4 pills a day. She tolerated 7th cycle well and is due to get 8th cycle today . She will get MRI brain on 4/2/20.\par She reports minor gum bleeding from Avastin and dental infection on the left, will prescribe Amoxicillin. \par \par 4/21/2020: NIDHI BANKS is a 64 year old female here today for follow up visit for endometrial cancer. S/p cycle 8 of Avastin. Patient denies fever, chills, SOB, cough and pain. She states that her gums have minimal bleeding and dental infection improved after amoxicillin. She complains of mild fatigue. Restaging MRI of the brain was completed on 4/2/2020 which showed stable bilateral parenchymal hemorrhagic lesions compatible with known metastases; no new enhancing lesions are identified. \par Images were personally reviewed by MD Jose Roberto and discussed with patient. \par \par 5/12/2020: MRI brain on 4/2/2020 reveals 1. Stable bilateral parenchymal hemorrhagic lesions compatible with known \par metastases. \par 2. No new enhancing lesions are identified. \par PET CT on 4/30/2020 revealed Right upper quadrant peritoneal implant adjacent to the inferior tip of the \par liver 4.7 (image [id number]) \par Right lower quadrant 0.5 cm peritoneal nodule 7.2 (image 111) \par Right lower quadrant subcentimeter peritoneal nodule 2.6 (image [id number]) \par Anterior perisplenic peritoneal nodule 12.3-48% increase from 8.9 \par (remeasured) previously \par Stable non-FDG avid (attenuation corrected and nonattenuation corrected \par images) 4 mm right middle lobe pulmonary nodule-image 181) \par  No other definite sites of abnormal FDG uptake \par IMPRESSION: \par Compared to 1/22/2020: \par new FDG avid peritoneal implants in right lower quadrant of the abdomen \par suspicious for biologic tumor activity \par 48% interval increase in SUV in anterior perisplenic peritoneal nodule (SUV \par 12.3 vs 8.9 remeasured) \par In retrospect stable FDG avid peritoneal implant in the right upper quadrant \par of the abdomen (SUV 4.7 vs 3.8 remeasured-image 134) \par No other definite sites of abnormal FDG uptake \par Images were personally reviewed by MD Jose Roberto and discussed with patient.\par Given minimal degree of progression in the abdomen and persistent control intracranially, we have discussed continuing Avastin for now and short term restaging. \par Nidhi offers no complaints today. \par \par 6/2/2020: The patient is here for follow up. She is due for Avastin today . She has  no major complaints . She reported gum bleeding and swelling, which is much better today . She also reported hand swelling bilaterally. She denies fever, chills, no respiratory, cardiac, GI/ symptoms. She denies headaches or neurological deficits. \par \par 6/23/2020: Nidhi is 65 years old female came for a follow up visit for papillary-serous endometrial carcinoma with brain metastasis.  She reported feeling well. \par She denies dizziness, change of mental status, fever, chills, SOB. 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  She reported feeling well. \par She denies dizziness, change of mental status, fever, chills, SOB. \par We reviewed MRI of the brain from 7/5/20 that revealed \par The study is considered stable since April 2, 2020 with the following \par findings: \par 1. 3 lesions are being followed, in the left frontal, right frontal and \par left occipital lobes. \par 2. The lesions are essentially stable, nonenhancing and probably mildly \par hemorrhagic \par 3. A punctiform lesion in the right frontal lobe (series 11 image 7) was \par probably present on the prior study. This is stable and probably not new. \par 4. Any differences in size are probably due to differences in the way the \par patient was scanned. \par Images were personally reviewed by MD Jose Roberto and discussed with patient. \par We will continue the current treatment with  Avastin one every 3 weeks.  Patient reports improvement in neuropathy with gabapentin 200 mg every 12 hrs.\par Patient is due for repeat PET Scan at the end of July,2020.\par \par 8/4/2020: NIDHI BANKS is a 65 year old female here today for follow up visit for endometrial cancer. S/p cycle 11 of Avastin. Patient denies fever, chills, SOB, cough and pain.\par PET CT from 7/29/2020 revealed \par Compared to 4/30/2020: \par 1 new FDG avid near midline peritoneal implant (SUV 4.3) consistent with biologic tumor activity \par 1 new left cervical level V FDG avid lymph node (SUV 8.7) suspicious for biologic tumor activity in light of the history of papillary thyroid carcinoma \par Interval increases in SUV: 115% increase in SUV in right lower quadrant peritoneal implant \par 97% increase in SUV in right upper quadrant peritoneal implant -66% increase in SUV in level 3 cervical lymph node \par Interval 36% decrease in SUV in anterior perisplenic nodule (7.9 vs 12.3) \par Stable FDG avid right upper quadrant implants and right level 5 cervical lymph node \par No other sites of abnormal FDG uptake \par We discussed other different treatment option.\par Images were personally reviewed by MD Jose Roberto and discussed with patient.\par We have discussed that there is undeniable progression systemically but still benefit in the CNS. I will review her chart and determine if restarting weekly carbo/taxol is of value, vs adding an additional agent vs starting on Keytruda/Lenvima is at this point indicated. \par Patient will continue with one additional dose of Avastin today.\par \par 8/25/2020: Nidhi is feeling well, no complaints today. At this point given minimal but sustained progression we will plan to switch over to Kaytruda combination with Lenvima. \par We have gone over side effects of immunotherapy at length, including but not limited to autoimmune mediated pneumonitis, enteritis, dermatitis, thyroiditis, damage to the kidneys, liver, pancreas, pituitary gland etc. patient and family voice understanding and are in agreement to proceed with treatment.\par Side effects of Lenvima including, but not limited to, cytopenias, that may require blood product transfusions and lead to increased risk of infection, requiring hospitalization, allergic reactions, that can rarely be life-threatening, skin reactions, nausea/vomiting, mucositis, diarrhea/constipation, QT prolongation, GI perforation, HTN etc were discussed at length, patient and family voice undestaging, all questions were answered to patient's satisfaction.\par \par 9/15/20 Nidhi presented today for f/u and to get treatment . On last visit she was started on keytruda and lenvima . She was told to start with 10 mg a day , but she started taking 20 mg a day . Developed diffuse muscle aches and pains , reports has pain in her mouth as well . Also developed changes in her voice , its becoming more and more hoarse now . Also reported pain in her left shoulder , which is improved now . She has been coming for wkly CBC , since she started treatment , counts have been stable . Had a detailed discussion with Nidhi, her symptoms are likely from treatment with lenvima , she should have escalated the dose from 10 mg to 20 mg , if it was well tolerated , since she started taking 20 mg daily ,  that’s why she has severe adverse effects. She is recommended to hold off on treatment for 4 days and then restart it at 10 mg daily . She demonstrated understanding . Will proceed with keytruda today . \par

## 2020-09-18 NOTE — ASSESSMENT
[FreeTextEntry1] : Papillary- serous endometrial cancer, stage IV\par --PET CT on 11/29/2017 revealed no clear GYN origin, extensive KEVIN, uptake in the right lung and pleura, thyroid nodule\par --Malignant pleural effusion, resolved\par --L side PleurX catheter was removed on 2/8/2018\par --Completed cycle 8  of carbo/taxol on 6/29/2018.  2 cycles were administered  postoperatively.\par --Restaging PET CT on 9/26/2018 (3 months post completion of chemo 6/29/2018)was essentially negative for recurrent disease\par --Restaging PET CT on 12/18/2018 reveals   New left upper quadrant peritoneal nodule measuring 8 mm with an SUV max  of 7.3. This is suggestive of biologic tumor activity.  No other FDG avid lesions seen throughout the scan.\par --Restaging PET CT on 2/12/2019 revealed slight increase in size of left upper quadrant peritoneal nodule (1.1 cm, \par previously 0.8 cm) with stable FDG uptake, 7.7 SUV suspicious for biologic tumor activity.\par --Case was discussed with Dr. Massey at [place], biopsy of peritoneal nodule is positive for recurrent endometrial cancer.\par --MRI brain on 4/23/2019 revealed multiple brain mets\par --S/p whole brain radiation completed 4/2019, required acute rehab\par --PET CT on 5/1/2019 subsequently revealed further disease progression \par --Restarted weekly carbo/taxol 3 on 1 off on 5/9/2019, continue with weekly Carbo/Taxol for now.\par --Off Decadron and Keppra since 6/5/2019 \par --Restaging brain MRI on 6/8/2019 showed almost complete resolution of most supratentorial lesions and no new lesions.\par --Restaging CT C/A/P on 7/9/2019 did not reveal definite progression, there was a questionable filling defect suspicious for possible chronic PE/artifact, this was discussed with Radiology at length anticoagulation was not deemed to be necessary\par --PET CT on 7/24/2019 revealed positive response to therapy \par --Carbo/Taxol stopped, last dose 9/13/2019, on hold since, she remains platinum sensitive \par --Restaging PET CT on 10/18/2019 is NAD\par --Restaging MRI of the brain on 10/15/2019 is suggestive for possible disease progression, no new lesions, she remains asymptomatic and off steroids, will follow closely \par --She was referred to [facility] for brain SRS consideration, close observation for now \par --Started on Avastin maintenance 11/5/2019, will consider adding Eliquis ppx for DVT/ PE, for now she will take baby ASA every other day  \par --We sent for prednisone 10 mg po if needed for additional skin issues\par --Patient well aware to contact us if any side effect developed\par --MRI of the brain 1/15/2020 reveals overall good response to Avastin\par --Restaging PET CT on 1/22/2020 reveals single small perisplenic area of activity, suspicious for disease progression, asymptomatic \par --Proceed with cycle 10 of Avastin on 5/12/2020 after weighing risks/benefits with plan for short term follow up imaging \par -- Reviewed  MRI of brain on 7/5/20  showed stable metastases; no new enhancing lesions are identified.\par -- Restaging PET CT on 4/30/2020 reveals minimal progression in the abdomen and pelvis\par -- Other options discussed is switching to Keytruda/Lenvima combination, this is a consideration in the future \par -- We will c/w with Avastin for now since it is working in the brain and she has a minimal progression in abdomen. \par --PET CT from 7/29/2020 reveals additional evidence of progression, minimal and not symptomatic\par --Last dose of Avastin on 8/4/2020\par -- Was started on Lenvima/Keytruda 8/25/2020, she was told that treatment will be t Lenvima at 10mg daily with plan to dose escalate as tolerated, weekly bloodwork.\par --Took 20 mg daily , recommended to hold off on treatment for 4 days , and then restart at 10 mg daily after 4 days if feeling fine . If still has symptoms , she is recommended to call us .\par --Will proceed with keytruda today .\par --CBC reviewed ,mild  thrombocytopenia noted , will continue to monitor .\par --TSH and CMP today . \par \par Papillary thyroid cancer [id number], 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 Herrera \par --Follow up with Dr. Acevedo of endocrinology; he is addressing vitamin D, thyroid and diabetes\par \par Cataract of eye\par -- S/p successful surgery on 9/30/19 . \par \par Follow up in 3 weeks or earlier if needed . \par \par \par

## 2020-09-18 NOTE — PHYSICAL EXAM
[Restricted in physically strenuous activity but ambulatory and able to carry out work of a light or sedentary nature] : Status 1- Restricted in physically strenuous activity but ambulatory and able to carry out work of a light or sedentary nature, e.g., light house work, office work [Normal] : full range of motion and no deformities appreciated [de-identified] : B/L cataracts [de-identified] : CTA B/L [de-identified] : No mucosal ulcers noted

## 2020-09-18 NOTE — REVIEW OF SYSTEMS
[Fatigue] : fatigue [Easy Bleeding] : a tendency for easy bleeding [Negative] : Neurological [Hoarseness] : hoarseness [Mucosal Pain] : mucosal pain [Recent Change In Weight] : ~T no recent weight change [Dysphagia] : no dysphagia [Nosebleeds] : no nosebleeds [Chest Pain] : no chest pain [Palpitations] : no palpitations [Lower Ext Edema] : no lower extremity edema [Shortness Of Breath] : no shortness of breath [Wheezing] : no wheezing [Cough] : no cough [SOB on Exertion] : no shortness of breath during exertion [FreeTextEntry2] : voice changes  [FreeTextEntry4] : new mucosal pain  [FreeTextEntry9] : normal strength in upper and lower extremities  [de-identified] : no gait impairment- numbness and swelling of bilateral hands  [de-identified] : mild gingival bleeding

## 2020-10-17 NOTE — END OF VISIT
[] : Fellow [FreeTextEntry3] : I was present with the PA during the key portions of the history and exam. I agree with the findings and plan as documented in the PA's note, unless noted below.\par

## 2020-10-17 NOTE — PHYSICAL EXAM
[Restricted in physically strenuous activity but ambulatory and able to carry out work of a light or sedentary nature] : Status 1- Restricted in physically strenuous activity but ambulatory and able to carry out work of a light or sedentary nature, e.g., light house work, office work [Normal] : no JVD, no calf tenderness, venous stasis changes, varices [de-identified] : B/L cataracts [de-identified] : pale, chronically ill [de-identified] : CTA B/L [de-identified] : No mucosal ulcers noted

## 2020-10-17 NOTE — ASSESSMENT
[FreeTextEntry1] : Papillary- serous endometrial cancer, stage IV\par --PET CT on 11/29/2017 revealed no clear GYN origin, extensive KEVIN, uptake in the right lung and pleura, thyroid nodule\par --Malignant pleural effusion, resolved\par --L side PleurX catheter was removed on 2/8/2018\par --Completed cycle 8  of carbo/taxol on 6/29/2018.  2 cycles were administered  postoperatively.\par --Restaging PET CT on 9/26/2018 (3 months post completion of chemo 6/29/2018)was essentially negative for recurrent disease\par --Restaging PET CT on 12/18/2018 reveals   New left upper quadrant peritoneal nodule measuring 8 mm with an SUV max  of 7.3. This is suggestive of biologic tumor activity.  No other FDG avid lesions seen throughout the scan.\par --Restaging PET CT on 2/12/2019 revealed slight increase in size of left upper quadrant peritoneal nodule (1.1 cm, \par previously 0.8 cm) with stable FDG uptake, 7.7 SUV suspicious for biologic tumor activity.\par --Case was discussed with Dr. Massey at [place], biopsy of peritoneal nodule is positive for recurrent endometrial cancer.\par --MRI brain on 4/23/2019 revealed multiple brain mets\par --S/p whole brain radiation completed 4/2019, required acute rehab\par --PET CT on 5/1/2019 subsequently revealed further disease progression \par --Restarted weekly carbo/taxol 3 on 1 off on 5/9/2019, continue with weekly Carbo/Taxol for now.\par --Off Decadron and Keppra since 6/5/2019 \par --Restaging brain MRI on 6/8/2019 showed almost complete resolution of most supratentorial lesions and no new lesions.\par --Restaging CT C/A/P on 7/9/2019 did not reveal definite progression, there was a questionable filling defect suspicious for possible chronic PE/artifact, this was discussed with Radiology at length anticoagulation was not deemed to be necessary\par --PET CT on 7/24/2019 revealed positive response to therapy \par --Carbo/Taxol stopped, last dose 9/13/2019, on hold since, she remains platinum sensitive \par --Restaging PET CT on 10/18/2019 is NAD\par --Restaging MRI of the brain on 10/15/2019 is suggestive for possible disease progression, no new lesions, she remains asymptomatic and off steroids, will follow closely \par --She was referred to [facility] for brain SRS consideration, close observation for now \par --Started on Avastin maintenance 11/5/2019, will consider adding Eliquis ppx for DVT/ PE, for now she will take baby ASA every other day  \par --We sent for prednisone 10 mg po if needed for additional skin issues\par --Patient well aware to contact us if any side effect developed\par --MRI of the brain 1/15/2020 reveals overall good response to Avastin\par --Restaging PET CT on 1/22/2020 reveals single small perisplenic area of activity, suspicious for disease progression, asymptomatic \par --Proceed with cycle 10 of Avastin on 5/12/2020 after weighing risks/benefits with plan for short term follow up imaging \par -- Reviewed  MRI of brain on 7/5/20  showed stable metastases; no new enhancing lesions are identified.\par -- Restaging PET CT on 4/30/2020 reveals minimal progression in the abdomen and pelvis\par -- Other options discussed is switching to Keytruda/Lenvima combination, this is a consideration in the future \par -- We will c/w with Avastin for now since it is working in the brain and she has a minimal progression in abdomen. \par --PET CT from 7/29/2020 reveals additional evidence of progression, minimal and not symptomatic\par --Last dose of Avastin on 8/4/2020\par -- Was started on Lenvima/Keytruda 8/25/2020\par --Unable to tolerate 20 mg daily, recommended to hold off on treatment for 4 days , and then restart at 10 mg daily after 4 days if feeling fine.\par --10/6/20 --will hold lenvima for 2 weeks\par --re-evaluate disease status with PET scan\par --Will proceed with keytruda on 10/6/2020\par \par --CBC reviewed, WNL\par --TSH and CMP today . \par \par Papillary thyroid cancer [id number], 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 Herrera \par --Follow up with Dr. Acevedo of endocrinology; he is addressing vitamin D, thyroid and diabetes\par \par Cataract of eye\par -- S/p successful surgery on 9/30/19 \par \par Follow up in 3 weeks or earlier if needed.\par Case discussed and patient seen with Dr. Cid.\par \par \par \par

## 2020-10-17 NOTE — HISTORY OF PRESENT ILLNESS
[Disease: _____________________] : Disease: [unfilled] [AJCC Stage: ____] : AJCC Stage: [unfilled] [de-identified] : Serena is a rodrigue 65 yo female, who has started developing SOB approximately 2 months ago, she went to ER on 11/2/2017 and on CXR was noted to have a large right sided pleural effusion. She underwent a thoracentesis, 1.6L of fluid was drained. 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 IHC was pos for CK7, PAX8, CK5/6 and Ca125, negative for TTF-1/Napsin, calretinin, CK20, mammaglobin, p63, villin, HAM56, GCDFP15, TAY-EP4. \par \par Her visit on 12/1/2017 Serena had an excisional biopsy of cervical node on 12/13/2017 revealing met adenocarcinoma, primary source still was not clear. On 12/14/2017 Serena was admitted with SOB, Pleurx catheter was placed on 12/14/2017. 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 Serena received 1st dose of carbo (auc of 5)/taxol(175mg/m2) on 12/15/2017 without complications, but the next day sustained a fall 2/2 vasovagal episode and developed asymptomatic SAH, that resolved on imaging by 12/21/2017.  [de-identified] : KRas WT, EGFR WT, Alk WT, ROS1 WT, PDL1-1%\par Normal MMR protein expression [de-identified] : 11/24/2017: She reports some SOB, especially ARZATE today. No fevers, no weight loss (reports some weight gain), no GI,  or GYN symptoms, except for increasing abdominal girth, she reports no blood in the stool or urine and no vaginal bleeding. She reports no CP and no palpitations, she reports worsening nonproductive cough, no hemoptysis. She also reports difficulty lying on left side and lying down flat. \par She reports left on/off facial numbness several months in duration. She had a CT of her head performed in [facility] within 1 year.  Additionally there is a freely mobile left cervical node present on exam, as per patient this was in place for approximately 1 year. \par \par 12/1/2017: Nidhi is here for follow up today. She had a therapeutic thoracentesis in  on 11/27/2017, 2L of fluid were removed, with much improved respiratory status. PET CT and MRI of the brain were done on 11/29/2017, findings were discussed today. There remains no clear primary source of malignancy. We have discussed that the source of tumor may be Mullerian vs lung. regardless of source chemotherapy needs to be initiated ASAP. We have discussed Carbo/taxol regiment that will cover both Mullerian and lung origin. Side effects including myelosuppression, peripheral neuropathy, allergic reaction and others.\par \par 1/2/2017 Since last visit Nidhi had an excisional biopsy of cervical node on 12/13/2017 revealing met adenocarcinoma, primary source still was not clear. On 12/14/2017 Nidhi was admitted with SOB, Pleurx catheter was placed on 12/14/2017. 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 Nidhi received 1st dose of carbo (auc of 5)/taxol(175mg/m2) on 12/15/2017 without complications, but the next day sustained a fall 2/2 vasovagal episode and developed asymptomatic SAH, that resolved on imaging by 12/21/2017. Today Nidhi's SOB has significantly improved. She reports very mild neuropathy in fingertips only. She reports no headache and offers no other complaints. \par \par 1/26/2018: Complains of some neuropathy, otherwise tolerating chemo with no difficulty. \par \par 2/16/2018: restaging CT C/A/P reviewed, significant improvement noted. Will refer to GYN/ONC. Reports slightly worsening neuropathy, not -painful, taking Gabapentin, no other symptoms. For cycle 4 of carbo/taxol today.\par \par 3/9/2018: Nidhi met with Dr. Massey, she is planned for surgery on 5/7/2018, after completion of 6 cycles of carbo/taxol and restaging PET CT ordered for mid April and follow up with Dr. Massey on April 20. She reports worsening neuropathy, and occasional pain and changes in vision in her left eye, eye symptoms come and go and are not very bothersome. She reports no other symptoms. \par \par 3/30/2018; Nidhi is here for cycle 6 of carbo/taxol, she continues to have neuropathy in finger and toes, but offers no other complaints, chemo has been dose reduced. \par \par 4/27/2018: Results of restaging PET CT s/p 6 cycles of carbo/taxol revealed 1. No new areas of abnormal increased uptake is seen to indicate \par biologically active disease.\par \par 2. Persistent PET positive left thyroid mass with a max SUV 33.1, \par previously max SUV 34.8. Further evaluation with thyroid ultrasound and \par if needed fine-needle aspiration biopsy will be helpful.\par \par 3.   PET positive left cervical lymph nodes mainly level 2 on the left \par with a max SUV 5.47previously 18. Minimal increased uptake in the \par bilateral axillary lymph nodes most likely reactive(less than 2.5)\par \par 4. Weakly PET positive, max SUV 2.89 right pleura, previous max SUV 5.3 \par with non-FDG avid right pleural effusion. \par \par 5. Previously FDG avid right and left supraclavicular lymphadenopathy, \par left internal mammary, bilateral paratracheal, para-aortic, para \par vertebral, carinal, mesenteric, right and left iliacs, right inguinal \par lymphadenopathy, small in size and no longer FDG avid.\par \par 6. No abnormal increased uptake is seen in the  lobulated uterus.\par \par 7. Overall there is marked improvement in the FDG avid disease.\par This was reviewed with Nidhi. She met with Dr. Massey on 4/20/2018, surgery is planned for 5/7/2018. She will also joselyn to meet with ENT re FDG avid thyroid nodule. Will assist in making he an appointment for her. \par Persistent neuropathy on gabapentin. \par \par 6/29/18 ; Patient came for follow up visit , evaluation for chemotherapy cycle 8 of carbo / Taxol , patient tolerating medication well , except neuropathy  recommend to have gabapentin  300 mg po daily.\par \par 7/20/2018: Nidhi present for follow up today, she has completed total of 8 cycles of Carbo/Taxol. She reports significant neuropathy in her hands and feet. We will discontinue chemotherapy today and plan to follow with close observation. She has thyroidectomy planned with Dr. Herrera on 8/20/2018, obtaining clearance for PMD. She is also planning to fly to [place] at the end of September. She reports no SOB, no GI or  symptoms. \par \par 9/12/2018: Nidhi offers no significant complaints today. She states neuropathy in her hands has almost completely resolved and neuropathy in her feet is significantly better. She had undergone complete thyroidectomy by Dr. Herrera on 8/20/2018, pathology revealed papillary thyroid cancer, measuring 2 cm, pT1b, other additional foci of papillary carcinoma were also noted, no LVI, surgical margins were clear, LN 0/2 had no carcinoma, stage [id number]. She is following up with Dr. Herrera tomorrow. She has still not gone for her vacation in [place]. \par \par 10/10/2018: Restaging PET CT on 9/26/2018 reveals Since 4/16/2018,  1. Post thyroidectomy with diffuse increased FDG uptake at the thyroid  bed likely representing post-surgical changes.  2. Subtle increased uptake is seen in the lamina , right side at the  level of T8 with a max SUV 4.6. This is not sufficient for metastatic  disease. Bone scan or MRI examination with contrast will be helpful for  further evaluation.  3. No other areas of abnormal increased uptake is seen. Images were personally reviewed by myself and discussed with Nidhi. \par She also had an Echo on 9/24/2018 revealing EF of 63%. \par Nidhi reports ongoing fatigue, she is unable to lose weight. She is taking Synthroid and following with Dr. Acevedo. \par She reports stiff fingers at night only, no painful neuropathy. \par She denies any other symptoms today. \par \par 12/11/2018: Nidhi feels well, neuropathy in hands and feet is much improved. S he is now complaining of r-sided facial pain associated with some swelling, pain comes and goes. She has already seen Dr. Herrera, who ordered CT of sinuses and prescribed pain relief meds. She is also due for restaging PET CT. Nidhi feels well otherwise. \par \par 1/9/2019: Restaging PET CT was performed on 12/18/2018 it revealed COMPARISON : 9/24/2018.  New left upper quadrant peritoneal nodule measuring 8 mm with an SUV max  of 7.3. This is suggestive of biologic tumor activity.  No other FDG avid lesions seen throughout the scan. Images were personally reviewed by myself and discussed with Nidhi, originally over the phone and again today. I have originally suggested to try AI in the interim, Nidhi however reported diarrhea at the time of the scan and declined intervention for now. We will plan for restaging PET CT to be performed in 6-8 weeks, this will be ordered today. She will follow up with Dr. Massey at [facility] shortly and bring the disk for his review. I would also like her to meet with genetics, this will be arranged at [facility] with Dr. Massey's help. Nidhi reports no new symptoms today, her neuropathy is manageable and  improving. She still reports unilateral headache that was work up in the past. Neurology eval was again suggested to her. She is following closely with Endocrinology re thyroid management. She will have follow up with Dr. Herrera shortly as well. \par \par 2/20/2019: Nidhi offers no new complaints. PET CT from 2/12/2019 revealed \par Since PET/CT of December 19, 2018, no new sites of pathologic FDG uptake\par Slight increase in size of left upper quadrant peritoneal nodule (1.1 cm, \par previously 0.8 cm) with stable FDG uptake, 7.7 SUV suspicious for \par biologic tumor activity.\par No other sites of pathologic FDG uptake \par Images were personally reviewed by myself and discussed with Nidhi, case was also discussed with Dr. Massey at [facility]. Nidhi will have follow up with his shortly. She is following with endocrinology. reports improving neuropathy. No GI or  symptoms at this time. No weight loss. \par \par 3/20/2019: Nidhi had a peritoneal nodule biopsy done at [facility], I have received a call from Dr. 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 We have discussed that surgery though could be attempted will not be the best therapeutic approach, recommencement of systemic therapy was discussed. I have not received the results from [facility] yet. I have briefly discussed this with Nidhi today. Nidhi reports that she has acutely developed right lower extremity weakness 5-6 days ago, she did not inform any of her doctors about this. She also reports that her R upper extremity though not weak "does not feel normal either. She reports no back pain, there is no point tenderness, RLE is objectively weak on exam. I recommend ER evaluation to r/o CVA vs brain met, vs L-spine related issue. Recommend admission for work up. Nidhi is agreeable to get admitted. \par \par 5/8/2019: Nidhi present for follow up, she was diagnosed with multiple metastasis to the brain, MRI was done on 3/21/2019 and revealed \par Multiple supratentorial heterogeneously enhancing lesions consistent with \par metastatic disease. The appearance on the T2-weighted images is suggestive \par of adenocarcinoma. There is mass effect upon the left lateral ventricle and \par corpus callosum. \par She received whole brain irradiation by Dr. Dahl, completed it in the beginning of 4/2019, she is currently on steroid taper managed by Dr. Dahl, she is taking 4mg of Decadron daily. She will be stopping the Keppra as there have been no documented h/o seizures. She still reports impairment of the L visual field, she has an appointment coming up with Opthalmology. She reports no deficits walking after she has completed inpatient acute rehabilitation. \par Restaging PET CT on 5/1/2019 reveals COMPARISON : 2/12/2019 \par Multiple new FDG avid omental nodules largest measuring up to 10 mm, \par positive on nonattenuation corrected images. Findings are suggestive of \par biologic tumor activity. \par Again seen is a left upper quadrant peritoneal nodule, SUV max 6.8, \par previously 7.7 (12% decrease). \par Images were personally reviewed by myself and discussed with patient. \par She now reports mild abdominal bloating, no other symptoms. She reports her neuropathy has significantly improved. \par She has first evidence of recurrent disease documented 5.5 months after last carbo/taxol dose, the volume of disease was very small (1 8mm nodule), she was then observed for another 6 months and now she wishes to restart the chemotherapy. \par I have discussed with her that rechallenging with carbo/taxol may still prove to be effective as long as she gets restaged after 2 cycles. Given recent whole brain radiation and neuropathy I will offer her carbo/taxol weekly with does reduction on the taxol for neuropathy, We will plan to run carbo slowly to avoid allergic reaction. \par She is agreeable to start ASAP. \par \par 6/5/2019: Nidhi has completed 1 cycle of weekly Carbo/taxol, ran at slow rate to avoid Carbo reaction and has tolerated chemotherapy without difficulty, she does not complain of increase in neuropathy. She has ongoing vision changes in her L eye, she had no residual LE weakness. She is off Decadron and Keppra. She was advised to see ophthalmology. \par \par 7/3/2019: Nidhi is doing well.  Reports no problems with carbo/taxol.  Still complains of vision changes in L eye but is seeing ophthalmologist on 7/16.  Remains active around the house and cooks regularly.  No fevers, weight loss, anorexia.  ROS is otherwise only significant for occasional loose stools, no diarrhea.\par \par 7/17/2019: Nidhi is doing well, denies worsening neuropathy. She c/o feeling fatigued and tired. Her last chemo was 1 week ago(7/11/19) with carbo/taxol and is due again tomorrow. She saw ophthalmology and will need cataract surgery of the L eye. No c/o chest pain/SOB. No weight loss.\par CT C/A/P was done on 7/9/2019, images were personally reviewed by myself and discussed with several radiology attendings, they revealed \par CHEST:\par Filling defect within a segmental branch of the lower lobe pulmonary artery \par suspicious for pulmonary embolus. \par Stable left upper lobe and right middle lobe 3 mm nodules. \par CT abdomen and pelvis performed on the same day, see separate report. \par ADDENDUM: \par Please note that the morphology of the small thrombus within the left lower \par pulmonary artery is not indicative of an acute thrombus but rather a chronic \par appearance. \par This was discussed with Radiology, it was not deemed that thrombus was acute and may not have even been present at all, finding was deemed to be equivocal, based on radiology assessment anticoagulation was not indicated for the filling defect noted. Initially CTA was recommended, the recommendation was withdrawn upon further review. \par ABDOMEN/PELVIS:\par New 1.3 cm segment IV hepatic hypodensity seen on series 4 image 205. \par Lesion not seen on prior PET/CT from 5/1/2019 or abdomen and pelvis CT from \par \par 2/2/2018 and is consistent with a new metastasis. \par Two other hepatic lesions described above, decreased in size since 2/2/2018, \par consistent with treated hepatic metastases. \par Anterior perisplenic omental nodule measures 0.7 cm on series 2 image 53, \par previously measuring 1.2 cm on PET/CT dated 5/1/2019. \par All the other previously seen omental nodules have resolved since 5/1/2019. \par ADDENDUM:\par Case was discussed with Dr. Cid in detail. It is also possible that the \par new 1.3 cm lesion in the liver since February 2, 2018 represents a treated \par metastasis similar to the other liver lesions. \par PET/CT would be necessary to assess disease activity. \par I have discussed case with Radiology, and it was determined that there was no clear cut evidence of progression. PET CT was ordered today.\par This was discussed with Nidhi, will proceed with Carbo/Taxol for now. \par \par 8/1/2019: Nidhi reports no major side effects from weekly Carbo/Taxol, she reports no neuropathy. She has completed 10 cycles altogether. \par PET CT on 7/24/2019 revealed \par 1. Since May 1, 2019, no definite new site of pathologic FDG uptake to \par suggest new biologic tumor activity; specifically, no pathologic FDG uptake \par within previously noted 1.3 cm lesion within hepatic segment 4. \par 2. Increased FDG uptake within several subcentimeter bilateral cervical \par lymph nodes, which is nonspecific and may be postinflammatory; max SUV 9.2 \par is noted within a 0.7 cm right level 2 cervical node. Attention on follow-up \par is suggested. \par 3. Few peritoneal nodules have overall decreased in size. \par 4. No additional site of pathologic FDG uptake. \par Images were personally reviewed by myself and discussed with Nidhi. \par She wishes to continue with chemotherapy. Will plan for 3 additional cycles. Will consider adding Avastin, but with h/o inconclusive/possible chronic PE ppx anticoagulation maybe necessary. \par \par 8/28/2019: Nidhi reports feeling well, she denies worsening neuropathy, worsening neurological symptoms, SOB, abdominal pain, bloating. She reports she is planned to undergo  eye surgery towards the end of September. We will plan to hold chemotherapy briefly after this cycle to facilitate adequate counts and minimize complications. We again have briefly discussed considering Avastin based therapy, will hold off on this prior to surgery. 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 May proceed without formal PAST, will check CBC prior to procedure, PT and PTT today. \par \par 9/25/19:Nidhi reports feeling well, she denies any changes since last visit, No SOB, abdominal pain, bloating. She reports she is planned to undergo  eye surgery in 9/30/19  CBC reviewed with normal Hemogram . we will hold chem this week , she will resume after Cataract sx . 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 May proceed without formal PAST, will check CBC prior to procedure, PT and PTT today. \par \par 10/4/2019: Nidhi underwent successful eye surgery on 9/30/2019, she reports she can see much better now. Last dose of chemotherapy was administered on 9/20, she then was on hold for surgery. She has completed 5 additional cycles of Carbo/taxol. She is due for restaging. Her disease is only accurately assessed on PET CT, prior attempts to obtain CT scans resulted in additional imaging with PET, as findings were inconclusive. PET CT will be ordered to restage. \par In addition she is due for MRI of the brain. She has had a small amount of weight loss, mild neuropathy is persistent. She offers no additional complaints. \par She will have port placed on Monday, will hold chemotherapy until restaging scans complete. \par \par 10/23/2019: Nidhi feels well, and denies any new symptoms. Her eye surgery was successful, vision is much improved now. She had restaging scans. \par PET CT on 10/18/2019 revealed COMPARISON : 7/24/2019. \par No pathologic uptake to suggest biologic tumor activity. \par MRI of the brain on 10/15/2019 revealed \par Comparison with June 8 and March 21, 2019: (Generally lesions improved \par markedly since March but slightly increased in size since June) \par 1. Lesions previously demonstrated on the study of June 8, 2019 has \par remained stable or minimally increased in size \par 2. Specifically, right frontal opercular lesion measures about 1 cm and \par left posterior inferior temporal lobe lesion measures about 1 cm. These have \par increased since the previous study. \par 3. Small punctiform lesions within the right frontal white matter and left \par frontal sulcus or more apparent than on the study of June 8 \par 4. These lesions are all much smaller than the earlier MRI of March 21, \par 2019. \par 5. No new lesions. \par Images were personally reviewed by myself and discussed with patient. She remains asymptomatic and off the steroids. \par I have also discussed the case with Dr. Dahl. I would like Nidhi to discuss the options with [facility], she maybe a candidate for brain SRS. \par She has been off Carbo/Taxol for a few weeks, she remains Platinum sensitive. I have discussed the option of switching her over to Avastin maintenance, will need to prophylax with low dose Eliquis as well, given questionable finding of small PE in the past. \par Side effects of Avastin were discussed at length, including HNT, proteinuria, small risk of DVT/PE, GI perforations etc. \par She is agreeable to start after [facility] consultation. \par \par 11/5/2019: Nidhi feels OK, she reports acute onset head discomfort that lasts minutes and subsides, the episodes are becoming more frequent 1-2 times a day. She has seen Dr. Dahl, who wishes to hold off on offering SRS to the brain. Plan was to start on Avastin today. Nidhi reports she currently has a lapse in her insurance coverage till 12/2019, I will not be able to start he on ppx dose Eliquis for now, she instead will take baby ASA every other day, to modify risk of DVT/PE. Will plan to switch over to Eliquis at 2.5mg BID once insurance is active. She also contemplated going to [place] with her daughter, I am willing to hold Avastin until she comes back, but Nidhi wishes to start right away, I explained to her that flight may result in complications if that is her wish. She will cancel the trip and start with Avastin today. Side effects have been discussed. \par \par 11/20/2019: Nidhi came for a follow up visit, she reports feeling well, she reports less fatigue. She is s/p first dose of Avastin on 11/5/2019, she tolerated Avastin without difficulty, she is due for the 2nd dose today. \par We communicated CBC result with HGB of 12.3, normal platelet and WBCs. She lost like 5 pounds secondary to gum problems, she will follow up with her dentist this week. She is currently on baby ASA every other day. Continue with single agent Avastin.  \par \par 12/17/2019: Nidhi came for a follow up visit, she reports feeling well, she experienced pruritic rash on last Saturday 12/14/19, which improved over 2 days with Benadryl alone.  Today her skin rash has completely resolved. We will add Solu-Medrol 100 mg IV prior to Avastin. Nidhi reports less fatigue. She is due for the 3rd dose of Avastin today. \par We communicated CBC result with HGB of 12.3, normal platelet and WBCs. \par \par 1/7/2019: Nidhi is doing well, reports good appetite, she has lost 1lb. Reports no GI or pulmonary symptoms. She reports her gums are bleeding occasionally. No new neurological symptoms. She reports no rash after last cycle of Avastin. \par \par 1/28/2020: Nidhi is here for follow up visit, she has no difficulty tolerating Avastin, she reports L sided chronic eye discomfort, neuropathic in nature. Neurontin has been helpful in the past, will reorder this. \par PET CT on 1/22/2020 revealed COMPARISON : 10/18/2019. \par Anterior perisplenic nodule measuring 8 mm is FDG avid, SUV max 8.9, \par consistent with biologic tumor activity. \par MRI of the brain on 1/15/2020 revealed \par In comparison to the previous brain MRI dated 10/15/2019: \par 1. Redemonstrated scattered enhancing lesions consistent with metastatic \par disease, with overall good treatment response since the prior exam. \par 2. No significant interval change in the ovoid lesion in the superior left \par frontal lobe measuring 12 mm. \par 3. Decreased size of the right opercular lesion measuring 6 mm, previously \par 11 mm. Decreased size of the left inferior occipital/tentorial lesion \par measuring 6 mm, previously 11 mm. The previously seen right frontal white \par matter punctate lesion is no longer visualized. \par 4. No new lesions are demonstrated. \par All images were personally reviewed by myself and discussed with Nidhi. I explained to her I am concerned about the perisplenic lesion concerning for disease progression, but it is isolated and very small, asymptomatic. There is definitely a positive response to Avastin in the brain and given that, we will continue with Avastin with short term follow up imaging. Nidhi knows to inform me with any new symptoms immediately. \par \par 2/18/2020: NIDHI BANKS is a 64 year old female here today for follow up visit. She completed 5 cycles of Avastin; tolerating well at this time. She denies fever, chills, change in mental status, or change in weight. She complains of left painful facial swelling. In addition, she complains of swelling of fingers, joint pain and numbness in her toes. She continues on Gabapentin 100mg TID with no symptom relief and wishes to try to go up on the dose. She complains of mild gingival bleeding in the morning. Last PET showed new isolated perisplenic lesion suspicious for disease progression, however MRI of the brain appeared better on Avastin, so the plan was to continue with Avastin with short term follow up. \par \par 3/10/2020: NIDHI BANKS is a 64 year old female here today for follow up visit. She denies new neurological symptoms, skin rash, fever, or change in weight. She continues of Gabapentin which was increased to 200 mg TID; neuropathy improving. She continues to complain of mild gingival bleeding. Left facial swelling and numbness resolved. She has completed cycle 6 of Avastin without complication. \par \par 3/31/02525: Nidhi presented today for routine f/u, she feels fine. Denies any chest pain, sob, fever, chills and cough. Reports that she continues to have gum bleeding. Neuropathy is better with gabapentin, she decreased the dose to 4 pills a day. She tolerated 7th cycle well and is due to get 8th cycle today . She will get MRI brain on 4/2/20.\par She reports minor gum bleeding from Avastin and dental infection on the left, will prescribe Amoxicillin. \par \par 4/21/2020: INDHI BANKS is a 64 year old female here today for follow up visit for endometrial cancer. S/p cycle 8 of Avastin. Patient denies fever, chills, SOB, cough and pain. She states that her gums have minimal bleeding and dental infection improved after amoxicillin. She complains of mild fatigue. Restaging MRI of the brain was completed on 4/2/2020 which showed stable bilateral parenchymal hemorrhagic lesions compatible with known metastases; no new enhancing lesions are identified. \par Images were personally reviewed by MD Jose Roberto and discussed with patient. \par \par 5/12/2020: MRI brain on 4/2/2020 reveals 1. Stable bilateral parenchymal hemorrhagic lesions compatible with known \par metastases. \par 2. No new enhancing lesions are identified. \par PET CT on 4/30/2020 revealed Right upper quadrant peritoneal implant adjacent to the inferior tip of the \par liver 4.7 (image [id number]) \par Right lower quadrant 0.5 cm peritoneal nodule 7.2 (image 111) \par Right lower quadrant subcentimeter peritoneal nodule 2.6 (image [id number]) \par Anterior perisplenic peritoneal nodule 12.3-48% increase from 8.9 \par (remeasured) previously \par Stable non-FDG avid (attenuation corrected and nonattenuation corrected \par images) 4 mm right middle lobe pulmonary nodule-image 181) \par  No other definite sites of abnormal FDG uptake \par IMPRESSION: \par Compared to 1/22/2020: \par new FDG avid peritoneal implants in right lower quadrant of the abdomen \par suspicious for biologic tumor activity \par 48% interval increase in SUV in anterior perisplenic peritoneal nodule (SUV \par 12.3 vs 8.9 remeasured) \par In retrospect stable FDG avid peritoneal implant in the right upper quadrant \par of the abdomen (SUV 4.7 vs 3.8 remeasured-image 134) \par No other definite sites of abnormal FDG uptake \par Images were personally reviewed by MD Jose Roberto and discussed with patient.\par Given minimal degree of progression in the abdomen and persistent control intracranially, we have discussed continuing Avastin for now and short term restaging. \par Nidhi offers no complaints today. \par \par 6/2/2020: The patient is here for follow up. She is due for Avastin today . She has  no major complaints . She reported gum bleeding and swelling, which is much better today . She also reported hand swelling bilaterally. She denies fever, chills, no respiratory, cardiac, GI/ symptoms. She denies headaches or neurological deficits. \par \par 6/23/2020: Nidhi is 65 years old female came for a follow up visit for papillary-serous endometrial carcinoma with brain metastasis.  She reported feeling well. \par She denies dizziness, change of mental status, fever, chills, SOB. 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  She reported feeling well. \par She denies dizziness, change of mental status, fever, chills, SOB. \par We reviewed MRI of the brain from 7/5/20 that revealed \par The study is considered stable since April 2, 2020 with the following \par findings: \par 1. 3 lesions are being followed, in the left frontal, right frontal and \par left occipital lobes. \par 2. The lesions are essentially stable, nonenhancing and probably mildly \par hemorrhagic \par 3. A punctiform lesion in the right frontal lobe (series 11 image 7) was \par probably present on the prior study. This is stable and probably not new. \par 4. Any differences in size are probably due to differences in the way the \par patient was scanned. \par Images were personally reviewed by MD Jose Roberto and discussed with patient. \par We will continue the current treatment with  Avastin one every 3 weeks.  Patient reports improvement in neuropathy with gabapentin 200 mg every 12 hrs.\par Patient is due for repeat PET Scan at the end of July,2020.\par \par 8/4/2020: NIDHI BANKS is a 65 year old female here today for follow up visit for endometrial cancer. S/p cycle 11 of Avastin. Patient denies fever, chills, SOB, cough and pain.\par PET CT from 7/29/2020 revealed \par Compared to 4/30/2020: \par 1 new FDG avid near midline peritoneal implant (SUV 4.3) consistent with biologic tumor activity \par 1 new left cervical level V FDG avid lymph node (SUV 8.7) suspicious for biologic tumor activity in light of the history of papillary thyroid carcinoma \par Interval increases in SUV: 115% increase in SUV in right lower quadrant peritoneal implant \par 97% increase in SUV in right upper quadrant peritoneal implant -66% increase in SUV in level 3 cervical lymph node \par Interval 36% decrease in SUV in anterior perisplenic nodule (7.9 vs 12.3) \par Stable FDG avid right upper quadrant implants and right level 5 cervical lymph node \par No other sites of abnormal FDG uptake \par We discussed other different treatment option.\par Images were personally reviewed by MD Jose Roberto and discussed with patient.\par We have discussed that there is undeniable progression systemically but still benefit in the CNS. I will review her chart and determine if restarting weekly carbo/taxol is of value, vs adding an additional agent vs starting on Keytruda/Lenvima is at this point indicated. \par Patient will continue with one additional dose of Avastin today.\par \par 8/25/2020: Nidhi is feeling well, no complaints today. At this point given minimal but sustained progression we will plan to switch over to Kaytruda combination with Lenvima. \par We have gone over side effects of immunotherapy at length, including but not limited to autoimmune mediated pneumonitis, enteritis, dermatitis, thyroiditis, damage to the kidneys, liver, pancreas, pituitary gland etc. patient and family voice understanding and are in agreement to proceed with treatment.\par Side effects of Lenvima including, but not limited to, cytopenias, that may require blood product transfusions and lead to increased risk of infection, requiring hospitalization, allergic reactions, that can rarely be life-threatening, skin reactions, nausea/vomiting, mucositis, diarrhea/constipation, QT prolongation, GI perforation, HTN etc were discussed at length, patient and family voice undestaging, all questions were answered to patient's satisfaction.\par \par 9/15/20 Nidhi presented today for f/u and to get treatment . On last visit she was started on keytruda and lenvima . She was told to start with 10 mg a day , but she started taking 20 mg a day . Developed diffuse muscle aches and pains , reports has pain in her mouth as well . Also developed changes in her voice , its becoming more and more hoarse now . Also reported pain in her left shoulder , which is improved now . She has been coming for wkly CBC , since she started treatment , counts have been stable . Had a detailed discussion with Nidhi, her symptoms are likely from treatment with lenvima , she should have escalated the dose from 10 mg to 20 mg , if it was well tolerated , since she started taking 20 mg daily ,  that’s why she has severe adverse effects. She is recommended to hold off on treatment for 4 days and then restart it at 10 mg daily . She demonstrated understanding . Will proceed with keytruda today . \par \par 10/6/20:  Patient here for follow up visit for stage IV endometrial cancer.  She is taking lower dose of Lenvima, 10 mg.  She is complaining of hoarseness and mouth soreness over the last couple of weeks.  She also has left arm and shoulder weakness.  Although she admits she feels somewhat better on lower dose of Lenvima.  She is very fatigued and does very little activity during the day and is losing weight.

## 2020-10-17 NOTE — REVIEW OF SYSTEMS
[Fatigue] : fatigue [Hoarseness] : hoarseness [Mucosal Pain] : mucosal pain [Negative] : Heme/Lymph [Dysphagia] : no dysphagia [Recent Change In Weight] : ~T no recent weight change [Nosebleeds] : no nosebleeds [Chest Pain] : no chest pain [Palpitations] : no palpitations [Lower Ext Edema] : no lower extremity edema [Shortness Of Breath] : no shortness of breath [Wheezing] : no wheezing [Cough] : no cough [SOB on Exertion] : no shortness of breath during exertion [Easy Bleeding] : no tendency for easy bleeding [FreeTextEntry2] : hoarse [FreeTextEntry4] : new mucosal pain  [FreeTextEntry7] : mild rectal bleeding with BM [FreeTextEntry9] : decreased strength left upper arm [de-identified] : no gait impairment- numbness and swelling of bilateral hands

## 2020-10-17 NOTE — RESULTS/DATA
[FreeTextEntry1] : EXAM:  MR BRAIN WAW IC      \par \par \par PROCEDURE DATE:  06/08/2019  \par \par \par \par \par INTERPRETATION:  Clinical History / Reason for exam: Dizziness and \par vertigo, history of uterine and thyroid cancer, for evaluation of \par metastatic disease, lesion seen on prior MRI of the brain.\par \par MRI OF THE BRAIN WITHOUT AND WITH CONTRAST\par \par TECHNIQUE:\par \par Multiplanar multisequence imaging of the brain was performed on the \[facility] open magnet before and after the intravenous administration of \par 7.5 cc of Gadavist including brain lab protocol.\par \par COMPARISON:\par \par MRI of the brain without and with contrast dated March 12, 2019.\par \par FINDINGS:\par \par The previously described supratentorial lesions have almost completely \par resolved.\par \par The lesion in the left posterior frontal region now measures 1.4 cm in \par maximum diameter, the lesion in the anterior right frontal lobe measures \par 0.7 cm in maximum diameter, the second lesion in the right frontal lobe \par measures 0.1 cm in maximum diameter, the lesion in the right posterior \par parietal region measures 0.3 cm in maximum diameter, and the left \par occipital lobe lesion measures 1.2 cm in maximum diameter. (Previously \par measuring 2.6, 2.4, 0.5, 0.7, and 2.5 cm respectively).\par \par The third, fourth, and lateral ventricles are normal in size and \par position. There is no shift of the midline structures.\par \par The cerebellar tonsils are minimally low-lying (0.3 cm of cerebellar \par ectopia).\par \par Incidental note is made of a tiny medial cyst.\par \par There are scattered T2/FLAIR hyperintense signal intensities in the\par subcortical white matter which are nonspecific differential diagnostic\par possibilities include chronic ischemic change, foci of gliosis or\par demyelination.\par \par IMPRESSION:\par \par In comparison with the prior MRI of the brain dated March 21, 2019:\par \par There has been almost complete resolution of most of of the previously \par described supratentorial lesions.\par \par There are no new enhancing lesions.\par \par \par \par \par \par \par CRISTINA MÉNDEZ M.D., ATTENDING RADIOLOGIST\par This document has been electronically signed. Oren 10 2019  1:17PM\par   \par \par   \par \par \par [id number]^RI^DC\par \par EXAM:  PETCT SKUL-THI ONC FDG SUBS      \par \par \par PROCEDURE DATE:  05/01/2019  \par \par \par \par \par INTERPRETATION:  \par FDG PET CT STUDY   Subsequent  treatment strategy\par REASON: TUMOR IMAGING - PET with concurrently acquired CT for attenuation \par correction and anatomic localization; skull base to mid - thigh .\par \par CPT code 20418.\par \par Fasting blood glucose level:     91 mg/dl\par \par HISTORY: Endometrial cancer. Stage IV with brain metastatic disease.\par \par TECHNIQUE: Approximately 45 minutes after the intravenous administration \par of 10.7 mCi 18-Fluorine FDG, whole body PET images were acquired from \par base of skull to mid - thigh.\par \par CT protocol used for this PET/CT study is designed for attenuation \par correction and anatomic localization of PET findings.  This  CT \par is not designed to replace state-of-the-art diagnostic CT scans for \par specific imaging protocols of different body parts.\par \par COMPARISON : 2/12/2019.\par Correlation: MRI of the brain on 3/21/2019.\par \par FINDINGS:\par \par Head/Neck: No biologically active head/neck lesions.     \par Normal uptake within the brain, pharyngeal lymphoid tissue, salivary \par glands and laryngeal musculature is noted.    \par \par Thorax:   No suspicious hypermetabolic mediastinal, hilar, lung \par parenchymal lesions.\par \par Abdomen/Pelvis: Physiologic GI/  activity. \par \par Again seen is a left upper quadrant peritoneal nodule, SUV max 6.8, \par previously 7.7 (12% decrease). \par \par Multiple new FDG avid omental nodules largest of which is adjacent to the \par distal transverse colon, measuring 10 mm, positive on nonattenuation \par corrected images, axial image 134. \par \par No other abnormal visceral or guillaume tracer uptake is present.    \par \par Musculoskeletal :  No suspicious hypermetabolic osseous lesions.\par \par Additional CT findings:\par Status post hysterectomy.\par Colonic diverticulosis.\par \par IMPRESSION: \par \par COMPARISON : 2/12/2019\par \par Multiple new FDG avid omental nodules largest measuring up to 10 mm, \par positive on nonattenuation corrected images. Findings are suggestive of \par biologic tumor activity.\par \par Again seen is a left upper quadrant peritoneal nodule, SUV max 6.8, \par previously 7.7 (12% decrease). \par \par \par \par \par

## 2020-11-02 NOTE — PHYSICAL EXAM
[Restricted in physically strenuous activity but ambulatory and able to carry out work of a light or sedentary nature] : Status 1- Restricted in physically strenuous activity but ambulatory and able to carry out work of a light or sedentary nature, e.g., light house work, office work [Normal] : affect appropriate [de-identified] : pale, chronically ill [de-identified] : B/L cataracts [de-identified] : CTA B/L [de-identified] : arthritic changes of the fingers  [de-identified] : No mucosal ulcers noted

## 2020-11-02 NOTE — ASSESSMENT
[FreeTextEntry1] : Papillary- serous endometrial cancer, stage IV\par --PET CT on 11/29/2017 revealed no clear GYN origin, extensive KEVIN, uptake in the right lung and pleura, thyroid nodule\par --Malignant pleural effusion, resolved\par --L side PleurX catheter was removed on 2/8/2018\par --Completed cycle 8  of carbo/taxol on 6/29/2018.  2 cycles were administered  postoperatively.\par --Restaging PET CT on 9/26/2018 (3 months post completion of chemo 6/29/2018)was essentially negative for recurrent disease\par --Restaging PET CT on 12/18/2018 reveals   New left upper quadrant peritoneal nodule measuring 8 mm with an SUV max  of 7.3. This is suggestive of biologic tumor activity.  No other FDG avid lesions seen throughout the scan.\par --Restaging PET CT on 2/12/2019 revealed slight increase in size of left upper quadrant peritoneal nodule (1.1 cm, \par previously 0.8 cm) with stable FDG uptake, 7.7 SUV suspicious for biologic tumor activity.\par --Case was discussed with Dr. Massey at [place], biopsy of peritoneal nodule is positive for recurrent endometrial cancer.\par --MRI brain on 4/23/2019 revealed multiple brain mets\par --S/p whole brain radiation completed 4/2019, required acute rehab\par --PET CT on 5/1/2019 subsequently revealed further disease progression \par --Restarted weekly carbo/taxol 3 on 1 off on 5/9/2019, continue with weekly Carbo/Taxol for now.\par --Off Decadron and Keppra since 6/5/2019 \par --Restaging brain MRI on 6/8/2019 showed almost complete resolution of most supratentorial lesions and no new lesions.\par --Restaging CT C/A/P on 7/9/2019 did not reveal definite progression, there was a questionable filling defect suspicious for possible chronic PE/artifact, this was discussed with Radiology at length anticoagulation was not deemed to be necessary\par --PET CT on 7/24/2019 revealed positive response to therapy \par --Carbo/Taxol stopped, last dose 9/13/2019, on hold since, she remains platinum sensitive \par --Restaging PET CT on 10/18/2019 is NAD\par --Restaging MRI of the brain on 10/15/2019 is suggestive for possible disease progression, no new lesions, she remains asymptomatic and off steroids, will follow closely \par --She was referred to [facility] for brain SRS consideration, close observation for now \par --Started on Avastin maintenance 11/5/2019, will consider adding Eliquis ppx for DVT/ PE, for now she will take baby ASA every other day  \par --We sent for prednisone 10 mg po if needed for additional skin issues\par --Patient well aware to contact us if any side effect developed\par --MRI of the brain 1/15/2020 reveals overall good response to Avastin\par --Restaging PET CT on 1/22/2020 reveals single small perisplenic area of activity, suspicious for disease progression, asymptomatic \par --Proceed with cycle 10 of Avastin on 5/12/2020 after weighing risks/benefits with plan for short term follow up imaging \par -- Reviewed  MRI of brain on 7/5/20  showed stable metastases; no new enhancing lesions are identified.\par -- Restaging PET CT on 4/30/2020 reveals minimal progression in the abdomen and pelvis\par -- Other options discussed is switching to Keytruda/Lenvima combination, this is a consideration in the future \par -- We will c/w with Avastin for now since it is working in the brain and she has a minimal progression in abdomen. \par --PET CT from 7/29/2020 reveals additional evidence of progression, minimal and not symptomatic\par --Last dose of Avastin on 8/4/2020\par -- Was started on Lenvima/Keytruda 8/25/2020\par --Unable to tolerate 20 mg daily, recommended to hold off on treatment for 4 days , and then restart at 10 mg daily after 4 days if feeling fine.\par --She has help Lenvima for two weeks since 10.6.2020 She states that her s/e have greatly improved and feels well. We will stop Lenvima \par -- PET CT on 10/14/2020 revealed mixed response with no new sites of disease \par -- We discussed treatment options including changing to Doxil or proceeding with single Keytruda at this time. \par -- She will continue with Keytruda every three weeks. Reimaging study in two months \par --CBC reviewed, WNL TSH CMP\par -- Arthritic changes noted to hands- possibly secondary to immunotherapy- will continue to observe \par \par Papillary thyroid cancer [id number], 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 Herrera \par --Follow up with Dr. Acevedo of endocrinology; he is addressing vitamin D, thyroid and diabetes\par \par Cataract of eye\par -- S/p successful surgery on 9/30/19 \par \par Keytruda in 1 week follow up in three weeks or earlier if needed.\par Case discussed and patient seen with Dr. Cid.\par \par \par \par

## 2020-11-02 NOTE — REVIEW OF SYSTEMS
[Fatigue] : fatigue [Negative] : Allergic/Immunologic [Recent Change In Weight] : ~T no recent weight change [Dysphagia] : no dysphagia [Nosebleeds] : no nosebleeds [Chest Pain] : no chest pain [Palpitations] : no palpitations [Lower Ext Edema] : no lower extremity edema [Shortness Of Breath] : no shortness of breath [Wheezing] : no wheezing [Cough] : no cough [SOB on Exertion] : no shortness of breath during exertion [Easy Bleeding] : no tendency for easy bleeding [FreeTextEntry2] : hoarse [FreeTextEntry9] : swelling and arthritic changes to hands [de-identified] : no gait impairment- numbness and swelling of bilateral hands

## 2020-11-02 NOTE — RESULTS/DATA
[FreeTextEntry1] : EXAM:  MR BRAIN WAW IC      \par \par \par PROCEDURE DATE:  06/08/2019  \par \par \par \par \par INTERPRETATION:  Clinical History / Reason for exam: Dizziness and \par vertigo, history of uterine and thyroid cancer, for evaluation of \par metastatic disease, lesion seen on prior MRI of the brain.\par \par MRI OF THE BRAIN WITHOUT AND WITH CONTRAST\par \par TECHNIQUE:\par \par Multiplanar multisequence imaging of the brain was performed on the \[facility] open magnet before and after the intravenous administration of \par 7.5 cc of Gadavist including brain lab protocol.\par \par COMPARISON:\par \par MRI of the brain without and with contrast dated March 12, 2019.\par \par FINDINGS:\par \par The previously described supratentorial lesions have almost completely \par resolved.\par \par The lesion in the left posterior frontal region now measures 1.4 cm in \par maximum diameter, the lesion in the anterior right frontal lobe measures \par 0.7 cm in maximum diameter, the second lesion in the right frontal lobe \par measures 0.1 cm in maximum diameter, the lesion in the right posterior \par parietal region measures 0.3 cm in maximum diameter, and the left \par occipital lobe lesion measures 1.2 cm in maximum diameter. (Previously \par measuring 2.6, 2.4, 0.5, 0.7, and 2.5 cm respectively).\par \par The third, fourth, and lateral ventricles are normal in size and \par position. There is no shift of the midline structures.\par \par The cerebellar tonsils are minimally low-lying (0.3 cm of cerebellar \par ectopia).\par \par Incidental note is made of a tiny medial cyst.\par \par There are scattered T2/FLAIR hyperintense signal intensities in the\par subcortical white matter which are nonspecific differential diagnostic\par possibilities include chronic ischemic change, foci of gliosis or\par demyelination.\par \par IMPRESSION:\par \par In comparison with the prior MRI of the brain dated March 21, 2019:\par \par There has been almost complete resolution of most of of the previously \par described supratentorial lesions.\par \par There are no new enhancing lesions.\par \par \par \par \par \par \par CRISTINA MÉNDEZ M.D., ATTENDING RADIOLOGIST\par This document has been electronically signed. Oren 10 2019  1:17PM\par   \par \par   \par \par \par [id number]^RI^DC\par \par EXAM:  PETCT SKUL-THI ONC FDG SUBS      \par \par \par PROCEDURE DATE:  05/01/2019  \par \par \par \par \par INTERPRETATION:  \par FDG PET CT STUDY   Subsequent  treatment strategy\par REASON: TUMOR IMAGING - PET with concurrently acquired CT for attenuation \par correction and anatomic localization; skull base to mid - thigh .\par \par CPT code 36147.\par \par Fasting blood glucose level:     91 mg/dl\par \par HISTORY: Endometrial cancer. Stage IV with brain metastatic disease.\par \par TECHNIQUE: Approximately 45 minutes after the intravenous administration \par of 10.7 mCi 18-Fluorine FDG, whole body PET images were acquired from \par base of skull to mid - thigh.\par \par CT protocol used for this PET/CT study is designed for attenuation \par correction and anatomic localization of PET findings.  This  CT \par is not designed to replace state-of-the-art diagnostic CT scans for \par specific imaging protocols of different body parts.\par \par COMPARISON : 2/12/2019.\par Correlation: MRI of the brain on 3/21/2019.\par \par FINDINGS:\par \par Head/Neck: No biologically active head/neck lesions.     \par Normal uptake within the brain, pharyngeal lymphoid tissue, salivary \par glands and laryngeal musculature is noted.    \par \par Thorax:   No suspicious hypermetabolic mediastinal, hilar, lung \par parenchymal lesions.\par \par Abdomen/Pelvis: Physiologic GI/  activity. \par \par Again seen is a left upper quadrant peritoneal nodule, SUV max 6.8, \par previously 7.7 (12% decrease). \par \par Multiple new FDG avid omental nodules largest of which is adjacent to the \par distal transverse colon, measuring 10 mm, positive on nonattenuation \par corrected images, axial image 134. \par \par No other abnormal visceral or guillaume tracer uptake is present.    \par \par Musculoskeletal :  No suspicious hypermetabolic osseous lesions.\par \par Additional CT findings:\par Status post hysterectomy.\par Colonic diverticulosis.\par \par IMPRESSION: \par \par COMPARISON : 2/12/2019\par \par Multiple new FDG avid omental nodules largest measuring up to 10 mm, \par positive on nonattenuation corrected images. Findings are suggestive of \par biologic tumor activity.\par \par Again seen is a left upper quadrant peritoneal nodule, SUV max 6.8, \par previously 7.7 (12% decrease). \par \par \par \par \par

## 2020-11-02 NOTE — HISTORY OF PRESENT ILLNESS
[Disease: _____________________] : Disease: [unfilled] [AJCC Stage: ____] : AJCC Stage: [unfilled] [de-identified] : Serena is a rodrigue 63 yo female, who has started developing SOB approximately 2 months ago, she went to ER on 11/2/2017 and on CXR was noted to have a large right sided pleural effusion. She underwent a thoracentesis, 1.6L of fluid was drained. 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 IHC was pos for CK7, PAX8, CK5/6 and Ca125, negative for TTF-1/Napsin, calretinin, CK20, mammaglobin, p63, villin, HAM56, GCDFP15, TAY-EP4. \par \par Her visit on 12/1/2017 Serena had an excisional biopsy of cervical node on 12/13/2017 revealing met adenocarcinoma, primary source still was not clear. On 12/14/2017 Serena was admitted with SOB, Pleurx catheter was placed on 12/14/2017. 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 Serena received 1st dose of carbo (auc of 5)/taxol(175mg/m2) on 12/15/2017 without complications, but the next day sustained a fall 2/2 vasovagal episode and developed asymptomatic SAH, that resolved on imaging by 12/21/2017.  [de-identified] : KRas WT, EGFR WT, Alk WT, ROS1 WT, PDL1-1%\par Normal MMR protein expression [de-identified] : 11/24/2017: She reports some SOB, especially ARZATE today. No fevers, no weight loss (reports some weight gain), no GI,  or GYN symptoms, except for increasing abdominal girth, she reports no blood in the stool or urine and no vaginal bleeding. She reports no CP and no palpitations, she reports worsening nonproductive cough, no hemoptysis. She also reports difficulty lying on left side and lying down flat. \par She reports left on/off facial numbness several months in duration. She had a CT of her head performed in [facility], [place] within 1 year.  Additionally there is a freely mobile left cervical node present on exam, as per patient this was in place for approximately 1 year. \par \par 12/1/2017: Nidhi is here for follow up today. She had a therapeutic thoracentesis in  on 11/27/2017, 2L of fluid were removed, with much improved respiratory status. PET CT and MRI of the brain were done on 11/29/2017, findings were discussed today. There remains no clear primary source of malignancy. We have discussed that the source of tumor may be Mullerian vs lung. regardless of source chemotherapy needs to be initiated ASAP. We have discussed Carbo/taxol regiment that will cover both Mullerian and lung origin. Side effects including myelosuppression, peripheral neuropathy, allergic reaction and others.\par \par 1/2/2017 Since last visit Nidhi had an excisional biopsy of cervical node on 12/13/2017 revealing met adenocarcinoma, primary source still was not clear. On 12/14/2017 Nidhi was admitted with SOB, Pleurx catheter was placed on 12/14/2017. 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 Nidhi received 1st dose of carbo (auc of 5)/taxol(175mg/m2) on 12/15/2017 without complications, but the next day sustained a fall 2/2 vasovagal episode and developed asymptomatic SAH, that resolved on imaging by 12/21/2017. Today Nidhi's SOB has significantly improved. She reports very mild neuropathy in fingertips only. She reports no headache and offers no other complaints. \par \par 1/26/2018: Complains of some neuropathy, otherwise tolerating chemo with no difficulty. \par \par 2/16/2018: restaging CT C/A/P reviewed, significant improvement noted. Will refer to GYN/ONC. Reports slightly worsening neuropathy, not -painful, taking Gabapentin, no other symptoms. For cycle 4 of carbo/taxol today.\par \par 3/9/2018: Nidhi met with Dr. Massey, she is planned for surgery on 5/7/2018, after completion of 6 cycles of carbo/taxol and restaging PET CT ordered for mid April and follow up with Dr. Massey on April 20. She reports worsening neuropathy, and occasional pain and changes in vision in her left eye, eye symptoms come and go and are not very bothersome. She reports no other symptoms. \par \par 3/30/2018; Nidhi is here for cycle 6 of carbo/taxol, she continues to have neuropathy in finger and toes, but offers no other complaints, chemo has been dose reduced. \par \par 4/27/2018: Results of restaging PET CT s/p 6 cycles of carbo/taxol revealed 1. No new areas of abnormal increased uptake is seen to indicate \par biologically active disease.\par \par 2. Persistent PET positive left thyroid mass with a max SUV 33.1, \par previously max SUV 34.8. Further evaluation with thyroid ultrasound and \par if needed fine-needle aspiration biopsy will be helpful.\par \par 3.   PET positive left cervical lymph nodes mainly level 2 on the left \par with a max SUV 5.47previously 18. Minimal increased uptake in the \par bilateral axillary lymph nodes most likely reactive(less than 2.5)\par \par 4. Weakly PET positive, max SUV 2.89 right pleura, previous max SUV 5.3 \par with non-FDG avid right pleural effusion. \par \par 5. Previously FDG avid right and left supraclavicular lymphadenopathy, \par left internal mammary, bilateral paratracheal, para-aortic, para \par vertebral, carinal, mesenteric, right and left iliacs, right inguinal \par lymphadenopathy, small in size and no longer FDG avid.\par \par 6. No abnormal increased uptake is seen in the  lobulated uterus.\par \par 7. Overall there is marked improvement in the FDG avid disease.\par This was reviewed with Nidhi. She met with Dr. Massey on 4/20/2018, surgery is planned for 5/7/2018. She will also joselyn to meet with ENT re FDG avid thyroid nodule. Will assist in making he an appointment for her. \par Persistent neuropathy on gabapentin. \par \par 6/29/18 ; Patient came for follow up visit , evaluation for chemotherapy cycle 8 of carbo / Taxol , patient tolerating medication well , except neuropathy  recommend to have gabapentin  300 mg po daily.\par \par 7/20/2018: Nidhi present for follow up today, she has completed total of 8 cycles of Carbo/Taxol. She reports significant neuropathy in her hands and feet. We will discontinue chemotherapy today and plan to follow with close observation. She has thyroidectomy planned with Dr. Herrera on 8/20/2018, obtaining clearance for PMD. She is also planning to fly to [place] at the end of September. She reports no SOB, no GI or  symptoms. \par \par 9/12/2018: Nidhi offers no significant complaints today. She states neuropathy in her hands has almost completely resolved and neuropathy in her feet is significantly better. She had undergone complete thyroidectomy by Dr. Herrera on 8/20/2018, pathology revealed papillary thyroid cancer, measuring 2 cm, pT1b, other additional foci of papillary carcinoma were also noted, no LVI, surgical margins were clear, LN 0/2 had no carcinoma, stage [id number]. She is following up with Dr. Herrera tomorrow. She has still not gone for her vacation in [place]. \par \par 10/10/2018: Restaging PET CT on 9/26/2018 reveals Since 4/16/2018,  1. Post thyroidectomy with diffuse increased FDG uptake at the thyroid  bed likely representing post-surgical changes.  2. Subtle increased uptake is seen in the lamina , right side at the  level of T8 with a max SUV 4.6. This is not sufficient for metastatic  disease. Bone scan or MRI examination with contrast will be helpful for  further evaluation.  3. No other areas of abnormal increased uptake is seen. Images were personally reviewed by myself and discussed with Nidhi. \par She also had an Echo on 9/24/2018 revealing EF of 63%. \par Nidhi reports ongoing fatigue, she is unable to lose weight. She is taking Synthroid and following with Dr. Acevedo. \par She reports stiff fingers at night only, no painful neuropathy. \par She denies any other symptoms today. \par \par 12/11/2018: Nidhi feels well, neuropathy in hands and feet is much improved. S he is now complaining of r-sided facial pain associated with some swelling, pain comes and goes. She has already seen Dr. Herrera, who ordered CT of sinuses and prescribed pain relief meds. She is also due for restaging PET CT. Nidhi feels well otherwise. \par \par 1/9/2019: Restaging PET CT was performed on 12/18/2018 it revealed COMPARISON : 9/24/2018.  New left upper quadrant peritoneal nodule measuring 8 mm with an SUV max  of 7.3. This is suggestive of biologic tumor activity.  No other FDG avid lesions seen throughout the scan. Images were personally reviewed by myself and discussed with Nidhi, originally over the phone and again today. I have originally suggested to try AI in the interim, Nidhi however reported diarrhea at the time of the scan and declined intervention for now. We will plan for restaging PET CT to be performed in 6-8 weeks, this will be ordered today. She will follow up with Dr. Massey at [facility] shortly and bring the disk for his review. I would also like her to meet with genetics, this will be arranged at [facility] with Dr. Massey's help. Nidhi reports no new symptoms today, her neuropathy is manageable and  improving. She still reports unilateral headache that was work up in the past. Neurology eval was again suggested to her. She is following closely with Endocrinology re thyroid management. She will have follow up with Dr. Herrera shortly as well. \par \par 2/20/2019: Nidhi offers no new complaints. PET CT from 2/12/2019 revealed \par Since PET/CT of December 19, 2018, no new sites of pathologic FDG uptake\par Slight increase in size of left upper quadrant peritoneal nodule (1.1 cm, \par previously 0.8 cm) with stable FDG uptake, 7.7 SUV suspicious for \par biologic tumor activity.\par No other sites of pathologic FDG uptake \par Images were personally reviewed by myself and discussed with Nidhi, case was also discussed with Dr. Massey at [facility]. Nidhi will have follow up with his shortly. She is following with endocrinology. reports improving neuropathy. No GI or  symptoms at this time. No weight loss. \par \par 3/20/2019: Nidhi had a peritoneal nodule biopsy done at [facility], I have received a call from Dr. 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 We have discussed that surgery though could be attempted will not be the best therapeutic approach, recommencement of systemic therapy was discussed. I have not received the results from [facility] yet. I have briefly discussed this with Nidhi today. Nidhi reports that she has acutely developed right lower extremity weakness 5-6 days ago, she did not inform any of her doctors about this. She also reports that her R upper extremity though not weak "does not feel normal either. She reports no back pain, there is no point tenderness, RLE is objectively weak on exam. I recommend ER evaluation to r/o CVA vs brain met, vs L-spine related issue. Recommend admission for work up. Nidhi is agreeable to get admitted. \par \par 5/8/2019: Nidhi present for follow up, she was diagnosed with multiple metastasis to the brain, MRI was done on 3/21/2019 and revealed \par Multiple supratentorial heterogeneously enhancing lesions consistent with \par metastatic disease. The appearance on the T2-weighted images is suggestive \par of adenocarcinoma. There is mass effect upon the left lateral ventricle and \par corpus callosum. \par She received whole brain irradiation by Dr. Dahl, completed it in the beginning of 4/2019, she is currently on steroid taper managed by Dr. Dahl, she is taking 4mg of Decadron daily. She will be stopping the Keppra as there have been no documented h/o seizures. She still reports impairment of the L visual field, she has an appointment coming up with Opthalmology. She reports no deficits walking after she has completed inpatient acute rehabilitation. \par Restaging PET CT on 5/1/2019 reveals COMPARISON : 2/12/2019 \par Multiple new FDG avid omental nodules largest measuring up to 10 mm, \par positive on nonattenuation corrected images. Findings are suggestive of \par biologic tumor activity. \par Again seen is a left upper quadrant peritoneal nodule, SUV max 6.8, \par previously 7.7 (12% decrease). \par Images were personally reviewed by myself and discussed with patient. \par She now reports mild abdominal bloating, no other symptoms. She reports her neuropathy has significantly improved. \par She has first evidence of recurrent disease documented 5.5 months after last carbo/taxol dose, the volume of disease was very small (1 8mm nodule), she was then observed for another 6 months and now she wishes to restart the chemotherapy. \par I have discussed with her that rechallenging with carbo/taxol may still prove to be effective as long as she gets restaged after 2 cycles. Given recent whole brain radiation and neuropathy I will offer her carbo/taxol weekly with does reduction on the taxol for neuropathy, We will plan to run carbo slowly to avoid allergic reaction. \par She is agreeable to start ASAP. \par \par 6/5/2019: Nidhi has completed 1 cycle of weekly Carbo/taxol, ran at slow rate to avoid Carbo reaction and has tolerated chemotherapy without difficulty, she does not complain of increase in neuropathy. She has ongoing vision changes in her L eye, she had no residual LE weakness. She is off Decadron and Keppra. She was advised to see ophthalmology. \par \par 7/3/2019: Nidhi is doing well.  Reports no problems with carbo/taxol.  Still complains of vision changes in L eye but is seeing ophthalmologist on 7/16.  Remains active around the house and cooks regularly.  No fevers, weight loss, anorexia.  ROS is otherwise only significant for occasional loose stools, no diarrhea.\par \par 7/17/2019: Nidhi is doing well, denies worsening neuropathy. She c/o feeling fatigued and tired. Her last chemo was 1 week ago(7/11/19) with carbo/taxol and is due again tomorrow. She saw ophthalmology and will need cataract surgery of the L eye. No c/o chest pain/SOB. No weight loss.\par CT C/A/P was done on 7/9/2019, images were personally reviewed by myself and discussed with several radiology attendings, they revealed \par CHEST:\par Filling defect within a segmental branch of the lower lobe pulmonary artery \par suspicious for pulmonary embolus. \par Stable left upper lobe and right middle lobe 3 mm nodules. \par CT abdomen and pelvis performed on the same day, see separate report. \par ADDENDUM: \par Please note that the morphology of the small thrombus within the left lower \par pulmonary artery is not indicative of an acute thrombus but rather a chronic \par appearance. \par This was discussed with Radiology, it was not deemed that thrombus was acute and may not have even been present at all, finding was deemed to be equivocal, based on radiology assessment anticoagulation was not indicated for the filling defect noted. Initially CTA was recommended, the recommendation was withdrawn upon further review. \par ABDOMEN/PELVIS:\par New 1.3 cm segment IV hepatic hypodensity seen on series 4 image 205. \par Lesion not seen on prior PET/CT from 5/1/2019 or abdomen and pelvis CT from \par \par 2/2/2018 and is consistent with a new metastasis. \par Two other hepatic lesions described above, decreased in size since 2/2/2018, \par consistent with treated hepatic metastases. \par Anterior perisplenic omental nodule measures 0.7 cm on series 2 image 53, \par previously measuring 1.2 cm on PET/CT dated 5/1/2019. \par All the other previously seen omental nodules have resolved since 5/1/2019. \par ADDENDUM:\par Case was discussed with Dr. Cid in detail. It is also possible that the \par new 1.3 cm lesion in the liver since February 2, 2018 represents a treated \par metastasis similar to the other liver lesions. \par PET/CT would be necessary to assess disease activity. \par I have discussed case with Radiology, and it was determined that there was no clear cut evidence of progression. PET CT was ordered today.\par This was discussed with Nidhi, will proceed with Carbo/Taxol for now. \par \par 8/1/2019: Nidhi reports no major side effects from weekly Carbo/Taxol, she reports no neuropathy. She has completed 10 cycles altogether. \par PET CT on 7/24/2019 revealed \par 1. Since May 1, 2019, no definite new site of pathologic FDG uptake to \par suggest new biologic tumor activity; specifically, no pathologic FDG uptake \par within previously noted 1.3 cm lesion within hepatic segment 4. \par 2. Increased FDG uptake within several subcentimeter bilateral cervical \par lymph nodes, which is nonspecific and may be postinflammatory; max SUV 9.2 \par is noted within a 0.7 cm right level 2 cervical node. Attention on follow-up \par is suggested. \par 3. Few peritoneal nodules have overall decreased in size. \par 4. No additional site of pathologic FDG uptake. \par Images were personally reviewed by myself and discussed with Nidhi. \par She wishes to continue with chemotherapy. Will plan for 3 additional cycles. Will consider adding Avastin, but with h/o inconclusive/possible chronic PE ppx anticoagulation maybe necessary. \par \par 8/28/2019: Nidhi reports feeling well, she denies worsening neuropathy, worsening neurological symptoms, SOB, abdominal pain, bloating. She reports she is planned to undergo  eye surgery towards the end of September. We will plan to hold chemotherapy briefly after this cycle to facilitate adequate counts and minimize complications. We again have briefly discussed considering Avastin based therapy, will hold off on this prior to surgery. 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 May proceed without formal PAST, will check CBC prior to procedure, PT and PTT today. \par \par 9/25/19:Nidhi reports feeling well, she denies any changes since last visit, No SOB, abdominal pain, bloating. She reports she is planned to undergo  eye surgery in 9/30/19  CBC reviewed with normal Hemogram . we will hold chem this week , she will resume after Cataract sx . 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 May proceed without formal PAST, will check CBC prior to procedure, PT and PTT today. \par \par 10/4/2019: Nidhi underwent successful eye surgery on 9/30/2019, she reports she can see much better now. Last dose of chemotherapy was administered on 9/20, she then was on hold for surgery. She has completed 5 additional cycles of Carbo/taxol. She is due for restaging. Her disease is only accurately assessed on PET CT, prior attempts to obtain CT scans resulted in additional imaging with PET, as findings were inconclusive. PET CT will be ordered to restage. \par In addition she is due for MRI of the brain. She has had a small amount of weight loss, mild neuropathy is persistent. She offers no additional complaints. \par She will have port placed on Monday, will hold chemotherapy until restaging scans complete. \par \par 10/23/2019: Nidhi feels well, and denies any new symptoms. Her eye surgery was successful, vision is much improved now. She had restaging scans. \par PET CT on 10/18/2019 revealed COMPARISON : 7/24/2019. \par No pathologic uptake to suggest biologic tumor activity. \par MRI of the brain on 10/15/2019 revealed \par Comparison with June 8 and March 21, 2019: (Generally lesions improved \par markedly since March but slightly increased in size since June) \par 1. Lesions previously demonstrated on the study of June 8, 2019 has \par remained stable or minimally increased in size \par 2. Specifically, right frontal opercular lesion measures about 1 cm and \par left posterior inferior temporal lobe lesion measures about 1 cm. These have \par increased since the previous study. \par 3. Small punctiform lesions within the right frontal white matter and left \par frontal sulcus or more apparent than on the study of June 8 \par 4. These lesions are all much smaller than the earlier MRI of March 21, \par 2019. \par 5. No new lesions. \par Images were personally reviewed by myself and discussed with patient. She remains asymptomatic and off the steroids. \par I have also discussed the case with Dr. Dahl. I would like Nidhi to discuss the options with [facility], she maybe a candidate for brain SRS. \par She has been off Carbo/Taxol for a few weeks, she remains Platinum sensitive. I have discussed the option of switching her over to Avastin maintenance, will need to prophylax with low dose Eliquis as well, given questionable finding of small PE in the past. \par Side effects of Avastin were discussed at length, including HNT, proteinuria, small risk of DVT/PE, GI perforations etc. \par She is agreeable to start after [facility] consultation. \par \par 11/5/2019: Nidhi feels OK, she reports acute onset head discomfort that lasts minutes and subsides, the episodes are becoming more frequent 1-2 times a day. She has seen Dr. Dahl, who wishes to hold off on offering SRS to the brain. Plan was to start on Avastin today. Nidhi reports she currently has a lapse in her insurance coverage till 12/2019, I will not be able to start he on ppx dose Eliquis for now, she instead will take baby ASA every other day, to modify risk of DVT/PE. Will plan to switch over to Eliquis at 2.5mg BID once insurance is active. She also contemplated going to [place] with her daughter, I am willing to hold Avastin until she comes back, but Nidhi wishes to start right away, I explained to her that flight may result in complications if that is her wish. She will cancel the trip and start with Avastin today. Side effects have been discussed. \par \par 11/20/2019: Nidhi came for a follow up visit, she reports feeling well, she reports less fatigue. She is s/p first dose of Avastin on 11/5/2019, she tolerated Avastin without difficulty, she is due for the 2nd dose today. \par We communicated CBC result with HGB of 12.3, normal platelet and WBCs. She lost like 5 pounds secondary to gum problems, she will follow up with her dentist this week. She is currently on baby ASA every other day. Continue with single agent Avastin.  \par \par 12/17/2019: Nidhi came for a follow up visit, she reports feeling well, she experienced pruritic rash on last Saturday 12/14/19, which improved over 2 days with Benadryl alone.  Today her skin rash has completely resolved. We will add Solu-Medrol 100 mg IV prior to Avastin. Nidhi reports less fatigue. She is due for the 3rd dose of Avastin today. \par We communicated CBC result with HGB of 12.3, normal platelet and WBCs. \par \par 1/7/2019: Nidhi is doing well, reports good appetite, she has lost 1lb. Reports no GI or pulmonary symptoms. She reports her gums are bleeding occasionally. No new neurological symptoms. She reports no rash after last cycle of Avastin. \par \par 1/28/2020: Nidhi is here for follow up visit, she has no difficulty tolerating Avastin, she reports L sided chronic eye discomfort, neuropathic in nature. Neurontin has been helpful in the past, will reorder this. \par PET CT on 1/22/2020 revealed COMPARISON : 10/18/2019. \par Anterior perisplenic nodule measuring 8 mm is FDG avid, SUV max 8.9, \par consistent with biologic tumor activity. \par MRI of the brain on 1/15/2020 revealed \par In comparison to the previous brain MRI dated 10/15/2019: \par 1. Redemonstrated scattered enhancing lesions consistent with metastatic \par disease, with overall good treatment response since the prior exam. \par 2. No significant interval change in the ovoid lesion in the superior left \par frontal lobe measuring 12 mm. \par 3. Decreased size of the right opercular lesion measuring 6 mm, previously \par 11 mm. Decreased size of the left inferior occipital/tentorial lesion \par measuring 6 mm, previously 11 mm. The previously seen right frontal white \par matter punctate lesion is no longer visualized. \par 4. No new lesions are demonstrated. \par All images were personally reviewed by myself and discussed with Nidhi. I explained to her I am concerned about the perisplenic lesion concerning for disease progression, but it is isolated and very small, asymptomatic. There is definitely a positive response to Avastin in the brain and given that, we will continue with Avastin with short term follow up imaging. Nidhi knows to inform me with any new symptoms immediately. \par \par 2/18/2020: NIDHI BANKS is a 64 year old female here today for follow up visit. She completed 5 cycles of Avastin; tolerating well at this time. She denies fever, chills, change in mental status, or change in weight. She complains of left painful facial swelling. In addition, she complains of swelling of fingers, joint pain and numbness in her toes. She continues on Gabapentin 100mg TID with no symptom relief and wishes to try to go up on the dose. She complains of mild gingival bleeding in the morning. Last PET showed new isolated perisplenic lesion suspicious for disease progression, however MRI of the brain appeared better on Avastin, so the plan was to continue with Avastin with short term follow up. \par \par 3/10/2020: NIDHI BANKS is a 64 year old female here today for follow up visit. She denies new neurological symptoms, skin rash, fever, or change in weight. She continues of Gabapentin which was increased to 200 mg TID; neuropathy improving. She continues to complain of mild gingival bleeding. Left facial swelling and numbness resolved. She has completed cycle 6 of Avastin without complication. \par \par 3/31/43028: Nidhi presented today for routine f/u, she feels fine. Denies any chest pain, sob, fever, chills and cough. Reports that she continues to have gum bleeding. Neuropathy is better with gabapentin, she decreased the dose to 4 pills a day. She tolerated 7th cycle well and is due to get 8th cycle today . She will get MRI brain on 4/2/20.\par She reports minor gum bleeding from Avastin and dental infection on the left, will prescribe Amoxicillin. \par \par 4/21/2020: NIDHI BANKS is a 64 year old female here today for follow up visit for endometrial cancer. S/p cycle 8 of Avastin. Patient denies fever, chills, SOB, cough and pain. She states that her gums have minimal bleeding and dental infection improved after amoxicillin. She complains of mild fatigue. Restaging MRI of the brain was completed on 4/2/2020 which showed stable bilateral parenchymal hemorrhagic lesions compatible with known metastases; no new enhancing lesions are identified. \par Images were personally reviewed by MD Jose Roberto and discussed with patient. \par \par 5/12/2020: MRI brain on 4/2/2020 reveals 1. Stable bilateral parenchymal hemorrhagic lesions compatible with known \par metastases. \par 2. No new enhancing lesions are identified. \par PET CT on 4/30/2020 revealed Right upper quadrant peritoneal implant adjacent to the inferior tip of the \par liver 4.7 (image [id number]) \par Right lower quadrant 0.5 cm peritoneal nodule 7.2 (image 111) \par Right lower quadrant subcentimeter peritoneal nodule 2.6 (image [id number]) \par Anterior perisplenic peritoneal nodule 12.3-48% increase from 8.9 \par (remeasured) previously \par Stable non-FDG avid (attenuation corrected and nonattenuation corrected \par images) 4 mm right middle lobe pulmonary nodule-image 181) \par  No other definite sites of abnormal FDG uptake \par IMPRESSION: \par Compared to 1/22/2020: \par new FDG avid peritoneal implants in right lower quadrant of the abdomen \par suspicious for biologic tumor activity \par 48% interval increase in SUV in anterior perisplenic peritoneal nodule (SUV \par 12.3 vs 8.9 remeasured) \par In retrospect stable FDG avid peritoneal implant in the right upper quadrant \par of the abdomen (SUV 4.7 vs 3.8 remeasured-image 134) \par No other definite sites of abnormal FDG uptake \par Images were personally reviewed by MD Jose Roberto and discussed with patient.\par Given minimal degree of progression in the abdomen and persistent control intracranially, we have discussed continuing Avastin for now and short term restaging. \par Nidhi offers no complaints today. \par \par 6/2/2020: The patient is here for follow up. She is due for Avastin today . She has  no major complaints . She reported gum bleeding and swelling, which is much better today . She also reported hand swelling bilaterally. She denies fever, chills, no respiratory, cardiac, GI/ symptoms. She denies headaches or neurological deficits. \par \par 6/23/2020: Nidhi is 65 years old female came for a follow up visit for papillary-serous endometrial carcinoma with brain metastasis.  She reported feeling well. \par She denies dizziness, change of mental status, fever, chills, SOB. 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  She reported feeling well. \par She denies dizziness, change of mental status, fever, chills, SOB. \par We reviewed MRI of the brain from 7/5/20 that revealed \par The study is considered stable since April 2, 2020 with the following \par findings: \par 1. 3 lesions are being followed, in the left frontal, right frontal and \par left occipital lobes. \par 2. The lesions are essentially stable, nonenhancing and probably mildly \par hemorrhagic \par 3. A punctiform lesion in the right frontal lobe (series 11 image 7) was \par probably present on the prior study. This is stable and probably not new. \par 4. Any differences in size are probably due to differences in the way the \par patient was scanned. \par Images were personally reviewed by MD Jose Roberto and discussed with patient. \par We will continue the current treatment with  Avastin one every 3 weeks.  Patient reports improvement in neuropathy with gabapentin 200 mg every 12 hrs.\par Patient is due for repeat PET Scan at the end of July,2020.\par \par 8/4/2020: NIDHI BANKS is a 65 year old female here today for follow up visit for endometrial cancer. S/p cycle 11 of Avastin. Patient denies fever, chills, SOB, cough and pain.\par PET CT from 7/29/2020 revealed \par Compared to 4/30/2020: \par 1 new FDG avid near midline peritoneal implant (SUV 4.3) consistent with biologic tumor activity \par 1 new left cervical level V FDG avid lymph node (SUV 8.7) suspicious for biologic tumor activity in light of the history of papillary thyroid carcinoma \par Interval increases in SUV: 115% increase in SUV in right lower quadrant peritoneal implant \par 97% increase in SUV in right upper quadrant peritoneal implant -66% increase in SUV in level 3 cervical lymph node \par Interval 36% decrease in SUV in anterior perisplenic nodule (7.9 vs 12.3) \par Stable FDG avid right upper quadrant implants and right level 5 cervical lymph node \par No other sites of abnormal FDG uptake \par We discussed other different treatment option.\par Images were personally reviewed by MD Jose Roberto and discussed with patient.\par We have discussed that there is undeniable progression systemically but still benefit in the CNS. I will review her chart and determine if restarting weekly carbo/taxol is of value, vs adding an additional agent vs starting on Keytruda/Lenvima is at this point indicated. \par Patient will continue with one additional dose of Avastin today.\par \par 8/25/2020: Nidhi is feeling well, no complaints today. At this point given minimal but sustained progression we will plan to switch over to Kaytruda combination with Lenvima. \par We have gone over side effects of immunotherapy at length, including but not limited to autoimmune mediated pneumonitis, enteritis, dermatitis, thyroiditis, damage to the kidneys, liver, pancreas, pituitary gland etc. patient and family voice understanding and are in agreement to proceed with treatment.\par Side effects of Lenvima including, but not limited to, cytopenias, that may require blood product transfusions and lead to increased risk of infection, requiring hospitalization, allergic reactions, that can rarely be life-threatening, skin reactions, nausea/vomiting, mucositis, diarrhea/constipation, QT prolongation, GI perforation, HTN etc were discussed at length, patient and family voice undestaging, all questions were answered to patient's satisfaction.\par \par 9/15/20 Nidhi presented today for f/u and to get treatment . On last visit she was started on keytruda and lenvima . She was told to start with 10 mg a day , but she started taking 20 mg a day . Developed diffuse muscle aches and pains , reports has pain in her mouth as well . Also developed changes in her voice , its becoming more and more hoarse now . Also reported pain in her left shoulder , which is improved now . She has been coming for wkly CBC , since she started treatment , counts have been stable . Had a detailed discussion with Nidhi, her symptoms are likely from treatment with lenvima , she should have escalated the dose from 10 mg to 20 mg , if it was well tolerated , since she started taking 20 mg daily ,  that’s why she has severe adverse effects. She is recommended to hold off on treatment for 4 days and then restart it at 10 mg daily . She demonstrated understanding . Will proceed with keytruda today . \par \par 10/6/20:  Patient here for follow up visit for stage IV endometrial cancer.  She is taking lower dose of Lenvima, 10 mg.  She is complaining of hoarseness and mouth soreness over the last couple of weeks.  She also has left arm and shoulder weakness.  Although she admits she feels somewhat better on lower dose of Lenvima.  She is very fatigued and does very little activity during the day and is losing weight.\par \par 10/20/2020: NIDHI is here for follow up visit for endometrial cancer. She was on dose reduced Lenvima co currently with Keytruda. She developed mouth sores and complications from Lenvima. She states that she is feeling well today. We contemplated starting on Doxil but she states she is feeling better after stopping Lenvima. Repeat PET from 10/16/2020 showed:  \par IMPRESSION:\par 1.  Since July 29, 2020, increased FDG uptake in multiple peritoneal nodules/tumor implants (max SUV 12.9 in a left upper quadrant nodule, reflecting 63% increase).\par 2.  Resolved FDG avid left cervical lymph nodes.\par 3.  No definite new sites of abnormal FDG uptake.\par

## 2020-11-17 NOTE — ASSESSMENT
[FreeTextEntry1] : Papillary- serous endometrial cancer, stage IV\par --PET CT on 11/29/2017 revealed no clear GYN origin, extensive KEVIN, uptake in the right lung and pleura, thyroid nodule\par --Malignant pleural effusion, resolved\par --L side PleurX catheter was removed on 2/8/2018\par --Completed cycle 8  of carbo/taxol on 6/29/2018.  2 cycles were administered  postoperatively.\par --Restaging PET CT on 9/26/2018 (3 months post completion of chemo 6/29/2018)was essentially negative for recurrent disease\par --Restaging PET CT on 12/18/2018 reveals   New left upper quadrant peritoneal nodule measuring 8 mm with an SUV max  of 7.3. This is suggestive of biologic tumor activity.  No other FDG avid lesions seen throughout the scan.\par --Restaging PET CT on 2/12/2019 revealed slight increase in size of left upper quadrant peritoneal nodule (1.1 cm, \par previously 0.8 cm) with stable FDG uptake, 7.7 SUV suspicious for biologic tumor activity.\par --Case was discussed with Dr. Massey at [facility], biopsy of peritoneal nodule is positive for recurrent endometrial cancer.\par --MRI brain on 4/23/2019 revealed multiple brain mets\par --S/p whole brain radiation completed 4/2019, required acute rehab\par --PET CT on 5/1/2019 subsequently revealed further disease progression \par --Restarted weekly carbo/taxol 3 on 1 off on 5/9/2019, continue with weekly Carbo/Taxol for now.\par --Off Decadron and Keppra since 6/5/2019 \par --Restaging brain MRI on 6/8/2019 showed almost complete resolution of most supratentorial lesions and no new lesions.\par --Restaging CT C/A/P on 7/9/2019 did not reveal definite progression, there was a questionable filling defect suspicious for possible chronic PE/artifact, this was discussed with Radiology at length anticoagulation was not deemed to be necessary\par --PET CT on 7/24/2019 revealed positive response to therapy \par --Carbo/Taxol stopped, last dose 9/13/2019, on hold since, she remains platinum sensitive \par --Restaging PET CT on 10/18/2019 is NAD\par --Restaging MRI of the brain on 10/15/2019 is suggestive for possible disease progression, no new lesions, she remains asymptomatic and off steroids, will follow closely \par --She was referred to [facility] for brain SRS consideration, close observation for now \par --Started on Avastin maintenance 11/5/2019, will consider adding Eliquis ppx for DVT/ PE, for now she will take baby ASA every other day  \par --We sent for prednisone 10 mg po if needed for additional skin issues\par --Patient well aware to contact us if any side effect developed\par --MRI of the brain 1/15/2020 reveals overall good response to Avastin\par --Restaging PET CT on 1/22/2020 reveals single small perisplenic area of activity, suspicious for disease progression, asymptomatic \par --Proceed with cycle 10 of Avastin on 5/12/2020 after weighing risks/benefits with plan for short term follow up imaging \par -- Reviewed  MRI of brain on 7/5/20  showed stable metastases; no new enhancing lesions are identified.\par -- Restaging PET CT on 4/30/2020 reveals minimal progression in the abdomen and pelvis\par -- Other options discussed is switching to Keytruda/Lenvima combination, this is a consideration in the future \par -- We will c/w with Avastin for now since it is working in the brain and she has a minimal progression in abdomen. \par --PET CT from 7/29/2020 reveals additional evidence of progression, minimal and not symptomatic\par --Last dose of Avastin on 8/4/2020\par -- Was started on Lenvima/Keytruda 8/25/2020\par --Unable to tolerate 20 mg daily, recommended to hold off on treatment for 4 days , and then restart at 10 mg daily after 4 days if feeling fine.\par --She has help Lenvima for two weeks since 10.6.2020 She states that her s/e have greatly improved and feels well. We will stop Lenvima \par -- PET CT on 10/14/2020 revealed mixed response with no new sites of disease \par -- We discussed treatment options including changing to Doxil or proceeding with single Keytruda at this time. \par -- She will continue with Keytruda every three weeks. Reimaging study in two months \par --CBC reviewed, WNL TSH CMP\par -- Arthritic changes noted to hands- possibly secondary to immunotherapy- will continue to observe \par --Follow up MRI brain ordered on 11/17/2020\par \par Papillary thyroid cancer [id number], 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 Herrera \par --Follow up with Dr. Acevedo of endocrinology; he is addressing vitamin D, thyroid and diabetes\par \par Cataract of eye\par -- S/p successful surgery on 9/30/19 \par \par Follow up in 3 weeks \par \par \par \par

## 2020-11-17 NOTE — PHYSICAL EXAM
[Restricted in physically strenuous activity but ambulatory and able to carry out work of a light or sedentary nature] : Status 1- Restricted in physically strenuous activity but ambulatory and able to carry out work of a light or sedentary nature, e.g., light house work, office work [Normal] : affect appropriate [de-identified] : pale, chronically ill [de-identified] : B/L cataracts [de-identified] : CTA B/L [de-identified] : arthritic changes of the fingers  [de-identified] : No mucosal ulcers noted

## 2020-11-17 NOTE — RESULTS/DATA
[FreeTextEntry1] : EXAM:  MR BRAIN WAW IC      \par \par \par PROCEDURE DATE:  06/08/2019  \par \par \par \par \par INTERPRETATION:  Clinical History / Reason for exam: Dizziness and \par vertigo, history of uterine and thyroid cancer, for evaluation of \par metastatic disease, lesion seen on prior MRI of the brain.\par \par MRI OF THE BRAIN WITHOUT AND WITH CONTRAST\par \par TECHNIQUE:\par \par Multiplanar multisequence imaging of the brain was performed on the \[facility] open magnet before and after the intravenous administration of \par 7.5 cc of Gadavist including brain lab protocol.\par \par COMPARISON:\par \par MRI of the brain without and with contrast dated March 12, 2019.\par \par FINDINGS:\par \par The previously described supratentorial lesions have almost completely \par resolved.\par \par The lesion in the left posterior frontal region now measures 1.4 cm in \par maximum diameter, the lesion in the anterior right frontal lobe measures \par 0.7 cm in maximum diameter, the second lesion in the right frontal lobe \par measures 0.1 cm in maximum diameter, the lesion in the right posterior \par parietal region measures 0.3 cm in maximum diameter, and the left \par occipital lobe lesion measures 1.2 cm in maximum diameter. (Previously \par measuring 2.6, 2.4, 0.5, 0.7, and 2.5 cm respectively).\par \par The third, fourth, and lateral ventricles are normal in size and \par position. There is no shift of the midline structures.\par \par The cerebellar tonsils are minimally low-lying (0.3 cm of cerebellar \par ectopia).\par \par Incidental note is made of a tiny medial cyst.\par \par There are scattered T2/FLAIR hyperintense signal intensities in the\par subcortical white matter which are nonspecific differential diagnostic\par possibilities include chronic ischemic change, foci of gliosis or\par demyelination.\par \par IMPRESSION:\par \par In comparison with the prior MRI of the brain dated March 21, 2019:\par \par There has been almost complete resolution of most of of the previously \par described supratentorial lesions.\par \par There are no new enhancing lesions.\par \par \par \par \par \par \par CRISTINA MÉNDEZ M.D., ATTENDING RADIOLOGIST\par This document has been electronically signed. Oren 10 2019  1:17PM\par   \par \par   \par \par \par [id number]^RI^DC\par \par EXAM:  PETCT SKUL-THI ONC FDG SUBS      \par \par \par PROCEDURE DATE:  05/01/2019  \par \par \par \par \par INTERPRETATION:  \par FDG PET CT STUDY   Subsequent  treatment strategy\par REASON: TUMOR IMAGING - PET with concurrently acquired CT for attenuation \par correction and anatomic localization; skull base to mid - thigh .\par \par CPT code 88520.\par \par Fasting blood glucose level:     91 mg/dl\par \par HISTORY: Endometrial cancer. Stage IV with brain metastatic disease.\par \par TECHNIQUE: Approximately 45 minutes after the intravenous administration \par of 10.7 mCi 18-Fluorine FDG, whole body PET images were acquired from \par base of skull to mid - thigh.\par \par CT protocol used for this PET/CT study is designed for attenuation \par correction and anatomic localization of PET findings.  This  CT \par is not designed to replace state-of-the-art diagnostic CT scans for \par specific imaging protocols of different body parts.\par \par COMPARISON : 2/12/2019.\par Correlation: MRI of the brain on 3/21/2019.\par \par FINDINGS:\par \par Head/Neck: No biologically active head/neck lesions.     \par Normal uptake within the brain, pharyngeal lymphoid tissue, salivary \par glands and laryngeal musculature is noted.    \par \par Thorax:   No suspicious hypermetabolic mediastinal, hilar, lung \par parenchymal lesions.\par \par Abdomen/Pelvis: Physiologic GI/  activity. \par \par Again seen is a left upper quadrant peritoneal nodule, SUV max 6.8, \par previously 7.7 (12% decrease). \par \par Multiple new FDG avid omental nodules largest of which is adjacent to the \par distal transverse colon, measuring 10 mm, positive on nonattenuation \par corrected images, axial image 134. \par \par No other abnormal visceral or guillaume tracer uptake is present.    \par \par Musculoskeletal :  No suspicious hypermetabolic osseous lesions.\par \par Additional CT findings:\par Status post hysterectomy.\par Colonic diverticulosis.\par \par IMPRESSION: \par \par COMPARISON : 2/12/2019\par \par Multiple new FDG avid omental nodules largest measuring up to 10 mm, \par positive on nonattenuation corrected images. Findings are suggestive of \par biologic tumor activity.\par \par Again seen is a left upper quadrant peritoneal nodule, SUV max 6.8, \par previously 7.7 (12% decrease). \par \par \par \par \par

## 2020-11-17 NOTE — HISTORY OF PRESENT ILLNESS
[Disease: _____________________] : Disease: [unfilled] [AJCC Stage: ____] : AJCC Stage: [unfilled] [de-identified] : Serena is a rodrigue 63 yo female, who has started developing SOB approximately 2 months ago, she went to ER on 11/2/2017 and on CXR was noted to have a large right sided pleural effusion. She underwent a thoracentesis, 1.6L of fluid was drained. 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 IHC was pos for CK7, PAX8, CK5/6 and Ca125, negative for TTF-1/Napsin, calretinin, CK20, mammaglobin, p63, villin, HAM56, GCDFP15, TAY-EP4. \par \par Her visit on 12/1/2017 Serena had an excisional biopsy of cervical node on 12/13/2017 revealing met adenocarcinoma, primary source still was not clear. On 12/14/2017 Serena was admitted with SOB, Pleurx catheter was placed on 12/14/2017. 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 Serena received 1st dose of carbo (auc of 5)/taxol(175mg/m2) on 12/15/2017 without complications, but the next day sustained a fall 2/2 vasovagal episode and developed asymptomatic SAH, that resolved on imaging by 12/21/2017.  [de-identified] : KRas WT, EGFR WT, Alk WT, ROS1 WT, PDL1-1%\par Normal MMR protein expression [de-identified] : 11/24/2017: She reports some SOB, especially ARZATE today. No fevers, no weight loss (reports some weight gain), no GI,  or GYN symptoms, except for increasing abdominal girth, she reports no blood in the stool or urine and no vaginal bleeding. She reports no CP and no palpitations, she reports worsening nonproductive cough, no hemoptysis. She also reports difficulty lying on left side and lying down flat. \par She reports left on/off facial numbness several months in duration. She had a CT of her head performed in [facility] within 1 year.  Additionally there is a freely mobile left cervical node present on exam, as per patient this was in place for approximately 1 year. \par \par 12/1/2017: Nidhi is here for follow up today. She had a therapeutic thoracentesis in  on 11/27/2017, 2L of fluid were removed, with much improved respiratory status. PET CT and MRI of the brain were done on 11/29/2017, findings were discussed today. There remains no clear primary source of malignancy. We have discussed that the source of tumor may be Mullerian vs lung. regardless of source chemotherapy needs to be initiated ASAP. We have discussed Carbo/taxol regiment that will cover both Mullerian and lung origin. Side effects including myelosuppression, peripheral neuropathy, allergic reaction and others.\par \par 1/2/2017 Since last visit Nidhi had an excisional biopsy of cervical node on 12/13/2017 revealing met adenocarcinoma, primary source still was not clear. On 12/14/2017 Nidhi was admitted with SOB, Pleurx catheter was placed on 12/14/2017. 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 Nidhi received 1st dose of carbo (auc of 5)/taxol(175mg/m2) on 12/15/2017 without complications, but the next day sustained a fall 2/2 vasovagal episode and developed asymptomatic SAH, that resolved on imaging by 12/21/2017. Today Nidhi's SOB has significantly improved. She reports very mild neuropathy in fingertips only. She reports no headache and offers no other complaints. \par \par 1/26/2018: Complains of some neuropathy, otherwise tolerating chemo with no difficulty. \par \par 2/16/2018: restaging CT C/A/P reviewed, significant improvement noted. Will refer to GYN/ONC. Reports slightly worsening neuropathy, not -painful, taking Gabapentin, no other symptoms. For cycle 4 of carbo/taxol today.\par \par 3/9/2018: Nidhi met with Dr. Massey, she is planned for surgery on 5/7/2018, after completion of 6 cycles of carbo/taxol and restaging PET CT ordered for mid April and follow up with Dr. Massey on April 20. She reports worsening neuropathy, and occasional pain and changes in vision in her left eye, eye symptoms come and go and are not very bothersome. She reports no other symptoms. \par \par 3/30/2018; Nidhi is here for cycle 6 of carbo/taxol, she continues to have neuropathy in finger and toes, but offers no other complaints, chemo has been dose reduced. \par \par 4/27/2018: Results of restaging PET CT s/p 6 cycles of carbo/taxol revealed 1. No new areas of abnormal increased uptake is seen to indicate \par biologically active disease.\par \par 2. Persistent PET positive left thyroid mass with a max SUV 33.1, \par previously max SUV 34.8. Further evaluation with thyroid ultrasound and \par if needed fine-needle aspiration biopsy will be helpful.\par \par 3.   PET positive left cervical lymph nodes mainly level 2 on the left \par with a max SUV 5.47previously 18. Minimal increased uptake in the \par bilateral axillary lymph nodes most likely reactive(less than 2.5)\par \par 4. Weakly PET positive, max SUV 2.89 right pleura, previous max SUV 5.3 \par with non-FDG avid right pleural effusion. \par \par 5. Previously FDG avid right and left supraclavicular lymphadenopathy, \par left internal mammary, bilateral paratracheal, para-aortic, para \par vertebral, carinal, mesenteric, right and left iliacs, right inguinal \par lymphadenopathy, small in size and no longer FDG avid.\par \par 6. No abnormal increased uptake is seen in the  lobulated uterus.\par \par 7. Overall there is marked improvement in the FDG avid disease.\par This was reviewed with Nidhi. She met with Dr. Massey on 4/20/2018, surgery is planned for 5/7/2018. She will also joselyn to meet with ENT re FDG avid thyroid nodule. Will assist in making he an appointment for her. \par Persistent neuropathy on gabapentin. \par \par 6/29/18 ; Patient came for follow up visit , evaluation for chemotherapy cycle 8 of carbo / Taxol , patient tolerating medication well , except neuropathy  recommend to have gabapentin  300 mg po daily.\par \par 7/20/2018: Nidhi present for follow up today, she has completed total of 8 cycles of Carbo/Taxol. She reports significant neuropathy in her hands and feet. We will discontinue chemotherapy today and plan to follow with close observation. She has thyroidectomy planned with Dr. Herrera on 8/20/2018, obtaining clearance for PMD. She is also planning to fly to [place] at the end of September. She reports no SOB, no GI or  symptoms. \par \par 9/12/2018: Nidhi offers no significant complaints today. She states neuropathy in her hands has almost completely resolved and neuropathy in her feet is significantly better. She had undergone complete thyroidectomy by Dr. Herrera on 8/20/2018, pathology revealed papillary thyroid cancer, measuring 2 cm, pT1b, other additional foci of papillary carcinoma were also noted, no LVI, surgical margins were clear, LN 0/2 had no carcinoma, stage [id number]. She is following up with Dr. Herrera tomorrow. She has still not gone for her vacation in [place]. \par \par 10/10/2018: Restaging PET CT on 9/26/2018 reveals Since 4/16/2018,  1. Post thyroidectomy with diffuse increased FDG uptake at the thyroid  bed likely representing post-surgical changes.  2. Subtle increased uptake is seen in the lamina , right side at the  level of T8 with a max SUV 4.6. This is not sufficient for metastatic  disease. Bone scan or MRI examination with contrast will be helpful for  further evaluation.  3. No other areas of abnormal increased uptake is seen. Images were personally reviewed by myself and discussed with Nidhi. \par She also had an Echo on 9/24/2018 revealing EF of 63%. \par Nidhi reports ongoing fatigue, she is unable to lose weight. She is taking Synthroid and following with Dr. Acevedo. \par She reports stiff fingers at night only, no painful neuropathy. \par She denies any other symptoms today. \par \par 12/11/2018: Nidhi feels well, neuropathy in hands and feet is much improved. S he is now complaining of r-sided facial pain associated with some swelling, pain comes and goes. She has already seen Dr. Herrera, who ordered CT of sinuses and prescribed pain relief meds. She is also due for restaging PET CT. Nidhi feels well otherwise. \par \par 1/9/2019: Restaging PET CT was performed on 12/18/2018 it revealed COMPARISON : 9/24/2018.  New left upper quadrant peritoneal nodule measuring 8 mm with an SUV max  of 7.3. This is suggestive of biologic tumor activity.  No other FDG avid lesions seen throughout the scan. Images were personally reviewed by myself and discussed with Nidhi, originally over the phone and again today. I have originally suggested to try AI in the interim, Nidhi however reported diarrhea at the time of the scan and declined intervention for now. We will plan for restaging PET CT to be performed in 6-8 weeks, this will be ordered today. She will follow up with Dr. Massey at [place] shortly and bring the disk for his review. I would also like her to meet with genetics, this will be arranged at [place] with Dr. Massey's help. Nidhi reports no new symptoms today, her neuropathy is manageable and  improving. She still reports unilateral headache that was work up in the past. Neurology eval was again suggested to her. She is following closely with Endocrinology re thyroid management. She will have follow up with Dr. Herrera shortly as well. \par \par 2/20/2019: Nidhi offers no new complaints. PET CT from 2/12/2019 revealed \par Since PET/CT of December 19, 2018, no new sites of pathologic FDG uptake\par Slight increase in size of left upper quadrant peritoneal nodule (1.1 cm, \par previously 0.8 cm) with stable FDG uptake, 7.7 SUV suspicious for \par biologic tumor activity.\par No other sites of pathologic FDG uptake \par Images were personally reviewed by myself and discussed with Nidhi, case was also discussed with Dr. Massey at [place]. Nidhi will have follow up with his shortly. She is following with endocrinology. reports improving neuropathy. No GI or  symptoms at this time. No weight loss. \par \par 3/20/2019: Nidhi had a peritoneal nodule biopsy done at [place], I have received a call from Dr. 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 We have discussed that surgery though could be attempted will not be the best therapeutic approach, recommencement of systemic therapy was discussed. I have not received the results from [place] yet. I have briefly discussed this with Nidhi today. Nidhi reports that she has acutely developed right lower extremity weakness 5-6 days ago, she did not inform any of her doctors about this. She also reports that her R upper extremity though not weak "does not feel normal either. She reports no back pain, there is no point tenderness, RLE is objectively weak on exam. I recommend ER evaluation to r/o CVA vs brain met, vs L-spine related issue. Recommend admission for work up. Nidhi is agreeable to get admitted. \par \par 5/8/2019: Nidhi present for follow up, she was diagnosed with multiple metastasis to the brain, MRI was done on 3/21/2019 and revealed \par Multiple supratentorial heterogeneously enhancing lesions consistent with \par metastatic disease. The appearance on the T2-weighted images is suggestive \par of adenocarcinoma. There is mass effect upon the left lateral ventricle and \par corpus callosum. \par She received whole brain irradiation by Dr. Dahl, completed it in the beginning of 4/2019, she is currently on steroid taper managed by Dr. Dahl, she is taking 4mg of Decadron daily. She will be stopping the Keppra as there have been no documented h/o seizures. She still reports impairment of the L visual field, she has an appointment coming up with Opthalmology. She reports no deficits walking after she has completed inpatient acute rehabilitation. \par Restaging PET CT on 5/1/2019 reveals COMPARISON : 2/12/2019 \par Multiple new FDG avid omental nodules largest measuring up to 10 mm, \par positive on nonattenuation corrected images. Findings are suggestive of \par biologic tumor activity. \par Again seen is a left upper quadrant peritoneal nodule, SUV max 6.8, \par previously 7.7 (12% decrease). \par Images were personally reviewed by myself and discussed with patient. \par She now reports mild abdominal bloating, no other symptoms. She reports her neuropathy has significantly improved. \par She has first evidence of recurrent disease documented 5.5 months after last carbo/taxol dose, the volume of disease was very small (1 8mm nodule), she was then observed for another 6 months and now she wishes to restart the chemotherapy. \par I have discussed with her that rechallenging with carbo/taxol may still prove to be effective as long as she gets restaged after 2 cycles. Given recent whole brain radiation and neuropathy I will offer her carbo/taxol weekly with does reduction on the taxol for neuropathy, We will plan to run carbo slowly to avoid allergic reaction. \par She is agreeable to start ASAP. \par \par 6/5/2019: Nidhi has completed 1 cycle of weekly Carbo/taxol, ran at slow rate to avoid Carbo reaction and has tolerated chemotherapy without difficulty, she does not complain of increase in neuropathy. She has ongoing vision changes in her L eye, she had no residual LE weakness. She is off Decadron and Keppra. She was advised to see ophthalmology. \par \par 7/3/2019: Nidhi is doing well.  Reports no problems with carbo/taxol.  Still complains of vision changes in L eye but is seeing ophthalmologist on 7/16.  Remains active around the house and cooks regularly.  No fevers, weight loss, anorexia.  ROS is otherwise only significant for occasional loose stools, no diarrhea.\par \par 7/17/2019: Nidhi is doing well, denies worsening neuropathy. She c/o feeling fatigued and tired. Her last chemo was 1 week ago(7/11/19) with carbo/taxol and is due again tomorrow. She saw ophthalmology and will need cataract surgery of the L eye. No c/o chest pain/SOB. No weight loss.\par CT C/A/P was done on 7/9/2019, images were personally reviewed by myself and discussed with several radiology attendings, they revealed \par CHEST:\par Filling defect within a segmental branch of the lower lobe pulmonary artery \par suspicious for pulmonary embolus. \par Stable left upper lobe and right middle lobe 3 mm nodules. \par CT abdomen and pelvis performed on the same day, see separate report. \par ADDENDUM: \par Please note that the morphology of the small thrombus within the left lower \par pulmonary artery is not indicative of an acute thrombus but rather a chronic \par appearance. \par This was discussed with Radiology, it was not deemed that thrombus was acute and may not have even been present at all, finding was deemed to be equivocal, based on radiology assessment anticoagulation was not indicated for the filling defect noted. Initially CTA was recommended, the recommendation was withdrawn upon further review. \par ABDOMEN/PELVIS:\par New 1.3 cm segment IV hepatic hypodensity seen on series 4 image 205. \par Lesion not seen on prior PET/CT from 5/1/2019 or abdomen and pelvis CT from \par \par 2/2/2018 and is consistent with a new metastasis. \par Two other hepatic lesions described above, decreased in size since 2/2/2018, \par consistent with treated hepatic metastases. \par Anterior perisplenic omental nodule measures 0.7 cm on series 2 image 53, \par previously measuring 1.2 cm on PET/CT dated 5/1/2019. \par All the other previously seen omental nodules have resolved since 5/1/2019. \par ADDENDUM:\par Case was discussed with Dr. Cid in detail. It is also possible that the \par new 1.3 cm lesion in the liver since February 2, 2018 represents a treated \par metastasis similar to the other liver lesions. \par PET/CT would be necessary to assess disease activity. \par I have discussed case with Radiology, and it was determined that there was no clear cut evidence of progression. PET CT was ordered today.\par This was discussed with Nidhi, will proceed with Carbo/Taxol for now. \par \par 8/1/2019: Nidhi reports no major side effects from weekly Carbo/Taxol, she reports no neuropathy. She has completed 10 cycles altogether. \par PET CT on 7/24/2019 revealed \par 1. Since May 1, 2019, no definite new site of pathologic FDG uptake to \par suggest new biologic tumor activity; specifically, no pathologic FDG uptake \par within previously noted 1.3 cm lesion within hepatic segment 4. \par 2. Increased FDG uptake within several subcentimeter bilateral cervical \par lymph nodes, which is nonspecific and may be postinflammatory; max SUV 9.2 \par is noted within a 0.7 cm right level 2 cervical node. Attention on follow-up \par is suggested. \par 3. Few peritoneal nodules have overall decreased in size. \par 4. No additional site of pathologic FDG uptake. \par Images were personally reviewed by myself and discussed with Nidhi. \par She wishes to continue with chemotherapy. Will plan for 3 additional cycles. Will consider adding Avastin, but with h/o inconclusive/possible chronic PE ppx anticoagulation maybe necessary. \par \par 8/28/2019: Nidhi reports feeling well, she denies worsening neuropathy, worsening neurological symptoms, SOB, abdominal pain, bloating. She reports she is planned to undergo  eye surgery towards the end of September. We will plan to hold chemotherapy briefly after this cycle to facilitate adequate counts and minimize complications. We again have briefly discussed considering Avastin based therapy, will hold off on this prior to surgery. 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 May proceed without formal PAST, will check CBC prior to procedure, PT and PTT today. \par \par 9/25/19:Nidhi reports feeling well, she denies any changes since last visit, No SOB, abdominal pain, bloating. She reports she is planned to undergo  eye surgery in 9/30/19  CBC reviewed with normal Hemogram . we will hold chem this week , she will resume after Cataract sx . 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 May proceed without formal PAST, will check CBC prior to procedure, PT and PTT today. \par \par 10/4/2019: Nidhi underwent successful eye surgery on 9/30/2019, she reports she can see much better now. Last dose of chemotherapy was administered on 9/20, she then was on hold for surgery. She has completed 5 additional cycles of Carbo/taxol. She is due for restaging. Her disease is only accurately assessed on PET CT, prior attempts to obtain CT scans resulted in additional imaging with PET, as findings were inconclusive. PET CT will be ordered to restage. \par In addition she is due for MRI of the brain. She has had a small amount of weight loss, mild neuropathy is persistent. She offers no additional complaints. \par She will have port placed on Monday, will hold chemotherapy until restaging scans complete. \par \par 10/23/2019: Nidhi feels well, and denies any new symptoms. Her eye surgery was successful, vision is much improved now. She had restaging scans. \par PET CT on 10/18/2019 revealed COMPARISON : 7/24/2019. \par No pathologic uptake to suggest biologic tumor activity. \par MRI of the brain on 10/15/2019 revealed \par Comparison with June 8 and March 21, 2019: (Generally lesions improved \par markedly since March but slightly increased in size since June) \par 1. Lesions previously demonstrated on the study of June 8, 2019 has \par remained stable or minimally increased in size \par 2. Specifically, right frontal opercular lesion measures about 1 cm and \par left posterior inferior temporal lobe lesion measures about 1 cm. These have \par increased since the previous study. \par 3. Small punctiform lesions within the right frontal white matter and left \par frontal sulcus or more apparent than on the study of June 8 \par 4. These lesions are all much smaller than the earlier MRI of March 21, \par 2019. \par 5. No new lesions. \par Images were personally reviewed by myself and discussed with patient. She remains asymptomatic and off the steroids. \par I have also discussed the case with Dr. Dahl. I would like Nidhi to discuss the options with [facility], she maybe a candidate for brain SRS. \par She has been off Carbo/Taxol for a few weeks, she remains Platinum sensitive. I have discussed the option of switching her over to Avastin maintenance, will need to prophylax with low dose Eliquis as well, given questionable finding of small PE in the past. \par Side effects of Avastin were discussed at length, including HNT, proteinuria, small risk of DVT/PE, GI perforations etc. \par She is agreeable to start after [facility] consultation. \par \par 11/5/2019: Nidhi feels OK, she reports acute onset head discomfort that lasts minutes and subsides, the episodes are becoming more frequent 1-2 times a day. She has seen Dr. Dahl, who wishes to hold off on offering SRS to the brain. Plan was to start on Avastin today. Nidhi reports she currently has a lapse in her insurance coverage till 12/2019, I will not be able to start he on ppx dose Eliquis for now, she instead will take baby ASA every other day, to modify risk of DVT/PE. Will plan to switch over to Eliquis at 2.5mg BID once insurance is active. She also contemplated going to [place] with her daughter, I am willing to hold Avastin until she comes back, but Nidhi wishes to start right away, I explained to her that flight may result in complications if that is her wish. She will cancel the trip and start with Avastin today. Side effects have been discussed. \par \par 11/20/2019: Nidhi came for a follow up visit, she reports feeling well, she reports less fatigue. She is s/p first dose of Avastin on 11/5/2019, she tolerated Avastin without difficulty, she is due for the 2nd dose today. \par We communicated CBC result with HGB of 12.3, normal platelet and WBCs. She lost like 5 pounds secondary to gum problems, she will follow up with her dentist this week. She is currently on baby ASA every other day. Continue with single agent Avastin.  \par \par 12/17/2019: Nidhi came for a follow up visit, she reports feeling well, she experienced pruritic rash on last Saturday 12/14/19, which improved over 2 days with Benadryl alone.  Today her skin rash has completely resolved. We will add Solu-Medrol 100 mg IV prior to Avastin. Nidhi reports less fatigue. She is due for the 3rd dose of Avastin today. \par We communicated CBC result with HGB of 12.3, normal platelet and WBCs. \par \par 1/7/2019: Nidhi is doing well, reports good appetite, she has lost 1lb. Reports no GI or pulmonary symptoms. She reports her gums are bleeding occasionally. No new neurological symptoms. She reports no rash after last cycle of Avastin. \par \par 1/28/2020: Nidhi is here for follow up visit, she has no difficulty tolerating Avastin, she reports L sided chronic eye discomfort, neuropathic in nature. Neurontin has been helpful in the past, will reorder this. \par PET CT on 1/22/2020 revealed COMPARISON : 10/18/2019. \par Anterior perisplenic nodule measuring 8 mm is FDG avid, SUV max 8.9, \par consistent with biologic tumor activity. \par MRI of the brain on 1/15/2020 revealed \par In comparison to the previous brain MRI dated 10/15/2019: \par 1. Redemonstrated scattered enhancing lesions consistent with metastatic \par disease, with overall good treatment response since the prior exam. \par 2. No significant interval change in the ovoid lesion in the superior left \par frontal lobe measuring 12 mm. \par 3. Decreased size of the right opercular lesion measuring 6 mm, previously \par 11 mm. Decreased size of the left inferior occipital/tentorial lesion \par measuring 6 mm, previously 11 mm. The previously seen right frontal white \par matter punctate lesion is no longer visualized. \par 4. No new lesions are demonstrated. \par All images were personally reviewed by myself and discussed with Nidhi. I explained to her I am concerned about the perisplenic lesion concerning for disease progression, but it is isolated and very small, asymptomatic. There is definitely a positive response to Avastin in the brain and given that, we will continue with Avastin with short term follow up imaging. Nidhi knows to inform me with any new symptoms immediately. \par \par 2/18/2020: NIDHI BANKS is a 64 year old female here today for follow up visit. She completed 5 cycles of Avastin; tolerating well at this time. She denies fever, chills, change in mental status, or change in weight. She complains of left painful facial swelling. In addition, she complains of swelling of fingers, joint pain and numbness in her toes. She continues on Gabapentin 100mg TID with no symptom relief and wishes to try to go up on the dose. She complains of mild gingival bleeding in the morning. Last PET showed new isolated perisplenic lesion suspicious for disease progression, however MRI of the brain appeared better on Avastin, so the plan was to continue with Avastin with short term follow up. \par \par 3/10/2020: NIDHI BANKS is a 64 year old female here today for follow up visit. She denies new neurological symptoms, skin rash, fever, or change in weight. She continues of Gabapentin which was increased to 200 mg TID; neuropathy improving. She continues to complain of mild gingival bleeding. Left facial swelling and numbness resolved. She has completed cycle 6 of Avastin without complication. \par \par 3/31/91183: Nidhi presented today for routine f/u, she feels fine. Denies any chest pain, sob, fever, chills and cough. Reports that she continues to have gum bleeding. Neuropathy is better with gabapentin, she decreased the dose to 4 pills a day. She tolerated 7th cycle well and is due to get 8th cycle today . She will get MRI brain on 4/2/20.\par She reports minor gum bleeding from Avastin and dental infection on the left, will prescribe Amoxicillin. \par \par 4/21/2020: NIDHI BANKS is a 64 year old female here today for follow up visit for endometrial cancer. S/p cycle 8 of Avastin. Patient denies fever, chills, SOB, cough and pain. She states that her gums have minimal bleeding and dental infection improved after amoxicillin. She complains of mild fatigue. Restaging MRI of the brain was completed on 4/2/2020 which showed stable bilateral parenchymal hemorrhagic lesions compatible with known metastases; no new enhancing lesions are identified. \par Images were personally reviewed by MD Jose Roberto and discussed with patient. \par \par 5/12/2020: MRI brain on 4/2/2020 reveals 1. Stable bilateral parenchymal hemorrhagic lesions compatible with known \par metastases. \par 2. No new enhancing lesions are identified. \par PET CT on 4/30/2020 revealed Right upper quadrant peritoneal implant adjacent to the inferior tip of the \par liver 4.7 (image [id number]) \par Right lower quadrant 0.5 cm peritoneal nodule 7.2 (image 111) \par Right lower quadrant subcentimeter peritoneal nodule 2.6 (image [id number]) \par Anterior perisplenic peritoneal nodule 12.3-48% increase from 8.9 \par (remeasured) previously \par Stable non-FDG avid (attenuation corrected and nonattenuation corrected \par images) 4 mm right middle lobe pulmonary nodule-image 181) \par  No other definite sites of abnormal FDG uptake \par IMPRESSION: \par Compared to 1/22/2020: \par new FDG avid peritoneal implants in right lower quadrant of the abdomen \par suspicious for biologic tumor activity \par 48% interval increase in SUV in anterior perisplenic peritoneal nodule (SUV \par 12.3 vs 8.9 remeasured) \par In retrospect stable FDG avid peritoneal implant in the right upper quadrant \par of the abdomen (SUV 4.7 vs 3.8 remeasured-image 134) \par No other definite sites of abnormal FDG uptake \par Images were personally reviewed by MD Jose Roberto and discussed with patient.\par Given minimal degree of progression in the abdomen and persistent control intracranially, we have discussed continuing Avastin for now and short term restaging. \par Nidhi offers no complaints today. \par \par 6/2/2020: The patient is here for follow up. She is due for Avastin today . She has  no major complaints . She reported gum bleeding and swelling, which is much better today . She also reported hand swelling bilaterally. She denies fever, chills, no respiratory, cardiac, GI/ symptoms. She denies headaches or neurological deficits. \par \par 6/23/2020: Nidhi is 65 years old female came for a follow up visit for papillary-serous endometrial carcinoma with brain metastasis.  She reported feeling well. \par She denies dizziness, change of mental status, fever, chills, SOB. 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  She reported feeling well. \par She denies dizziness, change of mental status, fever, chills, SOB. \par We reviewed MRI of the brain from 7/5/20 that revealed \par The study is considered stable since April 2, 2020 with the following \par findings: \par 1. 3 lesions are being followed, in the left frontal, right frontal and \par left occipital lobes. \par 2. The lesions are essentially stable, nonenhancing and probably mildly \par hemorrhagic \par 3. A punctiform lesion in the right frontal lobe (series 11 image 7) was \par probably present on the prior study. This is stable and probably not new. \par 4. Any differences in size are probably due to differences in the way the \par patient was scanned. \par Images were personally reviewed by MD Jose Roberto and discussed with patient. \par We will continue the current treatment with  Avastin one every 3 weeks.  Patient reports improvement in neuropathy with gabapentin 200 mg every 12 hrs.\par Patient is due for repeat PET Scan at the end of July,2020.\par \par 8/4/2020: NIDHI BANKS is a 65 year old female here today for follow up visit for endometrial cancer. S/p cycle 11 of Avastin. Patient denies fever, chills, SOB, cough and pain.\par PET CT from 7/29/2020 revealed \par Compared to 4/30/2020: \par 1 new FDG avid near midline peritoneal implant (SUV 4.3) consistent with biologic tumor activity \par 1 new left cervical level V FDG avid lymph node (SUV 8.7) suspicious for biologic tumor activity in light of the history of papillary thyroid carcinoma \par Interval increases in SUV: 115% increase in SUV in right lower quadrant peritoneal implant \par 97% increase in SUV in right upper quadrant peritoneal implant -66% increase in SUV in level 3 cervical lymph node \par Interval 36% decrease in SUV in anterior perisplenic nodule (7.9 vs 12.3) \par Stable FDG avid right upper quadrant implants and right level 5 cervical lymph node \par No other sites of abnormal FDG uptake \par We discussed other different treatment option.\par Images were personally reviewed by MD Jose Roberto and discussed with patient.\par We have discussed that there is undeniable progression systemically but still benefit in the CNS. I will review her chart and determine if restarting weekly carbo/taxol is of value, vs adding an additional agent vs starting on Keytruda/Lenvima is at this point indicated. \par Patient will continue with one additional dose of Avastin today.\par \par 8/25/2020: Nidhi is feeling well, no complaints today. At this point given minimal but sustained progression we will plan to switch over to Kaytruda combination with Lenvima. \par We have gone over side effects of immunotherapy at length, including but not limited to autoimmune mediated pneumonitis, enteritis, dermatitis, thyroiditis, damage to the kidneys, liver, pancreas, pituitary gland etc. patient and family voice understanding and are in agreement to proceed with treatment.\par Side effects of Lenvima including, but not limited to, cytopenias, that may require blood product transfusions and lead to increased risk of infection, requiring hospitalization, allergic reactions, that can rarely be life-threatening, skin reactions, nausea/vomiting, mucositis, diarrhea/constipation, QT prolongation, GI perforation, HTN etc were discussed at length, patient and family voice undestaging, all questions were answered to patient's satisfaction.\par \par 9/15/20 Nidhi presented today for f/u and to get treatment . On last visit she was started on keytruda and lenvima . She was told to start with 10 mg a day , but she started taking 20 mg a day . Developed diffuse muscle aches and pains , reports has pain in her mouth as well . Also developed changes in her voice , its becoming more and more hoarse now . Also reported pain in her left shoulder , which is improved now . She has been coming for wkly CBC , since she started treatment , counts have been stable . Had a detailed discussion with Nidhi, her symptoms are likely from treatment with lenvima , she should have escalated the dose from 10 mg to 20 mg , if it was well tolerated , since she started taking 20 mg daily ,  that’s why she has severe adverse effects. She is recommended to hold off on treatment for 4 days and then restart it at 10 mg daily . She demonstrated understanding . Will proceed with keytruda today . \par \par 10/6/20:  Patient here for follow up visit for stage IV endometrial cancer.  She is taking lower dose of Lenvima, 10 mg.  She is complaining of hoarseness and mouth soreness over the last couple of weeks.  She also has left arm and shoulder weakness.  Although she admits she feels somewhat better on lower dose of Lenvima.  She is very fatigued and does very little activity during the day and is losing weight.\par \par 10/20/2020: NIDHI is here for follow up visit for endometrial cancer. She was on dose reduced Lenvima co currently with Keytruda. She developed mouth sores and complications from Lenvima. She states that she is feeling well today. We contemplated starting on Doxil but she states she is feeling better after stopping Lenvima. Repeat PET from 10/16/2020 showed:  \par IMPRESSION:\par 1.  Since July 29, 2020, increased FDG uptake in multiple peritoneal nodules/tumor implants (max SUV 12.9 in a left upper quadrant nodule, reflecting 63% increase).\par 2.  Resolved FDG avid left cervical lymph nodes.\par 3.  No definite new sites of abnormal FDG uptake.\par \par 11/17/2020: Nidhi is feeling great, reports no complaints at all, she has gained 2 pounds, reports good appetite. CBC was reviewed. She will continue with Keytruda single agent, she had significant difficulty tolerating Lenvima even at doses lower than recommended. We will defer mammogram for now.

## 2020-11-17 NOTE — REVIEW OF SYSTEMS
[Fatigue] : fatigue [Negative] : Allergic/Immunologic [Recent Change In Weight] : ~T no recent weight change [Dysphagia] : no dysphagia [Nosebleeds] : no nosebleeds [Chest Pain] : no chest pain [Palpitations] : no palpitations [Lower Ext Edema] : no lower extremity edema [Shortness Of Breath] : no shortness of breath [Wheezing] : no wheezing [Cough] : no cough [SOB on Exertion] : no shortness of breath during exertion [Easy Bleeding] : no tendency for easy bleeding [FreeTextEntry2] : hoarse [FreeTextEntry9] : swelling and arthritic changes to hands [de-identified] : no gait impairment- numbness and swelling of bilateral hands

## 2020-11-19 NOTE — PATIENT PROFILE ADULT. - TEACHING/LEARNING CULTURAL CONSIDERATIONS
Vitals: Temp: 97.5  F (36.4  C) Temp src: Oral BP: 124/73 Pulse: 87   Resp: 24 SpO2: 95 % O2 Device: Nasal cannula Oxygen Delivery: 4 LPM  Neuro: AOx4  Respiratory: LS clr/ dim. O2 increased to 5L at beginning of shift, now on 4L nc. Desats with exertion.   Cardiac/tele: WDL  GI/: WDL  Skin: WDL  LDAs: PIV to right SL  Labs: COVID positive, special precautions maintained.   Diet: Regular   Activity: SBA  Plan: Plan to discharge once on ra. Will continue POC.      none

## 2020-12-02 NOTE — REASON FOR VISIT
Pt scheduled for 12/8/20      ----- Message from Saul Kurtz sent at 12/2/2020 10:23 AM CST -----  Regarding: fibroids  Type:  Patient Returning Call    Who Called:patient   Who Left Message for Patient:n/a  Does the patient know what this is regarding?:fibroids getting worse  Would the patient rather a call back or a response via [facility]? Call back   Best Call Back Number:[phone]  Additional Information: n/a         [Follow-Up Visit] : a follow-up [FreeTextEntry2] : endometrial cancer

## 2020-12-08 PROBLEM — G62.9 PERIPHERAL NEUROPATHY: Status: ACTIVE | Noted: 2018-04-25

## 2020-12-18 NOTE — REVIEW OF SYSTEMS
[Fatigue] : fatigue [Negative] : Allergic/Immunologic [Recent Change In Weight] : ~T no recent weight change [Dysphagia] : no dysphagia [Nosebleeds] : no nosebleeds [Chest Pain] : no chest pain [Palpitations] : no palpitations [Lower Ext Edema] : no lower extremity edema [Shortness Of Breath] : no shortness of breath [Wheezing] : no wheezing [Cough] : no cough [SOB on Exertion] : no shortness of breath during exertion [Easy Bleeding] : no tendency for easy bleeding [FreeTextEntry2] : hoarse [FreeTextEntry9] : swelling and arthritic changes to hands [de-identified] : no gait impairment- numbness and swelling of bilateral hands

## 2020-12-18 NOTE — RESULTS/DATA
[FreeTextEntry1] : EXAM:  MR BRAIN WAW IC      \par \par \par PROCEDURE DATE:  06/08/2019  \par \par \par \par \par INTERPRETATION:  Clinical History / Reason for exam: Dizziness and \par vertigo, history of uterine and thyroid cancer, for evaluation of \par metastatic disease, lesion seen on prior MRI of the brain.\par \par MRI OF THE BRAIN WITHOUT AND WITH CONTRAST\par \par TECHNIQUE:\par \par Multiplanar multisequence imaging of the brain was performed on the \[facility] open magnet before and after the intravenous administration of \par 7.5 cc of Gadavist including brain lab protocol.\par \par COMPARISON:\par \par MRI of the brain without and with contrast dated March 12, 2019.\par \par FINDINGS:\par \par The previously described supratentorial lesions have almost completely \par resolved.\par \par The lesion in the left posterior frontal region now measures 1.4 cm in \par maximum diameter, the lesion in the anterior right frontal lobe measures \par 0.7 cm in maximum diameter, the second lesion in the right frontal lobe \par measures 0.1 cm in maximum diameter, the lesion in the right posterior \par parietal region measures 0.3 cm in maximum diameter, and the left \par occipital lobe lesion measures 1.2 cm in maximum diameter. (Previously \par measuring 2.6, 2.4, 0.5, 0.7, and 2.5 cm respectively).\par \par The third, fourth, and lateral ventricles are normal in size and \par position. There is no shift of the midline structures.\par \par The cerebellar tonsils are minimally low-lying (0.3 cm of cerebellar \par ectopia).\par \par Incidental note is made of a tiny medial cyst.\par \par There are scattered T2/FLAIR hyperintense signal intensities in the\par subcortical white matter which are nonspecific differential diagnostic\par possibilities include chronic ischemic change, foci of gliosis or\par demyelination.\par \par IMPRESSION:\par \par In comparison with the prior MRI of the brain dated March 21, 2019:\par \par There has been almost complete resolution of most of of the previously \par described supratentorial lesions.\par \par There are no new enhancing lesions.\par \par \par \par \par \par \par CRISTINA MÉNDEZ M.D., ATTENDING RADIOLOGIST\par This document has been electronically signed. Oren 10 2019  1:17PM\par   \par \par   \par \par \par [id number]^RI^DC\par \par EXAM:  PETCT SKUL-THI ONC FDG SUBS      \par \par \par PROCEDURE DATE:  05/01/2019  \par \par \par \par \par INTERPRETATION:  \par FDG PET CT STUDY   Subsequent  treatment strategy\par REASON: TUMOR IMAGING - PET with concurrently acquired CT for attenuation \par correction and anatomic localization; skull base to mid - thigh .\par \par CPT code 12182.\par \par Fasting blood glucose level:     91 mg/dl\par \par HISTORY: Endometrial cancer. Stage IV with brain metastatic disease.\par \par TECHNIQUE: Approximately 45 minutes after the intravenous administration \par of 10.7 mCi 18-Fluorine FDG, whole body PET images were acquired from \par base of skull to mid - thigh.\par \par CT protocol used for this PET/CT study is designed for attenuation \par correction and anatomic localization of PET findings.  This  CT \par is not designed to replace state-of-the-art diagnostic CT scans for \par specific imaging protocols of different body parts.\par \par COMPARISON : 2/12/2019.\par Correlation: MRI of the brain on 3/21/2019.\par \par FINDINGS:\par \par Head/Neck: No biologically active head/neck lesions.     \par Normal uptake within the brain, pharyngeal lymphoid tissue, salivary \par glands and laryngeal musculature is noted.    \par \par Thorax:   No suspicious hypermetabolic mediastinal, hilar, lung \par parenchymal lesions.\par \par Abdomen/Pelvis: Physiologic GI/  activity. \par \par Again seen is a left upper quadrant peritoneal nodule, SUV max 6.8, \par previously 7.7 (12% decrease). \par \par Multiple new FDG avid omental nodules largest of which is adjacent to the \par distal transverse colon, measuring 10 mm, positive on nonattenuation \par corrected images, axial image 134. \par \par No other abnormal visceral or guillaume tracer uptake is present.    \par \par Musculoskeletal :  No suspicious hypermetabolic osseous lesions.\par \par Additional CT findings:\par Status post hysterectomy.\par Colonic diverticulosis.\par \par IMPRESSION: \par \par COMPARISON : 2/12/2019\par \par Multiple new FDG avid omental nodules largest measuring up to 10 mm, \par positive on nonattenuation corrected images. Findings are suggestive of \par biologic tumor activity.\par \par Again seen is a left upper quadrant peritoneal nodule, SUV max 6.8, \par previously 7.7 (12% decrease). \par \par \par \par \par

## 2020-12-18 NOTE — HISTORY OF PRESENT ILLNESS
[Disease: _____________________] : Disease: [unfilled] [AJCC Stage: ____] : AJCC Stage: [unfilled] [de-identified] : Serena is a rodrigue 63 yo female, who has started developing SOB approximately 2 months ago, she went to ER on 11/2/2017 and on CXR was noted to have a large right sided pleural effusion. She underwent a thoracentesis, 1.6L of fluid was drained. 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 IHC was pos for CK7, PAX8, CK5/6 and Ca125, negative for TTF-1/Napsin, calretinin, CK20, mammaglobin, p63, villin, HAM56, GCDFP15, TAY-EP4. \par \par Her visit on 12/1/2017 Serena had an excisional biopsy of cervical node on 12/13/2017 revealing met adenocarcinoma, primary source still was not clear. On 12/14/2017 Serena was admitted with SOB, Pleurx catheter was placed on 12/14/2017. 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 Serena received 1st dose of carbo (auc of 5)/taxol(175mg/m2) on 12/15/2017 without complications, but the next day sustained a fall 2/2 vasovagal episode and developed asymptomatic SAH, that resolved on imaging by 12/21/2017.  [de-identified] : KRas WT, EGFR WT, Alk WT, ROS1 WT, PDL1-1%\par Normal MMR protein expression [de-identified] : 11/24/2017: She reports some SOB, especially ARZATE today. No fevers, no weight loss (reports some weight gain), no GI,  or GYN symptoms, except for increasing abdominal girth, she reports no blood in the stool or urine and no vaginal bleeding. She reports no CP and no palpitations, she reports worsening nonproductive cough, no hemoptysis. She also reports difficulty lying on left side and lying down flat. \par She reports left on/off facial numbness several months in duration. She had a CT of her head performed in [facility] within 1 year.  Additionally there is a freely mobile left cervical node present on exam, as per patient this was in place for approximately 1 year. \par \par 12/1/2017: Nidhi is here for follow up today. She had a therapeutic thoracentesis in  on 11/27/2017, 2L of fluid were removed, with much improved respiratory status. PET CT and MRI of the brain were done on 11/29/2017, findings were discussed today. There remains no clear primary source of malignancy. We have discussed that the source of tumor may be Mullerian vs lung. regardless of source chemotherapy needs to be initiated ASAP. We have discussed Carbo/taxol regiment that will cover both Mullerian and lung origin. Side effects including myelosuppression, peripheral neuropathy, allergic reaction and others.\par \par 1/2/2017 Since last visit Nidhi had an excisional biopsy of cervical node on 12/13/2017 revealing met adenocarcinoma, primary source still was not clear. On 12/14/2017 Nidhi was admitted with SOB, Pleurx catheter was placed on 12/14/2017. 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 Nidhi received 1st dose of carbo (auc of 5)/taxol(175mg/m2) on 12/15/2017 without complications, but the next day sustained a fall 2/2 vasovagal episode and developed asymptomatic SAH, that resolved on imaging by 12/21/2017. Today Nidhi's SOB has significantly improved. She reports very mild neuropathy in fingertips only. She reports no headache and offers no other complaints. \par \par 1/26/2018: Complains of some neuropathy, otherwise tolerating chemo with no difficulty. \par \par 2/16/2018: restaging CT C/A/P reviewed, significant improvement noted. Will refer to GYN/ONC. Reports slightly worsening neuropathy, not -painful, taking Gabapentin, no other symptoms. For cycle 4 of carbo/taxol today.\par \par 3/9/2018: Nidhi met with Dr. Massey, she is planned for surgery on 5/7/2018, after completion of 6 cycles of carbo/taxol and restaging PET CT ordered for mid April and follow up with Dr. Massey on April 20. She reports worsening neuropathy, and occasional pain and changes in vision in her left eye, eye symptoms come and go and are not very bothersome. She reports no other symptoms. \par \par 3/30/2018; Nidhi is here for cycle 6 of carbo/taxol, she continues to have neuropathy in finger and toes, but offers no other complaints, chemo has been dose reduced. \par \par 4/27/2018: Results of restaging PET CT s/p 6 cycles of carbo/taxol revealed 1. No new areas of abnormal increased uptake is seen to indicate \par biologically active disease.\par \par 2. Persistent PET positive left thyroid mass with a max SUV 33.1, \par previously max SUV 34.8. Further evaluation with thyroid ultrasound and \par if needed fine-needle aspiration biopsy will be helpful.\par \par 3.   PET positive left cervical lymph nodes mainly level 2 on the left \par with a max SUV 5.47previously 18. Minimal increased uptake in the \par bilateral axillary lymph nodes most likely reactive(less than 2.5)\par \par 4. Weakly PET positive, max SUV 2.89 right pleura, previous max SUV 5.3 \par with non-FDG avid right pleural effusion. \par \par 5. Previously FDG avid right and left supraclavicular lymphadenopathy, \par left internal mammary, bilateral paratracheal, para-aortic, para \par vertebral, carinal, mesenteric, right and left iliacs, right inguinal \par lymphadenopathy, small in size and no longer FDG avid.\par \par 6. No abnormal increased uptake is seen in the  lobulated uterus.\par \par 7. Overall there is marked improvement in the FDG avid disease.\par This was reviewed with Nidhi. She met with Dr. Massey on 4/20/2018, surgery is planned for 5/7/2018. She will also joselyn to meet with ENT re FDG avid thyroid nodule. Will assist in making he an appointment for her. \par Persistent neuropathy on gabapentin. \par \par 6/29/18 ; Patient came for follow up visit , evaluation for chemotherapy cycle 8 of carbo / Taxol , patient tolerating medication well , except neuropathy  recommend to have gabapentin  300 mg po daily.\par \par 7/20/2018: Nidhi present for follow up today, she has completed total of 8 cycles of Carbo/Taxol. She reports significant neuropathy in her hands and feet. We will discontinue chemotherapy today and plan to follow with close observation. She has thyroidectomy planned with Dr. Herrera on 8/20/2018, obtaining clearance for PMD. She is also planning to fly to [place] at the end of September. She reports no SOB, no GI or  symptoms. \par \par 9/12/2018: Nidhi offers no significant complaints today. She states neuropathy in her hands has almost completely resolved and neuropathy in her feet is significantly better. She had undergone complete thyroidectomy by Dr. Herrera on 8/20/2018, pathology revealed papillary thyroid cancer, measuring 2 cm, pT1b, other additional foci of papillary carcinoma were also noted, no LVI, surgical margins were clear, LN 0/2 had no carcinoma, stage [id number]. She is following up with Dr. Herrera tomorrow. She has still not gone for her vacation in [place]. \par \par 10/10/2018: Restaging PET CT on 9/26/2018 reveals Since 4/16/2018,  1. Post thyroidectomy with diffuse increased FDG uptake at the thyroid  bed likely representing post-surgical changes.  2. Subtle increased uptake is seen in the lamina , right side at the  level of T8 with a max SUV 4.6. This is not sufficient for metastatic  disease. Bone scan or MRI examination with contrast will be helpful for  further evaluation.  3. No other areas of abnormal increased uptake is seen. Images were personally reviewed by myself and discussed with Nidhi. \par She also had an Echo on 9/24/2018 revealing EF of 63%. \par Nidhi reports ongoing fatigue, she is unable to lose weight. She is taking Synthroid and following with Dr. cAevedo. \par She reports stiff fingers at night only, no painful neuropathy. \par She denies any other symptoms today. \par \par 12/11/2018: Nidhi feels well, neuropathy in hands and feet is much improved. S he is now complaining of r-sided facial pain associated with some swelling, pain comes and goes. She has already seen Dr. Herrera, who ordered CT of sinuses and prescribed pain relief meds. She is also due for restaging PET CT. Nidhi feels well otherwise. \par \par 1/9/2019: Restaging PET CT was performed on 12/18/2018 it revealed COMPARISON : 9/24/2018.  New left upper quadrant peritoneal nodule measuring 8 mm with an SUV max  of 7.3. This is suggestive of biologic tumor activity.  No other FDG avid lesions seen throughout the scan. Images were personally reviewed by myself and discussed with Nidhi, originally over the phone and again today. I have originally suggested to try AI in the interim, Nidhi however reported diarrhea at the time of the scan and declined intervention for now. We will plan for restaging PET CT to be performed in 6-8 weeks, this will be ordered today. She will follow up with Dr. Massey at [facility] shortly and bring the disk for his review. I would also like her to meet with genetics, this will be arranged at [facility] with Dr. Massey's help. Nidhi reports no new symptoms today, her neuropathy is manageable and  improving. She still reports unilateral headache that was work up in the past. Neurology eval was again suggested to her. She is following closely with Endocrinology re thyroid management. She will have follow up with Dr. Herrera shortly as well. \par \par 2/20/2019: Nidhi offers no new complaints. PET CT from 2/12/2019 revealed \par Since PET/CT of December 19, 2018, no new sites of pathologic FDG uptake\par Slight increase in size of left upper quadrant peritoneal nodule (1.1 cm, \par previously 0.8 cm) with stable FDG uptake, 7.7 SUV suspicious for \par biologic tumor activity.\par No other sites of pathologic FDG uptake \par Images were personally reviewed by myself and discussed with Nidhi, case was also discussed with Dr. Massey at [facility]. Nidhi will have follow up with his shortly. She is following with endocrinology. reports improving neuropathy. No GI or  symptoms at this time. No weight loss. \par \par 3/20/2019: Nidhi had a peritoneal nodule biopsy done at [facility], I have received a call from Dr. 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 We have discussed that surgery though could be attempted will not be the best therapeutic approach, recommencement of systemic therapy was discussed. I have not received the results from [facility] yet. I have briefly discussed this with Nidhi today. Nidhi reports that she has acutely developed right lower extremity weakness 5-6 days ago, she did not inform any of her doctors about this. She also reports that her R upper extremity though not weak "does not feel normal either. She reports no back pain, there is no point tenderness, RLE is objectively weak on exam. I recommend ER evaluation to r/o CVA vs brain met, vs L-spine related issue. Recommend admission for work up. Nidhi is agreeable to get admitted. \par \par 5/8/2019: Nidhi present for follow up, she was diagnosed with multiple metastasis to the brain, MRI was done on 3/21/2019 and revealed \par Multiple supratentorial heterogeneously enhancing lesions consistent with \par metastatic disease. The appearance on the T2-weighted images is suggestive \par of adenocarcinoma. There is mass effect upon the left lateral ventricle and \par corpus callosum. \par She received whole brain irradiation by Dr. Dahl, completed it in the beginning of 4/2019, she is currently on steroid taper managed by Dr. Dahl, she is taking 4mg of Decadron daily. She will be stopping the Keppra as there have been no documented h/o seizures. She still reports impairment of the L visual field, she has an appointment coming up with Opthalmology. She reports no deficits walking after she has completed inpatient acute rehabilitation. \par Restaging PET CT on 5/1/2019 reveals COMPARISON : 2/12/2019 \par Multiple new FDG avid omental nodules largest measuring up to 10 mm, \par positive on nonattenuation corrected images. Findings are suggestive of \par biologic tumor activity. \par Again seen is a left upper quadrant peritoneal nodule, SUV max 6.8, \par previously 7.7 (12% decrease). \par Images were personally reviewed by myself and discussed with patient. \par She now reports mild abdominal bloating, no other symptoms. She reports her neuropathy has significantly improved. \par She has first evidence of recurrent disease documented 5.5 months after last carbo/taxol dose, the volume of disease was very small (1 8mm nodule), she was then observed for another 6 months and now she wishes to restart the chemotherapy. \par I have discussed with her that rechallenging with carbo/taxol may still prove to be effective as long as she gets restaged after 2 cycles. Given recent whole brain radiation and neuropathy I will offer her carbo/taxol weekly with does reduction on the taxol for neuropathy, We will plan to run carbo slowly to avoid allergic reaction. \par She is agreeable to start ASAP. \par \par 6/5/2019: Nidhi has completed 1 cycle of weekly Carbo/taxol, ran at slow rate to avoid Carbo reaction and has tolerated chemotherapy without difficulty, she does not complain of increase in neuropathy. She has ongoing vision changes in her L eye, she had no residual LE weakness. She is off Decadron and Keppra. She was advised to see ophthalmology. \par \par 7/3/2019: Nidhi is doing well.  Reports no problems with carbo/taxol.  Still complains of vision changes in L eye but is seeing ophthalmologist on 7/16.  Remains active around the house and cooks regularly.  No fevers, weight loss, anorexia.  ROS is otherwise only significant for occasional loose stools, no diarrhea.\par \par 7/17/2019: Nidhi is doing well, denies worsening neuropathy. She c/o feeling fatigued and tired. Her last chemo was 1 week ago(7/11/19) with carbo/taxol and is due again tomorrow. She saw ophthalmology and will need cataract surgery of the L eye. No c/o chest pain/SOB. No weight loss.\par CT C/A/P was done on 7/9/2019, images were personally reviewed by myself and discussed with several radiology attendings, they revealed \par CHEST:\par Filling defect within a segmental branch of the lower lobe pulmonary artery \par suspicious for pulmonary embolus. \par Stable left upper lobe and right middle lobe 3 mm nodules. \par CT abdomen and pelvis performed on the same day, see separate report. \par ADDENDUM: \par Please note that the morphology of the small thrombus within the left lower \par pulmonary artery is not indicative of an acute thrombus but rather a chronic \par appearance. \par This was discussed with Radiology, it was not deemed that thrombus was acute and may not have even been present at all, finding was deemed to be equivocal, based on radiology assessment anticoagulation was not indicated for the filling defect noted. Initially CTA was recommended, the recommendation was withdrawn upon further review. \par ABDOMEN/PELVIS:\par New 1.3 cm segment IV hepatic hypodensity seen on series 4 image 205. \par Lesion not seen on prior PET/CT from 5/1/2019 or abdomen and pelvis CT from \par \par 2/2/2018 and is consistent with a new metastasis. \par Two other hepatic lesions described above, decreased in size since 2/2/2018, \par consistent with treated hepatic metastases. \par Anterior perisplenic omental nodule measures 0.7 cm on series 2 image 53, \par previously measuring 1.2 cm on PET/CT dated 5/1/2019. \par All the other previously seen omental nodules have resolved since 5/1/2019. \par ADDENDUM:\par Case was discussed with Dr. Cid in detail. It is also possible that the \par new 1.3 cm lesion in the liver since February 2, 2018 represents a treated \par metastasis similar to the other liver lesions. \par PET/CT would be necessary to assess disease activity. \par I have discussed case with Radiology, and it was determined that there was no clear cut evidence of progression. PET CT was ordered today.\par This was discussed with Nidhi, will proceed with Carbo/Taxol for now. \par \par 8/1/2019: Nidhi reports no major side effects from weekly Carbo/Taxol, she reports no neuropathy. She has completed 10 cycles altogether. \par PET CT on 7/24/2019 revealed \par 1. Since May 1, 2019, no definite new site of pathologic FDG uptake to \par suggest new biologic tumor activity; specifically, no pathologic FDG uptake \par within previously noted 1.3 cm lesion within hepatic segment 4. \par 2. Increased FDG uptake within several subcentimeter bilateral cervical \par lymph nodes, which is nonspecific and may be postinflammatory; max SUV 9.2 \par is noted within a 0.7 cm right level 2 cervical node. Attention on follow-up \par is suggested. \par 3. Few peritoneal nodules have overall decreased in size. \par 4. No additional site of pathologic FDG uptake. \par Images were personally reviewed by myself and discussed with Nidhi. \par She wishes to continue with chemotherapy. Will plan for 3 additional cycles. Will consider adding Avastin, but with h/o inconclusive/possible chronic PE ppx anticoagulation maybe necessary. \par \par 8/28/2019: Nidhi reports feeling well, she denies worsening neuropathy, worsening neurological symptoms, SOB, abdominal pain, bloating. She reports she is planned to undergo  eye surgery towards the end of September. We will plan to hold chemotherapy briefly after this cycle to facilitate adequate counts and minimize complications. We again have briefly discussed considering Avastin based therapy, will hold off on this prior to surgery. 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 May proceed without formal PAST, will check CBC prior to procedure, PT and PTT today. \par \par 9/25/19:Nidhi reports feeling well, she denies any changes since last visit, No SOB, abdominal pain, bloating. She reports she is planned to undergo  eye surgery in 9/30/19  CBC reviewed with normal Hemogram . we will hold chem this week , she will resume after Cataract sx . 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 May proceed without formal PAST, will check CBC prior to procedure, PT and PTT today. \par \par 10/4/2019: Nidhi underwent successful eye surgery on 9/30/2019, she reports she can see much better now. Last dose of chemotherapy was administered on 9/20, she then was on hold for surgery. She has completed 5 additional cycles of Carbo/taxol. She is due for restaging. Her disease is only accurately assessed on PET CT, prior attempts to obtain CT scans resulted in additional imaging with PET, as findings were inconclusive. PET CT will be ordered to restage. \par In addition she is due for MRI of the brain. She has had a small amount of weight loss, mild neuropathy is persistent. She offers no additional complaints. \par She will have port placed on Monday, will hold chemotherapy until restaging scans complete. \par \par 10/23/2019: Nidhi feels well, and denies any new symptoms. Her eye surgery was successful, vision is much improved now. She had restaging scans. \par PET CT on 10/18/2019 revealed COMPARISON : 7/24/2019. \par No pathologic uptake to suggest biologic tumor activity. \par MRI of the brain on 10/15/2019 revealed \par Comparison with June 8 and March 21, 2019: (Generally lesions improved \par markedly since March but slightly increased in size since June) \par 1. Lesions previously demonstrated on the study of June 8, 2019 has \par remained stable or minimally increased in size \par 2. Specifically, right frontal opercular lesion measures about 1 cm and \par left posterior inferior temporal lobe lesion measures about 1 cm. These have \par increased since the previous study. \par 3. Small punctiform lesions within the right frontal white matter and left \par frontal sulcus or more apparent than on the study of June 8 \par 4. These lesions are all much smaller than the earlier MRI of March 21, \par 2019. \par 5. No new lesions. \par Images were personally reviewed by myself and discussed with patient. She remains asymptomatic and off the steroids. \par I have also discussed the case with Dr. Dahl. I would like Nidhi to discuss the options with [facility], she maybe a candidate for brain SRS. \par She has been off Carbo/Taxol for a few weeks, she remains Platinum sensitive. I have discussed the option of switching her over to Avastin maintenance, will need to prophylax with low dose Eliquis as well, given questionable finding of small PE in the past. \par Side effects of Avastin were discussed at length, including HNT, proteinuria, small risk of DVT/PE, GI perforations etc. \par She is agreeable to start after [facility] consultation. \par \par 11/5/2019: Nidhi feels OK, she reports acute onset head discomfort that lasts minutes and subsides, the episodes are becoming more frequent 1-2 times a day. She has seen Dr. Dahl, who wishes to hold off on offering SRS to the brain. Plan was to start on Avastin today. Nidhi reports she currently has a lapse in her insurance coverage till 12/2019, I will not be able to start he on ppx dose Eliquis for now, she instead will take baby ASA every other day, to modify risk of DVT/PE. Will plan to switch over to Eliquis at 2.5mg BID once insurance is active. She also contemplated going to [place] with her daughter, I am willing to hold Avastin until she comes back, but Nidhi wishes to start right away, I explained to her that flight may result in complications if that is her wish. She will cancel the trip and start with Avastin today. Side effects have been discussed. \par \par 11/20/2019: Nidhi came for a follow up visit, she reports feeling well, she reports less fatigue. She is s/p first dose of Avastin on 11/5/2019, she tolerated Avastin without difficulty, she is due for the 2nd dose today. \par We communicated CBC result with HGB of 12.3, normal platelet and WBCs. She lost like 5 pounds secondary to gum problems, she will follow up with her dentist this week. She is currently on baby ASA every other day. Continue with single agent Avastin.  \par \par 12/17/2019: Nidhi came for a follow up visit, she reports feeling well, she experienced pruritic rash on last Saturday 12/14/19, which improved over 2 days with Benadryl alone.  Today her skin rash has completely resolved. We will add Solu-Medrol 100 mg IV prior to Avastin. Nidhi reports less fatigue. She is due for the 3rd dose of Avastin today. \par We communicated CBC result with HGB of 12.3, normal platelet and WBCs. \par \par 1/7/2019: Nidhi is doing well, reports good appetite, she has lost 1lb. Reports no GI or pulmonary symptoms. She reports her gums are bleeding occasionally. No new neurological symptoms. She reports no rash after last cycle of Avastin. \par \par 1/28/2020: Nidhi is here for follow up visit, she has no difficulty tolerating Avastin, she reports L sided chronic eye discomfort, neuropathic in nature. Neurontin has been helpful in the past, will reorder this. \par PET CT on 1/22/2020 revealed COMPARISON : 10/18/2019. \par Anterior perisplenic nodule measuring 8 mm is FDG avid, SUV max 8.9, \par consistent with biologic tumor activity. \par MRI of the brain on 1/15/2020 revealed \par In comparison to the previous brain MRI dated 10/15/2019: \par 1. Redemonstrated scattered enhancing lesions consistent with metastatic \par disease, with overall good treatment response since the prior exam. \par 2. No significant interval change in the ovoid lesion in the superior left \par frontal lobe measuring 12 mm. \par 3. Decreased size of the right opercular lesion measuring 6 mm, previously \par 11 mm. Decreased size of the left inferior occipital/tentorial lesion \par measuring 6 mm, previously 11 mm. The previously seen right frontal white \par matter punctate lesion is no longer visualized. \par 4. No new lesions are demonstrated. \par All images were personally reviewed by myself and discussed with Nidhi. I explained to her I am concerned about the perisplenic lesion concerning for disease progression, but it is isolated and very small, asymptomatic. There is definitely a positive response to Avastin in the brain and given that, we will continue with Avastin with short term follow up imaging. Nidhi knows to inform me with any new symptoms immediately. \par \par 2/18/2020: NIDHI BANKS is a 64 year old female here today for follow up visit. She completed 5 cycles of Avastin; tolerating well at this time. She denies fever, chills, change in mental status, or change in weight. She complains of left painful facial swelling. In addition, she complains of swelling of fingers, joint pain and numbness in her toes. She continues on Gabapentin 100mg TID with no symptom relief and wishes to try to go up on the dose. She complains of mild gingival bleeding in the morning. Last PET showed new isolated perisplenic lesion suspicious for disease progression, however MRI of the brain appeared better on Avastin, so the plan was to continue with Avastin with short term follow up. \par \par 3/10/2020: NIDHI BANKS is a 64 year old female here today for follow up visit. She denies new neurological symptoms, skin rash, fever, or change in weight. She continues of Gabapentin which was increased to 200 mg TID; neuropathy improving. She continues to complain of mild gingival bleeding. Left facial swelling and numbness resolved. She has completed cycle 6 of Avastin without complication. \par \par 3/31/62323: Nidhi presented today for routine f/u, she feels fine. Denies any chest pain, sob, fever, chills and cough. Reports that she continues to have gum bleeding. Neuropathy is better with gabapentin, she decreased the dose to 4 pills a day. She tolerated 7th cycle well and is due to get 8th cycle today . She will get MRI brain on 4/2/20.\par She reports minor gum bleeding from Avastin and dental infection on the left, will prescribe Amoxicillin. \par \par 4/21/2020: NIDHI BANKS is a 64 year old female here today for follow up visit for endometrial cancer. S/p cycle 8 of Avastin. Patient denies fever, chills, SOB, cough and pain. She states that her gums have minimal bleeding and dental infection improved after amoxicillin. She complains of mild fatigue. Restaging MRI of the brain was completed on 4/2/2020 which showed stable bilateral parenchymal hemorrhagic lesions compatible with known metastases; no new enhancing lesions are identified. \par Images were personally reviewed by MD Jose Roberto and discussed with patient. \par \par 5/12/2020: MRI brain on 4/2/2020 reveals 1. Stable bilateral parenchymal hemorrhagic lesions compatible with known \par metastases. \par 2. No new enhancing lesions are identified. \par PET CT on 4/30/2020 revealed Right upper quadrant peritoneal implant adjacent to the inferior tip of the \par liver 4.7 (image [id number]) \par Right lower quadrant 0.5 cm peritoneal nodule 7.2 (image 111) \par Right lower quadrant subcentimeter peritoneal nodule 2.6 (image [id number]) \par Anterior perisplenic peritoneal nodule 12.3-48% increase from 8.9 \par (remeasured) previously \par Stable non-FDG avid (attenuation corrected and nonattenuation corrected \par images) 4 mm right middle lobe pulmonary nodule-image 181) \par  No other definite sites of abnormal FDG uptake \par IMPRESSION: \par Compared to 1/22/2020: \par new FDG avid peritoneal implants in right lower quadrant of the abdomen \par suspicious for biologic tumor activity \par 48% interval increase in SUV in anterior perisplenic peritoneal nodule (SUV \par 12.3 vs 8.9 remeasured) \par In retrospect stable FDG avid peritoneal implant in the right upper quadrant \par of the abdomen (SUV 4.7 vs 3.8 remeasured-image 134) \par No other definite sites of abnormal FDG uptake \par Images were personally reviewed by MD Jose Roberto and discussed with patient.\par Given minimal degree of progression in the abdomen and persistent control intracranially, we have discussed continuing Avastin for now and short term restaging. \par Nidhi offers no complaints today. \par \par 6/2/2020: The patient is here for follow up. She is due for Avastin today . She has  no major complaints . She reported gum bleeding and swelling, which is much better today . She also reported hand swelling bilaterally. She denies fever, chills, no respiratory, cardiac, GI/ symptoms. She denies headaches or neurological deficits. \par \par 6/23/2020: Nidhi is 65 years old female came for a follow up visit for papillary-serous endometrial carcinoma with brain metastasis.  She reported feeling well. \par She denies dizziness, change of mental status, fever, chills, SOB. 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  She reported feeling well. \par She denies dizziness, change of mental status, fever, chills, SOB. \par We reviewed MRI of the brain from 7/5/20 that revealed \par The study is considered stable since April 2, 2020 with the following \par findings: \par 1. 3 lesions are being followed, in the left frontal, right frontal and \par left occipital lobes. \par 2. The lesions are essentially stable, nonenhancing and probably mildly \par hemorrhagic \par 3. A punctiform lesion in the right frontal lobe (series 11 image 7) was \par probably present on the prior study. This is stable and probably not new. \par 4. Any differences in size are probably due to differences in the way the \par patient was scanned. \par Images were personally reviewed by MD Jose Roberto and discussed with patient. \par We will continue the current treatment with  Avastin one every 3 weeks.  Patient reports improvement in neuropathy with gabapentin 200 mg every 12 hrs.\par Patient is due for repeat PET Scan at the end of July,2020.\par \par 8/4/2020: NIDHI BANKS is a 65 year old female here today for follow up visit for endometrial cancer. S/p cycle 11 of Avastin. Patient denies fever, chills, SOB, cough and pain.\par PET CT from 7/29/2020 revealed \par Compared to 4/30/2020: \par 1 new FDG avid near midline peritoneal implant (SUV 4.3) consistent with biologic tumor activity \par 1 new left cervical level V FDG avid lymph node (SUV 8.7) suspicious for biologic tumor activity in light of the history of papillary thyroid carcinoma \par Interval increases in SUV: 115% increase in SUV in right lower quadrant peritoneal implant \par 97% increase in SUV in right upper quadrant peritoneal implant -66% increase in SUV in level 3 cervical lymph node \par Interval 36% decrease in SUV in anterior perisplenic nodule (7.9 vs 12.3) \par Stable FDG avid right upper quadrant implants and right level 5 cervical lymph node \par No other sites of abnormal FDG uptake \par We discussed other different treatment option.\par Images were personally reviewed by MD Jose Roberto and discussed with patient.\par We have discussed that there is undeniable progression systemically but still benefit in the CNS. I will review her chart and determine if restarting weekly carbo/taxol is of value, vs adding an additional agent vs starting on Keytruda/Lenvima is at this point indicated. \par Patient will continue with one additional dose of Avastin today.\par \par 8/25/2020: Nidhi is feeling well, no complaints today. At this point given minimal but sustained progression we will plan to switch over to Kaytruda combination with Lenvima. \par We have gone over side effects of immunotherapy at length, including but not limited to autoimmune mediated pneumonitis, enteritis, dermatitis, thyroiditis, damage to the kidneys, liver, pancreas, pituitary gland etc. patient and family voice understanding and are in agreement to proceed with treatment.\par Side effects of Lenvima including, but not limited to, cytopenias, that may require blood product transfusions and lead to increased risk of infection, requiring hospitalization, allergic reactions, that can rarely be life-threatening, skin reactions, nausea/vomiting, mucositis, diarrhea/constipation, QT prolongation, GI perforation, HTN etc were discussed at length, patient and family voice undestaging, all questions were answered to patient's satisfaction.\par \par 9/15/20 Nidhi presented today for f/u and to get treatment . On last visit she was started on keytruda and lenvima . She was told to start with 10 mg a day , but she started taking 20 mg a day . Developed diffuse muscle aches and pains , reports has pain in her mouth as well . Also developed changes in her voice , its becoming more and more hoarse now . Also reported pain in her left shoulder , which is improved now . She has been coming for wkly CBC , since she started treatment , counts have been stable . Had a detailed discussion with Nidhi, her symptoms are likely from treatment with lenvima , she should have escalated the dose from 10 mg to 20 mg , if it was well tolerated , since she started taking 20 mg daily ,  that’s why she has severe adverse effects. She is recommended to hold off on treatment for 4 days and then restart it at 10 mg daily . She demonstrated understanding . Will proceed with keytruda today . \par \par 10/6/20:  Patient here for follow up visit for stage IV endometrial cancer.  She is taking lower dose of Lenvima, 10 mg.  She is complaining of hoarseness and mouth soreness over the last couple of weeks.  She also has left arm and shoulder weakness.  Although she admits she feels somewhat better on lower dose of Lenvima.  She is very fatigued and does very little activity during the day and is losing weight.\par \par 10/20/2020: NIDHI is here for follow up visit for endometrial cancer. She was on dose reduced Lenvima co currently with Keytruda. She developed mouth sores and complications from Lenvima. She states that she is feeling well today. We contemplated starting on Doxil but she states she is feeling better after stopping Lenvima. Repeat PET from 10/16/2020 showed:  \par IMPRESSION:\par 1.  Since July 29, 2020, increased FDG uptake in multiple peritoneal nodules/tumor implants (max SUV 12.9 in a left upper quadrant nodule, reflecting 63% increase).\par 2.  Resolved FDG avid left cervical lymph nodes.\par 3.  No definite new sites of abnormal FDG uptake.\par \par 11/17/2020: Nidhi is feeling great, reports no complaints at all, she has gained 2 pounds, reports good appetite. CBC was reviewed. She will continue with Keytruda single agent, she had significant difficulty tolerating Lenvima even at doses lower than recommended. We will defer mammogram for now. \par \par 12/8/2020: Nidhi is here for a follow up visit, she reports feeling well. She offers no complaints at all today, she has gained 2 pounds, reports good appetite. CBC was reviewed. She will continue with Keytruda single agent.\par We reviewed MRI of the Brain 12/2/20:\par 1. Redemonstrated multiple enhancing lesions consistent with metastatic disease. Since the prior exam dated 7/2/2020\par 2. Slightly increased size of the right parietal lesion measuring 4.5 mm, previous

## 2020-12-18 NOTE — ASSESSMENT
[FreeTextEntry1] : Papillary- serous endometrial cancer, stage IV\par --PET CT on 11/29/2017 revealed no clear GYN origin, extensive KEVIN, uptake in the right lung and pleura, thyroid nodule\par --Malignant pleural effusion, resolved\par --L side PleurX catheter was removed on 2/8/2018\par --Completed cycle 8  of carbo/taxol on 6/29/2018.  2 cycles were administered  postoperatively.\par --Restaging PET CT on 9/26/2018 (3 months post completion of chemo 6/29/2018)was essentially negative for recurrent disease\par --Restaging PET CT on 12/18/2018 reveals   New left upper quadrant peritoneal nodule measuring 8 mm with an SUV max  of 7.3. This is suggestive of biologic tumor activity.  No other FDG avid lesions seen throughout the scan.\par --Restaging PET CT on 2/12/2019 revealed slight increase in size of left upper quadrant peritoneal nodule (1.1 cm, \par previously 0.8 cm) with stable FDG uptake, 7.7 SUV suspicious for biologic tumor activity.\par --Case was discussed with Dr. Massey at [place], biopsy of peritoneal nodule is positive for recurrent endometrial cancer.\par --MRI brain on 4/23/2019 revealed multiple brain mets\par --S/p whole brain radiation completed 4/2019, required acute rehab\par --PET CT on 5/1/2019 subsequently revealed further disease progression \par --Restarted weekly carbo/taxol 3 on 1 off on 5/9/2019, continue with weekly Carbo/Taxol for now.\par --Off Decadron and Keppra since 6/5/2019 \par --Restaging brain MRI on 6/8/2019 showed almost complete resolution of most supratentorial lesions and no new lesions.\par --Restaging CT C/A/P on 7/9/2019 did not reveal definite progression, there was a questionable filling defect suspicious for possible chronic PE/artifact, this was discussed with Radiology at length anticoagulation was not deemed to be necessary\par --PET CT on 7/24/2019 revealed positive response to therapy \par --Carbo/Taxol stopped, last dose 9/13/2019, on hold since, she remains platinum sensitive \par --Restaging PET CT on 10/18/2019 is NAD\par --Restaging MRI of the brain on 10/15/2019 is suggestive for possible disease progression, no new lesions, she remains asymptomatic and off steroids, will follow closely \par --She was referred to [facility] for brain SRS consideration, close observation for now \par --Started on Avastin maintenance 11/5/2019, will consider adding Eliquis ppx for DVT/ PE, for now she will take baby ASA every other day  \par --We sent for prednisone 10 mg po if needed for additional skin issues\par --Patient well aware to contact us if any side effect developed\par --MRI of the brain 1/15/2020 reveals overall good response to Avastin\par --Restaging PET CT on 1/22/2020 reveals single small perisplenic area of activity, suspicious for disease progression, asymptomatic \par --Proceed with cycle 10 of Avastin on 5/12/2020 after weighing risks/benefits with plan for short term follow up imaging \par -- Reviewed  MRI of brain on 7/5/20  showed stable metastases; no new enhancing lesions are identified.\par -- Restaging PET CT on 4/30/2020 reveals minimal progression in the abdomen and pelvis\par -- Other options discussed is switching to Keytruda/Lenvima combination, this is a consideration in the future \par -- We will c/w with Avastin for now since it is working in the brain and she has a minimal progression in abdomen. \par --PET CT from 7/29/2020 reveals additional evidence of progression, minimal and not symptomatic\par --Last dose of Avastin on 8/4/2020\par -- Was started on Lenvima/Keytruda 8/25/2020\par --Unable to tolerate 20 mg daily, recommended to hold off on treatment for 4 days , and then restart at 10 mg daily after 4 days if feeling fine.\par --She has help Lenvima for two weeks since 10.6.2020 She states that her s/e have greatly improved and feels well. We will stop Lenvima \par -- PET CT on 10/14/2020 revealed mixed response with no new sites of disease \par -- We discussed treatment options including changing to Doxil or proceeding with single Keytruda at this time. \par -- She will continue with Keytruda every three weeks. Reimaging study in two months \par --CBC reviewed, WNL TSH CMP\par -- Arthritic changes noted to hands- possibly secondary to immunotherapy- will continue to observe \par --we reviewed  MRI brain ordered with mild progressing in one of the brain lesion. \par --Patient will remain on KEYTRUDA \par --May need to add Avastin back in for better control of CNS disease/posttreatment changes \par \par Papillary thyroid cancer [id number], 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 Herrera \par --Follow up with Dr. Acevedo of endocrinology; he is addressing vitamin D, thyroid and diabetes\par \par Cataract of eye\par -- S/p successful surgery on 9/30/19 \par \par Follow up in 3 weeks \par Patient seen and examined by Dr aceves who agreed for the above plan of care.\par \par \par

## 2020-12-18 NOTE — PHYSICAL EXAM
[Restricted in physically strenuous activity but ambulatory and able to carry out work of a light or sedentary nature] : Status 1- Restricted in physically strenuous activity but ambulatory and able to carry out work of a light or sedentary nature, e.g., light house work, office work [Normal] : affect appropriate [de-identified] : pale, chronically ill [de-identified] : B/L cataracts [de-identified] : CTA B/L [de-identified] : arthritic changes of the fingers  [de-identified] : No mucosal ulcers noted

## 2020-12-29 PROBLEM — S06.6X9A SUBARACHNOID HEMATOMA: Status: ACTIVE | Noted: 2018-01-02

## 2021-01-01 ENCOUNTER — LABORATORY RESULT (OUTPATIENT)
Age: 66
End: 2021-01-01

## 2021-01-01 ENCOUNTER — NON-APPOINTMENT (OUTPATIENT)
Age: 66
End: 2021-01-01

## 2021-01-01 ENCOUNTER — APPOINTMENT (OUTPATIENT)
Dept: HEMATOLOGY ONCOLOGY | Facility: CLINIC | Age: 66
End: 2021-01-01
Payer: MEDICARE

## 2021-01-01 ENCOUNTER — OUTPATIENT (OUTPATIENT)
Dept: OUTPATIENT SERVICES | Facility: HOSPITAL | Age: 66
LOS: 1 days | Discharge: HOME | End: 2021-01-01

## 2021-01-01 ENCOUNTER — OUTPATIENT (OUTPATIENT)
Dept: OUTPATIENT SERVICES | Facility: HOSPITAL | Age: 66
LOS: 1 days | Discharge: HOME | End: 2021-01-01
Payer: MEDICARE

## 2021-01-01 ENCOUNTER — APPOINTMENT (OUTPATIENT)
Dept: INFUSION THERAPY | Facility: CLINIC | Age: 66
End: 2021-01-01

## 2021-01-01 ENCOUNTER — TRANSCRIPTION ENCOUNTER (OUTPATIENT)
Age: 66
End: 2021-01-01

## 2021-01-01 ENCOUNTER — INPATIENT (INPATIENT)
Facility: HOSPITAL | Age: 66
LOS: 2 days | Discharge: HOME HEALTH | End: 2021-02-19
Attending: INTERNAL MEDICINE | Admitting: INTERNAL MEDICINE
Payer: MEDICARE

## 2021-01-01 ENCOUNTER — APPOINTMENT (OUTPATIENT)
Dept: INFUSION THERAPY | Facility: CLINIC | Age: 66
End: 2021-01-01
Payer: MEDICARE

## 2021-01-01 ENCOUNTER — APPOINTMENT (OUTPATIENT)
Dept: INTERNAL MEDICINE | Facility: CLINIC | Age: 66
End: 2021-01-01

## 2021-01-01 ENCOUNTER — INPATIENT (INPATIENT)
Facility: HOSPITAL | Age: 66
LOS: 9 days | End: 2021-03-15
Attending: INTERNAL MEDICINE | Admitting: INTERNAL MEDICINE
Payer: MEDICARE

## 2021-01-01 ENCOUNTER — RX RENEWAL (OUTPATIENT)
Age: 66
End: 2021-01-01

## 2021-01-01 ENCOUNTER — RESULT REVIEW (OUTPATIENT)
Age: 66
End: 2021-01-01

## 2021-01-01 VITALS
HEIGHT: 64 IN | DIASTOLIC BLOOD PRESSURE: 58 MMHG | HEART RATE: 68 BPM | TEMPERATURE: 97.1 F | RESPIRATION RATE: 14 BRPM | WEIGHT: 125 LBS | SYSTOLIC BLOOD PRESSURE: 93 MMHG | BODY MASS INDEX: 21.34 KG/M2

## 2021-01-01 VITALS
RESPIRATION RATE: 18 BRPM | DIASTOLIC BLOOD PRESSURE: 58 MMHG | HEART RATE: 91 BPM | TEMPERATURE: 97 F | SYSTOLIC BLOOD PRESSURE: 103 MMHG

## 2021-01-01 VITALS
RESPIRATION RATE: 18 BRPM | SYSTOLIC BLOOD PRESSURE: 94 MMHG | HEART RATE: 100 BPM | DIASTOLIC BLOOD PRESSURE: 55 MMHG | TEMPERATURE: 97 F

## 2021-01-01 VITALS
TEMPERATURE: 98.4 F | WEIGHT: 136 LBS | HEIGHT: 64 IN | BODY MASS INDEX: 23.22 KG/M2 | RESPIRATION RATE: 14 BRPM | HEART RATE: 104 BPM | SYSTOLIC BLOOD PRESSURE: 109 MMHG | DIASTOLIC BLOOD PRESSURE: 66 MMHG

## 2021-01-01 VITALS
SYSTOLIC BLOOD PRESSURE: 118 MMHG | OXYGEN SATURATION: 98 % | WEIGHT: 130.07 LBS | TEMPERATURE: 99 F | RESPIRATION RATE: 18 BRPM | HEIGHT: 65 IN | HEART RATE: 94 BPM | DIASTOLIC BLOOD PRESSURE: 74 MMHG

## 2021-01-01 VITALS
SYSTOLIC BLOOD PRESSURE: 100 MMHG | HEART RATE: 67 BPM | OXYGEN SATURATION: 100 % | HEIGHT: 65 IN | TEMPERATURE: 98 F | DIASTOLIC BLOOD PRESSURE: 52 MMHG | RESPIRATION RATE: 18 BRPM

## 2021-01-01 VITALS
SYSTOLIC BLOOD PRESSURE: 110 MMHG | TEMPERATURE: 97.3 F | HEIGHT: 64 IN | RESPIRATION RATE: 14 BRPM | HEART RATE: 105 BPM | DIASTOLIC BLOOD PRESSURE: 67 MMHG

## 2021-01-01 VITALS
DIASTOLIC BLOOD PRESSURE: 89 MMHG | HEART RATE: 88 BPM | HEIGHT: 64 IN | RESPIRATION RATE: 14 BRPM | SYSTOLIC BLOOD PRESSURE: 125 MMHG | BODY MASS INDEX: 23.22 KG/M2 | WEIGHT: 136 LBS | TEMPERATURE: 99 F

## 2021-01-01 DIAGNOSIS — C55 MALIGNANT NEOPLASM OF UTERUS, PART UNSPECIFIED: ICD-10-CM

## 2021-01-01 DIAGNOSIS — G62.9 POLYNEUROPATHY, UNSPECIFIED: ICD-10-CM

## 2021-01-01 DIAGNOSIS — C54.1 MALIGNANT NEOPLASM OF ENDOMETRIUM: ICD-10-CM

## 2021-01-01 DIAGNOSIS — D64.9 ANEMIA, UNSPECIFIED: ICD-10-CM

## 2021-01-01 DIAGNOSIS — Z87.440 PERSONAL HISTORY OF URINARY (TRACT) INFECTIONS: ICD-10-CM

## 2021-01-01 DIAGNOSIS — Z98.890 OTHER SPECIFIED POSTPROCEDURAL STATES: Chronic | ICD-10-CM

## 2021-01-01 DIAGNOSIS — Z90.710 ACQUIRED ABSENCE OF BOTH CERVIX AND UTERUS: Chronic | ICD-10-CM

## 2021-01-01 DIAGNOSIS — C79.31 SECONDARY MALIGNANT NEOPLASM OF BRAIN: ICD-10-CM

## 2021-01-01 DIAGNOSIS — J91.0 MALIGNANT PLEURAL EFFUSION: ICD-10-CM

## 2021-01-01 DIAGNOSIS — Z79.890 HORMONE REPLACEMENT THERAPY: ICD-10-CM

## 2021-01-01 DIAGNOSIS — Z79.84 LONG TERM (CURRENT) USE OF ORAL HYPOGLYCEMIC DRUGS: ICD-10-CM

## 2021-01-01 DIAGNOSIS — C73 MALIGNANT NEOPLASM OF THYROID GLAND: ICD-10-CM

## 2021-01-01 DIAGNOSIS — R53.1 WEAKNESS: ICD-10-CM

## 2021-01-01 DIAGNOSIS — R49.0 DYSPHONIA: ICD-10-CM

## 2021-01-01 DIAGNOSIS — Z79.899 OTHER LONG TERM (CURRENT) DRUG THERAPY: ICD-10-CM

## 2021-01-01 DIAGNOSIS — Z11.59 ENCOUNTER FOR SCREENING FOR OTHER VIRAL DISEASES: ICD-10-CM

## 2021-01-01 DIAGNOSIS — Z51.12 ENCOUNTER FOR ANTINEOPLASTIC IMMUNOTHERAPY: ICD-10-CM

## 2021-01-01 DIAGNOSIS — R93.89 ABNORMAL FINDINGS ON DIAGNOSTIC IMAGING OF OTHER SPECIFIED BODY STRUCTURES: ICD-10-CM

## 2021-01-01 DIAGNOSIS — B37.0 CANDIDAL STOMATITIS: ICD-10-CM

## 2021-01-01 DIAGNOSIS — C80.1 MALIGNANT (PRIMARY) NEOPLASM, UNSPECIFIED: ICD-10-CM

## 2021-01-01 DIAGNOSIS — Z51.11 ENCOUNTER FOR ANTINEOPLASTIC CHEMOTHERAPY: ICD-10-CM

## 2021-01-01 DIAGNOSIS — E07.89 OTHER SPECIFIED DISORDERS OF THYROID: ICD-10-CM

## 2021-01-01 DIAGNOSIS — R62.7 ADULT FAILURE TO THRIVE: ICD-10-CM

## 2021-01-01 DIAGNOSIS — R60.0 LOCALIZED EDEMA: ICD-10-CM

## 2021-01-01 DIAGNOSIS — E87.1 HYPO-OSMOLALITY AND HYPONATREMIA: ICD-10-CM

## 2021-01-01 DIAGNOSIS — Z92.21 PERSONAL HISTORY OF ANTINEOPLASTIC CHEMOTHERAPY: ICD-10-CM

## 2021-01-01 LAB
A1C WITH ESTIMATED AVERAGE GLUCOSE RESULT: 7.1 % — HIGH (ref 4–5.6)
ALBUMIN SERPL ELPH-MCNC: 2.8 G/DL — LOW (ref 3.5–5.2)
ALBUMIN SERPL ELPH-MCNC: 2.9 G/DL — LOW (ref 3.5–5.2)
ALBUMIN SERPL ELPH-MCNC: 2.9 G/DL — LOW (ref 3.5–5.2)
ALBUMIN SERPL ELPH-MCNC: 3.1 G/DL — LOW (ref 3.5–5.2)
ALBUMIN SERPL ELPH-MCNC: 3.1 G/DL — LOW (ref 3.5–5.2)
ALBUMIN SERPL ELPH-MCNC: 3.3 G/DL
ALBUMIN SERPL ELPH-MCNC: 3.3 G/DL — LOW (ref 3.5–5.2)
ALBUMIN SERPL ELPH-MCNC: 3.4 G/DL
ALBUMIN SERPL ELPH-MCNC: 3.4 G/DL — LOW (ref 3.5–5.2)
ALBUMIN SERPL ELPH-MCNC: 3.5 G/DL
ALP BLD-CCNC: 62 U/L
ALP BLD-CCNC: 86 U/L
ALP BLD-CCNC: 91 U/L
ALP SERPL-CCNC: 102 U/L — SIGNIFICANT CHANGE UP (ref 30–115)
ALP SERPL-CCNC: 73 U/L — SIGNIFICANT CHANGE UP (ref 30–115)
ALP SERPL-CCNC: 77 U/L — SIGNIFICANT CHANGE UP (ref 30–115)
ALP SERPL-CCNC: 77 U/L — SIGNIFICANT CHANGE UP (ref 30–115)
ALP SERPL-CCNC: 79 U/L — SIGNIFICANT CHANGE UP (ref 30–115)
ALP SERPL-CCNC: 88 U/L — SIGNIFICANT CHANGE UP (ref 30–115)
ALP SERPL-CCNC: 90 U/L — SIGNIFICANT CHANGE UP (ref 30–115)
ALT FLD-CCNC: 32 U/L — SIGNIFICANT CHANGE UP (ref 0–41)
ALT FLD-CCNC: 34 U/L — SIGNIFICANT CHANGE UP (ref 0–41)
ALT FLD-CCNC: 34 U/L — SIGNIFICANT CHANGE UP (ref 0–41)
ALT FLD-CCNC: 35 U/L — SIGNIFICANT CHANGE UP (ref 0–41)
ALT FLD-CCNC: 36 U/L — SIGNIFICANT CHANGE UP (ref 0–41)
ALT FLD-CCNC: 40 U/L — SIGNIFICANT CHANGE UP (ref 0–41)
ALT FLD-CCNC: 46 U/L — HIGH (ref 0–41)
ALT SERPL-CCNC: 24 U/L
ALT SERPL-CCNC: 38 U/L
ALT SERPL-CCNC: 52 U/L
ANION GAP SERPL CALC-SCNC: 10 MMOL/L — SIGNIFICANT CHANGE UP (ref 7–14)
ANION GAP SERPL CALC-SCNC: 11 MMOL/L
ANION GAP SERPL CALC-SCNC: 11 MMOL/L
ANION GAP SERPL CALC-SCNC: 11 MMOL/L — SIGNIFICANT CHANGE UP (ref 7–14)
ANION GAP SERPL CALC-SCNC: 12 MMOL/L
ANION GAP SERPL CALC-SCNC: 13 MMOL/L — SIGNIFICANT CHANGE UP (ref 7–14)
ANION GAP SERPL CALC-SCNC: 14 MMOL/L — SIGNIFICANT CHANGE UP (ref 7–14)
ANION GAP SERPL CALC-SCNC: 15 MMOL/L — HIGH (ref 7–14)
ANION GAP SERPL CALC-SCNC: 8 MMOL/L — SIGNIFICANT CHANGE UP (ref 7–14)
ANION GAP SERPL CALC-SCNC: 9 MMOL/L — SIGNIFICANT CHANGE UP (ref 7–14)
ANION GAP SERPL CALC-SCNC: 9 MMOL/L — SIGNIFICANT CHANGE UP (ref 7–14)
ANISOCYTOSIS BLD QL: SIGNIFICANT CHANGE UP
ANISOCYTOSIS BLD QL: SLIGHT — SIGNIFICANT CHANGE UP
APPEARANCE UR: CLEAR — SIGNIFICANT CHANGE UP
APPEARANCE: CLEAR
APTT BLD: 22.7 SEC — CRITICAL LOW (ref 27–39.2)
APTT BLD: 22.9 SEC — CRITICAL LOW (ref 27–39.2)
APTT BLD: 25.2 SEC — LOW (ref 27–39.2)
APTT BLD: 26.4 SEC — LOW (ref 27–39.2)
AST SERPL-CCNC: 10 U/L
AST SERPL-CCNC: 11 U/L — SIGNIFICANT CHANGE UP (ref 0–41)
AST SERPL-CCNC: 13 U/L
AST SERPL-CCNC: 14 U/L — SIGNIFICANT CHANGE UP (ref 0–41)
AST SERPL-CCNC: 15 U/L — SIGNIFICANT CHANGE UP (ref 0–41)
AST SERPL-CCNC: 15 U/L — SIGNIFICANT CHANGE UP (ref 0–41)
AST SERPL-CCNC: 18 U/L
BACTERIA # UR AUTO: ABNORMAL
BASOPHILS # BLD AUTO: 0 K/UL — SIGNIFICANT CHANGE UP (ref 0–0.2)
BASOPHILS # BLD AUTO: 0 K/UL — SIGNIFICANT CHANGE UP (ref 0–0.2)
BASOPHILS # BLD AUTO: 0.01 K/UL — SIGNIFICANT CHANGE UP (ref 0–0.2)
BASOPHILS # BLD AUTO: 0.03 K/UL — SIGNIFICANT CHANGE UP (ref 0–0.2)
BASOPHILS # BLD AUTO: 0.05 K/UL — SIGNIFICANT CHANGE UP (ref 0–0.2)
BASOPHILS # BLD AUTO: 0.06 K/UL — SIGNIFICANT CHANGE UP (ref 0–0.2)
BASOPHILS # BLD AUTO: 0.07 K/UL — SIGNIFICANT CHANGE UP (ref 0–0.2)
BASOPHILS NFR BLD AUTO: 0 % — SIGNIFICANT CHANGE UP (ref 0–1)
BASOPHILS NFR BLD AUTO: 0 % — SIGNIFICANT CHANGE UP (ref 0–1)
BASOPHILS NFR BLD AUTO: 0.1 % — SIGNIFICANT CHANGE UP (ref 0–1)
BASOPHILS NFR BLD AUTO: 0.4 % — SIGNIFICANT CHANGE UP (ref 0–1)
BASOPHILS NFR BLD AUTO: 0.7 % — SIGNIFICANT CHANGE UP (ref 0–1)
BILIRUB SERPL-MCNC: 0.5 MG/DL
BILIRUB SERPL-MCNC: 0.5 MG/DL
BILIRUB SERPL-MCNC: 0.5 MG/DL — SIGNIFICANT CHANGE UP (ref 0.2–1.2)
BILIRUB SERPL-MCNC: 0.5 MG/DL — SIGNIFICANT CHANGE UP (ref 0.2–1.2)
BILIRUB SERPL-MCNC: 0.6 MG/DL
BILIRUB SERPL-MCNC: 0.6 MG/DL — SIGNIFICANT CHANGE UP (ref 0.2–1.2)
BILIRUB SERPL-MCNC: 0.7 MG/DL — SIGNIFICANT CHANGE UP (ref 0.2–1.2)
BILIRUB SERPL-MCNC: 0.7 MG/DL — SIGNIFICANT CHANGE UP (ref 0.2–1.2)
BILIRUB UR-MCNC: NEGATIVE — SIGNIFICANT CHANGE UP
BILIRUBIN URINE: NEGATIVE
BLD GP AB SCN SERPL QL: SIGNIFICANT CHANGE UP
BLD GP AB SCN SERPL QL: SIGNIFICANT CHANGE UP
BLOOD URINE: NEGATIVE
BUN SERPL-MCNC: 12 MG/DL — SIGNIFICANT CHANGE UP (ref 10–20)
BUN SERPL-MCNC: 12 MG/DL — SIGNIFICANT CHANGE UP (ref 10–20)
BUN SERPL-MCNC: 14 MG/DL — SIGNIFICANT CHANGE UP (ref 10–20)
BUN SERPL-MCNC: 15 MG/DL
BUN SERPL-MCNC: 16 MG/DL — SIGNIFICANT CHANGE UP (ref 10–20)
BUN SERPL-MCNC: 16 MG/DL — SIGNIFICANT CHANGE UP (ref 10–20)
BUN SERPL-MCNC: 17 MG/DL — SIGNIFICANT CHANGE UP (ref 10–20)
BUN SERPL-MCNC: 18 MG/DL
BUN SERPL-MCNC: 19 MG/DL — SIGNIFICANT CHANGE UP (ref 10–20)
BUN SERPL-MCNC: 20 MG/DL — SIGNIFICANT CHANGE UP (ref 10–20)
BUN SERPL-MCNC: 23 MG/DL
CALCIUM SERPL-MCNC: 7.9 MG/DL — LOW (ref 8.5–10.1)
CALCIUM SERPL-MCNC: 8.1 MG/DL
CALCIUM SERPL-MCNC: 8.2 MG/DL — LOW (ref 8.5–10.1)
CALCIUM SERPL-MCNC: 8.3 MG/DL
CALCIUM SERPL-MCNC: 8.3 MG/DL — LOW (ref 8.5–10.1)
CALCIUM SERPL-MCNC: 8.3 MG/DL — LOW (ref 8.5–10.1)
CALCIUM SERPL-MCNC: 8.6 MG/DL
CALCIUM SERPL-MCNC: 8.6 MG/DL — SIGNIFICANT CHANGE UP (ref 8.5–10.1)
CALCIUM SERPL-MCNC: 8.9 MG/DL — SIGNIFICANT CHANGE UP (ref 8.5–10.1)
CANCER AG125 SERPL-ACNC: 13 U/ML
CHLORIDE SERPL-SCNC: 91 MMOL/L — LOW (ref 98–110)
CHLORIDE SERPL-SCNC: 93 MMOL/L
CHLORIDE SERPL-SCNC: 93 MMOL/L — LOW (ref 98–110)
CHLORIDE SERPL-SCNC: 94 MMOL/L
CHLORIDE SERPL-SCNC: 94 MMOL/L — LOW (ref 98–110)
CHLORIDE SERPL-SCNC: 95 MMOL/L — LOW (ref 98–110)
CHLORIDE SERPL-SCNC: 95 MMOL/L — LOW (ref 98–110)
CHLORIDE SERPL-SCNC: 96 MMOL/L
CHLORIDE SERPL-SCNC: 97 MMOL/L — LOW (ref 98–110)
CO2 SERPL-SCNC: 23 MMOL/L — SIGNIFICANT CHANGE UP (ref 17–32)
CO2 SERPL-SCNC: 24 MMOL/L
CO2 SERPL-SCNC: 24 MMOL/L — SIGNIFICANT CHANGE UP (ref 17–32)
CO2 SERPL-SCNC: 25 MMOL/L — SIGNIFICANT CHANGE UP (ref 17–32)
CO2 SERPL-SCNC: 26 MMOL/L — SIGNIFICANT CHANGE UP (ref 17–32)
CO2 SERPL-SCNC: 26 MMOL/L — SIGNIFICANT CHANGE UP (ref 17–32)
CO2 SERPL-SCNC: 28 MMOL/L
CO2 SERPL-SCNC: 28 MMOL/L
CO2 SERPL-SCNC: 30 MMOL/L — SIGNIFICANT CHANGE UP (ref 17–32)
COLOR SPEC: YELLOW — SIGNIFICANT CHANGE UP
COLOR: COLORLESS
COLOR: NORMAL
COLOR: YELLOW
CREAT SERPL-MCNC: 0.5 MG/DL
CREAT SERPL-MCNC: 0.5 MG/DL — LOW (ref 0.7–1.5)
CREAT SERPL-MCNC: 0.5 MG/DL — LOW (ref 0.7–1.5)
CREAT SERPL-MCNC: 0.6 MG/DL
CREAT SERPL-MCNC: 0.6 MG/DL — LOW (ref 0.7–1.5)
CREAT SERPL-MCNC: 0.6 MG/DL — LOW (ref 0.7–1.5)
CREAT SERPL-MCNC: 0.7 MG/DL — SIGNIFICANT CHANGE UP (ref 0.7–1.5)
CREAT SERPL-MCNC: <0.5 MG/DL
CREAT SERPL-MCNC: <0.5 MG/DL — LOW (ref 0.7–1.5)
CULTURE RESULTS: SIGNIFICANT CHANGE UP
D DIMER BLD IA.RAPID-MCNC: 295 NG/ML DDU — HIGH (ref 0–230)
DACRYOCYTES BLD QL SMEAR: SLIGHT — SIGNIFICANT CHANGE UP
DACRYOCYTES BLD QL SMEAR: SLIGHT — SIGNIFICANT CHANGE UP
DIFF PNL FLD: ABNORMAL
EOSINOPHIL # BLD AUTO: 0 K/UL — SIGNIFICANT CHANGE UP (ref 0–0.7)
EOSINOPHIL NFR BLD AUTO: 0 % — SIGNIFICANT CHANGE UP (ref 0–8)
EPI CELLS # UR: 3 /HPF — SIGNIFICANT CHANGE UP (ref 0–5)
ESTIMATED AVERAGE GLUCOSE: 131 MG/DL
ESTIMATED AVERAGE GLUCOSE: 157 MG/DL — HIGH (ref 68–114)
FIBRINOGEN PPP-MCNC: 476 MG/DL — SIGNIFICANT CHANGE UP (ref 204.4–570.6)
GLUCOSE BLDC GLUCOMTR-MCNC: 133 MG/DL — HIGH (ref 70–99)
GLUCOSE BLDC GLUCOMTR-MCNC: 155 MG/DL — HIGH (ref 70–99)
GLUCOSE BLDC GLUCOMTR-MCNC: 173 MG/DL — HIGH (ref 70–99)
GLUCOSE BLDC GLUCOMTR-MCNC: 215 MG/DL — HIGH (ref 70–99)
GLUCOSE BLDC GLUCOMTR-MCNC: 239 MG/DL — HIGH (ref 70–99)
GLUCOSE BLDC GLUCOMTR-MCNC: 268 MG/DL — HIGH (ref 70–99)
GLUCOSE BLDC GLUCOMTR-MCNC: 87 MG/DL — SIGNIFICANT CHANGE UP (ref 70–99)
GLUCOSE QUALITATIVE U: NEGATIVE
GLUCOSE SERPL-MCNC: 101 MG/DL — HIGH (ref 70–99)
GLUCOSE SERPL-MCNC: 129 MG/DL — HIGH (ref 70–99)
GLUCOSE SERPL-MCNC: 134 MG/DL — HIGH (ref 70–99)
GLUCOSE SERPL-MCNC: 150 MG/DL — HIGH (ref 70–99)
GLUCOSE SERPL-MCNC: 157 MG/DL — HIGH (ref 70–99)
GLUCOSE SERPL-MCNC: 159 MG/DL
GLUCOSE SERPL-MCNC: 162 MG/DL — HIGH (ref 70–99)
GLUCOSE SERPL-MCNC: 163 MG/DL
GLUCOSE SERPL-MCNC: 273 MG/DL — HIGH (ref 70–99)
GLUCOSE SERPL-MCNC: 80 MG/DL — SIGNIFICANT CHANGE UP (ref 70–99)
GLUCOSE SERPL-MCNC: 89 MG/DL
GLUCOSE UR QL: SIGNIFICANT CHANGE UP
HBA1C MFR BLD HPLC: 6.2 %
HCT VFR BLD CALC: 29.9 % — LOW (ref 37–47)
HCT VFR BLD CALC: 31.7 % — LOW (ref 37–47)
HCT VFR BLD CALC: 32.1 % — LOW (ref 37–47)
HCT VFR BLD CALC: 33 % — LOW (ref 37–47)
HCT VFR BLD CALC: 34 % — LOW (ref 37–47)
HCT VFR BLD CALC: 35.3 % — LOW (ref 37–47)
HCT VFR BLD CALC: 36.9 % — LOW (ref 37–47)
HCT VFR BLD CALC: 37.6 % — SIGNIFICANT CHANGE UP (ref 37–47)
HCT VFR BLD CALC: 38 %
HCT VFR BLD CALC: 38.5 %
HCT VFR BLD CALC: 38.6 %
HCT VFR BLD CALC: 39.8 %
HCV AB S/CO SERPL IA: 0.04 COI — SIGNIFICANT CHANGE UP
HCV AB SERPL-IMP: SIGNIFICANT CHANGE UP
HEPARIN-PF4 AB RESULT: <0.6 U/ML — SIGNIFICANT CHANGE UP (ref 0–0.9)
HGB BLD-MCNC: 10.6 G/DL — LOW (ref 12–16)
HGB BLD-MCNC: 11 G/DL — LOW (ref 12–16)
HGB BLD-MCNC: 11.4 G/DL — LOW (ref 12–16)
HGB BLD-MCNC: 11.7 G/DL — LOW (ref 12–16)
HGB BLD-MCNC: 11.8 G/DL — LOW (ref 12–16)
HGB BLD-MCNC: 12.2 G/DL — SIGNIFICANT CHANGE UP (ref 12–16)
HGB BLD-MCNC: 12.6 G/DL — SIGNIFICANT CHANGE UP (ref 12–16)
HGB BLD-MCNC: 13 G/DL — SIGNIFICANT CHANGE UP (ref 12–16)
HGB BLD-MCNC: 13.1 G/DL
HGB BLD-MCNC: 13.3 G/DL
HGB BLD-MCNC: 13.5 G/DL
HGB BLD-MCNC: 13.7 G/DL
HYALINE CASTS # UR AUTO: 2 /LPF — SIGNIFICANT CHANGE UP (ref 0–7)
HYPOGRAN NEUTS BLD QL SMEAR: PRESENT — SIGNIFICANT CHANGE UP
IMM GRANULOCYTES NFR BLD AUTO: 10.1 % — HIGH (ref 0.1–0.3)
IMM GRANULOCYTES NFR BLD AUTO: 12.2 % — HIGH (ref 0.1–0.3)
IMM GRANULOCYTES NFR BLD AUTO: 20.5 % — HIGH (ref 0.1–0.3)
IMM GRANULOCYTES NFR BLD AUTO: 9.3 % — HIGH (ref 0.1–0.3)
IMM GRANULOCYTES NFR BLD AUTO: 9.9 % — HIGH (ref 0.1–0.3)
INR BLD: 0.84 RATIO — SIGNIFICANT CHANGE UP (ref 0.65–1.3)
INR BLD: 0.87 RATIO — SIGNIFICANT CHANGE UP (ref 0.65–1.3)
INR BLD: 0.9 RATIO — SIGNIFICANT CHANGE UP (ref 0.65–1.3)
KETONES UR-MCNC: NEGATIVE — SIGNIFICANT CHANGE UP
KETONES URINE: NEGATIVE
LDH SERPL L TO P-CCNC: 457 — HIGH (ref 50–242)
LEUKOCYTE ESTERASE UR-ACNC: ABNORMAL
LEUKOCYTE ESTERASE URINE: ABNORMAL
LEUKOCYTE ESTERASE URINE: NEGATIVE
LEUKOCYTE ESTERASE URINE: NEGATIVE
LYMPHOCYTES # BLD AUTO: 0.3 K/UL — LOW (ref 1.2–3.4)
LYMPHOCYTES # BLD AUTO: 0.33 K/UL — LOW (ref 1.2–3.4)
LYMPHOCYTES # BLD AUTO: 0.34 K/UL — LOW (ref 1.2–3.4)
LYMPHOCYTES # BLD AUTO: 0.34 K/UL — LOW (ref 1.2–3.4)
LYMPHOCYTES # BLD AUTO: 0.35 K/UL — LOW (ref 1.2–3.4)
LYMPHOCYTES # BLD AUTO: 0.46 K/UL — LOW (ref 1.2–3.4)
LYMPHOCYTES # BLD AUTO: 0.58 K/UL — LOW (ref 1.2–3.4)
LYMPHOCYTES # BLD AUTO: 2.7 % — LOW (ref 20.5–51.1)
LYMPHOCYTES # BLD AUTO: 3.4 % — LOW (ref 20.5–51.1)
LYMPHOCYTES # BLD AUTO: 3.5 % — LOW (ref 20.5–51.1)
LYMPHOCYTES # BLD AUTO: 4.2 % — LOW (ref 20.5–51.1)
LYMPHOCYTES # BLD AUTO: 4.2 % — LOW (ref 20.5–51.1)
LYMPHOCYTES # BLD AUTO: 5.6 % — LOW (ref 20.5–51.1)
LYMPHOCYTES # BLD AUTO: 7.9 % — LOW (ref 20.5–51.1)
MAGNESIUM SERPL-MCNC: 1.9 MG/DL — SIGNIFICANT CHANGE UP (ref 1.8–2.4)
MAGNESIUM SERPL-MCNC: 2 MG/DL — SIGNIFICANT CHANGE UP (ref 1.8–2.4)
MANUAL SMEAR VERIFICATION: SIGNIFICANT CHANGE UP
MCHC RBC-ENTMCNC: 30.4 PG
MCHC RBC-ENTMCNC: 30.8 PG
MCHC RBC-ENTMCNC: 30.8 PG — SIGNIFICANT CHANGE UP (ref 27–31)
MCHC RBC-ENTMCNC: 31.6 PG — HIGH (ref 27–31)
MCHC RBC-ENTMCNC: 31.7 PG
MCHC RBC-ENTMCNC: 31.7 PG — HIGH (ref 27–31)
MCHC RBC-ENTMCNC: 31.7 PG — HIGH (ref 27–31)
MCHC RBC-ENTMCNC: 31.8 PG
MCHC RBC-ENTMCNC: 32.1 PG — HIGH (ref 27–31)
MCHC RBC-ENTMCNC: 32.2 PG — HIGH (ref 27–31)
MCHC RBC-ENTMCNC: 32.4 PG — HIGH (ref 27–31)
MCHC RBC-ENTMCNC: 32.5 PG — HIGH (ref 27–31)
MCHC RBC-ENTMCNC: 33.1 G/DL — SIGNIFICANT CHANGE UP (ref 32–37)
MCHC RBC-ENTMCNC: 34 G/DL
MCHC RBC-ENTMCNC: 34.3 G/DL — SIGNIFICANT CHANGE UP (ref 32–37)
MCHC RBC-ENTMCNC: 34.4 G/DL
MCHC RBC-ENTMCNC: 34.4 G/DL — SIGNIFICANT CHANGE UP (ref 32–37)
MCHC RBC-ENTMCNC: 34.6 G/DL — SIGNIFICANT CHANGE UP (ref 32–37)
MCHC RBC-ENTMCNC: 35 G/DL
MCHC RBC-ENTMCNC: 35 G/DL
MCHC RBC-ENTMCNC: 35.5 G/DL — SIGNIFICANT CHANGE UP (ref 32–37)
MCHC RBC-ENTMCNC: 35.7 G/DL — SIGNIFICANT CHANGE UP (ref 32–37)
MCHC RBC-ENTMCNC: 35.8 G/DL — SIGNIFICANT CHANGE UP (ref 32–37)
MCHC RBC-ENTMCNC: 36 G/DL — SIGNIFICANT CHANGE UP (ref 32–37)
MCV RBC AUTO: 88.2 FL
MCV RBC AUTO: 90.1 FL — SIGNIFICANT CHANGE UP (ref 81–99)
MCV RBC AUTO: 90.3 FL — SIGNIFICANT CHANGE UP (ref 81–99)
MCV RBC AUTO: 90.4 FL
MCV RBC AUTO: 90.5 FL
MCV RBC AUTO: 90.7 FL — SIGNIFICANT CHANGE UP (ref 81–99)
MCV RBC AUTO: 90.9 FL — SIGNIFICANT CHANGE UP (ref 81–99)
MCV RBC AUTO: 91 FL
MCV RBC AUTO: 91.7 FL — SIGNIFICANT CHANGE UP (ref 81–99)
MCV RBC AUTO: 92.1 FL — SIGNIFICANT CHANGE UP (ref 81–99)
MCV RBC AUTO: 92.2 FL — SIGNIFICANT CHANGE UP (ref 81–99)
MCV RBC AUTO: 93.2 FL — SIGNIFICANT CHANGE UP (ref 81–99)
METAMYELOCYTES # FLD: 0.9 % — HIGH (ref 0–0)
MICROCYTES BLD QL: SLIGHT — SIGNIFICANT CHANGE UP
MONOCYTES # BLD AUTO: 0.23 K/UL — SIGNIFICANT CHANGE UP (ref 0.1–0.6)
MONOCYTES # BLD AUTO: 0.25 K/UL — SIGNIFICANT CHANGE UP (ref 0.1–0.6)
MONOCYTES # BLD AUTO: 0.25 K/UL — SIGNIFICANT CHANGE UP (ref 0.1–0.6)
MONOCYTES # BLD AUTO: 0.27 K/UL — SIGNIFICANT CHANGE UP (ref 0.1–0.6)
MONOCYTES # BLD AUTO: 0.31 K/UL — SIGNIFICANT CHANGE UP (ref 0.1–0.6)
MONOCYTES # BLD AUTO: 0.75 K/UL — HIGH (ref 0.1–0.6)
MONOCYTES # BLD AUTO: 1.02 K/UL — HIGH (ref 0.1–0.6)
MONOCYTES NFR BLD AUTO: 10.6 % — HIGH (ref 1.7–9.3)
MONOCYTES NFR BLD AUTO: 2.5 % — SIGNIFICANT CHANGE UP (ref 1.7–9.3)
MONOCYTES NFR BLD AUTO: 2.8 % — SIGNIFICANT CHANGE UP (ref 1.7–9.3)
MONOCYTES NFR BLD AUTO: 3.4 % — SIGNIFICANT CHANGE UP (ref 1.7–9.3)
MONOCYTES NFR BLD AUTO: 3.7 % — SIGNIFICANT CHANGE UP (ref 1.7–9.3)
MONOCYTES NFR BLD AUTO: 3.8 % — SIGNIFICANT CHANGE UP (ref 1.7–9.3)
MONOCYTES NFR BLD AUTO: 6.2 % — SIGNIFICANT CHANGE UP (ref 1.7–9.3)
MYELOCYTES NFR BLD: 0.9 % — HIGH (ref 0–0)
MYELOCYTES NFR BLD: 3.5 % — HIGH (ref 0–0)
NEUTROPHILS # BLD AUTO: 10.57 K/UL — HIGH (ref 1.4–6.5)
NEUTROPHILS # BLD AUTO: 5.59 K/UL — SIGNIFICANT CHANGE UP (ref 1.4–6.5)
NEUTROPHILS # BLD AUTO: 5.81 K/UL — SIGNIFICANT CHANGE UP (ref 1.4–6.5)
NEUTROPHILS # BLD AUTO: 5.82 K/UL — SIGNIFICANT CHANGE UP (ref 1.4–6.5)
NEUTROPHILS # BLD AUTO: 6.8 K/UL — HIGH (ref 1.4–6.5)
NEUTROPHILS # BLD AUTO: 7.65 K/UL — HIGH (ref 1.4–6.5)
NEUTROPHILS # BLD AUTO: 8.28 K/UL — HIGH (ref 1.4–6.5)
NEUTROPHILS NFR BLD AUTO: 71 % — SIGNIFICANT CHANGE UP (ref 42.2–75.2)
NEUTROPHILS NFR BLD AUTO: 75.8 % — HIGH (ref 42.2–75.2)
NEUTROPHILS NFR BLD AUTO: 79.7 % — HIGH (ref 42.2–75.2)
NEUTROPHILS NFR BLD AUTO: 81.4 % — HIGH (ref 42.2–75.2)
NEUTROPHILS NFR BLD AUTO: 81.5 % — HIGH (ref 42.2–75.2)
NEUTROPHILS NFR BLD AUTO: 81.5 % — HIGH (ref 42.2–75.2)
NEUTROPHILS NFR BLD AUTO: 84.1 % — HIGH (ref 42.2–75.2)
NEUTS BAND # BLD: 6.2 % — HIGH (ref 0–6)
NITRITE UR-MCNC: NEGATIVE — SIGNIFICANT CHANGE UP
NITRITE URINE: NEGATIVE
NRBC # BLD: 0 /100 WBCS — SIGNIFICANT CHANGE UP (ref 0–0)
NRBC # BLD: 1 /100 WBCS — HIGH (ref 0–0)
NRBC # BLD: 1 /100 — HIGH (ref 0–0)
NRBC # BLD: 2 /100 — HIGH (ref 0–0)
NRBC # BLD: SIGNIFICANT CHANGE UP /100 WBCS (ref 0–0)
NRBC # BLD: SIGNIFICANT CHANGE UP /100 WBCS (ref 0–0)
OVALOCYTES BLD QL SMEAR: SLIGHT — SIGNIFICANT CHANGE UP
PF4 HEPARIN CMPLX AB SER-ACNC: NEGATIVE — SIGNIFICANT CHANGE UP
PH UR: 6.5 — SIGNIFICANT CHANGE UP (ref 5–8)
PH URINE: 6
PH URINE: 6
PH URINE: 7.5
PHOSPHATE SERPL-MCNC: 3.4 MG/DL — SIGNIFICANT CHANGE UP (ref 2.1–4.9)
PHOSPHATE SERPL-MCNC: 4.3 MG/DL — SIGNIFICANT CHANGE UP (ref 2.1–4.9)
PLAT MORPH BLD: NORMAL — SIGNIFICANT CHANGE UP
PLAT MORPH BLD: SIGNIFICANT CHANGE UP
PLATELET # BLD AUTO: 108 K/UL
PLATELET # BLD AUTO: 137 K/UL
PLATELET # BLD AUTO: 137 K/UL — SIGNIFICANT CHANGE UP (ref 130–400)
PLATELET # BLD AUTO: 139 K/UL — SIGNIFICANT CHANGE UP (ref 130–400)
PLATELET # BLD AUTO: 144 K/UL — SIGNIFICANT CHANGE UP (ref 130–400)
PLATELET # BLD AUTO: 150 K/UL — SIGNIFICANT CHANGE UP (ref 130–400)
PLATELET # BLD AUTO: 166 K/UL — SIGNIFICANT CHANGE UP (ref 130–400)
PLATELET # BLD AUTO: 190 K/UL
PLATELET # BLD AUTO: 378 K/UL
PLATELET # BLD AUTO: 38 K/UL — LOW (ref 130–400)
PLATELET # BLD AUTO: 43 K/UL — LOW (ref 130–400)
PLATELET # BLD AUTO: 45 K/UL — LOW (ref 130–400)
PMV BLD: 10.2 FL
PMV BLD: 10.4 FL
PMV BLD: 9.1 FL
PMV BLD: 9.5 FL
POIKILOCYTOSIS BLD QL AUTO: SLIGHT — SIGNIFICANT CHANGE UP
POLYCHROMASIA BLD QL SMEAR: SLIGHT — SIGNIFICANT CHANGE UP
POLYCHROMASIA BLD QL SMEAR: SLIGHT — SIGNIFICANT CHANGE UP
POTASSIUM SERPL-MCNC: 3.7 MMOL/L — SIGNIFICANT CHANGE UP (ref 3.5–5)
POTASSIUM SERPL-MCNC: 3.9 MMOL/L — SIGNIFICANT CHANGE UP (ref 3.5–5)
POTASSIUM SERPL-MCNC: 4.1 MMOL/L — SIGNIFICANT CHANGE UP (ref 3.5–5)
POTASSIUM SERPL-MCNC: 4.3 MMOL/L — SIGNIFICANT CHANGE UP (ref 3.5–5)
POTASSIUM SERPL-MCNC: 4.4 MMOL/L — SIGNIFICANT CHANGE UP (ref 3.5–5)
POTASSIUM SERPL-MCNC: 4.4 MMOL/L — SIGNIFICANT CHANGE UP (ref 3.5–5)
POTASSIUM SERPL-MCNC: 4.6 MMOL/L — SIGNIFICANT CHANGE UP (ref 3.5–5)
POTASSIUM SERPL-MCNC: 5.1 MMOL/L — HIGH (ref 3.5–5)
POTASSIUM SERPL-SCNC: 3.6 MMOL/L
POTASSIUM SERPL-SCNC: 3.7 MMOL/L — SIGNIFICANT CHANGE UP (ref 3.5–5)
POTASSIUM SERPL-SCNC: 3.9 MMOL/L
POTASSIUM SERPL-SCNC: 3.9 MMOL/L — SIGNIFICANT CHANGE UP (ref 3.5–5)
POTASSIUM SERPL-SCNC: 4 MMOL/L
POTASSIUM SERPL-SCNC: 4.1 MMOL/L — SIGNIFICANT CHANGE UP (ref 3.5–5)
POTASSIUM SERPL-SCNC: 4.3 MMOL/L — SIGNIFICANT CHANGE UP (ref 3.5–5)
POTASSIUM SERPL-SCNC: 4.4 MMOL/L — SIGNIFICANT CHANGE UP (ref 3.5–5)
POTASSIUM SERPL-SCNC: 4.4 MMOL/L — SIGNIFICANT CHANGE UP (ref 3.5–5)
POTASSIUM SERPL-SCNC: 4.6 MMOL/L — SIGNIFICANT CHANGE UP (ref 3.5–5)
POTASSIUM SERPL-SCNC: 5.1 MMOL/L — HIGH (ref 3.5–5)
PROT SERPL-MCNC: 4.6 G/DL — LOW (ref 6–8)
PROT SERPL-MCNC: 4.7 G/DL — LOW (ref 6–8)
PROT SERPL-MCNC: 4.8 G/DL — LOW (ref 6–8)
PROT SERPL-MCNC: 4.9 G/DL — LOW (ref 6–8)
PROT SERPL-MCNC: 5 G/DL — LOW (ref 6–8)
PROT SERPL-MCNC: 5.2 G/DL
PROT SERPL-MCNC: 5.2 G/DL
PROT SERPL-MCNC: 5.4 G/DL — LOW (ref 6–8)
PROT SERPL-MCNC: 5.5 G/DL — LOW (ref 6–8)
PROT SERPL-MCNC: 5.8 G/DL
PROT UR-MCNC: SIGNIFICANT CHANGE UP
PROTEIN URINE: NEGATIVE
PROTEIN URINE: NEGATIVE
PROTEIN URINE: NORMAL
PROTHROM AB SERPL-ACNC: 10 SEC — SIGNIFICANT CHANGE UP (ref 9.95–12.87)
PROTHROM AB SERPL-ACNC: 10.3 SEC — SIGNIFICANT CHANGE UP (ref 9.95–12.87)
PROTHROM AB SERPL-ACNC: 9.7 SEC — LOW (ref 9.95–12.87)
RBC # BLD: 3.29 M/UL — LOW (ref 4.2–5.4)
RBC # BLD: 3.48 M/UL — LOW (ref 4.2–5.4)
RBC # BLD: 3.51 M/UL — LOW (ref 4.2–5.4)
RBC # BLD: 3.64 M/UL — LOW (ref 4.2–5.4)
RBC # BLD: 3.69 M/UL — LOW (ref 4.2–5.4)
RBC # BLD: 3.92 M/UL — LOW (ref 4.2–5.4)
RBC # BLD: 3.96 M/UL — LOW (ref 4.2–5.4)
RBC # BLD: 4.1 M/UL — LOW (ref 4.2–5.4)
RBC # BLD: 4.2 M/UL
RBC # BLD: 4.24 M/UL
RBC # BLD: 4.26 M/UL
RBC # BLD: 4.51 M/UL
RBC # FLD: 12.7 %
RBC # FLD: 14.5 %
RBC # FLD: 15.5 %
RBC # FLD: 16.6 % — HIGH (ref 11.5–14.5)
RBC # FLD: 16.7 % — HIGH (ref 11.5–14.5)
RBC # FLD: 16.9 %
RBC # FLD: 17 % — HIGH (ref 11.5–14.5)
RBC # FLD: 17.1 % — HIGH (ref 11.5–14.5)
RBC # FLD: 17.2 % — HIGH (ref 11.5–14.5)
RBC # FLD: 17.4 % — HIGH (ref 11.5–14.5)
RBC # FLD: 17.4 % — HIGH (ref 11.5–14.5)
RBC # FLD: 17.6 % — HIGH (ref 11.5–14.5)
RBC BLD AUTO: ABNORMAL
RBC BLD AUTO: NORMAL — SIGNIFICANT CHANGE UP
RBC CASTS # UR COMP ASSIST: 86 /HPF — HIGH (ref 0–4)
SARS-COV-2 RNA SPEC QL NAA+PROBE: SIGNIFICANT CHANGE UP
SMUDGE CELLS # BLD: PRESENT — SIGNIFICANT CHANGE UP
SODIUM SERPL-SCNC: 128 MMOL/L — LOW (ref 135–146)
SODIUM SERPL-SCNC: 129 MMOL/L
SODIUM SERPL-SCNC: 129 MMOL/L — LOW (ref 135–146)
SODIUM SERPL-SCNC: 131 MMOL/L — LOW (ref 135–146)
SODIUM SERPL-SCNC: 132 MMOL/L — LOW (ref 135–146)
SODIUM SERPL-SCNC: 133 MMOL/L
SODIUM SERPL-SCNC: 133 MMOL/L — LOW (ref 135–146)
SODIUM SERPL-SCNC: 135 MMOL/L
SODIUM SERPL-SCNC: 135 MMOL/L — SIGNIFICANT CHANGE UP (ref 135–146)
SP GR SPEC: 1.03 — SIGNIFICANT CHANGE UP (ref 1.01–1.03)
SPECIFIC GRAVITY URINE: 1.01
SPECIFIC GRAVITY URINE: 1.02
SPECIFIC GRAVITY URINE: 1.03
SPECIMEN SOURCE: SIGNIFICANT CHANGE UP
T3FREE SERPL-MCNC: 0.98 PG/ML — LOW (ref 1.8–4.6)
T4 FREE SERPL-MCNC: 1.4 NG/DL — SIGNIFICANT CHANGE UP (ref 0.9–1.8)
TROPONIN T SERPL-MCNC: <0.01 NG/ML — SIGNIFICANT CHANGE UP
TSH SERPL-ACNC: 0.01 UIU/ML
TSH SERPL-ACNC: 0.03 UIU/ML
TSH SERPL-ACNC: 0.13 UIU/ML
TSH SERPL-MCNC: 0.29 UIU/ML — SIGNIFICANT CHANGE UP (ref 0.27–4.2)
UROBILINOGEN FLD QL: SIGNIFICANT CHANGE UP
UROBILINOGEN URINE: NORMAL
VARIANT LYMPHS # BLD: 4.4 % — SIGNIFICANT CHANGE UP (ref 0–5)
WBC # BLD: 12.07 K/UL — HIGH (ref 4.8–10.8)
WBC # BLD: 7.14 K/UL — SIGNIFICANT CHANGE UP (ref 4.8–10.8)
WBC # BLD: 7.37 K/UL — SIGNIFICANT CHANGE UP (ref 4.8–10.8)
WBC # BLD: 8.01 K/UL — SIGNIFICANT CHANGE UP (ref 4.8–10.8)
WBC # BLD: 8.19 K/UL — SIGNIFICANT CHANGE UP (ref 4.8–10.8)
WBC # BLD: 8.35 K/UL — SIGNIFICANT CHANGE UP (ref 4.8–10.8)
WBC # BLD: 9.6 K/UL — SIGNIFICANT CHANGE UP (ref 4.8–10.8)
WBC # BLD: 9.86 K/UL — SIGNIFICANT CHANGE UP (ref 4.8–10.8)
WBC # FLD AUTO: 10.72 K/UL
WBC # FLD AUTO: 12.07 K/UL — HIGH (ref 4.8–10.8)
WBC # FLD AUTO: 12.42 K/UL
WBC # FLD AUTO: 13.63 K/UL
WBC # FLD AUTO: 14.42 K/UL
WBC # FLD AUTO: 7.14 K/UL — SIGNIFICANT CHANGE UP (ref 4.8–10.8)
WBC # FLD AUTO: 7.37 K/UL — SIGNIFICANT CHANGE UP (ref 4.8–10.8)
WBC # FLD AUTO: 8.01 K/UL — SIGNIFICANT CHANGE UP (ref 4.8–10.8)
WBC # FLD AUTO: 8.19 K/UL — SIGNIFICANT CHANGE UP (ref 4.8–10.8)
WBC # FLD AUTO: 8.35 K/UL — SIGNIFICANT CHANGE UP (ref 4.8–10.8)
WBC # FLD AUTO: 9.6 K/UL — SIGNIFICANT CHANGE UP (ref 4.8–10.8)
WBC # FLD AUTO: 9.86 K/UL — SIGNIFICANT CHANGE UP (ref 4.8–10.8)
WBC UR QL: 16 /HPF — HIGH (ref 0–5)

## 2021-01-01 PROCEDURE — ZZZZZ: CPT

## 2021-01-01 PROCEDURE — 73562 X-RAY EXAM OF KNEE 3: CPT | Mod: 26,RT

## 2021-01-01 PROCEDURE — 99239 HOSP IP/OBS DSCHRG MGMT >30: CPT

## 2021-01-01 PROCEDURE — 99233 SBSQ HOSP IP/OBS HIGH 50: CPT

## 2021-01-01 PROCEDURE — 99497 ADVNCD CARE PLAN 30 MIN: CPT | Mod: 25

## 2021-01-01 PROCEDURE — 99232 SBSQ HOSP IP/OBS MODERATE 35: CPT

## 2021-01-01 PROCEDURE — 73130 X-RAY EXAM OF HAND: CPT | Mod: 26,50

## 2021-01-01 PROCEDURE — 71045 X-RAY EXAM CHEST 1 VIEW: CPT | Mod: 26

## 2021-01-01 PROCEDURE — 99285 EMERGENCY DEPT VISIT HI MDM: CPT

## 2021-01-01 PROCEDURE — 72125 CT NECK SPINE W/O DYE: CPT | Mod: 26

## 2021-01-01 PROCEDURE — 99223 1ST HOSP IP/OBS HIGH 75: CPT

## 2021-01-01 PROCEDURE — 99214 OFFICE O/P EST MOD 30 MIN: CPT

## 2021-01-01 PROCEDURE — 72170 X-RAY EXAM OF PELVIS: CPT | Mod: 26

## 2021-01-01 PROCEDURE — 78815 PET IMAGE W/CT SKULL-THIGH: CPT | Mod: 26,PS

## 2021-01-01 PROCEDURE — 70450 CT HEAD/BRAIN W/O DYE: CPT | Mod: 26

## 2021-01-01 PROCEDURE — 71260 CT THORAX DX C+: CPT | Mod: 26

## 2021-01-01 PROCEDURE — 99223 1ST HOSP IP/OBS HIGH 75: CPT | Mod: AI

## 2021-01-01 PROCEDURE — 74177 CT ABD & PELVIS W/CONTRAST: CPT | Mod: 26

## 2021-01-01 PROCEDURE — 99213 OFFICE O/P EST LOW 20 MIN: CPT

## 2021-01-01 PROCEDURE — 93010 ELECTROCARDIOGRAM REPORT: CPT

## 2021-01-01 PROCEDURE — 70552 MRI BRAIN STEM W/DYE: CPT | Mod: 26

## 2021-01-01 PROCEDURE — 99231 SBSQ HOSP IP/OBS SF/LOW 25: CPT

## 2021-01-01 RX ORDER — PANTOPRAZOLE 40 MG/1
40 TABLET, DELAYED RELEASE ORAL
Qty: 30 | Refills: 5 | Status: ACTIVE | COMMUNITY
Start: 2020-02-20 | End: 1900-01-01

## 2021-01-01 RX ORDER — DEXTROSE 50 % IN WATER 50 %
25 SYRINGE (ML) INTRAVENOUS ONCE
Refills: 0 | Status: DISCONTINUED | OUTPATIENT
Start: 2021-01-01 | End: 2021-01-01

## 2021-01-01 RX ORDER — ZOLPIDEM TARTRATE 5 MG/1
5 TABLET ORAL
Qty: 10 | Refills: 0 | Status: ACTIVE | COMMUNITY
Start: 2021-01-01 | End: 1900-01-01

## 2021-01-01 RX ORDER — LEVETIRACETAM 250 MG/1
1000 TABLET, FILM COATED ORAL
Refills: 0 | Status: DISCONTINUED | OUTPATIENT
Start: 2021-01-01 | End: 2021-01-01

## 2021-01-01 RX ORDER — PANTOPRAZOLE SODIUM 20 MG/1
40 TABLET, DELAYED RELEASE ORAL
Refills: 0 | Status: DISCONTINUED | OUTPATIENT
Start: 2021-01-01 | End: 2021-01-01

## 2021-01-01 RX ORDER — ACETAMINOPHEN 500 MG
650 TABLET ORAL ONCE
Refills: 0 | Status: COMPLETED | OUTPATIENT
Start: 2021-01-01 | End: 2021-01-01

## 2021-01-01 RX ORDER — DEXAMETHASONE 1 MG/1
1 TABLET ORAL
Qty: 120 | Refills: 0 | Status: ACTIVE | COMMUNITY
Start: 2021-01-01 | End: 1900-01-01

## 2021-01-01 RX ORDER — CHOLECALCIFEROL (VITAMIN D3) 125 MCG
1000 CAPSULE ORAL EVERY 24 HOURS
Refills: 0 | Status: DISCONTINUED | OUTPATIENT
Start: 2021-01-01 | End: 2021-01-01

## 2021-01-01 RX ORDER — POLYETHYLENE GLYCOL 3350 17 G/17G
17 POWDER, FOR SOLUTION ORAL
Refills: 0 | Status: DISCONTINUED | OUTPATIENT
Start: 2021-01-01 | End: 2021-01-01

## 2021-01-01 RX ORDER — SODIUM CHLORIDE 9 MG/ML
1000 INJECTION, SOLUTION INTRAVENOUS
Refills: 0 | Status: DISCONTINUED | OUTPATIENT
Start: 2021-01-01 | End: 2021-01-01

## 2021-01-01 RX ORDER — DIPHENHYDRAMINE HCL 50 MG
25 CAPSULE ORAL ONCE
Refills: 0 | Status: COMPLETED | OUTPATIENT
Start: 2021-01-01 | End: 2021-01-01

## 2021-01-01 RX ORDER — DEXAMETHASONE 0.5 MG/5ML
2 ELIXIR ORAL THREE TIMES A DAY
Refills: 0 | Status: DISCONTINUED | OUTPATIENT
Start: 2021-01-01 | End: 2021-01-01

## 2021-01-01 RX ORDER — HYDROMORPHONE HYDROCHLORIDE 2 MG/ML
1 INJECTION INTRAMUSCULAR; INTRAVENOUS; SUBCUTANEOUS ONCE
Refills: 0 | Status: DISCONTINUED | OUTPATIENT
Start: 2021-01-01 | End: 2021-01-01

## 2021-01-01 RX ORDER — TOPICAL ANESTHETIC 200 MG/G
20 SPRAY DENTAL; PERIODONTAL
Qty: 1 | Refills: 0 | Status: ACTIVE | COMMUNITY
Start: 2021-01-01 | End: 1900-01-01

## 2021-01-01 RX ORDER — DEXAMETHASONE 0.5 MG/5ML
1 ELIXIR ORAL
Qty: 251 | Refills: 0
Start: 2021-01-01 | End: 2021-04-03

## 2021-01-01 RX ORDER — INSULIN LISPRO 100/ML
VIAL (ML) SUBCUTANEOUS
Refills: 0 | Status: DISCONTINUED | OUTPATIENT
Start: 2021-01-01 | End: 2021-01-01

## 2021-01-01 RX ORDER — SODIUM CHLORIDE 9 MG/ML
1000 INJECTION INTRAMUSCULAR; INTRAVENOUS; SUBCUTANEOUS
Refills: 0 | Status: DISCONTINUED | OUTPATIENT
Start: 2021-01-01 | End: 2021-01-01

## 2021-01-01 RX ORDER — DEXAMETHASONE 0.5 MG/5ML
2 ELIXIR ORAL
Refills: 0 | Status: DISCONTINUED | OUTPATIENT
Start: 2021-01-01 | End: 2021-01-01

## 2021-01-01 RX ORDER — FLUCONAZOLE 200 MG/1
200 TABLET ORAL
Qty: 7 | Refills: 0 | Status: ACTIVE | COMMUNITY
Start: 2021-01-01

## 2021-01-01 RX ORDER — PEMBROLIZUMAB 25 MG/ML
200 INJECTION, SOLUTION INTRAVENOUS ONCE
Refills: 0 | Status: COMPLETED | OUTPATIENT
Start: 2021-01-01 | End: 2021-01-01

## 2021-01-01 RX ORDER — SOD SULF/SODIUM/NAHCO3/KCL/PEG
4000 SOLUTION, RECONSTITUTED, ORAL ORAL ONCE
Refills: 0 | Status: COMPLETED | OUTPATIENT
Start: 2021-01-01 | End: 2021-01-01

## 2021-01-01 RX ORDER — GLUCAGON INJECTION, SOLUTION 0.5 MG/.1ML
1 INJECTION, SOLUTION SUBCUTANEOUS ONCE
Refills: 0 | Status: DISCONTINUED | OUTPATIENT
Start: 2021-01-01 | End: 2021-01-01

## 2021-01-01 RX ORDER — BEVACIZUMAB 400 MG/16ML
850 INJECTION, SOLUTION INTRAVENOUS ONCE
Refills: 0 | Status: COMPLETED | OUTPATIENT
Start: 2021-01-01 | End: 2021-01-01

## 2021-01-01 RX ORDER — DEXAMETHASONE 0.5 MG/5ML
10 ELIXIR ORAL ONCE
Refills: 0 | Status: COMPLETED | OUTPATIENT
Start: 2021-01-01 | End: 2021-01-01

## 2021-01-01 RX ORDER — CEFTRIAXONE 500 MG/1
1000 INJECTION, POWDER, FOR SOLUTION INTRAMUSCULAR; INTRAVENOUS ONCE
Refills: 0 | Status: COMPLETED | OUTPATIENT
Start: 2021-01-01 | End: 2021-01-01

## 2021-01-01 RX ORDER — MAGNESIUM SULFATE 500 MG/ML
2 VIAL (ML) INJECTION ONCE
Refills: 0 | Status: COMPLETED | OUTPATIENT
Start: 2021-01-01 | End: 2021-01-01

## 2021-01-01 RX ORDER — NYSTATIN 100000 [USP'U]/ML
100000 SUSPENSION ORAL 4 TIMES DAILY
Qty: 160 | Refills: 1 | Status: ACTIVE | COMMUNITY
Start: 2021-01-01 | End: 1900-01-01

## 2021-01-01 RX ORDER — DEXAMETHASONE 0.5 MG/5ML
4 ELIXIR ORAL
Refills: 0 | Status: DISCONTINUED | OUTPATIENT
Start: 2021-01-01 | End: 2021-01-01

## 2021-01-01 RX ORDER — CHLORHEXIDINE GLUCONATE 213 G/1000ML
1 SOLUTION TOPICAL
Refills: 0 | Status: DISCONTINUED | OUTPATIENT
Start: 2021-01-01 | End: 2021-01-01

## 2021-01-01 RX ORDER — LANOLIN ALCOHOL/MO/W.PET/CERES
5 CREAM (GRAM) TOPICAL AT BEDTIME
Refills: 0 | Status: DISCONTINUED | OUTPATIENT
Start: 2021-01-01 | End: 2021-01-01

## 2021-01-01 RX ORDER — ACETAMINOPHEN 500 MG
650 TABLET ORAL EVERY 4 HOURS
Refills: 0 | Status: DISCONTINUED | OUTPATIENT
Start: 2021-01-01 | End: 2021-01-01

## 2021-01-01 RX ORDER — DEXAMETHASONE 0.5 MG/5ML
4 ELIXIR ORAL EVERY 6 HOURS
Refills: 0 | Status: DISCONTINUED | OUTPATIENT
Start: 2021-01-01 | End: 2021-01-01

## 2021-01-01 RX ORDER — HYDROCORTISONE 1 %
1 OINTMENT (GRAM) TOPICAL EVERY 12 HOURS
Refills: 0 | Status: DISCONTINUED | OUTPATIENT
Start: 2021-01-01 | End: 2021-01-01

## 2021-01-01 RX ORDER — CEFTRIAXONE 500 MG/1
1000 INJECTION, POWDER, FOR SOLUTION INTRAMUSCULAR; INTRAVENOUS EVERY 24 HOURS
Refills: 0 | Status: DISCONTINUED | OUTPATIENT
Start: 2021-01-01 | End: 2021-01-01

## 2021-01-01 RX ORDER — MORPHINE SULFATE 50 MG/1
2 CAPSULE, EXTENDED RELEASE ORAL EVERY 4 HOURS
Refills: 0 | Status: DISCONTINUED | OUTPATIENT
Start: 2021-01-01 | End: 2021-01-01

## 2021-01-01 RX ORDER — SENNA PLUS 8.6 MG/1
2 TABLET ORAL AT BEDTIME
Refills: 0 | Status: DISCONTINUED | OUTPATIENT
Start: 2021-01-01 | End: 2021-01-01

## 2021-01-01 RX ORDER — OXYCODONE HYDROCHLORIDE 5 MG/1
5 TABLET ORAL EVERY 4 HOURS
Refills: 0 | Status: DISCONTINUED | OUTPATIENT
Start: 2021-01-01 | End: 2021-01-01

## 2021-01-01 RX ORDER — INFLUENZA VIRUS VACCINE 15; 15; 15; 15 UG/.5ML; UG/.5ML; UG/.5ML; UG/.5ML
0.5 SUSPENSION INTRAMUSCULAR ONCE
Refills: 0 | Status: DISCONTINUED | OUTPATIENT
Start: 2021-01-01 | End: 2021-01-01

## 2021-01-01 RX ORDER — PREGABALIN 225 MG/1
1000 CAPSULE ORAL DAILY
Refills: 0 | Status: DISCONTINUED | OUTPATIENT
Start: 2021-01-01 | End: 2021-01-01

## 2021-01-01 RX ORDER — DEXAMETHASONE 0.5 MG/5ML
1 ELIXIR ORAL
Qty: 251 | Refills: 0
Start: 2021-01-01 | End: 2021-04-01

## 2021-01-01 RX ORDER — LEVOTHYROXINE SODIUM 125 MCG
112 TABLET ORAL DAILY
Refills: 0 | Status: DISCONTINUED | OUTPATIENT
Start: 2021-01-01 | End: 2021-01-01

## 2021-01-01 RX ORDER — LANOLIN ALCOHOL/MO/W.PET/CERES
1 CREAM (GRAM) TOPICAL AT BEDTIME
Refills: 0 | Status: DISCONTINUED | OUTPATIENT
Start: 2021-01-01 | End: 2021-01-01

## 2021-01-01 RX ORDER — KETOROLAC TROMETHAMINE 30 MG/ML
30 SYRINGE (ML) INJECTION ONCE
Refills: 0 | Status: DISCONTINUED | OUTPATIENT
Start: 2021-01-01 | End: 2021-01-01

## 2021-01-01 RX ORDER — DEXTROSE 50 % IN WATER 50 %
15 SYRINGE (ML) INTRAVENOUS ONCE
Refills: 0 | Status: DISCONTINUED | OUTPATIENT
Start: 2021-01-01 | End: 2021-01-01

## 2021-01-01 RX ORDER — SALICYLIC ACID 0.5 %
1 CLEANSER (GRAM) TOPICAL THREE TIMES A DAY
Refills: 0 | Status: DISCONTINUED | OUTPATIENT
Start: 2021-01-01 | End: 2021-01-01

## 2021-01-01 RX ORDER — DEXAMETHASONE 4 MG/1
4 TABLET ORAL DAILY
Qty: 30 | Refills: 0 | Status: ACTIVE | COMMUNITY
Start: 2020-01-01 | End: 1900-01-01

## 2021-01-01 RX ORDER — POLYETHYLENE GLYCOL 3350 17 G/17G
17 POWDER, FOR SOLUTION ORAL DAILY
Refills: 0 | Status: DISCONTINUED | OUTPATIENT
Start: 2021-01-01 | End: 2021-01-01

## 2021-01-01 RX ORDER — DEXTROSE 50 % IN WATER 50 %
12.5 SYRINGE (ML) INTRAVENOUS ONCE
Refills: 0 | Status: DISCONTINUED | OUTPATIENT
Start: 2021-01-01 | End: 2021-01-01

## 2021-01-01 RX ORDER — LENVATINIB 10 MG/1
2 X 10 CAPSULE ORAL
Qty: 60 | Refills: 1 | Status: COMPLETED | COMMUNITY
Start: 2020-01-01 | End: 2021-01-01

## 2021-01-01 RX ADMIN — LEVETIRACETAM 1000 MILLIGRAM(S): 250 TABLET, FILM COATED ORAL at 17:17

## 2021-01-01 RX ADMIN — CHLORHEXIDINE GLUCONATE 1 APPLICATION(S): 213 SOLUTION TOPICAL at 05:47

## 2021-01-01 RX ADMIN — Medication 650 MILLIGRAM(S): at 13:11

## 2021-01-01 RX ADMIN — Medication 1 TABLET(S): at 11:29

## 2021-01-01 RX ADMIN — Medication 1 APPLICATION(S): at 22:08

## 2021-01-01 RX ADMIN — PANTOPRAZOLE SODIUM 40 MILLIGRAM(S): 20 TABLET, DELAYED RELEASE ORAL at 05:34

## 2021-01-01 RX ADMIN — LEVETIRACETAM 1000 MILLIGRAM(S): 250 TABLET, FILM COATED ORAL at 17:33

## 2021-01-01 RX ADMIN — SODIUM CHLORIDE 60 MILLILITER(S): 9 INJECTION INTRAMUSCULAR; INTRAVENOUS; SUBCUTANEOUS at 06:01

## 2021-01-01 RX ADMIN — LEVETIRACETAM 1000 MILLIGRAM(S): 250 TABLET, FILM COATED ORAL at 05:39

## 2021-01-01 RX ADMIN — Medication 4 MILLIGRAM(S): at 00:34

## 2021-01-01 RX ADMIN — Medication 4 MILLIGRAM(S): at 05:59

## 2021-01-01 RX ADMIN — PANTOPRAZOLE SODIUM 40 MILLIGRAM(S): 20 TABLET, DELAYED RELEASE ORAL at 06:04

## 2021-01-01 RX ADMIN — Medication 50 GRAM(S): at 14:07

## 2021-01-01 RX ADMIN — LEVETIRACETAM 1000 MILLIGRAM(S): 250 TABLET, FILM COATED ORAL at 05:47

## 2021-01-01 RX ADMIN — POLYETHYLENE GLYCOL 3350 17 GRAM(S): 17 POWDER, FOR SOLUTION ORAL at 12:16

## 2021-01-01 RX ADMIN — PEMBROLIZUMAB 216 MILLIGRAM(S): 25 INJECTION, SOLUTION INTRAVENOUS at 14:50

## 2021-01-01 RX ADMIN — PANTOPRAZOLE SODIUM 40 MILLIGRAM(S): 20 TABLET, DELAYED RELEASE ORAL at 06:13

## 2021-01-01 RX ADMIN — Medication 112 MICROGRAM(S): at 05:17

## 2021-01-01 RX ADMIN — Medication 1: at 08:06

## 2021-01-01 RX ADMIN — LEVETIRACETAM 1000 MILLIGRAM(S): 250 TABLET, FILM COATED ORAL at 17:31

## 2021-01-01 RX ADMIN — Medication 2: at 12:15

## 2021-01-01 RX ADMIN — MORPHINE SULFATE 2 MILLIGRAM(S): 50 CAPSULE, EXTENDED RELEASE ORAL at 23:15

## 2021-01-01 RX ADMIN — Medication 4 MILLIGRAM(S): at 05:39

## 2021-01-01 RX ADMIN — Medication 1000 UNIT(S): at 22:07

## 2021-01-01 RX ADMIN — BEVACIZUMAB 850 MILLIGRAM(S): 400 INJECTION, SOLUTION INTRAVENOUS at 14:16

## 2021-01-01 RX ADMIN — BEVACIZUMAB 268 MILLIGRAM(S): 400 INJECTION, SOLUTION INTRAVENOUS at 13:46

## 2021-01-01 RX ADMIN — CHLORHEXIDINE GLUCONATE 1 APPLICATION(S): 213 SOLUTION TOPICAL at 06:12

## 2021-01-01 RX ADMIN — Medication 2 MILLIGRAM(S): at 05:37

## 2021-01-01 RX ADMIN — CHLORHEXIDINE GLUCONATE 1 APPLICATION(S): 213 SOLUTION TOPICAL at 05:38

## 2021-01-01 RX ADMIN — Medication 1000 UNIT(S): at 05:45

## 2021-01-01 RX ADMIN — CHLORHEXIDINE GLUCONATE 1 APPLICATION(S): 213 SOLUTION TOPICAL at 05:19

## 2021-01-01 RX ADMIN — LEVETIRACETAM 1000 MILLIGRAM(S): 250 TABLET, FILM COATED ORAL at 05:24

## 2021-01-01 RX ADMIN — Medication 1 APPLICATION(S): at 12:47

## 2021-01-01 RX ADMIN — Medication 2 MILLIGRAM(S): at 23:56

## 2021-01-01 RX ADMIN — Medication 1 TABLET(S): at 05:45

## 2021-01-01 RX ADMIN — PREGABALIN 1000 MICROGRAM(S): 225 CAPSULE ORAL at 11:30

## 2021-01-01 RX ADMIN — Medication 2 MILLIGRAM(S): at 17:33

## 2021-01-01 RX ADMIN — BEVACIZUMAB 850 MILLIGRAM(S): 400 INJECTION, SOLUTION INTRAVENOUS at 14:35

## 2021-01-01 RX ADMIN — Medication 101 MILLIGRAM(S): at 12:30

## 2021-01-01 RX ADMIN — Medication 1 APPLICATION(S): at 05:40

## 2021-01-01 RX ADMIN — Medication 1000 UNIT(S): at 05:34

## 2021-01-01 RX ADMIN — CHLORHEXIDINE GLUCONATE 1 APPLICATION(S): 213 SOLUTION TOPICAL at 06:35

## 2021-01-01 RX ADMIN — Medication 1 MILLIGRAM(S): at 22:30

## 2021-01-01 RX ADMIN — Medication 112 MICROGRAM(S): at 05:46

## 2021-01-01 RX ADMIN — Medication 30 MILLIGRAM(S): at 05:44

## 2021-01-01 RX ADMIN — POLYETHYLENE GLYCOL 3350 17 GRAM(S): 17 POWDER, FOR SOLUTION ORAL at 11:30

## 2021-01-01 RX ADMIN — POLYETHYLENE GLYCOL 3350 17 GRAM(S): 17 POWDER, FOR SOLUTION ORAL at 17:30

## 2021-01-01 RX ADMIN — Medication 1 APPLICATION(S): at 13:05

## 2021-01-01 RX ADMIN — POLYETHYLENE GLYCOL 3350 17 GRAM(S): 17 POWDER, FOR SOLUTION ORAL at 11:04

## 2021-01-01 RX ADMIN — Medication 1 MILLIGRAM(S): at 22:23

## 2021-01-01 RX ADMIN — Medication 1 MILLIGRAM(S): at 22:07

## 2021-01-01 RX ADMIN — MORPHINE SULFATE 2 MILLIGRAM(S): 50 CAPSULE, EXTENDED RELEASE ORAL at 11:05

## 2021-01-01 RX ADMIN — Medication 25 MILLIGRAM(S): at 13:30

## 2021-01-01 RX ADMIN — Medication 1 APPLICATION(S): at 13:03

## 2021-01-01 RX ADMIN — LEVETIRACETAM 1000 MILLIGRAM(S): 250 TABLET, FILM COATED ORAL at 17:19

## 2021-01-01 RX ADMIN — PANTOPRAZOLE SODIUM 40 MILLIGRAM(S): 20 TABLET, DELAYED RELEASE ORAL at 05:59

## 2021-01-01 RX ADMIN — LEVETIRACETAM 1000 MILLIGRAM(S): 250 TABLET, FILM COATED ORAL at 06:01

## 2021-01-01 RX ADMIN — MORPHINE SULFATE 2 MILLIGRAM(S): 50 CAPSULE, EXTENDED RELEASE ORAL at 10:30

## 2021-01-01 RX ADMIN — Medication 1 TABLET(S): at 05:35

## 2021-01-01 RX ADMIN — Medication 112 MICROGRAM(S): at 05:36

## 2021-01-01 RX ADMIN — Medication 20 MILLIGRAM(S): at 22:07

## 2021-01-01 RX ADMIN — Medication 30 MILLIGRAM(S): at 05:14

## 2021-01-01 RX ADMIN — Medication 2 MILLIGRAM(S): at 17:19

## 2021-01-01 RX ADMIN — Medication 101 MILLIGRAM(S): at 13:10

## 2021-01-01 RX ADMIN — Medication 1 TABLET(S): at 21:40

## 2021-01-01 RX ADMIN — CHLORHEXIDINE GLUCONATE 1 APPLICATION(S): 213 SOLUTION TOPICAL at 05:37

## 2021-01-01 RX ADMIN — Medication 1 TABLET(S): at 13:03

## 2021-01-01 RX ADMIN — PANTOPRAZOLE SODIUM 40 MILLIGRAM(S): 20 TABLET, DELAYED RELEASE ORAL at 05:36

## 2021-01-01 RX ADMIN — PEMBROLIZUMAB 200 MILLIGRAM(S): 25 INJECTION, SOLUTION INTRAVENOUS at 15:20

## 2021-01-01 RX ADMIN — LEVETIRACETAM 1000 MILLIGRAM(S): 250 TABLET, FILM COATED ORAL at 18:50

## 2021-01-01 RX ADMIN — Medication 2 MILLIGRAM(S): at 05:34

## 2021-01-01 RX ADMIN — PANTOPRAZOLE SODIUM 40 MILLIGRAM(S): 20 TABLET, DELAYED RELEASE ORAL at 05:39

## 2021-01-01 RX ADMIN — Medication 1 APPLICATION(S): at 22:24

## 2021-01-01 RX ADMIN — PANTOPRAZOLE SODIUM 40 MILLIGRAM(S): 20 TABLET, DELAYED RELEASE ORAL at 05:17

## 2021-01-01 RX ADMIN — PREGABALIN 1000 MICROGRAM(S): 225 CAPSULE ORAL at 12:45

## 2021-01-01 RX ADMIN — PREGABALIN 1000 MICROGRAM(S): 225 CAPSULE ORAL at 13:03

## 2021-01-01 RX ADMIN — Medication 2 MILLIGRAM(S): at 05:53

## 2021-01-01 RX ADMIN — Medication 4 MILLIGRAM(S): at 00:40

## 2021-01-01 RX ADMIN — SENNA PLUS 2 TABLET(S): 8.6 TABLET ORAL at 22:23

## 2021-01-01 RX ADMIN — MORPHINE SULFATE 2 MILLIGRAM(S): 50 CAPSULE, EXTENDED RELEASE ORAL at 11:46

## 2021-01-01 RX ADMIN — Medication 1: at 17:32

## 2021-01-01 RX ADMIN — PEMBROLIZUMAB 200 MILLIGRAM(S): 25 INJECTION, SOLUTION INTRAVENOUS at 15:15

## 2021-01-01 RX ADMIN — Medication 112 MICROGRAM(S): at 05:39

## 2021-01-01 RX ADMIN — Medication 650 MILLIGRAM(S): at 13:10

## 2021-01-01 RX ADMIN — Medication 1 TABLET(S): at 14:48

## 2021-01-01 RX ADMIN — Medication 25 MILLIGRAM(S): at 13:25

## 2021-01-01 RX ADMIN — CEFTRIAXONE 100 MILLIGRAM(S): 500 INJECTION, POWDER, FOR SOLUTION INTRAMUSCULAR; INTRAVENOUS at 21:09

## 2021-01-01 RX ADMIN — Medication 4 MILLIGRAM(S): at 05:18

## 2021-01-01 RX ADMIN — SENNA PLUS 2 TABLET(S): 8.6 TABLET ORAL at 22:08

## 2021-01-01 RX ADMIN — Medication 112 MICROGRAM(S): at 05:34

## 2021-01-01 RX ADMIN — MORPHINE SULFATE 2 MILLIGRAM(S): 50 CAPSULE, EXTENDED RELEASE ORAL at 22:53

## 2021-01-01 RX ADMIN — SODIUM CHLORIDE 60 MILLILITER(S): 9 INJECTION INTRAMUSCULAR; INTRAVENOUS; SUBCUTANEOUS at 17:17

## 2021-01-01 RX ADMIN — Medication 1 APPLICATION(S): at 06:01

## 2021-01-01 RX ADMIN — Medication 1000 UNIT(S): at 22:32

## 2021-01-01 RX ADMIN — SODIUM CHLORIDE 60 MILLILITER(S): 9 INJECTION INTRAMUSCULAR; INTRAVENOUS; SUBCUTANEOUS at 20:53

## 2021-01-01 RX ADMIN — Medication 4000 MILLILITER(S): at 13:57

## 2021-01-01 RX ADMIN — LEVETIRACETAM 1000 MILLIGRAM(S): 250 TABLET, FILM COATED ORAL at 18:42

## 2021-01-01 RX ADMIN — LEVETIRACETAM 1000 MILLIGRAM(S): 250 TABLET, FILM COATED ORAL at 17:03

## 2021-01-01 RX ADMIN — LEVETIRACETAM 1000 MILLIGRAM(S): 250 TABLET, FILM COATED ORAL at 05:36

## 2021-01-01 RX ADMIN — Medication 1 TABLET(S): at 12:45

## 2021-01-01 RX ADMIN — Medication 1000 UNIT(S): at 22:23

## 2021-01-01 RX ADMIN — LEVETIRACETAM 1000 MILLIGRAM(S): 250 TABLET, FILM COATED ORAL at 05:33

## 2021-01-01 RX ADMIN — BEVACIZUMAB 268 MILLIGRAM(S): 400 INJECTION, SOLUTION INTRAVENOUS at 13:44

## 2021-01-01 RX ADMIN — SODIUM CHLORIDE 60 MILLILITER(S): 9 INJECTION INTRAMUSCULAR; INTRAVENOUS; SUBCUTANEOUS at 00:18

## 2021-01-01 RX ADMIN — Medication 4 MILLIGRAM(S): at 17:31

## 2021-01-01 RX ADMIN — CHLORHEXIDINE GLUCONATE 1 APPLICATION(S): 213 SOLUTION TOPICAL at 05:58

## 2021-01-01 RX ADMIN — LEVETIRACETAM 1000 MILLIGRAM(S): 250 TABLET, FILM COATED ORAL at 05:46

## 2021-01-01 RX ADMIN — Medication 650 MILLIGRAM(S): at 12:27

## 2021-01-01 RX ADMIN — PEMBROLIZUMAB 216 MILLIGRAM(S): 25 INJECTION, SOLUTION INTRAVENOUS at 13:44

## 2021-01-01 RX ADMIN — Medication 25 MILLIGRAM(S): at 12:45

## 2021-01-01 RX ADMIN — LEVETIRACETAM 1000 MILLIGRAM(S): 250 TABLET, FILM COATED ORAL at 05:01

## 2021-01-01 RX ADMIN — Medication 1 TABLET(S): at 23:56

## 2021-01-01 RX ADMIN — PREGABALIN 1000 MICROGRAM(S): 225 CAPSULE ORAL at 12:15

## 2021-01-01 RX ADMIN — Medication 112 MICROGRAM(S): at 06:01

## 2021-01-01 RX ADMIN — Medication 4 MILLIGRAM(S): at 13:05

## 2021-01-01 RX ADMIN — PEMBROLIZUMAB 216 MILLIGRAM(S): 25 INJECTION, SOLUTION INTRAVENOUS at 14:45

## 2021-01-01 RX ADMIN — Medication 1 APPLICATION(S): at 05:18

## 2021-01-01 RX ADMIN — PANTOPRAZOLE SODIUM 40 MILLIGRAM(S): 20 TABLET, DELAYED RELEASE ORAL at 06:00

## 2021-01-01 RX ADMIN — CHLORHEXIDINE GLUCONATE 1 APPLICATION(S): 213 SOLUTION TOPICAL at 05:30

## 2021-01-01 RX ADMIN — BEVACIZUMAB 89.33 MILLIGRAM(S): 400 INJECTION, SOLUTION INTRAVENOUS at 13:05

## 2021-01-01 RX ADMIN — Medication 4 MILLIGRAM(S): at 17:18

## 2021-01-01 RX ADMIN — LEVETIRACETAM 1000 MILLIGRAM(S): 250 TABLET, FILM COATED ORAL at 05:17

## 2021-01-01 RX ADMIN — LEVETIRACETAM 1000 MILLIGRAM(S): 250 TABLET, FILM COATED ORAL at 17:14

## 2021-01-01 RX ADMIN — Medication 1 APPLICATION(S): at 22:31

## 2021-01-01 RX ADMIN — Medication 3: at 11:29

## 2021-01-06 NOTE — REVIEW OF SYSTEMS
[Fatigue] : fatigue [Negative] : Allergic/Immunologic [Recent Change In Weight] : ~T no recent weight change [Dysphagia] : no dysphagia [Nosebleeds] : no nosebleeds [Mucosal Pain] : mucosal pain [Chest Pain] : no chest pain [Palpitations] : no palpitations [Lower Ext Edema] : no lower extremity edema [Shortness Of Breath] : no shortness of breath [Wheezing] : no wheezing [Cough] : no cough [SOB on Exertion] : no shortness of breath during exertion [Easy Bleeding] : no tendency for easy bleeding [FreeTextEntry2] : hoarse

## 2021-01-06 NOTE — HISTORY OF PRESENT ILLNESS
[Disease: _____________________] : Disease: [unfilled] [AJCC Stage: ____] : AJCC Stage: [unfilled] [de-identified] : Serena is a rodrigue 65 yo female, who has started developing SOB approximately 2 months ago, she went to ER on 11/2/2017 and on CXR was noted to have a large right sided pleural effusion. She underwent a thoracentesis, 1.6L of fluid was drained. 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 IHC was pos for CK7, PAX8, CK5/6 and Ca125, negative for TTF-1/Napsin, calretinin, CK20, mammaglobin, p63, villin, HAM56, GCDFP15, TAY-EP4. \par \par Her visit on 12/1/2017 Serena had an excisional biopsy of cervical node on 12/13/2017 revealing met adenocarcinoma, primary source still was not clear. On 12/14/2017 Serena was admitted with SOB, Pleurx catheter was placed on 12/14/2017. 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 Serena received 1st dose of carbo (auc of 5)/taxol(175mg/m2) on 12/15/2017 without complications, but the next day sustained a fall 2/2 vasovagal episode and developed asymptomatic SAH, that resolved on imaging by 12/21/2017.  [de-identified] : KRas WT, EGFR WT, Alk WT, ROS1 WT, PDL1-1%\par Normal MMR protein expression [de-identified] : 12/1/2017: Nidhi is here for follow up today. She had a therapeutic thoracentesis in IR on 11/27/2017, 2L of fluid were removed, with much improved respiratory status. PET CT and MRI of the brain were done on 11/29/2017, findings were discussed today. There remains no clear primary source of malignancy. We have discussed that the source of tumor may be Mullerian vs lung. regardless of source chemotherapy needs to be initiated ASAP. We have discussed Carbo/taxol regiment that will cover both Mullerian and lung origin. Side effects including myelosuppression, peripheral neuropathy, allergic reaction and others.\par \par 1/2/2017 Since last visit Nidhi had an excisional biopsy of cervical node on 12/13/2017 revealing met adenocarcinoma, primary source still was not clear. On 12/14/2017 Nidhi was admitted with SOB, Pleurx catheter was placed on 12/14/2017. 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 Nidhi received 1st dose of carbo (auc of 5)/taxol(175mg/m2) on 12/15/2017 without complications, but the next day sustained a fall 2/2 vasovagal episode and developed asymptomatic SAH, that resolved on imaging by 12/21/2017. Today Nidhi's SOB has significantly improved. She reports very mild neuropathy in fingertips only. She reports no headache and offers no other complaints. \par \par 1/26/2018: Complains of some neuropathy, otherwise tolerating chemo with no difficulty. \par \par 2/16/2018: restaging CT C/A/P reviewed, significant improvement noted. Will refer to GYN/ONC. Reports slightly worsening neuropathy, not -painful, taking Gabapentin, no other symptoms. For cycle 4 of carbo/taxol today.\par \par 3/9/2018: Nidhi met with Dr. Massey, she is planned for surgery on 5/7/2018, after completion of 6 cycles of carbo/taxol and restaging PET CT ordered for mid April and follow up with Dr. Massey on April 20. She reports worsening neuropathy, and occasional pain and changes in vision in her left eye, eye symptoms come and go and are not very bothersome. She reports no other symptoms. \par \par 3/30/2018; Nidhi is here for cycle 6 of carbo/taxol, she continues to have neuropathy in finger and toes, but offers no other complaints, chemo has been dose reduced. \par \par 4/27/2018: Results of restaging PET CT s/p 6 cycles of carbo/taxol revealed 1. No new areas of abnormal increased uptake is seen to indicate \par biologically active disease.\par \par 2. Persistent PET positive left thyroid mass with a max SUV 33.1, \par previously max SUV 34.8. Further evaluation with thyroid ultrasound and \par if needed fine-needle aspiration biopsy will be helpful.\par 3.   PET positive left cervical lymph nodes mainly level 2 on the left \par with a max SUV 5.47previously 18. Minimal increased uptake in the \par bilateral axillary lymph nodes most likely reactive(less than 2.5)\par 4. Weakly PET positive, max SUV 2.89 right pleura, previous max SUV 5.3 \par with non-FDG avid right pleural effusion. \par 5. Previously FDG avid right and left supraclavicular lymphadenopathy, \par left internal mammary, bilateral paratracheal, para-aortic, para \par vertebral, carinal, mesenteric, right and left iliacs, right inguinal \par lymphadenopathy, small in size and no longer FDG avid.\par 6. No abnormal increased uptake is seen in the  lobulated uterus.\par 7. Overall there is marked improvement in the FDG avid disease.\par This was reviewed with Nidhi. She met with Dr. Massey on 4/20/2018, surgery is planned for 5/7/2018. She will also joselyn to meet with ENT re FDG avid thyroid nodule. Will assist in making he an appointment for her. \par Persistent neuropathy on gabapentin. \par \par 6/29/18 ; Patient came for follow up visit , evaluation for chemotherapy cycle 8 of carbo / Taxol , patient tolerating medication well , except neuropathy  recommend to have gabapentin  300 mg po daily.\par \par 7/20/2018: Nidhi present for follow up today, she has completed total of 8 cycles of Carbo/Taxol. She reports significant neuropathy in her hands and feet. We will discontinue chemotherapy today and plan to follow with close observation. She has thyroidectomy planned with Dr. Herrera on 8/20/2018, obtaining clearance for PMD. She is also planning to fly to [place] at the end of September. She reports no SOB, no GI or  symptoms. \par \par 9/12/2018: Nidhi offers no significant complaints today. She states neuropathy in her hands has almost completely resolved and neuropathy in her feet is significantly better. She had undergone complete thyroidectomy by Dr. Herrera on 8/20/2018, pathology revealed papillary thyroid cancer, measuring 2 cm, pT1b, other additional foci of papillary carcinoma were also noted, no LVI, surgical margins were clear, LN 0/2 had no carcinoma, stage [id number]. She is following up with Dr. Herrera tomorrow. She has still not gone for her vacation in [place]. \par \par 10/10/2018: Restaging PET CT on 9/26/2018 reveals Since 4/16/2018,  1. Post thyroidectomy with diffuse increased FDG uptake at the thyroid  bed likely representing post-surgical changes.  2. Subtle increased uptake is seen in the lamina , right side at the  level of T8 with a max SUV 4.6. This is not sufficient for metastatic  disease. Bone scan or MRI examination with contrast will be helpful for  further evaluation.  3. No other areas of abnormal increased uptake is seen. Images were personally reviewed by myself and discussed with Nidhi. \par She also had an Echo on 9/24/2018 revealing EF of 63%. \par Nidhi reports ongoing fatigue, she is unable to lose weight. She is taking Synthroid and following with Dr. Acevedo. \par She reports stiff fingers at night only, no painful neuropathy. \par She denies any other symptoms today. \par \par 12/11/2018: Nidhi feels well, neuropathy in hands and feet is much improved. S he is now complaining of r-sided facial pain associated with some swelling, pain comes and goes. She has already seen Dr. Herrera, who ordered CT of sinuses and prescribed pain relief meds. She is also due for restaging PET CT. Nidhi feels well otherwise. \par \par 1/9/2019: Restaging PET CT was performed on 12/18/2018 it revealed COMPARISON : 9/24/2018.  New left upper quadrant peritoneal nodule measuring 8 mm with an SUV max  of 7.3. This is suggestive of biologic tumor activity.  No other FDG avid lesions seen throughout the scan. Images were personally reviewed by myself and discussed with Nidhi, originally over the phone and again today. I have originally suggested to try AI in the interim, Nidhi however reported diarrhea at the time of the scan and declined intervention for now. We will plan for restaging PET CT to be performed in 6-8 weeks, this will be ordered today. She will follow up with Dr. Massey at [facility] shortly and bring the disk for his review. I would also like her to meet with genetics, this will be arranged at [facility] with Dr. Massey's help. Nidhi reports no new symptoms today, her neuropathy is manageable and  improving. She still reports unilateral headache that was work up in the past. Neurology eval was again suggested to her. She is following closely with Endocrinology re thyroid management. She will have follow up with Dr. Herrera shortly as well. \par \par 2/20/2019: Nidhi offers no new complaints. PET CT from 2/12/2019 revealed \par Since PET/CT of December 19, 2018, no new sites of pathologic FDG uptake\par Slight increase in size of left upper quadrant peritoneal nodule (1.1 cm, \par previously 0.8 cm) with stable FDG uptake, 7.7 SUV suspicious for \par biologic tumor activity.\par No other sites of pathologic FDG uptake \par Images were personally reviewed by myself and discussed with Nidhi, case was also discussed with Dr. Massey at [facility]. Nidhi will have follow up with his shortly. She is following with endocrinology. reports improving neuropathy. No GI or  symptoms at this time. No weight loss. \par \par 3/20/2019: Nidhi had a peritoneal nodule biopsy done at [facility], I have received a call from Dr. 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 We have discussed that surgery though could be attempted will not be the best therapeutic approach, recommencement of systemic therapy was discussed. I have not received the results from [facility] yet. I have briefly discussed this with Nidhi today. Nidhi reports that she has acutely developed right lower extremity weakness 5-6 days ago, she did not inform any of her doctors about this. She also reports that her R upper extremity though not weak "does not feel normal either. She reports no back pain, there is no point tenderness, RLE is objectively weak on exam. I recommend ER evaluation to r/o CVA vs brain met, vs L-spine related issue. Recommend admission for work up. Nidhi is agreeable to get admitted. \par \par 5/8/2019: Nidhi present for follow up, she was diagnosed with multiple metastasis to the brain, MRI was done on 3/21/2019 and revealed \par Multiple supratentorial heterogeneously enhancing lesions consistent with \par metastatic disease. The appearance on the T2-weighted images is suggestive \par of adenocarcinoma. There is mass effect upon the left lateral ventricle and \par corpus callosum. \par She received whole brain irradiation by Dr. Dahl, completed it in the beginning of 4/2019, she is currently on steroid taper managed by Dr. Dahl, she is taking 4mg of Decadron daily. She will be stopping the Keppra as there have been no documented h/o seizures. She still reports impairment of the L visual field, she has an appointment coming up with Opthalmology. She reports no deficits walking after she has completed inpatient acute rehabilitation. \par Restaging PET CT on 5/1/2019 reveals COMPARISON : 2/12/2019 \par Multiple new FDG avid omental nodules largest measuring up to 10 mm, \par positive on nonattenuation corrected images. Findings are suggestive of \par biologic tumor activity. \par Again seen is a left upper quadrant peritoneal nodule, SUV max 6.8, \par previously 7.7 (12% decrease). \par Images were personally reviewed by myself and discussed with patient. \par She now reports mild abdominal bloating, no other symptoms. She reports her neuropathy has significantly improved. \par She has first evidence of recurrent disease documented 5.5 months after last carbo/taxol dose, the volume of disease was very small (1 8mm nodule), she was then observed for another 6 months and now she wishes to restart the chemotherapy. \par I have discussed with her that rechallenging with carbo/taxol may still prove to be effective as long as she gets restaged after 2 cycles. Given recent whole brain radiation and neuropathy I will offer her carbo/taxol weekly with does reduction on the taxol for neuropathy, We will plan to run carbo slowly to avoid allergic reaction. \par She is agreeable to start ASAP. \par \par 6/5/2019: Nidhi has completed 1 cycle of weekly Carbo/taxol, ran at slow rate to avoid Carbo reaction and has tolerated chemotherapy without difficulty, she does not complain of increase in neuropathy. She has ongoing vision changes in her L eye, she had no residual LE weakness. She is off Decadron and Keppra. She was advised to see ophthalmology. \par \par 7/3/2019: Nidhi is doing well.  Reports no problems with carbo/taxol.  Still complains of vision changes in L eye but is seeing ophthalmologist on 7/16.  Remains active around the house and cooks regularly.  No fevers, weight loss, anorexia.  ROS is otherwise only significant for occasional loose stools, no diarrhea.\par \par 7/17/2019: Nidhi is doing well, denies worsening neuropathy. She c/o feeling fatigued and tired. Her last chemo was 1 week ago(7/11/19) with carbo/taxol and is due again tomorrow. She saw ophthalmology and will need cataract surgery of the L eye. No c/o chest pain/SOB. No weight loss.\par CT C/A/P was done on 7/9/2019, images were personally reviewed by myself and discussed with several radiology attendings, they revealed \par CHEST:\par Filling defect within a segmental branch of the lower lobe pulmonary artery \par suspicious for pulmonary embolus. \par Stable left upper lobe and right middle lobe 3 mm nodules. \par CT abdomen and pelvis performed on the same day, see separate report. \par ADDENDUM: \par Please note that the morphology of the small thrombus within the left lower \par pulmonary artery is not indicative of an acute thrombus but rather a chronic \par appearance. \par This was discussed with Radiology, it was not deemed that thrombus was acute and may not have even been present at all, finding was deemed to be equivocal, based on radiology assessment anticoagulation was not indicated for the filling defect noted. Initially CTA was recommended, the recommendation was withdrawn upon further review. \par ABDOMEN/PELVIS:\par New 1.3 cm segment IV hepatic hypodensity seen on series 4 image 205. \par Lesion not seen on prior PET/CT from 5/1/2019 or abdomen and pelvis CT from \par \par 2/2/2018 and is consistent with a new metastasis. \par Two other hepatic lesions described above, decreased in size since 2/2/2018, \par consistent with treated hepatic metastases. \par Anterior perisplenic omental nodule measures 0.7 cm on series 2 image 53, \par previously measuring 1.2 cm on PET/CT dated 5/1/2019. \par All the other previously seen omental nodules have resolved since 5/1/2019. \par ADDENDUM:\par Case was discussed with Dr. Cid in detail. It is also possible that the \par new 1.3 cm lesion in the liver since February 2, 2018 represents a treated \par metastasis similar to the other liver lesions. \par PET/CT would be necessary to assess disease activity. \par I have discussed case with Radiology, and it was determined that there was no clear cut evidence of progression. PET CT was ordered today.\par This was discussed with Nidhi, will proceed with Carbo/Taxol for now. \par \par 8/1/2019: Nidhi reports no major side effects from weekly Carbo/Taxol, she reports no neuropathy. She has completed 10 cycles altogether. \par PET CT on 7/24/2019 revealed \par 1. Since May 1, 2019, no definite new site of pathologic FDG uptake to \par suggest new biologic tumor activity; specifically, no pathologic FDG uptake \par within previously noted 1.3 cm lesion within hepatic segment 4. \par 2. Increased FDG uptake within several subcentimeter bilateral cervical \par lymph nodes, which is nonspecific and may be postinflammatory; max SUV 9.2 \par is noted within a 0.7 cm right level 2 cervical node. Attention on follow-up \par is suggested. \par 3. Few peritoneal nodules have overall decreased in size. \par 4. No additional site of pathologic FDG uptake. \par Images were personally reviewed by myself and discussed with Nidhi. \par She wishes to continue with chemotherapy. Will plan for 3 additional cycles. Will consider adding Avastin, but with h/o inconclusive/possible chronic PE ppx anticoagulation maybe necessary. \par \par 8/28/2019: Nidhi reports feeling well, she denies worsening neuropathy, worsening neurological symptoms, SOB, abdominal pain, bloating. She reports she is planned to undergo  eye surgery towards the end of September. We will plan to hold chemotherapy briefly after this cycle to facilitate adequate counts and minimize complications. We again have briefly discussed considering Avastin based therapy, will hold off on this prior to surgery. 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 May proceed without formal PAST, will check CBC prior to procedure, PT and PTT today. \par \par 9/25/19:Nidhi reports feeling well, she denies any changes since last visit, No SOB, abdominal pain, bloating. She reports she is planned to undergo  eye surgery in 9/30/19  CBC reviewed with normal Hemogram . we will hold chem this week , she will resume after Cataract sx . 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 May proceed without formal PAST, will check CBC prior to procedure, PT and PTT today. \par \par 10/4/2019: Nidhi underwent successful eye surgery on 9/30/2019, she reports she can see much better now. Last dose of chemotherapy was administered on 9/20, she then was on hold for surgery. She has completed 5 additional cycles of Carbo/taxol. She is due for restaging. Her disease is only accurately assessed on PET CT, prior attempts to obtain CT scans resulted in additional imaging with PET, as findings were inconclusive. PET CT will be ordered to restage. \par In addition she is due for MRI of the brain. She has had a small amount of weight loss, mild neuropathy is persistent. She offers no additional complaints. \par She will have port placed on Monday, will hold chemotherapy until restaging scans complete. \par \par 10/23/2019: Nidhi feels well, and denies any new symptoms. Her eye surgery was successful, vision is much improved now. She had restaging scans. \par PET CT on 10/18/2019 revealed COMPARISON : 7/24/2019. \par No pathologic uptake to suggest biologic tumor activity. \par MRI of the brain on 10/15/2019 revealed \par Comparison with June 8 and March 21, 2019: (Generally lesions improved \par markedly since March but slightly increased in size since June) \par 1. Lesions previously demonstrated on the study of June 8, 2019 has \par remained stable or minimally increased in size \par 2. Specifically, right frontal opercular lesion measures about 1 cm and \par left posterior inferior temporal lobe lesion measures about 1 cm. These have \par increased since the previous study. \par 3. Small punctiform lesions within the right frontal white matter and left \par frontal sulcus or more apparent than on the study of June 8 \par 4. These lesions are all much smaller than the earlier MRI of March 21, \par 2019. \par 5. No new lesions. \par Images were personally reviewed by myself and discussed with patient. She remains asymptomatic and off the steroids. \par I have also discussed the case with Dr. Dahl. I would like Nidhi to discuss the options with [facility], she maybe a candidate for brain SRS. \par She has been off Carbo/Taxol for a few weeks, she remains Platinum sensitive. I have discussed the option of switching her over to Avastin maintenance, will need to prophylax with low dose Eliquis as well, given questionable finding of small PE in the past. \par Side effects of Avastin were discussed at length, including HNT, proteinuria, small risk of DVT/PE, GI perforations etc. \par She is agreeable to start after [facility] consultation. \par \par 11/5/2019: Nidhi feels OK, she reports acute onset head discomfort that lasts minutes and subsides, the episodes are becoming more frequent 1-2 times a day. She has seen Dr. Dahl, who wishes to hold off on offering SRS to the brain. Plan was to start on Avastin today. Nidhi reports she currently has a lapse in her insurance coverage till 12/2019, I will not be able to start he on ppx dose Eliquis for now, she instead will take baby ASA every other day, to modify risk of DVT/PE. Will plan to switch over to Eliquis at 2.5mg BID once insurance is active. She also contemplated going to [place] with her daughter, I am willing to hold Avastin until she comes back, but Nidhi wishes to start right away, I explained to her that flight may result in complications if that is her wish. She will cancel the trip and start with Avastin today. Side effects have been discussed. \par \par 11/20/2019: Nidhi came for a follow up visit, she reports feeling well, she reports less fatigue. She is s/p first dose of Avastin on 11/5/2019, she tolerated Avastin without difficulty, she is due for the 2nd dose today. \par We communicated CBC result with HGB of 12.3, normal platelet and WBCs. She lost like 5 pounds secondary to gum problems, she will follow up with her dentist this week. She is currently on baby ASA every other day. Continue with single agent Avastin.  \par \par 12/17/2019: Nidhi came for a follow up visit, she reports feeling well, she experienced pruritic rash on last Saturday 12/14/19, which improved over 2 days with Benadryl alone.  Today her skin rash has completely resolved. We will add Solu-Medrol 100 mg IV prior to Avastin. Nidhi reports less fatigue. She is due for the 3rd dose of Avastin today. \par We communicated CBC result with HGB of 12.3, normal platelet and WBCs. \par \par 1/7/2019: Nidhi is doing well, reports good appetite, she has lost 1lb. Reports no GI or pulmonary symptoms. She reports her gums are bleeding occasionally. No new neurological symptoms. She reports no rash after last cycle of Avastin. \par \par 1/28/2020: Nidhi is here for follow up visit, she has no difficulty tolerating Avastin, she reports L sided chronic eye discomfort, neuropathic in nature. Neurontin has been helpful in the past, will reorder this. \par PET CT on 1/22/2020 revealed COMPARISON : 10/18/2019. \par Anterior perisplenic nodule measuring 8 mm is FDG avid, SUV max 8.9, \par consistent with biologic tumor activity. \par MRI of the brain on 1/15/2020 revealed \par In comparison to the previous brain MRI dated 10/15/2019: \par 1. Redemonstrated scattered enhancing lesions consistent with metastatic \par disease, with overall good treatment response since the prior exam. \par 2. No significant interval change in the ovoid lesion in the superior left \par frontal lobe measuring 12 mm. \par 3. Decreased size of the right opercular lesion measuring 6 mm, previously \par 11 mm. Decreased size of the left inferior occipital/tentorial lesion \par measuring 6 mm, previously 11 mm. The previously seen right frontal white \par matter punctate lesion is no longer visualized. \par 4. No new lesions are demonstrated. \par All images were personally reviewed by myself and discussed with Nidhi. I explained to her I am concerned about the perisplenic lesion concerning for disease progression, but it is isolated and very small, asymptomatic. There is definitely a positive response to Avastin in the brain and given that, we will continue with Avastin with short term follow up imaging. Nidhi knows to inform me with any new symptoms immediately. \par \par 2/18/2020: NIDHI BANKS is a 64 year old female here today for follow up visit. She completed 5 cycles of Avastin; tolerating well at this time. She denies fever, chills, change in mental status, or change in weight. She complains of left painful facial swelling. In addition, she complains of swelling of fingers, joint pain and numbness in her toes. She continues on Gabapentin 100mg TID with no symptom relief and wishes to try to go up on the dose. She complains of mild gingival bleeding in the morning. Last PET showed new isolated perisplenic lesion suspicious for disease progression, however MRI of the brain appeared better on Avastin, so the plan was to continue with Avastin with short term follow up. \par \par 3/10/2020: NIDHI BANKS is a 64 year old female here today for follow up visit. She denies new neurological symptoms, skin rash, fever, or change in weight. She continues of Gabapentin which was increased to 200 mg TID; neuropathy improving. She continues to complain of mild gingival bleeding. Left facial swelling and numbness resolved. She has completed cycle 6 of Avastin without complication. \par \par 3/31/43690: Nidhi presented today for routine f/u, she feels fine. Denies any chest pain, sob, fever, chills and cough. Reports that she continues to have gum bleeding. Neuropathy is better with gabapentin, she decreased the dose to 4 pills a day. She tolerated 7th cycle well and is due to get 8th cycle today . She will get MRI brain on 4/2/20.\par She reports minor gum bleeding from Avastin and dental infection on the left, will prescribe Amoxicillin. \par \par 4/21/2020: NIDHI BANKS is a 64 year old female here today for follow up visit for endometrial cancer. S/p cycle 8 of Avastin. Patient denies fever, chills, SOB, cough and pain. She states that her gums have minimal bleeding and dental infection improved after amoxicillin. She complains of mild fatigue. Restaging MRI of the brain was completed on 4/2/2020 which showed stable bilateral parenchymal hemorrhagic lesions compatible with known metastases; no new enhancing lesions are identified. \par Images were personally reviewed by MD Jose Roberto and discussed with patient. \par \par 5/12/2020: MRI brain on 4/2/2020 reveals 1. Stable bilateral parenchymal hemorrhagic lesions compatible with known \par metastases. \par 2. No new enhancing lesions are identified. \par PET CT on 4/30/2020 revealed Right upper quadrant peritoneal implant adjacent to the inferior tip of the \par liver 4.7 (image [id number]) \par Right lower quadrant 0.5 cm peritoneal nodule 7.2 (image 111) \par Right lower quadrant subcentimeter peritoneal nodule 2.6 (image [id number]) \par Anterior perisplenic peritoneal nodule 12.3-48% increase from 8.9 \par (remeasured) previously \par Stable non-FDG avid (attenuation corrected and nonattenuation corrected \par images) 4 mm right middle lobe pulmonary nodule-image 181) \par  No other definite sites of abnormal FDG uptake \par IMPRESSION: \par Compared to 1/22/2020: \par new FDG avid peritoneal implants in right lower quadrant of the abdomen \par suspicious for biologic tumor activity \par 48% interval increase in SUV in anterior perisplenic peritoneal nodule (SUV \par 12.3 vs 8.9 remeasured) \par In retrospect stable FDG avid peritoneal implant in the right upper quadrant \par of the abdomen (SUV 4.7 vs 3.8 remeasured-image 134) \par No other definite sites of abnormal FDG uptake \par Images were personally reviewed by MD Jose Roberto and discussed with patient.\par Given minimal degree of progression in the abdomen and persistent control intracranially, we have discussed continuing Avastin for now and short term restaging. \par Nidhi offers no complaints today. \par \par 6/2/2020: The patient is here for follow up. She is due for Avastin today . She has  no major complaints . She reported gum bleeding and swelling, which is much better today . She also reported hand swelling bilaterally. She denies fever, chills, no respiratory, cardiac, GI/ symptoms. She denies headaches or neurological deficits. \par \par 6/23/2020: Nidhi is 65 years old female came for a follow up visit for papillary-serous endometrial carcinoma with brain metastasis.  She reported feeling well. \par She denies dizziness, change of mental status, fever, chills, SOB. 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  She reported feeling well. \par She denies dizziness, change of mental status, fever, chills, SOB. \par We reviewed MRI of the brain from 7/5/20 that revealed \par The study is considered stable since April 2, 2020 with the following \par findings: 1. 3 lesions are being followed, in the left frontal, right frontal and left occipital lobes. 2. The lesions are essentially stable, nonenhancing and probably mildly hemorrhagic 3. A punctiform lesion in the right frontal lobe (series 11 image 7) was probably present on the prior study. This is stable and probably not new. 4. Any differences in size are probably due to differences in the way the patient was scanned. Images were personally reviewed by MD Jose Roberto and discussed with patient. We will continue the current treatment with  Avastin one every 3 weeks.  Patient reports improvement in neuropathy with gabapentin 200 mg every 12 hrs.Patient is due for repeat PET Scan at the end of July,2020.\par \par 8/4/2020: NIDHI BANKS is a 65 year old female here today for follow up visit for endometrial cancer. S/p cycle 11 of Avastin. Patient denies fever, chills, SOB, cough and pain.\par PET CT from 7/29/2020 revealed \par Compared to 4/30/2020: \par 1 new FDG avid near midline peritoneal implant (SUV 4.3) consistent with biologic tumor activity \par 1 new left cervical level V FDG avid lymph node (SUV 8.7) suspicious for biologic tumor activity in light of the history of papillary thyroid carcinoma \par Interval increases in SUV: 115% increase in SUV in right lower quadrant peritoneal implant \par 97% increase in SUV in right upper quadrant peritoneal implant -66% increase in SUV in level 3 cervical lymph node \par Interval 36% decrease in SUV in anterior perisplenic nodule (7.9 vs 12.3) \par Stable FDG avid right upper quadrant implants and right level 5 cervical lymph node \par No other sites of abnormal FDG uptake \par We discussed other different treatment option.\par Images were personally reviewed by MD Jose Roberto and discussed with patient.\par We have discussed that there is undeniable progression systemically but still benefit in the CNS. I will review her chart and determine if restarting weekly carbo/taxol is of value, vs adding an additional agent vs starting on Keytruda/Lenvima is at this point indicated. \par Patient will continue with one additional dose of Avastin today.\par \par 8/25/2020: Nidhi is feeling well, no complaints today. At this point given minimal but sustained progression we will plan to switch over to Kaytruda combination with Lenvima. \par We have gone over side effects of immunotherapy at length, including but not limited to autoimmune mediated pneumonitis, enteritis, dermatitis, thyroiditis, damage to the kidneys, liver, pancreas, pituitary gland etc. patient and family voice understanding and are in agreement to proceed with treatment.\par Side effects of Lenvima including, but not limited to, cytopenias, that may require blood product transfusions and lead to increased risk of infection, requiring hospitalization, allergic reactions, that can rarely be life-threatening, skin reactions, nausea/vomiting, mucositis, diarrhea/constipation, QT prolongation, GI perforation, HTN etc were discussed at length, patient and family voice undestaging, all questions were answered to patient's satisfaction.\par \par 9/15/20 Nidhi presented today for f/u and to get treatment . On last visit she was started on keytruda and lenvima . She was told to start with 10 mg a day , but she started taking 20 mg a day . Developed diffuse muscle aches and pains , reports has pain in her mouth as well . Also developed changes in her voice , its becoming more and more hoarse now . Also reported pain in her left shoulder , which is improved now . She has been coming for wkly CBC , since she started treatment , counts have been stable . Had a detailed discussion with Nidhi, her symptoms are likely from treatment with lenvima , she should have escalated the dose from 10 mg to 20 mg , if it was well tolerated , since she started taking 20 mg daily ,  that’s why she has severe adverse effects. She is recommended to hold off on treatment for 4 days and then restart it at 10 mg daily . She demonstrated understanding . Will proceed with keytruda today . \par \par 10/6/20:  Patient here for follow up visit for stage IV endometrial cancer.  She is taking lower dose of Lenvima, 10 mg.  She is complaining of hoarseness and mouth soreness over the last couple of weeks.  She also has left arm and shoulder weakness.  Although she admits she feels somewhat better on lower dose of Lenvima.  She is very fatigued and does very little activity during the day and is losing weight.\par 10/20/2020: NIDHI is here for follow up visit for endometrial cancer. She was on dose reduced Lenvima co currently with Keytruda. She developed mouth sores and complications from Lenvima. She states that she is feeling well today. We contemplated starting on Doxil but she states she is feeling better after stopping Lenvima. Repeat PET from 10/16/2020 showed:  \par IMPRESSION:\par 1.  Since July 29, 2020, increased FDG uptake in multiple peritoneal nodules/tumor implants (max SUV 12.9 in a left upper quadrant nodule, reflecting 63% increase).\par 2.  Resolved FDG avid left cervical lymph nodes.\par 3.  No definite new sites of abnormal FDG uptake.\par 11/17/2020: Nidhi is feeling great, reports no complaints at all, she has gained 2 pounds, reports good appetite. CBC was reviewed. She will continue with Keytruda single agent, she had significant difficulty tolerating Lenvima even at doses lower than recommended. We will defer mammogram for now. \par 12/8/2020: Nidhi is here for a follow up visit, she reports feeling well. She offers no complaints at all today, she has gained 2 pounds, reports good appetite. CBC was reviewed. She will continue with Keytruda single agent.\par We reviewed MRI of the Brain 12/2/20:\par 1. Redemonstrated multiple enhancing lesions consistent with metastatic disease. Since the prior exam dated 7/2/2020\par 2. Slightly increased size of the right parietal lesion measuring 4.5 mm, previous\par 12/29/2020\par Nidhi is here for a follow up visit , she reports feeling tired , generalized weakness, she experienced episode of change of Mental status this week, also she lost 9 pounds from last visit. Patient is schedule for MRI of the Brain in 12/30/20. Also she reports mild pruritic Rash in her chest area. \par We will Hold Immunotherapy for today , Reassess post MRI of the Brain.  \par \par 1/6/21\par Patient here for metastatic endometrial cancer.  She remains weak, is complaining of symptoms of mucositis.  She is taking Decadron BID which is helping she says.  Her appetite has been better.  Patient denies cough, shortness of breath, fever, chills, night sweats or bone pain.  She denies any headache or dizziness.  \par \par \par \par \par \par

## 2021-01-06 NOTE — ASSESSMENT
[FreeTextEntry1] : Papillary- serous endometrial cancer, stage IV\par --PET CT on 11/29/2017 revealed no clear GYN origin, extensive KEVIN, uptake in the right lung and pleura, thyroid nodule\par --Malignant pleural effusion, resolved\par --L side PleurX catheter was removed on 2/8/2018\par --Completed cycle 8  of carbo/taxol on 6/29/2018.  2 cycles were administered  postoperatively.\par --Restaging PET CT on 9/26/2018 (3 months post completion of chemo 6/29/2018)was essentially negative for recurrent disease\par --Restaging PET CT on 12/18/2018 reveals   New left upper quadrant peritoneal nodule measuring 8 mm with an SUV max  of 7.3. This is suggestive of biologic tumor activity.  No other FDG avid lesions seen throughout the scan.\par --Restaging PET CT on 2/12/2019 revealed slight increase in size of left upper quadrant peritoneal nodule (1.1 cm, \par previously 0.8 cm) with stable FDG uptake, 7.7 SUV suspicious for biologic tumor activity.\par --Case was discussed with Dr. Massey at [facility], biopsy of peritoneal nodule is positive for recurrent endometrial cancer.\par --MRI brain on 4/23/2019 revealed multiple brain mets\par --S/p whole brain radiation completed 4/2019, required acute rehab\par --PET CT on 5/1/2019 subsequently revealed further disease progression \par --Restarted weekly carbo/taxol 3 on 1 off on 5/9/2019, continue with weekly Carbo/Taxol for now.\par --Off Decadron and Keppra since 6/5/2019 \par --Restaging brain MRI on 6/8/2019 showed almost complete resolution of most supratentorial lesions and no new lesions.\par --Restaging CT C/A/P on 7/9/2019 did not reveal definite progression, there was a questionable filling defect suspicious for possible chronic PE/artifact, this was discussed with Radiology at length anticoagulation was not deemed to be necessary\par --PET CT on 7/24/2019 revealed positive response to therapy \par --Carbo/Taxol stopped, last dose 9/13/2019, on hold since, she remains platinum sensitive \par --Restaging PET CT on 10/18/2019 is NAD\par --Restaging MRI of the brain on 10/15/2019 is suggestive for possible disease progression, no new lesions, she remains asymptomatic and off steroids, will follow closely \par --She was referred to [facility] for brain SRS consideration, close observation for now \par --Started on Avastin maintenance 11/5/2019, will consider adding Eliquis ppx for DVT/ PE, for now she will take baby ASA every other day  \par --We sent for prednisone 10 mg po if needed for additional skin issues\par --Patient well aware to contact us if any side effect developed\par --MRI of the brain 1/15/2020 reveals overall good response to Avastin\par --Restaging PET CT on 1/22/2020 reveals single small perisplenic area of activity, suspicious for disease progression, asymptomatic \par --Proceed with cycle 10 of Avastin on 5/12/2020 after weighing risks/benefits with plan for short term follow up imaging \par -- Reviewed  MRI of brain on 7/5/20  showed stable metastases; no new enhancing lesions are identified.\par -- Restaging PET CT on 4/30/2020 reveals minimal progression in the abdomen and pelvis\par -- Other options discussed is switching to Keytruda/Lenvima combination, this is a consideration in the future \par -- We will c/w with Avastin for now since it is working in the brain and she has a minimal progression in abdomen. \par --PET CT from 7/29/2020 reveals additional evidence of progression, minimal and not symptomatic\par --Last dose of Avastin on 8/4/2020\par -- Was started on Lenvima/Keytruda 8/25/2020\par --Unable to tolerate 20 mg daily, recommended to hold off on treatment for 4 days , and then restart at 10 mg daily after 4 days if feeling fine.\par --She has help Lenvima for two weeks since 10.6.2020 She states that her s/e have greatly improved and feels well. We will stop Lenvima \par -- PET CT on 10/14/2020 revealed mixed response with no new sites of disease \par -- We discussed treatment options including changing to Doxil or proceeding with single Keytruda at this time. \par -- She will continue with Keytruda every three weeks. Reimaging study in two months \par --CBC reviewed, WNL TSH CMP\par -- Arthritic changes noted to hands- possibly secondary to immunotherapy- will continue to observe \par --we reviewed  MRI brain ordered with mild progressing in one of the brain lesion. \par -- On 12/29/20  we will   hold KEYTRUDA for today.\par MRI of  the Brain on 12/30/20.\par showed stable to slightly more prominent mets \par Will need to add Avastin to current regimen of Keytruda\par Will need repeat PET scan as well, ordered today.\par --\par \par Papillary thyroid cancer [id number], 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 Herrera \par --Follow up with Dr. Acevedo of endocrinology; he is addressing vitamin D, thyroid and diabetes\par \par Cataract of eye\par -- S/p successful surgery on 9/30/19 \par \par Follow up in 3 weeks.\par  Case discussed with Dr. Cid via phone.\par \par \par

## 2021-01-06 NOTE — RESULTS/DATA
[FreeTextEntry1] : EXAM:  MR BRAIN WAW IC      \par \par \par PROCEDURE DATE:  06/08/2019  \par \par \par \par \par INTERPRETATION:  Clinical History / Reason for exam: Dizziness and \par vertigo, history of uterine and thyroid cancer, for evaluation of \par metastatic disease, lesion seen on prior MRI of the brain.\par \par MRI OF THE BRAIN WITHOUT AND WITH CONTRAST\par \par TECHNIQUE:\par \par Multiplanar multisequence imaging of the brain was performed on the \[facility] open magnet before and after the intravenous administration of \par 7.5 cc of Gadavist including brain lab protocol.\par \par COMPARISON:\par \par MRI of the brain without and with contrast dated March 12, 2019.\par \par FINDINGS:\par \par The previously described supratentorial lesions have almost completely \par resolved.\par \par The lesion in the left posterior frontal region now measures 1.4 cm in \par maximum diameter, the lesion in the anterior right frontal lobe measures \par 0.7 cm in maximum diameter, the second lesion in the right frontal lobe \par measures 0.1 cm in maximum diameter, the lesion in the right posterior \par parietal region measures 0.3 cm in maximum diameter, and the left \par occipital lobe lesion measures 1.2 cm in maximum diameter. (Previously \par measuring 2.6, 2.4, 0.5, 0.7, and 2.5 cm respectively).\par \par The third, fourth, and lateral ventricles are normal in size and \par position. There is no shift of the midline structures.\par \par The cerebellar tonsils are minimally low-lying (0.3 cm of cerebellar \par ectopia).\par \par Incidental note is made of a tiny medial cyst.\par \par There are scattered T2/FLAIR hyperintense signal intensities in the\par subcortical white matter which are nonspecific differential diagnostic\par possibilities include chronic ischemic change, foci of gliosis or\par demyelination.\par \par IMPRESSION:\par \par In comparison with the prior MRI of the brain dated March 21, 2019:\par \par There has been almost complete resolution of most of of the previously \par described supratentorial lesions.\par \par There are no new enhancing lesions.\par \par \par \par \par \par \par CRISTINA MÉNDEZ M.D., ATTENDING RADIOLOGIST\par This document has been electronically signed. Oren 10 2019  1:17PM\par   \par \par   \par \par \par [id number]^RI^DC\par \par EXAM:  PETCT SKUL-THI ONC FDG SUBS      \par \par \par PROCEDURE DATE:  05/01/2019  \par \par \par \par \par INTERPRETATION:  \par FDG PET CT STUDY   Subsequent  treatment strategy\par REASON: TUMOR IMAGING - PET with concurrently acquired CT for attenuation \par correction and anatomic localization; skull base to mid - thigh .\par \par CPT code 70188.\par \par Fasting blood glucose level:     91 mg/dl\par \par HISTORY: Endometrial cancer. Stage IV with brain metastatic disease.\par \par TECHNIQUE: Approximately 45 minutes after the intravenous administration \par of 10.7 mCi 18-Fluorine FDG, whole body PET images were acquired from \par base of skull to mid - thigh.\par \par CT protocol used for this PET/CT study is designed for attenuation \par correction and anatomic localization of PET findings.  This  CT \par is not designed to replace state-of-the-art diagnostic CT scans for \par specific imaging protocols of different body parts.\par \par COMPARISON : 2/12/2019.\par Correlation: MRI of the brain on 3/21/2019.\par \par FINDINGS:\par \par Head/Neck: No biologically active head/neck lesions.     \par Normal uptake within the brain, pharyngeal lymphoid tissue, salivary \par glands and laryngeal musculature is noted.    \par \par Thorax:   No suspicious hypermetabolic mediastinal, hilar, lung \par parenchymal lesions.\par \par Abdomen/Pelvis: Physiologic GI/  activity. \par \par Again seen is a left upper quadrant peritoneal nodule, SUV max 6.8, \par previously 7.7 (12% decrease). \par \par Multiple new FDG avid omental nodules largest of which is adjacent to the \par distal transverse colon, measuring 10 mm, positive on nonattenuation \par corrected images, axial image 134. \par \par No other abnormal visceral or guillaume tracer uptake is present.    \par \par Musculoskeletal :  No suspicious hypermetabolic osseous lesions.\par \par Additional CT findings:\par Status post hysterectomy.\par Colonic diverticulosis.\par \par IMPRESSION: \par \par COMPARISON : 2/12/2019\par \par Multiple new FDG avid omental nodules largest measuring up to 10 mm, \par positive on nonattenuation corrected images. Findings are suggestive of \par biologic tumor activity.\par \par Again seen is a left upper quadrant peritoneal nodule, SUV max 6.8, \par previously 7.7 (12% decrease). \par \par \par  MR BRAIN WAW IC\par \par \par PROCEDURE DATE: 12/30/2020\par \par \par \par \par INTERPRETATION: CLINICAL INDICATION: Intracranial metastasis. History of endometrial adenocarcinoma.\par \par TECHNIQUE: Multi-planar multi-sequential MR imaging of the brain was performed before and after the intravenous administration of 6 ml of Gadavist. 1.5 mL was discarded.\par \par COMPARISON: MRI of the brain dated 12/2/2020.\par \par FINDINGS:\par \par There is redemonstration of multiple enhancing intracranial metastasis, some of which with intrinsic T1 signal as follows:\par * Stable 1.1 cm lesion in the medial superior left frontal lobe.\par * Stable 0.8 cm lesion in the right frontal operculum.\par * Stable 0.8 cm lesion in the inferior left occipital lobe.\par * Stable punctate lesion in the right frontal white matter.\par * Stable 0.5 cm cortical lesion in the lateral left frontal lobe.\par * Slightly more prominent 0.5 cm lesion in the medial right parietal lobe.\par * Slightly more prominent 0.6 cm cortical lesion in the right temporal lobe.\par \par There is prominence of the sulci, sylvian fissures, and ventricles, reflecting stable mild diffuse parenchymal volume loss.\par \par There are scattered patchy T2/FLAIR hyperintensities in the periventricular cerebral white matter, consistent with mild left moderate chronic microvascular ischemic changes.\par \par No acute infarction or intracranial hemorrhage.\par \par The ventricles are normal without evidence of hydrocephalus. There are no extra-axial fluid collections. The skull base flow voids are present.\par \par The visualized intraorbital contents are normal. The imaged portions of the paranasal sinuses are clear. The mastoid air cells are clear. The visualized soft tissues and osseous structures appear normal.\par \par \par IMPRESSION:\par \par Stable to slightly more prominent multiple intracranial metastasis as above.\par \par No acute territorial infarct, intracranial hemorrhage, hydrocephalus, or extra-axial fluid collection.\par \par \par \par \par \par \par \par

## 2021-01-06 NOTE — PHYSICAL EXAM
[Restricted in physically strenuous activity but ambulatory and able to carry out work of a light or sedentary nature] : Status 1- Restricted in physically strenuous activity but ambulatory and able to carry out work of a light or sedentary nature, e.g., light house work, office work [Normal] : clear to auscultation bilaterally, no dullness, no wheezing [de-identified] : pale, chronically ill [de-identified] : B/L cataracts [de-identified] : arthritic changes of the fingers  [de-identified] : No mucosal ulcers noted

## 2021-01-14 NOTE — REVIEW OF SYSTEMS
[Fatigue] : fatigue [Negative] : Allergic/Immunologic [Recent Change In Weight] : ~T no recent weight change [Dysphagia] : no dysphagia [Nosebleeds] : no nosebleeds [Chest Pain] : no chest pain [Palpitations] : no palpitations [Lower Ext Edema] : no lower extremity edema [Shortness Of Breath] : no shortness of breath [Wheezing] : no wheezing [Cough] : no cough [SOB on Exertion] : no shortness of breath during exertion [Easy Bleeding] : no tendency for easy bleeding [FreeTextEntry2] : hoarse [FreeTextEntry9] : swelling and arthritic changes to hands [de-identified] : no gait impairment- numbness and swelling of bilateral hands

## 2021-01-14 NOTE — RESULTS/DATA
[FreeTextEntry1] : EXAM:  MR BRAIN WAW IC      \par \par \par PROCEDURE DATE:  06/08/2019  \par \par \par \par \par INTERPRETATION:  Clinical History / Reason for exam: Dizziness and \par vertigo, history of uterine and thyroid cancer, for evaluation of \par metastatic disease, lesion seen on prior MRI of the brain.\par \par MRI OF THE BRAIN WITHOUT AND WITH CONTRAST\par \par TECHNIQUE:\par \par Multiplanar multisequence imaging of the brain was performed on the \[facility] open magnet before and after the intravenous administration of \par 7.5 cc of Gadavist including brain lab protocol.\par \par COMPARISON:\par \par MRI of the brain without and with contrast dated March 12, 2019.\par \par FINDINGS:\par \par The previously described supratentorial lesions have almost completely \par resolved.\par \par The lesion in the left posterior frontal region now measures 1.4 cm in \par maximum diameter, the lesion in the anterior right frontal lobe measures \par 0.7 cm in maximum diameter, the second lesion in the right frontal lobe \par measures 0.1 cm in maximum diameter, the lesion in the right posterior \par parietal region measures 0.3 cm in maximum diameter, and the left \par occipital lobe lesion measures 1.2 cm in maximum diameter. (Previously \par measuring 2.6, 2.4, 0.5, 0.7, and 2.5 cm respectively).\par \par The third, fourth, and lateral ventricles are normal in size and \par position. There is no shift of the midline structures.\par \par The cerebellar tonsils are minimally low-lying (0.3 cm of cerebellar \par ectopia).\par \par Incidental note is made of a tiny medial cyst.\par \par There are scattered T2/FLAIR hyperintense signal intensities in the\par subcortical white matter which are nonspecific differential diagnostic\par possibilities include chronic ischemic change, foci of gliosis or\par demyelination.\par \par IMPRESSION:\par \par In comparison with the prior MRI of the brain dated March 21, 2019:\par \par There has been almost complete resolution of most of of the previously \par described supratentorial lesions.\par \par There are no new enhancing lesions.\par \par \par \par \par \par \par CRISTINA MÉNDEZ M.D., ATTENDING RADIOLOGIST\par This document has been electronically signed. Oren 10 2019  1:17PM\par   \par \par   \par \par \par [id number]^RI^DC\par \par EXAM:  PETCT SKUL-THI ONC FDG SUBS      \par \par \par PROCEDURE DATE:  05/01/2019  \par \par \par \par \par INTERPRETATION:  \par FDG PET CT STUDY   Subsequent  treatment strategy\par REASON: TUMOR IMAGING - PET with concurrently acquired CT for attenuation \par correction and anatomic localization; skull base to mid - thigh .\par \par CPT code 01344.\par \par Fasting blood glucose level:     91 mg/dl\par \par HISTORY: Endometrial cancer. Stage IV with brain metastatic disease.\par \par TECHNIQUE: Approximately 45 minutes after the intravenous administration \par of 10.7 mCi 18-Fluorine FDG, whole body PET images were acquired from \par base of skull to mid - thigh.\par \par CT protocol used for this PET/CT study is designed for attenuation \par correction and anatomic localization of PET findings.  This  CT \par is not designed to replace state-of-the-art diagnostic CT scans for \par specific imaging protocols of different body parts.\par \par COMPARISON : 2/12/2019.\par Correlation: MRI of the brain on 3/21/2019.\par \par FINDINGS:\par \par Head/Neck: No biologically active head/neck lesions.     \par Normal uptake within the brain, pharyngeal lymphoid tissue, salivary \par glands and laryngeal musculature is noted.    \par \par Thorax:   No suspicious hypermetabolic mediastinal, hilar, lung \par parenchymal lesions.\par \par Abdomen/Pelvis: Physiologic GI/  activity. \par \par Again seen is a left upper quadrant peritoneal nodule, SUV max 6.8, \par previously 7.7 (12% decrease). \par \par Multiple new FDG avid omental nodules largest of which is adjacent to the \par distal transverse colon, measuring 10 mm, positive on nonattenuation \par corrected images, axial image 134. \par \par No other abnormal visceral or guillaume tracer uptake is present.    \par \par Musculoskeletal :  No suspicious hypermetabolic osseous lesions.\par \par Additional CT findings:\par Status post hysterectomy.\par Colonic diverticulosis.\par \par IMPRESSION: \par \par COMPARISON : 2/12/2019\par \par Multiple new FDG avid omental nodules largest measuring up to 10 mm, \par positive on nonattenuation corrected images. Findings are suggestive of \par biologic tumor activity.\par \par Again seen is a left upper quadrant peritoneal nodule, SUV max 6.8, \par previously 7.7 (12% decrease). \par \par \par \par \par

## 2021-01-14 NOTE — PHYSICAL EXAM
[Restricted in physically strenuous activity but ambulatory and able to carry out work of a light or sedentary nature] : Status 1- Restricted in physically strenuous activity but ambulatory and able to carry out work of a light or sedentary nature, e.g., light house work, office work [Normal] : affect appropriate [de-identified] : pale, chronically ill [de-identified] : B/L cataracts [de-identified] : CTA B/L [de-identified] : arthritic changes of the fingers  [de-identified] : No mucosal ulcers noted

## 2021-01-14 NOTE — HISTORY OF PRESENT ILLNESS
[Disease: _____________________] : Disease: [unfilled] [AJCC Stage: ____] : AJCC Stage: [unfilled] [de-identified] : Serena is a rodrigue 65 yo female, who has started developing SOB approximately 2 months ago, she went to ER on 11/2/2017 and on CXR was noted to have a large right sided pleural effusion. She underwent a thoracentesis, 1.6L of fluid was drained. 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 IHC was pos for CK7, PAX8, CK5/6 and Ca125, negative for TTF-1/Napsin, calretinin, CK20, mammaglobin, p63, villin, HAM56, GCDFP15, TAY-EP4. \par \par Her visit on 12/1/2017 Serena had an excisional biopsy of cervical node on 12/13/2017 revealing met adenocarcinoma, primary source still was not clear. On 12/14/2017 Serena was admitted with SOB, Pleurx catheter was placed on 12/14/2017. 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 Serena received 1st dose of carbo (auc of 5)/taxol(175mg/m2) on 12/15/2017 without complications, but the next day sustained a fall 2/2 vasovagal episode and developed asymptomatic SAH, that resolved on imaging by 12/21/2017.  [de-identified] : KRas WT, EGFR WT, Alk WT, ROS1 WT, PDL1-1%\par Normal MMR protein expression [de-identified] : 12/1/2017: Nidhi is here for follow up today. She had a therapeutic thoracentesis in IR on 11/27/2017, 2L of fluid were removed, with much improved respiratory status. PET CT and MRI of the brain were done on 11/29/2017, findings were discussed today. There remains no clear primary source of malignancy. We have discussed that the source of tumor may be Mullerian vs lung. regardless of source chemotherapy needs to be initiated ASAP. We have discussed Carbo/taxol regiment that will cover both Mullerian and lung origin. Side effects including myelosuppression, peripheral neuropathy, allergic reaction and others.\par \par 1/2/2017 Since last visit Nidhi had an excisional biopsy of cervical node on 12/13/2017 revealing met adenocarcinoma, primary source still was not clear. On 12/14/2017 Nidhi was admitted with SOB, Pleurx catheter was placed on 12/14/2017. 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 Nidhi received 1st dose of carbo (auc of 5)/taxol(175mg/m2) on 12/15/2017 without complications, but the next day sustained a fall 2/2 vasovagal episode and developed asymptomatic SAH, that resolved on imaging by 12/21/2017. Today Nidhi's SOB has significantly improved. She reports very mild neuropathy in fingertips only. She reports no headache and offers no other complaints. \par \par 1/26/2018: Complains of some neuropathy, otherwise tolerating chemo with no difficulty. \par \par 2/16/2018: restaging CT C/A/P reviewed, significant improvement noted. Will refer to GYN/ONC. Reports slightly worsening neuropathy, not -painful, taking Gabapentin, no other symptoms. For cycle 4 of carbo/taxol today.\par \par 3/9/2018: Nidhi met with Dr. Massey, she is planned for surgery on 5/7/2018, after completion of 6 cycles of carbo/taxol and restaging PET CT ordered for mid April and follow up with Dr. Massey on April 20. She reports worsening neuropathy, and occasional pain and changes in vision in her left eye, eye symptoms come and go and are not very bothersome. She reports no other symptoms. \par \par 3/30/2018; Nidhi is here for cycle 6 of carbo/taxol, she continues to have neuropathy in finger and toes, but offers no other complaints, chemo has been dose reduced. \par \par 4/27/2018: Results of restaging PET CT s/p 6 cycles of carbo/taxol revealed 1. No new areas of abnormal increased uptake is seen to indicate \par biologically active disease.\par \par 2. Persistent PET positive left thyroid mass with a max SUV 33.1, \par previously max SUV 34.8. Further evaluation with thyroid ultrasound and \par if needed fine-needle aspiration biopsy will be helpful.\par 3.   PET positive left cervical lymph nodes mainly level 2 on the left \par with a max SUV 5.47previously 18. Minimal increased uptake in the \par bilateral axillary lymph nodes most likely reactive(less than 2.5)\par 4. Weakly PET positive, max SUV 2.89 right pleura, previous max SUV 5.3 \par with non-FDG avid right pleural effusion. \par 5. Previously FDG avid right and left supraclavicular lymphadenopathy, \par left internal mammary, bilateral paratracheal, para-aortic, para \par vertebral, carinal, mesenteric, right and left iliacs, right inguinal \par lymphadenopathy, small in size and no longer FDG avid.\par 6. No abnormal increased uptake is seen in the  lobulated uterus.\par 7. Overall there is marked improvement in the FDG avid disease.\par This was reviewed with Nidhi. She met with Dr. Massey on 4/20/2018, surgery is planned for 5/7/2018. She will also joselyn to meet with ENT re FDG avid thyroid nodule. Will assist in making he an appointment for her. \par Persistent neuropathy on gabapentin. \par \par 6/29/18 ; Patient came for follow up visit , evaluation for chemotherapy cycle 8 of carbo / Taxol , patient tolerating medication well , except neuropathy  recommend to have gabapentin  300 mg po daily.\par \par 7/20/2018: Nidhi present for follow up today, she has completed total of 8 cycles of Carbo/Taxol. She reports significant neuropathy in her hands and feet. We will discontinue chemotherapy today and plan to follow with close observation. She has thyroidectomy planned with Dr. Herrera on 8/20/2018, obtaining clearance for PMD. She is also planning to fly to [place] at the end of September. She reports no SOB, no GI or  symptoms. \par \par 9/12/2018: Nidhi offers no significant complaints today. She states neuropathy in her hands has almost completely resolved and neuropathy in her feet is significantly better. She had undergone complete thyroidectomy by Dr. Herrera on 8/20/2018, pathology revealed papillary thyroid cancer, measuring 2 cm, pT1b, other additional foci of papillary carcinoma were also noted, no LVI, surgical margins were clear, LN 0/2 had no carcinoma, stage [id number]. She is following up with Dr. Herrera tomorrow. She has still not gone for her vacation in [place]. \par \par 10/10/2018: Restaging PET CT on 9/26/2018 reveals Since 4/16/2018,  1. Post thyroidectomy with diffuse increased FDG uptake at the thyroid  bed likely representing post-surgical changes.  2. Subtle increased uptake is seen in the lamina , right side at the  level of T8 with a max SUV 4.6. This is not sufficient for metastatic  disease. Bone scan or MRI examination with contrast will be helpful for  further evaluation.  3. No other areas of abnormal increased uptake is seen. Images were personally reviewed by myself and discussed with Nidhi. \par She also had an Echo on 9/24/2018 revealing EF of 63%. \par Nidhi reports ongoing fatigue, she is unable to lose weight. She is taking Synthroid and following with Dr. Acevedo. \par She reports stiff fingers at night only, no painful neuropathy. \par She denies any other symptoms today. \par \par 12/11/2018: Nidhi feels well, neuropathy in hands and feet is much improved. S he is now complaining of r-sided facial pain associated with some swelling, pain comes and goes. She has already seen Dr. Herrera, who ordered CT of sinuses and prescribed pain relief meds. She is also due for restaging PET CT. Nidhi feels well otherwise. \par \par 1/9/2019: Restaging PET CT was performed on 12/18/2018 it revealed COMPARISON : 9/24/2018.  New left upper quadrant peritoneal nodule measuring 8 mm with an SUV max  of 7.3. This is suggestive of biologic tumor activity.  No other FDG avid lesions seen throughout the scan. Images were personally reviewed by myself and discussed with Nidhi, originally over the phone and again today. I have originally suggested to try AI in the interim, Nidhi however reported diarrhea at the time of the scan and declined intervention for now. We will plan for restaging PET CT to be performed in 6-8 weeks, this will be ordered today. She will follow up with Dr. Massey at [facility] shortly and bring the disk for his review. I would also like her to meet with genetics, this will be arranged at [facility] with Dr. Massey's help. Nidhi reports no new symptoms today, her neuropathy is manageable and  improving. She still reports unilateral headache that was work up in the past. Neurology eval was again suggested to her. She is following closely with Endocrinology re thyroid management. She will have follow up with Dr. Herrera shortly as well. \par \par 2/20/2019: Nidhi offers no new complaints. PET CT from 2/12/2019 revealed \par Since PET/CT of December 19, 2018, no new sites of pathologic FDG uptake\par Slight increase in size of left upper quadrant peritoneal nodule (1.1 cm, \par previously 0.8 cm) with stable FDG uptake, 7.7 SUV suspicious for \par biologic tumor activity.\par No other sites of pathologic FDG uptake \par Images were personally reviewed by myself and discussed with Nidhi, case was also discussed with Dr. Massey at [facility]. Nidhi will have follow up with his shortly. She is following with endocrinology. reports improving neuropathy. No GI or  symptoms at this time. No weight loss. \par \par 3/20/2019: Nidhi had a peritoneal nodule biopsy done at [facility], I have received a call from Dr. 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 We have discussed that surgery though could be attempted will not be the best therapeutic approach, recommencement of systemic therapy was discussed. I have not received the results from [facility] yet. I have briefly discussed this with Nidhi today. Nidhi reports that she has acutely developed right lower extremity weakness 5-6 days ago, she did not inform any of her doctors about this. She also reports that her R upper extremity though not weak "does not feel normal either. She reports no back pain, there is no point tenderness, RLE is objectively weak on exam. I recommend ER evaluation to r/o CVA vs brain met, vs L-spine related issue. Recommend admission for work up. Nidhi is agreeable to get admitted. \par \par 5/8/2019: Nidhi present for follow up, she was diagnosed with multiple metastasis to the brain, MRI was done on 3/21/2019 and revealed \par Multiple supratentorial heterogeneously enhancing lesions consistent with \par metastatic disease. The appearance on the T2-weighted images is suggestive \par of adenocarcinoma. There is mass effect upon the left lateral ventricle and \par corpus callosum. \par She received whole brain irradiation by Dr. Dahl, completed it in the beginning of 4/2019, she is currently on steroid taper managed by Dr. Dahl, she is taking 4mg of Decadron daily. She will be stopping the Keppra as there have been no documented h/o seizures. She still reports impairment of the L visual field, she has an appointment coming up with Opthalmology. She reports no deficits walking after she has completed inpatient acute rehabilitation. \par Restaging PET CT on 5/1/2019 reveals COMPARISON : 2/12/2019 \par Multiple new FDG avid omental nodules largest measuring up to 10 mm, \par positive on nonattenuation corrected images. Findings are suggestive of \par biologic tumor activity. \par Again seen is a left upper quadrant peritoneal nodule, SUV max 6.8, \par previously 7.7 (12% decrease). \par Images were personally reviewed by myself and discussed with patient. \par She now reports mild abdominal bloating, no other symptoms. She reports her neuropathy has significantly improved. \par She has first evidence of recurrent disease documented 5.5 months after last carbo/taxol dose, the volume of disease was very small (1 8mm nodule), she was then observed for another 6 months and now she wishes to restart the chemotherapy. \par I have discussed with her that rechallenging with carbo/taxol may still prove to be effective as long as she gets restaged after 2 cycles. Given recent whole brain radiation and neuropathy I will offer her carbo/taxol weekly with does reduction on the taxol for neuropathy, We will plan to run carbo slowly to avoid allergic reaction. \par She is agreeable to start ASAP. \par \par 6/5/2019: Nidhi has completed 1 cycle of weekly Carbo/taxol, ran at slow rate to avoid Carbo reaction and has tolerated chemotherapy without difficulty, she does not complain of increase in neuropathy. She has ongoing vision changes in her L eye, she had no residual LE weakness. She is off Decadron and Keppra. She was advised to see ophthalmology. \par \par 7/3/2019: Nidhi is doing well.  Reports no problems with carbo/taxol.  Still complains of vision changes in L eye but is seeing ophthalmologist on 7/16.  Remains active around the house and cooks regularly.  No fevers, weight loss, anorexia.  ROS is otherwise only significant for occasional loose stools, no diarrhea.\par \par 7/17/2019: Nidhi is doing well, denies worsening neuropathy. She c/o feeling fatigued and tired. Her last chemo was 1 week ago(7/11/19) with carbo/taxol and is due again tomorrow. She saw ophthalmology and will need cataract surgery of the L eye. No c/o chest pain/SOB. No weight loss.\par CT C/A/P was done on 7/9/2019, images were personally reviewed by myself and discussed with several radiology attendings, they revealed \par CHEST:\par Filling defect within a segmental branch of the lower lobe pulmonary artery \par suspicious for pulmonary embolus. \par Stable left upper lobe and right middle lobe 3 mm nodules. \par CT abdomen and pelvis performed on the same day, see separate report. \par ADDENDUM: \par Please note that the morphology of the small thrombus within the left lower \par pulmonary artery is not indicative of an acute thrombus but rather a chronic \par appearance. \par This was discussed with Radiology, it was not deemed that thrombus was acute and may not have even been present at all, finding was deemed to be equivocal, based on radiology assessment anticoagulation was not indicated for the filling defect noted. Initially CTA was recommended, the recommendation was withdrawn upon further review. \par ABDOMEN/PELVIS:\par New 1.3 cm segment IV hepatic hypodensity seen on series 4 image 205. \par Lesion not seen on prior PET/CT from 5/1/2019 or abdomen and pelvis CT from \par \par 2/2/2018 and is consistent with a new metastasis. \par Two other hepatic lesions described above, decreased in size since 2/2/2018, \par consistent with treated hepatic metastases. \par Anterior perisplenic omental nodule measures 0.7 cm on series 2 image 53, \par previously measuring 1.2 cm on PET/CT dated 5/1/2019. \par All the other previously seen omental nodules have resolved since 5/1/2019. \par ADDENDUM:\par Case was discussed with Dr. Cid in detail. It is also possible that the \par new 1.3 cm lesion in the liver since February 2, 2018 represents a treated \par metastasis similar to the other liver lesions. \par PET/CT would be necessary to assess disease activity. \par I have discussed case with Radiology, and it was determined that there was no clear cut evidence of progression. PET CT was ordered today.\par This was discussed with Nidhi, will proceed with Carbo/Taxol for now. \par \par 8/1/2019: Nidhi reports no major side effects from weekly Carbo/Taxol, she reports no neuropathy. She has completed 10 cycles altogether. \par PET CT on 7/24/2019 revealed \par 1. Since May 1, 2019, no definite new site of pathologic FDG uptake to \par suggest new biologic tumor activity; specifically, no pathologic FDG uptake \par within previously noted 1.3 cm lesion within hepatic segment 4. \par 2. Increased FDG uptake within several subcentimeter bilateral cervical \par lymph nodes, which is nonspecific and may be postinflammatory; max SUV 9.2 \par is noted within a 0.7 cm right level 2 cervical node. Attention on follow-up \par is suggested. \par 3. Few peritoneal nodules have overall decreased in size. \par 4. No additional site of pathologic FDG uptake. \par Images were personally reviewed by myself and discussed with Nidhi. \par She wishes to continue with chemotherapy. Will plan for 3 additional cycles. Will consider adding Avastin, but with h/o inconclusive/possible chronic PE ppx anticoagulation maybe necessary. \par \par 8/28/2019: Nidhi reports feeling well, she denies worsening neuropathy, worsening neurological symptoms, SOB, abdominal pain, bloating. She reports she is planned to undergo  eye surgery towards the end of September. We will plan to hold chemotherapy briefly after this cycle to facilitate adequate counts and minimize complications. We again have briefly discussed considering Avastin based therapy, will hold off on this prior to surgery. 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 May proceed without formal PAST, will check CBC prior to procedure, PT and PTT today. \par \par 9/25/19:Nidhi reports feeling well, she denies any changes since last visit, No SOB, abdominal pain, bloating. She reports she is planned to undergo  eye surgery in 9/30/19  CBC reviewed with normal Hemogram . we will hold chem this week , she will resume after Cataract sx . 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 May proceed without formal PAST, will check CBC prior to procedure, PT and PTT today. \par \par 10/4/2019: Nidhi underwent successful eye surgery on 9/30/2019, she reports she can see much better now. Last dose of chemotherapy was administered on 9/20, she then was on hold for surgery. She has completed 5 additional cycles of Carbo/taxol. She is due for restaging. Her disease is only accurately assessed on PET CT, prior attempts to obtain CT scans resulted in additional imaging with PET, as findings were inconclusive. PET CT will be ordered to restage. \par In addition she is due for MRI of the brain. She has had a small amount of weight loss, mild neuropathy is persistent. She offers no additional complaints. \par She will have port placed on Monday, will hold chemotherapy until restaging scans complete. \par \par 10/23/2019: Nidhi feels well, and denies any new symptoms. Her eye surgery was successful, vision is much improved now. She had restaging scans. \par PET CT on 10/18/2019 revealed COMPARISON : 7/24/2019. \par No pathologic uptake to suggest biologic tumor activity. \par MRI of the brain on 10/15/2019 revealed \par Comparison with June 8 and March 21, 2019: (Generally lesions improved \par markedly since March but slightly increased in size since June) \par 1. Lesions previously demonstrated on the study of June 8, 2019 has \par remained stable or minimally increased in size \par 2. Specifically, right frontal opercular lesion measures about 1 cm and \par left posterior inferior temporal lobe lesion measures about 1 cm. These have \par increased since the previous study. \par 3. Small punctiform lesions within the right frontal white matter and left \par frontal sulcus or more apparent than on the study of June 8 \par 4. These lesions are all much smaller than the earlier MRI of March 21, \par 2019. \par 5. No new lesions. \par Images were personally reviewed by myself and discussed with patient. She remains asymptomatic and off the steroids. \par I have also discussed the case with Dr. Dahl. I would like Nidhi to discuss the options with [facility], she maybe a candidate for brain SRS. \par She has been off Carbo/Taxol for a few weeks, she remains Platinum sensitive. I have discussed the option of switching her over to Avastin maintenance, will need to prophylax with low dose Eliquis as well, given questionable finding of small PE in the past. \par Side effects of Avastin were discussed at length, including HNT, proteinuria, small risk of DVT/PE, GI perforations etc. \par She is agreeable to start after [facility] consultation. \par \par 11/5/2019: Nidhi feels OK, she reports acute onset head discomfort that lasts minutes and subsides, the episodes are becoming more frequent 1-2 times a day. She has seen Dr. Dahl, who wishes to hold off on offering SRS to the brain. Plan was to start on Avastin today. Nidhi reports she currently has a lapse in her insurance coverage till 12/2019, I will not be able to start he on ppx dose Eliquis for now, she instead will take baby ASA every other day, to modify risk of DVT/PE. Will plan to switch over to Eliquis at 2.5mg BID once insurance is active. She also contemplated going to [place] with her daughter, I am willing to hold Avastin until she comes back, but Nidhi wishes to start right away, I explained to her that flight may result in complications if that is her wish. She will cancel the trip and start with Avastin today. Side effects have been discussed. \par \par 11/20/2019: Nidhi came for a follow up visit, she reports feeling well, she reports less fatigue. She is s/p first dose of Avastin on 11/5/2019, she tolerated Avastin without difficulty, she is due for the 2nd dose today. \par We communicated CBC result with HGB of 12.3, normal platelet and WBCs. She lost like 5 pounds secondary to gum problems, she will follow up with her dentist this week. She is currently on baby ASA every other day. Continue with single agent Avastin.  \par \par 12/17/2019: Nidhi came for a follow up visit, she reports feeling well, she experienced pruritic rash on last Saturday 12/14/19, which improved over 2 days with Benadryl alone.  Today her skin rash has completely resolved. We will add Solu-Medrol 100 mg IV prior to Avastin. Nidhi reports less fatigue. She is due for the 3rd dose of Avastin today. \par We communicated CBC result with HGB of 12.3, normal platelet and WBCs. \par \par 1/7/2019: Nidhi is doing well, reports good appetite, she has lost 1lb. Reports no GI or pulmonary symptoms. She reports her gums are bleeding occasionally. No new neurological symptoms. She reports no rash after last cycle of Avastin. \par \par 1/28/2020: Nidhi is here for follow up visit, she has no difficulty tolerating Avastin, she reports L sided chronic eye discomfort, neuropathic in nature. Neurontin has been helpful in the past, will reorder this. \par PET CT on 1/22/2020 revealed COMPARISON : 10/18/2019. \par Anterior perisplenic nodule measuring 8 mm is FDG avid, SUV max 8.9, \par consistent with biologic tumor activity. \par MRI of the brain on 1/15/2020 revealed \par In comparison to the previous brain MRI dated 10/15/2019: \par 1. Redemonstrated scattered enhancing lesions consistent with metastatic \par disease, with overall good treatment response since the prior exam. \par 2. No significant interval change in the ovoid lesion in the superior left \par frontal lobe measuring 12 mm. \par 3. Decreased size of the right opercular lesion measuring 6 mm, previously \par 11 mm. Decreased size of the left inferior occipital/tentorial lesion \par measuring 6 mm, previously 11 mm. The previously seen right frontal white \par matter punctate lesion is no longer visualized. \par 4. No new lesions are demonstrated. \par All images were personally reviewed by myself and discussed with Nidhi. I explained to her I am concerned about the perisplenic lesion concerning for disease progression, but it is isolated and very small, asymptomatic. There is definitely a positive response to Avastin in the brain and given that, we will continue with Avastin with short term follow up imaging. Nidhi knows to inform me with any new symptoms immediately. \par \par 2/18/2020: NIDHI BANKS is a 64 year old female here today for follow up visit. She completed 5 cycles of Avastin; tolerating well at this time. She denies fever, chills, change in mental status, or change in weight. She complains of left painful facial swelling. In addition, she complains of swelling of fingers, joint pain and numbness in her toes. She continues on Gabapentin 100mg TID with no symptom relief and wishes to try to go up on the dose. She complains of mild gingival bleeding in the morning. Last PET showed new isolated perisplenic lesion suspicious for disease progression, however MRI of the brain appeared better on Avastin, so the plan was to continue with Avastin with short term follow up. \par \par 3/10/2020: NIDHI BANKS is a 64 year old female here today for follow up visit. She denies new neurological symptoms, skin rash, fever, or change in weight. She continues of Gabapentin which was increased to 200 mg TID; neuropathy improving. She continues to complain of mild gingival bleeding. Left facial swelling and numbness resolved. She has completed cycle 6 of Avastin without complication. \par \par 3/31/48950: Nidhi presented today for routine f/u, she feels fine. Denies any chest pain, sob, fever, chills and cough. Reports that she continues to have gum bleeding. Neuropathy is better with gabapentin, she decreased the dose to 4 pills a day. She tolerated 7th cycle well and is due to get 8th cycle today . She will get MRI brain on 4/2/20.\par She reports minor gum bleeding from Avastin and dental infection on the left, will prescribe Amoxicillin. \par \par 4/21/2020: NIDHI BANKS is a 64 year old female here today for follow up visit for endometrial cancer. S/p cycle 8 of Avastin. Patient denies fever, chills, SOB, cough and pain. She states that her gums have minimal bleeding and dental infection improved after amoxicillin. She complains of mild fatigue. Restaging MRI of the brain was completed on 4/2/2020 which showed stable bilateral parenchymal hemorrhagic lesions compatible with known metastases; no new enhancing lesions are identified. \par Images were personally reviewed by MD Jose Roberto and discussed with patient. \par \par 5/12/2020: MRI brain on 4/2/2020 reveals 1. Stable bilateral parenchymal hemorrhagic lesions compatible with known \par metastases. \par 2. No new enhancing lesions are identified. \par PET CT on 4/30/2020 revealed Right upper quadrant peritoneal implant adjacent to the inferior tip of the \par liver 4.7 (image [id number]) \par Right lower quadrant 0.5 cm peritoneal nodule 7.2 (image 111) \par Right lower quadrant subcentimeter peritoneal nodule 2.6 (image [id number]) \par Anterior perisplenic peritoneal nodule 12.3-48% increase from 8.9 \par (remeasured) previously \par Stable non-FDG avid (attenuation corrected and nonattenuation corrected \par images) 4 mm right middle lobe pulmonary nodule-image 181) \par  No other definite sites of abnormal FDG uptake \par IMPRESSION: \par Compared to 1/22/2020: \par new FDG avid peritoneal implants in right lower quadrant of the abdomen \par suspicious for biologic tumor activity \par 48% interval increase in SUV in anterior perisplenic peritoneal nodule (SUV \par 12.3 vs 8.9 remeasured) \par In retrospect stable FDG avid peritoneal implant in the right upper quadrant \par of the abdomen (SUV 4.7 vs 3.8 remeasured-image 134) \par No other definite sites of abnormal FDG uptake \par Images were personally reviewed by MD Jose Roberto and discussed with patient.\par Given minimal degree of progression in the abdomen and persistent control intracranially, we have discussed continuing Avastin for now and short term restaging. \par Nidhi offers no complaints today. \par \par 6/2/2020: The patient is here for follow up. She is due for Avastin today . She has  no major complaints . She reported gum bleeding and swelling, which is much better today . She also reported hand swelling bilaterally. She denies fever, chills, no respiratory, cardiac, GI/ symptoms. She denies headaches or neurological deficits. \par \par 6/23/2020: Nidhi is 65 years old female came for a follow up visit for papillary-serous endometrial carcinoma with brain metastasis.  She reported feeling well. \par She denies dizziness, change of mental status, fever, chills, SOB. 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  She reported feeling well. \par She denies dizziness, change of mental status, fever, chills, SOB. \par We reviewed MRI of the brain from 7/5/20 that revealed \par The study is considered stable since April 2, 2020 with the following \par findings: 1. 3 lesions are being followed, in the left frontal, right frontal and left occipital lobes. 2. The lesions are essentially stable, nonenhancing and probably mildly hemorrhagic 3. A punctiform lesion in the right frontal lobe (series 11 image 7) was probably present on the prior study. This is stable and probably not new. 4. Any differences in size are probably due to differences in the way the patient was scanned. Images were personally reviewed by MD Jose Roberto and discussed with patient. We will continue the current treatment with  Avastin one every 3 weeks.  Patient reports improvement in neuropathy with gabapentin 200 mg every 12 hrs.Patient is due for repeat PET Scan at the end of July,2020.\par \par 8/4/2020: NIDHI BANKS is a 65 year old female here today for follow up visit for endometrial cancer. S/p cycle 11 of Avastin. Patient denies fever, chills, SOB, cough and pain.\par PET CT from 7/29/2020 revealed \par Compared to 4/30/2020: \par 1 new FDG avid near midline peritoneal implant (SUV 4.3) consistent with biologic tumor activity \par 1 new left cervical level V FDG avid lymph node (SUV 8.7) suspicious for biologic tumor activity in light of the history of papillary thyroid carcinoma \par Interval increases in SUV: 115% increase in SUV in right lower quadrant peritoneal implant \par 97% increase in SUV in right upper quadrant peritoneal implant -66% increase in SUV in level 3 cervical lymph node \par Interval 36% decrease in SUV in anterior perisplenic nodule (7.9 vs 12.3) \par Stable FDG avid right upper quadrant implants and right level 5 cervical lymph node \par No other sites of abnormal FDG uptake \par We discussed other different treatment option.\par Images were personally reviewed by MD Jose Roberto and discussed with patient.\par We have discussed that there is undeniable progression systemically but still benefit in the CNS. I will review her chart and determine if restarting weekly carbo/taxol is of value, vs adding an additional agent vs starting on Keytruda/Lenvima is at this point indicated. \par Patient will continue with one additional dose of Avastin today.\par \par 8/25/2020: Nidhi is feeling well, no complaints today. At this point given minimal but sustained progression we will plan to switch over to Kaytruda combination with Lenvima. \par We have gone over side effects of immunotherapy at length, including but not limited to autoimmune mediated pneumonitis, enteritis, dermatitis, thyroiditis, damage to the kidneys, liver, pancreas, pituitary gland etc. patient and family voice understanding and are in agreement to proceed with treatment.\par Side effects of Lenvima including, but not limited to, cytopenias, that may require blood product transfusions and lead to increased risk of infection, requiring hospitalization, allergic reactions, that can rarely be life-threatening, skin reactions, nausea/vomiting, mucositis, diarrhea/constipation, QT prolongation, GI perforation, HTN etc were discussed at length, patient and family voice undestaging, all questions were answered to patient's satisfaction.\par \par 9/15/20 Nidhi presented today for f/u and to get treatment . On last visit she was started on keytruda and lenvima . She was told to start with 10 mg a day , but she started taking 20 mg a day . Developed diffuse muscle aches and pains , reports has pain in her mouth as well . Also developed changes in her voice , its becoming more and more hoarse now . Also reported pain in her left shoulder , which is improved now . She has been coming for wkly CBC , since she started treatment , counts have been stable . Had a detailed discussion with Nidhi, her symptoms are likely from treatment with lenvima , she should have escalated the dose from 10 mg to 20 mg , if it was well tolerated , since she started taking 20 mg daily ,  that’s why she has severe adverse effects. She is recommended to hold off on treatment for 4 days and then restart it at 10 mg daily . She demonstrated understanding . Will proceed with keytruda today . \par \par 10/6/20:  Patient here for follow up visit for stage IV endometrial cancer.  She is taking lower dose of Lenvima, 10 mg.  She is complaining of hoarseness and mouth soreness over the last couple of weeks.  She also has left arm and shoulder weakness.  Although she admits she feels somewhat better on lower dose of Lenvima.  She is very fatigued and does very little activity during the day and is losing weight.\par 10/20/2020: NIDHI is here for follow up visit for endometrial cancer. She was on dose reduced Lenvima co currently with Keytruda. She developed mouth sores and complications from Lenvima. She states that she is feeling well today. We contemplated starting on Doxil but she states she is feeling better after stopping Lenvima. Repeat PET from 10/16/2020 showed:  \par IMPRESSION:\par 1.  Since July 29, 2020, increased FDG uptake in multiple peritoneal nodules/tumor implants (max SUV 12.9 in a left upper quadrant nodule, reflecting 63% increase).\par 2.  Resolved FDG avid left cervical lymph nodes.\par 3.  No definite new sites of abnormal FDG uptake.\par 11/17/2020: Nidhi is feeling great, reports no complaints at all, she has gained 2 pounds, reports good appetite. CBC was reviewed. She will continue with Keytruda single agent, she had significant difficulty tolerating Lenvima even at doses lower than recommended. We will defer mammogram for now. \par 12/8/2020: Nidhi is here for a follow up visit, she reports feeling well. She offers no complaints at all today, she has gained 2 pounds, reports good appetite. CBC was reviewed. She will continue with Keytruda single agent.\par We reviewed MRI of the Brain 12/2/20:\par 1. Redemonstrated multiple enhancing lesions consistent with metastatic disease. Since the prior exam dated 7/2/2020\par 2. Slightly increased size of the right parietal lesion measuring 4.5 mm, previous\par 12/29/2020\par Nidhi is here for a follow up visit , she reports feeling tired , generalized weakness, she experienced episode of change of Mental status this week, also she lost 9 pounds from last visit. Patient is schedule for MRI of the Brain in 12/30/20. Also she reports mild pruritic Rash in her chest area. \par We will Hold Immunotherapy for today , Reassess post MRI of the Brain.

## 2021-01-14 NOTE — ASSESSMENT
[FreeTextEntry1] : Papillary- serous endometrial cancer, stage IV\par --PET CT on 11/29/2017 revealed no clear GYN origin, extensive KEVIN, uptake in the right lung and pleura, thyroid nodule\par --Malignant pleural effusion, resolved\par --L side PleurX catheter was removed on 2/8/2018\par --Completed cycle 8  of carbo/taxol on 6/29/2018.  2 cycles were administered  postoperatively.\par --Restaging PET CT on 9/26/2018 (3 months post completion of chemo 6/29/2018)was essentially negative for recurrent disease\par --Restaging PET CT on 12/18/2018 reveals   New left upper quadrant peritoneal nodule measuring 8 mm with an SUV max  of 7.3. This is suggestive of biologic tumor activity.  No other FDG avid lesions seen throughout the scan.\par --Restaging PET CT on 2/12/2019 revealed slight increase in size of left upper quadrant peritoneal nodule (1.1 cm, \par previously 0.8 cm) with stable FDG uptake, 7.7 SUV suspicious for biologic tumor activity.\par --Case was discussed with Dr. Massey at [facility], biopsy of peritoneal nodule is positive for recurrent endometrial cancer.\par --MRI brain on 4/23/2019 revealed multiple brain mets\par --S/p whole brain radiation completed 4/2019, required acute rehab\par --PET CT on 5/1/2019 subsequently revealed further disease progression \par --Restarted weekly carbo/taxol 3 on 1 off on 5/9/2019, continue with weekly Carbo/Taxol for now.\par --Off Decadron and Keppra since 6/5/2019 \par --Restaging brain MRI on 6/8/2019 showed almost complete resolution of most supratentorial lesions and no new lesions.\par --Restaging CT C/A/P on 7/9/2019 did not reveal definite progression, there was a questionable filling defect suspicious for possible chronic PE/artifact, this was discussed with Radiology at length anticoagulation was not deemed to be necessary\par --PET CT on 7/24/2019 revealed positive response to therapy \par --Carbo/Taxol stopped, last dose 9/13/2019, on hold since, she remains platinum sensitive \par --Restaging PET CT on 10/18/2019 is NAD\par --Restaging MRI of the brain on 10/15/2019 is suggestive for possible disease progression, no new lesions, she remains asymptomatic and off steroids, will follow closely \par --She was referred to [facility] for brain S[facility] consideration, close observation for now \par --Started on Avastin maintenance 11/5/2019, will consider adding Eliquis ppx for DVT/ PE, for now she will take baby ASA every other day  \par --We sent for prednisone 10 mg po if needed for additional skin issues\par --Patient well aware to contact us if any side effect developed\par --MRI of the brain 1/15/2020 reveals overall good response to Avastin\par --Restaging PET CT on 1/22/2020 reveals single small perisplenic area of activity, suspicious for disease progression, asymptomatic \par --Proceed with cycle 10 of Avastin on 5/12/2020 after weighing risks/benefits with plan for short term follow up imaging \par -- Reviewed  MRI of brain on 7/5/20  showed stable metastases; no new enhancing lesions are identified.\par -- Restaging PET CT on 4/30/2020 reveals minimal progression in the abdomen and pelvis\par -- Other options discussed is switching to Keytruda/Lenvima combination, this is a consideration in the future \par -- We will c/w with Avastin for now since it is working in the brain and she has a minimal progression in abdomen. \par --PET CT from 7/29/2020 reveals additional evidence of progression, minimal and not symptomatic\par --Last dose of Avastin on 8/4/2020\par -- Was started on Lenvima/Keytruda 8/25/2020\par --Unable to tolerate 20 mg daily, recommended to hold off on treatment for 4 days , and then restart at 10 mg daily after 4 days if feeling fine.\par --She has help Lenvima for two weeks since 10.6.2020 She states that her s/e have greatly improved and feels well. We will stop Lenvima \par -- PET CT on 10/14/2020 revealed mixed response with no new sites of disease \par -- We discussed treatment options including changing to Doxil or proceeding with single Keytruda at this time. \par -- She will continue with Keytruda every three weeks. Reimaging study in two months \par --CBC reviewed, WNL TSH CMP\par -- Arthritic changes noted to hands- possibly secondary to immunotherapy- will continue to observe \par --we reviewed  MRI brain ordered with mild progressing in one of the brain lesion. \par -- On 12/29/20  we will   hold KEYTRUDA for today.\par Patient is schedule for MRI of  the Brain on 12/30/20. \par --\par \par Papillary thyroid cancer [id number], 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 Herrera \par --Follow up with Dr. Acevedo of endocrinology; he is addressing vitamin D, thyroid and diabetes\par \par Cataract of eye\par -- S/p successful surgery on 9/30/19 \par \par Follow up in 1 weeks \par  case reviewed and managed with Dr Bangura who agreed for the above plan of care.\par \par \par

## 2021-01-14 NOTE — HISTORY OF PRESENT ILLNESS
[Disease: _____________________] : Disease: [unfilled] [AJCC Stage: ____] : AJCC Stage: [unfilled] [de-identified] : Serena is a rodrigue 65 yo female, who has started developing SOB approximately 2 months ago, she went to ER on 11/2/2017 and on CXR was noted to have a large right sided pleural effusion. She underwent a thoracentesis, 1.6L of fluid was drained. 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 IHC was pos for CK7, PAX8, CK5/6 and Ca125, negative for TTF-1/Napsin, calretinin, CK20, mammaglobin, p63, villin, HAM56, GCDFP15, TAY-EP4. \par \par Her visit on 12/1/2017 Serena had an excisional biopsy of cervical node on 12/13/2017 revealing met adenocarcinoma, primary source still was not clear. On 12/14/2017 Serena was admitted with SOB, Pleurx catheter was placed on 12/14/2017. 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 Serena received 1st dose of carbo (auc of 5)/taxol(175mg/m2) on 12/15/2017 without complications, but the next day sustained a fall 2/2 vasovagal episode and developed asymptomatic SAH, that resolved on imaging by 12/21/2017.  [de-identified] : KRas WT, EGFR WT, Alk WT, ROS1 WT, PDL1-1%\par Normal MMR protein expression [de-identified] : 12/1/2017: Nidhi is here for follow up today. She had a therapeutic thoracentesis in IR on 11/27/2017, 2L of fluid were removed, with much improved respiratory status. PET CT and MRI of the brain were done on 11/29/2017, findings were discussed today. There remains no clear primary source of malignancy. We have discussed that the source of tumor may be Mullerian vs lung. regardless of source chemotherapy needs to be initiated ASAP. We have discussed Carbo/taxol regiment that will cover both Mullerian and lung origin. Side effects including myelosuppression, peripheral neuropathy, allergic reaction and others.\par \par 1/2/2017 Since last visit Nidhi had an excisional biopsy of cervical node on 12/13/2017 revealing met adenocarcinoma, primary source still was not clear. On 12/14/2017 Nidhi was admitted with SOB, Pleurx catheter was placed on 12/14/2017. 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 Nidhi received 1st dose of carbo (auc of 5)/taxol(175mg/m2) on 12/15/2017 without complications, but the next day sustained a fall 2/2 vasovagal episode and developed asymptomatic SAH, that resolved on imaging by 12/21/2017. Today Nidhi's SOB has significantly improved. She reports very mild neuropathy in fingertips only. She reports no headache and offers no other complaints. \par \par 1/26/2018: Complains of some neuropathy, otherwise tolerating chemo with no difficulty. \par \par 2/16/2018: restaging CT C/A/P reviewed, significant improvement noted. Will refer to GYN/ONC. Reports slightly worsening neuropathy, not -painful, taking Gabapentin, no other symptoms. For cycle 4 of carbo/taxol today.\par \par 3/9/2018: Nidhi met with Dr. Massey, she is planned for surgery on 5/7/2018, after completion of 6 cycles of carbo/taxol and restaging PET CT ordered for mid April and follow up with Dr. Massey on April 20. She reports worsening neuropathy, and occasional pain and changes in vision in her left eye, eye symptoms come and go and are not very bothersome. She reports no other symptoms. \par \par 3/30/2018; Nidhi is here for cycle 6 of carbo/taxol, she continues to have neuropathy in finger and toes, but offers no other complaints, chemo has been dose reduced. \par \par 4/27/2018: Results of restaging PET CT s/p 6 cycles of carbo/taxol revealed 1. No new areas of abnormal increased uptake is seen to indicate \par biologically active disease.\par \par 2. Persistent PET positive left thyroid mass with a max SUV 33.1, \par previously max SUV 34.8. Further evaluation with thyroid ultrasound and \par if needed fine-needle aspiration biopsy will be helpful.\par 3.   PET positive left cervical lymph nodes mainly level 2 on the left \par with a max SUV 5.47previously 18. Minimal increased uptake in the \par bilateral axillary lymph nodes most likely reactive(less than 2.5)\par 4. Weakly PET positive, max SUV 2.89 right pleura, previous max SUV 5.3 \par with non-FDG avid right pleural effusion. \par 5. Previously FDG avid right and left supraclavicular lymphadenopathy, \par left internal mammary, bilateral paratracheal, para-aortic, para \par vertebral, carinal, mesenteric, right and left iliacs, right inguinal \par lymphadenopathy, small in size and no longer FDG avid.\par 6. No abnormal increased uptake is seen in the  lobulated uterus.\par 7. Overall there is marked improvement in the FDG avid disease.\par This was reviewed with Nidhi. She met with Dr. Massey on 4/20/2018, surgery is planned for 5/7/2018. She will also joselyn to meet with ENT re FDG avid thyroid nodule. Will assist in making he an appointment for her. \par Persistent neuropathy on gabapentin. \par \par 6/29/18 ; Patient came for follow up visit , evaluation for chemotherapy cycle 8 of carbo / Taxol , patient tolerating medication well , except neuropathy  recommend to have gabapentin  300 mg po daily.\par \par 7/20/2018: Nidhi present for follow up today, she has completed total of 8 cycles of Carbo/Taxol. She reports significant neuropathy in her hands and feet. We will discontinue chemotherapy today and plan to follow with close observation. She has thyroidectomy planned with Dr. Herrera on 8/20/2018, obtaining clearance for PMD. She is also planning to fly to [place] at the end of September. She reports no SOB, no GI or  symptoms. \par \par 9/12/2018: Nidhi offers no significant complaints today. She states neuropathy in her hands has almost completely resolved and neuropathy in her feet is significantly better. She had undergone complete thyroidectomy by Dr. Herrera on 8/20/2018, pathology revealed papillary thyroid cancer, measuring 2 cm, pT1b, other additional foci of papillary carcinoma were also noted, no LVI, surgical margins were clear, LN 0/2 had no carcinoma, stage [id number]. She is following up with Dr. Herrera tomorrow. She has still not gone for her vacation in [place]. \par \par 10/10/2018: Restaging PET CT on 9/26/2018 reveals Since 4/16/2018,  1. Post thyroidectomy with diffuse increased FDG uptake at the thyroid  bed likely representing post-surgical changes.  2. Subtle increased uptake is seen in the lamina , right side at the  level of T8 with a max SUV 4.6. This is not sufficient for metastatic  disease. Bone scan or MRI examination with contrast will be helpful for  further evaluation.  3. No other areas of abnormal increased uptake is seen. Images were personally reviewed by myself and discussed with Nidhi. \par She also had an Echo on 9/24/2018 revealing EF of 63%. \par Nidhi reports ongoing fatigue, she is unable to lose weight. She is taking Synthroid and following with Dr. Acevedo. \par She reports stiff fingers at night only, no painful neuropathy. \par She denies any other symptoms today. \par \par 12/11/2018: Nidhi feels well, neuropathy in hands and feet is much improved. S he is now complaining of r-sided facial pain associated with some swelling, pain comes and goes. She has already seen Dr. Herrera, who ordered CT of sinuses and prescribed pain relief meds. She is also due for restaging PET CT. Nidhi feels well otherwise. \par \par 1/9/2019: Restaging PET CT was performed on 12/18/2018 it revealed COMPARISON : 9/24/2018.  New left upper quadrant peritoneal nodule measuring 8 mm with an SUV max  of 7.3. This is suggestive of biologic tumor activity.  No other FDG avid lesions seen throughout the scan. Images were personally reviewed by myself and discussed with Nidhi, originally over the phone and again today. I have originally suggested to try AI in the interim, Nidhi however reported diarrhea at the time of the scan and declined intervention for now. We will plan for restaging PET CT to be performed in 6-8 weeks, this will be ordered today. She will follow up with Dr. Massey at [facility] shortly and bring the disk for his review. I would also like her to meet with genetics, this will be arranged at [facility] with Dr. Massey's help. Nidhi reports no new symptoms today, her neuropathy is manageable and  improving. She still reports unilateral headache that was work up in the past. Neurology eval was again suggested to her. She is following closely with Endocrinology re thyroid management. She will have follow up with Dr. Herrera shortly as well. \par \par 2/20/2019: Nidhi offers no new complaints. PET CT from 2/12/2019 revealed \par Since PET/CT of December 19, 2018, no new sites of pathologic FDG uptake\par Slight increase in size of left upper quadrant peritoneal nodule (1.1 cm, \par previously 0.8 cm) with stable FDG uptake, 7.7 SUV suspicious for \par biologic tumor activity.\par No other sites of pathologic FDG uptake \par Images were personally reviewed by myself and discussed with Nidhi, case was also discussed with Dr. Massey at [facility]. Nidhi will have follow up with his shortly. She is following with endocrinology. reports improving neuropathy. No GI or  symptoms at this time. No weight loss. \par \par 3/20/2019: Nidhi had a peritoneal nodule biopsy done at [facility], I have received a call from Dr. 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 We have discussed that surgery though could be attempted will not be the best therapeutic approach, recommencement of systemic therapy was discussed. I have not received the results from [facility] yet. I have briefly discussed this with Nidhi today. Nidhi reports that she has acutely developed right lower extremity weakness 5-6 days ago, she did not inform any of her doctors about this. She also reports that her R upper extremity though not weak "does not feel normal either. She reports no back pain, there is no point tenderness, RLE is objectively weak on exam. I recommend ER evaluation to r/o CVA vs brain met, vs L-spine related issue. Recommend admission for work up. Nidhi is agreeable to get admitted. \par \par 5/8/2019: Nidhi present for follow up, she was diagnosed with multiple metastasis to the brain, MRI was done on 3/21/2019 and revealed \par Multiple supratentorial heterogeneously enhancing lesions consistent with \par metastatic disease. The appearance on the T2-weighted images is suggestive \par of adenocarcinoma. There is mass effect upon the left lateral ventricle and \par corpus callosum. \par She received whole brain irradiation by Dr. Dahl, completed it in the beginning of 4/2019, she is currently on steroid taper managed by Dr. Dahl, she is taking 4mg of Decadron daily. She will be stopping the Keppra as there have been no documented h/o seizures. She still reports impairment of the L visual field, she has an appointment coming up with Opthalmology. She reports no deficits walking after she has completed inpatient acute rehabilitation. \par Restaging PET CT on 5/1/2019 reveals COMPARISON : 2/12/2019 \par Multiple new FDG avid omental nodules largest measuring up to 10 mm, \par positive on nonattenuation corrected images. Findings are suggestive of \par biologic tumor activity. \par Again seen is a left upper quadrant peritoneal nodule, SUV max 6.8, \par previously 7.7 (12% decrease). \par Images were personally reviewed by myself and discussed with patient. \par She now reports mild abdominal bloating, no other symptoms. She reports her neuropathy has significantly improved. \par She has first evidence of recurrent disease documented 5.5 months after last carbo/taxol dose, the volume of disease was very small (1 8mm nodule), she was then observed for another 6 months and now she wishes to restart the chemotherapy. \par I have discussed with her that rechallenging with carbo/taxol may still prove to be effective as long as she gets restaged after 2 cycles. Given recent whole brain radiation and neuropathy I will offer her carbo/taxol weekly with does reduction on the taxol for neuropathy, We will plan to run carbo slowly to avoid allergic reaction. \par She is agreeable to start ASAP. \par \par 6/5/2019: Nidhi has completed 1 cycle of weekly Carbo/taxol, ran at slow rate to avoid Carbo reaction and has tolerated chemotherapy without difficulty, she does not complain of increase in neuropathy. She has ongoing vision changes in her L eye, she had no residual LE weakness. She is off Decadron and Keppra. She was advised to see ophthalmology. \par \par 7/3/2019: Nidhi is doing well.  Reports no problems with carbo/taxol.  Still complains of vision changes in L eye but is seeing ophthalmologist on 7/16.  Remains active around the house and cooks regularly.  No fevers, weight loss, anorexia.  ROS is otherwise only significant for occasional loose stools, no diarrhea.\par \par 7/17/2019: Nidhi is doing well, denies worsening neuropathy. She c/o feeling fatigued and tired. Her last chemo was 1 week ago(7/11/19) with carbo/taxol and is due again tomorrow. She saw ophthalmology and will need cataract surgery of the L eye. No c/o chest pain/SOB. No weight loss.\par CT C/A/P was done on 7/9/2019, images were personally reviewed by myself and discussed with several radiology attendings, they revealed \par CHEST:\par Filling defect within a segmental branch of the lower lobe pulmonary artery \par suspicious for pulmonary embolus. \par Stable left upper lobe and right middle lobe 3 mm nodules. \par CT abdomen and pelvis performed on the same day, see separate report. \par ADDENDUM: \par Please note that the morphology of the small thrombus within the left lower \par pulmonary artery is not indicative of an acute thrombus but rather a chronic \par appearance. \par This was discussed with Radiology, it was not deemed that thrombus was acute and may not have even been present at all, finding was deemed to be equivocal, based on radiology assessment anticoagulation was not indicated for the filling defect noted. Initially CTA was recommended, the recommendation was withdrawn upon further review. \par ABDOMEN/PELVIS:\par New 1.3 cm segment IV hepatic hypodensity seen on series 4 image 205. \par Lesion not seen on prior PET/CT from 5/1/2019 or abdomen and pelvis CT from \par \par 2/2/2018 and is consistent with a new metastasis. \par Two other hepatic lesions described above, decreased in size since 2/2/2018, \par consistent with treated hepatic metastases. \par Anterior perisplenic omental nodule measures 0.7 cm on series 2 image 53, \par previously measuring 1.2 cm on PET/CT dated 5/1/2019. \par All the other previously seen omental nodules have resolved since 5/1/2019. \par ADDENDUM:\par Case was discussed with Dr. Cid in detail. It is also possible that the \par new 1.3 cm lesion in the liver since February 2, 2018 represents a treated \par metastasis similar to the other liver lesions. \par PET/CT would be necessary to assess disease activity. \par I have discussed case with Radiology, and it was determined that there was no clear cut evidence of progression. PET CT was ordered today.\par This was discussed with Nidhi, will proceed with Carbo/Taxol for now. \par \par 8/1/2019: Nidhi reports no major side effects from weekly Carbo/Taxol, she reports no neuropathy. She has completed 10 cycles altogether. \par PET CT on 7/24/2019 revealed \par 1. Since May 1, 2019, no definite new site of pathologic FDG uptake to \par suggest new biologic tumor activity; specifically, no pathologic FDG uptake \par within previously noted 1.3 cm lesion within hepatic segment 4. \par 2. Increased FDG uptake within several subcentimeter bilateral cervical \par lymph nodes, which is nonspecific and may be postinflammatory; max SUV 9.2 \par is noted within a 0.7 cm right level 2 cervical node. Attention on follow-up \par is suggested. \par 3. Few peritoneal nodules have overall decreased in size. \par 4. No additional site of pathologic FDG uptake. \par Images were personally reviewed by myself and discussed with Nidhi. \par She wishes to continue with chemotherapy. Will plan for 3 additional cycles. Will consider adding Avastin, but with h/o inconclusive/possible chronic PE ppx anticoagulation maybe necessary. \par \par 8/28/2019: Nidhi reports feeling well, she denies worsening neuropathy, worsening neurological symptoms, SOB, abdominal pain, bloating. She reports she is planned to undergo  eye surgery towards the end of September. We will plan to hold chemotherapy briefly after this cycle to facilitate adequate counts and minimize complications. We again have briefly discussed considering Avastin based therapy, will hold off on this prior to surgery. 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 May proceed without formal PAST, will check CBC prior to procedure, PT and PTT today. \par \par 9/25/19:Nidhi reports feeling well, she denies any changes since last visit, No SOB, abdominal pain, bloating. She reports she is planned to undergo  eye surgery in 9/30/19  CBC reviewed with normal Hemogram . we will hold chem this week , she will resume after Cataract sx . 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 May proceed without formal PAST, will check CBC prior to procedure, PT and PTT today. \par \par 10/4/2019: Nidhi underwent successful eye surgery on 9/30/2019, she reports she can see much better now. Last dose of chemotherapy was administered on 9/20, she then was on hold for surgery. She has completed 5 additional cycles of Carbo/taxol. She is due for restaging. Her disease is only accurately assessed on PET CT, prior attempts to obtain CT scans resulted in additional imaging with PET, as findings were inconclusive. PET CT will be ordered to restage. \par In addition she is due for MRI of the brain. She has had a small amount of weight loss, mild neuropathy is persistent. She offers no additional complaints. \par She will have port placed on Monday, will hold chemotherapy until restaging scans complete. \par \par 10/23/2019: Nidhi feels well, and denies any new symptoms. Her eye surgery was successful, vision is much improved now. She had restaging scans. \par PET CT on 10/18/2019 revealed COMPARISON : 7/24/2019. \par No pathologic uptake to suggest biologic tumor activity. \par MRI of the brain on 10/15/2019 revealed \par Comparison with June 8 and March 21, 2019: (Generally lesions improved \par markedly since March but slightly increased in size since June) \par 1. Lesions previously demonstrated on the study of June 8, 2019 has \par remained stable or minimally increased in size \par 2. Specifically, right frontal opercular lesion measures about 1 cm and \par left posterior inferior temporal lobe lesion measures about 1 cm. These have \par increased since the previous study. \par 3. Small punctiform lesions within the right frontal white matter and left \par frontal sulcus or more apparent than on the study of June 8 \par 4. These lesions are all much smaller than the earlier MRI of March 21, \par 2019. \par 5. No new lesions. \par Images were personally reviewed by myself and discussed with patient. She remains asymptomatic and off the steroids. \par I have also discussed the case with Dr. Dahl. I would like Nidhi to discuss the options with [facility], she maybe a candidate for brain SRS. \par She has been off Carbo/Taxol for a few weeks, she remains Platinum sensitive. I have discussed the option of switching her over to Avastin maintenance, will need to prophylax with low dose Eliquis as well, given questionable finding of small PE in the past. \par Side effects of Avastin were discussed at length, including HNT, proteinuria, small risk of DVT/PE, GI perforations etc. \par She is agreeable to start after [facility] consultation. \par \par 11/5/2019: Nidhi feels OK, she reports acute onset head discomfort that lasts minutes and subsides, the episodes are becoming more frequent 1-2 times a day. She has seen Dr. Dahl, who wishes to hold off on offering SRS to the brain. Plan was to start on Avastin today. Nidhi reports she currently has a lapse in her insurance coverage till 12/2019, I will not be able to start he on ppx dose Eliquis for now, she instead will take baby ASA every other day, to modify risk of DVT/PE. Will plan to switch over to Eliquis at 2.5mg BID once insurance is active. She also contemplated going to [place] with her daughter, I am willing to hold Avastin until she comes back, but Nidhi wishes to start right away, I explained to her that flight may result in complications if that is her wish. She will cancel the trip and start with Avastin today. Side effects have been discussed. \par \par 11/20/2019: Nidhi came for a follow up visit, she reports feeling well, she reports less fatigue. She is s/p first dose of Avastin on 11/5/2019, she tolerated Avastin without difficulty, she is due for the 2nd dose today. \par We communicated CBC result with HGB of 12.3, normal platelet and WBCs. She lost like 5 pounds secondary to gum problems, she will follow up with her dentist this week. She is currently on baby ASA every other day. Continue with single agent Avastin.  \par \par 12/17/2019: Nidhi came for a follow up visit, she reports feeling well, she experienced pruritic rash on last Saturday 12/14/19, which improved over 2 days with Benadryl alone.  Today her skin rash has completely resolved. We will add Solu-Medrol 100 mg IV prior to Avastin. Nidhi reports less fatigue. She is due for the 3rd dose of Avastin today. \par We communicated CBC result with HGB of 12.3, normal platelet and WBCs. \par \par 1/7/2019: Nidhi is doing well, reports good appetite, she has lost 1lb. Reports no GI or pulmonary symptoms. She reports her gums are bleeding occasionally. No new neurological symptoms. She reports no rash after last cycle of Avastin. \par \par 1/28/2020: Nidhi is here for follow up visit, she has no difficulty tolerating Avastin, she reports L sided chronic eye discomfort, neuropathic in nature. Neurontin has been helpful in the past, will reorder this. \par PET CT on 1/22/2020 revealed COMPARISON : 10/18/2019. \par Anterior perisplenic nodule measuring 8 mm is FDG avid, SUV max 8.9, \par consistent with biologic tumor activity. \par MRI of the brain on 1/15/2020 revealed \par In comparison to the previous brain MRI dated 10/15/2019: \par 1. Redemonstrated scattered enhancing lesions consistent with metastatic \par disease, with overall good treatment response since the prior exam. \par 2. No significant interval change in the ovoid lesion in the superior left \par frontal lobe measuring 12 mm. \par 3. Decreased size of the right opercular lesion measuring 6 mm, previously \par 11 mm. Decreased size of the left inferior occipital/tentorial lesion \par measuring 6 mm, previously 11 mm. The previously seen right frontal white \par matter punctate lesion is no longer visualized. \par 4. No new lesions are demonstrated. \par All images were personally reviewed by myself and discussed with Nidhi. I explained to her I am concerned about the perisplenic lesion concerning for disease progression, but it is isolated and very small, asymptomatic. There is definitely a positive response to Avastin in the brain and given that, we will continue with Avastin with short term follow up imaging. Nidhi knows to inform me with any new symptoms immediately. \par \par 2/18/2020: NIDHI BANKS is a 64 year old female here today for follow up visit. She completed 5 cycles of Avastin; tolerating well at this time. She denies fever, chills, change in mental status, or change in weight. She complains of left painful facial swelling. In addition, she complains of swelling of fingers, joint pain and numbness in her toes. She continues on Gabapentin 100mg TID with no symptom relief and wishes to try to go up on the dose. She complains of mild gingival bleeding in the morning. Last PET showed new isolated perisplenic lesion suspicious for disease progression, however MRI of the brain appeared better on Avastin, so the plan was to continue with Avastin with short term follow up. \par \par 3/10/2020: NIDHI BANKS is a 64 year old female here today for follow up visit. She denies new neurological symptoms, skin rash, fever, or change in weight. She continues of Gabapentin which was increased to 200 mg TID; neuropathy improving. She continues to complain of mild gingival bleeding. Left facial swelling and numbness resolved. She has completed cycle 6 of Avastin without complication. \par \par 3/31/09390: Nidhi presented today for routine f/u, she feels fine. Denies any chest pain, sob, fever, chills and cough. Reports that she continues to have gum bleeding. Neuropathy is better with gabapentin, she decreased the dose to 4 pills a day. She tolerated 7th cycle well and is due to get 8th cycle today . She will get MRI brain on 4/2/20.\par She reports minor gum bleeding from Avastin and dental infection on the left, will prescribe Amoxicillin. \par \par 4/21/2020: NIDHI BANKS is a 64 year old female here today for follow up visit for endometrial cancer. S/p cycle 8 of Avastin. Patient denies fever, chills, SOB, cough and pain. She states that her gums have minimal bleeding and dental infection improved after amoxicillin. She complains of mild fatigue. Restaging MRI of the brain was completed on 4/2/2020 which showed stable bilateral parenchymal hemorrhagic lesions compatible with known metastases; no new enhancing lesions are identified. \par Images were personally reviewed by MD Jose Roberto and discussed with patient. \par \par 5/12/2020: MRI brain on 4/2/2020 reveals 1. Stable bilateral parenchymal hemorrhagic lesions compatible with known \par metastases. \par 2. No new enhancing lesions are identified. \par PET CT on 4/30/2020 revealed Right upper quadrant peritoneal implant adjacent to the inferior tip of the \par liver 4.7 (image [id number]) \par Right lower quadrant 0.5 cm peritoneal nodule 7.2 (image 111) \par Right lower quadrant subcentimeter peritoneal nodule 2.6 (image [id number]) \par Anterior perisplenic peritoneal nodule 12.3-48% increase from 8.9 \par (remeasured) previously \par Stable non-FDG avid (attenuation corrected and nonattenuation corrected \par images) 4 mm right middle lobe pulmonary nodule-image 181) \par  No other definite sites of abnormal FDG uptake \par IMPRESSION: \par Compared to 1/22/2020: \par new FDG avid peritoneal implants in right lower quadrant of the abdomen \par suspicious for biologic tumor activity \par 48% interval increase in SUV in anterior perisplenic peritoneal nodule (SUV \par 12.3 vs 8.9 remeasured) \par In retrospect stable FDG avid peritoneal implant in the right upper quadrant \par of the abdomen (SUV 4.7 vs 3.8 remeasured-image 134) \par No other definite sites of abnormal FDG uptake \par Images were personally reviewed by MD Jose Roberto and discussed with patient.\par Given minimal degree of progression in the abdomen and persistent control intracranially, we have discussed continuing Avastin for now and short term restaging. \par Nidhi offers no complaints today. \par \par 6/2/2020: The patient is here for follow up. She is due for Avastin today . She has  no major complaints . She reported gum bleeding and swelling, which is much better today . She also reported hand swelling bilaterally. She denies fever, chills, no respiratory, cardiac, GI/ symptoms. She denies headaches or neurological deficits. \par \par 6/23/2020: Nidhi is 65 years old female came for a follow up visit for papillary-serous endometrial carcinoma with brain metastasis.  She reported feeling well. \par She denies dizziness, change of mental status, fever, chills, SOB. 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  She reported feeling well. \par She denies dizziness, change of mental status, fever, chills, SOB. \par We reviewed MRI of the brain from 7/5/20 that revealed \par The study is considered stable since April 2, 2020 with the following \par findings: 1. 3 lesions are being followed, in the left frontal, right frontal and left occipital lobes. 2. The lesions are essentially stable, nonenhancing and probably mildly hemorrhagic 3. A punctiform lesion in the right frontal lobe (series 11 image 7) was probably present on the prior study. This is stable and probably not new. 4. Any differences in size are probably due to differences in the way the patient was scanned. Images were personally reviewed by MD Jose Roberto and discussed with patient. We will continue the current treatment with  Avastin one every 3 weeks.  Patient reports improvement in neuropathy with gabapentin 200 mg every 12 hrs.Patient is due for repeat PET Scan at the end of July,2020.\par \par 8/4/2020: NIDHI BANKS is a 65 year old female here today for follow up visit for endometrial cancer. S/p cycle 11 of Avastin. Patient denies fever, chills, SOB, cough and pain.\par PET CT from 7/29/2020 revealed \par Compared to 4/30/2020: \par 1 new FDG avid near midline peritoneal implant (SUV 4.3) consistent with biologic tumor activity \par 1 new left cervical level V FDG avid lymph node (SUV 8.7) suspicious for biologic tumor activity in light of the history of papillary thyroid carcinoma \par Interval increases in SUV: 115% increase in SUV in right lower quadrant peritoneal implant \par 97% increase in SUV in right upper quadrant peritoneal implant -66% increase in SUV in level 3 cervical lymph node \par Interval 36% decrease in SUV in anterior perisplenic nodule (7.9 vs 12.3) \par Stable FDG avid right upper quadrant implants and right level 5 cervical lymph node \par No other sites of abnormal FDG uptake \par We discussed other different treatment option.\par Images were personally reviewed by MD Jose Roberto and discussed with patient.\par We have discussed that there is undeniable progression systemically but still benefit in the CNS. I will review her chart and determine if restarting weekly carbo/taxol is of value, vs adding an additional agent vs starting on Keytruda/Lenvima is at this point indicated. \par Patient will continue with one additional dose of Avastin today.\par \par 8/25/2020: Nidhi is feeling well, no complaints today. At this point given minimal but sustained progression we will plan to switch over to Kaytruda combination with Lenvima. \par We have gone over side effects of immunotherapy at length, including but not limited to autoimmune mediated pneumonitis, enteritis, dermatitis, thyroiditis, damage to the kidneys, liver, pancreas, pituitary gland etc. patient and family voice understanding and are in agreement to proceed with treatment.\par Side effects of Lenvima including, but not limited to, cytopenias, that may require blood product transfusions and lead to increased risk of infection, requiring hospitalization, allergic reactions, that can rarely be life-threatening, skin reactions, nausea/vomiting, mucositis, diarrhea/constipation, QT prolongation, GI perforation, HTN etc were discussed at length, patient and family voice undestaging, all questions were answered to patient's satisfaction.\par \par 9/15/20 Nidhi presented today for f/u and to get treatment . On last visit she was started on keytruda and lenvima . She was told to start with 10 mg a day , but she started taking 20 mg a day . Developed diffuse muscle aches and pains , reports has pain in her mouth as well . Also developed changes in her voice , its becoming more and more hoarse now . Also reported pain in her left shoulder , which is improved now . She has been coming for wkly CBC , since she started treatment , counts have been stable . Had a detailed discussion with Nidhi, her symptoms are likely from treatment with lenvima , she should have escalated the dose from 10 mg to 20 mg , if it was well tolerated , since she started taking 20 mg daily ,  that’s why she has severe adverse effects. She is recommended to hold off on treatment for 4 days and then restart it at 10 mg daily . She demonstrated understanding . Will proceed with keytruda today . \par \par 10/6/20:  Patient here for follow up visit for stage IV endometrial cancer.  She is taking lower dose of Lenvima, 10 mg.  She is complaining of hoarseness and mouth soreness over the last couple of weeks.  She also has left arm and shoulder weakness.  Although she admits she feels somewhat better on lower dose of Lenvima.  She is very fatigued and does very little activity during the day and is losing weight.\par 10/20/2020: NIDHI is here for follow up visit for endometrial cancer. She was on dose reduced Lenvima co currently with Keytruda. She developed mouth sores and complications from Lenvima. She states that she is feeling well today. We contemplated starting on Doxil but she states she is feeling better after stopping Lenvima. Repeat PET from 10/16/2020 showed:  \par IMPRESSION:\par 1.  Since July 29, 2020, increased FDG uptake in multiple peritoneal nodules/tumor implants (max SUV 12.9 in a left upper quadrant nodule, reflecting 63% increase).\par 2.  Resolved FDG avid left cervical lymph nodes.\par 3.  No definite new sites of abnormal FDG uptake.\par 11/17/2020: Nidhi is feeling great, reports no complaints at all, she has gained 2 pounds, reports good appetite. CBC was reviewed. She will continue with Keytruda single agent, she had significant difficulty tolerating Lenvima even at doses lower than recommended. We will defer mammogram for now. \par 12/8/2020: Nidhi is here for a follow up visit, she reports feeling well. She offers no complaints at all today, she has gained 2 pounds, reports good appetite. CBC was reviewed. She will continue with Keytruda single agent.\par We reviewed MRI of the Brain 12/2/20:\par 1. Redemonstrated multiple enhancing lesions consistent with metastatic disease. Since the prior exam dated 7/2/2020\par 2. Slightly increased size of the right parietal lesion measuring 4.5 mm, previous\par 12/29/2020\par Nidhi is here for a follow up visit , she reports feeling tired , generalized weakness, she experienced episode of change of Mental status this week, also she lost 9 pounds from last visit. Patient is schedule for MRI of the Brain in 12/30/20. Also she reports mild pruritic Rash in her chest area. \par We will Hold Immunotherapy for today , Reassess post MRI of the Brain.

## 2021-01-14 NOTE — END OF VISIT
[FreeTextEntry3] : I was physically present for the key portions of the evaluation and management service provided.  I agree with the history and physical, and plan which I have reviewed and edited where appropriate.\par \par She is pending MR of brain. will [email or web address] farrisron for now and will hold Keyuda. RTC in 1-2 weeks

## 2021-01-14 NOTE — REVIEW OF SYSTEMS
[Fatigue] : fatigue [Negative] : Allergic/Immunologic [Recent Change In Weight] : ~T no recent weight change [Dysphagia] : no dysphagia [Nosebleeds] : no nosebleeds [Chest Pain] : no chest pain [Palpitations] : no palpitations [Lower Ext Edema] : no lower extremity edema [Shortness Of Breath] : no shortness of breath [Wheezing] : no wheezing [Cough] : no cough [SOB on Exertion] : no shortness of breath during exertion [Easy Bleeding] : no tendency for easy bleeding [FreeTextEntry2] : hoarse [FreeTextEntry9] : swelling and arthritic changes to hands [de-identified] : no gait impairment- numbness and swelling of bilateral hands

## 2021-01-14 NOTE — ASSESSMENT
[FreeTextEntry1] : Papillary- serous endometrial cancer, stage IV\par --PET CT on 11/29/2017 revealed no clear GYN origin, extensive KEVIN, uptake in the right lung and pleura, thyroid nodule\par --Malignant pleural effusion, resolved\par --L side PleurX catheter was removed on 2/8/2018\par --Completed cycle 8  of carbo/taxol on 6/29/2018.  2 cycles were administered  postoperatively.\par --Restaging PET CT on 9/26/2018 (3 months post completion of chemo 6/29/2018)was essentially negative for recurrent disease\par --Restaging PET CT on 12/18/2018 reveals   New left upper quadrant peritoneal nodule measuring 8 mm with an SUV max  of 7.3. This is suggestive of biologic tumor activity.  No other FDG avid lesions seen throughout the scan.\par --Restaging PET CT on 2/12/2019 revealed slight increase in size of left upper quadrant peritoneal nodule (1.1 cm, \par previously 0.8 cm) with stable FDG uptake, 7.7 SUV suspicious for biologic tumor activity.\par --Case was discussed with Dr. Massey at [facility], biopsy of peritoneal nodule is positive for recurrent endometrial cancer.\par --MRI brain on 4/23/2019 revealed multiple brain mets\par --S/p whole brain radiation completed 4/2019, required acute rehab\par --PET CT on 5/1/2019 subsequently revealed further disease progression \par --Restarted weekly carbo/taxol 3 on 1 off on 5/9/2019, continue with weekly Carbo/Taxol for now.\par --Off Decadron and Keppra since 6/5/2019 \par --Restaging brain MRI on 6/8/2019 showed almost complete resolution of most supratentorial lesions and no new lesions.\par --Restaging CT C/A/P on 7/9/2019 did not reveal definite progression, there was a questionable filling defect suspicious for possible chronic PE/artifact, this was discussed with Radiology at length anticoagulation was not deemed to be necessary\par --PET CT on 7/24/2019 revealed positive response to therapy \par --Carbo/Taxol stopped, last dose 9/13/2019, on hold since, she remains platinum sensitive \par --Restaging PET CT on 10/18/2019 is NAD\par --Restaging MRI of the brain on 10/15/2019 is suggestive for possible disease progression, no new lesions, she remains asymptomatic and off steroids, will follow closely \par --She was referred to [facility] for brain S[facility] consideration, close observation for now \par --Started on Avastin maintenance 11/5/2019, will consider adding Eliquis ppx for DVT/ PE, for now she will take baby ASA every other day  \par --We sent for prednisone 10 mg po if needed for additional skin issues\par --Patient well aware to contact us if any side effect developed\par --MRI of the brain 1/15/2020 reveals overall good response to Avastin\par --Restaging PET CT on 1/22/2020 reveals single small perisplenic area of activity, suspicious for disease progression, asymptomatic \par --Proceed with cycle 10 of Avastin on 5/12/2020 after weighing risks/benefits with plan for short term follow up imaging \par -- Reviewed  MRI of brain on 7/5/20  showed stable metastases; no new enhancing lesions are identified.\par -- Restaging PET CT on 4/30/2020 reveals minimal progression in the abdomen and pelvis\par -- Other options discussed is switching to Keytruda/Lenvima combination, this is a consideration in the future \par -- We will c/w with Avastin for now since it is working in the brain and she has a minimal progression in abdomen. \par --PET CT from 7/29/2020 reveals additional evidence of progression, minimal and not symptomatic\par --Last dose of Avastin on 8/4/2020\par -- Was started on Lenvima/Keytruda 8/25/2020\par --Unable to tolerate 20 mg daily, recommended to hold off on treatment for 4 days , and then restart at 10 mg daily after 4 days if feeling fine.\par --She has help Lenvima for two weeks since 10.6.2020 She states that her s/e have greatly improved and feels well. We will stop Lenvima \par -- PET CT on 10/14/2020 revealed mixed response with no new sites of disease \par -- We discussed treatment options including changing to Doxil or proceeding with single Keytruda at this time. \par -- She will continue with Keytruda every three weeks. Reimaging study in two months \par --CBC reviewed, WNL TSH CMP\par -- Arthritic changes noted to hands- possibly secondary to immunotherapy- will continue to observe \par --we reviewed  MRI brain ordered with mild progressing in one of the brain lesion. \par -- On 12/29/20  we will   hold KEYTRUDA for today.\par Patient is schedule for MRI of  the Brain on 12/30/20. \par --\par \par Papillary thyroid cancer [id number], 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 Herrera \par --Follow up with Dr. Acevedo of endocrinology; he is addressing vitamin D, thyroid and diabetes\par \par Cataract of eye\par -- S/p successful surgery on 9/30/19 \par \par Follow up in 1 weeks \par  case reviewed and managed with Dr Bangura who agreed for the above plan of care.\par \par \par

## 2021-01-14 NOTE — RESULTS/DATA
[FreeTextEntry1] : EXAM:  MR BRAIN WAW IC      \par \par \par PROCEDURE DATE:  06/08/2019  \par \par \par \par \par INTERPRETATION:  Clinical History / Reason for exam: Dizziness and \par vertigo, history of uterine and thyroid cancer, for evaluation of \par metastatic disease, lesion seen on prior MRI of the brain.\par \par MRI OF THE BRAIN WITHOUT AND WITH CONTRAST\par \par TECHNIQUE:\par \par Multiplanar multisequence imaging of the brain was performed on the \[facility] open magnet before and after the intravenous administration of \par 7.5 cc of Gadavist including brain lab protocol.\par \par COMPARISON:\par \par MRI of the brain without and with contrast dated March 12, 2019.\par \par FINDINGS:\par \par The previously described supratentorial lesions have almost completely \par resolved.\par \par The lesion in the left posterior frontal region now measures 1.4 cm in \par maximum diameter, the lesion in the anterior right frontal lobe measures \par 0.7 cm in maximum diameter, the second lesion in the right frontal lobe \par measures 0.1 cm in maximum diameter, the lesion in the right posterior \par parietal region measures 0.3 cm in maximum diameter, and the left \par occipital lobe lesion measures 1.2 cm in maximum diameter. (Previously \par measuring 2.6, 2.4, 0.5, 0.7, and 2.5 cm respectively).\par \par The third, fourth, and lateral ventricles are normal in size and \par position. There is no shift of the midline structures.\par \par The cerebellar tonsils are minimally low-lying (0.3 cm of cerebellar \par ectopia).\par \par Incidental note is made of a tiny medial cyst.\par \par There are scattered T2/FLAIR hyperintense signal intensities in the\par subcortical white matter which are nonspecific differential diagnostic\par possibilities include chronic ischemic change, foci of gliosis or\par demyelination.\par \par IMPRESSION:\par \par In comparison with the prior MRI of the brain dated March 21, 2019:\par \par There has been almost complete resolution of most of of the previously \par described supratentorial lesions.\par \par There are no new enhancing lesions.\par \par \par \par \par \par \par CRISTINA MÉNDEZ M.D., ATTENDING RADIOLOGIST\par This document has been electronically signed. Oren 10 2019  1:17PM\par   \par \par   \par \par \par [id number]^RI^DC\par \par EXAM:  PETCT SKUL-THI ONC FDG SUBS      \par \par \par PROCEDURE DATE:  05/01/2019  \par \par \par \par \par INTERPRETATION:  \par FDG PET CT STUDY   Subsequent  treatment strategy\par REASON: TUMOR IMAGING - PET with concurrently acquired CT for attenuation \par correction and anatomic localization; skull base to mid - thigh .\par \par CPT code 06658.\par \par Fasting blood glucose level:     91 mg/dl\par \par HISTORY: Endometrial cancer. Stage IV with brain metastatic disease.\par \par TECHNIQUE: Approximately 45 minutes after the intravenous administration \par of 10.7 mCi 18-Fluorine FDG, whole body PET images were acquired from \par base of skull to mid - thigh.\par \par CT protocol used for this PET/CT study is designed for attenuation \par correction and anatomic localization of PET findings.  This  CT \par is not designed to replace state-of-the-art diagnostic CT scans for \par specific imaging protocols of different body parts.\par \par COMPARISON : 2/12/2019.\par Correlation: MRI of the brain on 3/21/2019.\par \par FINDINGS:\par \par Head/Neck: No biologically active head/neck lesions.     \par Normal uptake within the brain, pharyngeal lymphoid tissue, salivary \par glands and laryngeal musculature is noted.    \par \par Thorax:   No suspicious hypermetabolic mediastinal, hilar, lung \par parenchymal lesions.\par \par Abdomen/Pelvis: Physiologic GI/  activity. \par \par Again seen is a left upper quadrant peritoneal nodule, SUV max 6.8, \par previously 7.7 (12% decrease). \par \par Multiple new FDG avid omental nodules largest of which is adjacent to the \par distal transverse colon, measuring 10 mm, positive on nonattenuation \par corrected images, axial image 134. \par \par No other abnormal visceral or guillaume tracer uptake is present.    \par \par Musculoskeletal :  No suspicious hypermetabolic osseous lesions.\par \par Additional CT findings:\par Status post hysterectomy.\par Colonic diverticulosis.\par \par IMPRESSION: \par \par COMPARISON : 2/12/2019\par \par Multiple new FDG avid omental nodules largest measuring up to 10 mm, \par positive on nonattenuation corrected images. Findings are suggestive of \par biologic tumor activity.\par \par Again seen is a left upper quadrant peritoneal nodule, SUV max 6.8, \par previously 7.7 (12% decrease). \par \par \par \par \par

## 2021-01-14 NOTE — PHYSICAL EXAM
[Restricted in physically strenuous activity but ambulatory and able to carry out work of a light or sedentary nature] : Status 1- Restricted in physically strenuous activity but ambulatory and able to carry out work of a light or sedentary nature, e.g., light house work, office work [Normal] : affect appropriate [de-identified] : pale, chronically ill [de-identified] : B/L cataracts [de-identified] : CTA B/L [de-identified] : arthritic changes of the fingers  [de-identified] : No mucosal ulcers noted

## 2021-02-03 PROBLEM — Z51.12 ENCOUNTER FOR ANTINEOPLASTIC IMMUNOTHERAPY: Status: ACTIVE | Noted: 2020-01-01

## 2021-02-03 NOTE — PHYSICAL EXAM
[Restricted in physically strenuous activity but ambulatory and able to carry out work of a light or sedentary nature] : Status 1- Restricted in physically strenuous activity but ambulatory and able to carry out work of a light or sedentary nature, e.g., light house work, office work [Normal] : affect appropriate [de-identified] : pale, chronically ill [de-identified] : B/L cataracts [de-identified] : arthritic changes of the fingers  [de-identified] : No mucosal ulcers noted

## 2021-02-03 NOTE — RESULTS/DATA
[FreeTextEntry1] : EXAM:  MR BRAIN WAW IC      \par \par \par PROCEDURE DATE:  06/08/2019  \par \par \par \par \par INTERPRETATION:  Clinical History / Reason for exam: Dizziness and \par vertigo, history of uterine and thyroid cancer, for evaluation of \par metastatic disease, lesion seen on prior MRI of the brain.\par \par MRI OF THE BRAIN WITHOUT AND WITH CONTRAST\par \par TECHNIQUE:\par \par Multiplanar multisequence imaging of the brain was performed on the \[facility] open magnet before and after the intravenous administration of \par 7.5 cc of Gadavist including brain lab protocol.\par \par COMPARISON:\par \par MRI of the brain without and with contrast dated March 12, 2019.\par \par FINDINGS:\par \par The previously described supratentorial lesions have almost completely \par resolved.\par \par The lesion in the left posterior frontal region now measures 1.4 cm in \par maximum diameter, the lesion in the anterior right frontal lobe measures \par 0.7 cm in maximum diameter, the second lesion in the right frontal lobe \par measures 0.1 cm in maximum diameter, the lesion in the right posterior \par parietal region measures 0.3 cm in maximum diameter, and the left \par occipital lobe lesion measures 1.2 cm in maximum diameter. (Previously \par measuring 2.6, 2.4, 0.5, 0.7, and 2.5 cm respectively).\par \par The third, fourth, and lateral ventricles are normal in size and \par position. There is no shift of the midline structures.\par \par The cerebellar tonsils are minimally low-lying (0.3 cm of cerebellar \par ectopia).\par \par Incidental note is made of a tiny medial cyst.\par \par There are scattered T2/FLAIR hyperintense signal intensities in the\par subcortical white matter which are nonspecific differential diagnostic\par possibilities include chronic ischemic change, foci of gliosis or\par demyelination.\par \par IMPRESSION:\par \par In comparison with the prior MRI of the brain dated March 21, 2019:\par \par There has been almost complete resolution of most of of the previously \par described supratentorial lesions.\par \par There are no new enhancing lesions.\par \par \par \par \par \par \par CRISTINA MÉNDEZ M.D., ATTENDING RADIOLOGIST\par This document has been electronically signed. Oren 10 2019  1:17PM\par   \par \par   \par \par \par [id number]^RI^DC\par \par EXAM:  PETCT SKUL-THI ONC FDG SUBS      \par \par \par PROCEDURE DATE:  05/01/2019  \par \par \par \par \par INTERPRETATION:  \par FDG PET CT STUDY   Subsequent  treatment strategy\par REASON: TUMOR IMAGING - PET with concurrently acquired CT for attenuation \par correction and anatomic localization; skull base to mid - thigh .\par \par CPT code 96062.\par \par Fasting blood glucose level:     91 mg/dl\par \par HISTORY: Endometrial cancer. Stage IV with brain metastatic disease.\par \par TECHNIQUE: Approximately 45 minutes after the intravenous administration \par of 10.7 mCi 18-Fluorine FDG, whole body PET images were acquired from \par base of skull to mid - thigh.\par \par CT protocol used for this PET/CT study is designed for attenuation \par correction and anatomic localization of PET findings.  This  CT \par is not designed to replace state-of-the-art diagnostic CT scans for \par specific imaging protocols of different body parts.\par \par COMPARISON : 2/12/2019.\par Correlation: MRI of the brain on 3/21/2019.\par \par FINDINGS:\par \par Head/Neck: No biologically active head/neck lesions.     \par Normal uptake within the brain, pharyngeal lymphoid tissue, salivary \par glands and laryngeal musculature is noted.    \par \par Thorax:   No suspicious hypermetabolic mediastinal, hilar, lung \par parenchymal lesions.\par \par Abdomen/Pelvis: Physiologic GI/  activity. \par \par Again seen is a left upper quadrant peritoneal nodule, SUV max 6.8, \par previously 7.7 (12% decrease). \par \par Multiple new FDG avid omental nodules largest of which is adjacent to the \par distal transverse colon, measuring 10 mm, positive on nonattenuation \par corrected images, axial image 134. \par \par No other abnormal visceral or guillaume tracer uptake is present.    \par \par Musculoskeletal :  No suspicious hypermetabolic osseous lesions.\par \par Additional CT findings:\par Status post hysterectomy.\par Colonic diverticulosis.\par \par IMPRESSION: \par \par COMPARISON : 2/12/2019\par \par Multiple new FDG avid omental nodules largest measuring up to 10 mm, \par positive on nonattenuation corrected images. Findings are suggestive of \par biologic tumor activity.\par \par Again seen is a left upper quadrant peritoneal nodule, SUV max 6.8, \par previously 7.7 (12% decrease). \par \par \par  MR BRAIN WAW IC\par \par \par PROCEDURE DATE: 12/30/2020\par \par \par \par \par INTERPRETATION: CLINICAL INDICATION: Intracranial metastasis. History of endometrial adenocarcinoma.\par \par TECHNIQUE: Multi-planar multi-sequential MR imaging of the brain was performed before and after the intravenous administration of 6 ml of Gadavist. 1.5 mL was discarded.\par \par COMPARISON: MRI of the brain dated 12/2/2020.\par \par FINDINGS:\par \par There is redemonstration of multiple enhancing intracranial metastasis, some of which with intrinsic T1 signal as follows:\par * Stable 1.1 cm lesion in the medial superior left frontal lobe.\par * Stable 0.8 cm lesion in the right frontal operculum.\par * Stable 0.8 cm lesion in the inferior left occipital lobe.\par * Stable punctate lesion in the right frontal white matter.\par * Stable 0.5 cm cortical lesion in the lateral left frontal lobe.\par * Slightly more prominent 0.5 cm lesion in the medial right parietal lobe.\par * Slightly more prominent 0.6 cm cortical lesion in the right temporal lobe.\par \par There is prominence of the sulci, sylvian fissures, and ventricles, reflecting stable mild diffuse parenchymal volume loss.\par \par There are scattered patchy T2/FLAIR hyperintensities in the periventricular cerebral white matter, consistent with mild left moderate chronic microvascular ischemic changes.\par \par No acute infarction or intracranial hemorrhage.\par \par The ventricles are normal without evidence of hydrocephalus. There are no extra-axial fluid collections. The skull base flow voids are present.\par \par The visualized intraorbital contents are normal. The imaged portions of the paranasal sinuses are clear. The mastoid air cells are clear. The visualized soft tissues and osseous structures appear normal.\par \par \par IMPRESSION:\par \par Stable to slightly more prominent multiple intracranial metastasis as above.\par \par No acute territorial infarct, intracranial hemorrhage, hydrocephalus, or extra-axial fluid collection.\par \par \par \par \par \par \par \par

## 2021-02-03 NOTE — HISTORY OF PRESENT ILLNESS
[Disease: _____________________] : Disease: [unfilled] [AJCC Stage: ____] : AJCC Stage: [unfilled] [de-identified] : Serena is a rodrigue 65 yo female, who has started developing SOB approximately 2 months ago, she went to ER on 11/2/2017 and on CXR was noted to have a large right sided pleural effusion. She underwent a thoracentesis, 1.6L of fluid was drained. 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 IHC was pos for CK7, PAX8, CK5/6 and Ca125, negative for TTF-1/Napsin, calretinin, CK20, mammaglobin, p63, villin, HAM56, GCDFP15, TAY-EP4. \par \par Her visit on 12/1/2017 Serena had an excisional biopsy of cervical node on 12/13/2017 revealing met adenocarcinoma, primary source still was not clear. On 12/14/2017 Serena was admitted with SOB, Pleurx catheter was placed on 12/14/2017. 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 Serena received 1st dose of carbo (auc of 5)/taxol(175mg/m2) on 12/15/2017 without complications, but the next day sustained a fall 2/2 vasovagal episode and developed asymptomatic SAH, that resolved on imaging by 12/21/2017. \par \par 12/1/2017: Serena is here for follow up today. She had a therapeutic thoracentesis in IR on 11/27/2017, 2L of fluid were removed, with much improved respiratory status. PET CT and MRI of the brain were done on 11/29/2017, findings were discussed today. There remains no clear primary source of malignancy. We have discussed that the source of tumor may be Mullerian vs lung. regardless of source chemotherapy needs to be initiated ASAP. We have discussed Carbo/taxol regiment that will cover both Mullerian and lung origin. Side effects including myelosuppression, peripheral neuropathy, allergic reaction and others.\par \par 1/2/2017 Since last visit Serena had an excisional biopsy of cervical node on 12/13/2017 revealing met adenocarcinoma, primary source still was not clear. On 12/14/2017 Serena was admitted with SOB, Pleurx catheter was placed on 12/14/2017. 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 Serena received 1st dose of carbo (auc of 5)/taxol(175mg/m2) on 12/15/2017 without complications, but the next day sustained a fall 2/2 vasovagal episode and developed asymptomatic SAH, that resolved on imaging by 12/21/2017. Today Serena's SOB has significantly improved. She reports very mild neuropathy in fingertips only. She reports no headache and offers no other complaints. \par \par 1/26/2018: Complains of some neuropathy, otherwise tolerating chemo with no difficulty. \par \par 2/16/2018: restaging CT C/A/P reviewed, significant improvement noted. Will refer to GYN/ONC. Reports slightly worsening neuropathy, not -painful, taking Gabapentin, no other symptoms. For cycle 4 of carbo/taxol today.\par \par 3/9/2018: Serena met with Dr. Massey, she is planned for surgery on 5/7/2018, after completion of 6 cycles of carbo/taxol and restaging PET CT ordered for mid April and follow up with Dr. Massey on April 20. She reports worsening neuropathy, and occasional pain and changes in vision in her left eye, eye symptoms come and go and are not very bothersome. She reports no other symptoms. \par \par 3/30/2018; Serena is here for cycle 6 of carbo/taxol, she continues to have neuropathy in finger and toes, but offers no other complaints, chemo has been dose reduced. \par \par 4/27/2018: Results of restaging PET CT s/p 6 cycles of carbo/taxol revealed 1. No new areas of abnormal increased uptake is seen to indicate \par biologically active disease.\par 2. Persistent PET positive left thyroid mass with a max SUV 33.1, \par previously max SUV 34.8. Further evaluation with thyroid ultrasound and \par if needed fine-needle aspiration biopsy will be helpful.\par 3.   PET positive left cervical lymph nodes mainly level 2 on the left \par with a max SUV 5.47previously 18. Minimal increased uptake in the \par bilateral axillary lymph nodes most likely reactive(less than 2.5)\par 4. Weakly PET positive, max SUV 2.89 right pleura, previous max SUV 5.3 \par with non-FDG avid right pleural effusion. \par 5. Previously FDG avid right and left supraclavicular lymphadenopathy, \par left internal mammary, bilateral paratracheal, para-aortic, para \par vertebral, carinal, mesenteric, right and left iliacs, right inguinal \par lymphadenopathy, small in size and no longer FDG avid.\par 6. No abnormal increased uptake is seen in the  lobulated uterus.\par 7. Overall there is marked improvement in the FDG avid disease.\par This was reviewed with Serena. She met with Dr. Massey on 4/20/2018, surgery is planned for 5/7/2018. She will also joselyn to meet with ENT re FDG avid thyroid nodule. Will assist in making he an appointment for her. \par Persistent neuropathy on gabapentin. \par \par 6/29/18 ; Patient came for follow up visit , evaluation for chemotherapy cycle 8 of carbo / Taxol , patient tolerating medication well , except neuropathy  recommend to have gabapentin  300 mg po daily.\par \par 7/20/2018: Serena present for follow up today, she has completed total of 8 cycles of Carbo/Taxol. She reports significant neuropathy in her hands and feet. We will discontinue chemotherapy today and plan to follow with close observation. She has thyroidectomy planned with Dr. Herrera on 8/20/2018, obtaining clearance for PMD. She is also planning to fly to [place] at the end of September. She reports no SOB, no GI or  symptoms. \par \par 9/12/2018: Serena offers no significant complaints today. She states neuropathy in her hands has almost completely resolved and neuropathy in her feet is significantly better. She had undergone complete thyroidectomy by Dr. Herrera on 8/20/2018, pathology revealed papillary thyroid cancer, measuring 2 cm, pT1b, other additional foci of papillary carcinoma were also noted, no LVI, surgical margins were clear, LN 0/2 had no carcinoma, stage [id number]. She is following up with Dr. Herrera tomorrow. She has still not gone for her vacation in [place]. \par \par 10/10/2018: Restaging PET CT on 9/26/2018 reveals Since 4/16/2018,  1. Post thyroidectomy with diffuse increased FDG uptake at the thyroid  bed likely representing post-surgical changes.  2. Subtle increased uptake is seen in the lamina , right side at the  level of T8 with a max SUV 4.6. This is not sufficient for metastatic  disease. Bone scan or MRI examination with contrast will be helpful for  further evaluation.  3. No other areas of abnormal increased uptake is seen. Images were personally reviewed by myself and discussed with Serena. \par She also had an Echo on 9/24/2018 revealing EF of 63%. \par Serena reports ongoing fatigue, she is unable to lose weight. She is taking Synthroid and following with Dr. Acevedo. \par She reports stiff fingers at night only, no painful neuropathy. \par She denies any other symptoms today. \par \par 12/11/2018: Serena feels well, neuropathy in hands and feet is much improved. S he is now complaining of r-sided facial pain associated with some swelling, pain comes and goes. She has already seen Dr. Herrera, who ordered CT of sinuses and prescribed pain relief meds. She is also due for restaging PET CT. Serena feels well otherwise.  [de-identified] : KRas WT, EGFR WT, Alk WT, ROS1 WT, PDL1-1%\par Normal MMR protein expression [de-identified] : 1/9/2019: Restaging PET CT was performed on 12/18/2018 it revealed COMPARISON : 9/24/2018.  New left upper quadrant peritoneal nodule measuring 8 mm with an SUV max  of 7.3. This is suggestive of biologic tumor activity.  No other FDG avid lesions seen throughout the scan. Images were personally reviewed by myself and discussed with Nidhi, originally over the phone and again today. I have originally suggested to try AI in the interim, Nidhi however reported diarrhea at the time of the scan and declined intervention for now. We will plan for restaging PET CT to be performed in 6-8 weeks, this will be ordered today. She will follow up with Dr. Massey at [facility] shortly and bring the disk for his review. I would also like her to meet with genetics, this will be arranged at [facility] with Dr. Massey's help. Nidhi reports no new symptoms today, her neuropathy is manageable and  improving. She still reports unilateral headache that was work up in the past. Neurology eval was again suggested to her. She is following closely with Endocrinology re thyroid management. She will have follow up with Dr. Herrera shortly as well. \par \par 2/20/2019: Nidhi offers no new complaints. PET CT from 2/12/2019 revealed \par Since PET/CT of December 19, 2018, no new sites of pathologic FDG uptake\par Slight increase in size of left upper quadrant peritoneal nodule (1.1 cm, \par previously 0.8 cm) with stable FDG uptake, 7.7 SUV suspicious for \par biologic tumor activity.\par No other sites of pathologic FDG uptake \par Images were personally reviewed by myself and discussed with Nidhi, case was also discussed with Dr. Massey at [facility]. Nidhi will have follow up with his shortly. She is following with endocrinology. reports improving neuropathy. No GI or  symptoms at this time. No weight loss. \par \par 3/20/2019: Nidhi had a peritoneal nodule biopsy done at [facility], I have received a call from Dr. 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 We have discussed that surgery though could be attempted will not be the best therapeutic approach, recommencement of systemic therapy was discussed. I have not received the results from [facility] yet. I have briefly discussed this with Nidhi today. Nidhi reports that she has acutely developed right lower extremity weakness 5-6 days ago, she did not inform any of her doctors about this. She also reports that her R upper extremity though not weak "does not feel normal either. She reports no back pain, there is no point tenderness, RLE is objectively weak on exam. I recommend ER evaluation to r/o CVA vs brain met, vs L-spine related issue. Recommend admission for work up. Nidhi is agreeable to get admitted. \par \par 5/8/2019: Nidhi present for follow up, she was diagnosed with multiple metastasis to the brain, MRI was done on 3/21/2019 and revealed \par Multiple supratentorial heterogeneously enhancing lesions consistent with \par metastatic disease. The appearance on the T2-weighted images is suggestive \par of adenocarcinoma. There is mass effect upon the left lateral ventricle and \par corpus callosum. \par She received whole brain irradiation by Dr. Dahl, completed it in the beginning of 4/2019, she is currently on steroid taper managed by Dr. Dahl, she is taking 4mg of Decadron daily. She will be stopping the Keppra as there have been no documented h/o seizures. She still reports impairment of the L visual field, she has an appointment coming up with Opthalmology. She reports no deficits walking after she has completed inpatient acute rehabilitation. \par Restaging PET CT on 5/1/2019 reveals COMPARISON : 2/12/2019 \par Multiple new FDG avid omental nodules largest measuring up to 10 mm, \par positive on nonattenuation corrected images. Findings are suggestive of \par biologic tumor activity. \par Again seen is a left upper quadrant peritoneal nodule, SUV max 6.8, \par previously 7.7 (12% decrease). \par Images were personally reviewed by myself and discussed with patient. \par She now reports mild abdominal bloating, no other symptoms. She reports her neuropathy has significantly improved. \par She has first evidence of recurrent disease documented 5.5 months after last carbo/taxol dose, the volume of disease was very small (1 8mm nodule), she was then observed for another 6 months and now she wishes to restart the chemotherapy. \par I have discussed with her that rechallenging with carbo/taxol may still prove to be effective as long as she gets restaged after 2 cycles. Given recent whole brain radiation and neuropathy I will offer her carbo/taxol weekly with does reduction on the taxol for neuropathy, We will plan to run carbo slowly to avoid allergic reaction. \par She is agreeable to start ASAP. \par \par 6/5/2019: Nidhi has completed 1 cycle of weekly Carbo/taxol, ran at slow rate to avoid Carbo reaction and has tolerated chemotherapy without difficulty, she does not complain of increase in neuropathy. She has ongoing vision changes in her L eye, she had no residual LE weakness. She is off Decadron and Keppra. She was advised to see ophthalmology. \par \par 7/3/2019: Nidhi is doing well.  Reports no problems with carbo/taxol.  Still complains of vision changes in L eye but is seeing ophthalmologist on 7/16.  Remains active around the house and cooks regularly.  No fevers, weight loss, anorexia.  ROS is otherwise only significant for occasional loose stools, no diarrhea.\par \par 7/17/2019: Nidhi is doing well, denies worsening neuropathy. She c/o feeling fatigued and tired. Her last chemo was 1 week ago(7/11/19) with carbo/taxol and is due again tomorrow. She saw ophthalmology and will need cataract surgery of the L eye. No c/o chest pain/SOB. No weight loss.\par CT C/A/P was done on 7/9/2019, images were personally reviewed by myself and discussed with several radiology attendings, they revealed \par CHEST:\par Filling defect within a segmental branch of the lower lobe pulmonary artery \par suspicious for pulmonary embolus. \par Stable left upper lobe and right middle lobe 3 mm nodules. \par CT abdomen and pelvis performed on the same day, see separate report. \par ADDENDUM: \par Please note that the morphology of the small thrombus within the left lower \par pulmonary artery is not indicative of an acute thrombus but rather a chronic \par appearance. \par This was discussed with Radiology, it was not deemed that thrombus was acute and may not have even been present at all, finding was deemed to be equivocal, based on radiology assessment anticoagulation was not indicated for the filling defect noted. Initially CTA was recommended, the recommendation was withdrawn upon further review. \par ABDOMEN/PELVIS:\par New 1.3 cm segment IV hepatic hypodensity seen on series 4 image 205. \par Lesion not seen on prior PET/CT from 5/1/2019 or abdomen and pelvis CT from \par \par 2/2/2018 and is consistent with a new metastasis. \par Two other hepatic lesions described above, decreased in size since 2/2/2018, \par consistent with treated hepatic metastases. \par Anterior perisplenic omental nodule measures 0.7 cm on series 2 image 53, \par previously measuring 1.2 cm on PET/CT dated 5/1/2019. \par All the other previously seen omental nodules have resolved since 5/1/2019. \par ADDENDUM:\par Case was discussed with Dr. Cid in detail. It is also possible that the \par new 1.3 cm lesion in the liver since February 2, 2018 represents a treated \par metastasis similar to the other liver lesions. \par PET/CT would be necessary to assess disease activity. \par I have discussed case with Radiology, and it was determined that there was no clear cut evidence of progression. PET CT was ordered today.\par This was discussed with Nidhi, will proceed with Carbo/Taxol for now. \par \par 8/1/2019: Nidhi reports no major side effects from weekly Carbo/Taxol, she reports no neuropathy. She has completed 10 cycles altogether. \par PET CT on 7/24/2019 revealed \par 1. Since May 1, 2019, no definite new site of pathologic FDG uptake to \par suggest new biologic tumor activity; specifically, no pathologic FDG uptake \par within previously noted 1.3 cm lesion within hepatic segment 4. \par 2. Increased FDG uptake within several subcentimeter bilateral cervical \par lymph nodes, which is nonspecific and may be postinflammatory; max SUV 9.2 \par is noted within a 0.7 cm right level 2 cervical node. Attention on follow-up \par is suggested. \par 3. Few peritoneal nodules have overall decreased in size. \par 4. No additional site of pathologic FDG uptake. \par Images were personally reviewed by myself and discussed with Nidhi. \par She wishes to continue with chemotherapy. Will plan for 3 additional cycles. Will consider adding Avastin, but with h/o inconclusive/possible chronic PE ppx anticoagulation maybe necessary. \par \par 8/28/2019: Nidhi reports feeling well, she denies worsening neuropathy, worsening neurological symptoms, SOB, abdominal pain, bloating. She reports she is planned to undergo  eye surgery towards the end of September. We will plan to hold chemotherapy briefly after this cycle to facilitate adequate counts and minimize complications. We again have briefly discussed considering Avastin based therapy, will hold off on this prior to surgery. 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 May proceed without formal PAST, will check CBC prior to procedure, PT and PTT today. \par \par 9/25/19:Nidhi reports feeling well, she denies any changes since last visit, No SOB, abdominal pain, bloating. She reports she is planned to undergo  eye surgery in 9/30/19  CBC reviewed with normal Hemogram . we will hold chem this week , she will resume after Cataract sx . 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 May proceed without formal PAST, will check CBC prior to procedure, PT and PTT today. \par \par 10/4/2019: Nidhi underwent successful eye surgery on 9/30/2019, she reports she can see much better now. Last dose of chemotherapy was administered on 9/20, she then was on hold for surgery. She has completed 5 additional cycles of Carbo/taxol. She is due for restaging. Her disease is only accurately assessed on PET CT, prior attempts to obtain CT scans resulted in additional imaging with PET, as findings were inconclusive. PET CT will be ordered to restage. \par In addition she is due for MRI of the brain. She has had a small amount of weight loss, mild neuropathy is persistent. She offers no additional complaints. \par She will have port placed on Monday, will hold chemotherapy until restaging scans complete. \par \par 10/23/2019: Nidhi feels well, and denies any new symptoms. Her eye surgery was successful, vision is much improved now. She had restaging scans. \par PET CT on 10/18/2019 revealed COMPARISON : 7/24/2019. \par No pathologic uptake to suggest biologic tumor activity. \par MRI of the brain on 10/15/2019 revealed \par Comparison with June 8 and March 21, 2019: (Generally lesions improved \par markedly since March but slightly increased in size since June) \par 1. Lesions previously demonstrated on the study of June 8, 2019 has \par remained stable or minimally increased in size \par 2. Specifically, right frontal opercular lesion measures about 1 cm and \par left posterior inferior temporal lobe lesion measures about 1 cm. These have \par increased since the previous study. \par 3. Small punctiform lesions within the right frontal white matter and left \par frontal sulcus or more apparent than on the study of June 8 \par 4. These lesions are all much smaller than the earlier MRI of March 21, \par 2019. \par 5. No new lesions. \par Images were personally reviewed by myself and discussed with patient. She remains asymptomatic and off the steroids. \par I have also discussed the case with Dr. Dahl. I would like Nidhi to discuss the options with [facility], she maybe a candidate for brain SRS. \par She has been off Carbo/Taxol for a few weeks, she remains Platinum sensitive. I have discussed the option of switching her over to Avastin maintenance, will need to prophylax with low dose Eliquis as well, given questionable finding of small PE in the past. \par Side effects of Avastin were discussed at length, including HNT, proteinuria, small risk of DVT/PE, GI perforations etc. \par She is agreeable to start after [facility] consultation. \par \par 11/5/2019: Nidhi feels OK, she reports acute onset head discomfort that lasts minutes and subsides, the episodes are becoming more frequent 1-2 times a day. She has seen Dr. Dahl, who wishes to hold off on offering SRS to the brain. Plan was to start on Avastin today. Nidhi reports she currently has a lapse in her insurance coverage till 12/2019, I will not be able to start he on ppx dose Eliquis for now, she instead will take baby ASA every other day, to modify risk of DVT/PE. Will plan to switch over to Eliquis at 2.5mg BID once insurance is active. She also contemplated going to [place] with her daughter, I am willing to hold Avastin until she comes back, but Nidhi wishes to start right away, I explained to her that flight may result in complications if that is her wish. She will cancel the trip and start with Avastin today. Side effects have been discussed. \par \par 11/20/2019: Nidhi came for a follow up visit, she reports feeling well, she reports less fatigue. She is s/p first dose of Avastin on 11/5/2019, she tolerated Avastin without difficulty, she is due for the 2nd dose today. \par We communicated CBC result with HGB of 12.3, normal platelet and WBCs. She lost like 5 pounds secondary to gum problems, she will follow up with her dentist this week. She is currently on baby ASA every other day. Continue with single agent Avastin.  \par \par 12/17/2019: Nidhi came for a follow up visit, she reports feeling well, she experienced pruritic rash on last Saturday 12/14/19, which improved over 2 days with Benadryl alone.  Today her skin rash has completely resolved. We will add Solu-Medrol 100 mg IV prior to Avastin. Nidhi reports less fatigue. She is due for the 3rd dose of Avastin today. \par We communicated CBC result with HGB of 12.3, normal platelet and WBCs. \par \par 1/7/2019: Nidhi is doing well, reports good appetite, she has lost 1lb. Reports no GI or pulmonary symptoms. She reports her gums are bleeding occasionally. No new neurological symptoms. She reports no rash after last cycle of Avastin. \par \par 1/28/2020: Nidhi is here for follow up visit, she has no difficulty tolerating Avastin, she reports L sided chronic eye discomfort, neuropathic in nature. Neurontin has been helpful in the past, will reorder this. \par PET CT on 1/22/2020 revealed COMPARISON : 10/18/2019. \par Anterior perisplenic nodule measuring 8 mm is FDG avid, SUV max 8.9, \par consistent with biologic tumor activity. \par MRI of the brain on 1/15/2020 revealed \par In comparison to the previous brain MRI dated 10/15/2019: \par 1. Redemonstrated scattered enhancing lesions consistent with metastatic \par disease, with overall good treatment response since the prior exam. \par 2. No significant interval change in the ovoid lesion in the superior left \par frontal lobe measuring 12 mm. \par 3. Decreased size of the right opercular lesion measuring 6 mm, previously \par 11 mm. Decreased size of the left inferior occipital/tentorial lesion \par measuring 6 mm, previously 11 mm. The previously seen right frontal white \par matter punctate lesion is no longer visualized. \par 4. No new lesions are demonstrated. \par All images were personally reviewed by myself and discussed with Nidhi. I explained to her I am concerned about the perisplenic lesion concerning for disease progression, but it is isolated and very small, asymptomatic. There is definitely a positive response to Avastin in the brain and given that, we will continue with Avastin with short term follow up imaging. Nidhi knows to inform me with any new symptoms immediately. \par \par 2/18/2020: NIDHI BANKS is a 64 year old female here today for follow up visit. She completed 5 cycles of Avastin; tolerating well at this time. She denies fever, chills, change in mental status, or change in weight. She complains of left painful facial swelling. In addition, she complains of swelling of fingers, joint pain and numbness in her toes. She continues on Gabapentin 100mg TID with no symptom relief and wishes to try to go up on the dose. She complains of mild gingival bleeding in the morning. Last PET showed new isolated perisplenic lesion suspicious for disease progression, however MRI of the brain appeared better on Avastin, so the plan was to continue with Avastin with short term follow up. \par \par 3/10/2020: NIDHI BANKS is a 64 year old female here today for follow up visit. She denies new neurological symptoms, skin rash, fever, or change in weight. She continues of Gabapentin which was increased to 200 mg TID; neuropathy improving. She continues to complain of mild gingival bleeding. Left facial swelling and numbness resolved. She has completed cycle 6 of Avastin without complication. \par \par 3/31/30673: Nidhi presented today for routine f/u, she feels fine. Denies any chest pain, sob, fever, chills and cough. Reports that she continues to have gum bleeding. Neuropathy is better with gabapentin, she decreased the dose to 4 pills a day. She tolerated 7th cycle well and is due to get 8th cycle today . She will get MRI brain on 4/2/20.\par She reports minor gum bleeding from Avastin and dental infection on the left, will prescribe Amoxicillin. \par \par 4/21/2020: NIDHI BANKS is a 64 year old female here today for follow up visit for endometrial cancer. S/p cycle 8 of Avastin. Patient denies fever, chills, SOB, cough and pain. She states that her gums have minimal bleeding and dental infection improved after amoxicillin. She complains of mild fatigue. Restaging MRI of the brain was completed on 4/2/2020 which showed stable bilateral parenchymal hemorrhagic lesions compatible with known metastases; no new enhancing lesions are identified. \par Images were personally reviewed by MD Jose Roberto and discussed with patient. \par \par 5/12/2020: MRI brain on 4/2/2020 reveals 1. Stable bilateral parenchymal hemorrhagic lesions compatible with known \par metastases. \par 2. No new enhancing lesions are identified. \par PET CT on 4/30/2020 revealed Right upper quadrant peritoneal implant adjacent to the inferior tip of the \par liver 4.7 (image [id number]) \par Right lower quadrant 0.5 cm peritoneal nodule 7.2 (image 111) \par Right lower quadrant subcentimeter peritoneal nodule 2.6 (image [id number]) \par Anterior perisplenic peritoneal nodule 12.3-48% increase from 8.9 \par (remeasured) previously \par Stable non-FDG avid (attenuation corrected and nonattenuation corrected \par images) 4 mm right middle lobe pulmonary nodule-image 181) \par  No other definite sites of abnormal FDG uptake \par IMPRESSION: \par Compared to 1/22/2020: \par new FDG avid peritoneal implants in right lower quadrant of the abdomen \par suspicious for biologic tumor activity \par 48% interval increase in SUV in anterior perisplenic peritoneal nodule (SUV \par 12.3 vs 8.9 remeasured) \par In retrospect stable FDG avid peritoneal implant in the right upper quadrant \par of the abdomen (SUV 4.7 vs 3.8 remeasured-image 134) \par No other definite sites of abnormal FDG uptake \par Images were personally reviewed by MD Jose Roberto and discussed with patient.\par Given minimal degree of progression in the abdomen and persistent control intracranially, we have discussed continuing Avastin for now and short term restaging. \par Nidhi offers no complaints today. \par \par 6/2/2020: The patient is here for follow up. She is due for Avastin today . She has  no major complaints . She reported gum bleeding and swelling, which is much better today . She also reported hand swelling bilaterally. She denies fever, chills, no respiratory, cardiac, GI/ symptoms. She denies headaches or neurological deficits. \par \par 6/23/2020: Nidhi is 65 years old female came for a follow up visit for papillary-serous endometrial carcinoma with brain metastasis.  She reported feeling well. \par She denies dizziness, change of mental status, fever, chills, SOB. 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  She reported feeling well. \par She denies dizziness, change of mental status, fever, chills, SOB. \par We reviewed MRI of the brain from 7/5/20 that revealed \par The study is considered stable since April 2, 2020 with the following \par findings: 1. 3 lesions are being followed, in the left frontal, right frontal and left occipital lobes. 2. The lesions are essentially stable, nonenhancing and probably mildly hemorrhagic 3. A punctiform lesion in the right frontal lobe (series 11 image 7) was probably present on the prior study. This is stable and probably not new. 4. Any differences in size are probably due to differences in the way the patient was scanned. Images were personally reviewed by MD Jose Roberto and discussed with patient. We will continue the current treatment with  Avastin one every 3 weeks.  Patient reports improvement in neuropathy with gabapentin 200 mg every 12 hrs.Patient is due for repeat PET Scan at the end of July,2020.\par \par 8/4/2020: NIDHI BANKS is a 65 year old female here today for follow up visit for endometrial cancer. S/p cycle 11 of Avastin. Patient denies fever, chills, SOB, cough and pain.\par PET CT from 7/29/2020 revealed \par Compared to 4/30/2020: \par 1 new FDG avid near midline peritoneal implant (SUV 4.3) consistent with biologic tumor activity \par 1 new left cervical level V FDG avid lymph node (SUV 8.7) suspicious for biologic tumor activity in light of the history of papillary thyroid carcinoma \par Interval increases in SUV: 115% increase in SUV in right lower quadrant peritoneal implant \par 97% increase in SUV in right upper quadrant peritoneal implant -66% increase in SUV in level 3 cervical lymph node \par Interval 36% decrease in SUV in anterior perisplenic nodule (7.9 vs 12.3) \par Stable FDG avid right upper quadrant implants and right level 5 cervical lymph node \par No other sites of abnormal FDG uptake \par We discussed other different treatment option.\par Images were personally reviewed by MD Jose Roberto and discussed with patient.\par We have discussed that there is undeniable progression systemically but still benefit in the CNS. I will review her chart and determine if restarting weekly carbo/taxol is of value, vs adding an additional agent vs starting on Keytruda/Lenvima is at this point indicated. \par Patient will continue with one additional dose of Avastin today.\par \par 8/25/2020: Nidhi is feeling well, no complaints today. At this point given minimal but sustained progression we will plan to switch over to Kaytruda combination with Lenvima. \par We have gone over side effects of immunotherapy at length, including but not limited to autoimmune mediated pneumonitis, enteritis, dermatitis, thyroiditis, damage to the kidneys, liver, pancreas, pituitary gland etc. patient and family voice understanding and are in agreement to proceed with treatment.\par Side effects of Lenvima including, but not limited to, cytopenias, that may require blood product transfusions and lead to increased risk of infection, requiring hospitalization, allergic reactions, that can rarely be life-threatening, skin reactions, nausea/vomiting, mucositis, diarrhea/constipation, QT prolongation, GI perforation, HTN etc were discussed at length, patient and family voice undestaging, all questions were answered to patient's satisfaction.\par \par 9/15/20 Nidhi presented today for f/u and to get treatment . On last visit she was started on keytruda and lenvima . She was told to start with 10 mg a day , but she started taking 20 mg a day . Developed diffuse muscle aches and pains , reports has pain in her mouth as well . Also developed changes in her voice , its becoming more and more hoarse now . Also reported pain in her left shoulder , which is improved now . She has been coming for wkly CBC , since she started treatment , counts have been stable . Had a detailed discussion with Nidhi, her symptoms are likely from treatment with lenvima , she should have escalated the dose from 10 mg to 20 mg , if it was well tolerated , since she started taking 20 mg daily ,  that’s why she has severe adverse effects. She is recommended to hold off on treatment for 4 days and then restart it at 10 mg daily . She demonstrated understanding . Will proceed with keytruda today . \par \par 10/6/20:  Patient here for follow up visit for stage IV endometrial cancer.  She is taking lower dose of Lenvima, 10 mg.  She is complaining of hoarseness and mouth soreness over the last couple of weeks.  She also has left arm and shoulder weakness.  Although she admits she feels somewhat better on lower dose of Lenvima.  She is very fatigued and does very little activity during the day and is losing weight.\par 10/20/2020: NIDHI is here for follow up visit for endometrial cancer. She was on dose reduced Lenvima co currently with Keytruda. She developed mouth sores and complications from Lenvima. She states that she is feeling well today. We contemplated starting on Doxil but she states she is feeling better after stopping Lenvima. Repeat PET from 10/16/2020 showed:  \par IMPRESSION:\par 1.  Since July 29, 2020, increased FDG uptake in multiple peritoneal nodules/tumor implants (max SUV 12.9 in a left upper quadrant nodule, reflecting 63% increase).\par 2.  Resolved FDG avid left cervical lymph nodes.\par 3.  No definite new sites of abnormal FDG uptake.\par 11/17/2020: Nidhi is feeling great, reports no complaints at all, she has gained 2 pounds, reports good appetite. CBC was reviewed. She will continue with Keytruda single agent, she had significant difficulty tolerating Lenvima even at doses lower than recommended. We will defer mammogram for now. \par 12/8/2020: Nidhi is here for a follow up visit, she reports feeling well. She offers no complaints at all today, she has gained 2 pounds, reports good appetite. CBC was reviewed. She will continue with Keytruda single agent.\par We reviewed MRI of the Brain 12/2/20:\par 1. Redemonstrated multiple enhancing lesions consistent with metastatic disease. Since the prior exam dated 7/2/2020\par 2. Slightly increased size of the right parietal lesion measuring 4.5 mm, previous\par \par 12/29/2020\par Nidhi is here for a follow up visit , she reports feeling tired , generalized weakness, she experienced episode of change of Mental status this week, also she lost 9 pounds from last visit. Patient is schedule for MRI of the Brain in 12/30/20. Also she reports mild pruritic Rash i\par We will Hold Immunotherapy for today , Reassess post MRI of the Brain. \par \par 1/6/21\par Patient here for metastatic endometrial cancer.  She remains weak, is complaining of symptoms of mucositis.  She is taking Decadron BID which is helping she says.  Her appetite has been better.  Patient denies cough, shortness of breath, fever, chills, night sweats or bone pain.  She denies any headache or dizziness. \par \par 2/2/2021: Nidhi presents for follow up today, she reports feeling weak, reports mouth discomfort. She has been taking Decadron at 4mg BID, at this point we will taper is first to 6mg daily for 3 days then to 4mg in AM for another week. We will also prescribe Nystatin swish for thrush. \par PET CT on 1/26/2021 revealed \par Since October 15, 2020:\par 1. New and increased FDG-avid peritoneal nodules/tumor implants, catalogued above, with max SUV 13.2 within new midline peritoneal 1.9 x 1.3 cm nodule.\par 2. Previously described left upper quadrant 1 cm nodule with max SUV 7.5, previously 12.9 (42% decrease).\par 3. No additional sites of pathologic FDG uptake. \par MRI brain on 12/30/2020 revealed \par Stable to slightly more prominent multiple intracranial metastasis as above.\par No acute territorial infarct, intracranial hemorrhage, hydrocephalus, or extra-axial fluid collection.\par Images were personally reviewed by MD Jose Roberto and discussed with patient and family.\par After reviewing images given minimal areas of progression while of Decadron Nidhi wishes to stay on Keytruda and try to taper of Decadron. Avastin has been added top the regiment. I have explained to Nidhi that progression is noted and I would like to offer to switch in therapy. \par We will continue with Keytruda/Avastin with plan for short term follow up scan. \par Taper Decadron to 6mg daily with plan to taper to 4 mg daily in 3-5 days if tolerated. \par

## 2021-02-03 NOTE — REVIEW OF SYSTEMS
[Fatigue] : fatigue [Mucosal Pain] : mucosal pain [Negative] : Allergic/Immunologic [Recent Change In Weight] : ~T no recent weight change [Dysphagia] : no dysphagia [Nosebleeds] : no nosebleeds [Chest Pain] : no chest pain [Palpitations] : no palpitations [Lower Ext Edema] : no lower extremity edema [Shortness Of Breath] : no shortness of breath [Wheezing] : no wheezing [Cough] : no cough [SOB on Exertion] : no shortness of breath during exertion [Easy Bleeding] : no tendency for easy bleeding [FreeTextEntry2] : hoarse

## 2021-02-03 NOTE — ASSESSMENT
[FreeTextEntry1] : Papillary- serous endometrial cancer, stage IV\par --PET CT on 11/29/2017 revealed no clear GYN origin, extensive KEVIN, uptake in the right lung and pleura, thyroid nodule\par --Malignant pleural effusion, resolved\par --L side PleurX catheter was removed on 2/8/2018\par --Completed cycle 8  of carbo/taxol on 6/29/2018.  2 cycles were administered  postoperatively.\par --Restaging PET CT on 9/26/2018 (3 months post completion of chemo 6/29/2018)was essentially negative for recurrent disease\par --Restaging PET CT on 12/18/2018 reveals   New left upper quadrant peritoneal nodule measuring 8 mm with an SUV max  of 7.3. This is suggestive of biologic tumor activity.  No other FDG avid lesions seen throughout the scan.\par --Restaging PET CT on 2/12/2019 revealed slight increase in size of left upper quadrant peritoneal nodule (1.1 cm, \par previously 0.8 cm) with stable FDG uptake, 7.7 SUV suspicious for biologic tumor activity.\par --Case was discussed with Dr. Massey at [place], biopsy of peritoneal nodule is positive for recurrent endometrial cancer.\par --MRI brain on 4/23/2019 revealed multiple brain mets\par --S/p whole brain radiation completed 4/2019, required acute rehab\par --PET CT on 5/1/2019 subsequently revealed further disease progression \par --Restarted weekly carbo/taxol 3 on 1 off on 5/9/2019, continue with weekly Carbo/Taxol for now.\par --Off Decadron and Keppra since 6/5/2019 \par --Restaging brain MRI on 6/8/2019 showed almost complete resolution of most supratentorial lesions and no new lesions.\par --Restaging CT C/A/P on 7/9/2019 did not reveal definite progression, there was a questionable filling defect suspicious for possible chronic PE/artifact, this was discussed with Radiology at length anticoagulation was not deemed to be necessary\par --PET CT on 7/24/2019 revealed positive response to therapy \par --Carbo/Taxol stopped, last dose 9/13/2019, on hold since, she remains platinum sensitive \par --Restaging PET CT on 10/18/2019 is NAD\par --Restaging MRI of the brain on 10/15/2019 is suggestive for possible disease progression, no new lesions, she remains asymptomatic and off steroids, will follow closely \par --She was referred to [facility] for brain SRS consideration, close observation for now \par --Started on Avastin maintenance 11/5/2019, will consider adding Eliquis ppx for DVT/ PE, for now she will take baby ASA every other day  \par --We sent for prednisone 10 mg po if needed for additional skin issues\par --Patient well aware to contact us if any side effect developed\par --MRI of the brain 1/15/2020 reveals overall good response to Avastin\par --Restaging PET CT on 1/22/2020 reveals single small perisplenic area of activity, suspicious for disease progression, asymptomatic \par --Proceed with cycle 10 of Avastin on 5/12/2020 after weighing risks/benefits with plan for short term follow up imaging \par -- Reviewed  MRI of brain on 7/5/20  showed stable metastases; no new enhancing lesions are identified.\par -- Restaging PET CT on 4/30/2020 reveals minimal progression in the abdomen and pelvis\par -- Other options discussed is switching to Keytruda/Lenvima combination, this is a consideration in the future \par -- We will c/w with Avastin for now since it is working in the brain and she has a minimal progression in abdomen. \par --PET CT from 7/29/2020 reveals additional evidence of progression, minimal and not symptomatic\par --Last dose of Avastin on 8/4/2020\par -- Was started on Lenvima/Keytruda 8/25/2020\par --Unable to tolerate 20 mg daily, recommended to hold off on treatment for 4 days , and then restart at 10 mg daily after 4 days if feeling fine.\par --She has help Lenvima for two weeks since 10.6.2020 She states that her s/e have greatly improved and feels well. We will stop Lenvima \par -- PET CT on 10/14/2020 revealed mixed response with no new sites of disease \par -- We discussed treatment options including changing to Doxil or proceeding with single Keytruda at this time. \par -- She will continue with Keytruda every three weeks. Reimaging study in two months \par --CBC reviewed, WNL TSH CMP\par -- Arthritic changes noted to hands- possibly secondary to immunotherapy- will continue to observe \par --we reviewed  MRI brain ordered with mild progressing in one of the brain lesion. \par --On 2/2/2021, Avastin was added to Keytruda in the setting of CNS progression, continue with Keytruda/Avastin with short term follow up with plan to switch therapy if progression is sustained\par --Taper Decadron \par --Add Nystatin mouthwash and magic mouthwash \par \par Papillary thyroid cancer [id number], 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 Herrera \par --Follow up with Dr. Acevedo of endocrinology; he is addressing vitamin D, thyroid and diabetes\par \par Cataract of eye\par -- S/p successful surgery on 9/30/19 \par \par Follow up in 3 weeks\par \par \par

## 2021-02-16 NOTE — H&P ADULT - ATTENDING COMMENTS
66 YO F with a PMH of peripheral neuropathy, metastatic papillary thyroid CA and stage IV uterine CA s/p ELEONORA and thyroidectomy who presents to the hospital with a c/o generalized weakness for the past x 1 week. Associated with decreased PO intake and pain in her B/L hands/feet. Denies any fevers/chills, N/V/D, ABD pain, or rashes. ROS positive for daily hematochezia. Of note, pt had out-pt MRI in December which showed brain mets and a PET scan showed peritoneal nodules.     In the ED, seen by Heme/Onc. They plan to have palliative care see the pt for possible hospice.     Physical exam shows pt in NAD. VSS, afebrile, not hypoxic on RA. A&Ox3. Non-focal neuro exam. Muscle strength/sensation intact. CTA B/L with no W/C/R. RRR, no M/G/R. ABD is soft and non-tender, normoactive BSs. LEs without swelling. No rashes. Labs and radiology as above.     Generalized weakness and failure to thrive from metastatic papillary thyroid CA and stage IV uterine CA s/p ELEONORA and thyroidectomy Heme/Onc is following. Palliative consult. Nutrition eval. IVFs. PRN pain meds. Send B12/Folate. Keppra. Repeat MRI, IV steroids.   -If pt does not go into hospice, Neurosurgery eval for brain mets.     BRBPR. HD stable. Hgb is stable. Active T&S. GI consult if Hgb drops.     Hypoalbuminemia, from poor oral intake. Nutrition eval.    Hx of peripheral neuropathy. Restart home meds, except as stated above. DVT PPX. Inform PCP of pt's admission to hospital. My note supersedes the residents note.

## 2021-02-16 NOTE — ED PROVIDER NOTE - NS ED ROS FT
Eyes:  No visual changes, eye pain or discharge.  ENMT:  No hearing changes, pain, no sore throat or runny nose, no difficulty swallowing  Cardiac:  No chest pain, SOB or edema. No chest pain with exertion.  Respiratory:  No cough or respiratory distress. No hemoptysis. No history of asthma or RAD.  GI:  No nausea, vomiting, diarrhea or abdominal pain.  :  No dysuria, frequency or burning.  MS:  No myalgia, muscle weakness, joint pain or back pain.  Neuro: +weakness  Skin:  No skin rash.   Endocrine: No history of thyroid disease or diabetes.

## 2021-02-16 NOTE — PATIENT PROFILE ADULT - ARRIVAL FROM
Home Xenograft Text: The defect edges were debeveled with a #15 scalpel blade.  Given the location of the defect, shape of the defect and the proximity to free margins a xenograft was deemed most appropriate.  The graft was then trimmed to fit the size of the defect.  The graft was then placed in the primary defect and oriented appropriately.

## 2021-02-16 NOTE — ED PROVIDER NOTE - PHYSICAL EXAMINATION
CONSTITUTIONAL: Well-developed; well-nourished; in no acute distress.   SKIN: warm, dry, scattered petechie to upper extremity  HEAD: Normocephalic; atraumatic.  EYES: no conjunctival injection. PERRL.   ENT: No nasal discharge; airway clear.  NECK: Supple; non tender.  CARD: S1, S2 normal; no murmurs, gallops, or rubs. Regular rate and rhythm.   RESP: No wheezes, rales or rhonchi.  ABD: soft ntnd  EXT: Normal ROM.  No clubbing, cyanosis or edema to lower extremity. +swelling to b/l wrists nontender  LYMPH: No acute cervical adenopathy.  NEURO: Alert, oriented, grossly unremarkable  PSYCH: Cooperative, appropriate.

## 2021-02-16 NOTE — ED ADULT TRIAGE NOTE - CHIEF COMPLAINT QUOTE
As per [facility] worker, pt sent by [facility] to admitted. Pt h/o stage IV uterine cancer. Pt c/o failure to thrive. As per [facility] worker, pt sent by [facility] to be admitted. Pt h/o stage IV uterine cancer. As per daughter and aid, pt c/o failure to thrive.

## 2021-02-16 NOTE — CONSULT NOTE ADULT - SUBJECTIVE AND OBJECTIVE BOX
Patient is a 65y old  Female who presents with a chief complaint of Failure to thrive (16 Feb 2021 15:17)      HPI:  65 y F pmh peripheral neuropathy, papillary thyroid cancer and stage IV uterine cancer, s/p ELEONORA in April 2018 and thyroidectomy in August 2018, heme/onc is sokoloff, metastatic papillary thyroid cancer presenting with weakness x1 week. Pt complaining about swelling to the b/l hands/wrists and b/l feet. Pt says weakness has caused her to not be able to walk for the last few days. No f/c/n/v. No diarrhea. No chest pain/SOB. No abdominal pain. Pt sent by [facility] to be admitted. Of note, recent MRI in December showed brain metastasis, PET scan showed peritoneal nodules.    In the ED: VSS, labs significant for mild hyponatremia 132. 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 She was originally diagnosed in 2017 after excisional biopsy of cervical node on 12/13/2017 showed met adenocarcinoma, primary source still was not clear. On 12/14/2017 Serena was admitted with SOB, Pleurx catheter was placed on 12/14/2017. Transvaginal sono was done on 12/14/2017 revealing thickened endometrium, endometrial biopsy on 12/19/2017 favored papillary-serous endometrial carcinoma. She received 6 cycles of carbo/taxol and then underwent surgery with Dr. Massey in May 2018. She then completed 2 additional cycles of Carbo/Taxol in June 2018 that were given postoperatively. She had PET scan in September 2018 which was negative for diease but PET in 12/2018 showed new left upper quadrant peritoneal nodule that was 8mm in size that was FDG avid. It increased in size on PET in February 2019 and was biopsied which was positive for recurrent endometrial cancer. She then had MRI in April 2019 which showed brain mets. She completed WBRT in April 2019. PET in May 2019 showed disease progression and she was restarted on weekly carbo/taxol which was stopped in September 2019 as she had positive response to therapy with no evidnce of disease progression in July 2019. Eventually she had MRI brain in 10/2019 which showed disease progression and was started on Avastin maintenance therapy in 11/2019 of which she completed 10 cycles by May 2020. PET scan in April 2020 showed some minimal disease progression and she was started on Keytruda/Lenvima but she was unable to tolerate Lenvima. She was then continued on single agent Keytruda. Eventually,  2/2/2021, Avastin was added to Keytruda in the setting of CNS progression    She presented to ED with generalized weakness and failure to thrive. She also endorses BRBPR for the last two weeks.        ROS:  Negative except for:    PAST MEDICAL & SURGICAL HISTORY:  Thyroid cancer    Peripheral neuropathy    Pleural effusion  11/17    Uterine cancer    History of total abdominal hysterectomy  4/2018    S/P thoracentesis  12/17    H/O lymph node excision  LEFT NECK 12/17        SOCIAL HISTORY:    FAMILY HISTORY:  CVA (cerebral vascular accident) (Father)    CAD (coronary artery disease) (Mother)        MEDICATIONS  (STANDING):  calcium carbonate 1250 mG  + Vitamin D (OsCal 500 + D) 1 Tablet(s) Oral daily  cholecalciferol Oral Tab/Cap - Peds 1000 Unit(s) Oral every 24 hours  cyanocobalamin 1000 MICROGram(s) Oral daily  dexAMETHasone     Tablet 4 milliGRAM(s) Oral every 6 hours  dexAMETHasone  IVPB 10 milliGRAM(s) IV Intermittent once  levETIRAcetam 1000 milliGRAM(s) Oral two times a day  levothyroxine 112 MICROGram(s) Oral daily  melatonin 1 milliGRAM(s) Oral at bedtime  pantoprazole    Tablet 40 milliGRAM(s) Oral before breakfast  senna 2 Tablet(s) Oral at bedtime  sodium chloride 0.9%. 1000 milliLiter(s) (60 mL/Hr) IV Continuous <Continuous>  vitamin A &amp; D Ointment 1 Application(s) Topical three times a day    MEDICATIONS  (PRN):    Height (cm): 165.1 (02-16-21 @ 12:03)  Weight (kg): 59 (02-16-21 @ 12:03)  BMI (kg/m2): 21.6 (02-16-21 @ 12:03)  BSA (m2): 1.65 (02-16-21 @ 12:03)  Allergies    adhesives (Rash)  No Known Drug Allergies    Intolerances        Vital Signs Last 24 Hrs  T(C): 36.7 (16 Feb 2021 15:12), Max: 37.1 (16 Feb 2021 12:03)  T(F): 98.1 (16 Feb 2021 15:12), Max: 98.7 (16 Feb 2021 12:03)  HR: 87 (16 Feb 2021 15:12) (87 - 94)  BP: 126/64 (16 Feb 2021 15:12) (118/74 - 126/64)  BP(mean): --  RR: 18 (16 Feb 2021 15:12) (18 - 18)  SpO2: 98% (16 Feb 2021 15:12) (98% - 98%)    PHYSICAL EXAM  General: adult in NAD  HEENT: anicteric sclera, pink conjunctiva  Neck: supple  CV: normal S1/S2 with no murmur rubs or gallops  Lungs: CTABL  Abdomen: soft non-tender non-distended   Ext: no edema  Skin: no rashes   Neuro: alert and oriented X 4, no focal deficits      LABS:                          12.6   9.60  )-----------( 150      ( 16 Feb 2021 14:05 )             35.3         Mean Cell Volume : 90.1 fL  Mean Cell Hemoglobin : 32.1 pg  Mean Cell Hemoglobin Concentration : 35.7 g/dL  Auto Neutrophil # : 7.65 K/uL  Auto Lymphocyte # : 0.34 K/uL  Auto Monocyte # : 1.02 K/uL  Auto Eosinophil # : 0.00 K/uL  Auto Basophil # : 0.00 K/uL  Auto Neutrophil % : 79.7 %  Auto Lymphocyte % : 3.5 %  Auto Monocyte % : 10.6 %  Auto Eosinophil % : 0.0 %  Auto Basophil % : 0.0 %      Serial CBC's  02-16 @ 14:05  Hct-35.3 / Hgb-12.6 / Plat-150 / RBC-3.92 / WBC-9.60      02-16    132<L>  |  93<L>  |  16  ----------------------------<  101<H>  4.3   |  30  |  0.5<L>    Ca    8.6      16 Feb 2021 14:05  Phos  3.4     02-16    TPro  5.4<L>  /  Alb  3.3<L>  /  TBili  0.6  /  DBili  x   /  AST  14  /  ALT  46<H>  /  AlkPhos  88  02-16      BLOOD SMEAR INTERPRETATION:       RADIOLOGY & ADDITIONAL STUDIES:

## 2021-02-16 NOTE — ED ADULT NURSE NOTE - OBJECTIVE STATEMENT
Pt sent by [facility] to be admitted. Pt h/o stage IV uterine cancer. As per daughter and aid, pt c/o failure to thrive

## 2021-02-16 NOTE — ED ADULT NURSE NOTE - NSIMPLEMENTINTERV_GEN_ALL_ED
Implemented All Fall with Harm Risk Interventions:  [place] to call system. Call bell, personal items and telephone within reach. Instruct patient to call for assistance. Room bathroom lighting operational. Non-slip footwear when patient is off stretcher. Physically safe environment: no spills, clutter or unnecessary equipment. Stretcher in lowest position, wheels locked, appropriate side rails in place. Provide visual cue, wrist band, yellow gown, etc. Monitor gait and stability. Monitor for mental status changes and reorient to person, place, and time. Review medications for side effects contributing to fall risk. Reinforce activity limits and safety measures with patient and family. Provide visual clues: red socks.

## 2021-02-16 NOTE — H&P ADULT - NSHPREVIEWOFSYSTEMS_GEN_ALL_CORE
CONSTITUTIONAL: Weak, ill-appearing  EYES/ENT: No visual changes;  No vertigo or throat pain   NECK: No pain or stiffness  RESPIRATORY: No cough, wheezing, hemoptysis; No shortness of breath  CARDIOVASCULAR: No chest pain or palpitations  GASTROINTESTINAL: No abdominal or epigastric pain. No nausea, vomiting, or hematemesis; No diarrhea or constipation. No melena or hematochezia.  GENITOURINARY: No dysuria, frequency or hematuria  NEUROLOGICAL: No numbness or weakness  All other review of systems is negative unless indicated above. CONSTITUTIONAL: Fatigue, decrease appetite, weight loss  EYES/ENT: No visual changes;  No vertigo or throat pain   NECK: No pain or stiffness  RESPIRATORY: No cough, wheezing, hemoptysis; No shortness of breath  CARDIOVASCULAR: No chest pain or palpitations  GASTROINTESTINAL: lower GI bleeding, constipation.  GENITOURINARY: No dysuria, frequency or hematuria  NEUROLOGICAL: Loss of balance  All other review of systems is negative unless indicated above.

## 2021-02-16 NOTE — H&P ADULT - ASSESSMENT
61 yo F with pmh of papillary thyroid cancer s/p resection stage IV endometrial cancer s/p 8 cycles of carboplatin and taxol, sent in by her oncologist due to progressive R sided weakness, found to have new lesions in the brain    #Stage IV uterine CA s/p hysterectomy b/l salpingoopherectomy  #Papillary thyroid CA s/p thyroidectomy  #Brain mets  #Failure to thrive  - MRI 12/2020 showed multiple brain mets, increasing in size, no acute infarct  - Gentle IV hydration  - Dronabinol  - Nutrition consult  - c/w Keppra 1g PO BID   - switched decadron 6 mgPO q12h  - c/w levothyroxine  - f/u TSH, free t3, t4      DVT ppx SCDs  Diet: DASH  Activity: OOBTC  FULL CODE   61 yo F with pmh of papillary thyroid cancer s/p resection stage IV endometrial cancer s/p 8 cycles of carboplatin and taxol, sent in by her oncologist due to progressive R sided weakness, found to have new lesions in the brain    #Stage IV uterine CA s/p hysterectomy b/l salpingoopherectomy  #Papillary thyroid CA s/p thyroidectomy  #Brain mets  #Failure to thrive  #Loss of balance  - On Avastin, last chemo February 2nd  - MRI 12/2020 showed multiple brain mets, increasing in size, no acute infarct  - Loss of balance, memory loss x 2 weeks  - Repeat MRI with IV con ordered  - Gentle IV hydration  - Consider Dronabinol  - Nutrition consult  - c/w Keppra 1g PO BID   - c/w levothyroxine  - f/u TSH, free t3, t4  - f/u heme onc    #Hematochezia  - Daily PRBPR , with constipation  - Bowel regimen  - Hb stable for now, monitor  - COnsider GI eval if drop in HB  - Keep T&S active    #FInger swelling  - f/u xray b/l hand    #Itch      DVT ppx SCDs  Diet: DASH  Activity: OOBTC  FULL CODE   61 yo F with pmh of papillary thyroid cancer s/p resection stage IV endometrial cancer s/p 8 cycles of carboplatin and taxol, sent in by her oncologist due to progressive R sided weakness, found to have new lesions in the brain    #Stage IV uterine CA s/p hysterectomy b/l salpingoopherectomy  #Papillary thyroid CA s/p thyroidectomy  #Brain mets  #Failure to thrive  #Loss of balance  - On Avastin, last chemo February 2nd  - MRI 12/2020 showed multiple brain mets, increasing in size, no acute infarct  - Loss of balance, memory loss x 2 weeks  - Repeat MRI with IV con ordered  - Gentle IV hydration  - Consider Dronabinol  - Nutrition consult  - c/w Keppra 1g PO BID   - c/w levothyroxine  - f/u TSH, free t3, t4  - f/u heme onc    #Hematochezia  - Daily PRBPR , with constipation  - Bowel regimen  - Hb stable for now, monitor  - COnsider GI eval if drop in HB  - Keep T&S active    #FInger swelling  - f/u xray b/l hand    #Itching, dry skin  - Non-obstructive LFT pattern   - Possibly chemo induced  - Vit A&D ointment      DVT ppx SCDs  Diet: DASH  Activity: OOBTC  FULL CODE   63 yo F with pmh of papillary thyroid cancer s/p resection stage IV endometrial cancer s/p 8 cycles of carboplatin and taxol, sent in by her oncologist due to progressive R sided weakness, found to have new lesions in the brain    #Stage IV uterine CA s/p hysterectomy b/l salpingoopherectomy  #Papillary thyroid CA s/p thyroidectomy  #Brain mets  #Failure to thrive  #Loss of balance  - On Avastin, last chemo February 2nd  - MRI 12/2020 showed multiple brain mets, increasing in size, no acute infarct  - Loss of balance, memory loss x 2 weeks  - Repeat MRI with IV con ordered  - Gentle IV hydration  - Consider Dronabinol  - Nutrition consult  - c/w Keppra 1g PO BID   -Started dexamethasone, 10mg IV one dose now, then 4mg q6 oral  -Can D/C if MRI Stable  - c/w levothyroxine  - f/u TSH, free t3, t4  - f/u heme onc    #Hematochezia  - Daily PRBPR , with constipation  - Bowel regimen  - Hb stable for now, monitor  - COnsider GI eval if drop in HB  - Keep T&S active    #FInger swelling  - f/u xray b/l hand    #Itching, dry skin  - Non-obstructive LFT pattern   - Possibly chemo induced  - Vit A&D ointment      DVT ppx SCDs  Diet: DASH  Activity: OOBTC  FULL CODE

## 2021-02-16 NOTE — ED PROVIDER NOTE - CLINICAL SUMMARY MEDICAL DECISION MAKING FREE TEXT BOX
64 Y/O F PMHX AS DOCUMENTED WITH GENERALIZED WEAKNESS AND INABILITY TO AMBULATE. ALL DIAGNOSTIC TESTING REVIEWED. PT WITH FAILURE TO THIRVE. HEME/ONC CONSULTED. PT ADMITTED TO MEDICINE.

## 2021-02-16 NOTE — H&P ADULT - NSHPPHYSICALEXAM_GEN_ALL_CORE
PHYSICAL EXAM:  GENERAL: Weak, ill-appearing  HEAD:  Atraumatic, Normocephalic  EYES: EOMI, PERRLA, conjunctiva and sclera clear  ENT: Moist mucous membranes  NECK: Supple, No JVD  CHEST/LUNG: Clear to auscultation bilaterally; No rales, rhonchi, wheezing, or rubs. Unlabored respirations  HEART: Regular rate and rhythm; No murmurs, rubs, or gallops  ABDOMEN: Bowel sounds present; Soft, Nontender, Nondistended. No hepatomegally  EXTREMITIES:  2+ Peripheral Pulses, brisk capillary refill. No clubbing, cyanosis, or edema  NERVOUS SYSTEM:  Alert & Oriented X3, speech clear. No deficits   MSK: FROM all 4 extremities, full and equal strength  SKIN: No rashes or lesions PHYSICAL EXAM:  GENERAL: Weak, ill-appearing  HEAD:  Atraumatic, Normocephalic  EYES: EOMI, PERRLA, conjunctiva and sclera clear  ENT: Moist mucous membranes  NECK: Supple, No JVD  CHEST/LUNG: Clear to auscultation bilaterally; No rales, rhonchi, wheezing, or rubs. Unlabored respirations  HEART: Regular rate and rhythm; No murmurs, rubs, or gallops  ABDOMEN: Bowel sounds present; Soft, Nontender, Nondistended. No hepatomegally  EXTREMITIES:  swollen finger joints  NERVOUS SYSTEM:  Alert & Oriented X3  SKIN: No rashes or lesions

## 2021-02-16 NOTE — PATIENT PROFILE ADULT - NSPROGENSOURCEINFO_GEN_A_NUR
pt not ingesting cinacalcet  iv bisphosphonate pamidronate 60mg iv infusion  cont to monitor calcium levels patient

## 2021-02-16 NOTE — PATIENT PROFILE ADULT - VISION (WITH CORRECTIVE LENSES IF THE PATIENT USUALLY WEARS THEM):
Normal vision: sees adequately in most situations; can see medication labels, newsprint Wound Care to Follow up

## 2021-02-16 NOTE — ED PROVIDER NOTE - IV ALTEPASE ADMIN HIDDEN
Pt at [facility] for CT scan. Nurse unable to get blood return from picc line, flushes slow. Unable to tolerated PIV start. Pt will be rescheduled for Monday afternoon. Requested Prednisone be sent to pharmacy again for premedication due to allergy.  Appointment show

## 2021-02-16 NOTE — CONSULT NOTE ADULT - ASSESSMENT
Patient is a 65 y F with PMHx peripheral neuropathy, papillary thyroid cancer s/p thyroidectomy and stage IV Papillary Serous Carcinoma s/p ELEONORA in April 2018 presenting with generalized weakness.     ASSESSMENT  #) Stage IV Papillary Serous Endometrial Carcinoma   #) Bright Red Blood per Rectum   #) Hx of Papillary Thyroid Cancer     PLAN  - Patient is on Avastin/Keytruda, next dose is not due until 2/23/21   - She will need GI evaluation for new onset BRBPR  - She will likely need PT evaluation as she complains of weakness  - Please obtain MRI of brain w/wout contrast to reassess metastatic brain lesions   - Papillary thyroid cancer is being followed by ENT and Endocrine  Patient is a 65 y F with PMHx peripheral neuropathy, papillary thyroid cancer s/p thyroidectomy and stage IV Papillary Serous Carcinoma s/p ELEONORA in April 2018 presenting with generalized weakness.     ASSESSMENT  #) Stage IV Papillary Serous Endometrial Carcinoma   #) Bright Red Blood per Rectum   #) Hx of Papillary Thyroid Cancer     PLAN  - Patient is on Avastin/Keytruda, next dose is not due until 2/23/21   - She will need GI evaluation for new onset BRBPR  - She will likely need PT evaluation as she complains of weakness  - Please obtain MRI of brain w/wout contrast to reassess metastatic brain lesions   - Papillary thyroid cancer is being followed by ENT and Endocrine  - Palliative care evaluation would be appreciated

## 2021-02-16 NOTE — ED PROVIDER NOTE - PROGRESS NOTE DETAILS
Placed consult for ketan (heme/onc) -[place] Spoke with Dr. Cid who says pt has endometrial cancer with widespread mets. Pt failure to thrive for some time now, worsening. Increased weakness and fatigue. Decrease in appetite, not getting out of bed/walking. Jose Roberto says she already spoke to palliative care and says she will get hospice on board once pt is admitted -[place]

## 2021-02-16 NOTE — CONSULT NOTE ADULT - ATTENDING COMMENTS
Serena reports significant decline is PFS over the past few weeks. Please restage with MRI brain with and without contrast.   Continue Dex at 2 mg BID for now.   Swab for COVID.   Patient reports yu GIB, consider GI consult.   Will follow closely.

## 2021-02-16 NOTE — ED PROVIDER NOTE - ATTENDING CONTRIBUTION TO CARE
I personally evaluated the patient. I reviewed the Resident’s or Physician Assistant’s note (as assigned above), and agree with the findings and plan except as documented in my note.   66 Y/O F H/O THYROID CA, STAGE 4 UTERINE CA (S/P ELEONORA, RT, CHEMO) WITH BRAIN METS (S/P RT), PERIPHERAL NEUROPATHTY SENT TO ED FROM [facility] FOR ADMISSION FOR FAILURE TO THRIVE. I personally evaluated the patient. I reviewed the Resident’s or Physician Assistant’s note (as assigned above), and agree with the findings and plan except as documented in my note.   66 Y/O F H/O THYROID CA, STAGE 4 UTERINE CA (S/P ELEONORA, RT, CHEMO) WITH BRAIN METS (S/P RT), PERIPHERAL NEUROPATHTY SENT TO ED FROM [facility] FOR ADMISSION FOR FAILURE TO THRIVE. PT WITH ANOREXIA AND POOR PO INTAKE. PT WITH WEAKNESS AND INABILITY TO AMBULATE. NO FEVER, CHILLS. NO COUGH, SOB, CP. NO ABD PAIN, N/V/D. VITALS NOTED. ALERT OX3 NAD WELL APPEARING. NCAT PERRL. EOMI. OP NORMAL. MMM. NECK SUPPLE. LUNGS CLEAR B/L. RRR. S1S2. ABD- SOFT NONTENDER. CN 2-12 INTACT. NEURO EXAM NONFOCAL.

## 2021-02-16 NOTE — ED ADULT NURSE NOTE - CHIEF COMPLAINT QUOTE
As per [employer] worker, pt sent by [facility] to be admitted. Pt h/o stage IV uterine cancer. As per daughter and aid, pt c/o failure to thrive.

## 2021-02-16 NOTE — H&P ADULT - HISTORY OF PRESENT ILLNESS
65 y F pmh peripheral neuropathy, papillary thyroid cancer and stage IV uterine cancer, s/p ELEONORA in April 2018 and thyroidectomy in August 2018, heme/onc is lonnieff, metastatic papillary thyroid cancer presenting with weakness x1 week. Pt complaining about swelling to the b/l hands/wrists and b/l feet. Pt says weakness has caused her to not be able to walk for the last few days. No f/c/n/v. No diarrhea. No chest pain/SOB. No abdominal pain. Pt sent by [facility] to be admitted. Of note, recent MRI in December showed brain metastasis, PET scan showed peritoneal nodules.    In the ED: VSS, labs significant for mild hyponatremia 132.

## 2021-02-17 NOTE — CONSULT NOTE ADULT - SUBJECTIVE AND OBJECTIVE BOX
Gastroenterology Consultation:    Patient is a 65y old  Female who presents with a chief complaint of Failure to thrive (16 Feb 2021 17:00)      Admitted on: 02-16-21  HPI:  65 y F pmh peripheral neuropathy, papillary thyroid cancer and stage IV uterine cancer, s/p ELEONORA in April 2018 and thyroidectomy in August 2018, heme/onc is joanaloff, metastatic papillary thyroid cancer presenting with weakness x1 week. Pt complaining about swelling to the b/l hands/wrists and b/l feet. Pt says weakness has caused her to not be able to walk for the last few days. No f/c/n/v. No diarrhea. No chest pain/SOB. No abdominal pain. Pt sent by [facility] to be admitted. Of note, recent MRI in December showed brain metastasis, PET scan showed peritoneal nodules.    In the ED: VSS, labs significant for mild hyponatremia 132.     (16 Feb 2021 15:17)      Prior records Reviewed (Y/N):  History obtained from person other than patient (Y/N):    Prior EGD:  Prior Colonoscopy:      PAST MEDICAL & SURGICAL HISTORY:  Thyroid cancer    Peripheral neuropathy    Pleural effusion  11/17    Uterine cancer    History of total abdominal hysterectomy  4/2018    S/P thoracentesis  12/17    H/O lymph node excision  LEFT NECK 12/17        FAMILY HISTORY:  CVA (cerebral vascular accident) (Father)    CAD (coronary artery disease) (Mother)        Social History:  Tobacco:  Alcohol:  Drugs:    Home Medications:  acetaminophen 325 mg oral tablet: 2 tab(s) orally every 4 hours, As needed, for pain or fever (30 Sep 2019 12:48)  aluminum hydroxide-magnesium hydroxide 200 mg-200 mg/5 mL oral suspension: 30 milliliter(s) orally every 6 hours, As needed, Dyspepsia (30 Sep 2019 12:48)  Calcium 600+D 600 mg-200 intl units oral tablet: 1 tab(s) orally 3 times a day (30 Sep 2019 12:48)  cholecalciferol oral tablet: 1000 unit(s) orally every 24 hours (30 Sep 2019 12:48)  cyanocobalamin 1000 mcg oral tablet: 1 tab(s) orally once a day (30 Sep 2019 12:48)  hydrocortisone 1% topical cream: 1 application topically every 12 hours, As needed, Rash and/or Itching (30 Sep 2019 12:48)  melatonin 5 mg oral tablet: 1 tab(s) orally once a day (at bedtime), As needed, Sleep (30 Sep 2019 12:48)  polyethylene glycol 3350 oral powder for reconstitution: 17 gram(s) orally once a day (30 Sep 2019 12:48)  senna oral tablet: 2 tab(s) orally once a day (at bedtime), As Needed, for constipation (30 Sep 2019 12:48)    MEDICATIONS  (STANDING):  calcium carbonate 1250 mG  + Vitamin D (OsCal 500 + D) 1 Tablet(s) Oral daily  cholecalciferol Oral Tab/Cap - Peds 1000 Unit(s) Oral every 24 hours  cyanocobalamin 1000 MICROGram(s) Oral daily  dexAMETHasone     Tablet 4 milliGRAM(s) Oral every 6 hours  influenza   Vaccine 0.5 milliLiter(s) IntraMuscular once  levETIRAcetam 1000 milliGRAM(s) Oral two times a day  levothyroxine 112 MICROGram(s) Oral daily  melatonin 1 milliGRAM(s) Oral at bedtime  pantoprazole    Tablet 40 milliGRAM(s) Oral before breakfast  senna 2 Tablet(s) Oral at bedtime  sodium chloride 0.9%. 1000 milliLiter(s) (60 mL/Hr) IV Continuous <Continuous>  vitamin A &amp; D Ointment 1 Application(s) Topical three times a day    MEDICATIONS  (PRN):      Allergies  adhesives (Rash)  No Known Drug Allergies      Review of Systems:   Constitutional:  No Fever, No Chills  ENT/Mouth:  No Hearing Changes,  No Difficulty Swallowing  Eyes:  No Eye Pain, No Vision Changes  Cardiovascular:  No Chest Pain, No Palpitations  Respiratory:  No Cough, No Dyspnea  Gastrointestinal:  As described in HPI  Musculoskeletal:  No Joint Swelling, No Back Pain  Skin:  No Skin Lesions, No Jaundice  Neuro:  No Syncope, No Dizziness  Heme/Lymph:  No Bruising, No Bleeding.          Physical Examination:  T(C): 36.3 (02-17-21 @ 05:35), Max: 37.1 (02-16-21 @ 12:03)  HR: 73 (02-17-21 @ 05:35) (73 - 94)  BP: 108/64 (02-17-21 @ 05:35) (108/64 - 138/73)  RR: 18 (02-17-21 @ 05:35) (18 - 18)  SpO2: 97% (02-16-21 @ 21:30) (97% - 98%)  Height (cm): 165.1 (02-16-21 @ 12:03)  Weight (kg): 59 (02-16-21 @ 12:03)      Constitutional: No acute distress.  Eyes:. Conjunctivae are clear, Sclera is non-icteric.  Ears Nose and Throat: The external ears are normal appearing,  Oral mucosa is pink and moist.  Respiratory:  No signs of respiratory distress. Lung sounds are clear bilaterally.  Cardiovascular:  S1 S2, Regular rate and rhythm.  GI: Abdomen is soft, symmetric, and non-tender without distention. There are no visible lesions or scars. Bowel sounds are present and normoactive in all four quadrants. No masses, hepatomegaly, or splenomegaly are noted.   Neuro: No Tremor, No involuntary movements  Skin: No rashes, No Jaundice.          Data: (reviewed by attending)                        11.7   7.37  )-----------( 139      ( 17 Feb 2021 07:00 )             34.0     Hgb Trend:  11.7  02-17-21 @ 07:00  12.6  02-16-21 @ 14:05      02-17    133<L>  |  95<L>  |  17  ----------------------------<  80  3.7   |  30  |  0.6<L>    Ca    7.9<L>      17 Feb 2021 07:00  Phos  4.3     02-17  Mg     2.0     02-17    TPro  4.8<L>  /  Alb  2.9<L>  /  TBili  0.7  /  DBili  x   /  AST  14  /  ALT  36  /  AlkPhos  77  02-17    Liver panel trend:  TBili 0.7   /   AST 14   /   ALT 36   /   AlkP 77   /   Tptn 4.8   /   Alb 2.9    /   DBili --      02-17  TBili 0.6   /   AST 14   /   ALT 46   /   AlkP 88   /   Tptn 5.4   /   Alb 3.3    /   DBili --      02-16      PT/INR - ( 17 Feb 2021 07:00 )   PT: 9.70 sec;   INR: 0.84 ratio         PTT - ( 17 Feb 2021 07:00 )  PTT:25.2 sec        Radiology:(reviewed by attending)       Gastroenterology Consultation:    Patient is a 65y old  Female who presents with a chief complaint of Failure to thrive (16 Feb 2021 17:00)      Admitted on: 02-16-21  HPI:  65 y F pmh peripheral neuropathy, papillary thyroid cancer and stage IV uterine cancer, s/p ELEONORA in April 2018 and thyroidectomy in August 2018, heme/onc is sokoloff, metastatic papillary thyroid cancer presenting with weakness x1 week. Pt complaining about swelling to the b/l hands/wrists and b/l feet. Pt says weakness has caused her to not be able to walk for the last few days. No f/c/n/v. No diarrhea. No chest pain/SOB. No abdominal pain. Pt sent by [facility] to be admitted. Of note, recent MRI in December showed brain metastasis, PET scan showed peritoneal nodules.    GI consulted for blood per rectum, the patient complains of red blood per rectum for 2 weeks now, moderate amount. and sees blood when wiping. she is having 1 BM per day, most days hard brown stool, that she has to push. witnessed at bedside, she stayed like 10 minutes, pushing, had 1 large brown BM, and the liquid is tinged with blood and had blood when wiping   denies abdominal pain, nausea or vomiting  on baby aspirin at home QOCD      Prior records Reviewed (Y/N): Y  History obtained from person other than patient (Y/N): N    Prior EGD: N  Prior Colonoscopy: 2 years ago in [place]      PAST MEDICAL & SURGICAL HISTORY:  Thyroid cancer    Peripheral neuropathy    Pleural effusion  11/17    Uterine cancer    History of total abdominal hysterectomy  4/2018    S/P thoracentesis  12/17    H/O lymph node excision  LEFT NECK 12/17        FAMILY HISTORY:  CVA (cerebral vascular accident) (Father)    CAD (coronary artery disease) (Mother)        Social History:  Tobacco: N  Alcohol: N  Drugs: N    Home Medications:  acetaminophen 325 mg oral tablet: 2 tab(s) orally every 4 hours, As needed, for pain or fever (30 Sep 2019 12:48)  aluminum hydroxide-magnesium hydroxide 200 mg-200 mg/5 mL oral suspension: 30 milliliter(s) orally every 6 hours, As needed, Dyspepsia (30 Sep 2019 12:48)  Calcium 600+D 600 mg-200 intl units oral tablet: 1 tab(s) orally 3 times a day (30 Sep 2019 12:48)  cholecalciferol oral tablet: 1000 unit(s) orally every 24 hours (30 Sep 2019 12:48)  cyanocobalamin 1000 mcg oral tablet: 1 tab(s) orally once a day (30 Sep 2019 12:48)  hydrocortisone 1% topical cream: 1 application topically every 12 hours, As needed, Rash and/or Itching (30 Sep 2019 12:48)  melatonin 5 mg oral tablet: 1 tab(s) orally once a day (at bedtime), As needed, Sleep (30 Sep 2019 12:48)  polyethylene glycol 3350 oral powder for reconstitution: 17 gram(s) orally once a day (30 Sep 2019 12:48)  senna oral tablet: 2 tab(s) orally once a day (at bedtime), As Needed, for constipation (30 Sep 2019 12:48)    MEDICATIONS  (STANDING):  calcium carbonate 1250 mG  + Vitamin D (OsCal 500 + D) 1 Tablet(s) Oral daily  cholecalciferol Oral Tab/Cap - Peds 1000 Unit(s) Oral every 24 hours  cyanocobalamin 1000 MICROGram(s) Oral daily  dexAMETHasone     Tablet 4 milliGRAM(s) Oral every 6 hours  influenza   Vaccine 0.5 milliLiter(s) IntraMuscular once  levETIRAcetam 1000 milliGRAM(s) Oral two times a day  levothyroxine 112 MICROGram(s) Oral daily  melatonin 1 milliGRAM(s) Oral at bedtime  pantoprazole    Tablet 40 milliGRAM(s) Oral before breakfast  senna 2 Tablet(s) Oral at bedtime  sodium chloride 0.9%. 1000 milliLiter(s) (60 mL/Hr) IV Continuous <Continuous>  vitamin A &amp; D Ointment 1 Application(s) Topical three times a day    MEDICATIONS  (PRN):      Allergies  adhesives (Rash)  No Known Drug Allergies      Review of Systems:   Constitutional:  No Fever, No Chills  ENT/Mouth:  No Hearing Changes,  No Difficulty Swallowing  Eyes:  No Eye Pain, No Vision Changes  Cardiovascular:  No Chest Pain, No Palpitations  Respiratory:  No Cough, +Dyspnea  Gastrointestinal:  As described in HPI  Skin:  pallor  Neuro:  weakness   Heme/Lymph:  +Bruising       Physical Examination:  T(C): 36.3 (02-17-21 @ 05:35), Max: 37.1 (02-16-21 @ 12:03)  HR: 73 (02-17-21 @ 05:35) (73 - 94)  BP: 108/64 (02-17-21 @ 05:35) (108/64 - 138/73)  RR: 18 (02-17-21 @ 05:35) (18 - 18)  SpO2: 97% (02-16-21 @ 21:30) (97% - 98%)  Height (cm): 165.1 (02-16-21 @ 12:03)  Weight (kg): 59 (02-16-21 @ 12:03)      Constitutional: No acute distress.          Data: (reviewed by attending)                        11.7   7.37  )-----------( 139      ( 17 Feb 2021 07:00 )             34.0     Hgb Trend:  11.7  02-17-21 @ 07:00  12.6  02-16-21 @ 14:05      02-17    133<L>  |  95<L>  |  17  ----------------------------<  80  3.7   |  30  |  0.6<L>    Ca    7.9<L>      17 Feb 2021 07:00  Phos  4.3     02-17  Mg     2.0     02-17    TPro  4.8<L>  /  Alb  2.9<L>  /  TBili  0.7  /  DBili  x   /  AST  14  /  ALT  36  /  AlkPhos  77  02-17    Liver panel trend:  TBili 0.7   /   AST 14   /   ALT 36   /   AlkP 77   /   Tptn 4.8   /   Alb 2.9    /   DBili --      02-17  TBili 0.6   /   AST 14   /   ALT 46   /   AlkP 88   /   Tptn 5.4   /   Alb 3.3    /   DBili --      02-16      PT/INR - ( 17 Feb 2021 07:00 )   PT: 9.70 sec;   INR: 0.84 ratio         PTT - ( 17 Feb 2021 07:00 )  PTT:25.2 sec        Radiology:(reviewed by attending)       Gastroenterology Consultation:    Patient is a 65y old  Female who presents with a chief complaint of Failure to thrive (16 Feb 2021 17:00)      Admitted on: 02-16-21  HPI:  65 y F pmh peripheral neuropathy, papillary thyroid cancer and stage IV uterine cancer, s/p ELEONORA in April 2018 and thyroidectomy in August 2018, heme/onc is sokoloff, metastatic papillary thyroid cancer presenting with weakness x1 week. Pt complaining about swelling to the b/l hands/wrists and b/l feet. Pt says weakness has caused her to not be able to walk for the last few days. No f/c/n/v. No diarrhea. No chest pain/SOB. No abdominal pain. Pt sent by [facility] to be admitted. Of note, recent MRI in December showed brain metastasis, PET scan showed peritoneal nodules.    GI consulted for blood per rectum, the patient complains of red blood per rectum for 2 weeks now, moderate amount. and sees blood when wiping. she is having 1 BM per day, most days hard brown stool, that she has to push. witnessed at bedside, she stayed like 10 minutes, pushing, had 1 large brown BM, and the liquid is tinged with blood and had blood when wiping   denies abdominal pain, nausea or vomiting  on baby aspirin at home QOCD    Prior records Reviewed (Y/N): Y  History obtained from person other than patient (Y/N): N    Prior EGD: N  Prior Colonoscopy: 2 years ago in [place]      PAST MEDICAL & SURGICAL HISTORY:  Thyroid cancer    Peripheral neuropathy    Pleural effusion  11/17    Uterine cancer    History of total abdominal hysterectomy  4/2018    S/P thoracentesis  12/17    H/O lymph node excision  LEFT NECK 12/17        FAMILY HISTORY:  CVA (cerebral vascular accident) (Father)    CAD (coronary artery disease) (Mother)        Social History:  Tobacco: N  Alcohol: N  Drugs: N    Home Medications:  acetaminophen 325 mg oral tablet: 2 tab(s) orally every 4 hours, As needed, for pain or fever (30 Sep 2019 12:48)  aluminum hydroxide-magnesium hydroxide 200 mg-200 mg/5 mL oral suspension: 30 milliliter(s) orally every 6 hours, As needed, Dyspepsia (30 Sep 2019 12:48)  Calcium 600+D 600 mg-200 intl units oral tablet: 1 tab(s) orally 3 times a day (30 Sep 2019 12:48)  cholecalciferol oral tablet: 1000 unit(s) orally every 24 hours (30 Sep 2019 12:48)  cyanocobalamin 1000 mcg oral tablet: 1 tab(s) orally once a day (30 Sep 2019 12:48)  hydrocortisone 1% topical cream: 1 application topically every 12 hours, As needed, Rash and/or Itching (30 Sep 2019 12:48)  melatonin 5 mg oral tablet: 1 tab(s) orally once a day (at bedtime), As needed, Sleep (30 Sep 2019 12:48)  polyethylene glycol 3350 oral powder for reconstitution: 17 gram(s) orally once a day (30 Sep 2019 12:48)  senna oral tablet: 2 tab(s) orally once a day (at bedtime), As Needed, for constipation (30 Sep 2019 12:48)    MEDICATIONS  (STANDING):  calcium carbonate 1250 mG  + Vitamin D (OsCal 500 + D) 1 Tablet(s) Oral daily  cholecalciferol Oral Tab/Cap - Peds 1000 Unit(s) Oral every 24 hours  cyanocobalamin 1000 MICROGram(s) Oral daily  dexAMETHasone     Tablet 4 milliGRAM(s) Oral every 6 hours  influenza   Vaccine 0.5 milliLiter(s) IntraMuscular once  levETIRAcetam 1000 milliGRAM(s) Oral two times a day  levothyroxine 112 MICROGram(s) Oral daily  melatonin 1 milliGRAM(s) Oral at bedtime  pantoprazole    Tablet 40 milliGRAM(s) Oral before breakfast  senna 2 Tablet(s) Oral at bedtime  sodium chloride 0.9%. 1000 milliLiter(s) (60 mL/Hr) IV Continuous <Continuous>  vitamin A &amp; D Ointment 1 Application(s) Topical three times a day    MEDICATIONS  (PRN):      Allergies  adhesives (Rash)  No Known Drug Allergies      Review of Systems:   Constitutional:  No Fever, No Chills  ENT/Mouth:  No Hearing Changes,  No Difficulty Swallowing  Eyes:  No Eye Pain, No Vision Changes  Cardiovascular:  No Chest Pain, No Palpitations  Respiratory:  No Cough, +Dyspnea  Gastrointestinal:  As described in HPI  Skin:  pallor  Neuro:  weakness   Heme/Lymph:  +Bruising       Physical Examination:  T(C): 36.3 (02-17-21 @ 05:35), Max: 37.1 (02-16-21 @ 12:03)  HR: 73 (02-17-21 @ 05:35) (73 - 94)  BP: 108/64 (02-17-21 @ 05:35) (108/64 - 138/73)  RR: 18 (02-17-21 @ 05:35) (18 - 18)  SpO2: 97% (02-16-21 @ 21:30) (97% - 98%)  Height (cm): 165.1 (02-16-21 @ 12:03)  Weight (kg): 59 (02-16-21 @ 12:03)      Constitutional: No acute distress.  HEAD:  Atraumatic, Normocephalic  EYES: EOMI   NECK: Supple, No JVD  CHEST/LUNG: Clear to auscultation bilaterally; No wheeze;    HEART: Regular rate and rhythm; No murmurs;   ABDOMEN: Soft, Nontender, Nondistended; Bowel sounds present; No guarding  PSYCH: AAOx3  SKIN: No rashes or lesions    Data: (reviewed by attending)                        11.7   7.37  )-----------( 139      ( 17 Feb 2021 07:00 )             34.0     Hgb Trend:  11.7  02-17-21 @ 07:00  12.6  02-16-21 @ 14:05      02-17    133<L>  |  95<L>  |  17  ----------------------------<  80  3.7   |  30  |  0.6<L>    Ca    7.9<L>      17 Feb 2021 07:00  Phos  4.3     02-17  Mg     2.0     02-17    TPro  4.8<L>  /  Alb  2.9<L>  /  TBili  0.7  /  DBili  x   /  AST  14  /  ALT  36  /  AlkPhos  77  02-17    Liver panel trend:  TBili 0.7   /   AST 14   /   ALT 36   /   AlkP 77   /   Tptn 4.8   /   Alb 2.9    /   DBili --      02-17  TBili 0.6   /   AST 14   /   ALT 46   /   AlkP 88   /   Tptn 5.4   /   Alb 3.3    /   DBili --      02-16      PT/INR - ( 17 Feb 2021 07:00 )   PT: 9.70 sec;   INR: 0.84 ratio         PTT - ( 17 Feb 2021 07:00 )  PTT:25.2 sec        Radiology:(reviewed by attending)  none new

## 2021-02-17 NOTE — MEDICAL STUDENT PROGRESS NOTE(EDUCATION) - SUBJECTIVE AND OBJECTIVE BOX
NIDHI BANKS  65y  Female      Patient is a 65y old  Female with a pmh of papillary thyroid cancer s/p resection and stage 4 endometrial cancer s/p 8 cycles of carboplatin and taxol who presents with a chief complaint of Failure to thrive and Right sided weakness (17 Feb 2021 09:52)    INTERVAL HPI/OVERNIGHT EVENTS: Pt lying comfortably in bed, sleeping. When woken up, she reports she's hungry.   She denies fever, chills, vision changes, headaches, nausea, and vomiting. She denies new leg weakness but continues to experience fingers numbness and tingling with pain. She denies chest pain, palpitations, and shortness of breath.    REVIEW OF SYSTEMS:  CONSTITUTIONAL: + fatigue, No fever, weight loss  EYES: No eye pain, visual disturbances, or discharge  ENMT:  No difficulty hearing, tinnitus, vertigo; No sinus or throat pain  NECK: No pain or stiffness  BREASTS: No pain, masses, or nipple discharge  RESPIRATORY: No cough, wheezing, chills or hemoptysis; No shortness of breath  CARDIOVASCULAR: No chest pain, palpitations, dizziness, or leg swelling  GASTROINTESTINAL: No abdominal or epigastric pain. No nausea, vomiting, or hematemesis; No diarrhea or constipation. + hematochezia.  GENITOURINARY: No dysuria, frequency, hematuria, or incontinence  NEUROLOGICAL: No headaches, memory loss, loss of strength, numbness, or tremors  SKIN: +itching, burning, rashes, or lesions   LYMPH NODES: No enlarged glands  ENDOCRINE: No heat or cold intolerance; +hair loss  MUSCULOSKELETAL: +joint pain, no swelling; No muscle, back, +extremity pain  PSYCHIATRIC: No depression, anxiety, mood swings, or difficulty sleeping  HEME/LYMPH: No easy bruising, or bleeding gums  ALLERGY AND IMMUNOLOGIC: No hives or eczema  FAMILY HISTORY:  CVA (cerebral vascular accident) (Father)    CAD (coronary artery disease) (Mother)    Vital Signs Last 24 Hrs  T(C): 36.3 (17 Feb 2021 05:35), Max: 37.1 (16 Feb 2021 12:03)  T(F): 97.3 (17 Feb 2021 05:35), Max: 98.7 (16 Feb 2021 12:03)  HR: 73 (17 Feb 2021 05:35) (73 - 94)  BP: 108/64 (17 Feb 2021 05:35) (108/64 - 138/73)  BP(mean): --  RR: 18 (17 Feb 2021 05:35) (18 - 18)  SpO2: 97% (16 Feb 2021 21:30) (97% - 98%)  No Known Drug Allergies      PHYSICAL EXAM:  GENERAL: NAD, well-groomed, well-developed  HEAD:  Atraumatic, Normocephalic  EYES: EOMI, PERRLA, conjunctiva and sclera clear  ENMT: No tonsillar erythema, exudates, or enlargement; Moist mucous membranes, Good dentition, No lesions  NECK: Supple, No JVD, Normal thyroid  NERVOUS SYSTEM:  Alert & Oriented X3, Good concentration; INCOMPLETE  CHEST/LUNG: Clear to percussion bilaterally; No rales, rhonchi, wheezing, or rubs  HEART: Regular rate and rhythm; No murmurs, rubs, or gallops  ABDOMEN: Soft, Nontender, Nondistended; Bowel sounds present  EXTREMITIES:  2+ Peripheral Pulses, No clubbing, cyanosis, or edema, tenderness b/l feet and calves, no erythema  LYMPH: No lymphadenopathy noted  SKIN: No rashes or lesions, alopecia diffuse    Consultant(s) Notes Reviewed:  [x ] YES  [ ] NO  Care Discussed with Consultants/Other Providers [ x] YES  [ ] NO    LABS:             11.7   7.37  )-----------( 139      ( 17 Feb 2021 07:00 )             34.0   02-17    133<L>  |  95<L>  |  17  ----------------------------<  80  3.7   |  30  |  0.6<L>    Ca    7.9<L>      17 Feb 2021 07:00  Phos  4.3     02-17  Mg     2.0     02-17    TPro  4.8<L>  /  Alb  2.9<L>  /  TBili  0.7  /  DBili  x   /  AST  14  /  ALT  36  /  AlkPhos  77  02-17    RADIOLOGY & ADDITIONAL TESTS:  < from: Xray Hand 3 Views, Bilateral (02.16.21 @ 18:55) >  EXAM:  XR HAND MIN 3 VIEWS BI            PROCEDURE DATE:  02/16/2021      < from: Xray Hand 3 Views, Bilateral (02.16.21 @ 18:55) >  impression:    Bilateral: No acutely displaced fracture. Joint alignment. Bilateral basal joint and mild DIP degenerative change. No soft tissue calcification      Imaging Personally Reviewed:  [ ] YES  [ ] NO  calcium carbonate 1250 mG  + Vitamin D (OsCal 500 + D) 1 Tablet(s) Oral daily  cholecalciferol Oral Tab/Cap - Peds 1000 Unit(s) Oral every 24 hours  cyanocobalamin 1000 MICROGram(s) Oral daily  dexAMETHasone     Tablet 4 milliGRAM(s) Oral every 6 hours  influenza   Vaccine 0.5 milliLiter(s) IntraMuscular once  levETIRAcetam 1000 milliGRAM(s) Oral two times a day  levothyroxine 112 MICROGram(s) Oral daily  melatonin 1 milliGRAM(s) Oral at bedtime  pantoprazole    Tablet 40 milliGRAM(s) Oral before breakfast  senna 2 Tablet(s) Oral at bedtime  sodium chloride 0.9%. 1000 milliLiter(s) IV Continuous <Continuous>  vitamin A &amp; D Ointment 1 Application(s) Topical three times a day      HEALTH ISSUES - PROBLEM Dx:           NIDHI BANKS  65y  Female      Patient is a 65y old  Female with a pmh of papillary thyroid cancer s/p resection and stage 4 endometrial cancer s/p 8 cycles of carboplatin and taxol who presents with a chief complaint of Failure to thrive and Right sided weakness (17 Feb 2021 09:52)    INTERVAL HPI/OVERNIGHT EVENTS: Pt lying comfortably in bed, sleeping. When woken up, she reports she's hungry.   She denies fever, chills, vision changes, headaches, nausea, and vomiting. She denies new leg weakness but continues to experience fingers numbness and tingling with pain. She denies chest pain, palpitations, and shortness of breath.    REVIEW OF SYSTEMS:  CONSTITUTIONAL: + fatigue, No fever, weight loss  EYES: No eye pain, visual disturbances, or discharge  ENMT:  No difficulty hearing, tinnitus, vertigo; No sinus or throat pain  NECK: No pain or stiffness  BREASTS: No pain, masses, or nipple discharge  RESPIRATORY: No cough, wheezing, chills or hemoptysis; No shortness of breath  CARDIOVASCULAR: No chest pain, palpitations, dizziness, or leg swelling  GASTROINTESTINAL: No abdominal or epigastric pain. No nausea, vomiting, or hematemesis; No diarrhea or constipation. + hematochezia.  GENITOURINARY: No dysuria, frequency, hematuria, or incontinence  NEUROLOGICAL: No headaches, memory loss, loss of strength, numbness, or tremors  SKIN: +itching, burning, rashes, or lesions   LYMPH NODES: No enlarged glands  ENDOCRINE: No heat or cold intolerance; +hair loss  MUSCULOSKELETAL: +joint pain, no swelling; No muscle, back, +extremity pain  PSYCHIATRIC: No depression, anxiety, mood swings, or difficulty sleeping  HEME/LYMPH: No easy bruising, or bleeding gums  ALLERGY AND IMMUNOLOGIC: No hives or eczema  FAMILY HISTORY:  CVA (cerebral vascular accident) (Father)    CAD (coronary artery disease) (Mother)    Vital Signs Last 24 Hrs  T(C): 36.3 (17 Feb 2021 05:35), Max: 37.1 (16 Feb 2021 12:03)  T(F): 97.3 (17 Feb 2021 05:35), Max: 98.7 (16 Feb 2021 12:03)  HR: 73 (17 Feb 2021 05:35) (73 - 94)  BP: 108/64 (17 Feb 2021 05:35) (108/64 - 138/73)  BP(mean): --  RR: 18 (17 Feb 2021 05:35) (18 - 18)  SpO2: 97% (16 Feb 2021 21:30) (97% - 98%)  No Known Drug Allergies      PHYSICAL EXAM:  GENERAL: NAD, well-groomed, well-developed  HEAD:  Atraumatic, Normocephalic, hair loss noted  EYES: EOMI, PERRLA, conjunctiva and sclera clear  ENMT: No tonsillar erythema, exudates, or enlargement; Moist mucous membranes, Good dentition, No lesions  NECK: Supple, No JVD, Normal thyroid  NERVOUS SYSTEM:  Alert & Oriented X3, Good concentration; INCOMPLETE  CHEST/LUNG: Clear to percussion bilaterally; No rales, rhonchi, wheezing, or rubs  HEART: Regular rate and rhythm; No murmurs, rubs, or gallops  ABDOMEN: Soft, Nontender, Nondistended; Bowel sounds present  EXTREMITIES:  2+ Peripheral Pulses, No clubbing, cyanosis, or edema, tenderness b/l feet and calves, no erythema  LYMPH: No lymphadenopathy noted  SKIN: No rashes or lesions, alopecia diffuse    Consultant(s) Notes Reviewed:  [x ] YES  [ ] NO  Care Discussed with Consultants/Other Providers [ x] YES  [ ] NO    LABS:             11.7   7.37  )-----------( 139      ( 17 Feb 2021 07:00 )             34.0   02-17    133<L>  |  95<L>  |  17  ----------------------------<  80  3.7   |  30  |  0.6<L>    Ca    7.9<L>      17 Feb 2021 07:00  Phos  4.3     02-17  Mg     2.0     02-17    TPro  4.8<L>  /  Alb  2.9<L>  /  TBili  0.7  /  DBili  x   /  AST  14  /  ALT  36  /  AlkPhos  77  02-17    RADIOLOGY & ADDITIONAL TESTS:  < from: Xray Hand 3 Views, Bilateral (02.16.21 @ 18:55) >  EXAM:  XR HAND MIN 3 VIEWS BI            PROCEDURE DATE:  02/16/2021      < from: Xray Hand 3 Views, Bilateral (02.16.21 @ 18:55) >  impression:    Bilateral: No acutely displaced fracture. Joint alignment. Bilateral basal joint and mild DIP degenerative change. No soft tissue calcification      Imaging Personally Reviewed:  [ ] YES  [ ] NO  calcium carbonate 1250 mG  + Vitamin D (OsCal 500 + D) 1 Tablet(s) Oral daily  cholecalciferol Oral Tab/Cap - Peds 1000 Unit(s) Oral every 24 hours  cyanocobalamin 1000 MICROGram(s) Oral daily  dexAMETHasone     Tablet 4 milliGRAM(s) Oral every 6 hours  influenza   Vaccine 0.5 milliLiter(s) IntraMuscular once  levETIRAcetam 1000 milliGRAM(s) Oral two times a day  levothyroxine 112 MICROGram(s) Oral daily  melatonin 1 milliGRAM(s) Oral at bedtime  pantoprazole    Tablet 40 milliGRAM(s) Oral before breakfast  senna 2 Tablet(s) Oral at bedtime  sodium chloride 0.9%. 1000 milliLiter(s) IV Continuous <Continuous>  vitamin A &amp; D Ointment 1 Application(s) Topical three times a day      HEALTH ISSUES - PROBLEM Dx:

## 2021-02-17 NOTE — MEDICAL STUDENT PROGRESS NOTE(EDUCATION) - NS MD HP STUD ASPLAN PLAN FT
61 yo female pmh papillary thyroid carcinoma s/p resection and stage 4 endometrial cancer s/p ELEONORA and 8 cycles chemo with carboplatin and taxol, new mets in brain and peritoneal nodes from Jan 2021 sent in from [facility] with R sided weakness:    #Failure to thrive  - Consider Dronabinol  - Pending Nutrition consult    #Loss of balance and stage 4 endometrial cancer   - On Avastin, last chemo February 2nd  - MRI 12/2020 showed multiple brain mets, increasing in size, no acute infarct  - Loss of balance, memory loss x 2 weeks  - Repeat MRI with IV con ordered> pending results  - Gentle IV hydration  - c/w Keppra 1g PO BID   -Started dexamethasone, 10mg IV one dose now, then 4mg q6 oral  -F/u heme onc    #Papillary thyroid carcinoma s/p resection  - c/w levothyroxine  - f/u TSH, free t3, t4  - f/u heme onc    #Hematochezia  - Daily PRBPR , with constipation  - Bowel regimen  - Hb stable for now, monitor  - GI consulted pending update  - Keep type and screen active    #FInger swelling  -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  -X ray hand b/l pending official read      #Itching, dry skin  - Non-obstructive LFT pattern , continue to monitor  - Possibly chemo induced  - Vit A&D ointment      DVT ppx SCDs  Diet: DASH  Activity: OOBTC  FULL CODE   63 yo female pmh papillary thyroid carcinoma s/p resection and stage 4 endometrial cancer s/p ELEONORA and 8 cycles chemo with carboplatin and taxol, new mets in brain and peritoneal nodes from Jan 2021 sent in from [facility] with R sided weakness:    #Loss of balance and stage 4 endometrial cancer   - On Avastin, last chemo February 2nd  - MRI 12/2020 showed multiple brain mets, increasing in size, no acute infarct  - Loss of balance, memory loss x 2 weeks  - Repeat MRI with IV con performed  - Gentle IV hydration  - c/w Keppra 1g PO BID   - On IV dexamethasone  -F/u heme onc    #Papillary thyroid carcinoma s/p resection  - c/w levothyroxine  - f/u TSH, free t3, t4 (received)  - f/u heme onc    #Failure to thrive  - Consider Dronabinol  - Pending Nutrition consult    #Hematochezia  - Daily PRBPR , with constipation  - Bowel regimen  - Hb stable for now, monitor  - GI consulted pending update  - Keep type and screen active    #FInger swelling  -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 Joint alignment. Bilateral basal joint and mild DIP degenerative change. No soft tissue calcification    #Itching, dry skin  - Non-obstructive LFT pattern , continue to monitor  - Possibly chemo induced  - Vit A&D ointment      DVT ppx SCDs  Diet: DASH  Activity: OOBTC  FULL CODE

## 2021-02-17 NOTE — DISCHARGE NOTE NURSING/CASE MANAGEMENT/SOCIAL WORK - PATIENT PORTAL LINK FT
You can access the FollowMyHealth Patient Portal offered by [facility] by registering at the following website: [email or web address]. By joining [facility]’s FollowMyHealth portal, you will also be able to view your health information using other applications (apps) compatible with our system.

## 2021-02-17 NOTE — PROGRESS NOTE ADULT - ASSESSMENT
Patient is a 65 y F with PMHx peripheral neuropathy, papillary thyroid cancer s/p thyroidectomy and stage IV Papillary Serous Carcinoma s/p ELEONORA in April 2018 presenting with generalized weakness.     ASSESSMENT  #) Stage IV Papillary Serous Endometrial Carcinoma   #) Bright Red Blood per Rectum   #) Hx of Papillary Thyroid Cancer     PLAN  - Patient is on Avastin/Keytruda, next dose is not due until 2/23/21   - GI evaluation appreciated, she is pending colonoscopy on Friday   - She is pending PT evaluation  - MRI brain noted, she has improvement in her lesions, will taper steroids slowly   - Papillary thyroid cancer is being followed by ENT and Endocrine  - Palliative care evaluation not necessary at this time as she wishes to continue with treatment

## 2021-02-17 NOTE — PROGRESS NOTE ADULT - ASSESSMENT
63 yo female pmh papillary thyroid carcinoma s/p resection and stage 4 endometrial cancer s/p ELEONORA and 8 cycles chemo with carboplatin and taxol, new mets in brain and peritoneal nodes from Jan 2021 sent in from [facility] with R sided weakness:    #Loss of balance/Ambulatory dysfunction  - in the setting of profound weakness and malnutrition  - nutrition recs pending  - PT eval pending  - Gentle IV hydration    #Failure to thrive  - Consider Dronabinol  - Pending Nutrition consult    #Hematochezia  - H/H stable  - HDS and satting well on RA  - Daily PRBPR , with constipation  - Bowel regimen  - GI consulted pending update  - Keep type and screen active      # stage 4 endometrial cancer   - On Avastin, last chemo February 2nd  - MRI 12/2020 showed multiple brain mets, increasing in size, no acute infarct  - Loss of balance, memory loss x 2 weeks  - Repeat MRI with IV con performed; report above(improved brain met burden)  - c/w Keppra 1g PO BID   - On IV dexamethasone  - F/u heme onc    #Papillary thyroid carcinoma s/p resection  - c/w levothyroxine  - f/u TSH, free t3, t4 (received)  - f/u heme onc    #FInger swelling  -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 Joint alignment. Bilateral basal joint and mild DIP degenerative change. No soft tissue calcification    #Itching, dry skin  - Non-obstructive LFT pattern , continue to monitor  - Possibly chemo induced  - Vit A&D ointment      DVT ppx SCDs  Diet: DASH  Activity: OOBTC  FULL CODE

## 2021-02-17 NOTE — PROGRESS NOTE ADULT - ATTENDING COMMENTS
MRI brain results reviewed with Serena, improved with Avastin.   Taper decadron slowly, continue with taper as outpatient.   Physical therapy eval, will need at least PT at home, if not rehab admission.   We have discussed the idea of hospice, Serena at this time wishes to continue with chemotherapy and defer palliative options for now. I have explained to her that she will need to get stronger with rehab. She is motivated to continue to work with PT.

## 2021-02-17 NOTE — PROGRESS NOTE ADULT - SUBJECTIVE AND OBJECTIVE BOX
NIDHI BANKS  65y  Female      Patient is a 65y old  Female who presents with a chief complaint of Failure to thrive (17 Feb 2021 09:52)      INTERVAL HPI/OVERNIGHT EVENTS: admitted yesterday- no acute events overnight. no nursing concerns. s/p MRI. poor po intake. no pain reported.      VITALS  T(C): 36.5 (02-17-21 @ 13:27), Max: 36.7 (02-16-21 @ 15:12)  HR: 86 (02-17-21 @ 13:27) (73 - 87)  BP: 108/62 (02-17-21 @ 13:27) (108/62 - 138/73)  RR: 16 (02-17-21 @ 13:27) (16 - 18)  SpO2: 97% (02-16-21 @ 21:30) (97% - 98%)  Wt(kg): --Vital Signs Last 24 Hrs  T(C): 36.5 (17 Feb 2021 13:27), Max: 36.7 (16 Feb 2021 15:12)  T(F): 97.7 (17 Feb 2021 13:27), Max: 98.1 (16 Feb 2021 15:12)  HR: 86 (17 Feb 2021 13:27) (73 - 87)  BP: 108/62 (17 Feb 2021 13:27) (108/62 - 138/73)  BP(mean): --  RR: 16 (17 Feb 2021 13:27) (16 - 18)  SpO2: 97% (16 Feb 2021 21:30) (97% - 98%)        PHYSICAL EXAM:  GENERAL: NAD, well-groomed, well-developed  PSYCH: no agitation, baseline mentation  NERVOUS SYSTEM:  Alert & Oriented X3, Motor Strength 5/5 B/L upper and lower extremities; Sensory intact; FTN WNL  PULMONARY: Clear to percussion bilaterally; No rales, rhonchi, wheezing, or rubs  CARDIOVASCULAR: Regular rate and rhythm; No murmurs, rubs, or gallops  GI: Soft, Nontender, Nondistended; Bowel sounds present  EXTREMITIES:  2+ Peripheral Pulses, No clubbing, cyanosis, or edema  LYMPH: No lymphadenopathy noted  SKIN: No rashes or lesions    Consultant(s) Notes Reviewed:  [x ] YES  [ ] NO    Discussed with Consultants/Other Providers [ x] YES     LABS                          11.7   7.37  )-----------( 139      ( 17 Feb 2021 07:00 )             34.0     02-17    133<L>  |  95<L>  |  17  ----------------------------<  80  3.7   |  30  |  0.6<L>    Ca    7.9<L>      17 Feb 2021 07:00  Phos  4.3     02-17  Mg     2.0     02-17    TPro  4.8<L>  /  Alb  2.9<L>  /  TBili  0.7  /  DBili  x   /  AST  14  /  ALT  36  /  AlkPhos  77  02-17    PT/INR - ( 17 Feb 2021 07:00 )   PT: 9.70 sec;   INR: 0.84 ratio    PTT - ( 17 Feb 2021 07:00 )  PTT:25.2 sec      RADIOLOGY & ADDITIONAL TESTS:  MR Head w/ IV Cont (02.17.21 @ 12:43)   IMPRESSION:  In comparison to the prior brain MRI 12/30/2020:    1. Slight improvement in intracranial metastatic disease:  -Several of the 5-6 mm enhancing lesions seen on the prior exam have decreased and are now punctate in size.  -Several stable intrinsically T1 hyperintense lesions are noted consistent with treated lesions.  -No new enhancing lesions.    2. Stable confluent white matter signal abnormality likely reflecting posttreatment changes.      MEDICATIONS  (STANDING):  calcium carbonate 1250 mG  + Vitamin D (OsCal 500 + D) 1 Tablet(s) Oral daily  cholecalciferol Oral Tab/Cap - Peds 1000 Unit(s) Oral every 24 hours  cyanocobalamin 1000 MICROGram(s) Oral daily  dexAMETHasone     Tablet 4 milliGRAM(s) Oral every 6 hours  influenza   Vaccine 0.5 milliLiter(s) IntraMuscular once  levETIRAcetam 1000 milliGRAM(s) Oral two times a day  levothyroxine 112 MICROGram(s) Oral daily  melatonin 1 milliGRAM(s) Oral at bedtime  pantoprazole    Tablet 40 milliGRAM(s) Oral before breakfast  senna 2 Tablet(s) Oral at bedtime  sodium chloride 0.9%. 1000 milliLiter(s) (60 mL/Hr) IV Continuous <Continuous>  vitamin A &amp; D Ointment 1 Application(s) Topical three times a day    MEDICATIONS  (PRN):

## 2021-02-17 NOTE — PHYSICAL THERAPY INITIAL EVALUATION ADULT - SPECIFY REASON(S)
Chart reviewed, spoke with RN, pt encountered supine, NAD, +IV. Pt reports fatigued, and declined PT participation. Requested PT to return tomorrow, preferably in am. Will f/u as adarsh.

## 2021-02-17 NOTE — CONSULT NOTE ADULT - ASSESSMENT
Diabetic Retinopathy: Evaluating Your Eyes     Your eye healthcare provider examines your eyes with a slit lamp.   Diabetic retinopathy is a condition that happens when diabetes damages blood vessels in the rear of the eye. It can lead to vision loss. To help catch it early, have a complete dilated eye exam at least once a year. During the exam, the eye healthcare provider will review your medical history, examine your eyes, and check your vision.  Women who are pregnant and have pre-existing type 1 or type 2 diabetes have an increased risk of retinopathy. Women with diabetes should have an eye exam before pregnancy or in the first trimester. They should continue to be monitored every trimester and for 1 year after delivery, depending on the severity of the retinopathy.  Your medical history  Your eye healthcare provider may ask about the following:  · Your diabetes type, history, treatments (such as insulin), and how you monitor your blood sugar level  · Your familys health, including whether any relative has had diabetes or diabetic retinopathy  · Any diseases, surgeries, or other medical procedures youve had  · Any medicines, herbs, or supplements you use, including those you buy over the counter  · Any problems with your vision you may be having, such as blurred or double vision, problems seeing at night, or flashes or floaters   Your eye exam  Your eye healthcare provider uses an eye chart and other tools to check your vision. Then he or she examines your eyes for signs of disease. You are given eye drops to widen (dilate) your pupils. You may have one or more of the following tests:  · Tonometry to measure fluid pressure inside the eye.  · Slit lamp exam to allow the healthcare provider to view the structures of your eye.  · Ultrasound to create an image of the eye using sound waves. Ultrasound may be used if blood is found in the clear gel that fills the eye (vitreous).  · Ocular coherence tomography  (OCT) to create an image of the retina using light waves. This shows if there is fluid leaking into certain parts of the eye. It can also measure the thickness of the retina.  Fluorescein angiography  This test may be done to check the health of the inside lining of the eye (retina). It also checks the tiny blood vessels (capillaries) that carry blood to the retina. During the test:  · Photographs are taken of the retina.  · A dye is then injected into the bloodstream through the arm or hand. The dye travels to the capillaries in the eye.  · More photographs are taken of the retina. The dye causes the capillaries to stand out on the photographs.  You may feel brief nausea during the procedure. For a few hours after the test, your skin, eyes, and urine may appear yellow. Talk with your healthcare provider for more information about this test.   Date Last Reviewed: 6/1/2016  © 8394-7478 [employer]. [address] [place] [address], [place] [address]. All rights reserved. This information is not intended as a substitute for professional medical care. Always follow your healthcare professional's instructions.         65 y F pmh peripheral neuropathy, papillary thyroid cancer and stage IV uterine cancer, s/p ELEONORA in April 2018 and thyroidectomy in August 2018, heme/onc is zachkorefugioff, metastatic papillary thyroid cancer presenting with weakness x1 week.    GI consulted for blood per rectum, the patient complains of red blood per rectum for 2 weeks now, moderate amount.      *Blood per rectum  -HD stable   -Hb 15-16 > 12.6 on admission > 11.7   -most likely hemorrhoidal cannot rule out other etiologies  -patient is agreeable for colonoscopy      plan  -2 large bore IV  -Trend CBC  -keep active type and screen  -clear liquid tomorrow starting with breakfast  -colonoscopy on friday    65 y F pmh peripheral neuropathy, papillary thyroid cancer and stage IV uterine cancer, s/p ELEONORA in April 2018 and thyroidectomy in August 2018, heme/onc is sokoloff, metastatic papillary thyroid cancer presenting with weakness x1 week.    GI consulted for blood per rectum, the patient complains of red blood per rectum for 2 weeks now, moderate amount.      *Blood per rectum  -HD stable   -Hb 15-16 > 12.6 on admission > 11.7   -most likely hemorrhoidal cannot rule out other etiologies  -patient is agreeable for colonoscopy      plan  -2 large bore IV  -Trend CBC  -keep active type and screen  -avoid constipation  -aggressive bowel regimen  -c/w senna, add miralax bid  -clear liquid tomorrow starting with breakfast  -colonoscopy on friday     will follow

## 2021-02-17 NOTE — PROGRESS NOTE ADULT - SUBJECTIVE AND OBJECTIVE BOX
Patient is a 65y old  Female who presents with a chief complaint of Failure to thrive (17 Feb 2021 14:01)      Subjective: She feels weak today but has good appetite       Vital Signs Last 24 Hrs  T(C): 36.5 (17 Feb 2021 13:27), Max: 36.7 (16 Feb 2021 15:12)  T(F): 97.7 (17 Feb 2021 13:27), Max: 98.1 (16 Feb 2021 15:12)  HR: 86 (17 Feb 2021 13:27) (73 - 87)  BP: 108/62 (17 Feb 2021 13:27) (108/62 - 138/73)  BP(mean): --  RR: 16 (17 Feb 2021 13:27) (16 - 18)  SpO2: 97% (16 Feb 2021 21:30) (97% - 98%)    PHYSICAL EXAM  General: adult in NAD  HEENT: anicteric sclera, pink conjunctiva  Neck: supple  CV: normal S1/S2 with no murmur rubs or gallops  Lungs: CTBABL  Abdomen: soft non-tender non-distended  Ext: no edema  Skin: no rashes   Neuro: alert and oriented X 4    MEDICATIONS  (STANDING):  calcium carbonate 1250 mG  + Vitamin D (OsCal 500 + D) 1 Tablet(s) Oral daily  cholecalciferol Oral Tab/Cap - Peds 1000 Unit(s) Oral every 24 hours  cyanocobalamin 1000 MICROGram(s) Oral daily  dexAMETHasone     Tablet 4 milliGRAM(s) Oral every 6 hours  influenza   Vaccine 0.5 milliLiter(s) IntraMuscular once  levETIRAcetam 1000 milliGRAM(s) Oral two times a day  levothyroxine 112 MICROGram(s) Oral daily  melatonin 1 milliGRAM(s) Oral at bedtime  pantoprazole    Tablet 40 milliGRAM(s) Oral before breakfast  senna 2 Tablet(s) Oral at bedtime  sodium chloride 0.9%. 1000 milliLiter(s) (60 mL/Hr) IV Continuous <Continuous>  vitamin A &amp; D Ointment 1 Application(s) Topical three times a day    MEDICATIONS  (PRN):      LABS:                          11.7   7.37  )-----------( 139      ( 17 Feb 2021 07:00 )             34.0         Mean Cell Volume : 92.1 fL  Mean Cell Hemoglobin : 31.7 pg  Mean Cell Hemoglobin Concentration : 34.4 g/dL  Auto Neutrophil # : 5.59 K/uL  Auto Lymphocyte # : 0.58 K/uL  Auto Monocyte # : 0.25 K/uL  Auto Eosinophil # : 0.00 K/uL  Auto Basophil # : 0.05 K/uL  Auto Neutrophil % : 75.8 %  Auto Lymphocyte % : 7.9 %  Auto Monocyte % : 3.4 %  Auto Eosinophil % : 0.0 %  Auto Basophil % : 0.7 %      Serial CBC's  02-17 @ 07:00  Hct-34.0 / Hgb-11.7 / Plat-139 / RBC-3.69 / WBC-7.37  Serial CBC's  02-16 @ 14:05  Hct-35.3 / Hgb-12.6 / Plat-150 / RBC-3.92 / WBC-9.60      02-17    133<L>  |  95<L>  |  17  ----------------------------<  80  3.7   |  30  |  0.6<L>    Ca    7.9<L>      17 Feb 2021 07:00  Phos  4.3     02-17  Mg     2.0     02-17    TPro  4.8<L>  /  Alb  2.9<L>  /  TBili  0.7  /  DBili  x   /  AST  14  /  ALT  36  /  AlkPhos  77  02-17      PT/INR - ( 17 Feb 2021 07:00 )   PT: 9.70 sec;   INR: 0.84 ratio         PTT - ( 17 Feb 2021 07:00 )  PTT:25.2 sec      BLOOD SMEAR INTERPRETATION:       RADIOLOGY & ADDITIONAL STUDIES:    < from: MR Head w/ IV Cont (02.17.21 @ 12:43) >  IMPRESSION:  In comparison to the prior brain MRI 12/30/2020:    1. Slight improvement in intracranial metastatic disease:  -Several of the 5-6 mm enhancing lesions seen on the prior exam have decreased and are now punctate in size.  -Several stable intrinsically T1 hyperintense lesions are noted consistent with treated lesions.  -No new enhancing lesions.    2. Stable confluent white matter signal abnormality likely reflecting posttreatment changes.              UBALDO ANGLIN MD; Attending Radiologist  This document has been electronically signed. Feb 17 2021  1:17PM    < end of copied text >

## 2021-02-18 NOTE — PROGRESS NOTE ADULT - SUBJECTIVE AND OBJECTIVE BOX
NIDHI BANKS  65y  Female      Patient is a 65y old  Female who presents with a chief complaint of Failure to thrive (18 Feb 2021 11:29)      INTERVAL HPI/OVERNIGHT EVENTS:       VITALS  T(C): 35.9 (02-18-21 @ 04:25), Max: 36.5 (02-17-21 @ 13:27)  HR: 63 (02-18-21 @ 04:25) (63 - 89)  BP: 112/61 (02-18-21 @ 04:25) (93/54 - 112/61)  RR: 20 (02-18-21 @ 04:25) (16 - 20)  SpO2: --  Wt(kg): --Vital Signs Last 24 Hrs  T(C): 35.9 (18 Feb 2021 04:25), Max: 36.5 (17 Feb 2021 13:27)  T(F): 96.7 (18 Feb 2021 04:25), Max: 97.7 (17 Feb 2021 13:27)  HR: 63 (18 Feb 2021 04:25) (63 - 89)  BP: 112/61 (18 Feb 2021 04:25) (93/54 - 112/61)  BP(mean): --  RR: 20 (18 Feb 2021 04:25) (16 - 20)  SpO2: --        PHYSICAL EXAM:  GENERAL: elderly F, NAD, non toxic, chronically ill-appearing  PSYCH: no agitation, baseline mentation  HEENT: NCAT, anicteric sclera, non injected conjunctiva  NERVOUS SYSTEM:  Alert & Oriented X3, CN 2-12  PULMONARY: Clear to percussion bilaterally; No rales, rhonchi, wheezing, or rubs  CARDIOVASCULAR: Regular rate and rhythm; No murmurs, rubs, or gallops  GI: Soft, Nontender, Nondistended; Bowel sounds present  EXTREMITIES:  2+ Peripheral Pulses, No clubbing, cyanosis, or edema  LYMPH: No lymphadenopathy noted  SKIN: No rashes or lesions, no jaundice, chemo-related alopecia    Consultant(s) Notes Reviewed:  [x ] YES  [ ] NO    Discussed with Consultants/Other Providers [ x] YES     LABS                          13.0   8.19  )-----------( 166      ( 18 Feb 2021 06:11 )             37.6     02-18    133<L>  |  93<L>  |  12  ----------------------------<  157<H>  4.4   |  30  |  <0.5<L>    Ca    8.6      18 Feb 2021 06:11  Phos  4.3     02-17  Mg     2.0     02-18    TPro  5.5<L>  /  Alb  3.4<L>  /  TBili  0.6  /  DBili  x   /  AST  14  /  ALT  40  /  AlkPhos  102  02-18  PT/INR - ( 17 Feb 2021 07:00 )   PT: 9.70 sec;   INR: 0.84 ratio    PTT - ( 17 Feb 2021 07:00 )  PTT:25.2 sec      RADIOLOGY & ADDITIONAL TESTS:  MR Head w/ IV Cont (02.17.21 @ 12:43)   IMPRESSION:    In comparison to the prior brain MRI 12/30/2020:  1. Slight improvement in intracranial metastatic disease:  -Several of the 5-6 mm enhancing lesions seen on the prior exam have decreased and are now punctate in size.  -Several stable intrinsically T1 hyperintense lesions are noted consistent with treated lesions.  -No new enhancing lesions.    2. Stable confluent white matter signal abnormality likely reflecting posttreatment changes.        MEDICATIONS  (STANDING):  bisacodyl 20 milliGRAM(s) Oral once  calcium carbonate 1250 mG  + Vitamin D (OsCal 500 + D) 1 Tablet(s) Oral daily  cholecalciferol Oral Tab/Cap - Peds 1000 Unit(s) Oral every 24 hours  cyanocobalamin 1000 MICROGram(s) Oral daily  dexAMETHasone     Tablet 4 milliGRAM(s) Oral every 6 hours  influenza   Vaccine 0.5 milliLiter(s) IntraMuscular once  levETIRAcetam 1000 milliGRAM(s) Oral two times a day  levothyroxine 112 MICROGram(s) Oral daily  melatonin 1 milliGRAM(s) Oral at bedtime  pantoprazole    Tablet 40 milliGRAM(s) Oral before breakfast  polyethylene glycol 3350 17 Gram(s) Oral two times a day  polyethylene glycol/electrolyte Solution. 4000 milliLiter(s) Oral once  senna 2 Tablet(s) Oral at bedtime  sodium chloride 0.9%. 1000 milliLiter(s) (60 mL/Hr) IV Continuous <Continuous>  vitamin A &amp; D Ointment 1 Application(s) Topical three times a day         NIDHI BANKS  65y  Female      Patient is a 65y old  Female who presents with a chief complaint of Failure to thrive (18 Feb 2021 11:29)      INTERVAL HPI/OVERNIGHT EVENTS:  no acute events overnight. patient to begin bowel prep for tenative colonscopy. Feeling "weak". No futher recurrent melena.      VITALS  T(C): 35.9 (02-18-21 @ 04:25), Max: 36.5 (02-17-21 @ 13:27)  HR: 63 (02-18-21 @ 04:25) (63 - 89)  BP: 112/61 (02-18-21 @ 04:25) (93/54 - 112/61)  RR: 20 (02-18-21 @ 04:25) (16 - 20)  SpO2: --  Wt(kg): --Vital Signs Last 24 Hrs  T(C): 35.9 (18 Feb 2021 04:25), Max: 36.5 (17 Feb 2021 13:27)  T(F): 96.7 (18 Feb 2021 04:25), Max: 97.7 (17 Feb 2021 13:27)  HR: 63 (18 Feb 2021 04:25) (63 - 89)  BP: 112/61 (18 Feb 2021 04:25) (93/54 - 112/61)  BP(mean): --  RR: 20 (18 Feb 2021 04:25) (16 - 20)  SpO2: --        PHYSICAL EXAM:  GENERAL: elderly F, NAD, non toxic, chronically ill-appearing  PSYCH: no agitation, baseline mentation  HEENT: NCAT, anicteric sclera, non injected conjunctiva  NERVOUS SYSTEM:  Alert & Oriented X3, CN 2-12  PULMONARY: Clear to percussion bilaterally; No rales, rhonchi, wheezing, or rubs  CARDIOVASCULAR: Regular rate and rhythm; No murmurs, rubs, or gallops  GI: Soft, Nontender, Nondistended; Bowel sounds present  EXTREMITIES:  2+ Peripheral Pulses, No clubbing, cyanosis, or edema  LYMPH: No lymphadenopathy noted  SKIN: No rashes or lesions, no jaundice, chemo-related alopecia    Consultant(s) Notes Reviewed:  [x ] YES  [ ] NO    Discussed with Consultants/Other Providers [ x] YES     LABS                          13.0   8.19  )-----------( 166      ( 18 Feb 2021 06:11 )             37.6     02-18    133<L>  |  93<L>  |  12  ----------------------------<  157<H>  4.4   |  30  |  <0.5<L>    Ca    8.6      18 Feb 2021 06:11  Phos  4.3     02-17  Mg     2.0     02-18    TPro  5.5<L>  /  Alb  3.4<L>  /  TBili  0.6  /  DBili  x   /  AST  14  /  ALT  40  /  AlkPhos  102  02-18  PT/INR - ( 17 Feb 2021 07:00 )   PT: 9.70 sec;   INR: 0.84 ratio    PTT - ( 17 Feb 2021 07:00 )  PTT:25.2 sec      RADIOLOGY & ADDITIONAL TESTS:  MR Head w/ IV Cont (02.17.21 @ 12:43)   IMPRESSION:    In comparison to the prior brain MRI 12/30/2020:  1. Slight improvement in intracranial metastatic disease:  -Several of the 5-6 mm enhancing lesions seen on the prior exam have decreased and are now punctate in size.  -Several stable intrinsically T1 hyperintense lesions are noted consistent with treated lesions.  -No new enhancing lesions.    2. Stable confluent white matter signal abnormality likely reflecting posttreatment changes.        MEDICATIONS  (STANDING):  bisacodyl 20 milliGRAM(s) Oral once  calcium carbonate 1250 mG  + Vitamin D (OsCal 500 + D) 1 Tablet(s) Oral daily  cholecalciferol Oral Tab/Cap - Peds 1000 Unit(s) Oral every 24 hours  cyanocobalamin 1000 MICROGram(s) Oral daily  dexAMETHasone     Tablet 4 milliGRAM(s) Oral every 6 hours  influenza   Vaccine 0.5 milliLiter(s) IntraMuscular once  levETIRAcetam 1000 milliGRAM(s) Oral two times a day  levothyroxine 112 MICROGram(s) Oral daily  melatonin 1 milliGRAM(s) Oral at bedtime  pantoprazole    Tablet 40 milliGRAM(s) Oral before breakfast  polyethylene glycol 3350 17 Gram(s) Oral two times a day  polyethylene glycol/electrolyte Solution. 4000 milliLiter(s) Oral once  senna 2 Tablet(s) Oral at bedtime  sodium chloride 0.9%. 1000 milliLiter(s) (60 mL/Hr) IV Continuous <Continuous>  vitamin A &amp; D Ointment 1 Application(s) Topical three times a day

## 2021-02-18 NOTE — MEDICAL STUDENT PROGRESS NOTE(EDUCATION) - SUBJECTIVE AND OBJECTIVE BOX
NIDHI BANKS  65y  Female      Patient is a 65y old  Female with a pmh of papillary thyroid cancer s/p resection and stage 4 endometrial cancer s/p 8 cycles of carboplatin and taxol who presents with a chief complaint of Failure to thrive and Right sided weakness (17 Feb 2021 09:52)    INTERVAL HPI/OVERNIGHT EVENTS: Pt sitting comfortably in chair, in no acute distress. She reports that she's depressed and sick and tired.   She had one BM this morning, no hematochezia. She reports that she's dizzy and tired.   She denies fever, chills, vision changes, headaches, nausea, and vomiting. She denies new leg weakness but continues to experience fingers numbness and tingling with pain. She denies chest pain, palpitations, and shortness of breath.    REVIEW OF SYSTEMS:  CONSTITUTIONAL: + fatigue, No fever, +weight loss  EYES: No eye pain, visual disturbances, or discharge  ENMT:  No difficulty hearing, tinnitus, vertigo; No sinus or throat pain  NECK: No pain or stiffness  BREASTS: No pain, masses, or nipple discharge  RESPIRATORY: No cough, wheezing, chills or hemoptysis; No shortness of breath  CARDIOVASCULAR: No chest pain, palpitations, dizziness, or leg swelling  GASTROINTESTINAL: No abdominal or epigastric pain. No nausea, vomiting, or hematemesis; No diarrhea or constipation. - hematochezia.  GENITOURINARY: No dysuria, frequency, hematuria, or incontinence  NEUROLOGICAL: No headaches, memory loss, loss of strength, numbness, or tremors  SKIN: +itching, burning, rashes, or lesions   LYMPH NODES: No enlarged glands  ENDOCRINE: No heat or cold intolerance; +hair loss  MUSCULOSKELETAL: +joint pain, no swelling; No muscle, back, +extremity pain  PSYCHIATRIC: No depression, anxiety, mood swings, or difficulty sleeping  HEME/LYMPH: No easy bruising, or bleeding gums  ALLERGY AND IMMUNOLOGIC: No hives or eczema  FAMILY HISTORY:  CVA (cerebral vascular accident) (Father)    CAD (coronary artery disease) (Mother)    ICU Vital Signs Last 24 Hrs  T(C): 35.9 (18 Feb 2021 04:25), Max: 36.5 (17 Feb 2021 13:27)  T(F): 96.7 (18 Feb 2021 04:25), Max: 97.7 (17 Feb 2021 13:27)  HR: 63 (18 Feb 2021 04:25) (63 - 89)  BP: 112/61 (18 Feb 2021 04:25) (93/54 - 112/61)  RR: 20 (18 Feb 2021 04:25) (16 - 20)  SpO2    No Known Drug Allergies      PHYSICAL EXAM:  GENERAL: NAD,well-developed  HEAD:  Atraumatic, Normocephalic, hair loss noted  EYES: EOMI, PERRLA, conjunctiva and sclera clear  ENMT: No tonsillar erythema, exudates, or enlargement; Moist mucous membranes, Good dentition, No lesions  NECK: Supple, No JVD, Normal thyroid  NERVOUS SYSTEM:  Alert & Oriented X3, Good concentration;   CHEST/LUNG: Clear to percussion bilaterally; No rales, rhonchi, wheezing, or rubs  HEART: Regular rate and rhythm; No murmurs, rubs, or gallops  ABDOMEN: Soft, Nontender, Nondistended; Bowel sounds present  EXTREMITIES:  2+ Peripheral Pulses, No clubbing, cyanosis, or edema, tenderness b/l feet and calves, no erythema  LYMPH: No lymphadenopathy noted  SKIN: No rashes or lesions, alopecia diffuse    Consultant(s) Notes Reviewed:  [x ] YES  [ ] NO  Care Discussed with Consultants/Other Providers [ x] YES  [ ] NO    LABS:                        13.0   8.19  )-----------( 166      ( 18 Feb 2021 06:11 )             37.6   02-18    133<L>  |  93<L>  |  12  ----------------------------<  157<H>  4.4   |  30  |  <0.5<L>    Ca    8.6      18 Feb 2021 06:11  Phos  4.3     02-17  Mg     2.0     02-18    TPro  5.5<L>  /  Alb  3.4<L>  /  TBili  0.6  /  DBili  x   /  AST  14  /  ALT  40  /  AlkPhos  102  02-18      RADIOLOGY & ADDITIONAL TESTS:  < from: Xray Hand 3 Views, Bilateral (02.16.21 @ 18:55) >  EXAM:  XR HAND MIN 3 VIEWS BI            PROCEDURE DATE:  02/16/2021      < from: Xray Hand 3 Views, Bilateral (02.16.21 @ 18:55) >  impression:    Bilateral: No acutely displaced fracture. Joint alignment. Bilateral basal joint and mild DIP degenerative change. No soft tissue calcification      Imaging Personally Reviewed:  [ ] YES  [ ] NO  calcium carbonate 1250 mG  + Vitamin D (OsCal 500 + D) 1 Tablet(s) Oral daily  cholecalciferol Oral Tab/Cap - Peds 1000 Unit(s) Oral every 24 hours  cyanocobalamin 1000 MICROGram(s) Oral daily  dexAMETHasone     Tablet 4 milliGRAM(s) Oral every 6 hours  influenza   Vaccine 0.5 milliLiter(s) IntraMuscular once  levETIRAcetam 1000 milliGRAM(s) Oral two times a day  levothyroxine 112 MICROGram(s) Oral daily  melatonin 1 milliGRAM(s) Oral at bedtime  pantoprazole    Tablet 40 milliGRAM(s) Oral before breakfast  senna 2 Tablet(s) Oral at bedtime  sodium chloride 0.9%. 1000 milliLiter(s) IV Continuous <Continuous>  vitamin A &amp; D Ointment 1 Application(s) Topical three times a day      HEALTH ISSUES - PROBLEM Dx:

## 2021-02-18 NOTE — DIETITIAN INITIAL EVALUATION ADULT. - OTHER CALCULATIONS
est kcal needs = ~1370-1600kcal/day (MSJ x1.2-1.4 considering stage IV metastatic disease on active treatment & wt loss); est protein needs =  65-77 g/day (1.1-1.3 g/kg CBW, reasons mentioned above); est fluid needs = per LIP

## 2021-02-18 NOTE — DIETITIAN INITIAL EVALUATION ADULT. - MALNUTRITION
unable to form clear diagnosis. pt reports to RD she was eating well PTA. wt loss noted, no severe muscle wasting/fat loss. will continue to monitor.

## 2021-02-18 NOTE — PROGRESS NOTE ADULT - ASSESSMENT
Patient is a 65 y F with PMHx peripheral neuropathy, papillary thyroid cancer s/p thyroidectomy and stage IV Papillary Serous Carcinoma s/p ELEONORA in April 2018 presenting with generalized weakness.     ASSESSMENT  #) Stage IV Papillary Serous Endometrial Carcinoma   #) Bright Red Blood per Rectum   #) Hx of Papillary Thyroid Cancer     PLAN  - Patient is on Avastin/Keytruda, next dose is not due until 2/23/21   - GI evaluation appreciated, she is pending colonoscopy on Friday   - PT eval appreciated, currently she is qualifying for home with home PT   - MRI brain noted, she has improvement in her lesions, will taper steroids slowly   - Papillary thyroid cancer is being followed by ENT and Endocrine  - Palliative care evaluation not necessary at this time as she wishes to continue with treatment   - If patient is able to qualify for SNF, immunotherapy can be held while she is at the facility, ideally for one week only

## 2021-02-18 NOTE — PHYSICAL THERAPY INITIAL EVALUATION ADULT - SITTING BALANCE: STATIC
This writer called Carter Mcnamara and he confirmed that he no longer work with the pt. Mr. Mcnamara confirmed that he haven't spoken with the pt for a while but do recommend that the patient have a higher level of care. Carlos Roche, BA  3/26/2019     good balance

## 2021-02-18 NOTE — PROGRESS NOTE ADULT - ASSESSMENT
61 yo female pmh papillary thyroid carcinoma s/p resection and stage 4 endometrial cancer s/p ELEONORA and 8 cycles chemo with carboplatin and taxol, new mets in brain and peritoneal nodes from Jan 2021 sent in from [facility] with R sided weakness:    #Loss of balance/Ambulatory dysfunction  - in the setting of profound weakness and malnutrition  - nutrition recs pending  - PT following  - Gentle IV hydration    #Failure to thrive  - Consider Dronabinol  - Pending Nutrition consult    #Hematochezia  - H/H stable  - HDS and satting well on RA  - Daily PRBPR , with constipation  - Bowel regimen  - Keep type and screen active  -clear liquid today starting with breakfast  -colonoscopy on friday     # stage 4 endometrial cancer   - On Avastin, last chemo February 2nd  - MRI 12/2020 showed multiple brain mets, increasing in size, no acute infarct; - Repeat MRI with IV con performed; report above(improved brain met burden)  - Loss of balance, memory loss x 2 weeks  - Per Heme/Onc, MRI brain noted, she has improvement in her lesions, will taper steroids slowly   - Patient is on Avastin/Keytruda, next dose is not due until 2/23/21   - Palliative care evaluation not necessary at this time as she wishes to continue with treatment   - c/w Keppra 1g PO BID     #Papillary thyroid carcinoma s/p resection  - c/w levothyroxine  - f/u TSH, free t3, t4 (received)  - f/u heme onc    #FInger swelling  -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 Joint alignment. Bilateral basal joint and mild DIP degenerative change. No soft tissue calcification    #Itching, dry skin  - Non-obstructive LFT pattern , continue to monitor  - Possibly chemo induced  - Vit A&D ointment      DVT ppx SCDs  Diet: DASH  Activity: OOBTC  FULL CODE    #Progress Note Handoff  Pending (specify):  Consults__GI, colonscopy, PT eval, Nutrition consult  Family discussion: patient updated on plan and is in agreement  Disposition: Unknown at this time________

## 2021-02-18 NOTE — DIETITIAN INITIAL EVALUATION ADULT. - ENERGY INTAKE
observed pt eating 2 Jell-Os & tolerating well. Tolerates clear liquids well, reports she is hungry but aware of need for colonoscopy. Recs at end of document d/w LIP (Dr. Vallejo).

## 2021-02-18 NOTE — DIETITIAN INITIAL EVALUATION ADULT. - ORAL INTAKE PTA/DIET HISTORY
C/s placed for poor PO intake, however, pt reports she was eating well PTA, 3 meals/day and appetite remained at baseline. No GI s/s interfering w. her ability to eat. Denied weakness impairing intake as well. NKFA. Denies use of supplements or cultural/Hindu dietary restrictions.

## 2021-02-18 NOTE — MEDICAL STUDENT PROGRESS NOTE(EDUCATION) - NS MD HP STUD ASPLAN PLAN FT
61 yo female pmh papillary thyroid carcinoma s/p resection and stage 4 endometrial cancer s/p ELEONORA and 8 cycles chemo with carboplatin and taxol, new mets in brain and peritoneal nodes from Jan 2021 sent in from [facility] with R sided weakness:    #Loss of balance and stage 4 endometrial cancer   - On Avastin, last chemo February 2nd  - MRI 12/2020 showed multiple brain mets, increasing in size, no acute infarct  - Loss of balance, memory loss x 2 weeks  - Repeat MRI with IV con performed- improving brain lesions compared to December MRI  - Gentle IV hydration  - c/w Keppra 1g PO BID   - On IV dexamethasone  -F/u heme onc  -Started PT sessions this morning    #Papillary thyroid carcinoma s/p resection  - c/w levothyroxine  - f/u TSH, free t3, t4 (received)  - f/u heme onc    #Failure to thrive  - Consider Dronabinol  - Pending Nutrition consult    #Hematochezia Internal hemorrhoids vs colon pathology  - Daily PRBPR , with constipation  - Bowel regimen  - Hb stable @13 today  - Pending colonoscopy on Friday 2/19   -Start Bowel prep @ 4pm today and keep npo after midnight  -Soft Liquid diet    #Finer swelling  -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 Joint alignment. Bilateral basal joint and mild DIP degenerative change. No soft tissue calcification    #Itching, dry skin  - Non-obstructive LFT pattern , continue to monitor  - Possibly chemo induced  - Vit A&D ointment    #Hyponatremia Na 133  -Asymptomatic  -C/w monitor    DVT ppx SCDs  Diet: DASH- Soft liquid  Activity: OOBTC  FULL CODE   61 yo female pmh papillary thyroid carcinoma s/p resection and stage 4 endometrial cancer s/p ELEONORA and 8 cycles chemo with carboplatin and taxol, new mets in brain and peritoneal nodes from Jan 2021 sent in from [facility] with R sided weakness:    #Loss of balance and stage 4 endometrial cancer   - On Avastin, last chemo February 2nd  - MRI 12/2020 showed multiple brain mets, increasing in size, no acute infarct  - Loss of balance, memory loss x 2 weeks  - Repeat MRI with IV con performed- improving brain lesions compared to December MRI  - Gentle IV hydration  - c/w Keppra 1g PO BID   - On IV dexamethasone  -F/u heme onc  -Started PT sessions this morning    #Papillary thyroid carcinoma s/p resection  - c/w levothyroxine  - f/u TSH, free t3, t4 (received)  - f/u heme onc    #Failure to thrive  - Consider Dronabinol  - Pending Nutrition consult    #Hematochezia Internal hemorrhoids vs colon pathology  - Daily PRBPR , with constipation  - Bowel regimen  - Hb stable @13 today  - Pending colonoscopy on Friday 2/19   -Start Bowel prep @ 4pm today and keep npo after midnight  -Soft Liquid diet    #Finger swelling  -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 Joint alignment. Bilateral basal joint and mild DIP degenerative change. No soft tissue calcification    #Itching, dry skin  - Non-obstructive LFT pattern , continue to monitor  - Possibly chemo induced  - Vit A&D ointment    #Hyponatremia Na 133  -Asymptomatic  -C/w monitor    DVT ppx SCDs  Diet: DASH- Soft liquid  Activity: OOBTC  FULL CODE

## 2021-02-18 NOTE — DIETITIAN INITIAL EVALUATION ADULT. - PERTINENT MEDS FT
NS @60mL/hr, miralax, OsCal + vitamin D, cholecalciferol, cyanocobalamin, decadron, keppra, synthroid, senna

## 2021-02-18 NOTE — DIETITIAN INITIAL EVALUATION ADULT. - OTHER INFO
Pt p/w progressive weakness x1 week PTA. Loss of balance/ambulatory dysfunction in setting of weakness & possible malnutrition. Hospital course complicated by BRBPR--pt notes ongoing x2 weeks PTA; per GI: likely hemorrhoidal cannot r/o other etiologies, plan for colonoscopy 2/19. Stage IV Papillary Serous Endometrial Carcinoma w. h/o papillary thyroid cancer; multiple brain mets on MRI--per HemeOnc: pt on Avastin/Keytruda next dose 2/23.

## 2021-02-18 NOTE — PHYSICAL THERAPY INITIAL EVALUATION ADULT - GENERAL OBSERVATIONS, REHAB EVAL
Chart reviewed, pt encountered seated in b/s chair, NAD, agreeable, no pain reported, IE completed 08:45-09:05

## 2021-02-18 NOTE — DIETITIAN INITIAL EVALUATION ADULT. - PHYSCIAL ASSESSMENT
wt loss reported by pt, no EMR wt hx to confirm but pt appears to be a reliable historian. no severe muscle wasting, very mild fat depletion to tricep region. edema also noted possibly masking further fat loss/ muscle wasting to lower half of body. will continue to monitor wt trends.  skin intact. well nourished

## 2021-02-18 NOTE — PROGRESS NOTE ADULT - SUBJECTIVE AND OBJECTIVE BOX
Gastroenterology progress note:     Patient is a 65y old  Female who presents with a chief complaint of Failure to thrive (18 Feb 2021 14:13)       Admitted on: 02-16-21    We are following the patient for blood per rectum      Interval History: patient had 1 BM without blood. no nausea no vomiting     Patient's medical problems are stable   Prior records reviewed (Y/N): Y  History obtained from someone other than patient (Y/N): N      PAST MEDICAL & SURGICAL HISTORY:  Thyroid cancer    Peripheral neuropathy    Pleural effusion  11/17    Uterine cancer    History of total abdominal hysterectomy  4/2018    S/P thoracentesis  12/17    H/O lymph node excision  LEFT NECK 12/17        MEDICATIONS  (STANDING):  bisacodyl 20 milliGRAM(s) Oral once  calcium carbonate 1250 mG  + Vitamin D (OsCal 500 + D) 1 Tablet(s) Oral daily  cholecalciferol Oral Tab/Cap - Peds 1000 Unit(s) Oral every 24 hours  cyanocobalamin 1000 MICROGram(s) Oral daily  dexAMETHasone     Tablet 4 milliGRAM(s) Oral every 6 hours  influenza   Vaccine 0.5 milliLiter(s) IntraMuscular once  levETIRAcetam 1000 milliGRAM(s) Oral two times a day  levothyroxine 112 MICROGram(s) Oral daily  melatonin 1 milliGRAM(s) Oral at bedtime  pantoprazole    Tablet 40 milliGRAM(s) Oral before breakfast  polyethylene glycol 3350 17 Gram(s) Oral two times a day  senna 2 Tablet(s) Oral at bedtime  sodium chloride 0.9%. 1000 milliLiter(s) (60 mL/Hr) IV Continuous <Continuous>  vitamin A &amp; D Ointment 1 Application(s) Topical three times a day    MEDICATIONS  (PRN):      Allergies  adhesives (Rash)  No Known Drug Allergies      Review of Systems:   General: no fever  HEENT: no hemoptysis  Cardiovascular:  No Chest Pain, No Palpitations  Respiratory:  No Cough, No Dyspnea  Gastrointestinal:  As described in HPI  Hematology: no bruising or hematoma   Skin: no new rash    Physical Examination:  T(C): 36.3 (02-18-21 @ 14:05), Max: 36.3 (02-18-21 @ 14:05)  HR: 71 (02-18-21 @ 14:05) (63 - 89)  BP: 98/61 (02-18-21 @ 14:05) (93/54 - 112/61)  RR: 18 (02-18-21 @ 14:05) (18 - 20)  SpO2: --    Constitutional: No acute distress.  Neck: no palpable thyroid  Eyes: EOMI  Respiratory:  No signs of respiratory distress. Lung sounds are clear bilaterally.  Cardiovascular:  S1 S2, Regular rate and rhythm.  Abdominal: Abdomen is soft, symmetric, and non-tender without distention.   Extremities: no pitting edema  Skin: No rashes, No Jaundice.        Data: (reviewed by attending)                        13.0   8.19  )-----------( 166      ( 18 Feb 2021 06:11 )             37.6     Hgb trend:  13.0  02-18-21 @ 06:11  11.7  02-17-21 @ 07:00  12.6  02-16-21 @ 14:05        02-18    133<L>  |  93<L>  |  12  ----------------------------<  157<H>  4.4   |  30  |  <0.5<L>    Ca    8.6      18 Feb 2021 06:11  Phos  4.3     02-17  Mg     2.0     02-18    TPro  5.5<L>  /  Alb  3.4<L>  /  TBili  0.6  /  DBili  x   /  AST  14  /  ALT  40  /  AlkPhos  102  02-18    Liver panel trend:  TBili 0.6   /   AST 14   /   ALT 40   /   AlkP 102   /   Tptn 5.5   /   Alb 3.4    /   DBili --      02-18  TBili 0.7   /   AST 14   /   ALT 36   /   AlkP 77   /   Tptn 4.8   /   Alb 2.9    /   DBili --      02-17  TBili 0.6   /   AST 14   /   ALT 46   /   AlkP 88   /   Tptn 5.4   /   Alb 3.3    /   DBili --      02-16      PT/INR - ( 17 Feb 2021 07:00 )   PT: 9.70 sec;   INR: 0.84 ratio         PTT - ( 17 Feb 2021 07:00 )  PTT:25.2 sec       Radiology: (reviewed by attending)    < from: MR Head w/ IV Cont (02.17.21 @ 12:43) >  IMPRESSION:  In comparison to the prior brain MRI 12/30/2020:    1. Slight improvement in intracranial metastatic disease:  -Several of the 5-6 mm enhancing lesions seen on the prior exam have decreased and are now punctate in size.  -Several stable intrinsically T1 hyperintense lesions are noted consistent with treated lesions.  -No new enhancing lesions.    2. Stable confluent white matter signal abnormality likely reflecting posttreatment changes.              UBALDO ANGLIN MD; Attending Radiologist  This document has been electronically signed. Feb 17 2021  1:17PM    < end of copied text >

## 2021-02-18 NOTE — DIETITIAN INITIAL EVALUATION ADULT. - ADD RECOMMEND
RD will monitor diet order, energy intake, nutrition related labs, body composition, NFPF (appetite, GI s/s)

## 2021-02-18 NOTE — PROGRESS NOTE ADULT - ASSESSMENT
65 y F pmh peripheral neuropathy, papillary thyroid cancer and stage IV uterine cancer, s/p ELEONORA in April 2018 and thyroidectomy in August 2018, heme/onc is sokorefugioff, metastatic papillary thyroid cancer presenting with weakness x1 week.    GI consulted for blood per rectum, the patient complains of red blood per rectum for 2 weeks now, moderate amount.      *Blood per rectum  -HD stable   -Hb 15-16 > 12.6 on admission > 11.7 > 13   -most likely hemorrhoidal cannot rule out other etiologies  -patient is agreeable for colonoscopy      plan  -2 large bore IV  -Trend CBC  -keep active type and screen  -avoid constipation  -aggressive bowel regimen  -c/w senna, add miralax bid  -clear liquids today   -Golytely 4L at 4pm, Dulcolax 20mg at bedtime, NPO after midnight  -Trend HG BID     -for questions call [phone] during weekdays till 5pm and call GI service after 5pm and on weekends [phone] 65 y F pmh peripheral neuropathy, papillary thyroid cancer and stage IV uterine cancer, s/p ELEONORA in April 2018 and thyroidectomy in August 2018, heme/onc is lonnieff, metastatic papillary thyroid cancer presenting with weakness x1 week.    GI consulted for blood per rectum, the patient complains of red blood per rectum for 2 weeks now, moderate amount.      *Blood per rectum  -HD stable   -Hb 15-16 > 12.6 on admission > 11.7 > 13   -most likely hemorrhoidal cannot rule out other etiologies  -patient is agreeable for colonoscopy      plan  -2 large bore IV  -Trend CBC  -keep active type and screen  -avoid constipation  -aggressive bowel regimen  -c/w senna, add miralax bid  -clear liquids today   -Golytely 4L at 4pm, Dulcolax 20mg at bedtime, NPO after midnight     -for questions call [phone] during weekdays till 5pm and call GI service after 5pm and on weekends [phone] 65 y F pmh peripheral neuropathy, papillary thyroid cancer and stage IV uterine cancer, s/p ELEONORA in April 2018 and thyroidectomy in August 2018, heme/onc is sokoloff, metastatic papillary thyroid cancer presenting with weakness x1 week.    GI consulted for blood per rectum, the patient complains of red blood per rectum for 2 weeks now, moderate amount.      *Blood per rectum  -HD stable   -Hb 15-16 > 12.6 on admission > 11.7 > 13   -most likely hemorrhoidal cannot rule out other etiologies  -patient is agreeable for colonoscopy      plan  -2 large bore IV  -Trend CBC  -keep active type and screen  -avoid constipation  -aggressive bowel regimen  -c/w senna, add miralax bid  -clear liquids today   -Golytely 4L at 4pm, Dulcolax 20mg at bedtime, NPO after midnight for colonoscopy in am      -for questions call [phone] during weekdays till 5pm and call GI service after 5pm and on weekends [phone]

## 2021-02-18 NOTE — PROGRESS NOTE ADULT - SUBJECTIVE AND OBJECTIVE BOX
Patient is a 65y old  Female who presents with a chief complaint of Failure to thrive (18 Feb 2021 13:18)      Subjective: She feels a bit stronger today. She does not notice as much bleeding with her bowel movements.       Vital Signs Last 24 Hrs  T(C): 36.3 (18 Feb 2021 14:05), Max: 36.3 (18 Feb 2021 14:05)  T(F): 97.3 (18 Feb 2021 14:05), Max: 97.3 (18 Feb 2021 14:05)  HR: 71 (18 Feb 2021 14:05) (63 - 89)  BP: 98/61 (18 Feb 2021 14:05) (93/54 - 112/61)  BP(mean): --  RR: 18 (18 Feb 2021 14:05) (18 - 20)  SpO2: --    PHYSICAL EXAM  General: adult in NAD  HEENT: anicteric sclera, pink conjunctiva  Neck: supple  CV: normal S1/S2 with no murmur rubs or gallops  Lungs: CTABL  Abdomen: soft non-tender non-distended  Ext: no edema  Skin: no rashes   Neuro: alert and oriented X 4, no focal deficits    MEDICATIONS  (STANDING):  bisacodyl 20 milliGRAM(s) Oral once  calcium carbonate 1250 mG  + Vitamin D (OsCal 500 + D) 1 Tablet(s) Oral daily  cholecalciferol Oral Tab/Cap - Peds 1000 Unit(s) Oral every 24 hours  cyanocobalamin 1000 MICROGram(s) Oral daily  dexAMETHasone     Tablet 4 milliGRAM(s) Oral every 6 hours  influenza   Vaccine 0.5 milliLiter(s) IntraMuscular once  levETIRAcetam 1000 milliGRAM(s) Oral two times a day  levothyroxine 112 MICROGram(s) Oral daily  melatonin 1 milliGRAM(s) Oral at bedtime  pantoprazole    Tablet 40 milliGRAM(s) Oral before breakfast  polyethylene glycol 3350 17 Gram(s) Oral two times a day  senna 2 Tablet(s) Oral at bedtime  sodium chloride 0.9%. 1000 milliLiter(s) (60 mL/Hr) IV Continuous <Continuous>  vitamin A &amp; D Ointment 1 Application(s) Topical three times a day    MEDICATIONS  (PRN):      LABS:                          13.0   8.19  )-----------( 166      ( 18 Feb 2021 06:11 )             37.6         Mean Cell Volume : 91.7 fL  Mean Cell Hemoglobin : 31.7 pg  Mean Cell Hemoglobin Concentration : 34.6 g/dL  Auto Neutrophil # : 5.81 K/uL  Auto Lymphocyte # : 0.46 K/uL  Auto Monocyte # : 0.23 K/uL  Auto Eosinophil # : 0.00 K/uL  Auto Basophil # : 0.01 K/uL  Auto Neutrophil % : 71.0 %  Auto Lymphocyte % : 5.6 %  Auto Monocyte % : 2.8 %  Auto Eosinophil % : 0.0 %  Auto Basophil % : 0.1 %      Serial CBC's  02-18 @ 06:11  Hct-37.6 / Hgb-13.0 / Plat-166 / RBC-4.10 / WBC-8.19  Serial CBC's  02-17 @ 07:00  Hct-34.0 / Hgb-11.7 / Plat-139 / RBC-3.69 / WBC-7.37  Serial CBC's  02-16 @ 14:05  Hct-35.3 / Hgb-12.6 / Plat-150 / RBC-3.92 / WBC-9.60      02-18    133<L>  |  93<L>  |  12  ----------------------------<  157<H>  4.4   |  30  |  <0.5<L>    Ca    8.6      18 Feb 2021 06:11  Phos  4.3     02-17  Mg     2.0     02-18    TPro  5.5<L>  /  Alb  3.4<L>  /  TBili  0.6  /  DBili  x   /  AST  14  /  ALT  40  /  AlkPhos  102  02-18      PT/INR - ( 17 Feb 2021 07:00 )   PT: 9.70 sec;   INR: 0.84 ratio         PTT - ( 17 Feb 2021 07:00 )  PTT:25.2 sec        BLOOD SMEAR INTERPRETATION:       RADIOLOGY & ADDITIONAL STUDIES:

## 2021-02-19 NOTE — PROGRESS NOTE ADULT - ASSESSMENT
65 y F pmh peripheral neuropathy, papillary thyroid cancer and stage IV uterine cancer, s/p ELEONORA in April 2018 and thyroidectomy in August 2018, heme/onc is sokoloff, metastatic papillary thyroid cancer presenting with weakness x1 week.    GI consulted for blood per rectum, the patient complains of red blood per rectum for 2 weeks now, moderate amount.      *Blood per rectum  -HD stable   -Hb 15-16 > 12.6 on admission > 11.7 > 13 >11.8   -most likely hemorrhoidal cannot rule out other etiologies  -patient refused colonoscopy    -blood per rectum resolved  -avoid constipation  -aggressive bowel regimen  -c/w senna, miralax bid     -colonoscopy as OP  -will sign off  -call as needed, [phone] during weekdays till 5pm and call GI service after 5pm and on weekends [phone]  -Follow up with our [facility] located at [address] [place] [address]. Phone Number: [phone]   65 y F pmh peripheral neuropathy, papillary thyroid cancer and stage IV uterine cancer, s/p ELEONORA in April 2018 and thyroidectomy in August 2018, heme/onc is sokoloff, metastatic papillary thyroid cancer presenting with weakness x1 week.    GI consulted for blood per rectum, the patient complains of red blood per rectum for 2 weeks now, moderate amount.      *Blood per rectum  -HD stable   -Hb 15-16 > 12.6 on admission > 11.7 > 13 >11.8   -most likely hemorrhoidal cannot rule out other etiologies  -patient refused colonoscopy , risks and benefits d/w pt   -blood per rectum resolved  -avoid constipation  -aggressive bowel regimen  -c/w senna, miralax bid     -colonoscopy as OP  -will sign off  -call as needed, [phone] during weekdays till 5pm and call GI service after 5pm and on weekends [phone]  -Follow up with our [facility] located at [address] [place] [address]. Phone Number: [phone]

## 2021-02-19 NOTE — DISCHARGE NOTE PROVIDER - NSDCMRMEDTOKEN_GEN_ALL_CORE_FT
acetaminophen 325 mg oral tablet: 2 tab(s) orally every 4 hours, As needed, for pain or fever  aluminum hydroxide-magnesium hydroxide 200 mg-200 mg/5 mL oral suspension: 30 milliliter(s) orally every 6 hours, As needed, Dyspepsia  Calcium 600+D 600 mg-200 intl units oral tablet: 1 tab(s) orally 3 times a day  cholecalciferol oral tablet: 1000 unit(s) orally every 24 hours  cyanocobalamin 1000 mcg oral tablet: 1 tab(s) orally once a day  dexamethasone 4 mg oral tablet: 1 tab(s) orally 2 times a day through 4/22/19; on 4/23 decrease dose to 1 tab po daily, take with food  hydrocortisone 1% topical cream: 1 application topically every 12 hours, As needed, Rash and/or Itching  levETIRAcetam 1000 mg oral tablet: 1 tab(s) orally 2 times a day  levothyroxine 112 mcg (0.112 mg) oral tablet: 1 tab(s) orally once a day, take one hour before breakfast daily  melatonin 5 mg oral tablet: 1 tab(s) orally once a day (at bedtime), As needed, Sleep  metFORMIN 500 mg oral tablet: 1 tab(s) orally once a day in the evening with dinner  pantoprazole 40 mg oral delayed release tablet: 1 tab(s) orally once a day, take 30 minutes before breakfast daily  polyethylene glycol 3350 oral powder for reconstitution: 17 gram(s) orally once a day  senna oral tablet: 2 tab(s) orally once a day (at bedtime), As Needed, for constipation

## 2021-02-19 NOTE — CHART NOTE - NSCHARTNOTEFT_GEN_A_CORE
<<<RESIDENT DISCHARGE NOTE>>>     NIDHI BANKS  MRN-[id number]    Pt seen and examined. Pt cleared for discharge. Pt understands and accepts.    VITAL SIGNS:  T(F): 96.6 (02-19-21 @ 13:42), Max: 97.3 (02-18-21 @ 21:07)  HR: 91 (02-19-21 @ 13:42)  BP: 103/58 (02-19-21 @ 13:42)  SpO2: --      PHYSICAL EXAMINATION:  General:  NAD  Head & Neck: NC/AT   Pulmonary: CTA BL  Cardiovascular: RRR   Gastrointestinal/Abdomen & Pelvis:  Soft, NT/ND  Neurologic/Motor: Non focal     TEST RESULTS:                        12.2   9.86  )-----------( 144      ( 19 Feb 2021 11:05 )             36.9       02-19    133<L>  |  95<L>  |  19  ----------------------------<  273<H>  3.9   |  23  |  0.7    Ca    8.3<L>      19 Feb 2021 11:05  Mg     2.0     02-19    TPro  5.0<L>  /  Alb  3.1<L>  /  TBili  0.7  /  DBili  x   /  AST  11  /  ALT  35  /  AlkPhos  73  02-19      FINAL DISCHARGE INTERVIEW:  Resident(s) Present: (Name:Yoni)    DISCHARGE MEDICATION RECONCILIATION  reviewed with Attending (Name: David)    DISPOSITION:   [  ] Home,    [ X] Home with Visiting Services,   [    ]  SNF/ NH,    [   ] Acute Rehab (4A),   [   ] Other (Specify:_________)

## 2021-02-19 NOTE — DISCHARGE NOTE PROVIDER - HOSPITAL COURSE
65 y F pmh peripheral neuropathy, papillary thyroid cancer and stage IV uterine cancer, s/p ELEONORA in April 2018 and thyroidectomy in August 2018, heme/onc is sobrigitteff, metastatic papillary thyroid cancer presenting with weakness x1 week. Pt complaining about swelling to the b/l hands/wrists and b/l feet. Pt says weakness has caused her to not be able to walk for the last few days. No f/c/n/v. No diarrhea. No chest pain/SOB. No abdominal pain. Pt sent by [facility] to be admitted. Of note, recent MRI in December showed brain metastasis, PET scan showed peritoneal nodules.    In the ED: VSS, labs significant for mild hyponatremia 132. Repeat MRA Head done, Brain mets seen to be improving on Avastin. Pt was going to undergo colonoscopy due to BRBPR, pt now refusing and agrees to undergo procedure OP. Pt cleared for d/c home.   65 y F pmh peripheral neuropathy, papillary thyroid cancer and stage IV uterine cancer, s/p ELEONORA in April 2018 and thyroidectomy in August 2018, heme/onc is sokoloff, metastatic papillary thyroid cancer presenting with weakness x1 week. Pt complaining about swelling to the b/l hands/wrists and b/l feet. Pt says weakness has caused her to not be able to walk for the last few days. No f/c/n/v. No diarrhea. No chest pain/SOB. No abdominal pain. Pt sent by [facility] to be admitted. Of note, recent MRI in December showed brain metastasis, PET scan showed peritoneal nodules. In the ED: VSS, labs significant for mild hyponatremia 132.     On admission, repeat MRI Head done, Brain mets seen to be improving on Avastin. Pt was going to undergo colonoscopy due to BRBPR, and began prep but the morning of procedure, refused to go forward and asked to be discharged. Agreed to undergo procedure as outpatient.    Vital Signs Last 24 Hrs  T(C): 35.9 (19 Feb 2021 13:42), Max: 36.3 (18 Feb 2021 14:05)  T(F): 96.6 (19 Feb 2021 13:42), Max: 97.3 (18 Feb 2021 14:05)  HR: 91 (19 Feb 2021 13:42) (62 - 91)  BP: 103/58 (19 Feb 2021 13:42) (98/61 - 126/67)  BP(mean): --  RR: 18 (19 Feb 2021 13:42) (18 - 18)  SpO2: --    PHYSICAL EXAM:  GENERAL: elderly F, NAD, non toxic, chronically ill-appearing  PSYCH: no agitation, baseline mentation  HEENT: NCAT, anicteric sclera, non injected conjunctiva  NERVOUS SYSTEM:  Alert & Oriented X3, CN 2-12  PULMONARY: Clear to percussion bilaterally; No rales, rhonchi, wheezing, or rubs  CARDIOVASCULAR: Regular rate and rhythm; No murmurs, rubs, or gallops  GI: Soft, Nontender, Nondistended; Bowel sounds present  EXTREMITIES:  2+ Peripheral Pulses, No clubbing, cyanosis, or edema  LYMPH: No lymphadenopathy noted  SKIN: No rashes or lesions, no jaundice, chemo-related alopecia     MR Head w/ IV Cont (02.17.21 @ 12:43)   IMPRESSION:  In comparison to the prior brain MRI 12/30/2020:    1. Slight improvement in intracranial metastatic disease:  -Several of the 5-6 mm enhancing lesions seen on the prior exam have decreased and are now punctate in size.  -Several stable intrinsically T1 hyperintense lesions are noted consistent with treated lesions.  -No new enhancing lesions.    2. Stable confluent white matter signal abnormality likely reflecting posttreatment changes.

## 2021-02-19 NOTE — DISCHARGE NOTE PROVIDER - CARE PROVIDER_API CALL
Stacie Cid)  HematologyOncology; Internal Medicine; Medical Oncology  [address] [address]  Phone: [phone]  Fax: [phone]  Follow Up Time:     Giovana Chamberlain)  Gastroenterology  [address] [place] [id number]  Phone: [phone]  Fax: [phone]  Follow Up Time:

## 2021-02-19 NOTE — MEDICAL STUDENT PROGRESS NOTE(EDUCATION) - SUBJECTIVE AND OBJECTIVE BOX
NIDHI BANKS  65y  Female      Patient is a 65y old  Female with a pmh of papillary thyroid cancer s/p resection and stage 4 endometrial cancer s/p 8 cycles of carboplatin and taxol who presents with a chief complaint of Failure to thrive and Right sided weakness (17 Feb 2021 09:52)    INTERVAL HPI/OVERNIGHT EVENTS: Pt sitting comfortably in chair, in no acute distress. She reports that she's "sick and tired".   She's very upset and anxious. Reports that she's not getting good care because she's been starving for the past 2 days. She reports that she wants to eat, go home, and doesn't want to have the colonoscopy done in this admission. She was seen by PT yesterday.  She denies hematochezia, diarrhea, vomiting, nausea, fever, chills, vision changes, headaches. She denies chest pain, palpitations, and shortness of breath.    REVIEW OF SYSTEMS:  CONSTITUTIONAL: + fatigue, No fever, +weight loss  EYES: No eye pain, visual disturbances, or discharge  ENMT:  No difficulty hearing, tinnitus, vertigo; No sinus or throat pain  NECK: No pain or stiffness  BREASTS: No pain, masses, or nipple discharge  RESPIRATORY: No cough, wheezing, chills or hemoptysis; No shortness of breath  CARDIOVASCULAR: No chest pain, palpitations, dizziness, or leg swelling  GASTROINTESTINAL: No abdominal or epigastric pain. No nausea, vomiting, or hematemesis; No diarrhea or constipation, no hematochezia  GENITOURINARY: No dysuria, frequency, hematuria, or incontinence  NEUROLOGICAL: No headaches, memory loss, loss of strength, numbness, or tremors  SKIN: +itching, burning, rashes, or lesions   LYMPH NODES: No enlarged glands  ENDOCRINE: No heat or cold intolerance; +hair loss  MUSCULOSKELETAL: +joint pain, no swelling; No muscle, back, +extremity pain  PSYCHIATRIC: No depression, anxiety, mood swings, or difficulty sleeping  HEME/LYMPH: No easy bruising, or bleeding gums  ALLERGY AND IMMUNOLOGIC: No hives or eczema  FAMILY HISTORY:  CVA (cerebral vascular accident) (Father)    CAD (coronary artery disease) (Mother)    ICU Vital Signs Last 24 Hrs  T(C): 35.9 (19 Feb 2021 04:42), Max: 36.3 (18 Feb 2021 14:05)  T(F): 96.7 (19 Feb 2021 04:42), Max: 97.3 (18 Feb 2021 14:05)  HR: 67 (19 Feb 2021 04:42) (62 - 71)  BP: 126/67 (19 Feb 2021 04:42) (98/61 - 126/67)  RR: 18 (19 Feb 2021 04:42) (18 - 18)    No Known Drug Allergies      PHYSICAL EXAM:  GENERAL: NAD,well-developed  HEAD:  Atraumatic, Normocephalic, hair loss noted  EYES: EOMI, PERRLA, conjunctiva and sclera clear  ENMT: No tonsillar erythema, exudates, or enlargement; Moist mucous membranes, Good dentition, No lesions  NECK: Supple, No JVD, Normal thyroid  NERVOUS SYSTEM:  Alert & Oriented X3, Good concentration;   CHEST/LUNG: Clear to percussion bilaterally; No rales, rhonchi, wheezing, or rubs  HEART: Regular rate and rhythm; No murmurs, rubs, or gallops  ABDOMEN: Soft, Nontender, Nondistended; Bowel sounds present  EXTREMITIES:  2+ Peripheral Pulses, No clubbing, cyanosis, or edema, tenderness b/l feet and calves, no erythema  LYMPH: No lymphadenopathy noted  SKIN: No rashes or lesions, alopecia diffuse    Consultant(s) Notes Reviewed:  [x ] YES  [ ] NO  Care Discussed with Consultants/Other Providers [ x] YES  [ ] NO    LABS:                         11.8   8.01  )-----------( 137      ( 18 Feb 2021 23:00 )             33.0   02-18    135  |  97<L>  |  14  ----------------------------<  150<H>  4.4   |  24  |  <0.5<L>    Ca    8.9      18 Feb 2021 22:33  Mg     2.0     02-18    TPro  5.5<L>  /  Alb  3.4<L>  /  TBili  0.6  /  DBili  x   /  AST  14  /  ALT  40  /  AlkPhos  102  02-18               RADIOLOGY & ADDITIONAL TESTS:  < from: Xray Hand 3 Views, Bilateral (02.16.21 @ 18:55) >  EXAM:  XR HAND MIN 3 VIEWS BI            PROCEDURE DATE:  02/16/2021      < from: Xray Hand 3 Views, Bilateral (02.16.21 @ 18:55) >  impression:    Bilateral: No acutely displaced fracture. Joint alignment. Bilateral basal joint and mild DIP degenerative change. No soft tissue calcification      Imaging Personally Reviewed:  [ ] YES  [ ] NO  calcium carbonate 1250 mG  + Vitamin D (OsCal 500 + D) 1 Tablet(s) Oral daily  cholecalciferol Oral Tab/Cap - Peds 1000 Unit(s) Oral every 24 hours  cyanocobalamin 1000 MICROGram(s) Oral daily  dexAMETHasone     Tablet 4 milliGRAM(s) Oral every 6 hours  influenza   Vaccine 0.5 milliLiter(s) IntraMuscular once  levETIRAcetam 1000 milliGRAM(s) Oral two times a day  levothyroxine 112 MICROGram(s) Oral daily  melatonin 1 milliGRAM(s) Oral at bedtime  pantoprazole    Tablet 40 milliGRAM(s) Oral before breakfast  senna 2 Tablet(s) Oral at bedtime  sodium chloride 0.9%. 1000 milliLiter(s) IV Continuous <Continuous>  vitamin A &amp; D Ointment 1 Application(s) Topical three times a day      HEALTH ISSUES - PROBLEM Dx:

## 2021-02-19 NOTE — DISCHARGE NOTE PROVIDER - NSDCFUSCHEDAPPT_GEN_ALL_CORE_FT
NIDHI BANKS ; 02/23/2021 ; [facility]PP HemOnc [facility] NIDHI Vidal ; 02/23/2021 ; NPP Chemo & Infus [facility] NIDHI Covarrubias ; 03/11/2021 ; NPP Internal Med [address]

## 2021-02-19 NOTE — DISCHARGE NOTE PROVIDER - NSDCCPCAREPLAN_GEN_ALL_CORE_FT
PRINCIPAL DISCHARGE DIAGNOSIS  Diagnosis: Endometrial cancer  Assessment and Plan of Treatment: You presented to the hospital with loss of balance and weakness. Your symptoms are likely due to your  cancer. Repeat MRI brain imaging showed that your new chemotherapy treatment is effective. Please follow-up with your oncologist as discussed and take your medications as prescribed.      SECONDARY DISCHARGE DIAGNOSES  Diagnosis: Bright red blood per rectum  Assessment and Plan of Treatment: You were found to have blood from your rectum. You were going to have a colonoscopy 2/19 however you preferred to complete the colonoscopy outpatient. Please follow-up with gastroenterology as discussed.

## 2021-02-19 NOTE — MEDICAL STUDENT PROGRESS NOTE(EDUCATION) - NS MD HP STUD ASPLAN PLAN FT
61 yo female pmh papillary thyroid carcinoma s/p resection and stage 4 endometrial cancer s/p ELEONORA and 8 cycles chemo with carboplatin and taxol, new mets in brain and peritoneal nodes from Jan 2021 sent in from [facility] with R sided weakness and hematochezia. Pt now refusing colonoscopy and wants to leave AMA    #Loss of balance and stage 4 endometrial cancer   - On Avastin, last chemo February 2nd  - MRI 12/2020 showed multiple brain mets, increasing in size, no acute infarct  - Loss of balance, memory loss x 2 weeks  - Repeat MRI with IV con performed- improving brain lesions compared to December MRI  - Gentle IV hydration  - c/w Keppra 1g PO BID   - On IV dexamethasone, taper dose to po: 4mg  q6 for 7d, 3mg q6 for 7d, 2mg for 7d q6,  -F/u heme onc  -Started PT sessions yesterday     #Hematochezia Internal hemorrhoids vs colon pathology  - Daily PRBPR , with constipation  - Bowel regimen incomplete  - Hb @11.8 today  - colonoscopy on Friday 2/19 - CANCELED for patient refusal. 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 Joint alignment. Bilateral basal joint and mild DIP degenerative change. No soft tissue calcification    #Itching, dry skin  - Non-obstructive LFT pattern , continue to monitor  - Possibly chemo induced  - Vit A&D ointment    #Hyponatremia Na 135  -Asymptomatic  -C/w monitor      Discharge planning: Pt wants to leave AMA. Will plan for D/C today and outpatient follow up for colonoscopy.  DVT ppx SCDs  Diet: DASH- Soft liquid  Activity: OOBTC  FULL CODE

## 2021-02-19 NOTE — DISCHARGE NOTE PROVIDER - CARE PROVIDERS DIRECT ADDRESSES
[email or web address]

## 2021-02-19 NOTE — PROGRESS NOTE ADULT - SUBJECTIVE AND OBJECTIVE BOX
Gastroenterology progress note:     Patient is a 65y old  Female who presents with a chief complaint of Failure to thrive (18 Feb 2021 17:53)       Admitted on: 02-16-21    We are following the patient for blood per rectum     Interval History: patient drank the Golytely, had BM with no blood. no abdominal pain. refused colonoscopy, wants to leave AMA    Patient's medical problems are stable    Prior records reviewed (Y/N): Y  History obtained from someone other than patient (Y/N): N      PAST MEDICAL & SURGICAL HISTORY:  Thyroid cancer    Peripheral neuropathy    Pleural effusion  11/17    Uterine cancer    History of total abdominal hysterectomy  4/2018    S/P thoracentesis  12/17    H/O lymph node excision  LEFT NECK 12/17        MEDICATIONS  (STANDING):  calcium carbonate 1250 mG  + Vitamin D (OsCal 500 + D) 1 Tablet(s) Oral daily  cholecalciferol Oral Tab/Cap - Peds 1000 Unit(s) Oral every 24 hours  cyanocobalamin 1000 MICROGram(s) Oral daily  dexAMETHasone     Tablet 4 milliGRAM(s) Oral two times a day  influenza   Vaccine 0.5 milliLiter(s) IntraMuscular once  levETIRAcetam 1000 milliGRAM(s) Oral two times a day  levothyroxine 112 MICROGram(s) Oral daily  melatonin 1 milliGRAM(s) Oral at bedtime  pantoprazole    Tablet 40 milliGRAM(s) Oral before breakfast  polyethylene glycol 3350 17 Gram(s) Oral two times a day  senna 2 Tablet(s) Oral at bedtime  sodium chloride 0.9%. 1000 milliLiter(s) (60 mL/Hr) IV Continuous <Continuous>  vitamin A &amp; D Ointment 1 Application(s) Topical three times a day    MEDICATIONS  (PRN):      Allergies  adhesives (Rash)  No Known Drug Allergies      Review of Systems:   patient refused interview     Physical Examination:  T(C): 35.9 (02-19-21 @ 04:42), Max: 36.3 (02-18-21 @ 14:05)  HR: 67 (02-19-21 @ 04:42) (62 - 71)  BP: 126/67 (02-19-21 @ 04:42) (98/61 - 126/67)  RR: 18 (02-19-21 @ 04:42) (18 - 18)  SpO2: --       Constitutional: No acute distress.  Head: normocephalic  Neck: no palpable thyroid  Eyes: EOMI  Respiratory:  No signs of respiratory distress.    Cardiovascular:  S1 S2, Regular rate and rhythm.  Abdominal: Abdomen is soft, symmetric, and non-tender without distention.    Skin: No rashes, No Jaundice.        Data: (reviewed by attending)                        11.8   8.01  )-----------( 137      ( 18 Feb 2021 23:00 )             33.0     Hgb trend:  11.8  02-18-21 @ 23:00  13.0  02-18-21 @ 06:11  11.7  02-17-21 @ 07:00  12.6  02-16-21 @ 14:05        02-18    135  |  97<L>  |  14  ----------------------------<  150<H>  4.4   |  24  |  <0.5<L>    Ca    8.9      18 Feb 2021 22:33  Mg     2.0     02-18    TPro  5.5<L>  /  Alb  3.4<L>  /  TBili  0.6  /  DBili  x   /  AST  14  /  ALT  40  /  AlkPhos  102  02-18    Liver panel trend:  TBili 0.6   /   AST 14   /   ALT 40   /   AlkP 102   /   Tptn 5.5   /   Alb 3.4    /   DBili --      02-18  TBili 0.7   /   AST 14   /   ALT 36   /   AlkP 77   /   Tptn 4.8   /   Alb 2.9    /   DBili --      02-17  TBili 0.6   /   AST 14   /   ALT 46   /   AlkP 88   /   Tptn 5.4   /   Alb 3.3    /   DBili --      02-16      PTT - ( 18 Feb 2021 23:00 )  PTT:26.4 sec       Radiology: (reviewed by attending)  none new

## 2021-02-24 PROBLEM — R53.1 GENERALIZED WEAKNESS: Status: ACTIVE | Noted: 2021-01-01

## 2021-02-24 PROBLEM — C73 THYROID CANCER: Status: ACTIVE | Noted: 2018-05-30

## 2021-02-24 PROBLEM — C79.31 METASTATIC ADENOCARCINOMA TO BRAIN: Status: ACTIVE | Noted: 2019-04-25

## 2021-02-24 PROBLEM — Z51.11 ENCOUNTER FOR ANTINEOPLASTIC CHEMOTHERAPY: Status: ACTIVE | Noted: 2019-06-05

## 2021-02-24 PROBLEM — D64.9 ANEMIA: Status: ACTIVE | Noted: 2019-07-25

## 2021-02-24 PROBLEM — C54.1 ENDOMETRIAL ADENOCARCINOMA: Status: ACTIVE | Noted: 2018-01-05

## 2021-02-24 PROBLEM — Z87.440 HISTORY OF URINARY TRACT INFECTION: Status: RESOLVED | Noted: 2020-01-01 | Resolved: 2020-01-01

## 2021-02-24 PROBLEM — J91.0 MALIGNANT PLEURAL EFFUSION: Status: ACTIVE | Noted: 2018-01-19

## 2021-02-24 PROBLEM — E07.89 THYROID LESION: Status: ACTIVE | Noted: 2018-04-20

## 2021-02-24 PROBLEM — R49.0 HOARSENESS: Status: ACTIVE | Noted: 2018-09-13

## 2021-02-24 PROBLEM — C55 UTERINE CANCER: Status: ACTIVE | Noted: 2018-01-02

## 2021-02-24 PROBLEM — R60.0 FACIAL EDEMA: Status: ACTIVE | Noted: 2018-12-10

## 2021-02-24 PROBLEM — C80.1 ADENOCARCINOMA: Status: ACTIVE | Noted: 2017-11-17

## 2021-02-24 PROBLEM — R93.89 ABNORMAL CXR: Status: RESOLVED | Noted: 2018-01-19 | Resolved: 2021-01-01

## 2021-02-24 NOTE — HISTORY OF PRESENT ILLNESS
[Disease: _____________________] : Disease: [unfilled] [AJCC Stage: ____] : AJCC Stage: [unfilled] [de-identified] : Serena is a rodrigue 63 yo female, who has started developing SOB approximately 2 months ago, she went to ER on 11/2/2017 and on CXR was noted to have a large right sided pleural effusion. She underwent a thoracentesis, 1.6L of fluid was drained. 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 IHC was pos for CK7, PAX8, CK5/6 and Ca125, negative for TTF-1/Napsin, calretinin, CK20, mammaglobin, p63, villin, HAM56, GCDFP15, TAY-EP4. \par \par Her visit on 12/1/2017 Serena had an excisional biopsy of cervical node on 12/13/2017 revealing met adenocarcinoma, primary source still was not clear. On 12/14/2017 Serena was admitted with SOB, Pleurx catheter was placed on 12/14/2017. 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 Serena received 1st dose of carbo (auc of 5)/taxol(175mg/m2) on 12/15/2017 without complications, but the next day sustained a fall 2/2 vasovagal episode and developed asymptomatic SAH, that resolved on imaging by 12/21/2017. \par \par 12/1/2017: Serena is here for follow up today. She had a therapeutic thoracentesis in IR on 11/27/2017, 2L of fluid were removed, with much improved respiratory status. PET CT and MRI of the brain were done on 11/29/2017, findings were discussed today. There remains no clear primary source of malignancy. We have discussed that the source of tumor may be Mullerian vs lung. regardless of source chemotherapy needs to be initiated ASAP. We have discussed Carbo/taxol regiment that will cover both Mullerian and lung origin. Side effects including myelosuppression, peripheral neuropathy, allergic reaction and others.\par \par 1/2/2017 Since last visit Serena had an excisional biopsy of cervical node on 12/13/2017 revealing met adenocarcinoma, primary source still was not clear. On 12/14/2017 Serena was admitted with SOB, Pleurx catheter was placed on 12/14/2017. 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 Serena received 1st dose of carbo (auc of 5)/taxol(175mg/m2) on 12/15/2017 without complications, but the next day sustained a fall 2/2 vasovagal episode and developed asymptomatic SAH, that resolved on imaging by 12/21/2017. Today Serena's SOB has significantly improved. She reports very mild neuropathy in fingertips only. She reports no headache and offers no other complaints. \par \par 1/26/2018: Complains of some neuropathy, otherwise tolerating chemo with no difficulty. \par \par 2/16/2018: restaging CT C/A/P reviewed, significant improvement noted. Will refer to GYN/ONC. Reports slightly worsening neuropathy, not -painful, taking Gabapentin, no other symptoms. For cycle 4 of carbo/taxol today.\par \par 3/9/2018: Serena met with Dr. Massey, she is planned for surgery on 5/7/2018, after completion of 6 cycles of carbo/taxol and restaging PET CT ordered for mid April and follow up with Dr. Massey on April 20. She reports worsening neuropathy, and occasional pain and changes in vision in her left eye, eye symptoms come and go and are not very bothersome. She reports no other symptoms. \par \par 3/30/2018; Serena is here for cycle 6 of carbo/taxol, she continues to have neuropathy in finger and toes, but offers no other complaints, chemo has been dose reduced. \par \par 4/27/2018: Results of restaging PET CT s/p 6 cycles of carbo/taxol revealed 1. No new areas of abnormal increased uptake is seen to indicate \par biologically active disease.\par 2. Persistent PET positive left thyroid mass with a max SUV 33.1, \par previously max SUV 34.8. Further evaluation with thyroid ultrasound and \par if needed fine-needle aspiration biopsy will be helpful.\par 3.   PET positive left cervical lymph nodes mainly level 2 on the left \par with a max SUV 5.47previously 18. Minimal increased uptake in the \par bilateral axillary lymph nodes most likely reactive(less than 2.5)\par 4. Weakly PET positive, max SUV 2.89 right pleura, previous max SUV 5.3 \par with non-FDG avid right pleural effusion. \par 5. Previously FDG avid right and left supraclavicular lymphadenopathy, \par left internal mammary, bilateral paratracheal, para-aortic, para \par vertebral, carinal, mesenteric, right and left iliacs, right inguinal \par lymphadenopathy, small in size and no longer FDG avid.\par 6. No abnormal increased uptake is seen in the  lobulated uterus.\par 7. Overall there is marked improvement in the FDG avid disease.\par This was reviewed with Serena. She met with Dr. Massey on 4/20/2018, surgery is planned for 5/7/2018. She will also joselyn to meet with ENT re FDG avid thyroid nodule. Will assist in making he an appointment for her. \par Persistent neuropathy on gabapentin. \par \par 6/29/18 ; Patient came for follow up visit , evaluation for chemotherapy cycle 8 of carbo / Taxol , patient tolerating medication well , except neuropathy  recommend to have gabapentin  300 mg po daily.\par \par 7/20/2018: Serena present for follow up today, she has completed total of 8 cycles of Carbo/Taxol. She reports significant neuropathy in her hands and feet. We will discontinue chemotherapy today and plan to follow with close observation. She has thyroidectomy planned with Dr. Herrera on 8/20/2018, obtaining clearance for PMD. She is also planning to fly to [place] at the end of September. She reports no SOB, no GI or  symptoms. \par \par 9/12/2018: Serena offers no significant complaints today. She states neuropathy in her hands has almost completely resolved and neuropathy in her feet is significantly better. She had undergone complete thyroidectomy by Dr. Herrera on 8/20/2018, pathology revealed papillary thyroid cancer, measuring 2 cm, pT1b, other additional foci of papillary carcinoma were also noted, no LVI, surgical margins were clear, LN 0/2 had no carcinoma, stage [id number]. She is following up with Dr. Herrera tomorrow. She has still not gone for her vacation in [place]. \par \par 10/10/2018: Restaging PET CT on 9/26/2018 reveals Since 4/16/2018,  1. Post thyroidectomy with diffuse increased FDG uptake at the thyroid  bed likely representing post-surgical changes.  2. Subtle increased uptake is seen in the lamina , right side at the  level of T8 with a max SUV 4.6. This is not sufficient for metastatic  disease. Bone scan or MRI examination with contrast will be helpful for  further evaluation.  3. No other areas of abnormal increased uptake is seen. Images were personally reviewed by myself and discussed with Serena. \par She also had an Echo on 9/24/2018 revealing EF of 63%. \par Serena reports ongoing fatigue, she is unable to lose weight. She is taking Synthroid and following with Dr. Acevedo. \par She reports stiff fingers at night only, no painful neuropathy. \par She denies any other symptoms today. \par \par 12/11/2018: Serena feels well, neuropathy in hands and feet is much improved. S he is now complaining of r-sided facial pain associated with some swelling, pain comes and goes. She has already seen Dr. Herrera, who ordered CT of sinuses and prescribed pain relief meds. She is also due for restaging PET CT. Serena feels well otherwise.  [de-identified] : KRas WT, EGFR WT, Alk WT, ROS1 WT, PDL1-1%\par Normal MMR protein expression [de-identified] : 1/9/2019: Restaging PET CT was performed on 12/18/2018 it revealed COMPARISON : 9/24/2018.  New left upper quadrant peritoneal nodule measuring 8 mm with an SUV max  of 7.3. This is suggestive of biologic tumor activity.  No other FDG avid lesions seen throughout the scan. Images were personally reviewed by myself and discussed with Nidhi, originally over the phone and again today. I have originally suggested to try AI in the interim, Nidhi however reported diarrhea at the time of the scan and declined intervention for now. We will plan for restaging PET CT to be performed in 6-8 weeks, this will be ordered today. She will follow up with Dr. Massey at [facility] shortly and bring the disk for his review. I would also like her to meet with genetics, this will be arranged at [facility] with Dr. Massey's help. Nidhi reports no new symptoms today, her neuropathy is manageable and  improving. She still reports unilateral headache that was work up in the past. Neurology eval was again suggested to her. She is following closely with Endocrinology re thyroid management. She will have follow up with Dr. Herrera shortly as well. \par \par 2/20/2019: Nidhi offers no new complaints. PET CT from 2/12/2019 revealed \par Since PET/CT of December 19, 2018, no new sites of pathologic FDG uptake\par Slight increase in size of left upper quadrant peritoneal nodule (1.1 cm, \par previously 0.8 cm) with stable FDG uptake, 7.7 SUV suspicious for \par biologic tumor activity.\par No other sites of pathologic FDG uptake \par Images were personally reviewed by myself and discussed with Nidhi, case was also discussed with Dr. Massey at [facility]. Nidhi will have follow up with his shortly. She is following with endocrinology. reports improving neuropathy. No GI or  symptoms at this time. No weight loss. \par \par 3/20/2019: Nidhi had a peritoneal nodule biopsy done at [facility], I have received a call from Dr. 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 We have discussed that surgery though could be attempted will not be the best therapeutic approach, recommencement of systemic therapy was discussed. I have not received the results from [facility] yet. I have briefly discussed this with Nidhi today. Nidhi reports that she has acutely developed right lower extremity weakness 5-6 days ago, she did not inform any of her doctors about this. She also reports that her R upper extremity though not weak "does not feel normal either. She reports no back pain, there is no point tenderness, RLE is objectively weak on exam. I recommend ER evaluation to r/o CVA vs brain met, vs L-spine related issue. Recommend admission for work up. Nidhi is agreeable to get admitted. \par \par 5/8/2019: Nidhi present for follow up, she was diagnosed with multiple metastasis to the brain, MRI was done on 3/21/2019 and revealed \par Multiple supratentorial heterogeneously enhancing lesions consistent with \par metastatic disease. The appearance on the T2-weighted images is suggestive \par of adenocarcinoma. There is mass effect upon the left lateral ventricle and \par corpus callosum. \par She received whole brain irradiation by Dr. Dahl, completed it in the beginning of 4/2019, she is currently on steroid taper managed by Dr. Dahl, she is taking 4mg of Decadron daily. She will be stopping the Keppra as there have been no documented h/o seizures. She still reports impairment of the L visual field, she has an appointment coming up with Opthalmology. She reports no deficits walking after she has completed inpatient acute rehabilitation. \par Restaging PET CT on 5/1/2019 reveals COMPARISON : 2/12/2019 \par Multiple new FDG avid omental nodules largest measuring up to 10 mm, \par positive on nonattenuation corrected images. Findings are suggestive of \par biologic tumor activity. \par Again seen is a left upper quadrant peritoneal nodule, SUV max 6.8, \par previously 7.7 (12% decrease). \par Images were personally reviewed by myself and discussed with patient. \par She now reports mild abdominal bloating, no other symptoms. She reports her neuropathy has significantly improved. \par She has first evidence of recurrent disease documented 5.5 months after last carbo/taxol dose, the volume of disease was very small (1 8mm nodule), she was then observed for another 6 months and now she wishes to restart the chemotherapy. \par I have discussed with her that rechallenging with carbo/taxol may still prove to be effective as long as she gets restaged after 2 cycles. Given recent whole brain radiation and neuropathy I will offer her carbo/taxol weekly with does reduction on the taxol for neuropathy, We will plan to run carbo slowly to avoid allergic reaction. \par She is agreeable to start ASAP. \par \par 6/5/2019: Nidhi has completed 1 cycle of weekly Carbo/taxol, ran at slow rate to avoid Carbo reaction and has tolerated chemotherapy without difficulty, she does not complain of increase in neuropathy. She has ongoing vision changes in her L eye, she had no residual LE weakness. She is off Decadron and Keppra. She was advised to see ophthalmology. \par \par 7/3/2019: Nidhi is doing well.  Reports no problems with carbo/taxol.  Still complains of vision changes in L eye but is seeing ophthalmologist on 7/16.  Remains active around the house and cooks regularly.  No fevers, weight loss, anorexia.  ROS is otherwise only significant for occasional loose stools, no diarrhea.\par \par 7/17/2019: Nidhi is doing well, denies worsening neuropathy. She c/o feeling fatigued and tired. Her last chemo was 1 week ago(7/11/19) with carbo/taxol and is due again tomorrow. She saw ophthalmology and will need cataract surgery of the L eye. No c/o chest pain/SOB. No weight loss.\par CT C/A/P was done on 7/9/2019, images were personally reviewed by myself and discussed with several radiology attendings, they revealed \par CHEST:\par Filling defect within a segmental branch of the lower lobe pulmonary artery \par suspicious for pulmonary embolus. \par Stable left upper lobe and right middle lobe 3 mm nodules. \par CT abdomen and pelvis performed on the same day, see separate report. \par ADDENDUM: \par Please note that the morphology of the small thrombus within the left lower \par pulmonary artery is not indicative of an acute thrombus but rather a chronic \par appearance. \par This was discussed with Radiology, it was not deemed that thrombus was acute and may not have even been present at all, finding was deemed to be equivocal, based on radiology assessment anticoagulation was not indicated for the filling defect noted. Initially CTA was recommended, the recommendation was withdrawn upon further review. \par ABDOMEN/PELVIS:\par New 1.3 cm segment IV hepatic hypodensity seen on series 4 image 205. \par Lesion not seen on prior PET/CT from 5/1/2019 or abdomen and pelvis CT from \par \par 2/2/2018 and is consistent with a new metastasis. \par Two other hepatic lesions described above, decreased in size since 2/2/2018, \par consistent with treated hepatic metastases. \par Anterior perisplenic omental nodule measures 0.7 cm on series 2 image 53, \par previously measuring 1.2 cm on PET/CT dated 5/1/2019. \par All the other previously seen omental nodules have resolved since 5/1/2019. \par ADDENDUM:\par Case was discussed with Dr. Cid in detail. It is also possible that the \par new 1.3 cm lesion in the liver since February 2, 2018 represents a treated \par metastasis similar to the other liver lesions. \par PET/CT would be necessary to assess disease activity. \par I have discussed case with Radiology, and it was determined that there was no clear cut evidence of progression. PET CT was ordered today.\par This was discussed with Nidhi, will proceed with Carbo/Taxol for now. \par \par 8/1/2019: Nidhi reports no major side effects from weekly Carbo/Taxol, she reports no neuropathy. She has completed 10 cycles altogether. \par PET CT on 7/24/2019 revealed \par 1. Since May 1, 2019, no definite new site of pathologic FDG uptake to \par suggest new biologic tumor activity; specifically, no pathologic FDG uptake \par within previously noted 1.3 cm lesion within hepatic segment 4. \par 2. Increased FDG uptake within several subcentimeter bilateral cervical \par lymph nodes, which is nonspecific and may be postinflammatory; max SUV 9.2 \par is noted within a 0.7 cm right level 2 cervical node. Attention on follow-up \par is suggested. \par 3. Few peritoneal nodules have overall decreased in size. \par 4. No additional site of pathologic FDG uptake. \par Images were personally reviewed by myself and discussed with Nidhi. \par She wishes to continue with chemotherapy. Will plan for 3 additional cycles. Will consider adding Avastin, but with h/o inconclusive/possible chronic PE ppx anticoagulation maybe necessary. \par \par 8/28/2019: Nidhi reports feeling well, she denies worsening neuropathy, worsening neurological symptoms, SOB, abdominal pain, bloating. She reports she is planned to undergo  eye surgery towards the end of September. We will plan to hold chemotherapy briefly after this cycle to facilitate adequate counts and minimize complications. We again have briefly discussed considering Avastin based therapy, will hold off on this prior to surgery. 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 May proceed without formal PAST, will check CBC prior to procedure, PT and PTT today. \par \par 9/25/19:Nidhi reports feeling well, she denies any changes since last visit, No SOB, abdominal pain, bloating. She reports she is planned to undergo  eye surgery in 9/30/19  CBC reviewed with normal Hemogram . we will hold chem this week , she will resume after Cataract sx . 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 May proceed without formal PAST, will check CBC prior to procedure, PT and PTT today. \par \par 10/4/2019: Nidhi underwent successful eye surgery on 9/30/2019, she reports she can see much better now. Last dose of chemotherapy was administered on 9/20, she then was on hold for surgery. She has completed 5 additional cycles of Carbo/taxol. She is due for restaging. Her disease is only accurately assessed on PET CT, prior attempts to obtain CT scans resulted in additional imaging with PET, as findings were inconclusive. PET CT will be ordered to restage. \par In addition she is due for MRI of the brain. She has had a small amount of weight loss, mild neuropathy is persistent. She offers no additional complaints. \par She will have port placed on Monday, will hold chemotherapy until restaging scans complete. \par \par 10/23/2019: Nidhi feels well, and denies any new symptoms. Her eye surgery was successful, vision is much improved now. She had restaging scans. \par PET CT on 10/18/2019 revealed COMPARISON : 7/24/2019. \par No pathologic uptake to suggest biologic tumor activity. \par MRI of the brain on 10/15/2019 revealed \par Comparison with June 8 and March 21, 2019: (Generally lesions improved \par markedly since March but slightly increased in size since June) \par 1. Lesions previously demonstrated on the study of June 8, 2019 has \par remained stable or minimally increased in size \par 2. Specifically, right frontal opercular lesion measures about 1 cm and \par left posterior inferior temporal lobe lesion measures about 1 cm. These have \par increased since the previous study. \par 3. Small punctiform lesions within the right frontal white matter and left \par frontal sulcus or more apparent than on the study of June 8 \par 4. These lesions are all much smaller than the earlier MRI of March 21, \par 2019. \par 5. No new lesions. \par Images were personally reviewed by myself and discussed with patient. She remains asymptomatic and off the steroids. \par I have also discussed the case with Dr. Dahl. I would like Nidhi to discuss the options with [facility], she maybe a candidate for brain SRS. \par She has been off Carbo/Taxol for a few weeks, she remains Platinum sensitive. I have discussed the option of switching her over to Avastin maintenance, will need to prophylax with low dose Eliquis as well, given questionable finding of small PE in the past. \par Side effects of Avastin were discussed at length, including HNT, proteinuria, small risk of DVT/PE, GI perforations etc. \par She is agreeable to start after [facility] consultation. \par \par 11/5/2019: Nidhi feels OK, she reports acute onset head discomfort that lasts minutes and subsides, the episodes are becoming more frequent 1-2 times a day. She has seen Dr. Dahl, who wishes to hold off on offering SRS to the brain. Plan was to start on Avastin today. Nidhi reports she currently has a lapse in her insurance coverage till 12/2019, I will not be able to start he on ppx dose Eliquis for now, she instead will take baby ASA every other day, to modify risk of DVT/PE. Will plan to switch over to Eliquis at 2.5mg BID once insurance is active. She also contemplated going to [place] with her daughter, I am willing to hold Avastin until she comes back, but Nidhi wishes to start right away, I explained to her that flight may result in complications if that is her wish. She will cancel the trip and start with Avastin today. Side effects have been discussed. \par \par 11/20/2019: Nidhi came for a follow up visit, she reports feeling well, she reports less fatigue. She is s/p first dose of Avastin on 11/5/2019, she tolerated Avastin without difficulty, she is due for the 2nd dose today. \par We communicated CBC result with HGB of 12.3, normal platelet and WBCs. She lost like 5 pounds secondary to gum problems, she will follow up with her dentist this week. She is currently on baby ASA every other day. Continue with single agent Avastin.  \par \par 12/17/2019: Nidhi came for a follow up visit, she reports feeling well, she experienced pruritic rash on last Saturday 12/14/19, which improved over 2 days with Benadryl alone.  Today her skin rash has completely resolved. We will add Solu-Medrol 100 mg IV prior to Avastin. Nidhi reports less fatigue. She is due for the 3rd dose of Avastin today. \par We communicated CBC result with HGB of 12.3, normal platelet and WBCs. \par \par 1/7/2019: Nidhi is doing well, reports good appetite, she has lost 1lb. Reports no GI or pulmonary symptoms. She reports her gums are bleeding occasionally. No new neurological symptoms. She reports no rash after last cycle of Avastin. \par \par 1/28/2020: Nidhi is here for follow up visit, she has no difficulty tolerating Avastin, she reports L sided chronic eye discomfort, neuropathic in nature. Neurontin has been helpful in the past, will reorder this. \par PET CT on 1/22/2020 revealed COMPARISON : 10/18/2019. \par Anterior perisplenic nodule measuring 8 mm is FDG avid, SUV max 8.9, \par consistent with biologic tumor activity. \par MRI of the brain on 1/15/2020 revealed \par In comparison to the previous brain MRI dated 10/15/2019: \par 1. Redemonstrated scattered enhancing lesions consistent with metastatic \par disease, with overall good treatment response since the prior exam. \par 2. No significant interval change in the ovoid lesion in the superior left \par frontal lobe measuring 12 mm. \par 3. Decreased size of the right opercular lesion measuring 6 mm, previously \par 11 mm. Decreased size of the left inferior occipital/tentorial lesion \par measuring 6 mm, previously 11 mm. The previously seen right frontal white \par matter punctate lesion is no longer visualized. \par 4. No new lesions are demonstrated. \par All images were personally reviewed by myself and discussed with Nidhi. I explained to her I am concerned about the perisplenic lesion concerning for disease progression, but it is isolated and very small, asymptomatic. There is definitely a positive response to Avastin in the brain and given that, we will continue with Avastin with short term follow up imaging. Nidhi knows to inform me with any new symptoms immediately. \par \par 2/18/2020: NIDHI BANKS is a 64 year old female here today for follow up visit. She completed 5 cycles of Avastin; tolerating well at this time. She denies fever, chills, change in mental status, or change in weight. She complains of left painful facial swelling. In addition, she complains of swelling of fingers, joint pain and numbness in her toes. She continues on Gabapentin 100mg TID with no symptom relief and wishes to try to go up on the dose. She complains of mild gingival bleeding in the morning. Last PET showed new isolated perisplenic lesion suspicious for disease progression, however MRI of the brain appeared better on Avastin, so the plan was to continue with Avastin with short term follow up. \par \par 3/10/2020: NIDHI BANKS is a 64 year old female here today for follow up visit. She denies new neurological symptoms, skin rash, fever, or change in weight. She continues of Gabapentin which was increased to 200 mg TID; neuropathy improving. She continues to complain of mild gingival bleeding. Left facial swelling and numbness resolved. She has completed cycle 6 of Avastin without complication. \par \par 3/31/21710: Nidhi presented today for routine f/u, she feels fine. Denies any chest pain, sob, fever, chills and cough. Reports that she continues to have gum bleeding. Neuropathy is better with gabapentin, she decreased the dose to 4 pills a day. She tolerated 7th cycle well and is due to get 8th cycle today . She will get MRI brain on 4/2/20.\par She reports minor gum bleeding from Avastin and dental infection on the left, will prescribe Amoxicillin. \par \par 4/21/2020: NIDHI BANKS is a 64 year old female here today for follow up visit for endometrial cancer. S/p cycle 8 of Avastin. Patient denies fever, chills, SOB, cough and pain. She states that her gums have minimal bleeding and dental infection improved after amoxicillin. She complains of mild fatigue. Restaging MRI of the brain was completed on 4/2/2020 which showed stable bilateral parenchymal hemorrhagic lesions compatible with known metastases; no new enhancing lesions are identified. \par Images were personally reviewed by MD Jose Roberto and discussed with patient. \par \par 5/12/2020: MRI brain on 4/2/2020 reveals 1. Stable bilateral parenchymal hemorrhagic lesions compatible with known \par metastases. \par 2. No new enhancing lesions are identified. \par PET CT on 4/30/2020 revealed Right upper quadrant peritoneal implant adjacent to the inferior tip of the \par liver 4.7 (image [id number]) \par Right lower quadrant 0.5 cm peritoneal nodule 7.2 (image 111) \par Right lower quadrant subcentimeter peritoneal nodule 2.6 (image [id number]) \par Anterior perisplenic peritoneal nodule 12.3-48% increase from 8.9 \par (remeasured) previously \par Stable non-FDG avid (attenuation corrected and nonattenuation corrected \par images) 4 mm right middle lobe pulmonary nodule-image 181) \par  No other definite sites of abnormal FDG uptake \par IMPRESSION: \par Compared to 1/22/2020: \par new FDG avid peritoneal implants in right lower quadrant of the abdomen \par suspicious for biologic tumor activity \par 48% interval increase in SUV in anterior perisplenic peritoneal nodule (SUV \par 12.3 vs 8.9 remeasured) \par In retrospect stable FDG avid peritoneal implant in the right upper quadrant \par of the abdomen (SUV 4.7 vs 3.8 remeasured-image 134) \par No other definite sites of abnormal FDG uptake \par Images were personally reviewed by MD Jose Roberto and discussed with patient.\par Given minimal degree of progression in the abdomen and persistent control intracranially, we have discussed continuing Avastin for now and short term restaging. \par Nidhi offers no complaints today. \par \par 6/2/2020: The patient is here for follow up. She is due for Avastin today . She has  no major complaints . She reported gum bleeding and swelling, which is much better today . She also reported hand swelling bilaterally. She denies fever, chills, no respiratory, cardiac, GI/ symptoms. She denies headaches or neurological deficits. \par \par 6/23/2020: Nidhi is 65 years old female came for a follow up visit for papillary-serous endometrial carcinoma with brain metastasis.  She reported feeling well. \par She denies dizziness, change of mental status, fever, chills, SOB. 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  She reported feeling well. \par She denies dizziness, change of mental status, fever, chills, SOB. \par We reviewed MRI of the brain from 7/5/20 that revealed \par The study is considered stable since April 2, 2020 with the following \par findings: 1. 3 lesions are being followed, in the left frontal, right frontal and left occipital lobes. 2. The lesions are essentially stable, nonenhancing and probably mildly hemorrhagic 3. A punctiform lesion in the right frontal lobe (series 11 image 7) was probably present on the prior study. This is stable and probably not new. 4. Any differences in size are probably due to differences in the way the patient was scanned. Images were personally reviewed by MD Jose Roberto and discussed with patient. We will continue the current treatment with  Avastin one every 3 weeks.  Patient reports improvement in neuropathy with gabapentin 200 mg every 12 hrs.Patient is due for repeat PET Scan at the end of July,2020.\par \par 8/4/2020: NIDHI BANKS is a 65 year old female here today for follow up visit for endometrial cancer. S/p cycle 11 of Avastin. Patient denies fever, chills, SOB, cough and pain.\par PET CT from 7/29/2020 revealed \par Compared to 4/30/2020: \par 1 new FDG avid near midline peritoneal implant (SUV 4.3) consistent with biologic tumor activity \par 1 new left cervical level V FDG avid lymph node (SUV 8.7) suspicious for biologic tumor activity in light of the history of papillary thyroid carcinoma \par Interval increases in SUV: 115% increase in SUV in right lower quadrant peritoneal implant \par 97% increase in SUV in right upper quadrant peritoneal implant -66% increase in SUV in level 3 cervical lymph node \par Interval 36% decrease in SUV in anterior perisplenic nodule (7.9 vs 12.3) \par Stable FDG avid right upper quadrant implants and right level 5 cervical lymph node \par No other sites of abnormal FDG uptake \par We discussed other different treatment option.\par Images were personally reviewed by MD Jose Roberto and discussed with patient.\par We have discussed that there is undeniable progression systemically but still benefit in the CNS. I will review her chart and determine if restarting weekly carbo/taxol is of value, vs adding an additional agent vs starting on Keytruda/Lenvima is at this point indicated. \par Patient will continue with one additional dose of Avastin today.\par \par 8/25/2020: Nidhi is feeling well, no complaints today. At this point given minimal but sustained progression we will plan to switch over to Kaytruda combination with Lenvima. \par We have gone over side effects of immunotherapy at length, including but not limited to autoimmune mediated pneumonitis, enteritis, dermatitis, thyroiditis, damage to the kidneys, liver, pancreas, pituitary gland etc. patient and family voice understanding and are in agreement to proceed with treatment.\par Side effects of Lenvima including, but not limited to, cytopenias, that may require blood product transfusions and lead to increased risk of infection, requiring hospitalization, allergic reactions, that can rarely be life-threatening, skin reactions, nausea/vomiting, mucositis, diarrhea/constipation, QT prolongation, GI perforation, HTN etc were discussed at length, patient and family voice undestaging, all questions were answered to patient's satisfaction.\par \par 9/15/20 Nidhi presented today for f/u and to get treatment . On last visit she was started on keytruda and lenvima . She was told to start with 10 mg a day , but she started taking 20 mg a day . Developed diffuse muscle aches and pains , reports has pain in her mouth as well . Also developed changes in her voice , its becoming more and more hoarse now . Also reported pain in her left shoulder , which is improved now . She has been coming for wkly CBC , since she started treatment , counts have been stable . Had a detailed discussion with Nidhi, her symptoms are likely from treatment with lenvima , she should have escalated the dose from 10 mg to 20 mg , if it was well tolerated , since she started taking 20 mg daily ,  that’s why she has severe adverse effects. She is recommended to hold off on treatment for 4 days and then restart it at 10 mg daily . She demonstrated understanding . Will proceed with keytruda today . \par \par 10/6/20:  Patient here for follow up visit for stage IV endometrial cancer.  She is taking lower dose of Lenvima, 10 mg.  She is complaining of hoarseness and mouth soreness over the last couple of weeks.  She also has left arm and shoulder weakness.  Although she admits she feels somewhat better on lower dose of Lenvima.  She is very fatigued and does very little activity during the day and is losing weight.\par 10/20/2020: NIDHI is here for follow up visit for endometrial cancer. She was on dose reduced Lenvima co currently with Keytruda. She developed mouth sores and complications from Lenvima. She states that she is feeling well today. We contemplated starting on Doxil but she states she is feeling better after stopping Lenvima. Repeat PET from 10/16/2020 showed:  \par IMPRESSION:\par 1.  Since July 29, 2020, increased FDG uptake in multiple peritoneal nodules/tumor implants (max SUV 12.9 in a left upper quadrant nodule, reflecting 63% increase).\par 2.  Resolved FDG avid left cervical lymph nodes.\par 3.  No definite new sites of abnormal FDG uptake.\par 11/17/2020: Nidhi is feeling great, reports no complaints at all, she has gained 2 pounds, reports good appetite. CBC was reviewed. She will continue with Keytruda single agent, she had significant difficulty tolerating Lenvima even at doses lower than recommended. We will defer mammogram for now. \par 12/8/2020: Nidhi is here for a follow up visit, she reports feeling well. She offers no complaints at all today, she has gained 2 pounds, reports good appetite. CBC was reviewed. She will continue with Keytruda single agent.\par We reviewed MRI of the Brain 12/2/20:\par 1. Redemonstrated multiple enhancing lesions consistent with metastatic disease. Since the prior exam dated 7/2/2020\par 2. Slightly increased size of the right parietal lesion measuring 4.5 mm, previous\par \par 12/29/2020\par Nidhi is here for a follow up visit , she reports feeling tired , generalized weakness, she experienced episode of change of Mental status this week, also she lost 9 pounds from last visit. Patient is schedule for MRI of the Brain in 12/30/20. Also she reports mild pruritic Rash i\par We will Hold Immunotherapy for today , Reassess post MRI of the Brain. \par \par 1/6/21\par Patient here for metastatic endometrial cancer.  She remains weak, is complaining of symptoms of mucositis.  She is taking Decadron BID which is helping she says.  Her appetite has been better.  Patient denies cough, shortness of breath, fever, chills, night sweats or bone pain.  She denies any headache or dizziness. \par \par 2/2/2021: Nidhi presents for follow up today, she reports feeling weak, reports mouth discomfort. She has been taking Decadron at 4mg BID, at this point we will taper is first to 6mg daily for 3 days then to 4mg in AM for another week. We will also prescribe Nystatin swish for thrush. \par PET CT on 1/26/2021 revealed \par Since October 15, 2020:\par 1. New and increased FDG-avid peritoneal nodules/tumor implants, catalogued above, with max SUV 13.2 within new midline peritoneal 1.9 x 1.3 cm nodule.\par 2. Previously described left upper quadrant 1 cm nodule with max SUV 7.5, previously 12.9 (42% decrease).\par 3. No additional sites of pathologic FDG uptake. \par MRI brain on 12/30/2020 revealed \par Stable to slightly more prominent multiple intracranial metastasis as above.\par No acute territorial infarct, intracranial hemorrhage, hydrocephalus, or extra-axial fluid collection.\par Images were personally reviewed by MD Jose Roberto and discussed with patient and family.\par After reviewing images given minimal areas of progression while of Decadron Nidhi wishes to stay on Keytruda and try to taper of Decadron. Avastin has been added top the regiment. I have explained to Nidhi that progression is noted and I would like to offer to switch in therapy. \par We will continue with Keytruda/Avastin with plan for short term follow up scan. \par Taper Decadron to 6mg daily with plan to taper to 4 mg daily in 3-5 days if tolerated. \par \par 2/16/2021: Nidhi is here for follow up, she is feeling extremely weak, unable to walk, I am concerned with progression in the brain, she will be referred to ED today, she is agreeable to go.

## 2021-02-24 NOTE — RESULTS/DATA
[FreeTextEntry1] : EXAM:  MR BRAIN WAW IC      \par \par \par PROCEDURE DATE:  06/08/2019  \par \par \par \par \par INTERPRETATION:  Clinical History / Reason for exam: Dizziness and \par vertigo, history of uterine and thyroid cancer, for evaluation of \par metastatic disease, lesion seen on prior MRI of the brain.\par \par MRI OF THE BRAIN WITHOUT AND WITH CONTRAST\par \par TECHNIQUE:\par \par Multiplanar multisequence imaging of the brain was performed on the \[facility] open magnet before and after the intravenous administration of \par 7.5 cc of Gadavist including brain lab protocol.\par \par COMPARISON:\par \par MRI of the brain without and with contrast dated March 12, 2019.\par \par FINDINGS:\par \par The previously described supratentorial lesions have almost completely \par resolved.\par \par The lesion in the left posterior frontal region now measures 1.4 cm in \par maximum diameter, the lesion in the anterior right frontal lobe measures \par 0.7 cm in maximum diameter, the second lesion in the right frontal lobe \par measures 0.1 cm in maximum diameter, the lesion in the right posterior \par parietal region measures 0.3 cm in maximum diameter, and the left \par occipital lobe lesion measures 1.2 cm in maximum diameter. (Previously \par measuring 2.6, 2.4, 0.5, 0.7, and 2.5 cm respectively).\par \par The third, fourth, and lateral ventricles are normal in size and \par position. There is no shift of the midline structures.\par \par The cerebellar tonsils are minimally low-lying (0.3 cm of cerebellar \par ectopia).\par \par Incidental note is made of a tiny medial cyst.\par \par There are scattered T2/FLAIR hyperintense signal intensities in the\par subcortical white matter which are nonspecific differential diagnostic\par possibilities include chronic ischemic change, foci of gliosis or\par demyelination.\par \par IMPRESSION:\par \par In comparison with the prior MRI of the brain dated March 21, 2019:\par \par There has been almost complete resolution of most of of the previously \par described supratentorial lesions.\par \par There are no new enhancing lesions.\par \par \par \par \par \par \par CRISTINA MÉNDEZ M.D., ATTENDING RADIOLOGIST\par This document has been electronically signed. Oren 10 2019  1:17PM\par   \par \par   \par \par \par [id number]^RI^DC\par \par EXAM:  PETCT SKUL-THI ONC FDG SUBS      \par \par \par PROCEDURE DATE:  05/01/2019  \par \par \par \par \par INTERPRETATION:  \par FDG PET CT STUDY   Subsequent  treatment strategy\par REASON: TUMOR IMAGING - PET with concurrently acquired CT for attenuation \par correction and anatomic localization; skull base to mid - thigh .\par \par CPT code 33993.\par \par Fasting blood glucose level:     91 mg/dl\par \par HISTORY: Endometrial cancer. Stage IV with brain metastatic disease.\par \par TECHNIQUE: Approximately 45 minutes after the intravenous administration \par of 10.7 mCi 18-Fluorine FDG, whole body PET images were acquired from \par base of skull to mid - thigh.\par \par CT protocol used for this PET/CT study is designed for attenuation \par correction and anatomic localization of PET findings.  This  CT \par is not designed to replace state-of-the-art diagnostic CT scans for \par specific imaging protocols of different body parts.\par \par COMPARISON : 2/12/2019.\par Correlation: MRI of the brain on 3/21/2019.\par \par FINDINGS:\par \par Head/Neck: No biologically active head/neck lesions.     \par Normal uptake within the brain, pharyngeal lymphoid tissue, salivary \par glands and laryngeal musculature is noted.    \par \par Thorax:   No suspicious hypermetabolic mediastinal, hilar, lung \par parenchymal lesions.\par \par Abdomen/Pelvis: Physiologic GI/  activity. \par \par Again seen is a left upper quadrant peritoneal nodule, SUV max 6.8, \par previously 7.7 (12% decrease). \par \par Multiple new FDG avid omental nodules largest of which is adjacent to the \par distal transverse colon, measuring 10 mm, positive on nonattenuation \par corrected images, axial image 134. \par \par No other abnormal visceral or guillaume tracer uptake is present.    \par \par Musculoskeletal :  No suspicious hypermetabolic osseous lesions.\par \par Additional CT findings:\par Status post hysterectomy.\par Colonic diverticulosis.\par \par IMPRESSION: \par \par COMPARISON : 2/12/2019\par \par Multiple new FDG avid omental nodules largest measuring up to 10 mm, \par positive on nonattenuation corrected images. Findings are suggestive of \par biologic tumor activity.\par \par Again seen is a left upper quadrant peritoneal nodule, SUV max 6.8, \par previously 7.7 (12% decrease). \par \par \par  MR BRAIN WAW IC\par \par \par PROCEDURE DATE: 12/30/2020\par \par \par \par \par INTERPRETATION: CLINICAL INDICATION: Intracranial metastasis. History of endometrial adenocarcinoma.\par \par TECHNIQUE: Multi-planar multi-sequential MR imaging of the brain was performed before and after the intravenous administration of 6 ml of Gadavist. 1.5 mL was discarded.\par \par COMPARISON: MRI of the brain dated 12/2/2020.\par \par FINDINGS:\par \par There is redemonstration of multiple enhancing intracranial metastasis, some of which with intrinsic T1 signal as follows:\par * Stable 1.1 cm lesion in the medial superior left frontal lobe.\par * Stable 0.8 cm lesion in the right frontal operculum.\par * Stable 0.8 cm lesion in the inferior left occipital lobe.\par * Stable punctate lesion in the right frontal white matter.\par * Stable 0.5 cm cortical lesion in the lateral left frontal lobe.\par * Slightly more prominent 0.5 cm lesion in the medial right parietal lobe.\par * Slightly more prominent 0.6 cm cortical lesion in the right temporal lobe.\par \par There is prominence of the sulci, sylvian fissures, and ventricles, reflecting stable mild diffuse parenchymal volume loss.\par \par There are scattered patchy T2/FLAIR hyperintensities in the periventricular cerebral white matter, consistent with mild left moderate chronic microvascular ischemic changes.\par \par No acute infarction or intracranial hemorrhage.\par \par The ventricles are normal without evidence of hydrocephalus. There are no extra-axial fluid collections. The skull base flow voids are present.\par \par The visualized intraorbital contents are normal. The imaged portions of the paranasal sinuses are clear. The mastoid air cells are clear. The visualized soft tissues and osseous structures appear normal.\par \par \par IMPRESSION:\par \par Stable to slightly more prominent multiple intracranial metastasis as above.\par \par No acute territorial infarct, intracranial hemorrhage, hydrocephalus, or extra-axial fluid collection.\par \par \par \par \par \par \par \par

## 2021-02-24 NOTE — PHYSICAL EXAM
[Restricted in physically strenuous activity but ambulatory and able to carry out work of a light or sedentary nature] : Status 1- Restricted in physically strenuous activity but ambulatory and able to carry out work of a light or sedentary nature, e.g., light house work, office work [Normal] : affect appropriate [de-identified] : pale, chronically ill [de-identified] : B/L cataracts [de-identified] : arthritic changes of the fingers  [de-identified] : No mucosal ulcers noted

## 2021-02-24 NOTE — PHYSICAL EXAM
[Restricted in physically strenuous activity but ambulatory and able to carry out work of a light or sedentary nature] : Status 1- Restricted in physically strenuous activity but ambulatory and able to carry out work of a light or sedentary nature, e.g., light house work, office work [Normal] : affect appropriate [de-identified] : pale, chronically ill [de-identified] : B/L cataracts [de-identified] : arthritic changes of the fingers  [de-identified] : No mucosal ulcers noted

## 2021-02-24 NOTE — ASSESSMENT
[FreeTextEntry1] : Papillary- serous endometrial cancer, stage IV\par --PET CT on 11/29/2017 revealed no clear GYN origin, extensive KEVIN, uptake in the right lung and pleura, thyroid nodule\par --Malignant pleural effusion, resolved\par --L side PleurX catheter was removed on 2/8/2018\par --Completed cycle 8  of carbo/taxol on 6/29/2018.  2 cycles were administered  postoperatively.\par --Restaging PET CT on 9/26/2018 (3 months post completion of chemo 6/29/2018)was essentially negative for recurrent disease\par --Restaging PET CT on 12/18/2018 reveals   New left upper quadrant peritoneal nodule measuring 8 mm with an SUV max  of 7.3. This is suggestive of biologic tumor activity.  No other FDG avid lesions seen throughout the scan.\par --Restaging PET CT on 2/12/2019 revealed slight increase in size of left upper quadrant peritoneal nodule (1.1 cm, \par previously 0.8 cm) with stable FDG uptake, 7.7 SUV suspicious for biologic tumor activity.\par --Case was discussed with Dr. Massey at [place], biopsy of peritoneal nodule is positive for recurrent endometrial cancer.\par --MRI brain on 4/23/2019 revealed multiple brain mets\par --S/p whole brain radiation completed 4/2019, required acute rehab\par --PET CT on 5/1/2019 subsequently revealed further disease progression \par --Restarted weekly carbo/taxol 3 on 1 off on 5/9/2019, continue with weekly Carbo/Taxol for now.\par --Off Decadron and Keppra since 6/5/2019 \par --Restaging brain MRI on 6/8/2019 showed almost complete resolution of most supratentorial lesions and no new lesions.\par --Restaging CT C/A/P on 7/9/2019 did not reveal definite progression, there was a questionable filling defect suspicious for possible chronic PE/artifact, this was discussed with Radiology at length anticoagulation was not deemed to be necessary\par --PET CT on 7/24/2019 revealed positive response to therapy \par --Carbo/Taxol stopped, last dose 9/13/2019, on hold since, she remains platinum sensitive \par --Restaging PET CT on 10/18/2019 is NAD\par --Restaging MRI of the brain on 10/15/2019 is suggestive for possible disease progression, no new lesions, she remains asymptomatic and off steroids, will follow closely \par --She was referred to [facility] for brain SRS consideration, close observation for now \par --Started on Avastin maintenance 11/5/2019, will consider adding Eliquis ppx for DVT/ PE, for now she will take baby ASA every other day  \par --We sent for prednisone 10 mg po if needed for additional skin issues\par --Patient well aware to contact us if any side effect developed\par --MRI of the brain 1/15/2020 reveals overall good response to Avastin\par --Restaging PET CT on 1/22/2020 reveals single small perisplenic area of activity, suspicious for disease progression, asymptomatic \par --Proceed with cycle 10 of Avastin on 5/12/2020 after weighing risks/benefits with plan for short term follow up imaging \par -- Reviewed  MRI of brain on 7/5/20  showed stable metastases; no new enhancing lesions are identified.\par -- Restaging PET CT on 4/30/2020 reveals minimal progression in the abdomen and pelvis\par -- Other options discussed is switching to Keytruda/Lenvima combination, this is a consideration in the future \par -- We will c/w with Avastin for now since it is working in the brain and she has a minimal progression in abdomen. \par --PET CT from 7/29/2020 reveals additional evidence of progression, minimal and not symptomatic\par --Last dose of Avastin on 8/4/2020\par -- Was started on Lenvima/Keytruda 8/25/2020\par --Unable to tolerate 20 mg daily, recommended to hold off on treatment for 4 days , and then restart at 10 mg daily after 4 days if feeling fine.\par --She has help Lenvima for two weeks since 10.6.2020 She states that her s/e have greatly improved and feels well. We will stop Lenvima \par -- PET CT on 10/14/2020 revealed mixed response with no new sites of disease \par -- We discussed treatment options including changing to Doxil or proceeding with single Keytruda at this time. \par -- She will continue with Keytruda every three weeks. Reimaging study in two months \par --CBC reviewed, WNL TSH CMP\par -- Arthritic changes noted to hands- possibly secondary to immunotherapy- will continue to observe \par --we reviewed  MRI brain ordered with mild progressing in one of the brain lesion. \par --On 2/2/2021, Avastin was added to Keytruda in the setting of CNS progression, continue with Keytruda/Avastin with short term follow up with plan to switch therapy if progression is sustained\par --MRI brain on 2/17/2021 with improved results\par --On 2/23/21 detailed Deacadron taper plan was provided for patient\par --Add Nystatin mouthwash and magic mouthwash \par --Add Diflucan 200 mg po daily for 7 days\par --On 2/23/2021 patient wishes to continue with Keytruda/Avastin \par \par Papillary thyroid cancer [id number], 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 Herrera \par --Follow up with Dr. Acevedo of endocrinology; he is addressing vitamin D, thyroid and diabetes\par \par Cataract of eye\par -- S/p successful surgery on 9/30/19 \par \par Follow up in 3 weeks\par Patient seen and examined by Dr Cid who agreed for the above plan of care. \par

## 2021-02-24 NOTE — RESULTS/DATA
[FreeTextEntry1] : EXAM:  MR BRAIN WAW IC      \par \par \par PROCEDURE DATE:  06/08/2019  \par \par \par \par \par INTERPRETATION:  Clinical History / Reason for exam: Dizziness and \par vertigo, history of uterine and thyroid cancer, for evaluation of \par metastatic disease, lesion seen on prior MRI of the brain.\par \par MRI OF THE BRAIN WITHOUT AND WITH CONTRAST\par \par TECHNIQUE:\par \par Multiplanar multisequence imaging of the brain was performed on the \[facility] open magnet before and after the intravenous administration of \par 7.5 cc of Gadavist including brain lab protocol.\par \par COMPARISON:\par \par MRI of the brain without and with contrast dated March 12, 2019.\par \par FINDINGS:\par \par The previously described supratentorial lesions have almost completely \par resolved.\par \par The lesion in the left posterior frontal region now measures 1.4 cm in \par maximum diameter, the lesion in the anterior right frontal lobe measures \par 0.7 cm in maximum diameter, the second lesion in the right frontal lobe \par measures 0.1 cm in maximum diameter, the lesion in the right posterior \par parietal region measures 0.3 cm in maximum diameter, and the left \par occipital lobe lesion measures 1.2 cm in maximum diameter. (Previously \par measuring 2.6, 2.4, 0.5, 0.7, and 2.5 cm respectively).\par \par The third, fourth, and lateral ventricles are normal in size and \par position. There is no shift of the midline structures.\par \par The cerebellar tonsils are minimally low-lying (0.3 cm of cerebellar \par ectopia).\par \par Incidental note is made of a tiny medial cyst.\par \par There are scattered T2/FLAIR hyperintense signal intensities in the\par subcortical white matter which are nonspecific differential diagnostic\par possibilities include chronic ischemic change, foci of gliosis or\par demyelination.\par \par IMPRESSION:\par \par In comparison with the prior MRI of the brain dated March 21, 2019:\par \par There has been almost complete resolution of most of of the previously \par described supratentorial lesions.\par \par There are no new enhancing lesions.\par \par \par \par \par \par \par CRISTINA MÉNDEZ M.D., ATTENDING RADIOLOGIST\par This document has been electronically signed. Oren 10 2019  1:17PM\par   \par \par   \par \par \par [id number]^RI^DC\par \par EXAM:  PETCT SKUL-THI ONC FDG SUBS      \par \par \par PROCEDURE DATE:  05/01/2019  \par \par \par \par \par INTERPRETATION:  \par FDG PET CT STUDY   Subsequent  treatment strategy\par REASON: TUMOR IMAGING - PET with concurrently acquired CT for attenuation \par correction and anatomic localization; skull base to mid - thigh .\par \par CPT code 76972.\par \par Fasting blood glucose level:     91 mg/dl\par \par HISTORY: Endometrial cancer. Stage IV with brain metastatic disease.\par \par TECHNIQUE: Approximately 45 minutes after the intravenous administration \par of 10.7 mCi 18-Fluorine FDG, whole body PET images were acquired from \par base of skull to mid - thigh.\par \par CT protocol used for this PET/CT study is designed for attenuation \par correction and anatomic localization of PET findings.  This  CT \par is not designed to replace state-of-the-art diagnostic CT scans for \par specific imaging protocols of different body parts.\par \par COMPARISON : 2/12/2019.\par Correlation: MRI of the brain on 3/21/2019.\par \par FINDINGS:\par \par Head/Neck: No biologically active head/neck lesions.     \par Normal uptake within the brain, pharyngeal lymphoid tissue, salivary \par glands and laryngeal musculature is noted.    \par \par Thorax:   No suspicious hypermetabolic mediastinal, hilar, lung \par parenchymal lesions.\par \par Abdomen/Pelvis: Physiologic GI/  activity. \par \par Again seen is a left upper quadrant peritoneal nodule, SUV max 6.8, \par previously 7.7 (12% decrease). \par \par Multiple new FDG avid omental nodules largest of which is adjacent to the \par distal transverse colon, measuring 10 mm, positive on nonattenuation \par corrected images, axial image 134. \par \par No other abnormal visceral or guillaume tracer uptake is present.    \par \par Musculoskeletal :  No suspicious hypermetabolic osseous lesions.\par \par Additional CT findings:\par Status post hysterectomy.\par Colonic diverticulosis.\par \par IMPRESSION: \par \par COMPARISON : 2/12/2019\par \par Multiple new FDG avid omental nodules largest measuring up to 10 mm, \par positive on nonattenuation corrected images. Findings are suggestive of \par biologic tumor activity.\par \par Again seen is a left upper quadrant peritoneal nodule, SUV max 6.8, \par previously 7.7 (12% decrease). \par \par \par  MR BRAIN WAW IC\par \par \par PROCEDURE DATE: 12/30/2020\par \par \par \par \par INTERPRETATION: CLINICAL INDICATION: Intracranial metastasis. History of endometrial adenocarcinoma.\par \par TECHNIQUE: Multi-planar multi-sequential MR imaging of the brain was performed before and after the intravenous administration of 6 ml of Gadavist. 1.5 mL was discarded.\par \par COMPARISON: MRI of the brain dated 12/2/2020.\par \par FINDINGS:\par \par There is redemonstration of multiple enhancing intracranial metastasis, some of which with intrinsic T1 signal as follows:\par * Stable 1.1 cm lesion in the medial superior left frontal lobe.\par * Stable 0.8 cm lesion in the right frontal operculum.\par * Stable 0.8 cm lesion in the inferior left occipital lobe.\par * Stable punctate lesion in the right frontal white matter.\par * Stable 0.5 cm cortical lesion in the lateral left frontal lobe.\par * Slightly more prominent 0.5 cm lesion in the medial right parietal lobe.\par * Slightly more prominent 0.6 cm cortical lesion in the right temporal lobe.\par \par There is prominence of the sulci, sylvian fissures, and ventricles, reflecting stable mild diffuse parenchymal volume loss.\par \par There are scattered patchy T2/FLAIR hyperintensities in the periventricular cerebral white matter, consistent with mild left moderate chronic microvascular ischemic changes.\par \par No acute infarction or intracranial hemorrhage.\par \par The ventricles are normal without evidence of hydrocephalus. There are no extra-axial fluid collections. The skull base flow voids are present.\par \par The visualized intraorbital contents are normal. The imaged portions of the paranasal sinuses are clear. The mastoid air cells are clear. The visualized soft tissues and osseous structures appear normal.\par \par \par IMPRESSION:\par \par Stable to slightly more prominent multiple intracranial metastasis as above.\par \par No acute territorial infarct, intracranial hemorrhage, hydrocephalus, or extra-axial fluid collection.\par \par \par \par \par \par \par \par

## 2021-02-24 NOTE — HISTORY OF PRESENT ILLNESS
[Disease: _____________________] : Disease: [unfilled] [AJCC Stage: ____] : AJCC Stage: [unfilled] [de-identified] : Serena is a rodrigue 65 yo female, who has started developing SOB approximately 2 months ago, she went to ER on 11/2/2017 and on CXR was noted to have a large right sided pleural effusion. She underwent a thoracentesis, 1.6L of fluid was drained. 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 IHC was pos for CK7, PAX8, CK5/6 and Ca125, negative for TTF-1/Napsin, calretinin, CK20, mammaglobin, p63, villin, HAM56, GCDFP15, TAY-EP4. \par \par Her visit on 12/1/2017 Serena had an excisional biopsy of cervical node on 12/13/2017 revealing met adenocarcinoma, primary source still was not clear. On 12/14/2017 Serena was admitted with SOB, Pleurx catheter was placed on 12/14/2017. 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 Serena received 1st dose of carbo (auc of 5)/taxol(175mg/m2) on 12/15/2017 without complications, but the next day sustained a fall 2/2 vasovagal episode and developed asymptomatic SAH, that resolved on imaging by 12/21/2017. \par \par 12/1/2017: Serena is here for follow up today. She had a therapeutic thoracentesis in IR on 11/27/2017, 2L of fluid were removed, with much improved respiratory status. PET CT and MRI of the brain were done on 11/29/2017, findings were discussed today. There remains no clear primary source of malignancy. We have discussed that the source of tumor may be Mullerian vs lung. regardless of source chemotherapy needs to be initiated ASAP. We have discussed Carbo/taxol regiment that will cover both Mullerian and lung origin. Side effects including myelosuppression, peripheral neuropathy, allergic reaction and others.\par \par 1/2/2017 Since last visit Serena had an excisional biopsy of cervical node on 12/13/2017 revealing met adenocarcinoma, primary source still was not clear. On 12/14/2017 Serena was admitted with SOB, Pleurx catheter was placed on 12/14/2017. 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 Serena received 1st dose of carbo (auc of 5)/taxol(175mg/m2) on 12/15/2017 without complications, but the next day sustained a fall 2/2 vasovagal episode and developed asymptomatic SAH, that resolved on imaging by 12/21/2017. Today Serena's SOB has significantly improved. She reports very mild neuropathy in fingertips only. She reports no headache and offers no other complaints. \par \par 1/26/2018: Complains of some neuropathy, otherwise tolerating chemo with no difficulty. \par \par 2/16/2018: restaging CT C/A/P reviewed, significant improvement noted. Will refer to GYN/ONC. Reports slightly worsening neuropathy, not -painful, taking Gabapentin, no other symptoms. For cycle 4 of carbo/taxol today.\par \par 3/9/2018: Serena met with Dr. Massey, she is planned for surgery on 5/7/2018, after completion of 6 cycles of carbo/taxol and restaging PET CT ordered for mid April and follow up with Dr. Massey on April 20. She reports worsening neuropathy, and occasional pain and changes in vision in her left eye, eye symptoms come and go and are not very bothersome. She reports no other symptoms. \par \par 3/30/2018; Serena is here for cycle 6 of carbo/taxol, she continues to have neuropathy in finger and toes, but offers no other complaints, chemo has been dose reduced. \par \par 4/27/2018: Results of restaging PET CT s/p 6 cycles of carbo/taxol revealed 1. No new areas of abnormal increased uptake is seen to indicate \par biologically active disease.\par 2. Persistent PET positive left thyroid mass with a max SUV 33.1, \par previously max SUV 34.8. Further evaluation with thyroid ultrasound and \par if needed fine-needle aspiration biopsy will be helpful.\par 3.   PET positive left cervical lymph nodes mainly level 2 on the left \par with a max SUV 5.47previously 18. Minimal increased uptake in the \par bilateral axillary lymph nodes most likely reactive(less than 2.5)\par 4. Weakly PET positive, max SUV 2.89 right pleura, previous max SUV 5.3 \par with non-FDG avid right pleural effusion. \par 5. Previously FDG avid right and left supraclavicular lymphadenopathy, \par left internal mammary, bilateral paratracheal, para-aortic, para \par vertebral, carinal, mesenteric, right and left iliacs, right inguinal \par lymphadenopathy, small in size and no longer FDG avid.\par 6. No abnormal increased uptake is seen in the  lobulated uterus.\par 7. Overall there is marked improvement in the FDG avid disease.\par This was reviewed with Serena. She met with Dr. Massey on 4/20/2018, surgery is planned for 5/7/2018. She will also joselyn to meet with ENT re FDG avid thyroid nodule. Will assist in making he an appointment for her. \par Persistent neuropathy on gabapentin. \par \par 6/29/18 ; Patient came for follow up visit , evaluation for chemotherapy cycle 8 of carbo / Taxol , patient tolerating medication well , except neuropathy  recommend to have gabapentin  300 mg po daily.\par \par 7/20/2018: Serena present for follow up today, she has completed total of 8 cycles of Carbo/Taxol. She reports significant neuropathy in her hands and feet. We will discontinue chemotherapy today and plan to follow with close observation. She has thyroidectomy planned with Dr. Herrera on 8/20/2018, obtaining clearance for PMD. She is also planning to fly to [place] at the end of September. She reports no SOB, no GI or  symptoms. \par \par 9/12/2018: Serena offers no significant complaints today. She states neuropathy in her hands has almost completely resolved and neuropathy in her feet is significantly better. She had undergone complete thyroidectomy by Dr. Herrera on 8/20/2018, pathology revealed papillary thyroid cancer, measuring 2 cm, pT1b, other additional foci of papillary carcinoma were also noted, no LVI, surgical margins were clear, LN 0/2 had no carcinoma, stage [id number]. She is following up with Dr. Herrera tomorrow. She has still not gone for her vacation in [place]. \par \par 10/10/2018: Restaging PET CT on 9/26/2018 reveals Since 4/16/2018,  1. Post thyroidectomy with diffuse increased FDG uptake at the thyroid  bed likely representing post-surgical changes.  2. Subtle increased uptake is seen in the lamina , right side at the  level of T8 with a max SUV 4.6. This is not sufficient for metastatic  disease. Bone scan or MRI examination with contrast will be helpful for  further evaluation.  3. No other areas of abnormal increased uptake is seen. Images were personally reviewed by myself and discussed with Serena. \par She also had an Echo on 9/24/2018 revealing EF of 63%. \par Serena reports ongoing fatigue, she is unable to lose weight. She is taking Synthroid and following with Dr. Acevedo. \par She reports stiff fingers at night only, no painful neuropathy. \par She denies any other symptoms today. \par \par 12/11/2018: Seerna feels well, neuropathy in hands and feet is much improved. S he is now complaining of r-sided facial pain associated with some swelling, pain comes and goes. She has already seen Dr. Herrear, who ordered CT of sinuses and prescribed pain relief meds. She is also due for restaging PET CT. Serena feels well otherwise.  [de-identified] : KRas WT, EGFR WT, Alk WT, ROS1 WT, PDL1-1%\par Normal MMR protein expression [de-identified] : 1/9/2019: Restaging PET CT was performed on 12/18/2018 it revealed COMPARISON : 9/24/2018.  New left upper quadrant peritoneal nodule measuring 8 mm with an SUV max  of 7.3. This is suggestive of biologic tumor activity.  No other FDG avid lesions seen throughout the scan. Images were personally reviewed by myself and discussed with Nidhi, originally over the phone and again today. I have originally suggested to try AI in the interim, Nidhi however reported diarrhea at the time of the scan and declined intervention for now. We will plan for restaging PET CT to be performed in 6-8 weeks, this will be ordered today. She will follow up with Dr. Massey at [facility] shortly and bring the disk for his review. I would also like her to meet with genetics, this will be arranged at [facility] with Dr. Massey's help. Nidhi reports no new symptoms today, her neuropathy is manageable and  improving. She still reports unilateral headache that was work up in the past. Neurology eval was again suggested to her. She is following closely with Endocrinology re thyroid management. She will have follow up with Dr. Herrera shortly as well. \par \par 2/20/2019: Nidhi offers no new complaints. PET CT from 2/12/2019 revealed \par Since PET/CT of December 19, 2018, no new sites of pathologic FDG uptake\par Slight increase in size of left upper quadrant peritoneal nodule (1.1 cm, \par previously 0.8 cm) with stable FDG uptake, 7.7 SUV suspicious for \par biologic tumor activity.\par No other sites of pathologic FDG uptake \par Images were personally reviewed by myself and discussed with Nidhi, case was also discussed with Dr. Massey at [facility]. Nidhi will have follow up with his shortly. She is following with endocrinology. reports improving neuropathy. No GI or  symptoms at this time. No weight loss. \par \par 3/20/2019: Nidhi had a peritoneal nodule biopsy done at [facility], I have received a call from Dr. 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 We have discussed that surgery though could be attempted will not be the best therapeutic approach, recommencement of systemic therapy was discussed. I have not received the results from [facility] yet. I have briefly discussed this with Nidhi today. Nidhi reports that she has acutely developed right lower extremity weakness 5-6 days ago, she did not inform any of her doctors about this. She also reports that her R upper extremity though not weak "does not feel normal either. She reports no back pain, there is no point tenderness, RLE is objectively weak on exam. I recommend ER evaluation to r/o CVA vs brain met, vs L-spine related issue. Recommend admission for work up. Nidhi is agreeable to get admitted. \par \par 5/8/2019: Nidhi present for follow up, she was diagnosed with multiple metastasis to the brain, MRI was done on 3/21/2019 and revealed \par Multiple supratentorial heterogeneously enhancing lesions consistent with \par metastatic disease. The appearance on the T2-weighted images is suggestive \par of adenocarcinoma. There is mass effect upon the left lateral ventricle and \par corpus callosum. \par She received whole brain irradiation by Dr. Dahl, completed it in the beginning of 4/2019, she is currently on steroid taper managed by Dr. Dahl, she is taking 4mg of Decadron daily. She will be stopping the Keppra as there have been no documented h/o seizures. She still reports impairment of the L visual field, she has an appointment coming up with Opthalmology. She reports no deficits walking after she has completed inpatient acute rehabilitation. \par Restaging PET CT on 5/1/2019 reveals COMPARISON : 2/12/2019 \par Multiple new FDG avid omental nodules largest measuring up to 10 mm, \par positive on nonattenuation corrected images. Findings are suggestive of \par biologic tumor activity. \par Again seen is a left upper quadrant peritoneal nodule, SUV max 6.8, \par previously 7.7 (12% decrease). \par Images were personally reviewed by myself and discussed with patient. \par She now reports mild abdominal bloating, no other symptoms. She reports her neuropathy has significantly improved. \par She has first evidence of recurrent disease documented 5.5 months after last carbo/taxol dose, the volume of disease was very small (1 8mm nodule), she was then observed for another 6 months and now she wishes to restart the chemotherapy. \par I have discussed with her that rechallenging with carbo/taxol may still prove to be effective as long as she gets restaged after 2 cycles. Given recent whole brain radiation and neuropathy I will offer her carbo/taxol weekly with does reduction on the taxol for neuropathy, We will plan to run carbo slowly to avoid allergic reaction. \par She is agreeable to start ASAP. \par \par 6/5/2019: Nidhi has completed 1 cycle of weekly Carbo/taxol, ran at slow rate to avoid Carbo reaction and has tolerated chemotherapy without difficulty, she does not complain of increase in neuropathy. She has ongoing vision changes in her L eye, she had no residual LE weakness. She is off Decadron and Keppra. She was advised to see ophthalmology. \par \par 7/3/2019: Nidhi is doing well.  Reports no problems with carbo/taxol.  Still complains of vision changes in L eye but is seeing ophthalmologist on 7/16.  Remains active around the house and cooks regularly.  No fevers, weight loss, anorexia.  ROS is otherwise only significant for occasional loose stools, no diarrhea.\par \par 7/17/2019: Nidhi is doing well, denies worsening neuropathy. She c/o feeling fatigued and tired. Her last chemo was 1 week ago(7/11/19) with carbo/taxol and is due again tomorrow. She saw ophthalmology and will need cataract surgery of the L eye. No c/o chest pain/SOB. No weight loss.\par CT C/A/P was done on 7/9/2019, images were personally reviewed by myself and discussed with several radiology attendings, they revealed \par CHEST:\par Filling defect within a segmental branch of the lower lobe pulmonary artery \par suspicious for pulmonary embolus. \par Stable left upper lobe and right middle lobe 3 mm nodules. \par CT abdomen and pelvis performed on the same day, see separate report. \par ADDENDUM: \par Please note that the morphology of the small thrombus within the left lower \par pulmonary artery is not indicative of an acute thrombus but rather a chronic \par appearance. \par This was discussed with Radiology, it was not deemed that thrombus was acute and may not have even been present at all, finding was deemed to be equivocal, based on radiology assessment anticoagulation was not indicated for the filling defect noted. Initially CTA was recommended, the recommendation was withdrawn upon further review. \par ABDOMEN/PELVIS:\par New 1.3 cm segment IV hepatic hypodensity seen on series 4 image 205. \par Lesion not seen on prior PET/CT from 5/1/2019 or abdomen and pelvis CT from \par \par 2/2/2018 and is consistent with a new metastasis. \par Two other hepatic lesions described above, decreased in size since 2/2/2018, \par consistent with treated hepatic metastases. \par Anterior perisplenic omental nodule measures 0.7 cm on series 2 image 53, \par previously measuring 1.2 cm on PET/CT dated 5/1/2019. \par All the other previously seen omental nodules have resolved since 5/1/2019. \par ADDENDUM:\par Case was discussed with Dr. Cid in detail. It is also possible that the \par new 1.3 cm lesion in the liver since February 2, 2018 represents a treated \par metastasis similar to the other liver lesions. \par PET/CT would be necessary to assess disease activity. \par I have discussed case with Radiology, and it was determined that there was no clear cut evidence of progression. PET CT was ordered today.\par This was discussed with Nidhi, will proceed with Carbo/Taxol for now. \par \par 8/1/2019: Nidhi reports no major side effects from weekly Carbo/Taxol, she reports no neuropathy. She has completed 10 cycles altogether. \par PET CT on 7/24/2019 revealed \par 1. Since May 1, 2019, no definite new site of pathologic FDG uptake to \par suggest new biologic tumor activity; specifically, no pathologic FDG uptake \par within previously noted 1.3 cm lesion within hepatic segment 4. \par 2. Increased FDG uptake within several subcentimeter bilateral cervical \par lymph nodes, which is nonspecific and may be postinflammatory; max SUV 9.2 \par is noted within a 0.7 cm right level 2 cervical node. Attention on follow-up \par is suggested. \par 3. Few peritoneal nodules have overall decreased in size. \par 4. No additional site of pathologic FDG uptake. \par Images were personally reviewed by myself and discussed with Nidhi. \par She wishes to continue with chemotherapy. Will plan for 3 additional cycles. Will consider adding Avastin, but with h/o inconclusive/possible chronic PE ppx anticoagulation maybe necessary. \par \par 8/28/2019: Nidhi reports feeling well, she denies worsening neuropathy, worsening neurological symptoms, SOB, abdominal pain, bloating. She reports she is planned to undergo  eye surgery towards the end of September. We will plan to hold chemotherapy briefly after this cycle to facilitate adequate counts and minimize complications. We again have briefly discussed considering Avastin based therapy, will hold off on this prior to surgery. 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 May proceed without formal PAST, will check CBC prior to procedure, PT and PTT today. \par \par 9/25/19:Nidhi reports feeling well, she denies any changes since last visit, No SOB, abdominal pain, bloating. She reports she is planned to undergo  eye surgery in 9/30/19  CBC reviewed with normal Hemogram . we will hold chem this week , she will resume after Cataract sx . 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 May proceed without formal PAST, will check CBC prior to procedure, PT and PTT today. \par \par 10/4/2019: Nidhi underwent successful eye surgery on 9/30/2019, she reports she can see much better now. Last dose of chemotherapy was administered on 9/20, she then was on hold for surgery. She has completed 5 additional cycles of Carbo/taxol. She is due for restaging. Her disease is only accurately assessed on PET CT, prior attempts to obtain CT scans resulted in additional imaging with PET, as findings were inconclusive. PET CT will be ordered to restage. \par In addition she is due for MRI of the brain. She has had a small amount of weight loss, mild neuropathy is persistent. She offers no additional complaints. \par She will have port placed on Monday, will hold chemotherapy until restaging scans complete. \par \par 10/23/2019: Nidhi feels well, and denies any new symptoms. Her eye surgery was successful, vision is much improved now. She had restaging scans. \par PET CT on 10/18/2019 revealed COMPARISON : 7/24/2019. \par No pathologic uptake to suggest biologic tumor activity. \par MRI of the brain on 10/15/2019 revealed \par Comparison with June 8 and March 21, 2019: (Generally lesions improved \par markedly since March but slightly increased in size since June) \par 1. Lesions previously demonstrated on the study of June 8, 2019 has \par remained stable or minimally increased in size \par 2. Specifically, right frontal opercular lesion measures about 1 cm and \par left posterior inferior temporal lobe lesion measures about 1 cm. These have \par increased since the previous study. \par 3. Small punctiform lesions within the right frontal white matter and left \par frontal sulcus or more apparent than on the study of June 8 \par 4. These lesions are all much smaller than the earlier MRI of March 21, \par 2019. \par 5. No new lesions. \par Images were personally reviewed by myself and discussed with patient. She remains asymptomatic and off the steroids. \par I have also discussed the case with Dr. Dahl. I would like Nidhi to discuss the options with [facility], she maybe a candidate for brain SRS. \par She has been off Carbo/Taxol for a few weeks, she remains Platinum sensitive. I have discussed the option of switching her over to Avastin maintenance, will need to prophylax with low dose Eliquis as well, given questionable finding of small PE in the past. \par Side effects of Avastin were discussed at length, including HNT, proteinuria, small risk of DVT/PE, GI perforations etc. \par She is agreeable to start after [facility] consultation. \par \par 11/5/2019: Nidhi feels OK, she reports acute onset head discomfort that lasts minutes and subsides, the episodes are becoming more frequent 1-2 times a day. She has seen Dr. Dahl, who wishes to hold off on offering SRS to the brain. Plan was to start on Avastin today. Nidhi reports she currently has a lapse in her insurance coverage till 12/2019, I will not be able to start he on ppx dose Eliquis for now, she instead will take baby ASA every other day, to modify risk of DVT/PE. Will plan to switch over to Eliquis at 2.5mg BID once insurance is active. She also contemplated going to [place] with her daughter, I am willing to hold Avastin until she comes back, but Nidhi wishes to start right away, I explained to her that flight may result in complications if that is her wish. She will cancel the trip and start with Avastin today. Side effects have been discussed. \par \par 11/20/2019: Nidhi came for a follow up visit, she reports feeling well, she reports less fatigue. She is s/p first dose of Avastin on 11/5/2019, she tolerated Avastin without difficulty, she is due for the 2nd dose today. \par We communicated CBC result with HGB of 12.3, normal platelet and WBCs. She lost like 5 pounds secondary to gum problems, she will follow up with her dentist this week. She is currently on baby ASA every other day. Continue with single agent Avastin.  \par \par 12/17/2019: Nidhi came for a follow up visit, she reports feeling well, she experienced pruritic rash on last Saturday 12/14/19, which improved over 2 days with Benadryl alone.  Today her skin rash has completely resolved. We will add Solu-Medrol 100 mg IV prior to Avastin. Nidhi reports less fatigue. She is due for the 3rd dose of Avastin today. \par We communicated CBC result with HGB of 12.3, normal platelet and WBCs. \par \par 1/7/2019: Nidhi is doing well, reports good appetite, she has lost 1lb. Reports no GI or pulmonary symptoms. She reports her gums are bleeding occasionally. No new neurological symptoms. She reports no rash after last cycle of Avastin. \par \par 1/28/2020: Nidhi is here for follow up visit, she has no difficulty tolerating Avastin, she reports L sided chronic eye discomfort, neuropathic in nature. Neurontin has been helpful in the past, will reorder this. \par PET CT on 1/22/2020 revealed COMPARISON : 10/18/2019. \par Anterior perisplenic nodule measuring 8 mm is FDG avid, SUV max 8.9, \par consistent with biologic tumor activity. \par MRI of the brain on 1/15/2020 revealed \par In comparison to the previous brain MRI dated 10/15/2019: \par 1. Redemonstrated scattered enhancing lesions consistent with metastatic \par disease, with overall good treatment response since the prior exam. \par 2. No significant interval change in the ovoid lesion in the superior left \par frontal lobe measuring 12 mm. \par 3. Decreased size of the right opercular lesion measuring 6 mm, previously \par 11 mm. Decreased size of the left inferior occipital/tentorial lesion \par measuring 6 mm, previously 11 mm. The previously seen right frontal white \par matter punctate lesion is no longer visualized. \par 4. No new lesions are demonstrated. \par All images were personally reviewed by myself and discussed with Nidhi. I explained to her I am concerned about the perisplenic lesion concerning for disease progression, but it is isolated and very small, asymptomatic. There is definitely a positive response to Avastin in the brain and given that, we will continue with Avastin with short term follow up imaging. Nidhi knows to inform me with any new symptoms immediately. \par \par 2/18/2020: NIDHI BANKS is a 64 year old female here today for follow up visit. She completed 5 cycles of Avastin; tolerating well at this time. She denies fever, chills, change in mental status, or change in weight. She complains of left painful facial swelling. In addition, she complains of swelling of fingers, joint pain and numbness in her toes. She continues on Gabapentin 100mg TID with no symptom relief and wishes to try to go up on the dose. She complains of mild gingival bleeding in the morning. Last PET showed new isolated perisplenic lesion suspicious for disease progression, however MRI of the brain appeared better on Avastin, so the plan was to continue with Avastin with short term follow up. \par \par 3/10/2020: NIDHI BANKS is a 64 year old female here today for follow up visit. She denies new neurological symptoms, skin rash, fever, or change in weight. She continues of Gabapentin which was increased to 200 mg TID; neuropathy improving. She continues to complain of mild gingival bleeding. Left facial swelling and numbness resolved. She has completed cycle 6 of Avastin without complication. \par \par 3/31/80291: Nidhi presented today for routine f/u, she feels fine. Denies any chest pain, sob, fever, chills and cough. Reports that she continues to have gum bleeding. Neuropathy is better with gabapentin, she decreased the dose to 4 pills a day. She tolerated 7th cycle well and is due to get 8th cycle today . She will get MRI brain on 4/2/20.\par She reports minor gum bleeding from Avastin and dental infection on the left, will prescribe Amoxicillin. \par \par 4/21/2020: NIDHI BANKS is a 64 year old female here today for follow up visit for endometrial cancer. S/p cycle 8 of Avastin. Patient denies fever, chills, SOB, cough and pain. She states that her gums have minimal bleeding and dental infection improved after amoxicillin. She complains of mild fatigue. Restaging MRI of the brain was completed on 4/2/2020 which showed stable bilateral parenchymal hemorrhagic lesions compatible with known metastases; no new enhancing lesions are identified. \par Images were personally reviewed by MD Jose Roberto and discussed with patient. \par \par 5/12/2020: MRI brain on 4/2/2020 reveals 1. Stable bilateral parenchymal hemorrhagic lesions compatible with known \par metastases. \par 2. No new enhancing lesions are identified. \par PET CT on 4/30/2020 revealed Right upper quadrant peritoneal implant adjacent to the inferior tip of the \par liver 4.7 (image [id number]) \par Right lower quadrant 0.5 cm peritoneal nodule 7.2 (image 111) \par Right lower quadrant subcentimeter peritoneal nodule 2.6 (image [id number]) \par Anterior perisplenic peritoneal nodule 12.3-48% increase from 8.9 \par (remeasured) previously \par Stable non-FDG avid (attenuation corrected and nonattenuation corrected \par images) 4 mm right middle lobe pulmonary nodule-image 181) \par  No other definite sites of abnormal FDG uptake \par IMPRESSION: \par Compared to 1/22/2020: \par new FDG avid peritoneal implants in right lower quadrant of the abdomen \par suspicious for biologic tumor activity \par 48% interval increase in SUV in anterior perisplenic peritoneal nodule (SUV \par 12.3 vs 8.9 remeasured) \par In retrospect stable FDG avid peritoneal implant in the right upper quadrant \par of the abdomen (SUV 4.7 vs 3.8 remeasured-image 134) \par No other definite sites of abnormal FDG uptake \par Images were personally reviewed by MD Jose Roberto and discussed with patient.\par Given minimal degree of progression in the abdomen and persistent control intracranially, we have discussed continuing Avastin for now and short term restaging. \par Nidhi offers no complaints today. \par \par 6/2/2020: The patient is here for follow up. She is due for Avastin today . She has  no major complaints . She reported gum bleeding and swelling, which is much better today . She also reported hand swelling bilaterally. She denies fever, chills, no respiratory, cardiac, GI/ symptoms. She denies headaches or neurological deficits. \par \par 6/23/2020: Nidhi is 65 years old female came for a follow up visit for papillary-serous endometrial carcinoma with brain metastasis.  She reported feeling well. \par She denies dizziness, change of mental status, fever, chills, SOB. 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  She reported feeling well. \par She denies dizziness, change of mental status, fever, chills, SOB. \par We reviewed MRI of the brain from 7/5/20 that revealed \par The study is considered stable since April 2, 2020 with the following \par findings: 1. 3 lesions are being followed, in the left frontal, right frontal and left occipital lobes. 2. The lesions are essentially stable, nonenhancing and probably mildly hemorrhagic 3. A punctiform lesion in the right frontal lobe (series 11 image 7) was probably present on the prior study. This is stable and probably not new. 4. Any differences in size are probably due to differences in the way the patient was scanned. Images were personally reviewed by MD Jose Roberto and discussed with patient. We will continue the current treatment with  Avastin one every 3 weeks.  Patient reports improvement in neuropathy with gabapentin 200 mg every 12 hrs.Patient is due for repeat PET Scan at the end of July,2020.\par \par 8/4/2020: NIDHI BANKS is a 65 year old female here today for follow up visit for endometrial cancer. S/p cycle 11 of Avastin. Patient denies fever, chills, SOB, cough and pain.\par PET CT from 7/29/2020 revealed \par Compared to 4/30/2020: \par 1 new FDG avid near midline peritoneal implant (SUV 4.3) consistent with biologic tumor activity \par 1 new left cervical level V FDG avid lymph node (SUV 8.7) suspicious for biologic tumor activity in light of the history of papillary thyroid carcinoma \par Interval increases in SUV: 115% increase in SUV in right lower quadrant peritoneal implant \par 97% increase in SUV in right upper quadrant peritoneal implant -66% increase in SUV in level 3 cervical lymph node \par Interval 36% decrease in SUV in anterior perisplenic nodule (7.9 vs 12.3) \par Stable FDG avid right upper quadrant implants and right level 5 cervical lymph node \par No other sites of abnormal FDG uptake \par We discussed other different treatment option.\par Images were personally reviewed by MD Jose Roberto and discussed with patient.\par We have discussed that there is undeniable progression systemically but still benefit in the CNS. I will review her chart and determine if restarting weekly carbo/taxol is of value, vs adding an additional agent vs starting on Keytruda/Lenvima is at this point indicated. \par Patient will continue with one additional dose of Avastin today.\par \par 8/25/2020: Nidhi is feeling well, no complaints today. At this point given minimal but sustained progression we will plan to switch over to Kaytruda combination with Lenvima. \par We have gone over side effects of immunotherapy at length, including but not limited to autoimmune mediated pneumonitis, enteritis, dermatitis, thyroiditis, damage to the kidneys, liver, pancreas, pituitary gland etc. patient and family voice understanding and are in agreement to proceed with treatment.\par Side effects of Lenvima including, but not limited to, cytopenias, that may require blood product transfusions and lead to increased risk of infection, requiring hospitalization, allergic reactions, that can rarely be life-threatening, skin reactions, nausea/vomiting, mucositis, diarrhea/constipation, QT prolongation, GI perforation, HTN etc were discussed at length, patient and family voice undestaging, all questions were answered to patient's satisfaction.\par \par 9/15/20 Nidhi presented today for f/u and to get treatment . On last visit she was started on keytruda and lenvima . She was told to start with 10 mg a day , but she started taking 20 mg a day . Developed diffuse muscle aches and pains , reports has pain in her mouth as well . Also developed changes in her voice , its becoming more and more hoarse now . Also reported pain in her left shoulder , which is improved now . She has been coming for wkly CBC , since she started treatment , counts have been stable . Had a detailed discussion with Nidhi, her symptoms are likely from treatment with lenvima , she should have escalated the dose from 10 mg to 20 mg , if it was well tolerated , since she started taking 20 mg daily ,  that’s why she has severe adverse effects. She is recommended to hold off on treatment for 4 days and then restart it at 10 mg daily . She demonstrated understanding . Will proceed with keytruda today . \par \par 10/6/20:  Patient here for follow up visit for stage IV endometrial cancer.  She is taking lower dose of Lenvima, 10 mg.  She is complaining of hoarseness and mouth soreness over the last couple of weeks.  She also has left arm and shoulder weakness.  Although she admits she feels somewhat better on lower dose of Lenvima.  She is very fatigued and does very little activity during the day and is losing weight.\par 10/20/2020: NIDHI is here for follow up visit for endometrial cancer. She was on dose reduced Lenvima co currently with Keytruda. She developed mouth sores and complications from Lenvima. She states that she is feeling well today. We contemplated starting on Doxil but she states she is feeling better after stopping Lenvima. Repeat PET from 10/16/2020 showed:  \par IMPRESSION:\par 1.  Since July 29, 2020, increased FDG uptake in multiple peritoneal nodules/tumor implants (max SUV 12.9 in a left upper quadrant nodule, reflecting 63% increase).\par 2.  Resolved FDG avid left cervical lymph nodes.\par 3.  No definite new sites of abnormal FDG uptake.\par 11/17/2020: Nidhi is feeling great, reports no complaints at all, she has gained 2 pounds, reports good appetite. CBC was reviewed. She will continue with Keytruda single agent, she had significant difficulty tolerating Lenvima even at doses lower than recommended. We will defer mammogram for now. \par 12/8/2020: Nidhi is here for a follow up visit, she reports feeling well. She offers no complaints at all today, she has gained 2 pounds, reports good appetite. CBC was reviewed. She will continue with Keytruda single agent.\par We reviewed MRI of the Brain 12/2/20:\par 1. Redemonstrated multiple enhancing lesions consistent with metastatic disease. Since the prior exam dated 7/2/2020\par 2. Slightly increased size of the right parietal lesion measuring 4.5 mm, previous\par \par 12/29/2020\par Nidhi is here for a follow up visit , she reports feeling tired , generalized weakness, she experienced episode of change of Mental status this week, also she lost 9 pounds from last visit. Patient is schedule for MRI of the Brain in 12/30/20. Also she reports mild pruritic Rash i\par We will Hold Immunotherapy for today , Reassess post MRI of the Brain. \par \par 1/6/21\par Patient here for metastatic endometrial cancer.  She remains weak, is complaining of symptoms of mucositis.  She is taking Decadron BID which is helping she says.  Her appetite has been better.  Patient denies cough, shortness of breath, fever, chills, night sweats or bone pain.  She denies any headache or dizziness. \par \par 2/2/2021: Nidhi presents for follow up today, she reports feeling weak, reports mouth discomfort. She has been taking Decadron at 4mg BID, at this point we will taper is first to 6mg daily for 3 days then to 4mg in AM for another week. We will also prescribe Nystatin swish for thrush. \par PET CT on 1/26/2021 revealed \par Since October 15, 2020:\par 1. New and increased FDG-avid peritoneal nodules/tumor implants, catalogued above, with max SUV 13.2 within new midline peritoneal 1.9 x 1.3 cm nodule.\par 2. Previously described left upper quadrant 1 cm nodule with max SUV 7.5, previously 12.9 (42% decrease).\par 3. No additional sites of pathologic FDG uptake. \par MRI brain on 12/30/2020 revealed \par Stable to slightly more prominent multiple intracranial metastasis as above.\par No acute territorial infarct, intracranial hemorrhage, hydrocephalus, or extra-axial fluid collection.\par Images were personally reviewed by MD Jose Roberto and discussed with patient and family.\par After reviewing images given minimal areas of progression while of Decadron Nidhi wishes to stay on Keytruda and try to taper of Decadron. Avastin has been added top the regiment. I have explained to Nidhi that progression is noted and I would like to offer to switch in therapy. \par We will continue with Keytruda/Avastin with plan for short term follow up scan. \par Taper Decadron to 6mg daily with plan to taper to 4 mg daily in 3-5 days if tolerated. \par \par 2/24/21: Nidhi is a 65 years old female who  presents for follow up for Papillary-serous endometrial cancer, stage IV. She was admitted to the hospital for weakness on 2/16/2021. \par MRI brain on 2/17/2021 revealed  \par In comparison to the prior brain MRI 12/30/2020:\par 1. Slight improvement in intracranial metastatic disease:\par -Several of the 5-6 mm enhancing lesions seen on the prior exam have decreased and are now punctate in size.\par -Several stable intrinsically T1 hyperintense lesions are noted consistent with treated lesions.\par -No new enhancing lesions.\par 2. Stable confluent white matter signal abnormality likely reflecting posttreatment changes.\par Images were personally reviewed by MD Jose Roberto and discussed with patient and family.\par I have also at length discussed prognosis and value in continuation of chemotherapy with Nidhi inhouse, Hospice care as a reasonable option was offered, she declined for now, prefers to continue with chemotherapy for now.  \par She will benefit from PT at home. \par Today she is in a wheel chair, reports mouth discomfort (thrush on exam).  She was discharged from the hospital on Decadron at 4mg TiD, at this point we will taper Decadron, first to 4 mg BID for 5 days, Decadron 2 mg BID for 5 days, Decadron 1mg PO BID for 5 days, Decadron 1mg daily for 5 days, then as clinically indicated, supply of the medication was provided, patient will also benefit from visiting nurse to monitor the taper.  We will also prescribe Diflucan 200 mg po daily for 7 days and swish for thrush. \par Blood work reviewed. \par Patient will proceed with Keytruda/Avastin today.

## 2021-02-24 NOTE — ASSESSMENT
[FreeTextEntry1] : Papillary- serous endometrial cancer, stage IV\par --PET CT on 11/29/2017 revealed no clear GYN origin, extensive KEVIN, uptake in the right lung and pleura, thyroid nodule\par --Malignant pleural effusion, resolved\par --L side PleurX catheter was removed on 2/8/2018\par --Completed cycle 8  of carbo/taxol on 6/29/2018.  2 cycles were administered  postoperatively.\par --Restaging PET CT on 9/26/2018 (3 months post completion of chemo 6/29/2018)was essentially negative for recurrent disease\par --Restaging PET CT on 12/18/2018 reveals   New left upper quadrant peritoneal nodule measuring 8 mm with an SUV max  of 7.3. This is suggestive of biologic tumor activity.  No other FDG avid lesions seen throughout the scan.\par --Restaging PET CT on 2/12/2019 revealed slight increase in size of left upper quadrant peritoneal nodule (1.1 cm, \par previously 0.8 cm) with stable FDG uptake, 7.7 SUV suspicious for biologic tumor activity.\par --Case was discussed with Dr. Massey at [place], biopsy of peritoneal nodule is positive for recurrent endometrial cancer.\par --MRI brain on 4/23/2019 revealed multiple brain mets\par --S/p whole brain radiation completed 4/2019, required acute rehab\par --PET CT on 5/1/2019 subsequently revealed further disease progression \par --Restarted weekly carbo/taxol 3 on 1 off on 5/9/2019, continue with weekly Carbo/Taxol for now.\par --Off Decadron and Keppra since 6/5/2019 \par --Restaging brain MRI on 6/8/2019 showed almost complete resolution of most supratentorial lesions and no new lesions.\par --Restaging CT C/A/P on 7/9/2019 did not reveal definite progression, there was a questionable filling defect suspicious for possible chronic PE/artifact, this was discussed with Radiology at length anticoagulation was not deemed to be necessary\par --PET CT on 7/24/2019 revealed positive response to therapy \par --Carbo/Taxol stopped, last dose 9/13/2019, on hold since, she remains platinum sensitive \par --Restaging PET CT on 10/18/2019 is NAD\par --Restaging MRI of the brain on 10/15/2019 is suggestive for possible disease progression, no new lesions, she remains asymptomatic and off steroids, will follow closely \par --She was referred to [facility] for brain SRS consideration, close observation for now \par --Started on Avastin maintenance 11/5/2019, will consider adding Eliquis ppx for DVT/ PE, for now she will take baby ASA every other day  \par --We sent for prednisone 10 mg po if needed for additional skin issues\par --Patient well aware to contact us if any side effect developed\par --MRI of the brain 1/15/2020 reveals overall good response to Avastin\par --Restaging PET CT on 1/22/2020 reveals single small perisplenic area of activity, suspicious for disease progression, asymptomatic \par --Proceed with cycle 10 of Avastin on 5/12/2020 after weighing risks/benefits with plan for short term follow up imaging \par -- Reviewed  MRI of brain on 7/5/20  showed stable metastases; no new enhancing lesions are identified.\par -- Restaging PET CT on 4/30/2020 reveals minimal progression in the abdomen and pelvis\par -- Other options discussed is switching to Keytruda/Lenvima combination, this is a consideration in the future \par -- We will c/w with Avastin for now since it is working in the brain and she has a minimal progression in abdomen. \par --PET CT from 7/29/2020 reveals additional evidence of progression, minimal and not symptomatic\par --Last dose of Avastin on 8/4/2020\par -- Was started on Lenvima/Keytruda 8/25/2020\par --Unable to tolerate 20 mg daily, recommended to hold off on treatment for 4 days , and then restart at 10 mg daily after 4 days if feeling fine.\par --She has help Lenvima for two weeks since 10.6.2020 She states that her s/e have greatly improved and feels well. We will stop Lenvima \par -- PET CT on 10/14/2020 revealed mixed response with no new sites of disease \par -- We discussed treatment options including changing to Doxil or proceeding with single Keytruda at this time. \par -- She will continue with Keytruda every three weeks. Reimaging study in two months \par --CBC reviewed, WNL TSH CMP\par -- Arthritic changes noted to hands- possibly secondary to immunotherapy- will continue to observe \par --we reviewed  MRI brain ordered with mild progressing in one of the brain lesion. \par --On 2/2/2021, Avastin was added to Keytruda in the setting of CNS progression, continue with Keytruda/Avastin with short term follow up with plan to switch therapy if progression is sustained\par --Taper Decadron \par --Add Nystatin mouthwash and magic mouthwash \par --On 2/16/2021 ER referral\par \par Papillary thyroid cancer [id number], 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 Herrera \par --Follow up with Dr. Acevedo of endocrinology; he is addressing vitamin D, thyroid and diabetes\par \par Cataract of eye\par -- S/p successful surgery on 9/30/19 \par \par Follow up upon discharge\par \par \par

## 2021-02-26 PROBLEM — B37.0 THRUSH: Status: ACTIVE | Noted: 2021-01-01

## 2021-03-05 NOTE — H&P ADULT - HISTORY OF PRESENT ILLNESS
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 Cid), and history of loss of balance presents to the hospital s/p fall from home. The patient reports that she has been having generalized weakness over the last few days. She reports that     Of note, the patient was recently in the hospital secondary to brbpr likely secondary to hemorrhoids, however no colonoscopy was performed as the patient preferred to do a work up outpatient.     T(C): 36.3,HR: 79 , BP: 114/73 , RR: 16 , SpO2: 99% on room air  Labs: WBC 12, Na 128, U/A (+) LE , pyuria , PLT 43 (baseline ~ 144)  Trauma w/up (-) : CTH (-) , CT C-spine (-), XRAY Pelvis (-) for acute fractures/ bleeding  CT A/P w/ iv contrast: Multiple peritoneal nodules, recently demonstrated on January 26, 2021 PET/CT, including 1.5 cm nodules subadjacent to abdominal wall, previously 1.9 cm, and anatomically stable left upper quadrant infradiaphragmatic 1 cm nodule.  CT Head No Cont : Area of hyperdensity/calcification within the left frontoparietal region measuring 1.0 x 1.1 cm andin the left occipital lobe measuring 1.0 x 0.6 cm, which may correspond to metastatic disease that is better seen on most recent MRI.       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 Cid), and history of loss of balance presents to the hospital s/p fall from home. The patient reports that she has been having generalized weakness over the last few days. She reports that she was getting out of bed this morning and tripped and fell and hit her head, however she denies any loss of consciousness and is unsure how long she was on the floor. Of note, the patient was recently in the hospital secondary to brbpr likely secondary to hemorrhoids, however no colonoscopy was performed as the patient preferred to do a work up outpatient. She denies any other symptoms including chest pain, dyspnea, cough, abdominal pain, dysuria, nausea, vomiting, diarrhea, melena, hematochezia. All ros negative except as above.     T(C): 36.3,HR: 79 , BP: 114/73 , RR: 16 , SpO2: 99% on room air  Labs: WBC 12, Na 128, U/A (+) LE , pyuria , PLT 43 (baseline ~ 144)  Trauma w/up (-) : CTH (-) , CT C-spine (-), XRAY Pelvis (-) for acute fractures/ bleeding  CT A/P w/ iv contrast: Multiple peritoneal nodules, recently demonstrated on January 26, 2021 PET/CT, including 1.5 cm nodules subadjacent to abdominal wall, previously 1.9 cm, and anatomically stable left upper quadrant infradiaphragmatic 1 cm nodule.  CT Head No Cont : Area of hyperdensity/calcification within the left frontoparietal region measuring 1.0 x 1.1 cm andin the left occipital lobe measuring 1.0 x 0.6 cm, which may correspond to metastatic disease that is better seen on most recent MRI.

## 2021-03-05 NOTE — ED ADULT TRIAGE NOTE - CHIEF COMPLAINT QUOTE
s/p mechanical fall, as per EMS patient had witnessed fall at home, denies any LOC or anticoagulant use, patient at baseline. patient has  stage IV uterine cancer

## 2021-03-05 NOTE — ED ADULT NURSE NOTE - NSIMPLEMENTINTERV_GEN_ALL_ED
Implemented All Fall with Harm Risk Interventions:  [place] to call system. Call bell, personal items and telephone within reach. Instruct patient to call for assistance. Room bathroom lighting operational. Non-slip footwear when patient is off stretcher. Physically safe environment: no spills, clutter or unnecessary equipment. Stretcher in lowest position, wheels locked, appropriate side rails in place. Provide visual cue, wrist band, yellow gown, etc. Monitor gait and stability. Monitor for mental status changes and reorient to person, place, and time. Review medications for side effects contributing to fall risk. Reinforce activity limits and safety measures with patient and family. Provide visual clues: red socks.

## 2021-03-05 NOTE — ED PROVIDER NOTE - PHYSICAL EXAMINATION
CONSTITUTIONAL: frail, but in no apparent distress.   EYES: PERRL; EOM intact.   NECK: Supple; non-tender; no cervical lymphadenopathy.   CARDIOVASCULAR: Normal S1, S2; no murmurs, rubs, or gallops.   RESPIRATORY: Normal chest excursion with respiration; breath sounds clear and equal bilaterally; no wheezes, rhonchi, or rales.  GI/: non-distended; non-tender; no palpable organomegaly.   MS: see below, otherwise normal ROM in all four extremities; non-tender to palpation; distal pulses are normal.   SKIN: right knee abrasion; otherwise normal for age and race; warm; dry; good turgor  NEURO/PSYCH: A & O x 4; grossly unremarkable.

## 2021-03-05 NOTE — CONSULT NOTE ADULT - ASSESSMENT
Serena is a 65 y F with PMHx peripheral neuropathy, papillary thyroid cancer s/p thyroidectomy and stage IV Papillary Serous Carcinoma s/p ELEONORA in April 2018 presenting with generalized weakness and fall     ASSESSMENT  #) Fall   #) Stage IV Papillary Serous Endometrial Carcinoma     PLAN  - Please obtain full traumatic work up for fall   - Patient is on Avastin/Keytruda, last dose was received on 2/25/21  - She fell at home and this concerning as she appears to be functionally declining with worsening performance status   - Please discuss all medical care with sister Barbara [phone]. We recommended that given her worsening functional status, we would recommend hospice at this time. Barbara is open to hospice but would like to discuss with Serena's significant other before making a final decision   - Hospice evaluation pending until family makes final decision   - Continue Decadron 2mg BID for now  Serena is a 65 y F with PMHx peripheral neuropathy, papillary thyroid cancer s/p thyroidectomy and stage IV Papillary Serous Carcinoma s/p ELEONORA in April 2018 presenting with generalized weakness and fall     ASSESSMENT  #) Fall   #) Stage IV Papillary Serous Endometrial Carcinoma     PLAN  - Please obtain full traumatic work up for fall   - Patient is on Avastin/Keytruda, last dose was received on 2/23/21  - She fell at home and this concerning as she appears to be functionally declining with worsening performance status   - Please discuss all medical care with sister Barbara [phone]. We recommended that given her worsening functional status, we would recommend hospice at this time. Barbara is open to hospice but would like to discuss with Serena's significant other before making a final decision   - Hospice evaluation pending until family makes final decision   - Continue Decadron 2mg BID for now

## 2021-03-05 NOTE — ED PROVIDER NOTE - CARE PLAN
Principal Discharge DX:	Fall  Secondary Diagnosis:	Weakness  Secondary Diagnosis:	Hyponatremia   Principal Discharge DX:	Fall  Secondary Diagnosis:	Weakness  Secondary Diagnosis:	Hyponatremia  Secondary Diagnosis:	UTI (urinary tract infection)  Secondary Diagnosis:	Thrombocytopenia

## 2021-03-05 NOTE — CONSULT NOTE ADULT - SUBJECTIVE AND OBJECTIVE BOX
Patient is a 65y old  Female who presents with a chief complaint of     HPI:  Serena has history of Papillary- serous endometrial cancer. She was originally diagnosed in 2017 after excisional biopsy of cervical node on 12/13/2017 showed met adenocarcinoma, primary source still was not clear. On 12/14/2017 Serena was admitted with SOB, Pleurx catheter was placed on 12/14/2017. Transvaginal sono was done on 12/14/2017 revealing thickened endometrium, endometrial biopsy on 12/19/2017 favored papillary-serous endometrial carcinoma. She received 6 cycles of carbo/taxol and then underwent surgery with Dr. Massey in May 2018. She then completed 2 additional cycles of Carbo/Taxol in June 2018 that were given postoperatively. She had PET scan in September 2018 which was negative for diease but PET in 12/2018 showed new left upper quadrant peritoneal nodule that was 8mm in size that was FDG avid. It increased in size on PET in February 2019 and was biopsied which was positive for recurrent endometrial cancer. She then had MRI in April 2019 which showed brain mets. She completed WBRT in April 2019. PET in May 2019 showed disease progression and she was restarted on weekly carbo/taxol which was stopped in September 2019 as she had positive response to therapy with no evidence of disease progression in July 2019. Eventually she had MRI brain in 10/2019 which showed disease progression and was started on Avastin maintenance therapy in 11/2019 of which she completed 10 cycles by May 2020. PET scan in April 2020 showed some minimal disease progression and she was started on Keytruda/Lenvima but she was unable to tolerate Lenvima. She was then continued on single agent Keytruda. Eventually,  2/2/2021, Avastin was added to Keytruda in the setting of CNS progression.     She presents today with complaints of fall. She endorses that she fell backwards onto her back and buttocks but did not lose consciousness.     Her last cycle of Keytruda and Avastin was received on 2/26/2     Of note, she was admitted from 2/16/21 to 2/19/21 with BRBRP but was stable to she was discharged with outpatient follow up with GI for colonoscopy She had MRI done on that admission which showed that her metastatic brain lesions were improving        ROS:  Negative except for:    PAST MEDICAL & SURGICAL HISTORY:  Thyroid cancer    Peripheral neuropathy    Pleural effusion  11/17    Uterine cancer    History of total abdominal hysterectomy  4/2018    S/P thoracentesis  12/17    H/O lymph node excision  LEFT NECK 12/17        SOCIAL HISTORY:    FAMILY HISTORY:  CVA (cerebral vascular accident) (Father)    CAD (coronary artery disease) (Mother)        MEDICATIONS  (STANDING):    MEDICATIONS  (PRN):    Height (cm): 165.1 (03-05-21 @ 15:00)  Allergies    adhesives (Rash)  No Known Drug Allergies    Intolerances        Vital Signs Last 24 Hrs  T(C): 36.7 (05 Mar 2021 15:00), Max: 36.7 (05 Mar 2021 15:00)  T(F): 98 (05 Mar 2021 15:00), Max: 98 (05 Mar 2021 15:00)  HR: 67 (05 Mar 2021 15:00) (67 - 67)  BP: 100/52 (05 Mar 2021 15:00) (100/52 - 100/52)  BP(mean): --  RR: 18 (05 Mar 2021 15:00) (18 - 18)  SpO2: 100% (05 Mar 2021 15:00) (100% - 100%)    PHYSICAL EXAM  General: adult in NAD  HEENT: clear oropharynx, anicteric sclera, pink conjunctiva  Neck: supple  CV: normal S1/S2 with no murmur rubs or gallops  Lungs: positive air movement b/l ant lungs,clear to auscultation, no wheezes, no rales  Abdomen: soft non-tender non-distended, no hepatosplenomegaly  Ext: no clubbing cyanosis or edema  Skin: no rashes and no petechiae  Neuro: alert and oriented X 4, no focal deficits      LABS:    LABS and CT SCANS ARE PENDING       BLOOD SMEAR INTERPRETATION:       RADIOLOGY & ADDITIONAL STUDIES:    < from: MR Head w/ IV Cont (02.17.21 @ 12:43) >  IMPRESSION:  In comparison to the prior brain MRI 12/30/2020:    1. Slight improvement in intracranial metastatic disease:  -Several of the 5-6 mm enhancing lesions seen on the prior exam have decreased and are now punctate in size.  -Several stable intrinsically T1 hyperintense lesions are noted consistent with treated lesions.  -No new enhancing lesions.    2. Stable confluent white matter signal abnormality likely reflecting posttreatment changes.      UBALDO ANGLIN MD; Attending Radiologist  This document has been electronically signed. Feb 17 2021  1:17PM    < end of copied text >   Patient is a 65y old  Female who presents with a chief complaint of     HPI:  Serena has history of Papillary- serous endometrial cancer. She was originally diagnosed in 2017 after excisional biopsy of cervical node on 12/13/2017 showed met adenocarcinoma, primary source still was not clear. On 12/14/2017 Serena was admitted with SOB, Pleurx catheter was placed on 12/14/2017. Transvaginal sono was done on 12/14/2017 revealing thickened endometrium, endometrial biopsy on 12/19/2017 favored papillary-serous endometrial carcinoma. She received 6 cycles of carbo/taxol and then underwent surgery with Dr. Massey in May 2018. She then completed 2 additional cycles of Carbo/Taxol in June 2018 that were given postoperatively. She had PET scan in September 2018 which was negative for diease but PET in 12/2018 showed new left upper quadrant peritoneal nodule that was 8mm in size that was FDG avid. It increased in size on PET in February 2019 and was biopsied which was positive for recurrent endometrial cancer. She then had MRI in April 2019 which showed brain mets. She completed WBRT in April 2019. PET in May 2019 showed disease progression and she was restarted on weekly carbo/taxol which was stopped in September 2019 as she had positive response to therapy with no evidence of disease progression in July 2019. Eventually she had MRI brain in 10/2019 which showed disease progression and was started on Avastin maintenance therapy in 11/2019 of which she completed 10 cycles by May 2020. PET scan in April 2020 showed some minimal disease progression and she was started on Keytruda/Lenvima but she was unable to tolerate Lenvima. She was then continued on single agent Keytruda. Eventually,  2/2/2021, Avastin was added to Keytruda in the setting of CNS progression.     She presents today with complaints of fall. She endorses that she fell backwards onto her back and buttocks but did not lose consciousness.     Her last cycle of Keytruda and Avastin was received on 2/23/21    Of note, she was admitted from 2/16/21 to 2/19/21 with BRBPR but was stable to she was discharged with outpatient follow up with GI for colonoscopy She had MRI done on that admission which showed that her metastatic brain lesions were improving        ROS:  Negative except for:    PAST MEDICAL & SURGICAL HISTORY:  Thyroid cancer    Peripheral neuropathy    Pleural effusion  11/17    Uterine cancer    History of total abdominal hysterectomy  4/2018    S/P thoracentesis  12/17    H/O lymph node excision  LEFT NECK 12/17        SOCIAL HISTORY:    FAMILY HISTORY:  CVA (cerebral vascular accident) (Father)    CAD (coronary artery disease) (Mother)        MEDICATIONS  (STANDING):    MEDICATIONS  (PRN):    Height (cm): 165.1 (03-05-21 @ 15:00)  Allergies    adhesives (Rash)  No Known Drug Allergies    Intolerances        Vital Signs Last 24 Hrs  T(C): 36.7 (05 Mar 2021 15:00), Max: 36.7 (05 Mar 2021 15:00)  T(F): 98 (05 Mar 2021 15:00), Max: 98 (05 Mar 2021 15:00)  HR: 67 (05 Mar 2021 15:00) (67 - 67)  BP: 100/52 (05 Mar 2021 15:00) (100/52 - 100/52)  BP(mean): --  RR: 18 (05 Mar 2021 15:00) (18 - 18)  SpO2: 100% (05 Mar 2021 15:00) (100% - 100%)    PHYSICAL EXAM  General: adult in NAD  HEENT: clear oropharynx, anicteric sclera, pink conjunctiva  Neck: supple  CV: normal S1/S2 with no murmur rubs or gallops  Lungs: positive air movement b/l ant lungs,clear to auscultation, no wheezes, no rales  Abdomen: soft non-tender non-distended, no hepatosplenomegaly  Ext: no clubbing cyanosis or edema  Skin: no rashes and no petechiae  Neuro: alert and oriented X 4, no focal deficits      LABS:    LABS and CT SCANS ARE PENDING       BLOOD SMEAR INTERPRETATION:       RADIOLOGY & ADDITIONAL STUDIES:    < from: MR Head w/ IV Cont (02.17.21 @ 12:43) >  IMPRESSION:  In comparison to the prior brain MRI 12/30/2020:    1. Slight improvement in intracranial metastatic disease:  -Several of the 5-6 mm enhancing lesions seen on the prior exam have decreased and are now punctate in size.  -Several stable intrinsically T1 hyperintense lesions are noted consistent with treated lesions.  -No new enhancing lesions.    2. Stable confluent white matter signal abnormality likely reflecting posttreatment changes.      UBALDO ANGLIN MD; Attending Radiologist  This document has been electronically signed. Feb 17 2021  1:17PM    < end of copied text >

## 2021-03-05 NOTE — ED PROVIDER NOTE - PROGRESS NOTE DETAILS
karen for daughter enid as per , [facility], pt has been progressively deteriorating and may need palliative c/s for hospice. advised to panscan pt and that daughter developmentally delayed and to speak to sister. called sister veronica and details of fall confirmed, no further info provided. d/w her plan to admit, service pending results of ED w/u

## 2021-03-05 NOTE — H&P ADULT - ASSESSMENT
Daughter Megan Sorto contacted, however was unable to reach for further details about the fall as well as updates about the patient.  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 Cid), and history of loss of balance presents to the hospital s/p fall from home. The patient reports that she has been having generalized weakness over the last few days. She reports that she was getting out of bed this morning and tripped and fell and hit her head, however she denies any loss of consciousness and is unsure how long she was on the floor. Of note, the patient was recently in the hospital secondary to brbpr likely secondary to hemorrhoids, however no colonoscopy was performed as the patient preferred to do a work up outpatient. She denies any other symptoms including chest pain, dyspnea, cough, abdominal pain, dysuria, nausea, vomiting, diarrhea, melena, hematochezia. All ros negative except as above.     # Mechanical Fall   # History of loss of balance   - patient tripped while getting out of bed   - orthostatic vitals f/u  - Trauma w/up (-) : CTH (-) , CT C-spine (-), XRAY Pelvis (-) for acute fractures/ bleeding  - f/u XRAY R Knee, CXR  - PT/OT Consult    # Isolated thrombocytopenia   - petechiae present on upper and lower extremities as well as abdomen  - patient has not been on heparin in the hospital for past 6 months , doubt HIT  - reviewed home meds, does not appear to be on anything that is known to cause low plt  - no heparin now  - repeat cbc in the am  - consider peripheral smear in am  - f/u with heme/onc    # Simple Cystitis   - WBC 12, afebrile  - (+) U/A , f/u urine clx  - c/w Rocepin , however careful as can worsen thrombocytopenia     # h/o Papillary thyroid cancer and stage IV uterine cancer with mets to brain (on MRI 02/17/21), s/p ELEONORA in April 2018 and thyroidectomy in August 2018 (follows Dr. Cid)  - now with worsening functional status   -on Avastin/Keytruda, last dose was received on 2/23/21  - f/u heme/onc   - palliative care consult if the family agrees, however I attempted to contact the family multiple times ~ 10-11 pm and was unsuccessful.     # Hyponatremia, moderate  likely secondary to siadh  - Na 128 on admission  - do not over correct ( <8-10 meq in 24 hrs)  - if not improved in am , consider sending urine sodium , urine and serum osm    # DMII  - f/s ac qhs, if > 180, start insulin protocol  - hold home meds  - carb consistent diet     # DVT PPX: SCD  # GI PPX: PPI  # Activity: as tolerated  # Diet: carb consistent  # CHG BATH        Daughter Megan Sorto contacted, however was unable to reach for further details about the fall/palliative care as well as updates about the patient.

## 2021-03-05 NOTE — ED PROVIDER NOTE - OBJECTIVE STATEMENT
pt with pmhx thyroid and uterine CA with mets to brain, recent admission for failure to thrive, presents to ED post fall at home 2/2 weakness. pt sts hit her head, other details from fall not available as it was witnessed by daughter but no ans on phone and as per hem/onc is developmentally delayed. sister veronica present in home, but did not witness fall. pt denies LOC. Denies fever/chill/HA/dizziness/chest pain/palpitation/sob/abd pain/n/v/d/ black stool/bloody stool/urinary sxs

## 2021-03-05 NOTE — ED PROVIDER NOTE - NS ED ROS FT
Constitutional: no fever, chills, no recent weight loss, change in appetite   Eyes: no redness/discharge/pain/vision changes  ENT: no rhinorrhea/ear pain/sore throat  Cardiac: No chest pain, SOB or edema.  Respiratory: No cough or respiratory distress  GI: No nausea, vomiting, diarrhea or abdominal pain.  : No dysuria, frequency, urgency or hematuria  MS: no pain to back or extremities, no loss of ROM  Neuro: No headache. No LOC.  Skin: No skin rash.  Endocrine: No history of thyroid disease or diabetes.  Except as documented in the HPI, all other systems are negative.

## 2021-03-05 NOTE — ED PROVIDER NOTE - CLINICAL SUMMARY MEDICAL DECISION MAKING FREE TEXT BOX
patient with weakness, hx of cancer, found to be thrombocytopenic after fall. ct imaging obtained which did not show acute trauma, lab work showed thrombocytopenia and hyponatremia, fluid was provided, abx were given for uti, patietn was admitted for overal weakness due to these causeas.

## 2021-03-05 NOTE — CONSULT NOTE ADULT - ATTENDING COMMENTS
Serena was readmitted with failure to thrive, falls at home.   Case was discussed with ER attending, plan to work up for trauma.   Would not excalate steroids above Decadron 2mg BID.   I have discussed case with patient's sister Barbara, I have explained to her that at this point I believe comfort care/hospice eval is appropriate, she will inform the rest of patient's family.   Will follow closely. Once sister discusses case with  Camilo advanced directives will be delineated.

## 2021-03-05 NOTE — ED ADULT NURSE NOTE - OBJECTIVE STATEMENT
Pt aox3, in no acute distress, c/o witnessed mechanical fall today at home, no LOC, no head injury, no use of blood thinners. Pt has generalized ecchymosis from previous falls. Pt aox3, in no acute distress, c/o witnessed mechanical fall today at home, no LOC, + head injury, no use of blood thinners. Pt has generalized ecchymosis from previous falls.

## 2021-03-05 NOTE — H&P ADULT - NSHPLABSRESULTS_GEN_ALL_CORE
11.4   12.07 )-----------( 43       ( 05 Mar 2021 15:53 )             31.7     03-05-21 @ 15:53    128<L>  |  93<L>  |  20             --------------------------< 162<H>     4.6  |  26  | 0.6<L>    eGFR AA: 111  eGFR N-AA: 96    Calcium: 8.6  Phosphorus: --  Magnesium: --    AST: 15    ALT: 34  AlkPhos: 90  Protein: 4.9<L>  Albumin: 3.1<L>  TBili: 0.5  D-Bili: --    Urinalysis (03.05.21 @ 18:52)    Glucose Qualitative, Urine: Trace    Blood, Urine: Moderate    pH Urine: 6.5    Color: Yellow    Urine Appearance: Clear    Bilirubin: Negative    Ketone - Urine: Negative    Specific Gravity: 1.029    Protein, Urine: Trace    Urobilinogen: <2 mg/dL    Nitrite: Negative    Leukocyte Esterase Concentration: Large    < from: CT Head No Cont (03.05.21 @ 17:48) >      IMPRESSION:    No CT evidence for acute intracranial hemorrhage, large territorial infarct, or midline shift.    < end of copied text >    < from: CT Abdomen and Pelvis w/ IV Cont (03.05.21 @ 19:14) >    IMPRESSION:  1. No evidence of acute traumatic intrathoracic or intra-abdominal pathology.    2. Multiple peritoneal nodules, recently demonstrated on January 26, 2021 PET/CT, including 1.5 cm nodules subadjacent to abdominal wall, previously 1.9 cm, and anatomically stable left upper quadrant infradiaphragmatic 1 cm nodule.      < end of copied text >  < from: CT Cervical Spine No Cont (03.05.21 @ 17:49) >    No fracture or subluxation is seen.  Impression no fractures seen    < end of copied text >    < from: CT Head No Cont (03.05.21 @ 17:48) >    IMPRESSION:    No CT evidence for acute intracranial hemorrhage, large territorial infarct, or midline shift.    < end of copied text >      < from: Xray Pelvis AP only (03.05.21 @ 17:42) >    Comparison 1/26/2021  No fracture or dislocation is seen.  Impression no fractures seen    < end of copied text >

## 2021-03-05 NOTE — ED PROVIDER NOTE - PRIOR HOSPITAL/ED VISITS SUMMARY FREE TEXT FOR MDM OBTAINED AND REVIEWED OLD RECORDS QUESTION
feb 2021 t says weakness has caused her to not be able to walk for the last few days. No f/c/n/v. No diarrhea. No chest pain/SOB. No abdominal pain. Pt sent by [facility] to be admitted. Of note, recent MRI in December showed brain metastasis, PET scan showed peritoneal nodules. In the ED: VSS, labs significant for mild hyponatremia 132.     On admission, repeat MRI Head done, Brain mets seen to be improving on Avastin. Pt was going to undergo colonoscopy due to BRBPR, and began prep but the morning of procedure, refused to go forward and asked to be discharged. Agreed to undergo procedure as outpatient.

## 2021-03-05 NOTE — H&P ADULT - NSHPPHYSICALEXAM_GEN_ALL_CORE
Vital Signs (24 Hrs):  T(C): 36.3 (03-05-21 @ 19:52), Max: 36.7 (03-05-21 @ 15:00)  HR: 79 (03-05-21 @ 19:52) (67 - 79)  BP: 114/73 (03-05-21 @ 19:52) (100/52 - 114/73)  RR: 16 (03-05-21 @ 19:52) (16 - 18)  SpO2: 99% (03-05-21 @ 19:52) (99% - 100%)    PHYSICAL EXAM:  GENERAL: NAD, lying in bed comfortably  HEAD:  Atraumatic, Normocephalic  EYES: EOMI, PERRLA, conjunctiva and sclera clear  ENT: Moist mucous membranes  NECK: Supple, No JVD  CHEST/LUNG: Clear to auscultation bilaterally; No rales, rhonchi, wheezing, or rubs. Unlabored respirations  HEART: Regular rate and rhythm; No murmurs, rubs, or gallops  ABDOMEN: Bowel sounds present; Soft, Nontender, Nondistended. No hepatomegally  EXTREMITIES:  2+ Peripheral Pulses, brisk capillary refill. No clubbing, cyanosis, or edema  NERVOUS SYSTEM:  Alert & Oriented X3, speech clear. No deficits   MSK: FROM all 4 extremities, full and equal strength  SKIN: No rashes or lesions Vital Signs (24 Hrs):  T(C): 36.3 (03-05-21 @ 19:52), Max: 36.7 (03-05-21 @ 15:00)  HR: 79 (03-05-21 @ 19:52) (67 - 79)  BP: 114/73 (03-05-21 @ 19:52) (100/52 - 114/73)  RR: 16 (03-05-21 @ 19:52) (16 - 18)  SpO2: 99% (03-05-21 @ 19:52) (99% - 100%)    PHYSICAL EXAM:  GENERAL: NAD, lying in bed comfortably  HEAD:  Atraumatic, Normocephalic  EYES: EOMI, PERRLA, conjunctiva and sclera clear  ENT: Moist mucous membranes  NECK: Supple, No JVD  CHEST/LUNG: Clear to auscultation bilaterally; No rales, rhonchi, wheezing, or rubs. Unlabored respirations  HEART: Regular rate and rhythm; No murmurs, rubs, or gallops  ABDOMEN: Bowel sounds present; Soft, Nontender, Nondistended. No hepatomegally  EXTREMITIES:  2+ Peripheral Pulses, brisk capillary refill. No clubbing, cyanosis, or edema  NERVOUS SYSTEM:  Alert & Oriented X3, speech clear. No deficits   MSK: FROM all 4 extremities, full and equal strength  SKIN: DIFFUSE PETECHIAE PRESENT ON UPPER AND LOWER EXTREMITIES AS WELL AS ABDOMEN

## 2021-03-06 NOTE — PROGRESS NOTE ADULT - ASSESSMENT
Serena is a 65 y F with PMHx peripheral neuropathy, papillary thyroid cancer s/p thyroidectomy and stage IV Papillary Serous Carcinoma s/p ELEONORA in April 2018 presenting with generalized weakness and fall     ASSESSMENT  #) Fall with negative trauma work up including CT head which is negative   #) Thrombocytopenia   #) Stage IV Papillary Serous Endometrial Carcinoma     PLAN  - Please obtain full traumatic work up for fall   - Patient is on Avastin/Keytruda, last dose was received on 2/23/21  - She fell at home and this concerning as she appears to be functionally declining with worsening performance status   - Please discuss all medical care with sister Barbara [phone]. Her daughter Megan is intellectually disabled and cannot comprehend her mother's medical care.   - Given her worsening functional status, we recommended hospice at this time. Barbara is open to hospice but would like to discuss with Serena's significant other before making a final decision   - Hospice evaluation pending until family makes final decision   - Continue Decadron 2mg BID for now   - Thrombocytopenia noted. Peripheral smear reviewed. No evidence of schistocytes no platelet clumping. Manual platelet count estimated at 40,000. Polychromatic red cells noted. Please send for LDH, Coagulation studies, D-Dimer and Fibrinogen. She may have element of DIC but regardless treatment will be mostly supportive   - Transfuse for platelet count <10K or <50K with any evidence of active bleed     Multiple attempts made to reach sister Barbara to follow up on hospice decision. She did not answer.  Serena is a 65 y F with PMHx peripheral neuropathy, papillary thyroid cancer s/p thyroidectomy and stage IV Papillary Serous Carcinoma s/p ELEONORA in April 2018 presenting with generalized weakness and fall     ASSESSMENT  #) Fall with negative trauma work up    #) Thrombocytopenia   #) Stage IV Papillary Serous Endometrial Carcinoma     PLAN  - Full traumatic work up noted, no evidence of any acute fracture; CT head negative for bleed   - Patient is on Avastin/Keytruda, last dose was received on 2/23/21  - She fell at home and this concerning as she appears to be functionally declining with worsening performance status   - Please discuss all medical care with sister Barbara [phone]. Her daughter Megan is intellectually disabled and cannot comprehend her mother's medical care.   - Given her worsening functional status, we recommended hospice at this time. Barbara is open to hospice but would like to discuss with Serena's significant other before making a final decision   - Hospice evaluation pending until family makes final decision   - Continue Decadron 2mg BID for now   - Thrombocytopenia noted. Peripheral smear reviewed. No evidence of schistocytes no platelet clumping. Manual platelet count estimated at 40,000. Polychromatic red cells noted. Please send for LDH, Coagulation studies, D-Dimer and Fibrinogen. She may have element of DIC but regardless treatment will be mostly supportive   - Transfuse for platelet count <10K or <50K with any evidence of active bleed     Multiple attempts made to reach sister Barbara to follow up on hospice decision. She did not answer.

## 2021-03-06 NOTE — PROGRESS NOTE ADULT - SUBJECTIVE AND OBJECTIVE BOX
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 Cid), and history of loss of balance presents to the hospital s/p fall from home. The patient reports that she has been having generalized weakness over the last few days. She reports that she was getting out of bed this morning and tripped and fell and hit her head, however she denies any loss of consciousness and is unsure how long she was on the floor. Of note, the patient was recently in the hospital secondary to brbpr likely secondary to hemorrhoids, however no colonoscopy was performed as the patient preferred to do a work up outpatient. She denies any other symptoms including chest pain, dyspnea, cough, abdominal pain, dysuria, nausea, vomiting, diarrhea, melena, hematochezia. All ros negative except as above.     T(C): 36.3,HR: 79 , BP: 114/73 , RR: 16 , SpO2: 99% on room air  Labs: WBC 12, Na 128, U/A (+) LE , pyuria , PLT 43 (baseline ~ 144)  Trauma w/up (-) : CTH (-) , CT C-spine (-), XRAY Pelvis (-) for acute fractures/ bleeding  CT A/P w/ iv contrast: Multiple peritoneal nodules, recently demonstrated on 2021 PET/CT, including 1.5 cm nodules subadjacent to abdominal wall, previously 1.9 cm, and anatomically stable left upper quadrant infradiaphragmatic 1 cm nodule.  CT Head No Cont : Area of hyperdensity/calcification within the left frontoparietal region measuring 1.0 x 1.1 cm andin the left occipital lobe measuring 1.0 x 0.6 cm, which may correspond to metastatic disease that is better seen on most recent MRI.       (05 Mar 2021 21:29)    Currently admitted to medicine with the primary diagnosis of Fall       Today is hospital day 1d.     INTERVAL HPI / OVERNIGHT EVENTS:  Patient was examined and seen at bedside. This morning she is resting comfortably in bed and reports some shortness of breath. Patient also reports feeling week. Has not decided on hospice yet.     ROS: Otherwise unremarkable     PAST MEDICAL & SURGICAL HISTORY  Thyroid cancer    Peripheral neuropathy    Pleural effusion      Uterine cancer    History of total abdominal hysterectomy  2018    S/P thoracentesis      H/O lymph node excision  LEFT NECK       ALLERGIES  adhesives (Rash)  No Known Drug Allergies    MEDICATIONS  STANDING MEDICATIONS  calcium carbonate 1250 mG  + Vitamin D (OsCal 500 + D) 1 Tablet(s) Oral three times a day  chlorhexidine 4% Liquid 1 Application(s) Topical <User Schedule>  cholecalciferol Oral Tab/Cap - Peds 1000 Unit(s) Oral every 24 hours  cyanocobalamin 1000 MICROGram(s) Oral daily  dexAMETHasone     Tablet 2 milliGRAM(s) Oral two times a day  dextrose 40% Gel 15 Gram(s) Oral once  dextrose 5%. 1000 milliLiter(s) IV Continuous <Continuous>  dextrose 5%. 1000 milliLiter(s) IV Continuous <Continuous>  dextrose 50% Injectable 25 Gram(s) IV Push once  dextrose 50% Injectable 12.5 Gram(s) IV Push once  dextrose 50% Injectable 25 Gram(s) IV Push once  glucagon  Injectable 1 milliGRAM(s) IntraMuscular once  influenza   Vaccine 0.5 milliLiter(s) IntraMuscular once  insulin lispro (ADMELOG) corrective regimen sliding scale   SubCutaneous three times a day before meals  levETIRAcetam 1000 milliGRAM(s) Oral two times a day  levothyroxine 112 MICROGram(s) Oral daily  pantoprazole    Tablet 40 milliGRAM(s) Oral before breakfast  polyethylene glycol 3350 17 Gram(s) Oral daily  sodium chloride 0.9%. 1000 milliLiter(s) IV Continuous <Continuous>    PRN MEDICATIONS  acetaminophen   Tablet .. 650 milliGRAM(s) Oral every 4 hours PRN  aluminum hydroxide/magnesium hydroxide/simethicone Suspension 30 milliLiter(s) Oral every 6 hours PRN  hydrocortisone 1% Topical Cream - Peds 1 Application(s) Topical every 12 hours PRN  melatonin 5 milliGRAM(s) Oral at bedtime PRN  senna 2 Tablet(s) Oral at bedtime PRN    VITALS:  T(F): 96.6  HR: 70  BP: 103/56  RR: 18  SpO2: 99%    PHYSICAL EXAM  GEN: NAD, Resting comfortably in bed, appears very weak  PULM: Clear to auscultation bilaterally, No wheezes  CVS: Regular rate and rhythm, S1-S2, no murmurs  ABD: Soft, non-tender, non-distended, no guarding  EXT: No edema, warm  NEURO: A&Ox4, no focal deficits    LABS                        11.0   8.35  )-----------( 38       ( 06 Mar 2021 04:58 )             32.1     03-06    131<L>  |  94<L>  |  16  ----------------------------<  134<H>  5.1<H>   |  26  |  0.5<L>    Ca    8.3<L>      06 Mar 2021 04:58    TPro  4.7<L>  /  Alb  2.9<L>  /  TBili  0.5  /  DBili  x   /  AST  15  /  ALT  32  /  AlkPhos  77  -06    PT/INR - ( 05 Mar 2021 15:53 )   PT: 10.30 sec;   INR: 0.90 ratio         PTT - ( 05 Mar 2021 15:53 )  PTT:22.7 sec  Urinalysis Basic - ( 05 Mar 2021 18:52 )    Color: Yellow / Appearance: Clear / S.029 / pH: x  Gluc: x / Ketone: Negative  / Bili: Negative / Urobili: <2 mg/dL   Blood: x / Protein: Trace / Nitrite: Negative   Leuk Esterase: Large / RBC: 86 /HPF / WBC 16 /HPF   Sq Epi: x / Non Sq Epi: 3 /HPF / Bacteria: Few        Troponin T, Serum: <0.01 ng/mL (21 @ 15:53)      CARDIAC MARKERS ( 05 Mar 2021 15:53 )  x     / <0.01 ng/mL / x     / x     / x          RADIOLOGY     HPI:  65 y F pmh of DMII, peripheral neuropathy, papillary thyroid cancer and stage IV uterine cancer with mets to brain (on MRI 21), s/p ELEONORA in 2018 and thyroidectomy in 2018 (follows Dr. Cid), and history of loss of balance presents to the hospital s/p fall from home. The patient reports that she has been having generalized weakness over the last few days. She reports that she was getting out of bed this morning and tripped and fell and hit her head, however she denies any loss of consciousness and is unsure how long she was on the floor. Of note, the patient was recently in the hospital secondary to brbpr likely secondary to hemorrhoids, however no colonoscopy was performed as the patient preferred to do a work up outpatient. She denies any other symptoms including chest pain, dyspnea, cough, abdominal pain, dysuria, nausea, vomiting, diarrhea, melena, hematochezia. All ros negative except as above.     T(C): 36.3,HR: 79 , BP: 114/73 , RR: 16 , SpO2: 99% on room air  Labs: WBC 12, Na 128, U/A (+) LE , pyuria , PLT 43 (baseline ~ 144)  Trauma w/up (-) : CTH (-) , CT C-spine (-), XRAY Pelvis (-) for acute fractures/ bleeding  CT A/P w/ iv contrast: Multiple peritoneal nodules, recently demonstrated on 2021 PET/CT, including 1.5 cm nodules subadjacent to abdominal wall, previously 1.9 cm, and anatomically stable left upper quadrant infradiaphragmatic 1 cm nodule.  CT Head No Cont : Area of hyperdensity/calcification within the left frontoparietal region measuring 1.0 x 1.1 cm andin the left occipital lobe measuring 1.0 x 0.6 cm, which may correspond to metastatic disease that is better seen on most recent MRI.       (05 Mar 2021 21:29)    Currently admitted to medicine with the primary diagnosis of Fall       Today is hospital day 1d.     INTERVAL HPI / OVERNIGHT EVENTS:  Patient was examined and seen at bedside. This morning she is resting comfortably in bed and reports some shortness of breath. Patient also reports feeling weak. Patient would like to obtain information for hospice.    ROS: Otherwise unremarkable     PAST MEDICAL & SURGICAL HISTORY  Thyroid cancer    Peripheral neuropathy    Pleural effusion      Uterine cancer    History of total abdominal hysterectomy  2018    S/P thoracentesis      H/O lymph node excision  LEFT NECK       ALLERGIES  adhesives (Rash)  No Known Drug Allergies    MEDICATIONS  STANDING MEDICATIONS  calcium carbonate 1250 mG  + Vitamin D (OsCal 500 + D) 1 Tablet(s) Oral three times a day  chlorhexidine 4% Liquid 1 Application(s) Topical <User Schedule>  cholecalciferol Oral Tab/Cap - Peds 1000 Unit(s) Oral every 24 hours  cyanocobalamin 1000 MICROGram(s) Oral daily  dexAMETHasone     Tablet 2 milliGRAM(s) Oral two times a day  dextrose 40% Gel 15 Gram(s) Oral once  dextrose 5%. 1000 milliLiter(s) IV Continuous <Continuous>  dextrose 5%. 1000 milliLiter(s) IV Continuous <Continuous>  dextrose 50% Injectable 25 Gram(s) IV Push once  dextrose 50% Injectable 12.5 Gram(s) IV Push once  dextrose 50% Injectable 25 Gram(s) IV Push once  glucagon  Injectable 1 milliGRAM(s) IntraMuscular once  influenza   Vaccine 0.5 milliLiter(s) IntraMuscular once  insulin lispro (ADMELOG) corrective regimen sliding scale   SubCutaneous three times a day before meals  levETIRAcetam 1000 milliGRAM(s) Oral two times a day  levothyroxine 112 MICROGram(s) Oral daily  pantoprazole    Tablet 40 milliGRAM(s) Oral before breakfast  polyethylene glycol 3350 17 Gram(s) Oral daily  sodium chloride 0.9%. 1000 milliLiter(s) IV Continuous <Continuous>    PRN MEDICATIONS  acetaminophen   Tablet .. 650 milliGRAM(s) Oral every 4 hours PRN  aluminum hydroxide/magnesium hydroxide/simethicone Suspension 30 milliLiter(s) Oral every 6 hours PRN  hydrocortisone 1% Topical Cream - Peds 1 Application(s) Topical every 12 hours PRN  melatonin 5 milliGRAM(s) Oral at bedtime PRN  senna 2 Tablet(s) Oral at bedtime PRN    VITALS:  T(F): 96.6  HR: 70  BP: 103/56  RR: 18  SpO2: 99%    PHYSICAL EXAM  GEN: NAD, Resting comfortably in bed, appears very weak  PULM: Clear to auscultation bilaterally, No wheezes  CVS: Regular rate and rhythm, S1-S2, no murmurs  ABD: Soft, non-tender, non-distended, no guarding  EXT: No edema, warm  NEURO: A&Ox4, no focal deficits    LABS                        11.0   8.35  )-----------( 38       ( 06 Mar 2021 04:58 )             32.1     03-06    131<L>  |  94<L>  |  16  ----------------------------<  134<H>  5.1<H>   |  26  |  0.5<L>    Ca    8.3<L>      06 Mar 2021 04:58    TPro  4.7<L>  /  Alb  2.9<L>  /  TBili  0.5  /  DBili  x   /  AST  15  /  ALT  32  /  AlkPhos  77  -06    PT/INR - ( 05 Mar 2021 15:53 )   PT: 10.30 sec;   INR: 0.90 ratio         PTT - ( 05 Mar 2021 15:53 )  PTT:22.7 sec  Urinalysis Basic - ( 05 Mar 2021 18:52 )    Color: Yellow / Appearance: Clear / S.029 / pH: x  Gluc: x / Ketone: Negative  / Bili: Negative / Urobili: <2 mg/dL   Blood: x / Protein: Trace / Nitrite: Negative   Leuk Esterase: Large / RBC: 86 /HPF / WBC 16 /HPF   Sq Epi: x / Non Sq Epi: 3 /HPF / Bacteria: Few        Troponin T, Serum: <0.01 ng/mL (21 @ 15:53)      CARDIAC MARKERS ( 05 Mar 2021 15:53 )  x     / <0.01 ng/mL / x     / x     / x          RADIOLOGY

## 2021-03-06 NOTE — PHYSICAL THERAPY INITIAL EVALUATION ADULT - GENERAL OBSERVATIONS, REHAB EVAL
Chart reviewed, pt encountered supine, sleeping, declined PT eval at this time 2* fatigue, requesting PT to return later, PT will f/u later
Chart reviewed, pt encountered supine, NAD, agreeable, +IV, IE completed 13:50-14:10

## 2021-03-06 NOTE — PHYSICAL THERAPY INITIAL EVALUATION ADULT - GAIT DEVIATIONS NOTED, PT EVAL
decreased rosa/increased time in double stance/decreased velocity of limb motion/decreased step length/decreased stride length/decreased weight-shifting ability

## 2021-03-06 NOTE — PROGRESS NOTE ADULT - ASSESSMENT
***Please discuss all medical care with sister Barbara [phone], NOT daughter. ***    65 y F pmh of DMII, peripheral neuropathy, papillary thyroid cancer and stage IV uterine cancer with mets to brain (on MRI 02/17/21), s/p ELEONORA in April 2018 and thyroidectomy in August 2018 (follows Dr. Cid), and history of loss of balance presents to the hospital s/p fall from home.    # Mechanical Fall   # History of loss of balance   - patient tripped while getting out of bed   - Trauma w/up (-) : CTH (-) , CT C-spine (-), XRAY Pelvis (-) for acute fractures/ bleeding  - f/u XRAY R Knee, CXR  - PT/OT Consult    # Isolated thrombocytopenia   - petechiae present on upper and lower extremities as well as abdomen  - no heparin now  - consider peripheral smear in am    # Simple Cystitis   - WBC 12, afebrile  - (+) U/A , f/u urine clx  - c/w Rocepin , however careful as can worsen thrombocytopenia     # h/o Papillary thyroid cancer and stage IV uterine cancer with mets to brain (on MRI 02/17/21), s/p ELEONORA in April 2018 and thyroidectomy in August 2018 (follows Dr. Cid)  - now with worsening functional status   - heme recommendations:  - Patient is on Avastin/Keytruda, last dose was received on 2/23/21  - She fell at home and this concerning as she appears to be functionally declining with worsening performance status   - We recommended that given her worsening functional status, we would recommend hospice at this time. Barbara is open to hospice but would like to discuss with Serena's significant other before making a final decision   - Hospice evaluation pending until family makes final decision   - Continue Decadron 2mg BID for now     # Hyponatremia, moderate  likely secondary to siadh  - Na 128 on admission > 131  - do not over correct ( <8-10 meq in 24 hrs)    # DMII  - f/s ac qhs, if > 180, start insulin protocol  - hold home meds  - carb consistent diet     # DVT PPX: SCD  # GI PPX: PPI  # Activity: as tolerated  # Diet: carb consistent  # CHG BATH  ***Please discuss all medical care with sister Barbara [phone], NOT daughter. ***               ***Please discuss all medical care with sister Barbara [phone], NOT daughter. ***    65 y F pmh of DMII, peripheral neuropathy, papillary thyroid cancer and stage IV uterine cancer with mets to brain (on MRI 02/17/21), s/p ELEONORA in April 2018 and thyroidectomy in August 2018 (follows Dr. Cid), and history of loss of balance presents to the hospital s/p fall from home.    # Mechanical Fall   # History of loss of balance   - patient tripped while getting out of bed   - Trauma w/up (-) : CTH (-) , CT C-spine (-), XRAY Pelvis (-) for acute fractures/ bleeding  - f/u XRAY R Knee, CXR  - PT/OT Consult    # Isolated thrombocytopenia   - petechiae present on upper and lower extremities as well as abdomen  - no heparin now  - consider peripheral smear in am    # Simple Cystitis   - WBC 12, afebrile  - (+) U/A , f/u urine clx  - c/w Rocepin , however careful as can worsen thrombocytopenia     # h/o Papillary thyroid cancer and stage IV uterine cancer with mets to brain (on MRI 02/17/21), s/p ELEONORA in April 2018 and thyroidectomy in August 2018 (follows Dr. Cid)  - now with worsening functional status   - heme recommendations:  - Patient is on Avastin/Keytruda, last dose was received on 2/23/21  - She fell at home and this concerning as she appears to be functionally declining with worsening performance status   - We recommended that given her worsening functional status, we would recommend hospice at this time. Barbara is open to hospice but would like to discuss with Serena's significant other before making a final decision   - Hospice evaluation pending until family makes final decision   - Continue Decadron 2mg BID for now     #Stage 3 bedsore to R buttock  -Triad cream  - Wound care nurse consulted    # Hyponatremia, moderate  likely secondary to siadh  - Na 128 on admission > 131  - do not over correct ( <8-10 meq in 24 hrs)    # DMII  - f/s ac qhs, if > 180, start insulin protocol  - hold home meds  - carb consistent diet     # DVT PPX: SCD  # GI PPX: PPI  # Activity: as tolerated  # Diet: carb consistent  # CHG BATH  ***Please discuss all medical care with sister Barbara [phone], NOT daughter. ***               ***Please discuss all medical care with sister Barbara [phone], NOT daughter. ***    65 y F pmh of DMII, peripheral neuropathy, papillary thyroid cancer and stage IV uterine cancer with mets to brain (on MRI 02/17/21), s/p ELEONORA in April 2018 and thyroidectomy in August 2018 (follows Dr. Cid), and history of loss of balance presents to the hospital s/p fall from home.    # Mechanical Fall   # History of loss of balance   - patient tripped while getting out of bed   - Trauma w/up (-) : CTH (-) , CT C-spine (-), XRAY Pelvis (-) for acute fractures/ bleeding  - f/u XRAY R Knee, CXR  - PT/OT Consult    # Isolated thrombocytopenia   - petechiae present on upper and lower extremities as well as abdomen  - no heparin now  - consider peripheral smear in am    # Simple Cystitis   - WBC 12, afebrile  - (+) U/A , f/u urine clx  - c/w Rocepin , however careful as can worsen thrombocytopenia     # h/o Papillary thyroid cancer and stage IV uterine cancer with mets to brain (on MRI 02/17/21), s/p ELEONORA in April 2018 and thyroidectomy in August 2018 (follows Dr. Cid)  - now with worsening functional status   - heme recommendations:  - Patient is on Avastin/Keytruda, last dose was received on 2/23/21  - She fell at home and this concerning as she appears to be functionally declining with worsening performance status   - We recommended that given her worsening functional status, we would recommend hospice at this time. Barbara is open to hospice but would like to discuss with Serena's significant other before making a final decision   - Continue Decadron 2mg BID for now   - Hospice consult placed    #Stage 3 bedsore to R buttock  -Triad cream  - Wound care nurse consulted    # Hyponatremia, moderate  likely secondary to siadh  - Na 128 on admission > 131  - do not over correct ( <8-10 meq in 24 hrs)    # DMII  - f/s ac qhs, if > 180, start insulin protocol  - hold home meds  - carb consistent diet     # DVT PPX: SCD  # GI PPX: PPI  # Activity: as tolerated  # Diet: carb consistent  # CHG BATH  ***Please discuss all medical care with sister Barbara [phone], NOT daughter. ***               ***Please discuss all medical care with sister Barbara [phone], NOT daughter. ***    65 y F pmh of DMII, peripheral neuropathy, papillary thyroid cancer and stage IV uterine cancer with mets to brain (on MRI 02/17/21), s/p ELEONORA in April 2018 and thyroidectomy in August 2018 (follows Dr. Cid), and history of loss of balance presents to the hospital s/p fall from home.    # Mechanical Fall   # History of loss of balance   - patient tripped while getting out of bed   - Trauma w/up (-) : CTH (-) , CT C-spine (-), XRAY Pelvis (-) for acute fractures/ bleeding  - f/u XRAY R Knee, CXR  - PT/OT Consult    # Isolated thrombocytopenia   - petechiae present on upper and lower extremities as well as abdomen  - no heparin now    # Simple Cystitis   - WBC 12, afebrile  - (+) U/A , f/u urine clx  - c/w Rocepin , however careful as can worsen thrombocytopenia     # h/o Papillary thyroid cancer and stage IV uterine cancer with mets to brain (on MRI 02/17/21), s/p ELEONORA in April 2018 and thyroidectomy in August 2018 (follows Dr. Cid)  - now with worsening functional status   - heme recommendations:  - Patient is on Avastin/Keytruda, last dose was received on 2/23/21  - She fell at home and this concerning as she appears to be functionally declining with worsening performance status   - We recommended that given her worsening functional status, we would recommend hospice at this time. Barbara is open to hospice but would like to discuss with Serena's significant other before making a final decision   - Continue Decadron 2mg BID for now   - Hospice consult placed    #Stage 3 bedsore to R buttock  -Triad cream  - Wound care nurse consulted    # Hyponatremia, moderate  likely secondary to siadh  - Na 128 on admission > 131  - do not over correct ( <8-10 meq in 24 hrs)    # DMII  - f/s ac qhs, if > 180, start insulin protocol  - hold home meds  - carb consistent diet     #Family contact  - spoke with patient's sister, Barbara, at 4:30 pm    # DVT PPX: SCD  # GI PPX: PPI  # Activity: as tolerated  # Diet: carb consistent  # CHG BATH  ***Please discuss all medical care with sister Barbara [phone], NOT daughter. ***               ***Please discuss all medical care with sister Barbara [phone], NOT daughter. ***    65 y F pmh of DMII, peripheral neuropathy, papillary thyroid cancer and stage IV uterine cancer with mets to brain (on MRI 02/17/21), s/p ELEONORA in April 2018 and thyroidectomy in August 2018 (follows Dr. Cid), and history of loss of balance presents to the hospital s/p fall from home.    # Mechanical Fall   # History of loss of balance   - patient tripped while getting out of bed   - Trauma w/up (-) : CTH (-) , CT C-spine (-), XRAY Pelvis (-) for acute fractures/ bleeding  - Xray R knee - radiology reporting that there is a questionable fracture of the right tibia and recommending further study with CT knee. Results discussed with patient. At this time, patient would not like to pursue surgery and declines further study of the possible fracture. She is not reporting R knee pain. Will not proceed with further study of questionable fracture at this time.  - PT/OT Consult    # Isolated thrombocytopenia   - petechiae present on upper and lower extremities as well as abdomen  - no heparin now    # Simple Cystitis   - WBC 12, afebrile  - (+) U/A , f/u urine clx  - c/w Rocepin , however careful as can worsen thrombocytopenia     # h/o Papillary thyroid cancer and stage IV uterine cancer with mets to brain (on MRI 02/17/21), s/p ELEONORA in April 2018 and thyroidectomy in August 2018 (follows Dr. Cid)  - now with worsening functional status   - heme recommendations:  - Patient is on Avastin/Keytruda, last dose was received on 2/23/21  - She fell at home and this concerning as she appears to be functionally declining with worsening performance status   - We recommended that given her worsening functional status, we would recommend hospice at this time. Barbara is open to hospice but would like to discuss with Serena's significant other before making a final decision   - Continue Decadron 2mg BID for now   - Hospice consult placed    #Stage 3 bedsore to R buttock  -Triad cream  - Wound care nurse consulted    # Hyponatremia, moderate  likely secondary to siadh  - Na 128 on admission > 131  - do not over correct ( <8-10 meq in 24 hrs)    # DMII  - f/s ac qhs, if > 180, start insulin protocol  - hold home meds  - carb consistent diet     #Family contact  - spoke with patient's sister, Barbara, at 4:30 pm    # DVT PPX: SCD  # GI PPX: PPI  # Activity: as tolerated  # Diet: carb consistent  # CHG BATH  ***Please discuss all medical care with sister Barbara [phone], NOT daughter. ***

## 2021-03-06 NOTE — PROGRESS NOTE ADULT - SUBJECTIVE AND OBJECTIVE BOX
Patient is a 65y old  Female who presents with a chief complaint of generalized weakness (06 Mar 2021 11:12)      Subjective: She is very tired today but is much more awake than she was yesterday when I saw her in the ED. She is tired and does not remember how she fell.       Vital Signs Last 24 Hrs  T(C): 35.9 (06 Mar 2021 07:30), Max: 36.7 (05 Mar 2021 15:00)  T(F): 96.6 (06 Mar 2021 07:30), Max: 98 (05 Mar 2021 15:00)  HR: 70 (06 Mar 2021 07:30) (67 - 79)  BP: 103/56 (06 Mar 2021 07:30) (99/53 - 114/73)  BP(mean): --  RR: 18 (06 Mar 2021 07:30) (16 - 18)  SpO2: 99% (05 Mar 2021 19:52) (99% - 100%)    PHYSICAL EXAM  General: frail appearing female   HEENT:  anicteric sclera, pink conjunctiva  Neck: supple  CV: normal S1/S2 with no murmur rubs or gallops  Lungs: CTABL  Abdomen: soft non-tender non-distended,  Ext: no clubbing cyanosis or edema  Skin: multiple petechiae along b/l lower ext  Neuro: alert and oriented X 2-3, lethargic but easily arousable     MEDICATIONS  (STANDING):  calcium carbonate 1250 mG  + Vitamin D (OsCal 500 + D) 1 Tablet(s) Oral three times a day  chlorhexidine 4% Liquid 1 Application(s) Topical <User Schedule>  cholecalciferol Oral Tab/Cap - Peds 1000 Unit(s) Oral every 24 hours  cyanocobalamin 1000 MICROGram(s) Oral daily  dexAMETHasone     Tablet 2 milliGRAM(s) Oral two times a day  dextrose 40% Gel 15 Gram(s) Oral once  dextrose 5%. 1000 milliLiter(s) (50 mL/Hr) IV Continuous <Continuous>  dextrose 5%. 1000 milliLiter(s) (100 mL/Hr) IV Continuous <Continuous>  dextrose 50% Injectable 25 Gram(s) IV Push once  dextrose 50% Injectable 12.5 Gram(s) IV Push once  dextrose 50% Injectable 25 Gram(s) IV Push once  glucagon  Injectable 1 milliGRAM(s) IntraMuscular once  influenza   Vaccine 0.5 milliLiter(s) IntraMuscular once  insulin lispro (ADMELOG) corrective regimen sliding scale   SubCutaneous three times a day before meals  levETIRAcetam 1000 milliGRAM(s) Oral two times a day  levothyroxine 112 MICROGram(s) Oral daily  pantoprazole    Tablet 40 milliGRAM(s) Oral before breakfast  polyethylene glycol 3350 17 Gram(s) Oral daily  sodium chloride 0.9%. 1000 milliLiter(s) (50 mL/Hr) IV Continuous <Continuous>    MEDICATIONS  (PRN):  acetaminophen   Tablet .. 650 milliGRAM(s) Oral every 4 hours PRN Temp greater or equal to 38C (100.4F), Mild Pain (1 - 3)  aluminum hydroxide/magnesium hydroxide/simethicone Suspension 30 milliLiter(s) Oral every 6 hours PRN Dyspepsia  hydrocortisone 1% Topical Cream - Peds 1 Application(s) Topical every 12 hours PRN Rash and/or Itching  melatonin 5 milliGRAM(s) Oral at bedtime PRN Sleep  senna 2 Tablet(s) Oral at bedtime PRN Constipation      LABS:                          11.0   8.35  )-----------( 38       ( 06 Mar 2021 04:58 )             32.1         Mean Cell Volume : 92.2 fL  Mean Cell Hemoglobin : 31.6 pg  Mean Cell Hemoglobin Concentration : 34.3 g/dL  Auto Neutrophil # : 6.80 K/uL  Auto Lymphocyte # : 0.35 K/uL  Auto Monocyte # : 0.31 K/uL  Auto Eosinophil # : 0.00 K/uL  Auto Basophil # : 0.06 K/uL  Auto Neutrophil % : 81.5 %  Auto Lymphocyte % : 4.2 %  Auto Monocyte % : 3.7 %  Auto Eosinophil % : 0.0 %  Auto Basophil % : 0.7 %      Serial CBC's  - @ 04:58  Hct-32.1 / Hgb-11.0 / Plat-38 / RBC-3.48 / WBC-8.35  Serial CBC's   @ 15:53  Hct-31.7 / Hgb-11.4 / Plat-43 / RBC-3.51 / WBC-12.07      03-06    131<L>  |  94<L>  |  16  ----------------------------<  134<H>  5.1<H>   |  26  |  0.5<L>    Ca    8.3<L>      06 Mar 2021 04:58    TPro  4.7<L>  /  Alb  2.9<L>  /  TBili  0.5  /  DBili  x   /  AST  15  /  ALT  32  /  AlkPhos  77  03-06      PT/INR - ( 05 Mar 2021 15:53 )   PT: 10.30 sec;   INR: 0.90 ratio         PTT - ( 05 Mar 2021 15:53 )  PTT:22.7 sec                Urinalysis Basic - ( 05 Mar 2021 18:52 )    Color: Yellow / Appearance: Clear / S.029 / pH: x  Gluc: x / Ketone: Negative  / Bili: Negative / Urobili: <2 mg/dL   Blood: x / Protein: Trace / Nitrite: Negative   Leuk Esterase: Large / RBC: 86 /HPF / WBC 16 /HPF   Sq Epi: x / Non Sq Epi: 3 /HPF / Bacteria: Few      BLOOD SMEAR INTERPRETATION:       RADIOLOGY & ADDITIONAL STUDIES:    < from: CT Head No Cont (21 @ 17:48) >  IMPRESSION:    No CT evidence for acute intracranial hemorrhage, large territorial infarct, or midline shift.      GIN EMMANUEL M.D., RESIDENT RADIOLOGIST  This document has been electronically signed.  BOOGIE SOTO MD; Attending Radiologist  This document has been electronically signed. Mar  5 2021  6:55PM    < end of copied text >

## 2021-03-07 NOTE — PROGRESS NOTE ADULT - SUBJECTIVE AND OBJECTIVE BOX
NIDHI BANKS    Patient is a 65y old  Female who presents with a chief complaint of generalized weakness (06 Mar 2021 12:37)    INTERVAL HPI/OVERNIGHT EVENTS: no events overnight, pt states she feels comfortable, denies pain in LE.     ROS: All ROS negative except generalized weakness    PHYSICAL EXAM:  T(C): 36.2, Max: 36.2 (-21 @ 07:00)  HR: 69 (69 - 91)  BP: 103/62 (99/66 - 111/63)  RR: 18 (18 - 18)  SpO2: --    GENERAL: NAD, frail, deconditioned  PULMONARY/CHEST: No rales, rhonchi, or wheezing  CARDIOVASC: Regular rate and rhythm; No murmurs  GI/ABDOMEN: Soft, Nontender, Nondistended; Bowel sounds present  EXTREMITIES: no edema, no calf tenderness  SKIN: petechial rash on LE, multiple scabs on LE  NERVOUS SYSTEM:  Alert & Oriented X3, no focal deficit     Consultant(s) Notes Reviewed by me.     LABS:                        10.6   7.14  )-----------( 45       ( 07 Mar 2021 05:52 )             29.9     03-07    129<L>  |  91<L>  |  12  ----------------------------<  129<H>  4.1   |  25  |  <0.5<L>    Ca    8.2<L>      07 Mar 2021 05:52  Mg     1.9     03-07    TPro  4.6<L>  /  Alb  2.8<L>  /  TBili  0.6  /  DBili  x   /  AST  14  /  ALT  34  /  AlkPhos  79  03-07    PT/INR - ( 06 Mar 2021 17:00 )   PT: 10.00 sec;   INR: 0.87 ratio         PTT - ( 06 Mar 2021 17:00 )  PTT:22.9 sec  Urinalysis Basic - ( 05 Mar 2021 18:52 )    Color: Yellow / Appearance: Clear / S.029 / pH: x  Gluc: x / Ketone: Negative  / Bili: Negative / Urobili: <2 mg/dL   Blood: x / Protein: Trace / Nitrite: Negative   Leuk Esterase: Large / RBC: 86 /HPF / WBC 16 /HPF   Sq Epi: x / Non Sq Epi: 3 /HPF / Bacteria: Few      CAPILLARY BLOOD GLUCOSE  POCT Blood Glucose.: 268 mg/dL (07 Mar 2021 10:38)  POCT Blood Glucose.: 133 mg/dL (07 Mar 2021 06:35)  POCT Blood Glucose.: 215 mg/dL (06 Mar 2021 20:58)  POCT Blood Glucose.: 173 mg/dL (06 Mar 2021 16:43)      Culture - Urine (collected 05 Mar 2021 18:52)  Source: .Urine Clean Catch (Midstream)  Final Report (07 Mar 2021 09:34):    >=3 organisms. Probable collection contamination.    RADIOLOGY & ADDITIONAL TESTS:  < from: CT Abdomen and Pelvis w/ IV Cont (21 @ 19:14) >  IMPRESSION:  1. No evidence of acute traumatic intrathoracic or intra-abdominal pathology.    2. Multiple peritoneal nodules, recently demonstrated on 2021 PET/CT, including 1.5 cm nodules subadjacent to abdominal wall, previously 1.9 cm, and anatomically stable left upper quadrant infradiaphragmatic 1 cm nodule.  < end of copied text >      < from: CT Cervical Spine No Cont (21 @ 17:49) >  INTERPRETATION:  Clinical History / Reason for exam: Trauma  The study was performed without IV contrast. Thin section axial and thin section reformatted sagittal and coronal views were obtained.  Comparison 2017  No fracture or subluxation is seen.  Impression no fractures seen  < end of copied text >    < from: CT Head No Cont (21 @ 17:48) >  IMPRESSION:  No CT evidence for acute intracranial hemorrhage, large territorial infarct, or midline shift.  < end of copied text >    < from: Xray Knee 3 Views, Right (21 @ 17:40) >  Impression:  Questionable lateral tibial plateau fracture. Further evaluation with a CT can be obtained.  No joint effusion. Mild to moderate tricompartmental degenerative changes.  < end of copied text >      MEDICATIONS  (STANDING):  calcium carbonate 1250 mG  + Vitamin D (OsCal 500 + D) 1 Tablet(s) Oral three times a day  chlorhexidine 4% Liquid 1 Application(s) Topical <User Schedule>  cholecalciferol Oral Tab/Cap - Peds 1000 Unit(s) Oral every 24 hours  cyanocobalamin 1000 MICROGram(s) Oral daily  dexAMETHasone     Tablet 2 milliGRAM(s) Oral two times a day  dextrose 40% Gel 15 Gram(s) Oral once  dextrose 5%. 1000 milliLiter(s) (50 mL/Hr) IV Continuous <Continuous>  dextrose 5%. 1000 milliLiter(s) (100 mL/Hr) IV Continuous <Continuous>  dextrose 50% Injectable 25 Gram(s) IV Push once  dextrose 50% Injectable 12.5 Gram(s) IV Push once  dextrose 50% Injectable 25 Gram(s) IV Push once  glucagon  Injectable 1 milliGRAM(s) IntraMuscular once  influenza   Vaccine 0.5 milliLiter(s) IntraMuscular once  insulin lispro (ADMELOG) corrective regimen sliding scale   SubCutaneous three times a day before meals  levETIRAcetam 1000 milliGRAM(s) Oral two times a day  levothyroxine 112 MICROGram(s) Oral daily  pantoprazole    Tablet 40 milliGRAM(s) Oral before breakfast  polyethylene glycol 3350 17 Gram(s) Oral daily  sodium chloride 0.9%. 1000 milliLiter(s) (50 mL/Hr) IV Continuous <Continuous>    MEDICATIONS  (PRN):  acetaminophen   Tablet .. 650 milliGRAM(s) Oral every 4 hours PRN Temp greater or equal to 38C (100.4F), Mild Pain (1 - 3)  aluminum hydroxide/magnesium hydroxide/simethicone Suspension 30 milliLiter(s) Oral every 6 hours PRN Dyspepsia  hydrocortisone 1% Topical Cream - Peds 1 Application(s) Topical every 12 hours PRN Rash and/or Itching  melatonin 5 milliGRAM(s) Oral at bedtime PRN Sleep  senna 2 Tablet(s) Oral at bedtime PRN Constipation

## 2021-03-07 NOTE — HOSPICE CARE NOTE - HOSPICE APPROVAL ADDITIONAL DETAILS
patient is S/p Thyroidectomy stage 4 papillary serous Carcinoma with Mets to the brain. Patient had last does of Avastin/ keytruda 2/23/21.

## 2021-03-07 NOTE — PROGRESS NOTE ADULT - ASSESSMENT
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 Cid), and history of loss of balance presents to the hospital s/p fall from home and rapid deconditioning.    # Mechanical Fall, frequent falls due to loss of balance, progressive decline of functional status. Possible tibial plateau fracture.  - pt does not have pain  - she is not surgical candidate will not do CT leg    # Stage 4 uterine cancer with mets to brain  - As per heme/onc: "Patient is on Avastin/Keytruda, last dose was received on 2/23/21  - She fell at home and this concerning as she appears to be functionally declining with worsening performance status   - We recommended that given her worsening functional status, we would recommend hospice at this time. Barbara is open to hospice but would like to discuss with Serena's significant other before making a final decision"  - pt and sister Barbara agreed for comfort care only treatment  - Continue Decadron 2mg BID for now - hospice evaluated the pt, possible admission to home hospice on Wednesday with possible dc from hospital on Tuesday.   - hospice to assess for DME needs    # Isolated thrombocytopenia, no active bleeding    # Simple Cystitis - can change to PO Levaquin, UCx >3 microorg.    #Stage 3 bedsore to R buttock  -Triad cream  - Wound care nurse consulted    # Hyponatremia, moderate, likely secondary to siadh    # DMII - will dc meds    Will proceed with comfort care, medication reconciliation will be done     ***Please discuss all medical care with sister Barbara [phone], NOT daughter. ***    DNR/DNI  Comfort care

## 2021-03-07 NOTE — GOALS OF CARE CONVERSATION - ADVANCED CARE PLANNING - CONVERSATION DETAILS
Pt states she has DNR/DNI form signed a few years ago, I also discussed MOLST form, discussed and explained comfort measures and hospice concept. Pt is willing to proceed with comfort measures. Hospice consult called. I also spoke to sister Barbara on phone, she confirmed they had conversation with Serena and made a decision for DNR/DNI, comfort care only. Pt is looking to go home with hospice service.

## 2021-03-07 NOTE — CHART NOTE - NSCHARTNOTEFT_GEN_A_CORE
Pt states she has DNR/DNI form signed a few years ago, I also discussed MOLST form, discussed and explained comfort measures and hospice concept. Pt is willing to proceed with comfort measures. Hospice consult called. I also spoke to sister Barbara on phone, she confirmed they had conversation with Serena and made a decision for DNR/DNI, comfort care only. Pt is looking to go home with hospice service.    RD to sign off.   RD remains available: Moriah Higuera [phone] Pt states she has DNR/DNI form signed a few years ago, I also discussed MOLST form, discussed and explained comfort measures and hospice concept. Pt is willing to proceed with comfort measures. Hospice consult called. I also spoke to sister Barbara on phone, she confirmed they had conversation with Serena and made a decision for DNR/DNI, comfort care only. Pt is looking to go home with hospice service.    spoke w/ pt regarding increased protein/energy needs d/t stage III PU -- willing to add Glucerna.     RD to sign off.   RD remains available: Moriah Higuera [phone] Pt states she has DNR/DNI form signed a few years ago, I also discussed MOLST form, discussed and explained comfort measures and hospice concept. Pt is willing to proceed with comfort measures. Hospice consult called. I also spoke to sister Barbara on phone, she confirmed they had conversation with Serena and made a decision for DNR/DNI, comfort care only. Pt is looking to go home with hospice service per GOC note.    spoke w/ pt regarding increased protein/energy needs d/t stage III PU -- willing to add Glucerna.     RD to sign off.   RD remains available: Moriah Higuera [phone]

## 2021-03-08 NOTE — DISCHARGE NOTE PROVIDER - NSDCFUSCHEDAPPT_GEN_ALL_CORE_FT
NIDHI BANKS ; 03/11/2021 ; N[facility] Internal Med [address] NIDHI Vidal ; 03/16/2021 ; NPP HemOnc 256[address] NIDHI Vidal ; 03/16/2021 ; NPP Chemo & Infus [address] Miguel ABBOTT

## 2021-03-08 NOTE — ADVANCED PRACTICE NURSE CONSULT - ASSESSMENT
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 Cid), and history of loss of balance presents to the hospital (3/5) s/p fall from home.   patient reports that she has been having generalized weakness over the last few days. She reports that she was getting out of bed this morning and tripped and fell and hit her head, however she denies any loss of consciousness and is unsure how long she was on the floor. Of note, the patient was recently in the hospital secondary to brbpr likely secondary to hemorrhoids, however no colonoscopy was performed as the patient preferred to do a work up outpatient. She denies any other symptoms including chest pain, dyspnea, cough, abdominal pain, dysuria, nausea, vomiting, diarrhea, melena, hematochezia. All ros negative except as above.   Currently admitted to medicine, on 4B, being managed for  Mechanical Fall; History of loss of balance; h/o Papillary thyroid cancer and stage IV uterine cancer with mets to brain (on MRI 02/17/21), s/p ELEONORA in April 2018 and thyroidectomy in August 2018 (follows Dr. Cid);  Isolated thrombocytopenia; Simple Cystitis; Stage 3 bedsore to R buttock; Hyponatremia, moderate, likely secondary to siadh; DMII. Pt now placed on comfort measures.      Received patient on 4B, laying supine in bed, turned to right side, HOB elevated 30 degrees. Pt awake, alert, minimally conversant, primary RN Miri &  patient made aware of purpose of WOCN visit, agreeable to consult.  RN reports wound currently being treated w/ Triad ointment.     Patient w/ dry skin & petechia rash throughout. With minimal assistance, patient turned completely to right side for skin assessment. Skin assessment as below.     Type of wound: Stage 3 pressure injury, POA  Location: right buttock   Wound measurements: cluster of three wounds in close proximity to each other & measured together at 4cm x 4cm x 0.2cm  Tunneling/Undermining: none  Wound bed: yellow subcutaneous tissue w/ epidermal budding  Wound edges: attached   Periwound: denuded   Wound exudate: none  Wound odor: none   Induration, erythema, warmth: none  Wound pain: pt w/ tenderness to area during wound bed assessment     Patient mostly bedbound, able to turn/position in bed w/ minimal assistance, pt incontinent of urine and bloody semi-formed stool during WOCN visit. Ordered for diabetic diet, adequate intake as per reported Jon score.

## 2021-03-08 NOTE — PROGRESS NOTE ADULT - ASSESSMENT
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 Cid), and history of loss of balance presents to the hospital s/p fall from home and rapid deconditioning.    # Mechanical Fall, frequent falls due to loss of balance, progressive decline of functional status. Possible tibial plateau fracture.  - pt does not have pain  - she is not surgical candidate will not do CT leg    # Stage 4 uterine cancer with mets to brain  - As per heme/onc: "Patient is on Avastin/Keytruda, last dose was received on 2/23/21  - She fell at home and this concerning as she appears to be functionally declining with worsening performance status   - We recommended that given her worsening functional status, we would recommend hospice at this time. Barbara is open to hospice but would like to discuss with Serena's significant other before making a final decision"  - pt and sister Barbara agreed for comfort care only treatment  - Continue Decadron 2mg BID for now - hospice evaluated the pt, possible admission to home hospice on Wednesday with possible dc from hospital on Tuesday.   - hospice to assess for DME needs    # Isolated thrombocytopenia, no active bleeding    # Simple Cystitis - can change to PO Levaquin, UCx >3 microorg.    # Stage 3 bedsore to R buttock  -Triad cream  - Wound care nurse consulted    # Hyponatremia, moderate, likely secondary to siadh    # DMII - will dc meds    Will proceed with comfort care.     Sister interested in inpatient hospice rather than home hospice since she does not have help.    ***Please discuss all medical care with sister Barbara [phone], NOT daughter. ***    DNR/DNI  Comfort care

## 2021-03-08 NOTE — DISCHARGE NOTE PROVIDER - CARE PROVIDERS DIRECT ADDRESSES
[email or web address]

## 2021-03-08 NOTE — DISCHARGE NOTE PROVIDER - NSDCMRMEDTOKEN_GEN_ALL_CORE_FT
acetaminophen 325 mg oral tablet: 2 tab(s) orally every 4 hours, As needed, for pain or fever  aluminum hydroxide-magnesium hydroxide 200 mg-200 mg/5 mL oral suspension: 30 milliliter(s) orally every 6 hours, As needed, Dyspepsia  Calcium 600+D 600 mg-200 intl units oral tablet: 1 tab(s) orally 3 times a day  cholecalciferol oral tablet: 1000 unit(s) orally every 24 hours  cyanocobalamin 1000 mcg oral tablet: 1 tab(s) orally once a day  dexamethasone 1 mg oral tablet: 4 tab(s) orally three times a day for 7 days, then 3 tab(s) orally three times a day for 7 days, 2 tab(s) orally three times a day for 7 days, 1 tab(s) orally three times a day for 7 days, 1 tab(s) orally three two times a day for 7 days, 1 tab(s) orally once a day for 7 days  hydrocortisone 1% topical cream: 1 application topically every 12 hours, As needed, Rash and/or Itching  levETIRAcetam 1000 mg oral tablet: 1 tab(s) orally 2 times a day  levothyroxine 112 mcg (0.112 mg) oral tablet: 1 tab(s) orally once a day, take one hour before breakfast daily  melatonin 5 mg oral tablet: 1 tab(s) orally once a day (at bedtime), As needed, Sleep  metFORMIN 500 mg oral tablet: 1 tab(s) orally once a day in the evening with dinner  pantoprazole 40 mg oral delayed release tablet: 1 tab(s) orally once a day, take 30 minutes before breakfast daily  polyethylene glycol 3350 oral powder for reconstitution: 17 gram(s) orally once a day  senna oral tablet: 2 tab(s) orally once a day (at bedtime), As Needed, for constipation

## 2021-03-08 NOTE — PROGRESS NOTE ADULT - ASSESSMENT
Serena is a 65 y F with PMHx peripheral neuropathy, papillary thyroid cancer s/p thyroidectomy and stage IV Papillary Serous Carcinoma s/p ELEONORA in April 2018 presenting with generalized weakness and fall     ASSESSMENT  #) Fall with negative trauma work up    #) Thrombocytopenia   #) Stage IV Papillary Serous Endometrial Carcinoma     PLAN  - Full traumatic work up noted, no evidence of any acute fracture; CT head negative for bleed   - Patient is on Avastin/Keytruda, last dose was received on 2/23/21  - She fell at home and overall is functionally declining with worsening performance status   - Please discuss all medical care with sister Barbara [phone]. Her daughter Megan is intellectually disabled and cannot comprehend her mother's medical care.   - Given her worsening functional status, we recommended hospice at this time  - Patient and family agreed to comfort measures and hospice placement. Tentative plan for discharge with enrollment to hospice on Wednesday  - Steroids can be discontinued as there is limited benefit given focus is now on comfort

## 2021-03-08 NOTE — DISCHARGE NOTE PROVIDER - NSDCCPCAREPLAN_GEN_ALL_CORE_FT
PRINCIPAL DISCHARGE DIAGNOSIS  Diagnosis: Fall  Assessment and Plan of Treatment: you presented to the hospital for fall. Your x-ray indicated a possible fracture involving the knee. It was decided that surgery would not be advisable and it was determined that further imaging would not be acquired. Pain was controlled with medications.      SECONDARY DISCHARGE DIAGNOSES  Diagnosis: Hyponatremia  Assessment and Plan of Treatment: You presented with low blood sodium on admission. It was slowly corrected. As you remained alert and on comfort care, non further measurements of blood sodium were acquired.    Diagnosis: Weakness  Assessment and Plan of Treatment: you presented with weakness to the hospital. This may be likely a result of a combination of your disease progression, the low sodium you came in with as well as deconditioning from a bed bound status.

## 2021-03-08 NOTE — PROGRESS NOTE ADULT - ASSESSMENT
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 Cid), and history of loss of balance presents to the hospital s/p fall from home.     # Mechanical Fall   # History of loss of balance   - patient tripped while getting out of bed   - Trauma w/up (-) : CTH (-) , CT C-spine (-), XRAY Pelvis (-) for acute fractures/ bleeding  - Xray R knee - questionable fracture of the right tibia. Patient not candidate for surgery, therefore CT knee was not performed in agreement with patient.     # h/o Papillary thyroid cancer and stage IV uterine cancer with mets to brain (on MRI 02/17/21), s/p ELEONORA in April 2018 and thyroidectomy in August 2018 (follows Dr. Cid)  - now with worsening functional status   - heme recommendations:  - Patient is on Avastin/Keytruda, last dose was received on 2/23/21  - She fell at home and this concerning as she appears to be functionally declining with worsening performance status   - We recommended that given her worsening functional status, we would recommend hospice at this time. Barbara is open to hospice but would like to discuss with Serena's significant other before making a final decision   - Hospice consult placed  - Patient comfort care, no blood draws, no vital signs, no unnecessary medications.   - DC decadron     # Isolated thrombocytopenia   - no bleeding present  - no heparin    # Simple Cystitis   - WBC 12 -> 7, afebrile   - s/p Rocepin    #Stage 3 bedsore to R buttock  - Triad cream  - Wound care nurse consulted    # Hyponatremia, moderate  likely secondary to siadh  - Na 128 on admission > 131    # DMII  - carb consistent diet  - comfort care, no DM medications, no finger sticks    # DVT PPX: SCD  # GI PPX: PPI  # Activity: as tolerated  # Diet: carb consistent  # CHG BATH    Pending: possible dc Tuesday for in home hospice Wednesday

## 2021-03-08 NOTE — PROGRESS NOTE ADULT - SUBJECTIVE AND OBJECTIVE BOX
NIDHI BANKS    Patient is a 65y old  Female who presents with a chief complaint of generalized weakness (08 Mar 2021 12:21)    INTERVAL HPI/OVERNIGHT EVENTS: no events overnight, pt looks comfortable     PHYSICAL EXAM:  T(C): 36.7, Max: 36.7 (03-08-21 @ 07:37)  HR: 72 (72 - 72)  BP: 107/63 (107/63 - 107/63)  RR: 18 (18 - 18)  SpO2: --    GENERAL: NAD, looks comfortable   PULMONARY/CHEST: No rales, rhonchi, wheezing  CARDIOVASC: Regular rate and rhythm; No murmurs  GI/ABDOMEN: Soft, Nontender, Nondistended; Bowel sounds present  NERVOUS SYSTEM:  Alert & Oriented X3, no focal deficit     LABS: no new labs             Culture - Urine (collected 05 Mar 2021 18:52)  Source: .Urine Clean Catch (Midstream)  Final Report (07 Mar 2021 09:34):    >=3 organisms. Probable collection contamination.    MEDICATIONS  (STANDING):  chlorhexidine 4% Liquid 1 Application(s) Topical <User Schedule>  dextrose 40% Gel 15 Gram(s) Oral once  dextrose 5%. 1000 milliLiter(s) (50 mL/Hr) IV Continuous <Continuous>  dextrose 5%. 1000 milliLiter(s) (100 mL/Hr) IV Continuous <Continuous>  dextrose 50% Injectable 25 Gram(s) IV Push once  dextrose 50% Injectable 12.5 Gram(s) IV Push once  dextrose 50% Injectable 25 Gram(s) IV Push once  glucagon  Injectable 1 milliGRAM(s) IntraMuscular once  levETIRAcetam 1000 milliGRAM(s) Oral two times a day  pantoprazole    Tablet 40 milliGRAM(s) Oral before breakfast  polyethylene glycol 3350 17 Gram(s) Oral daily    MEDICATIONS  (PRN):  acetaminophen   Tablet .. 650 milliGRAM(s) Oral every 4 hours PRN Temp greater or equal to 38C (100.4F), Mild Pain (1 - 3)  aluminum hydroxide/magnesium hydroxide/simethicone Suspension 30 milliLiter(s) Oral every 6 hours PRN Dyspepsia  hydrocortisone 1% Topical Cream - Peds 1 Application(s) Topical every 12 hours PRN Rash and/or Itching  melatonin 5 milliGRAM(s) Oral at bedtime PRN Sleep  morphine  - Injectable 2 milliGRAM(s) IV Push every 4 hours PRN Severe Pain (7 - 10)  senna 2 Tablet(s) Oral at bedtime PRN Constipation

## 2021-03-08 NOTE — PROGRESS NOTE ADULT - SUBJECTIVE AND OBJECTIVE BOX
Patient is a 65y old  Female who presents with a chief complaint of generalized weakness (08 Mar 2021 10:12)      Subjective: She was sleeping comfortably today       Vital Signs Last 24 Hrs  T(C): 36.7 (08 Mar 2021 07:37), Max: 36.7 (08 Mar 2021 07:37)  T(F): 98 (08 Mar 2021 07:37), Max: 98 (08 Mar 2021 07:37)  HR: 72 (08 Mar 2021 07:37) (72 - 72)  BP: 107/63 (08 Mar 2021 07:37) (107/63 - 107/63)  BP(mean): --  RR: 18 (08 Mar 2021 07:37) (18 - 18)  SpO2: --    PHYSICAL EXAM  General: adult in NAD  HEENT: anicteric sclera, pink conjunctiva  Neck: supple  CV: normal S1/S2 with no murmur rubs or gallops  Lungs: CTABL  Abdomen: soft non-tender non-distended  Ext: no edema  Skin: petechiae present on b/l lower ext   Neuro: alert and oriented X 4, no focal deficits    MEDICATIONS  (STANDING):  chlorhexidine 4% Liquid 1 Application(s) Topical <User Schedule>  dextrose 40% Gel 15 Gram(s) Oral once  dextrose 5%. 1000 milliLiter(s) (50 mL/Hr) IV Continuous <Continuous>  dextrose 5%. 1000 milliLiter(s) (100 mL/Hr) IV Continuous <Continuous>  dextrose 50% Injectable 25 Gram(s) IV Push once  dextrose 50% Injectable 12.5 Gram(s) IV Push once  dextrose 50% Injectable 25 Gram(s) IV Push once  glucagon  Injectable 1 milliGRAM(s) IntraMuscular once  levETIRAcetam 1000 milliGRAM(s) Oral two times a day  pantoprazole    Tablet 40 milliGRAM(s) Oral before breakfast  polyethylene glycol 3350 17 Gram(s) Oral daily    MEDICATIONS  (PRN):  acetaminophen   Tablet .. 650 milliGRAM(s) Oral every 4 hours PRN Temp greater or equal to 38C (100.4F), Mild Pain (1 - 3)  aluminum hydroxide/magnesium hydroxide/simethicone Suspension 30 milliLiter(s) Oral every 6 hours PRN Dyspepsia  hydrocortisone 1% Topical Cream - Peds 1 Application(s) Topical every 12 hours PRN Rash and/or Itching  melatonin 5 milliGRAM(s) Oral at bedtime PRN Sleep  morphine  - Injectable 2 milliGRAM(s) IV Push every 4 hours PRN Severe Pain (7 - 10)  senna 2 Tablet(s) Oral at bedtime PRN Constipation      LABS:                          10.6   7.14  )-----------( 45       ( 07 Mar 2021 05:52 )             29.9         Mean Cell Volume : 90.9 fL  Mean Cell Hemoglobin : 32.2 pg  Mean Cell Hemoglobin Concentration : 35.5 g/dL  Auto Neutrophil # : 5.82 K/uL  Auto Lymphocyte # : 0.30 K/uL  Auto Monocyte # : 0.27 K/uL  Auto Eosinophil # : 0.00 K/uL  Auto Basophil # : 0.03 K/uL  Auto Neutrophil % : 81.5 %  Auto Lymphocyte % : 4.2 %  Auto Monocyte % : 3.8 %  Auto Eosinophil % : 0.0 %  Auto Basophil % : 0.4 %      Serial CBC's  03-07 @ 05:52  Hct-29.9 / Hgb-10.6 / Plat-45 / RBC-3.29 / WBC-7.14  Serial CBC's  03-06 @ 04:58  Hct-32.1 / Hgb-11.0 / Plat-38 / RBC-3.48 / WBC-8.35  Serial CBC's  03-05 @ 15:53  Hct-31.7 / Hgb-11.4 / Plat-43 / RBC-3.51 / WBC-12.07      03-07    129<L>  |  91<L>  |  12  ----------------------------<  129<H>  4.1   |  25  |  <0.5<L>    Ca    8.2<L>      07 Mar 2021 05:52  Mg     1.9     03-07    TPro  4.6<L>  /  Alb  2.8<L>  /  TBili  0.6  /  DBili  x   /  AST  14  /  ALT  34  /  AlkPhos  79  03-07      PT/INR - ( 06 Mar 2021 17:00 )   PT: 10.00 sec;   INR: 0.87 ratio         PTT - ( 06 Mar 2021 17:00 )  PTT:22.9 sec      Culture - Urine (collected 05 Mar 2021 18:52)  Source: .Urine Clean Catch (Midstream)  Final Report (07 Mar 2021 09:34):    >=3 organisms. Probable collection contamination.            BLOOD SMEAR INTERPRETATION:       RADIOLOGY & ADDITIONAL STUDIES:

## 2021-03-08 NOTE — PROGRESS NOTE ADULT - SUBJECTIVE AND OBJECTIVE BOX
SUBJECTIVE  Patient is a 65y old Female who presents with a chief complaint of generalized weakness (08 Mar 2021 09:54)  Currently admitted to medicine with the primary diagnosis of Fall    Today is hospital day 3d, and this morning she is sitting comfortably in bed. no acute distress. Patient is currently speaking very little. She is requesting acetaminophen for pain relief. No overnight events reproted.     OBJECTIVE  PAST MEDICAL & SURGICAL HISTORY  Thyroid cancer    Peripheral neuropathy    Pleural effusion  11/17    Uterine cancer    History of total abdominal hysterectomy  4/2018    S/P thoracentesis  12/17    H/O lymph node excision  LEFT NECK 12/17      ALLERGIES:  adhesives (Rash)  No Known Drug Allergies    MEDICATIONS:  STANDING MEDICATIONS  chlorhexidine 4% Liquid 1 Application(s) Topical <User Schedule>  dextrose 40% Gel 15 Gram(s) Oral once  dextrose 5%. 1000 milliLiter(s) IV Continuous <Continuous>  dextrose 5%. 1000 milliLiter(s) IV Continuous <Continuous>  dextrose 50% Injectable 25 Gram(s) IV Push once  dextrose 50% Injectable 12.5 Gram(s) IV Push once  dextrose 50% Injectable 25 Gram(s) IV Push once  glucagon  Injectable 1 milliGRAM(s) IntraMuscular once  levETIRAcetam 1000 milliGRAM(s) Oral two times a day  pantoprazole    Tablet 40 milliGRAM(s) Oral before breakfast  polyethylene glycol 3350 17 Gram(s) Oral daily    PRN MEDICATIONS  acetaminophen   Tablet .. 650 milliGRAM(s) Oral every 4 hours PRN  aluminum hydroxide/magnesium hydroxide/simethicone Suspension 30 milliLiter(s) Oral every 6 hours PRN  hydrocortisone 1% Topical Cream - Peds 1 Application(s) Topical every 12 hours PRN  melatonin 5 milliGRAM(s) Oral at bedtime PRN  morphine  - Injectable 2 milliGRAM(s) IV Push every 4 hours PRN  senna 2 Tablet(s) Oral at bedtime PRN      VITAL SIGNS: Last 24 Hours  T(C): 36.7 (08 Mar 2021 07:37), Max: 36.7 (08 Mar 2021 07:37)  T(F): 98 (08 Mar 2021 07:37), Max: 98 (08 Mar 2021 07:37)  HR: 72 (08 Mar 2021 07:37) (72 - 72)  BP: 107/63 (08 Mar 2021 07:37) (107/63 - 107/63)  BP(mean): --  RR: 18 (08 Mar 2021 07:37) (18 - 18)  SpO2: --    LABS:                        10.6   7.14  )-----------( 45       ( 07 Mar 2021 05:52 )             29.9     03-07    129<L>  |  91<L>  |  12  ----------------------------<  129<H>  4.1   |  25  |  <0.5<L>    Ca    8.2<L>      07 Mar 2021 05:52  Mg     1.9     03-07    TPro  4.6<L>  /  Alb  2.8<L>  /  TBili  0.6  /  DBili  x   /  AST  14  /  ALT  34  /  AlkPhos  79  03-07    PT/INR - ( 06 Mar 2021 17:00 )   PT: 10.00 sec;   INR: 0.87 ratio         PTT - ( 06 Mar 2021 17:00 )  PTT:22.9 sec    Culture - Urine (collected 05 Mar 2021 18:52)  Source: .Urine Clean Catch (Midstream)  Final Report (07 Mar 2021 09:34):    >=3 organisms. Probable collection contamination.    RADIOLOGY:    PHYSICAL EXAM:    GENERAL: NAD, Frail, AAOx3  HEENT:  Atraumatic, Normocephalic. EOMI, PERRLA, conjunctiva and sclera clear, No JVD  PULMONARY: Clear to auscultation bilaterally; No wheeze  CARDIOVASCULAR: Regular rate and rhythm; No murmurs, rubs, or gallops  GASTROINTESTINAL: Soft, Nontender, Nondistended; Bowel sounds present  MUSCULOSKELETAL:  2+ Peripheral Pulses, No clubbing, cyanosis, or edema  NEUROLOGY: non-focal  SKIN: No rashes or lesions

## 2021-03-08 NOTE — DISCHARGE NOTE PROVIDER - CARE PROVIDER_API CALL
Stacie Cid)  HematologyOncology; Internal Medicine; Medical Oncology  [address] [place] [address]  Phone: [phone]  Fax: [phone]  Follow Up Time:

## 2021-03-08 NOTE — DISCHARGE NOTE PROVIDER - HOSPITAL COURSE
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 Cid), and history of loss of balance presents to the hospital s/p fall from home. The patient reports that she has been having generalized weakness over the last few days. She reports that she was getting out of bed this morning and tripped and fell and hit her head, however she denies any loss of consciousness and is unsure how long she was on the floor. Of note, the patient was recently in the hospital secondary to brbpr likely secondary to hemorrhoids, however no colonoscopy was performed as the patient preferred to do a work up outpatient. She denies any other symptoms including chest pain, dyspnea, cough, abdominal pain, dysuria, nausea, vomiting, diarrhea, melena, hematochezia. All ros negative except as above.     T(C): 36.3,HR: 79 , BP: 114/73 , RR: 16 , SpO2: 99% on room air  Labs: WBC 12, Na 128, U/A (+) LE , pyuria , PLT 43 (baseline ~ 144)  Trauma w/up (-) : CTH (-) , CT C-spine (-), XRAY Pelvis (-) for acute fractures/ bleeding  CT A/P w/ iv contrast: Multiple peritoneal nodules, recently demonstrated on January 26, 2021 PET/CT, including 1.5 cm nodules subadjacent to abdominal wall, previously 1.9 cm, and anatomically stable left upper quadrant infradiaphragmatic 1 cm nodule.  CT Head No Cont : Area of hyperdensity/calcification within the left frontoparietal region measuring 1.0 x 1.1 cm andin the left occipital lobe measuring 1.0 x 0.6 cm, which may correspond to metastatic disease that is better seen on most recent MRI.    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 Cid), and history of loss of balance presents to the hospital s/p fall from home.    # Mechanical Fall   # History of loss of balance   - patient tripped while getting out of bed   - Trauma w/up (-) : CTH (-) , CT C-spine (-), XRAY Pelvis (-) for acute fractures/ bleeding  - Xray R knee - questionable fracture of the right tibia. Patient not condiate for surgery, therefore CT knee was not performed in agreement with patient.     # h/o Papillary thyroid cancer and stage IV uterine cancer with mets to brain (on MRI 02/17/21), s/p ELEONORA in April 2018 and thyroidectomy in August 2018 (follows Dr. Cid)  - now with worsening functional status   - heme recommendations:  - Patient is on Avastin/Keytruda, last dose was received on 2/23/21  - She fell at home and this concerning as she appears to be functionally declining with worsening performance status   - We recommended that given her worsening functional status, we would recommend hospice at this time. Barbara is open to hospice but would like to discuss with Serena's significant other before making a final decision   - Continue Decadron 2mg BID for now   - Hospice consult p    # Isolated thrombocytopenia   - no bleeding present  - no heparin now    # Simple Cystitis   - WBC 12 > 7, afebrile  - (+) U/A , f/u urine clx  - c/w Rocepin , however careful as can worsen thrombocytopenia       #Stage 3 bedsore to R buttock  -Triad cream  - Wound care nurse consulted    # Hyponatremia, moderate  likely secondary to siadh  - Na 128 on admission > 131  - do not over correct ( <8-10 meq in 24 hrs)    # DMII  - f/s ac qhs, if > 180, start insulin protocol  - hold home meds  - carb consistent diet     #Family contact  - spoke with patient's sister, Barbara, at 4:30 pm    # DVT PPX: SCD  # GI PPX: PPI  # Activity: as tolerated  # Diet: carb consistent  # CHG BATH 65 y F pmh of DMII, peripheral neuropathy, papillary thyroid cancer and stage IV uterine cancer with mets to brain (on MRI 02/17/21), s/p ELEONORA in April 2018 and thyroidectomy in August 2018 (follows Dr. Cid), and history of loss of balance presents to the hospital s/p fall from home. The patient reports that she has been having generalized weakness over the last few days. She reports that she was getting out of bed this morning and tripped and fell and hit her head, however she denies any loss of consciousness and is unsure how long she was on the floor. Of note, the patient was recently in the hospital secondary to brbpr likely secondary to hemorrhoids, however no colonoscopy was performed as the patient preferred to do a work up outpatient. She denies any other symptoms including chest pain, dyspnea, cough, abdominal pain, dysuria, nausea, vomiting, diarrhea, melena, hematochezia. All ros negative except as above.     T(C): 36.3,HR: 79 , BP: 114/73 , RR: 16 , SpO2: 99% on room air  Labs: WBC 12, Na 128, U/A (+) LE , pyuria , PLT 43 (baseline ~ 144)  Trauma w/up (-) : CTH (-) , CT C-spine (-), XRAY Pelvis (-) for acute fractures/ bleeding  CT A/P w/ iv contrast: Multiple peritoneal nodules, recently demonstrated on January 26, 2021 PET/CT, including 1.5 cm nodules subadjacent to abdominal wall, previously 1.9 cm, and anatomically stable left upper quadrant infradiaphragmatic 1 cm nodule.  CT Head No Cont : Area of hyperdensity/calcification within the left frontoparietal region measuring 1.0 x 1.1 cm andin the left occipital lobe measuring 1.0 x 0.6 cm, which may correspond to metastatic disease that is better seen on most recent MRI.    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 Cid), and history of loss of balance presents to the hospital s/p fall from home.     # Mechanical Fall   # History of loss of balance   - patient tripped while getting out of bed   - Trauma w/up (-) : CTH (-) , CT C-spine (-), XRAY Pelvis (-) for acute fractures/ bleeding  - Xray R knee - questionable fracture of the right tibia. Patient not candidate for surgery, therefore CT knee was not performed in agreement with patient.     # h/o Papillary thyroid cancer and stage IV uterine cancer with mets to brain (on MRI 02/17/21), s/p ELEONORA in April 2018 and thyroidectomy in August 2018 (follows Dr. Cid)  - now with worsening functional status   - heme recommendations:  - Patient is on Avastin/Keytruda, last dose was received on 2/23/21  - She fell at home and this concerning as she appears to be functionally declining with worsening performance status   - We recommended that given her worsening functional status, we would recommend hospice at this time. Barbara is open to hospice but would like to discuss with Serena's significant other before making a final decision   - Hospice consult placed  - Patient comfort care, no blood draws, no vital signs, no unnecessary medications.     # Isolated thrombocytopenia   - no bleeding present  - no heparin    #Stage 3 bedsore to R buttock  - Triad cream and repositioning     # Hyponatremia, moderate  likely secondary to siadh  - Na 128 on admission > 131, patient alert, Patient comfort care, no blood draws    # DMII  - carb consistent diet  - comfort care, no DM medications, no finger sticks    # DVT PPX: SCD  # GI PPX: PPI  # Activity: as tolerated  # Diet: carb consistent  # CHG BATH

## 2021-03-08 NOTE — ADVANCED PRACTICE NURSE CONSULT - RECOMMEDATIONS
1. Stage 3 right buttock pressure injury- Continue w/ current treatment w/ Triad- Cleanse wound bed w/ normal saline, gently pat dry.  Apply thin layer of Coloplast Triad hydrophilic ointment to absorb wound exudate and provide protective layer. Apply Triad q12h and prn for strike-through drainage or soiling. If top layer of Triad soiled, gently remove w/ perineal cleanser and re-apply ointment. Do not scrub off as this may cause further skin breakdown.  -Assess skin/wound qshiftor within pt's goals/plan of care, report changes to primary provider.     Additional recs:   -Continue turning/positioning patient from side-to-side q2h while in bed, q1h when/if OOB chair, or in accordance w/ pt's plan of care. Utilize pillows and/or z-kapil fluidized positioner to assist w/ turning/positioning. When/if OOB chair, utilize pillows or chair cushion to offload pressure.   -Continue to offload heels from bed surface with soft pillow under calfs.   -Continue applying Coloplast Navid Protect moisture barrier cream to buttock and perineal area daily and prn after each incontinent episode.    -Continue utilizing one underpad underneath patient to contain incontinence episodes; change pad when saturated/soiled.   -If patient w/ consistent urinary incontinence, consider utilizing female incontinence device/PrimaFit (if patient candidate) to contain urinary incontinence.    Plan of Care: Primary RN Miri made aware of above recs. Spoke w/ covering/primary MD Smith (at 0623) in regards to above. No further needs/recs from [facility] service at this time. Staff RN to perform routine skin/wound assessment and manage wound care. Questions or concerns or if wound worsens and reconsult needed, please contact [facility], [phone].

## 2021-03-09 NOTE — PROGRESS NOTE ADULT - SUBJECTIVE AND OBJECTIVE BOX
SUBJECTIVE  Patient is a 65y old Female who presents with a chief complaint of generalized weakness (08 Mar 2021 15:15)  Currently admitted to medicine with the primary diagnosis of Fall    Today is hospital day 4d, and this morning she is laying comfortably in bed, no acute distress. Patient requesting undersigned leave her alone. She had no acute concerns at this time.     OBJECTIVE  PAST MEDICAL & SURGICAL HISTORY  Thyroid cancer    Peripheral neuropathy    Pleural effusion  11/17    Uterine cancer    History of total abdominal hysterectomy  4/2018    S/P thoracentesis  12/17    H/O lymph node excision  LEFT NECK 12/17      ALLERGIES:  adhesives (Rash)  No Known Drug Allergies    MEDICATIONS:  STANDING MEDICATIONS  chlorhexidine 4% Liquid 1 Application(s) Topical <User Schedule>  dextrose 40% Gel 15 Gram(s) Oral once  dextrose 5%. 1000 milliLiter(s) IV Continuous <Continuous>  dextrose 5%. 1000 milliLiter(s) IV Continuous <Continuous>  dextrose 50% Injectable 25 Gram(s) IV Push once  dextrose 50% Injectable 12.5 Gram(s) IV Push once  dextrose 50% Injectable 25 Gram(s) IV Push once  glucagon  Injectable 1 milliGRAM(s) IntraMuscular once  levETIRAcetam 1000 milliGRAM(s) Oral two times a day  pantoprazole    Tablet 40 milliGRAM(s) Oral before breakfast  polyethylene glycol 3350 17 Gram(s) Oral daily    PRN MEDICATIONS  acetaminophen   Tablet .. 650 milliGRAM(s) Oral every 4 hours PRN  aluminum hydroxide/magnesium hydroxide/simethicone Suspension 30 milliLiter(s) Oral every 6 hours PRN  hydrocortisone 1% Topical Cream - Peds 1 Application(s) Topical every 12 hours PRN  melatonin 5 milliGRAM(s) Oral at bedtime PRN  morphine  - Injectable 2 milliGRAM(s) IV Push every 4 hours PRN  senna 2 Tablet(s) Oral at bedtime PRN      VITAL SIGNS: Last 24 Hours  T(C): 35.9 (09 Mar 2021 07:30), Max: 35.9 (09 Mar 2021 07:30)  T(F): 96.6 (09 Mar 2021 07:30), Max: 96.6 (09 Mar 2021 07:30)  HR: 83 (09 Mar 2021 07:30) (83 - 83)  BP: 96/54 (09 Mar 2021 07:30) (96/54 - 96/54)  BP(mean): --  RR: 18 (09 Mar 2021 07:30) (18 - 18)  SpO2: --    LABS:      RADIOLOGY:    PHYSICAL EXAM:    Limited due to patient refusal     GENERAL: NAD, well-developed  HEENT:  Atraumatic, Normocephalic. EOMI, PERRLA, conjunctiva and sclera clear, No JVD  PULMONARY: Clear to auscultation bilaterally; No wheeze  CARDIOVASCULAR: Regular rate and rhythm; No murmurs, rubs, or gallops  GASTROINTESTINAL: Soft, Nontender, Nondistended; Bowel sounds present  MUSCULOSKELETAL:  2+ Peripheral Pulses, No clubbing, cyanosis, or edema  NEUROLOGY: non-focal  SKIN: No rashes or lesions     SUBJECTIVE  Patient is a 65y old Female who presents with a chief complaint of generalized weakness (08 Mar 2021 15:15)  Currently admitted to medicine with the primary diagnosis of Fall    Today is hospital day 4d, and this morning she is laying comfortably in bed, no acute distress. Patient requesting undersigned leave her alone. She had no acute concerns at this time.     OBJECTIVE  PAST MEDICAL & SURGICAL HISTORY  Thyroid cancer    Peripheral neuropathy    Pleural effusion  11/17    Uterine cancer    History of total abdominal hysterectomy  4/2018    S/P thoracentesis  12/17    H/O lymph node excision  LEFT NECK 12/17      ALLERGIES:  adhesives (Rash)  No Known Drug Allergies    MEDICATIONS:  STANDING MEDICATIONS  chlorhexidine 4% Liquid 1 Application(s) Topical <User Schedule>  dextrose 40% Gel 15 Gram(s) Oral once  dextrose 5%. 1000 milliLiter(s) IV Continuous <Continuous>  dextrose 5%. 1000 milliLiter(s) IV Continuous <Continuous>  dextrose 50% Injectable 25 Gram(s) IV Push once  dextrose 50% Injectable 12.5 Gram(s) IV Push once  dextrose 50% Injectable 25 Gram(s) IV Push once  glucagon  Injectable 1 milliGRAM(s) IntraMuscular once  levETIRAcetam 1000 milliGRAM(s) Oral two times a day  pantoprazole    Tablet 40 milliGRAM(s) Oral before breakfast  polyethylene glycol 3350 17 Gram(s) Oral daily    PRN MEDICATIONS  acetaminophen   Tablet .. 650 milliGRAM(s) Oral every 4 hours PRN  aluminum hydroxide/magnesium hydroxide/simethicone Suspension 30 milliLiter(s) Oral every 6 hours PRN  hydrocortisone 1% Topical Cream - Peds 1 Application(s) Topical every 12 hours PRN  melatonin 5 milliGRAM(s) Oral at bedtime PRN  morphine  - Injectable 2 milliGRAM(s) IV Push every 4 hours PRN  senna 2 Tablet(s) Oral at bedtime PRN      VITAL SIGNS: Last 24 Hours  T(C): 35.9 (09 Mar 2021 07:30), Max: 35.9 (09 Mar 2021 07:30)  T(F): 96.6 (09 Mar 2021 07:30), Max: 96.6 (09 Mar 2021 07:30)  HR: 83 (09 Mar 2021 07:30) (83 - 83)  BP: 96/54 (09 Mar 2021 07:30) (96/54 - 96/54)  BP(mean): --  RR: 18 (09 Mar 2021 07:30) (18 - 18)  SpO2: --    LABS:      RADIOLOGY:    PHYSICAL EXAM:    Limited due to patient refusal     GENERAL: NAD, well-developed  HEENT:  Atraumatic, Normocephalic. EOMI, conjunctiva and sclera clear, No JVD  PULMONARY: unable to perform  CARDIOVASCULAR: unable to perform  GASTROINTESTINAL: unable to perform  MUSCULOSKELETAL:  unable to perform, No clubbing, cyanosis, or edema  NEUROLOGY: non-focal  SKIN: No rashes or lesions     SUBJECTIVE  Patient is a 65y old Female who presents with a chief complaint of generalized weakness (08 Mar 2021 15:15)  Currently admitted to medicine with the primary diagnosis of Fall    Today is hospital day 4d, and this morning she is laying comfortably in bed, no acute distress. Patient requesting undersigned leave her alone. She had no acute concerns at this time.     OBJECTIVE  PAST MEDICAL & SURGICAL HISTORY  Thyroid cancer    Peripheral neuropathy    Pleural effusion  11/17    Uterine cancer    History of total abdominal hysterectomy  4/2018    S/P thoracentesis  12/17    H/O lymph node excision  LEFT NECK 12/17      ALLERGIES:  adhesives (Rash)  No Known Drug Allergies    MEDICATIONS:  STANDING MEDICATIONS  chlorhexidine 4% Liquid 1 Application(s) Topical <User Schedule>  dextrose 40% Gel 15 Gram(s) Oral once  dextrose 5%. 1000 milliLiter(s) IV Continuous <Continuous>  dextrose 5%. 1000 milliLiter(s) IV Continuous <Continuous>  dextrose 50% Injectable 25 Gram(s) IV Push once  dextrose 50% Injectable 12.5 Gram(s) IV Push once  dextrose 50% Injectable 25 Gram(s) IV Push once  glucagon  Injectable 1 milliGRAM(s) IntraMuscular once  levETIRAcetam 1000 milliGRAM(s) Oral two times a day  pantoprazole    Tablet 40 milliGRAM(s) Oral before breakfast  polyethylene glycol 3350 17 Gram(s) Oral daily    PRN MEDICATIONS  acetaminophen   Tablet .. 650 milliGRAM(s) Oral every 4 hours PRN  aluminum hydroxide/magnesium hydroxide/simethicone Suspension 30 milliLiter(s) Oral every 6 hours PRN  hydrocortisone 1% Topical Cream - Peds 1 Application(s) Topical every 12 hours PRN  melatonin 5 milliGRAM(s) Oral at bedtime PRN  morphine  - Injectable 2 milliGRAM(s) IV Push every 4 hours PRN  senna 2 Tablet(s) Oral at bedtime PRN      VITAL SIGNS: Last 24 Hours  T(C): 35.9 (09 Mar 2021 07:30), Max: 35.9 (09 Mar 2021 07:30)  T(F): 96.6 (09 Mar 2021 07:30), Max: 96.6 (09 Mar 2021 07:30)  HR: 83 (09 Mar 2021 07:30) (83 - 83)  BP: 96/54 (09 Mar 2021 07:30) (96/54 - 96/54)  BP(mean): --  RR: 18 (09 Mar 2021 07:30) (18 - 18)  SpO2: --    LABS:      RADIOLOGY:    PHYSICAL EXAM:    Limited due to patient refusal     GENERAL: NAD, well-developed  HEENT:  Atraumatic, Normocephalic. EOMI, conjunctiva and sclera clear, No JVD  PULMONARY: unable to perform  CARDIOVASCULAR: unable to perform  GASTROINTESTINAL: unable to perform  MUSCULOSKELETAL:  limited as unable to perform, No clubbing, cyanosis, or edema or upper extremities noted  NEUROLOGY: unable to perform

## 2021-03-09 NOTE — PROGRESS NOTE ADULT - ASSESSMENT
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 Cid), and history of loss of balance presents to the hospital s/p fall from home.     # Mechanical Fall   # History of loss of balance   - patient tripped while getting out of bed   - Trauma w/up (-) : CTH (-) , CT C-spine (-), XRAY Pelvis (-) for acute fractures/ bleeding  - Xray R knee - questionable fracture of the right tibia. Patient not candidate for surgery, therefore CT knee was not performed in agreement with patient.     # h/o Papillary thyroid cancer and stage IV uterine cancer with mets to brain (on MRI 02/17/21), s/p ELEONORA in April 2018 and thyroidectomy in August 2018 (follows Dr. Cid)  - now with worsening functional status   - heme recommendations:  - Patient is on Avastin/Keytruda, last dose was received on 2/23/21  - She fell at home and this concerning as she appears to be functionally declining with worsening performance status   - We recommended that given her worsening functional status, we would recommend hospice at this time. Barbara is open to hospice but would like to discuss with Serena's significant other before making a final decision   - Hospice consult placed  - Patient comfort care, no blood draws, no vital signs, no unnecessary medications.     # Isolated thrombocytopenia   - no bleeding present  - no heparin    # Simple Cystitis   - WBC 12 -> 7, afebrile   - s/p Rocepin    #Stage 3 bedsore to R buttock  - wound care nurse consult: clean with saline, cover with Triad cream    # Hyponatremia, moderate  - likely secondary to siadh  - Na 128 on admission > 131  - patient alert, no further blood draws     # DMII  - carb consistent diet  - comfort care, no DM medications, no finger sticks    # DVT PPX: SCD  # GI PPX: PPI  # Activity: as tolerated  # Diet: carb consistent  # CHG BATH    Pending: pending placement

## 2021-03-09 NOTE — PROGRESS NOTE ADULT - ASSESSMENT
Serena is a 65 y F with PMHx peripheral neuropathy, papillary thyroid cancer s/p thyroidectomy and stage IV Papillary Serous Carcinoma s/p ELEONORA in April 2018 presenting with generalized weakness and fall     ASSESSMENT  #) Fall with negative trauma work up    #) Thrombocytopenia   #) Stage IV Papillary Serous Endometrial Carcinoma     PLAN  - Full traumatic work up noted, no evidence of any acute fracture; CT head negative for bleed   - Patient is on Avastin/Keytruda, last dose was received on 2/23/21  - She fell at home and overall is functionally declining with worsening performance status   - Please discuss all medical care with sister Barbara [phone]. Her daughter Megan cannot comprehend her mother's medical care.   - Given her worsening functional status, we recommended hospice at this time  - Patient and family agreed to comfort measures and hospice placement. Tentative plan for discharge to [facility]   - Steroids can be discontinued as there is limited benefit given focus is now on comfort

## 2021-03-09 NOTE — PROGRESS NOTE ADULT - NSHPATTENDINGPLANDISCUSS_GEN_ALL_CORE
residents, nursing, , patient
nursing, , patient
residents, nursing, , patient
residents, nursing, , patient

## 2021-03-09 NOTE — PROGRESS NOTE ADULT - SUBJECTIVE AND OBJECTIVE BOX
Patient is a 65y old  Female who presents with a chief complaint of generalized weakness (09 Mar 2021 10:30)      Subjective: Serena is not very talkative today. She just nods yes or no to simple questions.       Vital Signs Last 24 Hrs  T(C): 36.3 (09 Mar 2021 15:49), Max: 36.3 (09 Mar 2021 15:49)  T(F): 97.4 (09 Mar 2021 15:49), Max: 97.4 (09 Mar 2021 15:49)  HR: 100 (09 Mar 2021 15:49) (83 - 100)  BP: 94/55 (09 Mar 2021 15:49) (94/55 - 96/54)  BP(mean): --  RR: 18 (09 Mar 2021 15:49) (18 - 18)  SpO2: --    PHYSICAL EXAM  General: adult in NAD  HEENT: anicteric sclera, pink conjunctiva  Neck: supple  CV: normal S1/S2 with no murmur rubs or gallops  Lungs: CTABL  Abdomen: soft non-tender non-distended  Ext: no edema  Skin: petechiae present on b/l lower ext   Neuro: alert and oriented X 4, no focal deficits    MEDICATIONS  (STANDING):  chlorhexidine 4% Liquid 1 Application(s) Topical <User Schedule>  dextrose 40% Gel 15 Gram(s) Oral once  dextrose 5%. 1000 milliLiter(s) (50 mL/Hr) IV Continuous <Continuous>  dextrose 5%. 1000 milliLiter(s) (100 mL/Hr) IV Continuous <Continuous>  dextrose 50% Injectable 25 Gram(s) IV Push once  dextrose 50% Injectable 12.5 Gram(s) IV Push once  dextrose 50% Injectable 25 Gram(s) IV Push once  glucagon  Injectable 1 milliGRAM(s) IntraMuscular once  levETIRAcetam 1000 milliGRAM(s) Oral two times a day  pantoprazole    Tablet 40 milliGRAM(s) Oral before breakfast  polyethylene glycol 3350 17 Gram(s) Oral daily    MEDICATIONS  (PRN):  acetaminophen   Tablet .. 650 milliGRAM(s) Oral every 4 hours PRN Temp greater or equal to 38C (100.4F), Mild Pain (1 - 3)  aluminum hydroxide/magnesium hydroxide/simethicone Suspension 30 milliLiter(s) Oral every 6 hours PRN Dyspepsia  hydrocortisone 1% Topical Cream - Peds 1 Application(s) Topical every 12 hours PRN Rash and/or Itching  melatonin 5 milliGRAM(s) Oral at bedtime PRN Sleep  oxyCODONE    IR 5 milliGRAM(s) Oral every 4 hours PRN Severe Pain (7 - 10)  senna 2 Tablet(s) Oral at bedtime PRN Constipation      LABS:      Serial CBC's  03-07 @ 05:52  Hct-29.9 / Hgb-10.6 / Plat-45 / RBC-3.29 / WBC-7.14  Serial CBC's  03-06 @ 04:58  Hct-32.1 / Hgb-11.0 / Plat-38 / RBC-3.48 / WBC-8.35        BLOOD SMEAR INTERPRETATION:       RADIOLOGY & ADDITIONAL STUDIES:

## 2021-03-10 NOTE — PROGRESS NOTE ADULT - SUBJECTIVE AND OBJECTIVE BOX
SUBJECTIVE  Patient is a 65y old Female who presents with a chief complaint of generalized weakness (09 Mar 2021 20:58)  Currently admitted to medicine with the primary diagnosis of Fall    Today is hospital day 5d, and this morning she is sleeping comfortably in bed. No acute distress. Patient remains comfort care.     OBJECTIVE  PAST MEDICAL & SURGICAL HISTORY  Thyroid cancer    Peripheral neuropathy    Pleural effusion  11/17    Uterine cancer    History of total abdominal hysterectomy  4/2018    S/P thoracentesis  12/17    H/O lymph node excision  LEFT NECK 12/17      ALLERGIES:  adhesives (Rash)  No Known Drug Allergies    MEDICATIONS:  STANDING MEDICATIONS  chlorhexidine 4% Liquid 1 Application(s) Topical <User Schedule>  dextrose 40% Gel 15 Gram(s) Oral once  dextrose 5%. 1000 milliLiter(s) IV Continuous <Continuous>  dextrose 5%. 1000 milliLiter(s) IV Continuous <Continuous>  dextrose 50% Injectable 25 Gram(s) IV Push once  dextrose 50% Injectable 12.5 Gram(s) IV Push once  dextrose 50% Injectable 25 Gram(s) IV Push once  glucagon  Injectable 1 milliGRAM(s) IntraMuscular once  levETIRAcetam 1000 milliGRAM(s) Oral two times a day  pantoprazole    Tablet 40 milliGRAM(s) Oral before breakfast  polyethylene glycol 3350 17 Gram(s) Oral daily    PRN MEDICATIONS  acetaminophen   Tablet .. 650 milliGRAM(s) Oral every 4 hours PRN  aluminum hydroxide/magnesium hydroxide/simethicone Suspension 30 milliLiter(s) Oral every 6 hours PRN  hydrocortisone 1% Topical Cream - Peds 1 Application(s) Topical every 12 hours PRN  melatonin 5 milliGRAM(s) Oral at bedtime PRN  oxyCODONE    IR 5 milliGRAM(s) Oral every 4 hours PRN  senna 2 Tablet(s) Oral at bedtime PRN      VITAL SIGNS: Last 24 Hours  T(C): 36.3 (09 Mar 2021 15:49), Max: 36.3 (09 Mar 2021 15:49)  T(F): 97.4 (09 Mar 2021 15:49), Max: 97.4 (09 Mar 2021 15:49)  HR: 100 (09 Mar 2021 15:49) (100 - 100)  BP: 94/55 (09 Mar 2021 15:49) (94/55 - 94/55)  BP(mean): --  RR: 18 (09 Mar 2021 15:49) (18 - 18)  SpO2: --    RADIOLOGY:    PHYSICAL EXAM:    Limited due to patient refusal     GENERAL: NAD, well-developed  HEENT:  Atraumatic, Normocephalic. EOMI, conjunctiva and sclera clear, No JVD  PULMONARY: unable to perform  CARDIOVASCULAR: unable to perform  GASTROINTESTINAL: unable to perform  MUSCULOSKELETAL:  limited as unable to perform, No clubbing, cyanosis, or edema or upper extremities noted  NEUROLOGY: unable to perform

## 2021-03-10 NOTE — PROGRESS NOTE ADULT - ASSESSMENT
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 Cid), and history of loss of balance presents to the hospital s/p fall from home.     # Mechanical Fall   # History of loss of balance   - patient tripped while getting out of bed   - Trauma w/up (-) : CTH (-) , CT C-spine (-), XRAY Pelvis (-) for acute fractures/ bleeding  - Xray R knee - questionable fracture of the right tibia. Patient not candidate for surgery, therefore CT knee was not performed in agreement with patient.     # h/o Papillary thyroid cancer and stage IV uterine cancer with mets to brain (on MRI 02/17/21), s/p ELEONORA in April 2018 and thyroidectomy in August 2018 (follows Dr. Cid)  - now with worsening functional status   - heme recommendations:  - Patient is on Avastin/Keytruda, last dose was received on 2/23/21  - She fell at home and this concerning as she appears to be functionally declining with worsening performance status   - We recommended that given her worsening functional status, we would recommend hospice at this time. Barbara is open to hospice but would like to discuss with Serena's significant other before making a final decision   - Hospice consult placed  - Patient comfort care, no blood draws, no vital signs, no unnecessary medications.     # Isolated thrombocytopenia   - no bleeding present  - no heparin    # Simple Cystitis   - WBC 12 -> 7, afebrile   - s/p Rocepin    #Stage 3 bedsore to R buttock  - wound care nurse consult: clean with saline, cover with Triad cream    # Hyponatremia, moderate  - likely secondary to siadh  - Na 128 on admission > 131  - patient alert, no further blood draws     # DMII  - carb consistent diet  - comfort care, no DM medications, no finger sticks    # DVT PPX: SCD  # GI PPX: PPI  # Activity: as tolerated  # Diet: carb consistent  # CHG BATH    Pending: [facility] pending auth

## 2021-03-11 NOTE — PROGRESS NOTE ADULT - ASSESSMENT
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 Cid), and history of loss of balance presents to the hospital s/p fall from home.     #covid Exposure  - COVID negative 3/08  - exposure 3/11, tested 3/11, pending results  - patient asymptomatic     # Mechanical Fall   # History of loss of balance   - patient tripped while getting out of bed   - Trauma w/up (-) : CTH (-) , CT C-spine (-), XRAY Pelvis (-) for acute fractures/ bleeding  - Xray R knee - questionable fracture of the right tibia. Patient not candidate for surgery, therefore CT knee was not performed in agreement with patient.     # h/o Papillary thyroid cancer and stage IV uterine cancer with mets to brain (on MRI 02/17/21), s/p ELEONORA in April 2018 and thyroidectomy in August 2018 (follows Dr. Cid)  - now with worsening functional status   - heme recommendations:  - Patient is on Avastin/Keytruda, last dose was received on 2/23/21  - She fell at home and this concerning as she appears to be functionally declining with worsening performance status   - We recommended that given her worsening functional status, we would recommend hospice at this time. Barbara is open to hospice but would like to discuss with Serena's significant other before making a final decision   - Hospice consult placed  - Patient comfort care, no blood draws, no vital signs, no unnecessary medications.     # Isolated thrombocytopenia   - no bleeding present  - no heparin    # Simple Cystitis   - WBC 12 -> 7, afebrile   - s/p Rocepin    #Stage 3 bedsore to R buttock  - wound care nurse consult: clean with saline, cover with Triad cream    # Hyponatremia, moderate  - likely secondary to siadh  - Na 128 on admission > 131  - patient alert, no further blood draws     # DMII  - carb consistent diet  - comfort care, no DM medications, no finger sticks    # DVT PPX: SCD  # GI PPX: PPI  # Activity: as tolerated  # Diet: carb consistent  # CHG BATH    Pending: [facility] pending auth

## 2021-03-11 NOTE — PROGRESS NOTE ADULT - SUBJECTIVE AND OBJECTIVE BOX
SUBJECTIVE  Patient is a 65y old Female who presents with a chief complaint of generalized weakness (10 Mar 2021 11:39)  Currently admitted to medicine with the primary diagnosis of Fall    Today is hospital day 6d, and this morning she is sitting comfortably in bed, no acute distress. Patient unwilling to interact with undersigned.     Yesterday patient found to be exposed to COVID + patient. Patient agreed to be tested for covid last night. No overnight events.      OBJECTIVE  PAST MEDICAL & SURGICAL HISTORY  Thyroid cancer    Peripheral neuropathy    Pleural effusion  11/17    Uterine cancer    History of total abdominal hysterectomy  4/2018    S/P thoracentesis  12/17    H/O lymph node excision  LEFT NECK 12/17      ALLERGIES:  adhesives (Rash)  No Known Drug Allergies    MEDICATIONS:  STANDING MEDICATIONS  chlorhexidine 4% Liquid 1 Application(s) Topical <User Schedule>  dextrose 40% Gel 15 Gram(s) Oral once  dextrose 5%. 1000 milliLiter(s) IV Continuous <Continuous>  dextrose 5%. 1000 milliLiter(s) IV Continuous <Continuous>  dextrose 50% Injectable 25 Gram(s) IV Push once  dextrose 50% Injectable 12.5 Gram(s) IV Push once  dextrose 50% Injectable 25 Gram(s) IV Push once  glucagon  Injectable 1 milliGRAM(s) IntraMuscular once  levETIRAcetam 1000 milliGRAM(s) Oral two times a day  polyethylene glycol 3350 17 Gram(s) Oral daily    PRN MEDICATIONS  acetaminophen   Tablet .. 650 milliGRAM(s) Oral every 4 hours PRN  aluminum hydroxide/magnesium hydroxide/simethicone Suspension 30 milliLiter(s) Oral every 6 hours PRN  hydrocortisone 1% Topical Cream - Peds 1 Application(s) Topical every 12 hours PRN  melatonin 5 milliGRAM(s) Oral at bedtime PRN  oxyCODONE    IR 5 milliGRAM(s) Oral every 4 hours PRN  senna 2 Tablet(s) Oral at bedtime PRN      VITAL SIGNS: Last 24 Hours  T(C): --  T(F): --  HR: --  BP: --  BP(mean): --  RR: --  SpO2: --    LABS:    RADIOLOGY:    PHYSICAL EXAM:    GENERAL: NAD, well-developed  HEENT:  Atraumatic, Normocephalic. EOMI, conjunctiva and sclera clear, No JVD  PULMONARY: unable to perform  CARDIOVASCULAR: unable to perform  GASTROINTESTINAL: unable to perform  MUSCULOSKELETAL:  limited as unable to perform, No clubbing, cyanosis, or edema or upper extremities noted  NEUROLOGY: unable to perform

## 2021-03-12 NOTE — PROGRESS NOTE ADULT - SUBJECTIVE AND OBJECTIVE BOX
SUBJECTIVE  Patient is a 65y old Female who presents with a chief complaint of generalized weakness (11 Mar 2021 15:29)  Currently admitted to medicine with the primary diagnosis of Fall    Today is hospital day 7d, and this morning she is sitting comfortably in bed, no acute distress. Patient without requests this morning.     OBJECTIVE  PAST MEDICAL & SURGICAL HISTORY  Thyroid cancer    Peripheral neuropathy    Pleural effusion  11/17    Uterine cancer    History of total abdominal hysterectomy  4/2018    S/P thoracentesis  12/17    H/O lymph node excision  LEFT NECK 12/17      ALLERGIES:  adhesives (Rash)  No Known Drug Allergies    MEDICATIONS:  STANDING MEDICATIONS  chlorhexidine 4% Liquid 1 Application(s) Topical <User Schedule>  dextrose 40% Gel 15 Gram(s) Oral once  dextrose 5%. 1000 milliLiter(s) IV Continuous <Continuous>  dextrose 5%. 1000 milliLiter(s) IV Continuous <Continuous>  dextrose 50% Injectable 25 Gram(s) IV Push once  dextrose 50% Injectable 12.5 Gram(s) IV Push once  dextrose 50% Injectable 25 Gram(s) IV Push once  glucagon  Injectable 1 milliGRAM(s) IntraMuscular once  levETIRAcetam 1000 milliGRAM(s) Oral two times a day  polyethylene glycol 3350 17 Gram(s) Oral daily    PRN MEDICATIONS  acetaminophen   Tablet .. 650 milliGRAM(s) Oral every 4 hours PRN  aluminum hydroxide/magnesium hydroxide/simethicone Suspension 30 milliLiter(s) Oral every 6 hours PRN  hydrocortisone 1% Topical Cream - Peds 1 Application(s) Topical every 12 hours PRN  melatonin 5 milliGRAM(s) Oral at bedtime PRN  morphine Concentrate 2 milliGRAM(s) Oral every 4 hours PRN  senna 2 Tablet(s) Oral at bedtime PRN      VITAL SIGNS: Last 24 Hours  T(C): --  T(F): --  HR: --  BP: --  BP(mean): --  RR: --  SpO2: --    LABS:      RADIOLOGY:    PHYSICAL EXAM:    GENERAL: NAD, well-developed  HEENT:  Atraumatic, Normocephalic. EOMI, conjunctiva and sclera clear, No JVD  PULMONARY: unable to perform  CARDIOVASCULAR: unable to perform  GASTROINTESTINAL: Soft, Non-tender, non-distended  MUSCULOSKELETAL:  No clubbing, cyanosis, or edema or upper extremities noted  NEUROLOGY: unable to perform

## 2021-03-12 NOTE — PROGRESS NOTE ADULT - ASSESSMENT
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 Cid), and history of loss of balance presents to the hospital s/p fall from home.     #covid Exposure  - COVID negative 3/08  - exposure 3/10  - COVID-19 PCR negative 3/11 after exposure  - Repeat COVID-19 PCR 3/12  - patient asymptomatic     # Mechanical Fall   # History of loss of balance   - patient tripped while getting out of bed   - Trauma w/up (-) : CTH (-) , CT C-spine (-), XRAY Pelvis (-) for acute fractures/ bleeding  - Xray R knee - questionable fracture of the right tibia. Patient not candidate for surgery, therefore CT knee was not performed in agreement with patient.     # h/o Papillary thyroid cancer and stage IV uterine cancer with mets to brain (on MRI 02/17/21), s/p ELEONORA in April 2018 and thyroidectomy in August 2018 (follows Dr. Cid)  - now with worsening functional status   - heme recommendations:  - Patient is on Avastin/Keytruda, last dose was received on 2/23/21  - She fell at home and this concerning as she appears to be functionally declining with worsening performance status   - We recommended that given her worsening functional status, we would recommend hospice at this time. Barbara is open to hospice but would like to discuss with Serena's significant other before making a final decision   - Hospice consult placed  - Patient comfort care, no blood draws, no vital signs, no unnecessary medications.     # Isolated thrombocytopenia   - no bleeding present  - no heparin    # Simple Cystitis   - WBC 12 -> 7, afebrile   - s/p Rocepin    #Stage 3 bedsore to R buttock  - wound care nurse consult: clean with saline, cover with Triad cream    # Hyponatremia, moderate  - likely secondary to siadh  - Na 128 on admission > 131  - patient alert, no further blood draws     # DMII  - carb consistent diet  - comfort care, no DM medications, no finger sticks    # DVT PPX: SCD  # GI PPX: PPI  # Activity: as tolerated  # Diet: carb consistent  # CHG BATH    Pending: [facility] pending auth, pending COVID PCR 3/12

## 2021-03-12 NOTE — PROGRESS NOTE ADULT - ATTENDING COMMENTS
patient seen and examined independently on morning rounds for the first time today, chart reviewed and discussed with the medicine resident and on interdisciplinary rounds and agree with the above resident progress note with the following addendum:    -awaiting authorization for inpatient hospice- continue comfort measures--dnr/dni- no labs/no blood draw- end of life care    DNR/DNI  Hospice following- awaiting inpatient hospice at [facility]
patient seen and examined independently on morning rounds, chart reviewed and discussed with the medicine resident and on interdisciplinary rounds and agree with the above resident progress note with the following addendum:    -awaiting authorization for inpatient hospice- covid exposure yesterday- 3/8 covid negative--continue comfort measures--dnr/dni- no labs/no blood draw- end of life care- as per hospice will need 2 negative covid pcr 48 hrs apart for dc (pending 3/10 covid---will need to repeat tomorrow 3/12/21)    DNR/DNI  Hospice following- awaiting inpatient hospice at [facility]
patient seen and examined independently on morning rounds, chart reviewed and discussed with the medicine resident and on interdisciplinary rounds and agree with the above resident progress note with the following addendum:    -awaiting authorization for inpatient hospice-----continue comfort measures--dnr/dni- no labs/no blood draw- end of life care- as per hospice will need 2 negative covid pcr 48 hrs apart for discharge due to covid exposure on the medical floor----3/10 covid negatrive---repaet swab today (3/12) pending---start roxinol sl (patietn has no iv access)    DNR/DNI  Hospice following- awaiting inpatient hospice at [facility] once auth obtained and also once second covid pcr back
Appears comfortable.   Case was discussed at length with sister Barbara, who feels that she does not have enough resources to take Serena home with hospice.   She wishes for to try for inpatient hospice options.   Case was discussed with primary attending.
Agree with resident's note, HPI, PE, assessment and plan.  Pt was seen and examined independently.     Pt agreed with hospice evaluation. Consult placed.
Agree with resident's note, HPI, PE, assessment and plan.  Pt was seen and examined independently.     Pt looks comfortable.   Pt is with comfort care only, awaiting dc to [facility] facility under hospice care.

## 2021-03-12 NOTE — HOSPICE CARE NOTE - CONVESATION DETAILS
Follow up call placed to ERIK Denis to discuss status of bed offers from facilities for placement with hospice services. Pending call back. 
Follow up call placed to ERIK Denis who reports patient is presently Covid negative as of 2/10/2021, pending additional swab today. Patient is also pending insurance authorization for transfer to [facility]. Hospice to remain available. 
Follow up call placed to covering  Lizbeth to discuss status of auth for transfer to [facility]. Lizbeth reports no update as of yet. Will follow up with insurance company after rounds and provide update to Hospice Intake Department. 
Update from ERIK Nieves who reports patient had Covid exposure. Patient requires two negative Covid swabs prior to D/C to [facility]. Hospice to remain available
Update received from ERIK Mcnally. Patient pending financial clearance for [facility]. 
Hospice consult has been completed - Chart reviewed RN spoke with patients sister who is considering home hospice care at time of conversation   sister unsure of equipment that might be needed. RN to follow up and discuss further in the morning to determine DME as well as to assure a safe DC plan.   RN also remain in communication with Dr. Avery. plan for DC possible Tuesday with Hospice admission for Wednesday. ERIK Bosch also made aware.
Hospice RN has left a voice mail for patients Sister Barbara to discuss further for DC plan with home hospice and DME that might be needed to assist with care.  pending a return call - Hospice RN to follow up with team once plan confirmed.

## 2021-03-13 NOTE — PROGRESS NOTE ADULT - ASSESSMENT
a/p:  #65 y F pmh of DMII, peripheral neuropathy, papillary thyroid cancer and stage IV uterine cancer with mets to brain (on MRI 02/17/21), s/p ELEONORA in April 2018 and thyroidectomy in August 2018 (follows Dr. Cid), and history of loss of balance presents to the hospital s/p fall from home      -awaiting authorization for inpatient hospice-----continue comfort measures--dnr/dni- no labs/no blood draw- end of life care- as per hospice x2 negative covid negative (both 3/10 and 3/12)-start roxinol sl (patietn has no iv access)    DNR/DNI  Hospice following- awaiting inpatient hospice at [facility] once auth obtained

## 2021-03-13 NOTE — PROGRESS NOTE ADULT - SUBJECTIVE AND OBJECTIVE BOX
Patient is a 65y old  Female who presents with a chief complaint of generalized weakness (12 Mar 2021 12:06)    HPI:  65 y F pmh of DMII, peripheral neuropathy, papillary thyroid cancer and stage IV uterine cancer with mets to brain (on MRI 02/17/21), s/p ELEONORA in April 2018 and thyroidectomy in August 2018 (follows Dr. Cid), and history of loss of balance presents to the hospital s/p fall from home. The patient reports that she has been having generalized weakness over the last few days. She reports that she was getting out of bed this morning and tripped and fell and hit her head, however she denies any loss of consciousness and is unsure how long she was on the floor. Of note, the patient was recently in the hospital secondary to brbpr likely secondary to hemorrhoids, however no colonoscopy was performed as the patient preferred to do a work up outpatient. She denies any other symptoms including chest pain, dyspnea, cough, abdominal pain, dysuria, nausea, vomiting, diarrhea, melena, hematochezia. All ros negative except as above.     T(C): 36.3,HR: 79 , BP: 114/73 , RR: 16 , SpO2: 99% on room air  Labs: WBC 12, Na 128, U/A (+) LE , pyuria , PLT 43 (baseline ~ 144)  Trauma w/up (-) : CTH (-) , CT C-spine (-), XRAY Pelvis (-) for acute fractures/ bleeding  CT A/P w/ iv contrast: Multiple peritoneal nodules, recently demonstrated on January 26, 2021 PET/CT, including 1.5 cm nodules subadjacent to abdominal wall, previously 1.9 cm, and anatomically stable left upper quadrant infradiaphragmatic 1 cm nodule.  CT Head No Cont : Area of hyperdensity/calcification within the left frontoparietal region measuring 1.0 x 1.1 cm andin the left occipital lobe measuring 1.0 x 0.6 cm, which may correspond to metastatic disease that is better seen on most recent MRI   (05 Mar 2021 21:29)    PAST MEDICAL & SURGICAL HISTORY:  Thyroid cancer  Peripheral neuropathy  Pleural effusion  11/17  Uterine cancer  History of total abdominal hysterectomy  4/2018  S/P thoracentesis  12/17  H/O lymph node excision  LEFT NECK 12/17      no overnight events---continue comfort measures only--end of life care awaiting authorization for outpatient hospice                    MEDICATIONS  (STANDING):  chlorhexidine 4% Liquid 1 Application(s) Topical <User Schedule>  dextrose 40% Gel 15 Gram(s) Oral once  dextrose 5%. 1000 milliLiter(s) (50 mL/Hr) IV Continuous <Continuous>  dextrose 5%. 1000 milliLiter(s) (100 mL/Hr) IV Continuous <Continuous>  dextrose 50% Injectable 25 Gram(s) IV Push once  dextrose 50% Injectable 12.5 Gram(s) IV Push once  dextrose 50% Injectable 25 Gram(s) IV Push once  glucagon  Injectable 1 milliGRAM(s) IntraMuscular once  levETIRAcetam 1000 milliGRAM(s) Oral two times a day  polyethylene glycol 3350 17 Gram(s) Oral daily

## 2021-03-14 NOTE — DISCHARGE NOTE FOR THE EXPIRED PATIENT - HOSPITAL COURSE
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 Cid), and history of loss of balance presents to the hospital s/p fall from home. Due to worsening functional status, patient was made DNR/DNI with CMO. Hospice care was following the patient- pt  was waiting authorization for inpatient hospice. On 3/14/21 at 20.30 patient was found in cardiopulmonary arrest.

## 2021-03-14 NOTE — PROGRESS NOTE ADULT - REASON FOR ADMISSION
generalized weakness

## 2021-03-14 NOTE — PROGRESS NOTE ADULT - ASSESSMENT
a/p:  #65 y F pmh of DMII, peripheral neuropathy, papillary thyroid cancer and stage IV uterine cancer with mets to brain (on MRI 02/17/21), s/p ELEONORA in April 2018 and thyroidectomy in August 2018 (follows Dr. Cid), and history of loss of balance presents to the hospital s/p fall from home      -awaiting authorization for inpatient hospice-----continue comfort measures--dnr/dni- no labs/no blood draw- end of life care- as per hospice need two negative covids prior to transferring to inpatient hospice (after covid exposure covid negative 3/10 and negative 3/12)-cont roxinol sl (patietn has no iv access)    DNR/DNI- comfort measures   Hospice following- grave prognosis---end of life care  awaiting inpatient hospice at [facility] once auth obtained

## 2021-03-14 NOTE — PROGRESS NOTE ADULT - SUBJECTIVE AND OBJECTIVE BOX
NIDHI BANKS 65y Female  MRN#: [id number]   Hospital Day: 9d    SUBJECTIVE  Patient is a 65y old Female who presents with a chief complaint of generalized weakness (13 Mar 2021 15:28)  Currently admitted to medicine with the primary diagnosis of Fall      INTERVAL HPI AND OVERNIGHT EVENTS:  Patient was examined and seen at bedside. This morning she is resting comfortably in bed and reports no issues or overnight events.    REVIEW OF SYMPTOMS:  CONSTITUTIONAL: No weakness, fevers or chills; No headaches  EYES: No visual changes, eye pain, or discharge  ENT: No vertigo; No ear pain or change in hearing; No sore throat or difficulty swallowing  NECK: No pain or stiffness  RESPIRATORY: No cough, wheezing, or hemoptysis; No shortness of breath  CARDIOVASCULAR: No chest pain or palpitations  GASTROINTESTINAL: No abdominal or epigastric pain; No nausea, vomiting, or hematemesis; No diarrhea or constipation; No melena or hematochezia  GENITOURINARY: No dysuria, frequency or hematuria  MUSCULOSKELETAL: No joint pain, no muscle pain, no weakness  NEUROLOGICAL: No numbness or weakness  SKIN: No itching or rashes    OBJECTIVE  PAST MEDICAL & SURGICAL HISTORY  Thyroid cancer    Peripheral neuropathy    Pleural effusion  11/17    Uterine cancer    History of total abdominal hysterectomy  4/2018    S/P thoracentesis  12/17    H/O lymph node excision  LEFT NECK 12/17      ALLERGIES:  adhesives (Rash)  No Known Drug Allergies    MEDICATIONS:  STANDING MEDICATIONS  chlorhexidine 4% Liquid 1 Application(s) Topical <User Schedule>  dextrose 40% Gel 15 Gram(s) Oral once  dextrose 5%. 1000 milliLiter(s) IV Continuous <Continuous>  dextrose 5%. 1000 milliLiter(s) IV Continuous <Continuous>  dextrose 50% Injectable 25 Gram(s) IV Push once  dextrose 50% Injectable 12.5 Gram(s) IV Push once  dextrose 50% Injectable 25 Gram(s) IV Push once  glucagon  Injectable 1 milliGRAM(s) IntraMuscular once  levETIRAcetam 1000 milliGRAM(s) Oral two times a day  polyethylene glycol 3350 17 Gram(s) Oral daily    PRN MEDICATIONS  acetaminophen   Tablet .. 650 milliGRAM(s) Oral every 4 hours PRN  aluminum hydroxide/magnesium hydroxide/simethicone Suspension 30 milliLiter(s) Oral every 6 hours PRN  hydrocortisone 1% Topical Cream - Peds 1 Application(s) Topical every 12 hours PRN  melatonin 5 milliGRAM(s) Oral at bedtime PRN  morphine Concentrate 2 milliGRAM(s) Oral every 4 hours PRN  senna 2 Tablet(s) Oral at bedtime PRN      VITAL SIGNS: Last 24 Hours  T(C): --  T(F): --  HR: --  BP: --  BP(mean): --  RR: --  SpO2: --    LABS:          RADIOLOGY:      PHYSICAL EXAM:  GENERAL: NAD, well-developed  HEENT:  Atraumatic, Normocephalic. EOMI, conjunctiva and sclera clear, No JVD  PULMONARY: unable to perform  CARDIOVASCULAR: unable to perform  GASTROINTESTINAL: Soft, Non-tender, non-distended  MUSCULOSKELETAL:  No clubbing, cyanosis, or edema or upper extremities noted  NEUROLOGY: unable to perform

## 2021-03-14 NOTE — DISCHARGE NOTE FOR THE EXPIRED PATIENT - NS PATIENT DEATH CRITERIA
Patient is dead based on Cardiopulmonary criteria including absent breath sounds, pulselessness and/or asystole Mastoid Interpolation Flap Division And Inset Text: Division and inset of the mastoid interpolation flap was performed to achieve optimal aesthetic result, restore normal anatomic appearance and avoid distortion of normal anatomy, expedite and facilitate wound healing, achieve optimal functional result and because linear closure either not possible or would produce suboptimal result. The patient was prepped and draped in the usual manner. The pedicle was infiltrated with local anesthesia. The pedicle was sectioned with a #15 blade. The pedicle was de-bulked and trimmed to match the shape of the defect. Hemostasis was achieved. The flap donor site and free margin of the flap were secured with deep buried sutures and the wound edges were re-approximated.

## 2021-03-14 NOTE — PROGRESS NOTE ADULT - ASSESSMENT
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 Cid), and history of loss of balance presents to the hospital s/p fall from home.     #Comfort Care Measures  - Patient DNR/DNI  - No labs/no blood draws  - As per Hospice x2 negative COVID on 3/10 and 3/12  - C/w Roxinol sublingual      #Covid Exposure  - COVID negative 3/08  - COVID exposure 3/10  - COVID-19 PCR negative 3/11 after exposure  - Repeat COVID-19 PCR 3/12 Negative  - patient asymptomatic     # Mechanical Fall   # History of loss of balance   - patient tripped while getting out of bed   - Trauma w/up (-) : CTH (-) , CT C-spine (-), XRAY Pelvis (-) for acute fractures/ bleeding  - Xray R knee - questionable fracture of the right tibia. Patient not candidate for surgery, therefore CT knee was not performed in agreement with patient.   - Patient comfort care, no blood draws, no vital signs, no unnecessary medications.     # H/o Papillary thyroid cancer and stage IV uterine cancer with mets to brain (on MRI 02/17/21), s/p ELEONORA in April 2018 and thyroidectomy in August 2018 (follows Dr. Cid)  - Worsening functional status   - heme recommendations:  - Patient is on Avastin/Keytruda, last dose was received on 2/23/21  - She fell at home and this concerning as she appears to be functionally declining with worsening performance status   - We recommended that given her worsening functional status, we would recommend hospice at this time. Barbara is open to hospice but would like to discuss with Serena's significant other before making a final decision   - Hospice follwoing  - Patient comfort care, no blood draws, no vital signs, no unnecessary medications.     # Isolated thrombocytopenia   - no bleeding present  - no heparin  - Patient comfort care, no blood draws, no vital signs, no unnecessary medications.     # Simple Cystitis   - WBC 12 -> 7, afebrile   - s/p Rocepin  - Patient comfort care, no blood draws, no vital signs, no unnecessary medications.     #Stage 3 bedsore to R buttock  - wound care nurse consult: clean with saline, cover with Triad cream  - Patient comfort care, no blood draws, no vital signs, no unnecessary medications.     # Hyponatremia, moderate  - likely secondary to siadh  - Na 128 on admission > 131  - Patient comfort care, no blood draws, no vital signs, no unnecessary medications.     # DMII  - carb consistent diet  - Patient comfort care, no blood draws, no vital signs, no unnecessary medications.     # DVT PPX: SCD  # GI PPX: PPI  # Activity: as tolerated  # Diet: carb consistent  # CHG BATH    Pending: [facility] awaiting inpatient hospice at [facility] once auth obtained.

## 2021-03-14 NOTE — PROGRESS NOTE ADULT - PROVIDER SPECIALTY LIST ADULT
Heme/Onc
Hospitalist
Internal Medicine
Heme/Onc
Hospitalist
Internal Medicine
Internal Medicine
Heme/Onc
Hospitalist
Hospitalist
Internal Medicine
Internal Medicine

## 2021-03-16 ENCOUNTER — APPOINTMENT (OUTPATIENT)
Dept: INFUSION THERAPY | Facility: CLINIC | Age: 66
End: 2021-03-16

## 2021-03-16 ENCOUNTER — APPOINTMENT (OUTPATIENT)
Dept: HEMATOLOGY ONCOLOGY | Facility: CLINIC | Age: 66
End: 2021-03-16

## 2021-03-27 DIAGNOSIS — C73 MALIGNANT NEOPLASM OF THYROID GLAND: ICD-10-CM

## 2021-03-27 DIAGNOSIS — Z90.710 ACQUIRED ABSENCE OF BOTH CERVIX AND UTERUS: ICD-10-CM

## 2021-03-27 DIAGNOSIS — Z51.5 ENCOUNTER FOR PALLIATIVE CARE: ICD-10-CM

## 2021-03-27 DIAGNOSIS — N30.90 CYSTITIS, UNSPECIFIED WITHOUT HEMATURIA: ICD-10-CM

## 2021-03-27 DIAGNOSIS — E11.42 TYPE 2 DIABETES MELLITUS WITH DIABETIC POLYNEUROPATHY: ICD-10-CM

## 2021-03-27 DIAGNOSIS — R62.7 ADULT FAILURE TO THRIVE: ICD-10-CM

## 2021-03-27 DIAGNOSIS — E87.1 HYPO-OSMOLALITY AND HYPONATREMIA: ICD-10-CM

## 2021-03-27 DIAGNOSIS — E89.0 POSTPROCEDURAL HYPOTHYROIDISM: ICD-10-CM

## 2021-03-27 DIAGNOSIS — D63.0 ANEMIA IN NEOPLASTIC DISEASE: ICD-10-CM

## 2021-03-27 DIAGNOSIS — C54.1 MALIGNANT NEOPLASM OF ENDOMETRIUM: ICD-10-CM

## 2021-03-27 DIAGNOSIS — Z79.890 HORMONE REPLACEMENT THERAPY: ICD-10-CM

## 2021-03-27 DIAGNOSIS — R29.6 REPEATED FALLS: ICD-10-CM

## 2021-03-27 DIAGNOSIS — L89.313 PRESSURE ULCER OF RIGHT BUTTOCK, STAGE 3: ICD-10-CM

## 2021-03-27 DIAGNOSIS — E11.65 TYPE 2 DIABETES MELLITUS WITH HYPERGLYCEMIA: ICD-10-CM

## 2021-03-27 DIAGNOSIS — Z74.01 BED CONFINEMENT STATUS: ICD-10-CM

## 2021-03-27 DIAGNOSIS — Z66 DO NOT RESUSCITATE: ICD-10-CM

## 2021-03-27 DIAGNOSIS — Z20.822 CONTACT WITH AND (SUSPECTED) EXPOSURE TO COVID-19: ICD-10-CM

## 2021-03-27 DIAGNOSIS — Z92.3 PERSONAL HISTORY OF IRRADIATION: ICD-10-CM

## 2021-03-27 DIAGNOSIS — C79.31 SECONDARY MALIGNANT NEOPLASM OF BRAIN: ICD-10-CM

## 2021-03-27 DIAGNOSIS — D69.59 OTHER SECONDARY THROMBOCYTOPENIA: ICD-10-CM

## 2021-08-04 NOTE — ED ADULT TRIAGE NOTE - LOCATION:
"ED Provider Note    Scribed for eJsus Velazco M.D. by Josue Cortez-Reyes. 8/3/2021  5:31 PM    Primary care provider: Pcp Pt States None  Means of arrival: Walk-in  History obtained from: Patient  History limited by: None    CHIEF COMPLAINT  Chief Complaint   Patient presents with   • Ear Pain   • Insect Bite       HPI  Aristides De Guzman is a 42 y.o. male who presents to the Emergency Department for evaluation of right ear pain onset four days ago. He states that it is possible that he was bitten by an insect but is unsure how he sustained the swelling. He declares that this past Thursday he noted two small blisters on his ear which have been growing in size since then. The patient adds that he did not have any pain at first but currently has jaw pain when he opens his mouth. He denies any additional fever. The patient denies any trauma to his ear which may be causing his symptoms.    REVIEW OF SYSTEMS  Pertinent negatives include no fever  See HPI for further details.     PAST MEDICAL HISTORY  No pertinent past medical history noted.     SURGICAL HISTORY  patient denies any surgical history    SOCIAL HISTORY  Social History     Tobacco Use   • Smoking status: Current Every Day Smoker   • Smokeless tobacco: Never Used   Vaping Use   • Vaping Use: Former   Substance Use Topics   • Alcohol use: Yes     Comment: 4-5 beers per day, more on weekends   • Drug use: Not Currently      Social History     Substance and Sexual Activity   Drug Use Not Currently       FAMILY HISTORY  Family History   Family history unknown: Yes       CURRENT MEDICATIONS  Home Medications     Reviewed by Danika Jett R.N. (Registered Nurse) on 08/03/21 at 1546  Med List Status: Complete   Medication Last Dose Status        Patient Devaughn Taking any Medications                       ALLERGIES  No Known Allergies    PHYSICAL EXAM  VITAL SIGNS: /85   Pulse 63   Temp 36.4 °C (97.6 °F) (Temporal)   Resp 14   Ht 1.676 m (5' 6\")   Wt 89 " kg (196 lb 3.4 oz)   SpO2 96%   BMI 31.67 kg/m²   Constitutional: Well developed, Well nourished, No acute distress, Non-toxic appearance.   HENT: Normocephalic, Atraumatic, Swelling of helix on right ear with cauliflower like appearance, mild redness behind right ear,  tenderness to upper portion of right ear .Oropharynx is clear mucous membranes are moist. No oral exudates or nasal discharge.   Eyes: Pupils are equal round and reactive, EOMI, Conjunctiva normal, No discharge.   Neck: Normal range of motion, No tenderness, Supple, No stridor. No meningismus.  Lymphatic: No lymphadenopathy noted.   Neurologic: Alert & oriented x 3, Normal motor function, Normal sensory function, No focal deficits noted. Reflexes are normal.  Psychiatric: Affect normal, Judgment normal, Mood normal. There is no suicidal ideation or patient reported hallucinations.       DIAGNOSTIC STUDIES / PROCEDURES  Incision and Drainage Procedure Note    Indication: Abscess    Procedure: The patient was positioned appropriately and the skin over the incision site was prepped with betadine and draped in a sterile fashion. Local anesthesia was obtained by infiltration using 1% Lidocaine without epinephrine.  An incision was then made over the apex of the lesion and approximately 5 cc of nothing but blood. Loculations were not present. The drainage cavity was then dressed with a bandage. The patient’s tetanus status was up to date and did not require a booster dose.    The patient tolerated the procedure well.    Complications: None        COURSE & MEDICAL DECISION MAKING  Nursing notes, VS, PMSFHx reviewed in chart.    5:31 PM Patient seen and examined at bedside. Patient was treated with Bactrim, Keflex, and Xylocaine  for his symptoms. I informed the patient of my plan to drain the fluid in his ear and treat him with the above medication. Patient verbalizes understanding and agreement to this plan of care.     5:45 PM -  I spoke with pharmacy  regarding the treatment for the patient's symptoms. They advise that the patient be treated with oral Zyvox for 10 days with his first dose being administered here at the ED.     6:19 PM - I numbed the patient's right ear with lidocaine 1 %.     6:32 PM - Incision and drainage performed by me as noted in the procedure note above. I informed the patient that I do not believe his symptoms to be secondary to infection as he did not have any purulent drainage. I informed him of my plan to prescribe him with bactrim and Keflex and of my plan to obtain a blood culture. I advised the patient to take the day off work tomorrow and let his ear drain. I discussed plan for discharge and follow up as outlined below. The patient verbalizes they feel comfortable going home. The patient is stable for discharge at this time and will return for any new or worsening symptoms. Patient verbalizes understanding and support with my plan for discharge.     The patient will return for new or worsening symptoms and is stable at the time of discharge.    DISPOSITION:  Patient will be discharged home in stable condition.    FINAL IMPRESSION  1. Hematoma of right auricular region     2. Incision and drainage procedure by SHERRI right helix I, Josue Cortez-Reyes (Scribe), am scribing for, and in the presence of, Jesus Velazco M.D..    Electronically signed by: Josue Cortez-Reyes (Calin), 8/3/2021    Jesus GUAJARDO M.D. personally performed the services described in this documentation, as scribed by Josue Cortez-Reyes in my presence, and it is both accurate and complete. E.    The note accurately reflects work and decisions made by me.  Jesus Velazco M.D.  8/3/2021  7:51 PM         Left arm;

## 2021-09-02 NOTE — H&P PST ADULT - CURRENT SWALLOWING
CC:  Magdaleno Castaneda is here today for eye exam.    No Vision Change  +Likes CL but left eye is still a little weak compared to right eye  +Using +1.50 OTC which is a little weak and +2.00 is a little strong  No Glare/Halos  No Flashes  No Floaters  No Pain  No Redness  No Light sensitivity  No Dryness  No Itching  No Watering  No other concerns    Health HX: Unchanged per patient    Medications, allergies, tobacco history reviewed and updated where needed in the EMR.    Ocular Medications:  None    Denies known Latex allergy or symptoms of Latex sensitivity.   (0) swallows foods and liquids w/o difficulty

## 2021-09-29 NOTE — DISCHARGE NOTE PROVIDER - PROVIDER TOKENS
PROVIDER:[TOKEN:[[id number]]] Carac Pregnancy And Lactation Text: This medication is Pregnancy Category X and contraindicated in pregnancy and in women who may become pregnant. It is unknown if this medication is excreted in breast milk.

## 2021-10-22 NOTE — ED PROVIDER NOTE - ATTENDING CONTRIBUTION TO CARE
patient with metatstic cancer, poor historian recently admitted. jeanmarie reports falling today from standing hieght and then fell again due to diffuse weakness. she reports head injury but no loc no vomiting. on exam she looks weak, diffuese petechia over ext and abd, she is awake and alert though. abd soft. plan is to obtain labs, ct head/neck/chest/abd/pelvis.
yes

## 2022-02-25 NOTE — ED PROVIDER NOTE - DATE/TIME 2
2/25/2022         RE: Ju Santana  [address]  [place] [address]        Dear Colleague,    Thank you for referring your patient, Ju Santana, to the [facility] [place]. Please see a copy of my visit note below.    ASSESSMENT & PLAN    Ju was seen today for pain.    Diagnoses and all orders for this visit:    Pain of right hand  -     Orthopedic  Referral    CMC arthritis  -     Orthopedic  Referral      This issue is acute and Unchanged.    We discussed these other possible diagnosis: Symptoms more likely CMC arthritis.    Plan:  - Today's Plan of Care:  Discussed activity considerations and other supportive care including Ice/Heat, OTC and other topical medications as needed.  Voltaren Gel  Schedule Occupational Hand Therapy  Consider bracing options: gamekeeper brace, push brace (can trail that in OT)    -We also discussed other future treatment options:  Consideration of US guided CMC injection  Referral to Hand Surgeon    Follow Up: as needed    Concerning signs and symptoms were reviewed.  The patient expressed understanding of this management plan and all questions were answered at this time.    Thanks for the opportunity to participate in the care of this patient, I will keep you updated on their progress.    CC: Jayashree Johnson MD [facility]  Sports Medicine Physician  [facility]    -----  Chief Complaint   Patient presents with     Right Hand - Pain       SUBJECTIVE  Ju Santana is a/an 49 year old female who is seen in consultation at the request of  Jayashree Nunez C.N.P. for evaluation of right hand pain.     The patient is seen by themselves.  The patient is Right handed    Onset: 2 month(s) ago with pain quickly getting worse. Reports insidious onset without acute precipitating event.  Location of Pain: right thumb radiating into MCP and CMC joints  Worsened by: gripping,  "grasping, making fist, lifting, opening bottles  Better with: nothing  Treatments tried: rest/activity avoidance, heat, ibuprofen, previous imaging (x-ray 2/8/22) and cool comfort splint, CBD cream  Associated symptoms: weakness of right hand    Orthopedic/Surgical history: YES - bilateral carpal tunnel release Date: 2015  Social History/Occupation: works as a  at [employer]    No family history pertinent to patient's problem today.    REVIEW OF SYSTEMS:  Review of Systems  Skin: no bruising, no swelling  Musculoskeletal: as above  Neurologic: no numbness, paresthesias  Remainder of review of systems is negative including constitutional, CV, pulmonary, GI, except as noted in HPI or medical history.    OBJECTIVE:  BP (!) 145/101   Ht 1.562 m (5' 1.5\")   Wt 85.7 kg (189 lb)   LMP 09/02/2011   BMI 35.13 kg/m     General: healthy, alert and in no distress  HEENT: no scleral icterus or conjunctival erythema  Skin: no suspicious lesions or rash. No jaundice.  CV: distal perfusion intact  Resp: normal respiratory effort without conversational dyspnea   Psych: normal mood and affect  Gait: normal steady gait with appropriate coordination and balance  Neuro: Normal light sensory exam of upper extremity    Bilateral Wrist and Hand exam  Inspection:       Swelling: mild right CMC joint    Tender:       CMC joint of 1st digit(s)  right    Non Tender:       Remainder of the Wrist and Hand bilateral    ROM:       Full and symmetric active and passive range of motion of the forearm, wrist and digits bilateral    Strength:       5/5 strength in the muscles of the hand, wrist and forearm bilateral    Special Tests:        neg (-) Finkelstein's maneuver right    Neurovascular:       2+ radial pulses bilaterally with brisk capillary refill and      normal sensation to light touch in the radial, median and ulnar nerve distributions    RADIOLOGY:  I independently visualized and reviewed these images with the patient  3 XR views " of right hand from 2/8/2022 reviewed: no acute bony abnormality, CMC and DIP joint degenerative changes    Results for orders placed or performed in visit on 02/08/22   XR Hand Right G/E 3 Views    Narrative    HAND RIGHT THREE OR MORE VIEWS February 8, 2022 8:48 AM     HISTORY: Hand pain base of thumb to third finger. Pain of right hand.    COMPARISON: August 27, 2013.      Impression    IMPRESSION: Bones are normally aligned. Osteophyte formation noted in  the dorsal aspect of the distal second and third phalanges. These  appear fairly well corticated, though osteophyte in the second digit  may have previously been fractured. More difficult to determine if a  fracture may be present on the third digit, though would be through an  osteophyte. Clinically correlate with localized tenderness to the  distal interphalangeal joint of the third digit. Mild osteoarthritis  at the first carpometacarpal articulation.    DELFINA NGUYEN MD         SYSTEM ID:  [id number]     *Note: Due to a large number of results and/or encounters for the requested time period, some results have not been displayed. A complete set of results can be found in Results Review.       Review of the result(s) of each unique test - XR               Again, thank you for allowing me to participate in the care of your patient.        Sincerely,        Cindy Johnson MD     20-Mar-2019 17:41

## 2022-12-08 NOTE — HISTORY OF PRESENT ILLNESS
[FreeTextEntry1] : Patient is here for follow up, accompanied by daughter and . Patient was seen by Dr. Cid continues to receive Avastin. Next appointment with Dr. Cid and Avastin treatment scheduled for 2/18/20. Patient c/o gum irritation, taking gabapentin with some relief. Denies headache N/V/D. Patient has PET scan 1/22/2020 and MRI 1/15/2020.  Composite Graft Text: The defect edges were debeveled with a #15 scalpel blade.  Given the location of the defect, shape of the defect, the proximity to free margins and the fact the defect was full thickness a composite graft was deemed most appropriate.  The defect was outline and then transferred to the donor site.  A full thickness graft was then excised from the donor site. The graft was then placed in the primary defect, oriented appropriately and then sutured into place.  The secondary defect was then repaired using a primary closure.

## 2023-08-21 NOTE — REASON FOR VISIT
Follow-up with your primary care provider in regards to today's visit within the next several days.       You may need to talk to your doctor about referral to a cardiologist for further testing outpatient or your primary care doctor may be able to arrange the testing themselves as well      Return to the emergency department in regards to today's visit if any of the following symptoms emerge, including, but not limited to, loss of consciousness, nausea and vomiting that do not go away, crushing chest pain, heavy pressure sensation in the middle of the chest, chest pain with any of the following features:  Pain radiating to the back/neck/jaw/either arm, shortness of breath, sweating, dizziness/lightheadedness, tearing pain going to the back, or general worsening of today's symptoms.    [Follow-Up Visit] : a follow-up [FreeTextEntry2] : endometrial cancer

## 2024-08-22 NOTE — PATIENT PROFILE ADULT. - COMFORT LEVEL, ACCEPTABLE
Anesthesia Evaluation     Patient summary reviewed and Nursing notes reviewed   NPO Solid Status: > 8 hours  NPO Liquid Status: > 4 hours           Airway   Mallampati: I  TM distance: >3 FB  Possible difficult intubation and Large neck circumference  Dental - normal exam     Pulmonary - normal exam   (+) asthma,shortness of breath (with exertion)  Cardiovascular - normal exam  Exercise tolerance: poor (<4 METS)        Neuro/Psych  GI/Hepatic/Renal/Endo    (+) GERD, GI bleeding , liver disease fatty liver disease, diabetes mellitus, thyroid problem hypothyroidism and thyroid nodules    Musculoskeletal     Abdominal   (+) obese   Substance History      OB/GYN          Other   arthritis,     ROS/Med Hx Other: Hx hysterectomy                    Anesthesia Plan    ASA 2     general   total IV anesthesia  (Total IV Anesthesia    Patient understands anesthesia not responsible for dental damage.  )  intravenous induction     Anesthetic plan, risks, benefits, and alternatives have been provided, discussed and informed consent has been obtained with: patient.    Plan discussed with CRNA.        CODE STATUS:         
2

## 2024-09-09 NOTE — ASU PREOP CHECKLIST - IDENTIFICATION BAND VERIFIED
Physical Therapy Discharge    Visit Type: Discharge Summary- Daily Treatment Note  Visit: 6  Referring Provider: Sindhu Renteria MD  Medical Diagnosis (from order): M25.511 - Acute pain of right shoulder     SUBJECTIVE                                                                                                               Pt stated he is more sore today and he thinks it was due to laying on the Right side.  Pt has pain up to 9/10 today.    Current Functional Limitations: -overhead reaching  -lifting  -carrying    Pain / Symptoms  - Pain rating (out of 10): Current: 9 ; Best: 2; Worst: 9      OBJECTIVE                                                                                                                     Range of Motion (ROM)   (degrees unless noted; active unless noted; norms in ( ); negative=lacking to 0, positive=beyond 0)  Shoulder:    - Flexion (180):         Right: 118 pain    - Abduction (180):         Right: 99 pain    - Internal Rotation:        - Behind Back:            Right: L3    - External Rotation:       - Behind the Head:            Right: C7     Strength  (out of 5 unless noted, standard test position unless noted)   Shoulder:     - Flexion:          Right: 4     - Abduction:         Right: 4-    - Internal Rotation:           Right (at 0°): 4    - External Rotation:          Right (at 0°): 4   Comments / Details: Strength scores are within the available range.                       Outcome/Assessments  Outcome Measures:   Quick Disabilities of the Arm, Shoulder and Hand: QuickDash Total Score (Score will not calculate if more then 2 questions are left blank): 40.91   (scored 0-100; a higher score indicates greater disability) see flowsheet for additional documentation      Treatment     Therapeutic Exercise  -UBE: 6', level #1, FWD/retro  -Pulleys: 1' flexion, 1' scaption - 5\" hold  -Wall slides: x5 reps, 5\" hold, flexion AAROM    -Sitting AAROM: x5 reps into shoulder flexion  (Left hand grabbing Right wrist)    - Standing Shoulder Flexion AAROM with stick: x10 reps, 5\" hold    -Chest press: 2x10 reps with 2# of weight    - Rows: 2x10 reps, RTB  -Floor to waist lift: x2 reps of 10# from floor to waist (pt states he is mostly using Left UE)    HEP Education, when to discontinue exercises if warranted      Skilled input: verbal instruction/cues, tactile instruction/cues and posture correction    Writer verbally educated and received verbal consent for hand placement, positioning of patient, and techniques to be performed today from patient for hand placement and palpation for techniques and therapist position for techniques as described above and how they are pertinent to the patient's plan of care.  Home Exercise Program  Access Code: [email or web address]  URL: [email or web address]  Date: 07/17/2024  Prepared by: Sergey El    Exercises  - Supine Shoulder Flexion AAROM with Hands Clasped  - 2 x daily - 7 x weekly - 10 reps - 5\" hold  - Standing Shoulder External Rotation AAROM with Dowel  - 2 x daily - 7 x weekly - 10 reps - 5\" hold  - Seated Scapular Retraction  - 2 x daily - 7 x weekly - 10 reps - 5\" hold      ASSESSMENT                                                                                                            Pt has attended 6 visits of PT for Right shoulder pain after a fall.  Pt did improve in AROM of the Right shoulder.  Pt has tears throughout the Right rotator cuff and labrum which likely are limiting further improvement.  Pt functionally can complete most activities and has assistance available if needed.  Pt continues to get dialysis and overall has multiple other medical concerns.  Pt does have a new PT order for general deconditioning and will start care for that next week.  See goal status below.    Education:   - Results of above outlined education: Verbalizes understanding and Demonstrates understanding    PLAN                                                                                                                            Discharge from skilled therapy with instructions/recommendations to: continue home exercise program, follow up with referring provider    Goals  Long Term Goals: to be met by end of plan of care  1. Patient will reach overhead without compensatory techniques, with patient reported manageable/tolerable difficulty for moving dishes/objects in and out of cabinets.  Status: partially met  Pt can complete at times but overall range is limited  2. Patient will complete upper body dressing without compensatory techniques independently, with patient reported manageable/tolerable difficulty.  Status: partially met Most of the days he can complete with tolerable symptoms but does have increased pain at times.   3. Patient/Client will be able to lift 10# pounds from floor to waist with proper body mechanics to assist with lifting activities at home of groceries and laundry.   Status: met   4. Quick DASH: Patient will score 42 or lower on The Quick DASH to indicate a decreased level of impairment with lifting, carrying, household and self-care activity. (minimal clinically important difference: 14 of calculated score)  Status: met   5. Patient will be independent with progressed and modified home exercise program. Status: met       Therapy procedure time and total treatment time can be found documented on the Time Entry flowsheet     done

## 2025-01-17 NOTE — DISCHARGE NOTE ADULT - NS TRANSFER EYEGLASSES PAIRS
Things Discussed at Today's Visit:    Referral for podiatry for nail care.   Symone from pharmacy and restart to help better monitor BG.  Early morning fasting labs at patient convenience.    Touch base with diabetes education for follow-up    Medication Changes:  Continue Lantus 15 units daily  Continue Humalog 6-10 units - since not monitoring BG regularly adjust dosage based on meal size and composition.   Start Metformin 500 mg - Take one tablet daily for 1 week then increase to 2 tablets daily      Continue Lisinopril 40 mg  Continue Atorvastatin 80 mg    I also recommend using Skintac under the sensor to help it stick better. You can buy this on [employer] or from [facility]. Put it on before you put on the Symone. You can also use an overlay patch to help it stay in place better        1 pair

## 2025-04-09 NOTE — ASU PREOP CHECKLIST - VIA
stretcher